# Patient Record
Sex: FEMALE | Race: WHITE | NOT HISPANIC OR LATINO | Employment: OTHER | URBAN - METROPOLITAN AREA
[De-identification: names, ages, dates, MRNs, and addresses within clinical notes are randomized per-mention and may not be internally consistent; named-entity substitution may affect disease eponyms.]

---

## 2017-03-21 ENCOUNTER — RESULTS ENCOUNTER (OUTPATIENT)
Dept: FAMILY MEDICINE CLINIC | Facility: CLINIC | Age: 72
End: 2017-03-21

## 2017-03-21 DIAGNOSIS — M13.0 ARTHRITIS OF MULTIPLE SITES: ICD-10-CM

## 2017-03-21 DIAGNOSIS — E03.9 HYPOTHYROIDISM, ACQUIRED: ICD-10-CM

## 2017-03-21 DIAGNOSIS — R73.01 IMPAIRED FASTING GLUCOSE: ICD-10-CM

## 2017-03-21 DIAGNOSIS — E78.2 MIXED HYPERLIPIDEMIA: ICD-10-CM

## 2017-03-21 DIAGNOSIS — G47.09 OTHER INSOMNIA: ICD-10-CM

## 2017-03-21 DIAGNOSIS — F41.9 ANXIETY: ICD-10-CM

## 2017-04-26 DIAGNOSIS — F41.9 ANXIETY: ICD-10-CM

## 2017-04-26 RX ORDER — PAROXETINE HYDROCHLORIDE 20 MG/1
TABLET, FILM COATED ORAL
Qty: 30 TABLET | Refills: 0 | Status: SHIPPED | OUTPATIENT
Start: 2017-04-26 | End: 2017-06-22 | Stop reason: SDUPTHER

## 2017-05-24 DIAGNOSIS — F41.9 ANXIETY: ICD-10-CM

## 2017-05-24 RX ORDER — PAROXETINE HYDROCHLORIDE 20 MG/1
TABLET, FILM COATED ORAL
Qty: 30 TABLET | Refills: 0 | OUTPATIENT
Start: 2017-05-24

## 2017-06-22 ENCOUNTER — OFFICE VISIT (OUTPATIENT)
Dept: FAMILY MEDICINE CLINIC | Facility: CLINIC | Age: 72
End: 2017-06-22

## 2017-06-22 VITALS
HEIGHT: 67 IN | RESPIRATION RATE: 16 BRPM | HEART RATE: 71 BPM | DIASTOLIC BLOOD PRESSURE: 80 MMHG | BODY MASS INDEX: 32.8 KG/M2 | WEIGHT: 209 LBS | OXYGEN SATURATION: 95 % | TEMPERATURE: 98.2 F | SYSTOLIC BLOOD PRESSURE: 140 MMHG

## 2017-06-22 DIAGNOSIS — M13.0 ARTHRITIS OF MULTIPLE SITES: ICD-10-CM

## 2017-06-22 DIAGNOSIS — E78.2 MIXED HYPERLIPIDEMIA: ICD-10-CM

## 2017-06-22 DIAGNOSIS — G47.09 OTHER INSOMNIA: ICD-10-CM

## 2017-06-22 DIAGNOSIS — H91.93 DECREASED HEARING, BILATERAL: Primary | ICD-10-CM

## 2017-06-22 DIAGNOSIS — E03.9 HYPOTHYROIDISM, ACQUIRED: ICD-10-CM

## 2017-06-22 DIAGNOSIS — F41.9 ANXIETY: ICD-10-CM

## 2017-06-22 PROCEDURE — 99214 OFFICE O/P EST MOD 30 MIN: CPT | Performed by: FAMILY MEDICINE

## 2017-06-22 RX ORDER — LEVOTHYROXINE SODIUM 0.12 MG/1
125 TABLET ORAL DAILY
Qty: 90 TABLET | Refills: 1 | Status: SHIPPED | OUTPATIENT
Start: 2017-06-22 | End: 2017-09-28 | Stop reason: SDUPTHER

## 2017-06-22 RX ORDER — PAROXETINE HYDROCHLORIDE 20 MG/1
20 TABLET, FILM COATED ORAL EVERY MORNING
Qty: 90 TABLET | Refills: 1 | Status: SHIPPED | OUTPATIENT
Start: 2017-06-22 | End: 2017-10-13 | Stop reason: ALTCHOICE

## 2017-06-22 RX ORDER — TRAZODONE HYDROCHLORIDE 150 MG/1
150 TABLET ORAL NIGHTLY
Qty: 90 TABLET | Refills: 1 | Status: SHIPPED | OUTPATIENT
Start: 2017-06-22 | End: 2017-09-28 | Stop reason: SDUPTHER

## 2017-06-22 RX ORDER — TRAZODONE HYDROCHLORIDE 150 MG/1
TABLET ORAL
Qty: 90 TABLET | Refills: 0 | OUTPATIENT
Start: 2017-06-22

## 2017-06-22 RX ORDER — DICLOFENAC SODIUM 75 MG/1
75 TABLET, DELAYED RELEASE ORAL DAILY
Qty: 90 TABLET | Refills: 1 | Status: SHIPPED | OUTPATIENT
Start: 2017-06-22 | End: 2017-10-13 | Stop reason: SDUPTHER

## 2017-06-22 RX ORDER — SIMVASTATIN 20 MG
20 TABLET ORAL NIGHTLY
Qty: 90 TABLET | Refills: 1 | Status: SHIPPED | OUTPATIENT
Start: 2017-06-22 | End: 2017-09-28 | Stop reason: SDUPTHER

## 2017-06-22 NOTE — PATIENT INSTRUCTIONS
Exercise 30 minutes most days of the week  Sleep 6-8 hours each night if possible  Low fat, low cholesterol diet   we discussed prescribed medications and how to take them   make sure you get results of any labs/studies ordered today  Low glycemic index diet     Do labs

## 2017-06-22 NOTE — PROGRESS NOTES
Subjective   Claudette L McManus is a 72 y.o. female.     History of Present Illness   Chief Complaint:   Chief Complaint   Patient presents with   • Hypertension   • Hyperlipidemia   • Hypothyroidism   • Anxiety   • Hyperglycemia       Claudette L McManus 72 y.o. female who presents today for Medical Management of the below listed issues and medication refills. Patient did not have labs as requested prior to her appointment. She stated that she is having decreased hearing and wishes to see a specialist for this issue.  Arthritis worse refer to dr delgadillo.  she has a history of   Patient Active Problem List   Diagnosis   • Acute pharyngitis   • Anxiety disorder   • Arthritis of multiple sites   • Back pain   • Bell's palsy   • Depression   • Essential hypertension   • Hyperlipidemia   • Hypothyroidism, acquired   • Impaired fasting glucose   • Insomnia   • Lumbar pain   • Sinusitis, acute   • Extremity pain   • OP (osteoporosis)   .  Since the last visit, she has overall felt well.  she has been compliant with   Current Outpatient Prescriptions:   •  aspirin 81 MG EC tablet, Take 81 mg by mouth daily. Take 1 tablet by oral route daily, Disp: , Rfl:   •  Calcium Carb-Cholecalciferol (CALCIUM + D3 PO), Take 1 tablet by mouth daily., Disp: , Rfl:   •  Cholecalciferol (VITAMIN D3) 2000 UNITS tablet, Take 1 capsule by mouth daily., Disp: , Rfl:   •  desonide (DESOWEN) 0.05 % ointment, Apply sparingly and rub gently into the affected area(s) by topical route 2 times per day for 10 days, Disp: , Rfl:   •  diclofenac (VOLTAREN) 75 MG EC tablet, Take 1 tablet by mouth Daily., Disp: 90 tablet, Rfl: 1  •  HYDROcodone-acetaminophen (NORCO) 5-325 MG per tablet, Take 1 tablet by mouth every 6 (six) hours as needed for moderate pain (4-6)., Disp: 60 tablet, Rfl: 0  •  levothyroxine (SYNTHROID, LEVOTHROID) 125 MCG tablet, Take 1 tablet by mouth Daily., Disp: 90 tablet, Rfl: 1  •  Multiple Vitamin (MULTIVITAMIN) tablet, Take 1 tablet  "by mouth daily., Disp: , Rfl:   •  ofloxacin (OCUFLOX) 0.3 % ophthalmic solution, , Disp: , Rfl:   •  PARoxetine (PAXIL) 20 MG tablet, TAKE ONE TABLET BY MOUTH EVERY MORNING, Disp: 30 tablet, Rfl: 0  •  simvastatin (ZOCOR) 20 MG tablet, Take 1 tablet by mouth Every Night., Disp: 90 tablet, Rfl: 1  •  traMADol (ULTRAM) 50 MG tablet, Take 1 tablet by mouth every 6 (six) hours as needed for moderate pain (4-6)., Disp: 60 tablet, Rfl: 0  •  traZODone (DESYREL) 150 MG tablet, Take 1 tablet by mouth Every Night., Disp: 90 tablet, Rfl: 0.  she denies medication side effects.    All of the chronic condition(s) listed above are stable w/o issues.    /94  Pulse 71  Temp 98.2 °F (36.8 °C) (Oral)   Resp 16  Ht 67\" (170.2 cm)  Wt 209 lb (94.8 kg)  SpO2 95%  BMI 32.73 kg/m2      The following portions of the patient's history were reviewed and updated as appropriate: allergies, current medications, past family history, past medical history, past social history, past surgical history and problem list.    Review of Systems   Constitutional: Negative for activity change, appetite change and unexpected weight change.   HENT: Positive for hearing loss.    Eyes: Negative for visual disturbance.   Respiratory: Negative for chest tightness and shortness of breath.    Cardiovascular: Negative for chest pain and palpitations.   Skin: Negative for color change.   Neurological: Negative for syncope and speech difficulty.   Psychiatric/Behavioral: Negative for confusion and decreased concentration.       Objective   Physical Exam   Constitutional: She is oriented to person, place, and time. She appears well-developed and well-nourished.   HENT:   Right Ear: External ear normal.   Left Ear: External ear normal.   Eyes: Pupils are equal, round, and reactive to light.   Neck: Normal range of motion. Neck supple. No thyromegaly present.   Cardiovascular: Normal rate and regular rhythm.    Pulmonary/Chest: Effort normal and breath " sounds normal.   Abdominal: Soft. Bowel sounds are normal.   Musculoskeletal:   Arthritis  Hands worse   Neurological: She is oriented to person, place, and time.   stress   Skin: No rash noted.   Psychiatric: She has a normal mood and affect. Her behavior is normal. Judgment and thought content normal.   Nursing note and vitals reviewed.      Assessment/Plan   Claudette was seen today for hypertension, hyperlipidemia, hypothyroidism, anxiety, hyperglycemia, arthritis and hearing problem.    Diagnoses and all orders for this visit:    Decreased hearing, bilateral  -     Ambulatory Referral to ENT (Otolaryngology)    Anxiety  -     PARoxetine (PAXIL) 20 MG tablet; Take 1 tablet by mouth Every Morning.    Mixed hyperlipidemia  -     simvastatin (ZOCOR) 20 MG tablet; Take 1 tablet by mouth Every Night.    Other insomnia  -     traZODone (DESYREL) 150 MG tablet; Take 1 tablet by mouth Every Night.    Hypothyroidism, acquired  -     levothyroxine (SYNTHROID, LEVOTHROID) 125 MCG tablet; Take 1 tablet by mouth Daily.    Arthritis of multiple sites  -     diclofenac (VOLTAREN) 75 MG EC tablet; Take 1 tablet by mouth Daily.  -     Ambulatory Referral to Rheumatology

## 2017-06-28 LAB
ALBUMIN SERPL-MCNC: 4.5 G/DL (ref 3.5–5.2)
ALBUMIN/GLOB SERPL: 2 G/DL
ALP SERPL-CCNC: 75 U/L (ref 39–117)
ALT SERPL-CCNC: 25 U/L (ref 1–33)
AST SERPL-CCNC: 27 U/L (ref 1–32)
BASOPHILS # BLD AUTO: 0.03 10*3/MM3 (ref 0–0.2)
BASOPHILS NFR BLD AUTO: 0.4 % (ref 0–1.5)
BILIRUB SERPL-MCNC: 0.3 MG/DL (ref 0.1–1.2)
BUN SERPL-MCNC: 14 MG/DL (ref 8–23)
BUN/CREAT SERPL: 15.1 (ref 7–25)
CALCIUM SERPL-MCNC: 10.3 MG/DL (ref 8.6–10.5)
CHLORIDE SERPL-SCNC: 98 MMOL/L (ref 98–107)
CHOLEST SERPL-MCNC: 203 MG/DL (ref 0–200)
CO2 SERPL-SCNC: 27.8 MMOL/L (ref 22–29)
CREAT SERPL-MCNC: 0.93 MG/DL (ref 0.57–1)
EOSINOPHIL # BLD AUTO: 0.34 10*3/MM3 (ref 0–0.7)
EOSINOPHIL NFR BLD AUTO: 4.4 % (ref 0.3–6.2)
ERYTHROCYTE [DISTWIDTH] IN BLOOD BY AUTOMATED COUNT: 13.3 % (ref 11.7–13)
GLOBULIN SER CALC-MCNC: 2.3 GM/DL
GLUCOSE SERPL-MCNC: 111 MG/DL (ref 65–99)
HBA1C MFR BLD: 6.4 % (ref 4.8–5.6)
HCT VFR BLD AUTO: 38.6 % (ref 35.6–45.5)
HDLC SERPL-MCNC: 59 MG/DL (ref 40–60)
HGB BLD-MCNC: 12.7 G/DL (ref 11.9–15.5)
IMM GRANULOCYTES # BLD: 0.02 10*3/MM3 (ref 0–0.03)
IMM GRANULOCYTES NFR BLD: 0.3 % (ref 0–0.5)
LDLC SERPL CALC-MCNC: 111 MG/DL (ref 0–100)
LYMPHOCYTES # BLD AUTO: 1.93 10*3/MM3 (ref 0.9–4.8)
LYMPHOCYTES NFR BLD AUTO: 24.8 % (ref 19.6–45.3)
MCH RBC QN AUTO: 32.2 PG (ref 26.9–32)
MCHC RBC AUTO-ENTMCNC: 32.9 G/DL (ref 32.4–36.3)
MCV RBC AUTO: 98 FL (ref 80.5–98.2)
MONOCYTES # BLD AUTO: 0.42 10*3/MM3 (ref 0.2–1.2)
MONOCYTES NFR BLD AUTO: 5.4 % (ref 5–12)
NEUTROPHILS # BLD AUTO: 5.04 10*3/MM3 (ref 1.9–8.1)
NEUTROPHILS NFR BLD AUTO: 64.7 % (ref 42.7–76)
PLATELET # BLD AUTO: 230 10*3/MM3 (ref 140–500)
POTASSIUM SERPL-SCNC: 4.6 MMOL/L (ref 3.5–5.2)
PROT SERPL-MCNC: 6.8 G/DL (ref 6–8.5)
RBC # BLD AUTO: 3.94 10*6/MM3 (ref 3.9–5.2)
SODIUM SERPL-SCNC: 141 MMOL/L (ref 136–145)
TRIGL SERPL-MCNC: 164 MG/DL (ref 0–150)
TSH SERPL DL<=0.005 MIU/L-ACNC: 2.48 MIU/ML (ref 0.27–4.2)
VLDLC SERPL CALC-MCNC: 32.8 MG/DL (ref 5–40)
WBC # BLD AUTO: 7.78 10*3/MM3 (ref 4.5–10.7)

## 2017-07-13 DIAGNOSIS — I10 ESSENTIAL HYPERTENSION: Primary | ICD-10-CM

## 2017-07-13 DIAGNOSIS — R73.01 IMPAIRED FASTING GLUCOSE: ICD-10-CM

## 2017-07-13 DIAGNOSIS — E03.9 HYPOTHYROIDISM, ACQUIRED: ICD-10-CM

## 2017-07-13 DIAGNOSIS — E78.5 HYPERLIPIDEMIA, UNSPECIFIED HYPERLIPIDEMIA TYPE: ICD-10-CM

## 2017-09-28 DIAGNOSIS — G47.09 OTHER INSOMNIA: ICD-10-CM

## 2017-09-28 DIAGNOSIS — E03.9 HYPOTHYROIDISM, ACQUIRED: ICD-10-CM

## 2017-09-28 DIAGNOSIS — E78.2 MIXED HYPERLIPIDEMIA: ICD-10-CM

## 2017-09-28 RX ORDER — LEVOTHYROXINE SODIUM 0.12 MG/1
125 TABLET ORAL DAILY
Qty: 30 TABLET | Refills: 0 | Status: SHIPPED | OUTPATIENT
Start: 2017-09-28 | End: 2017-10-13 | Stop reason: SDUPTHER

## 2017-09-28 RX ORDER — TRAZODONE HYDROCHLORIDE 150 MG/1
150 TABLET ORAL NIGHTLY
Qty: 30 TABLET | Refills: 0 | Status: SHIPPED | OUTPATIENT
Start: 2017-09-28 | End: 2017-10-13 | Stop reason: SDUPTHER

## 2017-09-28 RX ORDER — SIMVASTATIN 20 MG
20 TABLET ORAL NIGHTLY
Qty: 30 TABLET | Refills: 0 | Status: SHIPPED | OUTPATIENT
Start: 2017-09-28 | End: 2017-10-13 | Stop reason: SDUPTHER

## 2017-09-28 RX ORDER — SIMVASTATIN 20 MG
TABLET ORAL
Qty: 90 TABLET | Refills: 0 | OUTPATIENT
Start: 2017-09-28

## 2017-09-28 RX ORDER — LEVOTHYROXINE SODIUM 0.12 MG/1
TABLET ORAL
Qty: 90 TABLET | Refills: 0 | OUTPATIENT
Start: 2017-09-28

## 2017-09-28 RX ORDER — TRAZODONE HYDROCHLORIDE 150 MG/1
TABLET ORAL
Qty: 90 TABLET | Refills: 0 | OUTPATIENT
Start: 2017-09-28

## 2017-10-13 ENCOUNTER — OFFICE VISIT (OUTPATIENT)
Dept: FAMILY MEDICINE CLINIC | Facility: CLINIC | Age: 72
End: 2017-10-13

## 2017-10-13 VITALS
BODY MASS INDEX: 32.33 KG/M2 | WEIGHT: 206 LBS | RESPIRATION RATE: 16 BRPM | TEMPERATURE: 98.8 F | DIASTOLIC BLOOD PRESSURE: 80 MMHG | HEIGHT: 67 IN | OXYGEN SATURATION: 93 % | HEART RATE: 86 BPM | SYSTOLIC BLOOD PRESSURE: 130 MMHG

## 2017-10-13 DIAGNOSIS — E78.2 MIXED HYPERLIPIDEMIA: ICD-10-CM

## 2017-10-13 DIAGNOSIS — M13.0 ARTHRITIS OF MULTIPLE SITES: ICD-10-CM

## 2017-10-13 DIAGNOSIS — G47.09 OTHER INSOMNIA: ICD-10-CM

## 2017-10-13 DIAGNOSIS — F41.9 ANXIETY: Primary | ICD-10-CM

## 2017-10-13 DIAGNOSIS — Z12.39 SCREENING FOR BREAST CANCER: ICD-10-CM

## 2017-10-13 DIAGNOSIS — E03.9 HYPOTHYROIDISM, ACQUIRED: ICD-10-CM

## 2017-10-13 PROCEDURE — 99214 OFFICE O/P EST MOD 30 MIN: CPT | Performed by: FAMILY MEDICINE

## 2017-10-13 PROCEDURE — G0008 ADMIN INFLUENZA VIRUS VAC: HCPCS | Performed by: FAMILY MEDICINE

## 2017-10-13 RX ORDER — BUSPIRONE HYDROCHLORIDE 5 MG/1
5 TABLET ORAL 3 TIMES DAILY
Qty: 90 TABLET | Refills: 0 | Status: SHIPPED | OUTPATIENT
Start: 2017-10-13 | End: 2017-11-02 | Stop reason: SDUPTHER

## 2017-10-13 RX ORDER — DICLOFENAC SODIUM 75 MG/1
75 TABLET, DELAYED RELEASE ORAL DAILY
Qty: 90 TABLET | Refills: 1 | Status: SHIPPED | OUTPATIENT
Start: 2017-10-13 | End: 2019-01-15

## 2017-10-13 RX ORDER — SIMVASTATIN 20 MG
20 TABLET ORAL NIGHTLY
Qty: 90 TABLET | Refills: 1 | Status: SHIPPED | OUTPATIENT
Start: 2017-10-13 | End: 2018-05-04 | Stop reason: SDUPTHER

## 2017-10-13 RX ORDER — TRAZODONE HYDROCHLORIDE 150 MG/1
150 TABLET ORAL NIGHTLY
Qty: 90 TABLET | Refills: 1 | Status: SHIPPED | OUTPATIENT
Start: 2017-10-13 | End: 2018-05-04 | Stop reason: SDUPTHER

## 2017-10-13 RX ORDER — DULOXETIN HYDROCHLORIDE 30 MG/1
30 CAPSULE, DELAYED RELEASE ORAL DAILY
COMMUNITY
Start: 2017-09-26 | End: 2017-12-08 | Stop reason: DRUGHIGH

## 2017-10-13 RX ORDER — LEVOTHYROXINE SODIUM 0.12 MG/1
125 TABLET ORAL DAILY
Qty: 90 TABLET | Refills: 1 | Status: SHIPPED | OUTPATIENT
Start: 2017-10-13 | End: 2018-05-04 | Stop reason: SDUPTHER
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
## 2017-10-13 NOTE — PATIENT INSTRUCTIONS
Exercise 30 minutes most days of the week  Sleep 6-8 hours each night if possible  Low fat, low cholesterol diet   we discussed prescribed medications and how to take them   make sure you get results of any labs/studies ordered today  Low glycemic index diet   Labs due  Call me 3 weeks about buspar

## 2017-10-23 ENCOUNTER — HOSPITAL ENCOUNTER (OUTPATIENT)
Dept: MAMMOGRAPHY | Facility: HOSPITAL | Age: 72
Discharge: HOME | End: 2017-10-23

## 2017-11-01 ENCOUNTER — RESULTS ENCOUNTER (OUTPATIENT)
Dept: FAMILY MEDICINE CLINIC | Facility: CLINIC | Age: 72
End: 2017-11-01

## 2017-11-01 DIAGNOSIS — R73.01 IMPAIRED FASTING GLUCOSE: ICD-10-CM

## 2017-11-01 DIAGNOSIS — I10 ESSENTIAL HYPERTENSION: ICD-10-CM

## 2017-11-01 DIAGNOSIS — E78.5 HYPERLIPIDEMIA, UNSPECIFIED HYPERLIPIDEMIA TYPE: ICD-10-CM

## 2017-11-01 DIAGNOSIS — E03.9 HYPOTHYROIDISM, ACQUIRED: ICD-10-CM

## 2017-11-02 RX ORDER — BUSPIRONE HYDROCHLORIDE 5 MG/1
5 TABLET ORAL 3 TIMES DAILY
Qty: 90 TABLET | Refills: 4 | Status: SHIPPED | OUTPATIENT
Start: 2017-11-02 | End: 2018-04-16 | Stop reason: SDUPTHER

## 2017-12-01 LAB
ALBUMIN SERPL-MCNC: 4.4 G/DL (ref 3.5–5.2)
ALBUMIN/GLOB SERPL: 1.5 G/DL
ALP SERPL-CCNC: 82 U/L (ref 39–117)
ALT SERPL-CCNC: 22 U/L (ref 1–33)
AST SERPL-CCNC: 22 U/L (ref 1–32)
BILIRUB SERPL-MCNC: 0.4 MG/DL (ref 0.1–1.2)
BUN SERPL-MCNC: 22 MG/DL (ref 8–23)
BUN/CREAT SERPL: 21.8 (ref 7–25)
CALCIUM SERPL-MCNC: 10.3 MG/DL (ref 8.6–10.5)
CHLORIDE SERPL-SCNC: 101 MMOL/L (ref 98–107)
CHOLEST SERPL-MCNC: 194 MG/DL (ref 0–200)
CO2 SERPL-SCNC: 28.2 MMOL/L (ref 22–29)
CREAT SERPL-MCNC: 1.01 MG/DL (ref 0.57–1)
GFR SERPLBLD CREATININE-BSD FMLA CKD-EPI: 54 ML/MIN/1.73
GFR SERPLBLD CREATININE-BSD FMLA CKD-EPI: 65 ML/MIN/1.73
GLOBULIN SER CALC-MCNC: 3 GM/DL
GLUCOSE SERPL-MCNC: 132 MG/DL (ref 65–99)
HBA1C MFR BLD: 6.6 % (ref 4.8–5.6)
HDLC SERPL-MCNC: 62 MG/DL (ref 40–60)
LDLC SERPL CALC-MCNC: 106 MG/DL (ref 0–100)
POTASSIUM SERPL-SCNC: 4.3 MMOL/L (ref 3.5–5.2)
PROT SERPL-MCNC: 7.4 G/DL (ref 6–8.5)
SODIUM SERPL-SCNC: 142 MMOL/L (ref 136–145)
TRIGL SERPL-MCNC: 130 MG/DL (ref 0–150)
VLDLC SERPL CALC-MCNC: 26 MG/DL (ref 5–40)

## 2017-12-08 ENCOUNTER — OFFICE VISIT (OUTPATIENT)
Dept: FAMILY MEDICINE CLINIC | Facility: CLINIC | Age: 72
End: 2017-12-08

## 2017-12-08 VITALS
HEIGHT: 67 IN | BODY MASS INDEX: 32.33 KG/M2 | HEART RATE: 89 BPM | SYSTOLIC BLOOD PRESSURE: 160 MMHG | OXYGEN SATURATION: 92 % | WEIGHT: 206 LBS | TEMPERATURE: 97.8 F | RESPIRATION RATE: 16 BRPM | DIASTOLIC BLOOD PRESSURE: 94 MMHG

## 2017-12-08 DIAGNOSIS — R73.01 IMPAIRED FASTING GLUCOSE: Primary | ICD-10-CM

## 2017-12-08 DIAGNOSIS — I10 ESSENTIAL HYPERTENSION: ICD-10-CM

## 2017-12-08 DIAGNOSIS — F41.9 ANXIETY DISORDER, UNSPECIFIED TYPE: ICD-10-CM

## 2017-12-08 DIAGNOSIS — E78.2 MIXED HYPERLIPIDEMIA: ICD-10-CM

## 2017-12-08 PROCEDURE — 99214 OFFICE O/P EST MOD 30 MIN: CPT | Performed by: FAMILY MEDICINE

## 2017-12-08 RX ORDER — DULOXETIN HYDROCHLORIDE 60 MG/1
CAPSULE, DELAYED RELEASE ORAL
COMMUNITY
Start: 2017-11-17 | End: 2019-03-07 | Stop reason: SDUPTHER

## 2017-12-08 NOTE — PROGRESS NOTES
Subjective   Claudette L McManus is a 72 y.o. female.     History of Present Illness   Chief Complaint:   Chief Complaint   Patient presents with   • Anxiety   • Hypertension   • Hyperlipidemia   • impaired fasting glucose       Claudette L McManus 72 y.o. female who presents today for Medical Management of the below listed issues and to follow up on increased anxiety. At her last appointment I added buspar 5mg tid prn for her anxiety. She stated that this has helped her. Her Rheumatologist increased her Cymbalta from 30 mg to 60 mg. I reviewed her lab results.    she has a problem list of   Patient Active Problem List   Diagnosis   • Acute pharyngitis   • Anxiety disorder   • Arthritis of multiple sites   • Back pain   • Bell's palsy   • Depression   • Essential hypertension   • Hyperlipidemia   • Hypothyroidism, acquired   • Impaired fasting glucose   • Insomnia   • Lumbar pain   • Sinusitis, acute   • Extremity pain   • OP (osteoporosis)   .  Since the last visit, she has overall felt well.  she has been compliant with   Current Outpatient Prescriptions:   •  busPIRone (BUSPAR) 5 MG tablet, Take 1 tablet by mouth 3 (Three) Times a Day., Disp: 90 tablet, Rfl: 4  •  Calcium Carb-Cholecalciferol (CALCIUM + D3 PO), Take 1 tablet by mouth daily., Disp: , Rfl:   •  Cholecalciferol (VITAMIN D3) 2000 UNITS tablet, Take 1 capsule by mouth daily., Disp: , Rfl:   •  diclofenac (VOLTAREN) 75 MG EC tablet, Take 1 tablet by mouth Daily., Disp: 90 tablet, Rfl: 1  •  DULoxetine (CYMBALTA) 60 MG capsule, , Disp: , Rfl:   •  levothyroxine (SYNTHROID, LEVOTHROID) 125 MCG tablet, Take 1 tablet by mouth Daily., Disp: 90 tablet, Rfl: 1  •  Multiple Vitamin (MULTIVITAMIN) tablet, Take 1 tablet by mouth daily., Disp: , Rfl:   •  simvastatin (ZOCOR) 20 MG tablet, Take 1 tablet by mouth Every Night., Disp: 90 tablet, Rfl: 1  •  traZODone (DESYREL) 150 MG tablet, Take 1 tablet by mouth Every Night., Disp: 90 tablet, Rfl: 1.  she denies  "medication side effects.    All of the chronic condition(s) listed above are stable w/o issues.    /94  Pulse 89  Temp 97.8 °F (36.6 °C) (Oral)   Resp 16  Ht 170.2 cm (67\")  Wt 93.4 kg (206 lb)  SpO2 92%  BMI 32.26 kg/m2    Results for orders placed or performed in visit on 11/01/17   Comprehensive metabolic panel   Result Value Ref Range    Glucose 132 (H) 65 - 99 mg/dL    BUN 22 8 - 23 mg/dL    Creatinine 1.01 (H) 0.57 - 1.00 mg/dL    eGFR Non African Am 54 (L) >60 mL/min/1.73    eGFR African Am 65 >60 mL/min/1.73    BUN/Creatinine Ratio 21.8 7.0 - 25.0    Sodium 142 136 - 145 mmol/L    Potassium 4.3 3.5 - 5.2 mmol/L    Chloride 101 98 - 107 mmol/L    Total CO2 28.2 22.0 - 29.0 mmol/L    Calcium 10.3 8.6 - 10.5 mg/dL    Total Protein 7.4 6.0 - 8.5 g/dL    Albumin 4.40 3.50 - 5.20 g/dL    Globulin 3.0 gm/dL    A/G Ratio 1.5 g/dL    Total Bilirubin 0.4 0.1 - 1.2 mg/dL    Alkaline Phosphatase 82 39 - 117 U/L    AST (SGOT) 22 1 - 32 U/L    ALT (SGPT) 22 1 - 33 U/L   Lipid panel   Result Value Ref Range    Total Cholesterol 194 0 - 200 mg/dL    Triglycerides 130 0 - 150 mg/dL    HDL Cholesterol 62 (H) 40 - 60 mg/dL    VLDL Cholesterol 26 5 - 40 mg/dL    LDL Cholesterol  106 (H) 0 - 100 mg/dL   Hemoglobin A1c   Result Value Ref Range    Hemoglobin A1C 6.60 (H) 4.80 - 5.60 %           The following portions of the patient's history were reviewed and updated as appropriate: allergies, current medications, past family history, past medical history, past social history, past surgical history and problem list.    Review of Systems   Constitutional: Negative for activity change, appetite change and unexpected weight change.   Eyes: Negative for visual disturbance.   Respiratory: Negative for chest tightness and shortness of breath.    Cardiovascular: Negative for chest pain and palpitations.   Skin: Negative for color change.   Neurological: Negative for syncope and speech difficulty.   Psychiatric/Behavioral: " Negative for confusion and decreased concentration.       Objective   Physical Exam   Constitutional: She is oriented to person, place, and time. She appears well-developed and well-nourished.   HENT:   Right Ear: External ear normal.   Left Ear: External ear normal.   Mouth/Throat: Oropharynx is clear and moist.   Eyes: Conjunctivae are normal. Pupils are equal, round, and reactive to light.   Neck: Normal range of motion. Neck supple. No thyromegaly present.   Cardiovascular: Normal rate and regular rhythm.    Pulmonary/Chest: Effort normal and breath sounds normal.   Abdominal: Soft. Bowel sounds are normal.   Musculoskeletal: Normal range of motion.   Neurological: She is alert and oriented to person, place, and time. She has normal reflexes.   Skin: No rash noted.   Psychiatric: She has a normal mood and affect. Her behavior is normal. Thought content normal.   Nursing note and vitals reviewed.      Assessment/Plan   Claudette was seen today for anxiety, hypertension, hyperlipidemia and impaired fasting glucose.    Diagnoses and all orders for this visit:    Impaired fasting glucose  -     Lipid Panel; Future  -     Hemoglobin A1c; Future  -     Comprehensive Metabolic Panel; Future    Mixed hyperlipidemia  -     Lipid Panel; Future  -     Hemoglobin A1c; Future  -     Comprehensive Metabolic Panel; Future    Essential hypertension  -     Lipid Panel; Future  -     Hemoglobin A1c; Future  -     Comprehensive Metabolic Panel; Future    Anxiety disorder, unspecified type  -     Lipid Panel; Future  -     Hemoglobin A1c; Future  -     Comprehensive Metabolic Panel; Future

## 2017-12-08 NOTE — PATIENT INSTRUCTIONS
Exercise 30 minutes most days of the week  Sleep 6-8 hours each night if possible  Low fat, low cholesterol diet   we discussed prescribed medications and how to take them   make sure you get results of any labs/studies ordered today  Low glycemic index diet     Increase exercise

## 2017-12-17 DIAGNOSIS — F41.9 ANXIETY: ICD-10-CM

## 2017-12-18 RX ORDER — PAROXETINE HYDROCHLORIDE 20 MG/1
TABLET, FILM COATED ORAL
Qty: 90 TABLET | Refills: 0 | Status: SHIPPED | OUTPATIENT
Start: 2017-12-18 | End: 2019-01-15

## 2018-01-02 ENCOUNTER — OFFICE VISIT (OUTPATIENT)
Dept: FAMILY MEDICINE CLINIC | Facility: CLINIC | Age: 73
End: 2018-01-02

## 2018-01-02 VITALS
WEIGHT: 203 LBS | BODY MASS INDEX: 31.86 KG/M2 | SYSTOLIC BLOOD PRESSURE: 138 MMHG | OXYGEN SATURATION: 94 % | RESPIRATION RATE: 16 BRPM | HEART RATE: 86 BPM | DIASTOLIC BLOOD PRESSURE: 81 MMHG | TEMPERATURE: 97.6 F | HEIGHT: 67 IN

## 2018-01-02 DIAGNOSIS — J18.9 PNEUMONIA OF BOTH LOWER LOBES DUE TO INFECTIOUS ORGANISM: ICD-10-CM

## 2018-01-02 DIAGNOSIS — J06.9 ACUTE URI: Primary | ICD-10-CM

## 2018-01-02 PROCEDURE — 71046 X-RAY EXAM CHEST 2 VIEWS: CPT | Performed by: FAMILY MEDICINE

## 2018-01-02 PROCEDURE — 99214 OFFICE O/P EST MOD 30 MIN: CPT | Performed by: FAMILY MEDICINE

## 2018-01-02 RX ORDER — CEFDINIR 300 MG/1
300 CAPSULE ORAL 2 TIMES DAILY
Qty: 20 CAPSULE | Refills: 0 | Status: SHIPPED | OUTPATIENT
Start: 2018-01-02 | End: 2018-06-12

## 2018-01-02 NOTE — PROGRESS NOTES
Subjective   Claudette L McManus is a 72 y.o. female.     History of Present Illness   Chief Complaint:   Chief Complaint   Patient presents with   • ARASHI       Claudette L McManus 72 y.o. female who presents for evaluation of upper respiratory congestion with sinusitis. Symptoms include congestion, post nasal drip and productive cough.  Onset of symptoms was 10 days ago, gradually worsening since that time. Patient denies fever.   Evaluation to date: none Treatment to date:  OTC cough suppressant, Mucinex and cough drops.      she has a problem list of   Patient Active Problem List   Diagnosis   • Acute pharyngitis   • Anxiety disorder   • Arthritis of multiple sites   • Back pain   • Bell's palsy   • Depression   • Essential hypertension   • Hyperlipidemia   • Hypothyroidism, acquired   • Impaired fasting glucose   • Insomnia   • Lumbar pain   • Sinusitis, acute   • Extremity pain   • OP (osteoporosis)   .  Since the last visit, she has overall felt well.  she has been compliant with   Current Outpatient Prescriptions:   •  busPIRone (BUSPAR) 5 MG tablet, Take 1 tablet by mouth 3 (Three) Times a Day., Disp: 90 tablet, Rfl: 4  •  Calcium Carb-Cholecalciferol (CALCIUM + D3 PO), Take 1 tablet by mouth daily., Disp: , Rfl:   •  Cholecalciferol (VITAMIN D3) 2000 UNITS tablet, Take 1 capsule by mouth daily., Disp: , Rfl:   •  diclofenac (VOLTAREN) 75 MG EC tablet, Take 1 tablet by mouth Daily., Disp: 90 tablet, Rfl: 1  •  DULoxetine (CYMBALTA) 60 MG capsule, , Disp: , Rfl:   •  levothyroxine (SYNTHROID, LEVOTHROID) 125 MCG tablet, Take 1 tablet by mouth Daily., Disp: 90 tablet, Rfl: 1  •  Multiple Vitamin (MULTIVITAMIN) tablet, Take 1 tablet by mouth daily., Disp: , Rfl:   •  PARoxetine (PAXIL) 20 MG tablet, TAKE ONE TABLET BY MOUTH EVERY MORNING, Disp: 90 tablet, Rfl: 0  •  simvastatin (ZOCOR) 20 MG tablet, Take 1 tablet by mouth Every Night., Disp: 90 tablet, Rfl: 1  •  traZODone (DESYREL) 150 MG tablet, Take 1 tablet  "by mouth Every Night., Disp: 90 tablet, Rfl: 1.  she denies medication side effects.    All of the chronic condition(s) listed above are stable w/o issues.    /86  Pulse 86  Temp 97.6 °F (36.4 °C) (Oral)   Resp 16  Ht 170.2 cm (67\")  Wt 92.1 kg (203 lb)  SpO2 94%  BMI 31.79 kg/m2    The following portions of the patient's history were reviewed and updated as appropriate: allergies, current medications, past family history, past medical history, past social history, past surgical history and problem list.    Review of Systems   Constitutional: Negative for activity change, appetite change and unexpected weight change.   HENT: Positive for congestion and sinus pressure.    Eyes: Negative for visual disturbance.   Respiratory: Positive for cough. Negative for chest tightness and shortness of breath.    Cardiovascular: Negative for chest pain and palpitations.   Skin: Negative for color change.   Neurological: Negative for syncope and speech difficulty.   Psychiatric/Behavioral: Negative for confusion and decreased concentration.       Objective   Physical Exam   Constitutional: She is oriented to person, place, and time. She appears well-developed and well-nourished.   HENT:   Head: Normocephalic and atraumatic.   Right Ear: External ear normal.   Left Ear: External ear normal.   Mouth/Throat: Oropharynx is clear and moist.   Eyes: EOM are normal. Pupils are equal, round, and reactive to light.   Neck: Normal range of motion. Neck supple. No thyromegaly present.   Cardiovascular: Normal rate and regular rhythm.    Pulmonary/Chest: Effort normal. No respiratory distress. She has wheezes. She has no rales. She exhibits no tenderness.   Abdominal: Soft. Bowel sounds are normal.   Musculoskeletal: Normal range of motion.   Lymphadenopathy:     She has no cervical adenopathy.   Neurological: She is alert and oriented to person, place, and time.   Skin: Skin is warm and dry.   Psychiatric: She has a normal mood " and affect. Her behavior is normal.   Nursing note and vitals reviewed.  Views: posterior-anterior    Relevant Clinical Issues/Diagnoses/Indications for XRay: see HPI  Clinical Findings: Chest: was positive for infiltrate in  Both lower lobes    Compared with previous XRay? no    Date of Previous Xray:none    Changes on current Xray? yes    Assessment/Plan   Claudette was seen today for uri.    Diagnoses and all orders for this visit:    Acute URI  -     XR Chest PA & Lateral    Pneumonia of both lower lobes due to infectious organism    Other orders  -     cefdinir (OMNICEF) 300 MG capsule; Take 1 capsule by mouth 2 (Two) Times a Day.

## 2018-01-02 NOTE — PATIENT INSTRUCTIONS
Rest  Increase fluids   RTC prn or 1 week if not better   Take mucinex DM  Take Zyrtec or Claritin   OTC cough med prn      Ibuprofen for pain and fever control                                                        Tylenol(acetominophen) for pain and fever control    Saline nasal spray 2 sprays to each nostril 6 times daily     phenergan with codiene cough syrup  40z

## 2018-01-09 ENCOUNTER — OFFICE VISIT (OUTPATIENT)
Dept: FAMILY MEDICINE CLINIC | Facility: CLINIC | Age: 73
End: 2018-01-09

## 2018-01-09 VITALS
SYSTOLIC BLOOD PRESSURE: 161 MMHG | TEMPERATURE: 98.1 F | BODY MASS INDEX: 31.71 KG/M2 | DIASTOLIC BLOOD PRESSURE: 89 MMHG | HEIGHT: 67 IN | WEIGHT: 202 LBS | OXYGEN SATURATION: 93 % | HEART RATE: 88 BPM | RESPIRATION RATE: 16 BRPM

## 2018-01-09 DIAGNOSIS — J18.9 PNEUMONIA OF RIGHT LUNG DUE TO INFECTIOUS ORGANISM, UNSPECIFIED PART OF LUNG: Primary | ICD-10-CM

## 2018-01-09 PROCEDURE — 71046 X-RAY EXAM CHEST 2 VIEWS: CPT | Performed by: FAMILY MEDICINE

## 2018-01-09 PROCEDURE — 99214 OFFICE O/P EST MOD 30 MIN: CPT | Performed by: FAMILY MEDICINE

## 2018-01-09 NOTE — PATIENT INSTRUCTIONS
Exercise 30 minutes most days of the week  Sleep 6-8 hours each night if possible  Low fat, low cholesterol diet   we discussed prescribed medications and how to take them   make sure you get results of any labs/studies ordered today  Low glycemic index diet   Finish meds  Will call you cxr report

## 2018-01-09 NOTE — PROGRESS NOTES
Subjective   Claudette L McManus is a 72 y.o. female.     History of Present Illness   Chief Complaint:   Chief Complaint   Patient presents with   • Pneumonia       Claudette L McManus 72 y.o. female who presents today to follow up for pneumonia. She had a repeat CXR in the office today. She stated that she had been taking Omnicef twice a day with her Mucinex .. She stated that her symptoms are better but still has a cough and PND.  she has a problem list of   Patient Active Problem List   Diagnosis   • Acute pharyngitis   • Anxiety disorder   • Arthritis of multiple sites   • Back pain   • Bell's palsy   • Depression   • Essential hypertension   • Hyperlipidemia   • Hypothyroidism, acquired   • Impaired fasting glucose   • Insomnia   • Lumbar pain   • Sinusitis, acute   • Extremity pain   • OP (osteoporosis)   .  Since the last visit, she has overall felt well.  she has been compliant with   Current Outpatient Prescriptions:   •  busPIRone (BUSPAR) 5 MG tablet, Take 1 tablet by mouth 3 (Three) Times a Day., Disp: 90 tablet, Rfl: 4  •  Calcium Carb-Cholecalciferol (CALCIUM + D3 PO), Take 1 tablet by mouth daily., Disp: , Rfl:   •  cefdinir (OMNICEF) 300 MG capsule, Take 1 capsule by mouth 2 (Two) Times a Day., Disp: 20 capsule, Rfl: 0  •  Cholecalciferol (VITAMIN D3) 2000 UNITS tablet, Take 1 capsule by mouth daily., Disp: , Rfl:   •  diclofenac (VOLTAREN) 75 MG EC tablet, Take 1 tablet by mouth Daily., Disp: 90 tablet, Rfl: 1  •  DULoxetine (CYMBALTA) 60 MG capsule, , Disp: , Rfl:   •  levothyroxine (SYNTHROID, LEVOTHROID) 125 MCG tablet, Take 1 tablet by mouth Daily., Disp: 90 tablet, Rfl: 1  •  Multiple Vitamin (MULTIVITAMIN) tablet, Take 1 tablet by mouth daily., Disp: , Rfl:   •  PARoxetine (PAXIL) 20 MG tablet, TAKE ONE TABLET BY MOUTH EVERY MORNING, Disp: 90 tablet, Rfl: 0  •  simvastatin (ZOCOR) 20 MG tablet, Take 1 tablet by mouth Every Night., Disp: 90 tablet, Rfl: 1  •  traZODone (DESYREL) 150 MG tablet,  "Take 1 tablet by mouth Every Night., Disp: 90 tablet, Rfl: 1.  she denies medication side effects.    All of the chronic condition(s) listed above are stable w/o issues.    /83  Pulse 88  Temp 98.1 °F (36.7 °C) (Oral)   Resp 16  Ht 170.2 cm (67\")  Wt 91.6 kg (202 lb)  SpO2 93%  BMI 31.64 kg/m2    The following portions of the patient's history were reviewed and updated as appropriate: allergies, current medications, past family history, past medical history, past social history, past surgical history and problem list.    Review of Systems   Constitutional: Negative for activity change, appetite change and unexpected weight change.   HENT: Positive for postnasal drip.    Eyes: Negative for visual disturbance.   Respiratory: Positive for cough. Negative for chest tightness and shortness of breath.    Cardiovascular: Negative for chest pain and palpitations.   Skin: Negative for color change.   Neurological: Negative for syncope and speech difficulty.   Psychiatric/Behavioral: Negative for confusion and decreased concentration.       Objective   Physical Exam   Constitutional: She is oriented to person, place, and time. She appears well-developed and well-nourished.   HENT:   Head: Normocephalic.   Right Ear: External ear normal.   Left Ear: External ear normal.   Eyes: EOM are normal. Pupils are equal, round, and reactive to light.   Neck: Normal range of motion. Neck supple.   Cardiovascular: Normal rate and regular rhythm.    Pulmonary/Chest: Effort normal and breath sounds normal. No respiratory distress. She has no wheezes. She has no rales.   Abdominal: Soft. Bowel sounds are normal.   Musculoskeletal: Normal range of motion.   Neurological: She is alert and oriented to person, place, and time.   Nursing note and vitals reviewed.    Views: lateral and posterior-anterior    Relevant Clinical Issues/Diagnoses/Indications for XRay: see HPI  Clinical Findings: Chest: was negative for infiltrate, " effusion, pneumothorax, or wide mediastinum    Compared with previous XRay? yes    Date of Previous Xray:January 2, 2018    Changes on current Xray? no    Assessment/Plan   Claudette was seen today for pneumonia.    Diagnoses and all orders for this visit:    Pneumonia of right lung due to infectious organism, unspecified part of lung  -     XR Chest PA & Lateral

## 2018-02-08 ENCOUNTER — RESULTS ENCOUNTER (OUTPATIENT)
Dept: FAMILY MEDICINE CLINIC | Facility: CLINIC | Age: 73
End: 2018-02-08

## 2018-02-08 DIAGNOSIS — R73.01 IMPAIRED FASTING GLUCOSE: ICD-10-CM

## 2018-02-08 DIAGNOSIS — E78.2 MIXED HYPERLIPIDEMIA: ICD-10-CM

## 2018-02-08 DIAGNOSIS — F41.9 ANXIETY DISORDER, UNSPECIFIED TYPE: ICD-10-CM

## 2018-02-08 DIAGNOSIS — I10 ESSENTIAL HYPERTENSION: ICD-10-CM

## 2018-04-16 RX ORDER — BUSPIRONE HYDROCHLORIDE 5 MG/1
TABLET ORAL
Qty: 90 TABLET | Refills: 0 | Status: SHIPPED | OUTPATIENT
Start: 2018-04-16 | End: 2018-06-12 | Stop reason: SDUPTHER

## 2018-05-04 DIAGNOSIS — E03.9 HYPOTHYROIDISM, ACQUIRED: ICD-10-CM

## 2018-05-04 DIAGNOSIS — E78.2 MIXED HYPERLIPIDEMIA: ICD-10-CM

## 2018-05-04 DIAGNOSIS — G47.09 OTHER INSOMNIA: ICD-10-CM

## 2018-05-04 RX ORDER — TRAZODONE HYDROCHLORIDE 150 MG/1
TABLET ORAL
Qty: 30 TABLET | Refills: 0 | Status: SHIPPED | OUTPATIENT
Start: 2018-05-04 | End: 2018-06-02 | Stop reason: SDUPTHER

## 2018-05-04 RX ORDER — LEVOTHYROXINE SODIUM 0.12 MG/1
TABLET ORAL
Qty: 30 TABLET | Refills: 0 | Status: SHIPPED | OUTPATIENT
Start: 2018-05-04 | End: 2018-06-02 | Stop reason: SDUPTHER

## 2018-05-04 RX ORDER — SIMVASTATIN 20 MG
TABLET ORAL
Qty: 30 TABLET | Refills: 0 | Status: SHIPPED | OUTPATIENT
Start: 2018-05-04 | End: 2018-06-02 | Stop reason: SDUPTHER

## 2018-06-02 DIAGNOSIS — G47.09 OTHER INSOMNIA: ICD-10-CM

## 2018-06-02 DIAGNOSIS — E78.2 MIXED HYPERLIPIDEMIA: ICD-10-CM

## 2018-06-02 DIAGNOSIS — E03.9 HYPOTHYROIDISM, ACQUIRED: ICD-10-CM

## 2018-06-04 RX ORDER — SIMVASTATIN 20 MG
TABLET ORAL
Qty: 10 TABLET | Refills: 0 | Status: SHIPPED | OUTPATIENT
Start: 2018-06-04 | End: 2018-06-12 | Stop reason: SDUPTHER

## 2018-06-04 RX ORDER — LEVOTHYROXINE SODIUM 0.12 MG/1
TABLET ORAL
Qty: 10 TABLET | Refills: 0 | Status: SHIPPED | OUTPATIENT
Start: 2018-06-04 | End: 2018-06-12 | Stop reason: SDUPTHER

## 2018-06-04 RX ORDER — TRAZODONE HYDROCHLORIDE 150 MG/1
TABLET ORAL
Qty: 10 TABLET | Refills: 0 | Status: SHIPPED | OUTPATIENT
Start: 2018-06-04 | End: 2018-06-12 | Stop reason: SDUPTHER

## 2018-06-11 LAB
ALBUMIN SERPL-MCNC: 4.8 G/DL (ref 3.5–5.2)
ALBUMIN/GLOB SERPL: 1.5 G/DL
ALP SERPL-CCNC: 87 U/L (ref 39–117)
ALT SERPL-CCNC: 20 U/L (ref 1–33)
AST SERPL-CCNC: 17 U/L (ref 1–32)
BILIRUB SERPL-MCNC: 0.6 MG/DL (ref 0.1–1.2)
BUN SERPL-MCNC: 23 MG/DL (ref 8–23)
BUN/CREAT SERPL: 22.1 (ref 7–25)
CALCIUM SERPL-MCNC: 10.9 MG/DL (ref 8.6–10.5)
CHLORIDE SERPL-SCNC: 95 MMOL/L (ref 98–107)
CHOLEST SERPL-MCNC: 211 MG/DL (ref 0–200)
CO2 SERPL-SCNC: 26.6 MMOL/L (ref 22–29)
CREAT SERPL-MCNC: 1.04 MG/DL (ref 0.57–1)
GFR SERPLBLD CREATININE-BSD FMLA CKD-EPI: 52 ML/MIN/1.73
GFR SERPLBLD CREATININE-BSD FMLA CKD-EPI: 63 ML/MIN/1.73
GLOBULIN SER CALC-MCNC: 3.3 GM/DL
GLUCOSE SERPL-MCNC: 150 MG/DL (ref 65–99)
HBA1C MFR BLD: 6.5 % (ref 4.8–5.6)
HDLC SERPL-MCNC: 68 MG/DL (ref 40–60)
LDLC SERPL CALC-MCNC: 111 MG/DL (ref 0–100)
POTASSIUM SERPL-SCNC: 4.8 MMOL/L (ref 3.5–5.2)
PROT SERPL-MCNC: 8.1 G/DL (ref 6–8.5)
SODIUM SERPL-SCNC: 138 MMOL/L (ref 136–145)
TRIGL SERPL-MCNC: 159 MG/DL (ref 0–150)
VLDLC SERPL CALC-MCNC: 31.8 MG/DL (ref 5–40)

## 2018-06-12 ENCOUNTER — OFFICE VISIT (OUTPATIENT)
Dept: FAMILY MEDICINE CLINIC | Facility: CLINIC | Age: 73
End: 2018-06-12

## 2018-06-12 VITALS
RESPIRATION RATE: 16 BRPM | HEART RATE: 80 BPM | SYSTOLIC BLOOD PRESSURE: 146 MMHG | WEIGHT: 211 LBS | BODY MASS INDEX: 33.12 KG/M2 | TEMPERATURE: 97.2 F | DIASTOLIC BLOOD PRESSURE: 88 MMHG | OXYGEN SATURATION: 94 % | HEIGHT: 67 IN

## 2018-06-12 DIAGNOSIS — E03.9 HYPOTHYROIDISM, ACQUIRED: ICD-10-CM

## 2018-06-12 DIAGNOSIS — F41.9 ANXIETY: ICD-10-CM

## 2018-06-12 DIAGNOSIS — M13.0 ARTHRITIS OF MULTIPLE SITES: Primary | ICD-10-CM

## 2018-06-12 DIAGNOSIS — G47.09 OTHER INSOMNIA: ICD-10-CM

## 2018-06-12 DIAGNOSIS — E78.2 MIXED HYPERLIPIDEMIA: ICD-10-CM

## 2018-06-12 PROCEDURE — 73030 X-RAY EXAM OF SHOULDER: CPT | Performed by: FAMILY MEDICINE

## 2018-06-12 PROCEDURE — 99214 OFFICE O/P EST MOD 30 MIN: CPT | Performed by: FAMILY MEDICINE

## 2018-06-12 RX ORDER — SIMVASTATIN 20 MG
20 TABLET ORAL NIGHTLY
Qty: 90 TABLET | Refills: 1 | Status: SHIPPED | OUTPATIENT
Start: 2018-06-12 | End: 2019-01-15 | Stop reason: SDUPTHER

## 2018-06-12 RX ORDER — LEVOTHYROXINE SODIUM 0.12 MG/1
125 TABLET ORAL DAILY
Qty: 90 TABLET | Refills: 1 | Status: SHIPPED | OUTPATIENT
Start: 2018-06-12 | End: 2018-12-20

## 2018-06-12 RX ORDER — TRAZODONE HYDROCHLORIDE 150 MG/1
150 TABLET ORAL NIGHTLY
Qty: 90 TABLET | Refills: 1 | Status: SHIPPED | OUTPATIENT
Start: 2018-06-12 | End: 2018-12-20 | Stop reason: SDUPTHER

## 2018-06-12 RX ORDER — DICLOFENAC SODIUM 75 MG/1
75 TABLET, DELAYED RELEASE ORAL DAILY
Qty: 90 TABLET | Refills: 1 | Status: CANCELLED | OUTPATIENT
Start: 2018-06-12

## 2018-06-12 RX ORDER — PAROXETINE HYDROCHLORIDE 20 MG/1
20 TABLET, FILM COATED ORAL EVERY MORNING
Qty: 90 TABLET | Refills: 1 | Status: CANCELLED | OUTPATIENT
Start: 2018-06-12

## 2018-06-12 RX ORDER — BUSPIRONE HYDROCHLORIDE 5 MG/1
5 TABLET ORAL 3 TIMES DAILY
Qty: 270 TABLET | Refills: 1 | Status: SHIPPED | OUTPATIENT
Start: 2018-06-12 | End: 2019-01-15 | Stop reason: SDUPTHER

## 2018-06-12 RX ORDER — CEPHALEXIN 500 MG/1
500 CAPSULE ORAL 3 TIMES DAILY
Qty: 21 CAPSULE | Refills: 0 | Status: SHIPPED | OUTPATIENT
Start: 2018-06-12 | End: 2019-01-15

## 2018-06-12 NOTE — PROGRESS NOTES
Subjective   Claudette L McManus is a 73 y.o. female.     History of Present Illness   Chief Complaint:   Chief Complaint   Patient presents with   • Hyperlipidemia   • Insomnia   • Hypertension   • Hypothyroidism   • Depression   • Anxiety   • Arthritis       Claudette L McManus 73 y.o. female who presents today for Medical Management of the below listed issues and medication refills. I reviewed labs, hba1c good.  bs good  Lipid panel noted. Try to loose weight.  she has a problem list of   Refill meds   Discussed meds   infected dog bite  Scratch left leg  Pain right shoulder  Patient Active Problem List   Diagnosis   • Acute pharyngitis   • Anxiety disorder   • Arthritis of multiple sites   • Back pain   • Bell's palsy   • Depression   • Essential hypertension   • Hyperlipidemia   • Hypothyroidism, acquired   • Impaired fasting glucose   • Insomnia   • Lumbar pain   • Sinusitis, acute   • Extremity pain   • OP (osteoporosis)   .  Since the last visit, she has overall felt well.  she has been compliant with   Current Outpatient Prescriptions:   •  busPIRone (BUSPAR) 5 MG tablet, TAKE ONE TABLET BY MOUTH THREE TIMES A DAY, Disp: 90 tablet, Rfl: 0  •  Calcium Carb-Cholecalciferol (CALCIUM + D3 PO), Take 1 tablet by mouth daily., Disp: , Rfl:   •  Cholecalciferol (VITAMIN D3) 2000 UNITS tablet, Take 1 capsule by mouth daily., Disp: , Rfl:   •  diclofenac (VOLTAREN) 75 MG EC tablet, Take 1 tablet by mouth Daily., Disp: 90 tablet, Rfl: 1  •  DULoxetine (CYMBALTA) 60 MG capsule, , Disp: , Rfl:   •  levothyroxine (SYNTHROID, LEVOTHROID) 125 MCG tablet, TAKE ONE TABLET BY MOUTH DAILY, Disp: 10 tablet, Rfl: 0  •  Multiple Vitamin (MULTIVITAMIN) tablet, Take 1 tablet by mouth daily., Disp: , Rfl:   •  PARoxetine (PAXIL) 20 MG tablet, TAKE ONE TABLET BY MOUTH EVERY MORNING, Disp: 90 tablet, Rfl: 0  •  simvastatin (ZOCOR) 20 MG tablet, TAKE ONE TABLET BY MOUTH ONCE NIGHTLY, Disp: 10 tablet, Rfl: 0  •  traZODone (DESYREL) 150  "MG tablet, TAKE ONE TABLET BY MOUTH ONCE NIGHTLY, Disp: 10 tablet, Rfl: 0.  she denies medication side effects.    All of the chronic condition(s) listed above are stable w/o issues.    /88   Pulse 80   Temp 97.2 °F (36.2 °C) (Oral)   Resp 16   Ht 170.2 cm (67\")   Wt 95.7 kg (211 lb)   SpO2 94%   BMI 33.05 kg/m²     Results for orders placed or performed in visit on 02/08/18   Lipid Panel   Result Value Ref Range    Total Cholesterol 211 (H) 0 - 200 mg/dL    Triglycerides 159 (H) 0 - 150 mg/dL    HDL Cholesterol 68 (H) 40 - 60 mg/dL    VLDL Cholesterol 31.8 5 - 40 mg/dL    LDL Cholesterol  111 (H) 0 - 100 mg/dL   Hemoglobin A1c   Result Value Ref Range    Hemoglobin A1C 6.50 (H) 4.80 - 5.60 %   Comprehensive Metabolic Panel   Result Value Ref Range    Glucose 150 (H) 65 - 99 mg/dL    BUN 23 8 - 23 mg/dL    Creatinine 1.04 (H) 0.57 - 1.00 mg/dL    eGFR Non African Am 52 (L) >60 mL/min/1.73    eGFR African Am 63 >60 mL/min/1.73    BUN/Creatinine Ratio 22.1 7.0 - 25.0    Sodium 138 136 - 145 mmol/L    Potassium 4.8 3.5 - 5.2 mmol/L    Chloride 95 (L) 98 - 107 mmol/L    Total CO2 26.6 22.0 - 29.0 mmol/L    Calcium 10.9 (H) 8.6 - 10.5 mg/dL    Total Protein 8.1 6.0 - 8.5 g/dL    Albumin 4.80 3.50 - 5.20 g/dL    Globulin 3.3 gm/dL    A/G Ratio 1.5 g/dL    Total Bilirubin 0.6 0.1 - 1.2 mg/dL    Alkaline Phosphatase 87 39 - 117 U/L    AST (SGOT) 17 1 - 32 U/L    ALT (SGPT) 20 1 - 33 U/L           The following portions of the patient's history were reviewed and updated as appropriate: allergies, current medications, past family history, past medical history, past social history, past surgical history and problem list.    Review of Systems   Constitutional: Negative for activity change, appetite change, fatigue and unexpected weight change.   HENT: Negative for congestion.    Eyes: Negative for visual disturbance.   Respiratory: Negative for chest tightness and shortness of breath.    Cardiovascular: Negative " for chest pain and palpitations.   Gastrointestinal: Negative for abdominal pain and constipation.   Endocrine: Negative for cold intolerance, heat intolerance, polydipsia, polyphagia and polyuria.   Genitourinary: Negative for difficulty urinating, dysuria and menstrual problem.   Musculoskeletal: Negative for arthralgias.   Skin: Positive for wound. Negative for rash.        Dog scratch on LLE   Neurological: Negative for headaches.   Psychiatric/Behavioral: Negative for behavioral problems, dysphoric mood and sleep disturbance. The patient is not nervous/anxious.        Objective   Physical Exam   Constitutional: She is oriented to person, place, and time. She appears well-developed and well-nourished.   HENT:   Head: Normocephalic.   Right Ear: External ear normal.   Left Ear: External ear normal.   Mouth/Throat: Oropharynx is clear and moist.   Eyes: EOM are normal. Pupils are equal, round, and reactive to light.   Neck: Normal range of motion. Neck supple.   Cardiovascular: Normal rate and regular rhythm.    Pulmonary/Chest: Effort normal and breath sounds normal.   Abdominal: Soft. Bowel sounds are normal.   Musculoskeletal: Normal range of motion.   Lymphadenopathy:     She has no cervical adenopathy.   Neurological: She is alert and oriented to person, place, and time.   Skin: Skin is warm and dry.   Psychiatric: She has a normal mood and affect. Her behavior is normal.   Nursing note and vitals reviewed.      Assessment/Plan   Claudette was seen today for hyperlipidemia, insomnia, hypertension, hypothyroidism, depression, anxiety and arthritis.    Diagnoses and all orders for this visit:    Arthritis of multiple sites  -     XR Shoulder 2+ View Right    Anxiety  -     busPIRone (BUSPAR) 5 MG tablet; Take 1 tablet by mouth 3 (Three) Times a Day.    Hypothyroidism, acquired  -     levothyroxine (SYNTHROID, LEVOTHROID) 125 MCG tablet; Take 1 tablet by mouth Daily.    Mixed hyperlipidemia  -     simvastatin  (ZOCOR) 20 MG tablet; Take 1 tablet by mouth Every Night.    Other insomnia  -     traZODone (DESYREL) 150 MG tablet; Take 1 tablet by mouth Every Night.    Other orders  -     Cancel: diclofenac (VOLTAREN) 75 MG EC tablet; Take 1 tablet by mouth Daily.  -     Cancel: PARoxetine (PAXIL) 20 MG tablet; Take 1 tablet by mouth Every Morning.  -     cephalexin (KEFLEX) 500 MG capsule; Take 1 capsule by mouth 3 (Three) Times a Day.

## 2018-12-14 DIAGNOSIS — G47.09 OTHER INSOMNIA: ICD-10-CM

## 2018-12-14 DIAGNOSIS — E03.9 HYPOTHYROIDISM, ACQUIRED: ICD-10-CM

## 2018-12-14 RX ORDER — TRAZODONE HYDROCHLORIDE 150 MG/1
TABLET ORAL
Qty: 90 TABLET | Refills: 0 | OUTPATIENT
Start: 2018-12-14

## 2018-12-14 RX ORDER — LEVOTHYROXINE SODIUM 0.12 MG/1
TABLET ORAL
Qty: 90 TABLET | Refills: 0 | OUTPATIENT
Start: 2018-12-14

## 2018-12-20 DIAGNOSIS — E03.9 HYPOTHYROIDISM, ACQUIRED: ICD-10-CM

## 2018-12-20 DIAGNOSIS — G47.09 OTHER INSOMNIA: ICD-10-CM

## 2018-12-20 RX ORDER — LEVOTHYROXINE SODIUM 0.12 MG/1
125 TABLET ORAL DAILY
Qty: 90 TABLET | Refills: 1 | Status: SHIPPED | OUTPATIENT
Start: 2018-12-20 | End: 2019-01-15 | Stop reason: SDUPTHER

## 2018-12-20 RX ORDER — TRAZODONE HYDROCHLORIDE 150 MG/1
TABLET ORAL
Qty: 90 TABLET | Refills: 0 | Status: SHIPPED | OUTPATIENT
Start: 2018-12-20 | End: 2019-01-15 | Stop reason: SDUPTHER

## 2019-01-15 ENCOUNTER — OFFICE VISIT (OUTPATIENT)
Dept: FAMILY MEDICINE CLINIC | Facility: CLINIC | Age: 74
End: 2019-01-15

## 2019-01-15 VITALS
DIASTOLIC BLOOD PRESSURE: 77 MMHG | RESPIRATION RATE: 16 BRPM | WEIGHT: 212 LBS | HEIGHT: 67 IN | OXYGEN SATURATION: 98 % | SYSTOLIC BLOOD PRESSURE: 151 MMHG | HEART RATE: 85 BPM | TEMPERATURE: 98.3 F | BODY MASS INDEX: 33.27 KG/M2

## 2019-01-15 DIAGNOSIS — G47.09 OTHER INSOMNIA: ICD-10-CM

## 2019-01-15 DIAGNOSIS — E03.9 HYPOTHYROIDISM, ACQUIRED: ICD-10-CM

## 2019-01-15 DIAGNOSIS — E78.2 MIXED HYPERLIPIDEMIA: ICD-10-CM

## 2019-01-15 DIAGNOSIS — F41.9 ANXIETY: ICD-10-CM

## 2019-01-15 PROCEDURE — 99214 OFFICE O/P EST MOD 30 MIN: CPT | Performed by: FAMILY MEDICINE

## 2019-01-15 RX ORDER — LEVOTHYROXINE SODIUM 0.12 MG/1
125 TABLET ORAL DAILY
Qty: 90 TABLET | Refills: 1 | Status: SHIPPED | OUTPATIENT
Start: 2019-01-15 | End: 2020-02-10 | Stop reason: SDUPTHER

## 2019-01-15 RX ORDER — BUSPIRONE HYDROCHLORIDE 5 MG/1
5 TABLET ORAL 3 TIMES DAILY
Qty: 270 TABLET | Refills: 1 | Status: SHIPPED | OUTPATIENT
Start: 2019-01-15 | End: 2019-10-17

## 2019-01-15 RX ORDER — TRAZODONE HYDROCHLORIDE 150 MG/1
150 TABLET ORAL NIGHTLY
Qty: 90 TABLET | Refills: 1 | Status: SHIPPED | OUTPATIENT
Start: 2019-01-15 | End: 2019-09-17 | Stop reason: SDUPTHER

## 2019-01-15 RX ORDER — SIMVASTATIN 20 MG
20 TABLET ORAL NIGHTLY
Qty: 90 TABLET | Refills: 1 | Status: SHIPPED | OUTPATIENT
Start: 2019-01-15 | End: 2019-10-17

## 2019-01-15 NOTE — PROGRESS NOTES
Subjective   Chief Complaint:   Chief Complaint   Patient presents with   • Anxiety   • Hyperlipidemia         History of Present Illness [facility] we discussed dementia.  I'm going to review her papers from [facility].  Him back in 3 months since her log these episodes she seems alert and oriented don't see any memory deficit. She   is on trazodone 150 mg 1 at night for insomnia and BuSpar 5 mg 3 times a day for anxiety and  zocor  20 mg 1 a day.  Refill those medications  being level thyroxine 125 MCG's one a day.` We will do labs next time I see her.  I'm going to review the papers from [facility] and she is going to see me back in about 3 months.            Claudette L McManus 73 y.o. female who presents today for Medical Management of the below listed issues and medication refills.    ICD-10-CM ICD-9-CM   1. Anxiety F41.9 300.00   2. Mixed hyperlipidemia E78.2 272.2   3. Other insomnia G47.09 780.52        she has a problem list of   Patient Active Problem List   Diagnosis   • Acute pharyngitis   • Anxiety disorder   • Arthritis of multiple sites   • Back pain   • Bell's palsy   • Depression   • Essential hypertension   • Hyperlipidemia   • Hypothyroidism, acquired   • Impaired fasting glucose   • Insomnia   • Lumbar pain   • Sinusitis, acute   • Extremity pain   • OP (osteoporosis)   .  Since the last visit, she has overall felt well.  she has been compliant with   Current Outpatient Medications:   •  busPIRone (BUSPAR) 5 MG tablet, Take 1 tablet by mouth 3 (Three) Times a Day., Disp: 270 tablet, Rfl: 1  •  Calcium Carb-Cholecalciferol (CALCIUM + D3 PO), Take 1 tablet by mouth daily., Disp: , Rfl:   •  Cholecalciferol (VITAMIN D3) 2000 UNITS tablet, Take 1 capsule by mouth daily., Disp: , Rfl:   •  DULoxetine (CYMBALTA) 60 MG capsule, , Disp: , Rfl:   •  levothyroxine (SYNTHROID, LEVOTHROID) 125 MCG tablet, Take 1 tablet by mouth Daily., Disp: 90 tablet, Rfl: 1  •  Multiple Vitamin (MULTIVITAMIN)  "tablet, Take 1 tablet by mouth daily., Disp: , Rfl:   •  simvastatin (ZOCOR) 20 MG tablet, Take 1 tablet by mouth Every Night., Disp: 90 tablet, Rfl: 1  •  traZODone (DESYREL) 150 MG tablet, TAKE ONE TABLET BY MOUTH ONCE NIGHTLY, Disp: 90 tablet, Rfl: 0.  she denies medication side effects.    All of the chronic condition(s) listed above are stable w/o issues.    /77   Pulse 85   Temp 98.3 °F (36.8 °C)   Resp 16   Ht 170.2 cm (67\")   Wt 96.2 kg (212 lb)   SpO2 98%   BMI 33.20 kg/m²     Results for orders placed or performed in visit on 02/08/18   Lipid Panel   Result Value Ref Range    Total Cholesterol 211 (H) 0 - 200 mg/dL    Triglycerides 159 (H) 0 - 150 mg/dL    HDL Cholesterol 68 (H) 40 - 60 mg/dL    VLDL Cholesterol 31.8 5 - 40 mg/dL    LDL Cholesterol  111 (H) 0 - 100 mg/dL   Hemoglobin A1c   Result Value Ref Range    Hemoglobin A1C 6.50 (H) 4.80 - 5.60 %   Comprehensive Metabolic Panel   Result Value Ref Range    Glucose 150 (H) 65 - 99 mg/dL    BUN 23 8 - 23 mg/dL    Creatinine 1.04 (H) 0.57 - 1.00 mg/dL    eGFR Non African Am 52 (L) >60 mL/min/1.73    eGFR African Am 63 >60 mL/min/1.73    BUN/Creatinine Ratio 22.1 7.0 - 25.0    Sodium 138 136 - 145 mmol/L    Potassium 4.8 3.5 - 5.2 mmol/L    Chloride 95 (L) 98 - 107 mmol/L    Total CO2 26.6 22.0 - 29.0 mmol/L    Calcium 10.9 (H) 8.6 - 10.5 mg/dL    Total Protein 8.1 6.0 - 8.5 g/dL    Albumin 4.80 3.50 - 5.20 g/dL    Globulin 3.3 gm/dL    A/G Ratio 1.5 g/dL    Total Bilirubin 0.6 0.1 - 1.2 mg/dL    Alkaline Phosphatase 87 39 - 117 U/L    AST (SGOT) 17 1 - 32 U/L    ALT (SGPT) 20 1 - 33 U/L             The following portions of the patient's history were reviewed and updated as appropriate: allergies, current medications, past family history, past medical history, past social history, past surgical history and problem list.    Review of Systems   Constitutional: Negative for activity change, appetite change and unexpected weight change.   Eyes: " Negative for visual disturbance.   Respiratory: Negative for chest tightness and shortness of breath.    Cardiovascular: Negative for chest pain and palpitations.   Skin: Negative for color change.   Neurological: Negative for syncope and speech difficulty.   Psychiatric/Behavioral: Negative for confusion and decreased concentration.       Objective   Physical Exam   Constitutional: She is oriented to person, place, and time. She appears well-developed and well-nourished.   HENT:   Right Ear: External ear normal.   Left Ear: External ear normal.   Mouth/Throat: Oropharynx is clear and moist.   Eyes: Pupils are equal, round, and reactive to light.   Cardiovascular: Normal rate and regular rhythm.   Pulmonary/Chest: Effort normal and breath sounds normal.   Abdominal: Soft. Bowel sounds are normal.   Neurological: She is alert and oriented to person, place, and time.   Psychiatric: She has a normal mood and affect. Her behavior is normal.   Nursing note and vitals reviewed.      Assessment/Plan   Claudette was seen today for anxiety and hyperlipidemia.    Diagnoses and all orders for this visit:    Anxiety  -     busPIRone (BUSPAR) 5 MG tablet; Take 1 tablet by mouth 3 (Three) Times a Day.    Mixed hyperlipidemia  -     simvastatin (ZOCOR) 20 MG tablet; Take 1 tablet by mouth Every Night.    Other insomnia  -     traZODone (DESYREL) 150 MG tablet; Take 1 tablet by mouth Every Night.

## 2019-01-16 DIAGNOSIS — F41.9 ANXIETY: ICD-10-CM

## 2019-01-17 RX ORDER — BUSPIRONE HYDROCHLORIDE 5 MG/1
TABLET ORAL
Qty: 270 TABLET | Refills: 0 | Status: SHIPPED | OUTPATIENT
Start: 2019-01-17 | End: 2019-03-07 | Stop reason: SDUPTHER

## 2019-02-26 DIAGNOSIS — M54.50 CHRONIC BILATERAL LOW BACK PAIN WITHOUT SCIATICA: Primary | ICD-10-CM

## 2019-02-26 DIAGNOSIS — G89.29 CHRONIC BILATERAL LOW BACK PAIN WITHOUT SCIATICA: Primary | ICD-10-CM

## 2019-02-26 DIAGNOSIS — G89.29 CHRONIC BILATERAL THORACIC BACK PAIN: ICD-10-CM

## 2019-02-26 DIAGNOSIS — M54.6 CHRONIC BILATERAL THORACIC BACK PAIN: ICD-10-CM

## 2019-02-26 PROBLEM — E55.9 VITAMIN D DEFICIENCY: Status: ACTIVE | Noted: 2019-02-26

## 2019-02-26 PROBLEM — Z51.81 THERAPEUTIC DRUG MONITORING: Status: ACTIVE | Noted: 2019-02-26

## 2019-03-01 ENCOUNTER — HOSPITAL ENCOUNTER (OUTPATIENT)
Dept: GENERAL RADIOLOGY | Facility: HOSPITAL | Age: 74
Discharge: HOME | End: 2019-03-01
Admitting: INTERNAL MEDICINE

## 2019-03-01 ENCOUNTER — HOSPITAL ENCOUNTER (OUTPATIENT)
Dept: GENERAL RADIOLOGY | Facility: HOSPITAL | Age: 74
Discharge: HOME | End: 2019-03-01

## 2019-03-01 DIAGNOSIS — G89.29 CHRONIC BILATERAL THORACIC BACK PAIN: ICD-10-CM

## 2019-03-01 DIAGNOSIS — M54.6 CHRONIC BILATERAL THORACIC BACK PAIN: ICD-10-CM

## 2019-03-01 DIAGNOSIS — G89.29 CHRONIC BILATERAL LOW BACK PAIN WITHOUT SCIATICA: ICD-10-CM

## 2019-03-01 DIAGNOSIS — M54.50 CHRONIC BILATERAL LOW BACK PAIN WITHOUT SCIATICA: ICD-10-CM

## 2019-03-01 PROCEDURE — 72110 X-RAY EXAM L-2 SPINE 4/>VWS: CPT

## 2019-03-01 PROCEDURE — 72072 X-RAY EXAM THORAC SPINE 3VWS: CPT

## 2019-03-07 ENCOUNTER — OFFICE VISIT (OUTPATIENT)
Dept: INTERNAL MEDICINE | Facility: CLINIC | Age: 74
End: 2019-03-07

## 2019-03-07 VITALS
SYSTOLIC BLOOD PRESSURE: 160 MMHG | HEART RATE: 98 BPM | DIASTOLIC BLOOD PRESSURE: 82 MMHG | OXYGEN SATURATION: 97 % | HEIGHT: 67 IN | WEIGHT: 211 LBS | BODY MASS INDEX: 33.12 KG/M2

## 2019-03-07 DIAGNOSIS — I10 BENIGN ESSENTIAL HYPERTENSION: Chronic | ICD-10-CM

## 2019-03-07 DIAGNOSIS — M15.9 PRIMARY OSTEOARTHRITIS INVOLVING MULTIPLE JOINTS: Chronic | ICD-10-CM

## 2019-03-07 DIAGNOSIS — R73.01 IMPAIRED FASTING GLUCOSE: Chronic | ICD-10-CM

## 2019-03-07 DIAGNOSIS — F41.1 GENERALIZED ANXIETY DISORDER: Chronic | ICD-10-CM

## 2019-03-07 DIAGNOSIS — Z51.81 THERAPEUTIC DRUG MONITORING: ICD-10-CM

## 2019-03-07 DIAGNOSIS — E78.5 HYPERLIPIDEMIA, UNSPECIFIED HYPERLIPIDEMIA TYPE: Chronic | ICD-10-CM

## 2019-03-07 DIAGNOSIS — F51.04 CHRONIC INSOMNIA: Chronic | ICD-10-CM

## 2019-03-07 DIAGNOSIS — M54.50 CHRONIC BILATERAL LOW BACK PAIN WITHOUT SCIATICA: Primary | ICD-10-CM

## 2019-03-07 DIAGNOSIS — R93.7 ABNORMAL X-RAY OF THORACIC SPINE: ICD-10-CM

## 2019-03-07 DIAGNOSIS — G60.9 PERIPHERAL NEUROPATHY, IDIOPATHIC: ICD-10-CM

## 2019-03-07 DIAGNOSIS — F33.41 RECURRENT MAJOR DEPRESSIVE DISORDER, IN PARTIAL REMISSION (HCC): Chronic | ICD-10-CM

## 2019-03-07 DIAGNOSIS — R93.7 ABNORMAL X-RAY OF LUMBAR SPINE: ICD-10-CM

## 2019-03-07 DIAGNOSIS — G89.29 CHRONIC BILATERAL LOW BACK PAIN WITHOUT SCIATICA: Primary | ICD-10-CM

## 2019-03-07 DIAGNOSIS — E55.9 VITAMIN D DEFICIENCY: Chronic | ICD-10-CM

## 2019-03-07 DIAGNOSIS — M54.6 CHRONIC BILATERAL THORACIC BACK PAIN: ICD-10-CM

## 2019-03-07 DIAGNOSIS — G89.29 CHRONIC BILATERAL THORACIC BACK PAIN: ICD-10-CM

## 2019-03-07 DIAGNOSIS — E03.9 PRIMARY HYPOTHYROIDISM: Chronic | ICD-10-CM

## 2019-03-07 PROBLEM — N95.1 MENOPAUSAL STATE: Chronic | Status: ACTIVE | Noted: 2019-03-07

## 2019-03-07 PROBLEM — N95.1 MENOPAUSAL STATE: Status: ACTIVE | Noted: 2019-03-07

## 2019-03-07 PROCEDURE — 99204 OFFICE O/P NEW MOD 45 MIN: CPT | Performed by: INTERNAL MEDICINE

## 2019-03-07 RX ORDER — DULOXETIN HYDROCHLORIDE 60 MG/1
CAPSULE, DELAYED RELEASE ORAL
Qty: 90 CAPSULE | Refills: 3 | Status: SHIPPED | OUTPATIENT
Start: 2019-03-07 | End: 2019-07-03 | Stop reason: SDUPTHER

## 2019-03-07 NOTE — PROGRESS NOTES
03/07/2019    Patient Information  Claudette L McManus                                                                                          [id number]  [place] [address]      1945  [unfilled]  There is no work phone number on file.    Chief Complaint:     Complaining of mid and lower back pain with radiation into the ribs.  Follow-up peripheral neuropathy, hypertension/elevated blood pressure, generalized anxiety and depression, hyperlipidemia, hypothyroidism, impaired fasting glucose, chronic insomnia, primary osteoarthritis multiple joints.    History of Present Illness:    New patient to get established.  He is here today with complaints of persistent mid and lower back pain as described below.  I obtained orders for x-rays of the lumbar and thoracic spine prior to this visit and we will review that.  Patient also has several medical issues that we will touch upon today.  Her past medical history was updated in the electronic medical record and several corrections made.  Her past medical history reviewed and updated were necessary including health maintenance parameters.  This reveals she is up-to-date or else accounted for except for a DEXA scan.    The history regarding thoracic and lumbar chronic pain:    March 1, 2019--x-ray of the lumbar and thoracic spine reveals mild anterior listhesis of L3 upon L4 and L4 upon L5.  Alignment is otherwise normal.  Slight anterior wedging of mid thoracic vertebral bodies, may be chronic, but correlate clinically to exclude possibility recent injury.  No other evidence of fracture is identified.  Sclerotic change in the mid to lower thoracic vertebral bodies, particularly T6-T10 may be degenerative in nature but bone scan can be obtained to exclude possibility of an active process such as sclerotic metastatic disease.  Multilevel endplate spurring is seen.  Disc space narrowing at mid to lower thoracic levels and L4 through S1.  Arterial  calcifications are seen.    Review of Systems   Musculoskeletal: Positive for arthritis and back pain.   Neurological: Positive for numbness and paresthesias.       Active Problems:    Patient Active Problem List   Diagnosis   • Generalized anxiety disorder   • Primary osteoarthritis involving multiple joints   • Major depression   • Benign essential hypertension   • Hyperlipidemia   • Primary hypothyroidism   • Impaired fasting glucose   • Chronic insomnia   • Chronic bilateral low back pain without sciatica   • Chronic bilateral thoracic back pain   • Vitamin D deficiency   • Therapeutic drug monitoring   • Menopausal state   • Abnormal x-ray of thoracic spine   • Abnormal x-ray of lumbar spine   • Peripheral neuropathy, idiopathic         Past Medical History:   Diagnosis Date   • Benign essential hypertension    • Chronic insomnia 11/4/2014   • Generalized anxiety disorder 5/19/2013   • History of Bell's palsy 5/21/2013    Remote history of Bell's palsy.  Patient does not remember which side of the face.   • History of bone density study 2/5/2016    May 10, 2016--DEXA scan revealed average lumbar spine T score of 3.6.  Left femoral neck T score normal at 2.0.  Right femoral neck T score normal at 1.6.  Total left hip normal at 2.6 and total right hip normal at 2.5.   • Hyperlipidemia    • Impaired fasting glucose    • Major depression    • Menopausal state 3/7/2019   • Peripheral neuropathy, idiopathic 3/7/2019    Characterized by decreased sensation and paresthesias in both lower extremities described as a burning sensation.  Extends up to the knees.  Reportedly had been evaluated with nerve conduction study in the past.   • Primary hypothyroidism 5/19/2013   • Primary osteoarthritis involving multiple joints 4/22/2013   • Vitamin D deficiency 2/26/2019         Past Surgical History:   Procedure Laterality Date   • BILATERAL BREAST REDUCTION  Early 2000    Early 2000--bilateral breast reduction   •  CHOLECYSTECTOMY  1960s    1960s--open cholecystectomy   • COLONOSCOPY  2012 2012--reportedly normal colonoscopy   • PARTIAL HYSTERECTOMY  Remote    Remote partial hysterectomy.  Ovaries intact.   • REPLACEMENT TOTAL KNEE BILATERAL Left 2010; 2011    [id number]--bilateral total knee replacement   • TOTAL SHOULDER REPLACEMENT Left 2013    2013--left total shoulder replacement.         Allergies   Allergen Reactions   • Morphine And Related    • Other Other (See Comments)     REJECTED DACRON SUTURES IN THE PAST AND INCISION CAME APART           Current Outpatient Medications:   •  busPIRone (BUSPAR) 5 MG tablet, Take 1 tablet by mouth 3 (Three) Times a Day., Disp: 270 tablet, Rfl: 1  •  Calcium Carb-Cholecalciferol (CALCIUM + D3 PO), Take 1 tablet by mouth daily., Disp: , Rfl:   •  Cholecalciferol (VITAMIN D3) 2000 UNITS tablet, Take 1 capsule by mouth daily., Disp: , Rfl:   •  DULoxetine (CYMBALTA) 60 MG capsule, , Disp: , Rfl:   •  levothyroxine (SYNTHROID, LEVOTHROID) 125 MCG tablet, Take 1 tablet by mouth Daily., Disp: 90 tablet, Rfl: 1  •  Multiple Vitamin (MULTIVITAMIN) tablet, Take 1 tablet by mouth daily., Disp: , Rfl:   •  simvastatin (ZOCOR) 20 MG tablet, Take 1 tablet by mouth Every Night., Disp: 90 tablet, Rfl: 1  •  traZODone (DESYREL) 150 MG tablet, Take 1 tablet by mouth Every Night., Disp: 90 tablet, Rfl: 1      Family History   Problem Relation Age of Onset   • Alcohol abuse Father    • Cancer Other    • Diabetes Other         type II         Social History     Socioeconomic History   • Marital status:      Spouse name: Not on file   • Number of children: Not on file   • Years of education: Not on file   • Highest education level: Not on file   Social Needs   • Financial resource strain: Not hard at all   • Food insecurity - worry: Never true   • Food insecurity - inability: Never true   • Transportation needs - medical: No   • Transportation needs - non-medical: No   Occupational History   •  "Not on file   Tobacco Use   • Smoking status: Former Smoker     Last attempt to quit: 1998     Years since quittin.1   • Smokeless tobacco: Never Used   Substance and Sexual Activity   • Alcohol use: Yes     Alcohol/week: 0.6 oz     Types: 1 Glasses of wine per week     Frequency: Monthly or less     Drinks per session: 1 or 2     Binge frequency: Never     Comment: current some day   • Drug use: No   • Sexual activity: Not Currently     Partners: Male   Other Topics Concern   • Not on file   Social History Narrative   • Not on file         Vitals:    19 1510   BP: 160/82   Pulse: 98   SpO2: 97%   Weight: 95.7 kg (211 lb)   Height: 170.2 cm (67.01\")          Physical Exam:    General: Alert and oriented x 3.  No acute distress. Obese.  Normal affect.  HEENT: Pupils equal, round, reactive to light; extraocular movements intact; sclerae nonicteric; pharynx, ear canals and TMs normal.  Neck: Without JVD, thyromegaly, bruit, or adenopathy.  Lungs: Clear to auscultation in all fields.  Heart: Regular rate and rhythm without murmur, rub, gallop, or click.  Abdomen: Soft, nontender, without hepatosplenomegaly or hernia.  Bowel sounds normal.  : Deferred.  Rectal: Deferred.  Extremities: Without clubbing, cyanosis, edema, or pulse deficit.  Neurologic: Intact without focal deficit.  Normal station and gait observed during ingress and egress from the examination room.  Skin: Without significant lesion.  Musculoskeletal: Unremarkable.    Lab/other results:    I reviewed the x-rays of the lumbar and thoracic spine with patient.    Assessment/Plan:     Diagnosis Plan   1. Chronic bilateral low back pain without sciatica     2. Chronic bilateral thoracic back pain     3. Abnormal x-ray of thoracic spine     4. Abnormal x-ray of lumbar spine     5. Peripheral neuropathy, idiopathic     6. Benign essential hypertension     7. Generalized anxiety disorder     8. Major depression     9. Hyperlipidemia, unspecified " hyperlipidemia type     10. Primary hypothyroidism     11. Impaired fasting glucose     12. Chronic insomnia     13. Vitamin D deficiency     14. Primary osteoarthritis involving multiple joints     15. Therapeutic drug monitoring       Patient presents with chronic back pain that seems to be more involving the thoracic spine and radiates into her rib cage and is associated with certain movements such as lying back or twisting or tilting her torso.  She currently is giving no lumbar radicular symptoms.  She does have somewhat potentially abnormal x-rays of the thoracic and lumbar spine showing some minor wedging and sclerosis of the vertebral bodies.  Bone scan recommended by the radiologist.  Patient does have a peripheral neuropathy of both legs that sounds significant and she has failed numerous medications in the past including gabapentin and possibly Lyrica.  Patient has a history of elevated blood pressure without diagnosis of hypertension but I think she truly does have hypertension and this will need to be dealt with in the future.  Patient is under a lot of stress and has depression with anxiety and is on medication which seems to be helpful.  Patient has hyperlipidemia and is on simvastatin and this needs to be assessed properly as does her thyroid.  She has chronic insomnia and trazodone seems to be helpful.  Vitamin D needs to be assessed.  Note that it appears patient does not have any osteoporosis which was in the chart previously and I removed it.  Her latest DEXA scan was entirely normal.    Plan is as follows: Bone scan ordered.  Physical therapy ordered.  We will set up fasting lab work and follow-up after the results of the bone scan are normal.  Refill Cymbalta.    Note: New patient to me and this specialty.  Greater than 45 minutes was spent today evaluating patient with multiple medical problems and greater than 50% of this time was spent counseling patient regarding her diagnoses, diagnostic  options, therapeutic options, and prognosis.  [id number] level of service justified.    Procedures

## 2019-03-13 ENCOUNTER — HOSPITAL ENCOUNTER (OUTPATIENT)
Dept: NUCLEAR MEDICINE | Facility: HOSPITAL | Age: 74
Discharge: HOME | End: 2019-03-13

## 2019-03-13 DIAGNOSIS — G89.29 CHRONIC BILATERAL LOW BACK PAIN WITHOUT SCIATICA: ICD-10-CM

## 2019-03-13 DIAGNOSIS — R93.7 ABNORMAL X-RAY OF LUMBAR SPINE: ICD-10-CM

## 2019-03-13 DIAGNOSIS — G89.29 CHRONIC BILATERAL THORACIC BACK PAIN: ICD-10-CM

## 2019-03-13 DIAGNOSIS — M54.50 CHRONIC BILATERAL LOW BACK PAIN WITHOUT SCIATICA: ICD-10-CM

## 2019-03-13 DIAGNOSIS — M54.6 CHRONIC BILATERAL THORACIC BACK PAIN: ICD-10-CM

## 2019-03-13 DIAGNOSIS — R93.7 ABNORMAL X-RAY OF THORACIC SPINE: ICD-10-CM

## 2019-03-13 PROCEDURE — 0 TECHNETIUM MEDRONATE KIT: Performed by: INTERNAL MEDICINE

## 2019-03-13 PROCEDURE — A9503 TC99M MEDRONATE: HCPCS | Performed by: INTERNAL MEDICINE

## 2019-03-13 PROCEDURE — 78300 BONE IMAGING LIMITED AREA: CPT

## 2019-03-13 RX ORDER — TC 99M MEDRONATE 20 MG/10ML
20.9 INJECTION, POWDER, LYOPHILIZED, FOR SOLUTION INTRAVENOUS
Status: COMPLETED | OUTPATIENT
Start: 2019-03-13 | End: 2019-03-13

## 2019-03-13 RX ADMIN — Medication 20.9 MILLICURIE: at 12:10

## 2019-03-19 DIAGNOSIS — E78.5 HYPERLIPIDEMIA, UNSPECIFIED HYPERLIPIDEMIA TYPE: Chronic | ICD-10-CM

## 2019-03-19 DIAGNOSIS — E03.9 PRIMARY HYPOTHYROIDISM: Chronic | ICD-10-CM

## 2019-03-19 DIAGNOSIS — R73.01 IMPAIRED FASTING GLUCOSE: Chronic | ICD-10-CM

## 2019-03-19 DIAGNOSIS — Z51.81 THERAPEUTIC DRUG MONITORING: ICD-10-CM

## 2019-03-19 DIAGNOSIS — E55.9 VITAMIN D DEFICIENCY: Chronic | ICD-10-CM

## 2019-03-21 LAB
25(OH)D3+25(OH)D2 SERPL-MCNC: 82.3 NG/ML (ref 30–100)
ALBUMIN SERPL-MCNC: 4.8 G/DL (ref 3.5–5.2)
ALBUMIN/GLOB SERPL: 1.8 G/DL
ALP SERPL-CCNC: 72 U/L (ref 39–117)
ALT SERPL-CCNC: 40 U/L (ref 1–33)
AST SERPL-CCNC: 39 U/L (ref 1–32)
BILIRUB SERPL-MCNC: 0.5 MG/DL (ref 0.2–1.2)
BUN SERPL-MCNC: 20 MG/DL (ref 8–23)
BUN/CREAT SERPL: 20.6 (ref 7–25)
CALCIUM SERPL-MCNC: 10.6 MG/DL (ref 8.6–10.5)
CHLORIDE SERPL-SCNC: 100 MMOL/L (ref 98–107)
CHOLEST SERPL-MCNC: 203 MG/DL (ref 100–199)
CK SERPL-CCNC: 139 U/L (ref 20–180)
CO2 SERPL-SCNC: 25.2 MMOL/L (ref 22–29)
CREAT SERPL-MCNC: 0.97 MG/DL (ref 0.57–1)
ERYTHROCYTE [DISTWIDTH] IN BLOOD BY AUTOMATED COUNT: 12.5 % (ref 12.3–15.4)
GLOBULIN SER CALC-MCNC: 2.7 GM/DL
GLUCOSE SERPL-MCNC: 143 MG/DL (ref 65–99)
HBA1C MFR BLD: 6.5 % (ref 4.8–5.6)
HCT VFR BLD AUTO: 42 % (ref 34–46.6)
HDL SERPL-SCNC: 35.5 UMOL/L
HDLC SERPL-MCNC: 56 MG/DL
HGB BLD-MCNC: 13.6 G/DL (ref 12–15.9)
LDL SERPL QN: 21.3 NM
LDL SERPL-SCNC: 1401 NMOL/L
LDL SMALL SERPL-SCNC: 552 NMOL/L
LDLC SERPL CALC-MCNC: 119 MG/DL (ref 0–99)
MCH RBC QN AUTO: 31.6 PG (ref 26.6–33)
MCHC RBC AUTO-ENTMCNC: 32.4 G/DL (ref 31.5–35.7)
MCV RBC AUTO: 97.7 FL (ref 79–97)
PLATELET # BLD AUTO: 265 10*3/MM3 (ref 140–450)
POTASSIUM SERPL-SCNC: 4.7 MMOL/L (ref 3.5–5.2)
PROT SERPL-MCNC: 7.5 G/DL (ref 6–8.5)
RBC # BLD AUTO: 4.3 10*6/MM3 (ref 3.77–5.28)
SODIUM SERPL-SCNC: 142 MMOL/L (ref 136–145)
T3FREE SERPL-MCNC: 3 PG/ML (ref 2–4.4)
T4 FREE SERPL-MCNC: 1.86 NG/DL (ref 0.93–1.7)
TRIGL SERPL-MCNC: 140 MG/DL (ref 0–149)
TSH SERPL DL<=0.005 MIU/L-ACNC: 0.28 MIU/ML (ref 0.27–4.2)
WBC # BLD AUTO: 6.02 10*3/MM3 (ref 3.4–10.8)

## 2019-03-26 ENCOUNTER — TREATMENT (OUTPATIENT)
Dept: PHYSICAL THERAPY | Facility: CLINIC | Age: 74
End: 2019-03-26

## 2019-03-26 DIAGNOSIS — M54.6 CHRONIC BILATERAL THORACIC BACK PAIN: Primary | ICD-10-CM

## 2019-03-26 DIAGNOSIS — G89.29 CHRONIC BILATERAL THORACIC BACK PAIN: Primary | ICD-10-CM

## 2019-03-26 DIAGNOSIS — M54.50 CHRONIC BILATERAL LOW BACK PAIN WITHOUT SCIATICA: ICD-10-CM

## 2019-03-26 DIAGNOSIS — G89.29 CHRONIC BILATERAL LOW BACK PAIN WITHOUT SCIATICA: ICD-10-CM

## 2019-03-26 PROCEDURE — 97161 PT EVAL LOW COMPLEX 20 MIN: CPT | Performed by: PHYSICAL THERAPIST

## 2019-03-26 PROCEDURE — 97110 THERAPEUTIC EXERCISES: CPT | Performed by: PHYSICAL THERAPIST

## 2019-03-26 PROCEDURE — 97140 MANUAL THERAPY 1/> REGIONS: CPT | Performed by: PHYSICAL THERAPIST

## 2019-03-26 NOTE — PROGRESS NOTES
Physical Therapy Initial Evaluation and Plan of Care      Patient: Claudette L McManus   : 1945  Diagnosis/ICD-10 Code:  Chronic bilateral thoracic back pain [M54.6, G89.29]  Referring practitioner: Lito Moore MD    Subjective Evaluation    History of Present Illness  Mechanism of injury: 2018. Onset of thoracic pain. Especially with sit to supine. Random sharp intense pains with certain movements, mostly sidebending reaching  Chronic LBP  GArdening, bending, lifting  SIdebending;   PMH: B TKR; L TSA      Patient Occupation: Realtor part time; on computer Quality of life: good    Pain  Current pain ratin  At worst pain ratin  Quality: dull ache, sharp and discomfort  Relieving factors: rest and change in position  Aggravating factors: movement  Progression: no change    Social Support  Lives with: spouse    Diagnostic Tests  X-ray: abnormal  Bone scan: abnormal    Treatments  No previous or current treatments  Patient Goals  Patient goals for therapy: decreased pain, increased motion, increased strength and independence with ADLs/IADLs             Objective       Static Posture     Head  Tilted right.    Scapulae  Right depressed and right protracted.    Thoracic Spine  Hyperkyphosis.    Lumbar Spine   Flattened.     Cervical/Thoracic Screen   Thoracic range of motion within normal limits with the following exceptions: Decreased segmental mobility T4-T9.; decreased RROT (PA's on  L)    Active Range of Motion   Cervical/Thoracic Spine     Thoracic   Flexion: WFL  Left lateral flexion: Active left thoracic lateral flexion: 50%   Right lateral flexion: Active right thoracic lateral flexion: 50%   Left rotation: Active left thoracic rotation: 75%   Right rotation: Active right thoracic rotation: 50%   Left Shoulder   Flexion: 130 degrees   Abduction: 130 degrees     Right Shoulder   Flexion: 100 degrees with pain  Abduction: 90 degrees     Strength/Myotome Testing     Left Shoulder  Radha from home health calling to get order updated. It's over 11days old.     Planes of Motion   Abduction: 5 (sore in back)     Right Shoulder     Planes of Motion   Abduction: 5 (sore in back)     Left Hip   Planes of Motion   Flexion: 5    Right Hip   Planes of Motion   Flexion: 5    Additional Strength Details  Lower abs 3+/5         Assessment & Plan     Assessment  Impairments: abnormal muscle tone, abnormal or restricted ROM, activity intolerance, impaired physical strength, lacks appropriate home exercise program and pain with function  Assessment details: Pt is a good candidate for skilled PT intervention, nia manual therapy for spinal joint mobility, alignment, postural restoration and core strengthening to restore functional AROM and strength to return to previous level of ADL's.  Prognosis: good  Prognosis details: Short Term Goals ( 3 weeks)  1.Pt to be independent with HEP  2. Pt to exhibit improved thoracic segmental mobility to allow for increased ease with ADL's  3. Pt to report less pain with bed transfers  4. Pt to report less pain with sitting at computer    Long Term Goals ( 8 weeks)  1. Pt to demonstrate ability to maintain upright thoracic posture without verbal cues  2. Pt to demonstrate improved segmental mobility T spine  3. Pt to exhibit full cervical and thoracic AROM to  to allow for reaching and bending with min to no pain  4. Pt to score <20% on Back Index  Functional Limitations: lifting, uncomfortable because of pain, moving in bed, sitting, reaching overhead and unable to perform repetitive tasks  Plan  Therapy options: will be seen for skilled physical therapy services  Planned modality interventions: electrical stimulation/[place] stimulation, ultrasound and thermotherapy (hydrocollator packs)  Planned therapy interventions: abdominal trunk stabilization, body mechanics training, functional ROM exercises, home exercise program, joint mobilization, manual therapy, neuromuscular re-education, postural training, soft tissue mobilization, spinal/joint  mobilization, stretching, strengthening and therapeutic activities  Frequency: 2x week  Duration in weeks: 8  Treatment plan discussed with: patient        Manual Therapy:    20     mins  01868;  Therapeutic Exercise:    10     mins  99173;     Neuromuscular [place]:        mins  12998;    Therapeutic Activity:          mins  20754;     Gait Training:           mins  37767;     Ultrasound:          mins  58865;    Electrical Stimulation:         mins  27332 ( );  Dry Needling          mins self-pay    Timed Treatment:   30   mins   Total Treatment:     60   mins    PT SIGNATURE: Lennie Pimentel, PT   [place] License # [id number]  DATE TREATMENT INITIATED: 3/26/2019    Medicare Initial Certification  Certification Period: 6/24/2019  I certify that the therapy services are furnished while this patient is under my care.  The services outlined above are required by this patient, and will be reviewed every 90 days.     PHYSICIAN: Lito Moore MD      DATE:     Please sign and return via fax to [phone].. Thank you, [facility].

## 2019-03-27 ENCOUNTER — OFFICE VISIT (OUTPATIENT)
Dept: INTERNAL MEDICINE | Facility: CLINIC | Age: 74
End: 2019-03-27

## 2019-03-27 VITALS
DIASTOLIC BLOOD PRESSURE: 86 MMHG | HEART RATE: 87 BPM | HEIGHT: 67 IN | OXYGEN SATURATION: 96 % | BODY MASS INDEX: 32.33 KG/M2 | SYSTOLIC BLOOD PRESSURE: 146 MMHG | WEIGHT: 206 LBS

## 2019-03-27 DIAGNOSIS — F51.04 CHRONIC INSOMNIA: Chronic | ICD-10-CM

## 2019-03-27 DIAGNOSIS — M54.6 CHRONIC BILATERAL THORACIC BACK PAIN: Primary | Chronic | ICD-10-CM

## 2019-03-27 DIAGNOSIS — E03.9 PRIMARY HYPOTHYROIDISM: Chronic | ICD-10-CM

## 2019-03-27 DIAGNOSIS — F33.41 RECURRENT MAJOR DEPRESSIVE DISORDER, IN PARTIAL REMISSION (HCC): Chronic | ICD-10-CM

## 2019-03-27 DIAGNOSIS — M54.50 CHRONIC BILATERAL LOW BACK PAIN WITHOUT SCIATICA: Chronic | ICD-10-CM

## 2019-03-27 DIAGNOSIS — R73.01 IMPAIRED FASTING GLUCOSE: Chronic | ICD-10-CM

## 2019-03-27 DIAGNOSIS — M15.9 PRIMARY OSTEOARTHRITIS INVOLVING MULTIPLE JOINTS: Chronic | ICD-10-CM

## 2019-03-27 DIAGNOSIS — Z51.81 THERAPEUTIC DRUG MONITORING: ICD-10-CM

## 2019-03-27 DIAGNOSIS — G89.29 CHRONIC BILATERAL LOW BACK PAIN WITHOUT SCIATICA: Chronic | ICD-10-CM

## 2019-03-27 DIAGNOSIS — I10 BENIGN ESSENTIAL HYPERTENSION: Chronic | ICD-10-CM

## 2019-03-27 DIAGNOSIS — E55.9 VITAMIN D DEFICIENCY: Chronic | ICD-10-CM

## 2019-03-27 DIAGNOSIS — E78.5 HYPERLIPIDEMIA, UNSPECIFIED HYPERLIPIDEMIA TYPE: Chronic | ICD-10-CM

## 2019-03-27 DIAGNOSIS — G60.9 PERIPHERAL NEUROPATHY, IDIOPATHIC: Chronic | ICD-10-CM

## 2019-03-27 DIAGNOSIS — G89.29 CHRONIC BILATERAL THORACIC BACK PAIN: Primary | Chronic | ICD-10-CM

## 2019-03-27 DIAGNOSIS — F41.1 GENERALIZED ANXIETY DISORDER: Chronic | ICD-10-CM

## 2019-03-27 PROBLEM — R93.7 ABNORMAL X-RAY OF LUMBAR SPINE: Status: RESOLVED | Noted: 2019-03-07 | Resolved: 2019-03-27

## 2019-03-27 PROBLEM — R93.7 ABNORMAL X-RAY OF THORACIC SPINE: Status: RESOLVED | Noted: 2019-03-07 | Resolved: 2019-03-27

## 2019-03-27 PROCEDURE — 99214 OFFICE O/P EST MOD 30 MIN: CPT | Performed by: INTERNAL MEDICINE

## 2019-03-27 RX ORDER — EZETIMIBE 10 MG/1
10 TABLET ORAL DAILY
Qty: 30 TABLET | Refills: 6 | Status: SHIPPED | OUTPATIENT
Start: 2019-03-27 | End: 2019-10-17

## 2019-03-27 NOTE — PROGRESS NOTES
03/27/2019    Patient Information  Claudette L McManus                                                                                          [id number]  [place] [address]      1945  [unfilled]  There is no work phone number on file.    Chief Complaint:     Follow-up chronic thoracic and lumbar back pain, recent bone scan, impaired fasting glucose, primary hypothyroidism, hyperlipidemia, hypertension, major depression, primary osteoarthritis of multiple joints, history of generalized anxiety, chronic insomnia, vitamin D deficiency, peripheral neuropathy.  No new acute complaints.    History of Present Illness:    Patient with a history of medical problems as outlined in chief complaint presents today to follow-up on recent lab work and also to follow-up on a bone scan that we ordered because of abnormal thoracic and lumbar x-rays.  Patient also has chronic thoracic and lower back pain and we sent her to the physical therapist.  She has had one session and it is too early to assess whether or not she is improving.  Her past medical history reviewed and updated were necessary including health maintenance parameters.  This reveals she is up-to-date with the exception of a DEXA scan.  See below.    Review of Systems   Constitution: Negative.   HENT: Negative.    Eyes: Negative.    Cardiovascular: Negative.    Respiratory: Negative.    Endocrine: Negative.    Hematologic/Lymphatic: Negative.    Skin: Negative.    Musculoskeletal: Positive for arthritis and back pain.   Gastrointestinal: Negative.    Genitourinary: Negative.    Neurological: Negative.    Psychiatric/Behavioral: Negative.    Allergic/Immunologic: Negative.        Active Problems:    Patient Active Problem List   Diagnosis   • Generalized anxiety disorder   • Primary osteoarthritis involving multiple joints   • Major depression   • Benign essential hypertension   • Hyperlipidemia   • Primary hypothyroidism   • Impaired  fasting glucose   • Chronic insomnia   • Chronic bilateral low back pain without sciatica   • History of bone density study   • Chronic bilateral thoracic back pain   • Vitamin D deficiency   • Therapeutic drug monitoring   • Menopausal state   • Peripheral neuropathy, idiopathic         Past Medical History:   Diagnosis Date   • Benign essential hypertension    • Chronic bilateral low back pain without sciatica 8/16/2013 March 13, 2019--Limited bone scan.  This reveals scintigraphic activity in the spine and at the right shoulder corresponds to change seen on recent x-rays and is best explained by degenerative change.  Isolated metastatic disease exactly corresponding to the sites of extensive degenerative disc change would be peculiar.  March 7, 2019--new patient to get established.  She has complaints of approxi   • Chronic bilateral thoracic back pain 2/26/2019 March 13, 2019--Limited bone scan.  This reveals scintigraphic activity in the spine and at the right shoulder corresponds to change seen on recent x-rays and is best explained by degenerative change.  Isolated metastatic disease exactly corresponding to the sites of extensive degenerative disc change would be peculiar.  March 1, 2019--x-ray of the lumbar and thoracic spine reveals mild anterior l   • Chronic insomnia 11/4/2014   • Generalized anxiety disorder 5/19/2013   • History of Bell's palsy 5/21/2013    Remote history of Bell's palsy.  Patient does not remember which side of the face.   • History of bone density study 2/5/2016    May 10, 2016--DEXA scan revealed average lumbar spine T score of 3.6.  Left femoral neck T score normal at 2.0.  Right femoral neck T score normal at 1.6.  Total left hip normal at 2.6 and total right hip normal at 2.5.   • Hyperlipidemia    • Impaired fasting glucose    • Major depression    • Menopausal state 3/7/2019   • Peripheral neuropathy, idiopathic 3/7/2019    Characterized by decreased sensation and  paresthesias in both lower extremities described as a burning sensation.  Extends up to the knees.  Reportedly had been evaluated with nerve conduction study in the past.   • Primary hypothyroidism 5/19/2013   • Primary osteoarthritis involving multiple joints 4/22/2013   • Vitamin D deficiency 2/26/2019         Past Surgical History:   Procedure Laterality Date   • BILATERAL BREAST REDUCTION  Early 2000    Early 2000--bilateral breast reduction   • CHOLECYSTECTOMY  1960s    1960s--open cholecystectomy   • COLONOSCOPY  2012 2012--reportedly normal colonoscopy   • INCONTINENCE SURGERY  2012 Approximately 2012--implantation of questionable bladder stimulator right sacral area for urinary incontinence.  Details not known.   • PARTIAL HYSTERECTOMY  Remote    Remote partial hysterectomy.  Ovaries intact.   • REPLACEMENT TOTAL KNEE BILATERAL Left 2010; 2011 2010-2011--bilateral total knee replacement   • TOTAL SHOULDER REPLACEMENT Left 2013 2013--left total shoulder replacement.         Allergies   Allergen Reactions   • Morphine And Related    • Other Other (See Comments)     REJECTED DACRON SUTURES IN THE PAST AND INCISION CAME APART           Current Outpatient Medications:   •  busPIRone (BUSPAR) 5 MG tablet, Take 1 tablet by mouth 3 (Three) Times a Day., Disp: 270 tablet, Rfl: 1  •  Calcium Carb-Cholecalciferol (CALCIUM + D3 PO), Take 1 tablet by mouth daily., Disp: , Rfl:   •  Cholecalciferol (VITAMIN D3) 2000 UNITS tablet, Take 1 capsule by mouth daily., Disp: , Rfl:   •  DULoxetine (CYMBALTA) 60 MG capsule, Take 1 p.o. daily as directed, Disp: 90 capsule, Rfl: 3  •  levothyroxine (SYNTHROID, LEVOTHROID) 125 MCG tablet, Take 1 tablet by mouth Daily., Disp: 90 tablet, Rfl: 1  •  Multiple Vitamin (MULTIVITAMIN) tablet, Take 1 tablet by mouth daily., Disp: , Rfl:   •  simvastatin (ZOCOR) 20 MG tablet, Take 1 tablet by mouth Every Night., Disp: 90 tablet, Rfl: 1  •  traZODone (DESYREL) 150 MG tablet, Take 1  "tablet by mouth Every Night., Disp: 90 tablet, Rfl: 1      Family History   Problem Relation Age of Onset   • Alcohol abuse Father    • Cancer Other    • Diabetes Other         type II         Social History     Socioeconomic History   • Marital status:      Spouse name: Not on file   • Number of children: Not on file   • Years of education: Not on file   • Highest education level: Not on file   Social Needs   • Financial resource strain: Not hard at all   • Food insecurity:     Worry: Never true     Inability: Never true   • Transportation needs:     Medical: No     Non-medical: No   Tobacco Use   • Smoking status: Former Smoker     Last attempt to quit: 1998     Years since quittin.2   • Smokeless tobacco: Never Used   Substance and Sexual Activity   • Alcohol use: Yes     Alcohol/week: 0.6 oz     Types: 1 Glasses of wine per week     Frequency: Monthly or less     Drinks per session: 1 or 2     Binge frequency: Never     Comment: current some day   • Drug use: No   • Sexual activity: Not Currently     Partners: Male   Lifestyle   • Physical activity:     Days per week: 0 days     Minutes per session: 0 min   • Stress: To some extent   Relationships   • Social connections:     Talks on phone: Patient refused     Gets together: Patient refused     Attends Buddhism service: Patient refused     Active member of club or organization: Patient refused     Attends meetings of clubs or organizations: Patient refused     Relationship status: Patient refused         Vitals:    19 1057   BP: 146/86   Pulse: 87   SpO2: 96%   Weight: 93.4 kg (206 lb)   Height: 170.2 cm (67.01\")          Physical Exam:    General: Alert and oriented x 3.  No acute distress.  Normal affect. Obese. HEENT: Pupils equal, round, reactive to light; extraocular movements intact; sclerae nonicteric; pharynx, ear canals and TMs normal.  Neck: Without JVD, thyromegaly, bruit, or adenopathy.  Lungs: Clear to auscultation in all " fields.  Heart: Regular rate and rhythm without murmur, rub, gallop, or click.  Abdomen: Soft, nontender, without hepatosplenomegaly or hernia.  Bowel sounds normal.  : Deferred.  Rectal: Deferred.  Extremities: Without clubbing, cyanosis, edema, or pulse deficit.  Neurologic: Intact without focal deficit.  Normal station and gait observed during ingress and egress from the examination room.  Skin: Without significant lesion.  Musculoskeletal: Unremarkable.      Lab/other results:    NMR reveals a total cholesterol 203.  Triglycerides are 140.  LDL particle number is borderline elevated 1401.  Small LDL particle number elevated at 552.  HDL particle number normal at 35.5.  CMP normal except blood sugar elevated 143, calcium slightly elevated at 10.6, AST mildly elevated at 39 and ALT elevated at 40.  CBC is normal other than MCV slightly elevated at 97.7.  Hemoglobin A1c 6.5.  Thyroid function tests are essentially normal.  Vitamin D normal at 82.3.  CPK normal.    I reviewed the results of the limited nuclear bone scan.    Assessment/Plan:     Diagnosis Plan   1. Chronic bilateral thoracic back pain     2. Chronic bilateral low back pain without sciatica     3. Impaired fasting glucose     4. Primary hypothyroidism     5. Hyperlipidemia, unspecified hyperlipidemia type     6. Benign essential hypertension     7. Major depression     8. Primary osteoarthritis involving multiple joints     9. Generalized anxiety disorder     10. Chronic insomnia     11. Vitamin D deficiency     12. Peripheral neuropathy, idiopathic     13. Therapeutic drug monitoring       Patient with chronic thoracic and lower back pain related to degenerative arthritis and degenerative disc disease.  She is currently starting physical therapy and hopefully that will provide some relief.  It appears that her impaired fasting glucose has actually evolved into mild diabetes.  However, I will not give her that diagnosis at the present time based on  just one reading.  Patient has been working hard on her diet and is trying to lose weight and I feel certain that if she is successful that her A1c will fall back down into the range of impaired fasting glucose or perhaps better.  Her thyroid is totally therapeutic.  I do not feel any adjustments are needed.  Major depression and anxiety treated with BuSpar as well as Cymbalta.  She also takes trazodone to help with her chronic insomnia.  Her vitamin D is in the therapeutic range.  Patient does have a peripheral neuropathy and she has tried multiple medications without any success.  Symptoms are currently tolerable.    Plan is as follows: Patient will work on low carbohydrate diet and weight loss as well as exercise to the extent possible given her problems with her back.  Continue physical therapy.  I will not make any changes in her medication with the exception of her cholesterol medication.  She is currently taking simvastatin 20 mg/day which is not adequate.  I think an excellent choice would be addition of Zetia versus the increase of Zocor to 40 mg but certainly insurance considerations may play a role.  It appears that her insurance will cover Zetia and therefore I will prescribe Zetia 10 mg/day in addition to the simvastatin 20 mg/day.  Patient is aware to contact me if the cost of this is prohibitive.  I will have patient follow-up sometime after June 12, 2019 with lab prior and this will also be her subsequent Medicare wellness visit.  We will reassess the blood sugar issue as well as the cholesterol at that time.        Procedures

## 2019-03-29 ENCOUNTER — TREATMENT (OUTPATIENT)
Dept: PHYSICAL THERAPY | Facility: CLINIC | Age: 74
End: 2019-03-29

## 2019-03-29 DIAGNOSIS — G89.29 CHRONIC BILATERAL LOW BACK PAIN WITHOUT SCIATICA: ICD-10-CM

## 2019-03-29 DIAGNOSIS — M54.6 CHRONIC BILATERAL THORACIC BACK PAIN: Primary | ICD-10-CM

## 2019-03-29 DIAGNOSIS — G89.29 CHRONIC BILATERAL THORACIC BACK PAIN: Primary | ICD-10-CM

## 2019-03-29 DIAGNOSIS — M54.50 CHRONIC BILATERAL LOW BACK PAIN WITHOUT SCIATICA: ICD-10-CM

## 2019-03-29 PROCEDURE — 97110 THERAPEUTIC EXERCISES: CPT | Performed by: PHYSICAL THERAPIST

## 2019-03-29 PROCEDURE — 97140 MANUAL THERAPY 1/> REGIONS: CPT | Performed by: PHYSICAL THERAPIST

## 2019-03-29 NOTE — PROGRESS NOTES
Physical Therapy Daily Progress Note        Subjective     Claudette McManus reports: Exercises make me sore. Still having random intermittent pains    Objective   See Exercise, Manual, and Modality Logs for complete treatment.       Assessment/Plan  Improved rotation AROM today after treatment. Easier doing supine to sit after manual treatment than it was to do sit to supine. Adjusted wall thoracic exercise to do scap retraction vs arm raise since R shoulder ssore    Progress per Plan of Care           Manual Therapy:  25       mins  87785;  Therapeutic Exercise: 15      mins  56437;     Neuromuscular [place]:       mins  53831;    Therapeutic Activity:         mins  33660;     Gait Training:         mins  29369;     Ultrasound:         mins  59477;    Electrical Stimulation:        mins  14426 ( );  Dry Needling         mins self-pay    Timed Treatment:   40  mins   Total Treatment:     55  mins    Lennie Pimentel PT  Physical Therapist  [place] License # [id number]

## 2019-04-03 NOTE — PROGRESS NOTES
Physical Therapy Daily Progress Note    Visit # : 3  Claudette McManus reports: the last 2 days I've been painting lawn furniture, cleaning siding and hanging drapes and could barely move yesterday.     Subjective     Objective   See Exercise, Manual, and Modality Logs for complete treatment.       Assessment/Plan  Significant loss of segmental mobility T-S.  Pt requires verbal/tactile cuing for proper exercise form.  Good tolerance to core stability progression.  Progress per Plan of Care         Manual Therapy:          15       mins  41465;  Therapeutic Exercise: 15      mins  70634;     Neuromuscular [place]:       mins  61370;    Therapeutic Activity:          mins  17973;     Gait Training:                    mins  69425;     Ultrasound:                         mins  76192;    Electrical Stimulation:        mins  72839 ( );  Dry Needling                      mins self-pay     Timed Treatment:   30  mins direct  Total Treatment:     45  mins    Ghada Mayen PT  Physical Therapist  [place] License # [id number]

## 2019-04-05 ENCOUNTER — TREATMENT (OUTPATIENT)
Dept: PHYSICAL THERAPY | Facility: CLINIC | Age: 74
End: 2019-04-05

## 2019-04-05 DIAGNOSIS — M54.6 CHRONIC BILATERAL THORACIC BACK PAIN: Primary | ICD-10-CM

## 2019-04-05 DIAGNOSIS — G89.29 CHRONIC BILATERAL THORACIC BACK PAIN: Primary | ICD-10-CM

## 2019-04-05 PROCEDURE — 97110 THERAPEUTIC EXERCISES: CPT | Performed by: PHYSICAL THERAPIST

## 2019-04-05 PROCEDURE — 97140 MANUAL THERAPY 1/> REGIONS: CPT | Performed by: PHYSICAL THERAPIST

## 2019-04-10 ENCOUNTER — TREATMENT (OUTPATIENT)
Dept: PHYSICAL THERAPY | Facility: CLINIC | Age: 74
End: 2019-04-10

## 2019-04-10 DIAGNOSIS — G89.29 CHRONIC BILATERAL THORACIC BACK PAIN: Primary | ICD-10-CM

## 2019-04-10 DIAGNOSIS — M54.6 CHRONIC BILATERAL THORACIC BACK PAIN: Primary | ICD-10-CM

## 2019-04-10 PROCEDURE — 97110 THERAPEUTIC EXERCISES: CPT | Performed by: PHYSICAL THERAPIST

## 2019-04-10 PROCEDURE — 97140 MANUAL THERAPY 1/> REGIONS: CPT | Performed by: PHYSICAL THERAPIST

## 2019-04-10 NOTE — PROGRESS NOTES
Physical Therapy Daily Progress Note        Subjective     Claudetteisidoro Gomez reports: I am feeling some better. Only about 2 sharp pains since last week.    Objective   See Exercise, Manual, and Modality Logs for complete treatment.       Assessment/Plan  Improving posture and mobility. R shoulder sore after UBE    Progress per Plan of Care           Manual Therapy:  25       mins  22829;  Therapeutic Exercise: 15      mins  31362;     Neuromuscular [place]:       mins  92718;    Therapeutic Activity:         mins  47459;     Gait Training:         mins  57896;     Ultrasound:         mins  45392;    Electrical Stimulation:        mins  03901 ( );  Dry Needling         mins self-pay    Timed Treatment:   40   mins   Total Treatment:     40   mins    Lennie Pimentel, PT  Physical Therapist  [place] License # [id number]

## 2019-04-12 ENCOUNTER — TREATMENT (OUTPATIENT)
Dept: PHYSICAL THERAPY | Facility: CLINIC | Age: 74
End: 2019-04-12

## 2019-04-12 DIAGNOSIS — M54.6 CHRONIC BILATERAL THORACIC BACK PAIN: Primary | ICD-10-CM

## 2019-04-12 DIAGNOSIS — G89.29 CHRONIC BILATERAL THORACIC BACK PAIN: Primary | ICD-10-CM

## 2019-04-12 PROCEDURE — 97140 MANUAL THERAPY 1/> REGIONS: CPT | Performed by: PHYSICAL THERAPIST

## 2019-04-12 PROCEDURE — 97110 THERAPEUTIC EXERCISES: CPT | Performed by: PHYSICAL THERAPIST

## 2019-04-12 NOTE — PROGRESS NOTES
Physical Therapy Daily Progress NOTE       Subjective     Claudette McManus reports: No more sharp pains and I have more mobility. Pleased with progress    Objective   See Exercise, Manual, and Modality Logs for complete treatment.       Assessment/Plan  Doing well with improving posture and thoracic mobiltiy. L Cspine tight as well with restricted segmental mobility    Progress per Plan of Care           Manual Therapy: 25      mins  51033;  Therapeutic Exercise: 20      mins  50063;     Neuromuscular [place]:       mins  49553;    Therapeutic Activity:         mins  55961;     Gait Training:         mins  73570;     Ultrasound:         mins  10002;    Electrical Stimulation:        mins  91303 ( );  Dry Needling         mins self-pay    Timed Treatment:   45   mins   Total Treatment:     45   mins    Lennie Pimentel, PT  Physical Therapist  [place] License # [id number]

## 2019-04-15 ENCOUNTER — TELEPHONE (OUTPATIENT)
Dept: INTERNAL MEDICINE | Facility: CLINIC | Age: 74
End: 2019-04-15

## 2019-04-15 NOTE — TELEPHONE ENCOUNTER
Prescription diet pills are not indicated at the present time.  I do not feel that she has given this long enough or big enough effort.  Work harder.

## 2019-04-15 NOTE — TELEPHONE ENCOUNTER
Pt states that she has been trying the low carb diet but she is still hungry. She said that you told her to call if she has problems with this diet. You would prescribe her something. Call [phone].

## 2019-04-17 ENCOUNTER — TREATMENT (OUTPATIENT)
Dept: PHYSICAL THERAPY | Facility: CLINIC | Age: 74
End: 2019-04-17

## 2019-04-17 DIAGNOSIS — G89.29 CHRONIC BILATERAL LOW BACK PAIN WITHOUT SCIATICA: ICD-10-CM

## 2019-04-17 DIAGNOSIS — M54.6 CHRONIC BILATERAL THORACIC BACK PAIN: Primary | ICD-10-CM

## 2019-04-17 DIAGNOSIS — M54.50 CHRONIC BILATERAL LOW BACK PAIN WITHOUT SCIATICA: ICD-10-CM

## 2019-04-17 DIAGNOSIS — G89.29 CHRONIC BILATERAL THORACIC BACK PAIN: Primary | ICD-10-CM

## 2019-04-17 PROCEDURE — 97140 MANUAL THERAPY 1/> REGIONS: CPT | Performed by: PHYSICAL THERAPIST

## 2019-04-17 PROCEDURE — 97110 THERAPEUTIC EXERCISES: CPT | Performed by: PHYSICAL THERAPIST

## 2019-04-17 NOTE — PROGRESS NOTES
Physical Therapy Daily Progress Note        Subjective     Claudette McManus reports: 15 min late. Stuck in traffic. I feel so much better. No more sharp pains. I was able to work in yard without back pain    Objective   See Exercise, Manual, and Modality Logs for complete treatment.       Assessment/Plan  Minimal soreness with manual therapy. Improving thoracic rotation to WNL without pain. Continue to work on postural strengthening and seg mobiltiy    Progress strengthening /stabilization /functional activity           Manual Therapy:  15       mins  91262;  Therapeutic Exercise: 15      mins  28936;     Neuromuscular [place]:       mins  92264;    Therapeutic Activity:         mins  23772;     Gait Training:         mins  01041;     Ultrasound:         mins  19938;    Electrical Stimulation:        mins  07030 ( );  Dry Needling         mins self-pay    Timed Treatment:   30   mins   Total Treatment:     30   mins    Lennie Pimentel, PT  Physical Therapist  [place] License # [id number]

## 2019-04-24 ENCOUNTER — TREATMENT (OUTPATIENT)
Dept: PHYSICAL THERAPY | Facility: CLINIC | Age: 74
End: 2019-04-24

## 2019-04-24 PROCEDURE — 97140 MANUAL THERAPY 1/> REGIONS: CPT | Performed by: PHYSICAL THERAPIST

## 2019-04-24 PROCEDURE — 97110 THERAPEUTIC EXERCISES: CPT | Performed by: PHYSICAL THERAPIST

## 2019-04-24 NOTE — PROGRESS NOTES
Physical Therapy Daily Progress Note        Subjective     Claudette McManus reports: DOing really well. No more sharp pains      Objective   See Exercise, Manual, and Modality Logs for complete treatment.       Assessment/Plan  Much improved seg mobility, posture and strength. Full rotational AROM B    Progress per Plan of Care   Pt has to miss next week. Return the week after for probable DC           Manual Therapy:  25       mins  17782;  Therapeutic Exercise: 15      mins  72869;     Neuromuscular [place]:       mins  38027;    Therapeutic Activity:         mins  56681;     Gait Training:         mins  82575;     Ultrasound:         mins  50366;    Electrical Stimulation:        mins  48394 ( );  Dry Needling         mins self-pay    Timed Treatment:   40   mins   Total Treatment:     40   mins    Lennie Pimentel, PT  Physical Therapist  [place] License # [id number]

## 2019-07-03 ENCOUNTER — OFFICE VISIT (OUTPATIENT)
Dept: INTERNAL MEDICINE | Facility: CLINIC | Age: 74
End: 2019-07-03

## 2019-07-03 VITALS
DIASTOLIC BLOOD PRESSURE: 76 MMHG | SYSTOLIC BLOOD PRESSURE: 142 MMHG | WEIGHT: 200.6 LBS | HEART RATE: 77 BPM | BODY MASS INDEX: 31.48 KG/M2 | OXYGEN SATURATION: 98 % | HEIGHT: 67 IN

## 2019-07-03 DIAGNOSIS — G89.29 CHRONIC BILATERAL LOW BACK PAIN WITHOUT SCIATICA: Chronic | ICD-10-CM

## 2019-07-03 DIAGNOSIS — E55.9 VITAMIN D DEFICIENCY: Chronic | ICD-10-CM

## 2019-07-03 DIAGNOSIS — Z51.81 THERAPEUTIC DRUG MONITORING: ICD-10-CM

## 2019-07-03 DIAGNOSIS — F41.1 GENERALIZED ANXIETY DISORDER: Chronic | ICD-10-CM

## 2019-07-03 DIAGNOSIS — F51.04 CHRONIC INSOMNIA: Chronic | ICD-10-CM

## 2019-07-03 DIAGNOSIS — M54.6 CHRONIC BILATERAL THORACIC BACK PAIN: Chronic | ICD-10-CM

## 2019-07-03 DIAGNOSIS — E78.5 HYPERLIPIDEMIA, UNSPECIFIED HYPERLIPIDEMIA TYPE: Chronic | ICD-10-CM

## 2019-07-03 DIAGNOSIS — F33.41 RECURRENT MAJOR DEPRESSIVE DISORDER, IN PARTIAL REMISSION (HCC): Chronic | ICD-10-CM

## 2019-07-03 DIAGNOSIS — G89.29 CHRONIC BILATERAL THORACIC BACK PAIN: Chronic | ICD-10-CM

## 2019-07-03 DIAGNOSIS — R73.01 IMPAIRED FASTING GLUCOSE: Chronic | ICD-10-CM

## 2019-07-03 DIAGNOSIS — G60.9 PERIPHERAL NEUROPATHY, IDIOPATHIC: Chronic | ICD-10-CM

## 2019-07-03 DIAGNOSIS — Z00.00 MEDICARE ANNUAL WELLNESS VISIT, SUBSEQUENT: Primary | ICD-10-CM

## 2019-07-03 DIAGNOSIS — I10 BENIGN ESSENTIAL HYPERTENSION: Chronic | ICD-10-CM

## 2019-07-03 DIAGNOSIS — M54.50 CHRONIC BILATERAL LOW BACK PAIN WITHOUT SCIATICA: Chronic | ICD-10-CM

## 2019-07-03 DIAGNOSIS — E03.9 PRIMARY HYPOTHYROIDISM: Chronic | ICD-10-CM

## 2019-07-03 PROCEDURE — 99214 OFFICE O/P EST MOD 30 MIN: CPT | Performed by: INTERNAL MEDICINE

## 2019-07-03 PROCEDURE — 96160 PT-FOCUSED HLTH RISK ASSMT: CPT | Performed by: INTERNAL MEDICINE

## 2019-07-03 PROCEDURE — G0439 PPPS, SUBSEQ VISIT: HCPCS | Performed by: INTERNAL MEDICINE

## 2019-07-03 RX ORDER — DULOXETIN HYDROCHLORIDE 60 MG/1
CAPSULE, DELAYED RELEASE ORAL
Qty: 90 CAPSULE | Refills: 3 | Status: SHIPPED | OUTPATIENT
Start: 2019-07-03 | End: 2020-07-06

## 2019-07-03 NOTE — PROGRESS NOTES
QUICK REFERENCE INFORMATION:  The ABCs of the Annual Wellness Visit    Subsequent Medicare Wellness Visit     HEALTH RISK ASSESSMENT    : 1945    Recent Hospitalizations:  No hospitalization(s) within the last year..  ccc      Current Medical Providers:  Patient Care Team:  Lito Moore MD as PCP - General (Internal Medicine)  Shaan Ugalde MD as PCP - Claims Attributed        Smoking Status:  Social History     Tobacco Use   Smoking Status Former Smoker   • Last attempt to quit: 1998   • Years since quittin.5   Smokeless Tobacco Never Used       Alcohol Consumption:  Social History     Substance and Sexual Activity   Alcohol Use Yes   • Alcohol/week: 0.6 oz   • Types: 1 Glasses of wine per week   • Frequency: Monthly or less   • Drinks per session: 1 or 2   • Binge frequency: Never    Comment: current some day       Depression Screen:   PHQ-2/PHQ-9 Depression Screening 7/3/2019   Little interest or pleasure in doing things 0   Feeling down, depressed, or hopeless 0   Trouble falling or staying asleep, or sleeping too much 0   Feeling tired or having little energy 1   Poor appetite or overeating 0   Feeling bad about yourself - or that you are a failure or have let yourself or your family down 3   Trouble concentrating on things, such as reading the newspaper or watching television 0   Moving or speaking so slowly that other people could have noticed. Or the opposite - being so fidgety or restless that you have been moving around a lot more than usual 0   Thoughts that you would be better off dead, or of hurting yourself in some way 0   Total Score 4       Health Habits and Functional and Cognitive Screening:  Functional & Cognitive Status 7/3/2019   Do you have difficulty preparing food and eating? No   Do you have difficulty bathing yourself, getting dressed or grooming yourself? No   Do you have difficulty using the toilet? No   Do you have difficulty moving around from place to place? No    Do you have trouble with steps or getting out of a bed or a chair? No   In the past year have you fallen or experienced a near fall? Yes   Current Diet Low Carb Diet   Dental Exam Up to date   Eye Exam Up to date   Exercise (times per week) 0 times per week   Current Exercise Activities Include None   Do you need help using the phone?  No   Are you deaf or do you have serious difficulty hearing?  No   Do you need help with transportation? No   Do you need help shopping? No   Do you need help preparing meals?  No   Do you need help with housework?  No   Do you need help with laundry? No   Do you need help taking your medications? No   Do you need help managing money? No   Do you ever drive or ride in a car without wearing a seat belt? No   Have you felt unusual stress, anger or loneliness in the last month? No   Who do you live with? Spouse   If you need help, do you have trouble finding someone available to you? No   Have you been bothered in the last four weeks by sexual problems? No   Do you have difficulty concentrating, remembering or making decisions? Yes           Does the patient have evidence of cognitive impairment? No    Asiprin use counseling: Does not need ASA (and currently is not on it)      Recent Lab Results:    Lab Results   Component Value Date     (H) 06/26/2019     Lab Results   Component Value Date    HGBA1C 6.80 (H) 06/26/2019     Lab Results   Component Value Date    TRIG 164 (H) 06/26/2019    HDL 68 (H) 06/11/2018    VLDL 31.8 06/11/2018           Age-appropriate Screening Schedule:  Refer to the list below for future screening recommendations based on patient's age, sex and/or medical conditions. Orders for these recommended tests are listed in the plan section. The patient has been provided with a written plan.    Health Maintenance   Topic Date Due   • DXA SCAN  05/01/2020 (Originally 5/10/2018)   • INFLUENZA VACCINE  08/01/2019   • MAMMOGRAM  10/13/2019   • LIPID PANEL   06/26/2020   • COLONOSCOPY  04/03/2022   • TDAP/TD VACCINES (2 - Td) 05/25/2026   • PNEUMOCOCCAL VACCINES (65+ LOW/MEDIUM RISK)  Discontinued   • ZOSTER VACCINE  Discontinued        Subjective   History of Present Illness    Claudette L McManus is a 74 y.o. female who presents for an Annual Wellness Visit.    The following portions of the patient's history were reviewed and updated as appropriate: allergies, current medications, past family history, past medical history, past social history, past surgical history and problem list.    Outpatient Medications Prior to Visit   Medication Sig Dispense Refill   • busPIRone (BUSPAR) 5 MG tablet Take 1 tablet by mouth 3 (Three) Times a Day. 270 tablet 1   • Calcium Carb-Cholecalciferol (CALCIUM + D3 PO) Take 1 tablet by mouth daily.     • Cholecalciferol (VITAMIN D3) 2000 UNITS tablet Take 1 capsule by mouth daily.     • DULoxetine (CYMBALTA) 60 MG capsule Take 1 p.o. daily as directed 90 capsule 3   • ezetimibe (ZETIA) 10 MG tablet Take 1 tablet by mouth Daily. 30 tablet 6   • levothyroxine (SYNTHROID, LEVOTHROID) 125 MCG tablet Take 1 tablet by mouth Daily. 90 tablet 1   • Multiple Vitamin (MULTIVITAMIN) tablet Take 1 tablet by mouth daily.     • simvastatin (ZOCOR) 20 MG tablet Take 1 tablet by mouth Every Night. 90 tablet 1   • traZODone (DESYREL) 150 MG tablet Take 1 tablet by mouth Every Night. 90 tablet 1     No facility-administered medications prior to visit.        Patient Active Problem List   Diagnosis   • Generalized anxiety disorder   • Primary osteoarthritis involving multiple joints   • Major depression   • Benign essential hypertension   • Hyperlipidemia   • Primary hypothyroidism   • Impaired fasting glucose   • Chronic insomnia   • Chronic bilateral low back pain without sciatica   • Chronic bilateral thoracic back pain   • Vitamin D deficiency   • Therapeutic drug monitoring   • Menopausal state   • Peripheral neuropathy, idiopathic       Advance Care  "Planning:  Patient does not have an advance directive - information provided to the patient today    Identification of Risk Factors:  Risk factors include: weight , cardiovascular risk, increased fall risk and chronic pain.    Review of Systems   Musculoskeletal: Positive for arthralgias and back pain.   Psychiatric/Behavioral: Positive for dysphoric mood. The patient is nervous/anxious.    All other systems reviewed and are negative.      Compared to one year ago, the patient feels her physical health is worse.  Compared to one year ago, the patient feels her mental health is worse.    Objective       Physical Exam  General: Alert and oriented x 3.  No acute distress.  Normal affect.  Obese.  HEENT: Pupils equal, round, reactive to light; extraocular movements intact; sclerae nonicteric; pharynx, ear canals and TMs normal.  Neck: Without JVD, thyromegaly, bruit, or adenopathy.  Lungs: Clear to auscultation in all fields.  Heart: Regular rate and rhythm without murmur, rub, gallop, or click.  Abdomen: Soft, nontender, without hepatosplenomegaly or hernia.  Bowel sounds normal.  : Deferred.  Rectal: Deferred.  Extremities: Without clubbing, cyanosis, edema, or pulse deficit.  Neurologic: Intact without focal deficit.  Normal station and gait observed during ingress and egress from the examination room.  Skin: Without significant lesion.  Musculoskeletal: Unremarkable.    Vitals:    07/03/19 1021   BP: 142/76   BP Location: Right arm   Pulse: 77   SpO2: 98%   Weight: 91 kg (200 lb 9.6 oz)   Height: 170.2 cm (67.01\")       Patient's Body mass index is 31.41 kg/m². BMI is above normal parameters. Recommendations include: exercise counseling, nutrition counseling and referral to primary care.      Assessment/Plan   Patient Self-Management and Personalized Health Advice  The patient has been provided with information about: diet, exercise, weight management, prevention of cardiac or vascular disease, fall prevention and " designing advance directives and preventive services including:   · Advance directive, Counseling for cardiovascular disease risk reduction, Diabetes screening, see lab orders, Exercise counseling provided, Fall Risk assessment done, Glaucoma screening recommended, Nutrition counseling provided.    Visit Diagnoses:    ICD-10-CM ICD-9-CM   1. Medicare annual wellness visit, subsequent Z00.00 V70.0   2. Impaired fasting glucose R73.01 790.21   3. Primary hypothyroidism E03.9 244.9   4. Hyperlipidemia, unspecified hyperlipidemia type E78.5 272.4   5. Benign essential hypertension I10 401.1   6. Major depression F33.41 296.35   7. Generalized anxiety disorder F41.1 300.02   8. Chronic insomnia F51.04 780.52   9. Chronic bilateral low back pain without sciatica M54.5 724.2    G89.29 338.29   10. Chronic bilateral thoracic back pain M54.6 724.1    G89.29 338.29   11. Vitamin D deficiency E55.9 268.9   12. Peripheral neuropathy, idiopathic G60.9 356.9   13. Therapeutic drug monitoring Z51.81 V58.83       Orders Placed This Encounter   Procedures   • Comprehensive Metabolic Panel     Standing Status:   Future     Standing Expiration Date:   7/3/2021   • TSH     Standing Status:   Future     Standing Expiration Date:   7/3/2021   • T4, Free     Standing Status:   Future     Standing Expiration Date:   7/3/2021   • T3, Free     Standing Status:   Future     Standing Expiration Date:   7/3/2021   • Hemoglobin A1c     Standing Status:   Future     Standing Expiration Date:   7/3/2021       Outpatient Encounter Medications as of 7/3/2019   Medication Sig Dispense Refill   • busPIRone (BUSPAR) 5 MG tablet Take 1 tablet by mouth 3 (Three) Times a Day. 270 tablet 1   • Calcium Carb-Cholecalciferol (CALCIUM + D3 PO) Take 1 tablet by mouth daily.     • Cholecalciferol (VITAMIN D3) 2000 UNITS tablet Take 1 capsule by mouth daily.     • DULoxetine (CYMBALTA) 60 MG capsule Take 1 p.o. daily as directed 90 capsule 3   • ezetimibe (ZETIA)  10 MG tablet Take 1 tablet by mouth Daily. 30 tablet 6   • levothyroxine (SYNTHROID, LEVOTHROID) 125 MCG tablet Take 1 tablet by mouth Daily. 90 tablet 1   • Multiple Vitamin (MULTIVITAMIN) tablet Take 1 tablet by mouth daily.     • simvastatin (ZOCOR) 20 MG tablet Take 1 tablet by mouth Every Night. 90 tablet 1   • traZODone (DESYREL) 150 MG tablet Take 1 tablet by mouth Every Night. 90 tablet 1     No facility-administered encounter medications on file as of 7/3/2019.        Reviewed use of high risk medication in the elderly: yes  Reviewed for potential of harmful drug interactions in the elderly: yes    Follow Up:  Return in about 4 months (around 11/3/2019) for Next scheduled follow up with lab prior.     An After Visit Summary and PPPS with all of these plans were given to the patient.

## 2019-07-03 NOTE — PROGRESS NOTES
07/03/2019    Patient Information  Claudette L McManus                                                                                          [id number]  [place] [address]      1945  [unfilled]  There is no work phone number on file.    Chief Complaint:     Subsequent Medicare wellness visit.  Follow-up impaired fasting glucose, hypothyroidism, hyperlipidemia, hypertension, major depression with anxiety, chronic insomnia, chronic lower back pain and chronic thoracic back pain, vitamin D deficiency, idiopathic peripheral neuropathy.    History of Present Illness:    Patient with a history of medical problems as outlined in chief complaint presents today for subsequent Medicare wellness visit.  She also had lab work in order to monitor her chronic medical issues.  Her past medical history reviewed and updated were necessary including health maintenance parameters.  This reveals she will be up-to-date or else accounted for after today's visit.    Review of Systems   Constitution: Negative.   HENT: Negative.    Eyes: Negative.    Cardiovascular: Negative.    Respiratory: Negative.    Endocrine: Negative.    Hematologic/Lymphatic: Negative.    Skin: Negative.    Musculoskeletal: Positive for arthritis and back pain.   Gastrointestinal: Negative.    Genitourinary: Negative.    Neurological: Negative.    Psychiatric/Behavioral: Positive for depression. The patient has insomnia and is nervous/anxious.    Allergic/Immunologic: Negative.        Active Problems:    Patient Active Problem List   Diagnosis   • Generalized anxiety disorder   • Primary osteoarthritis involving multiple joints   • Major depression   • Benign essential hypertension   • Hyperlipidemia   • Primary hypothyroidism   • Impaired fasting glucose   • Chronic insomnia   • Chronic bilateral low back pain without sciatica   • Chronic bilateral thoracic back pain   • Vitamin D deficiency   • Therapeutic drug monitoring   •  Menopausal state   • Peripheral neuropathy, idiopathic         Past Medical History:   Diagnosis Date   • Benign essential hypertension    • Chronic bilateral low back pain without sciatica 8/16/2013 March 13, 2019--Limited bone scan.  This reveals scintigraphic activity in the spine and at the right shoulder corresponds to change seen on recent x-rays and is best explained by degenerative change.  Isolated metastatic disease exactly corresponding to the sites of extensive degenerative disc change would be peculiar.  March 7, 2019--new patient to get established.  She has complaints of approxi   • Chronic bilateral thoracic back pain 2/26/2019 March 13, 2019--Limited bone scan.  This reveals scintigraphic activity in the spine and at the right shoulder corresponds to change seen on recent x-rays and is best explained by degenerative change.  Isolated metastatic disease exactly corresponding to the sites of extensive degenerative disc change would be peculiar.  March 1, 2019--x-ray of the lumbar and thoracic spine reveals mild anterior l   • Chronic insomnia 11/4/2014   • Generalized anxiety disorder 5/19/2013   • History of Bell's palsy 5/21/2013    Remote history of Bell's palsy.  Patient does not remember which side of the face.   • History of bone density study 2/5/2016    May 10, 2016--DEXA scan revealed average lumbar spine T score of 3.6.  Left femoral neck T score normal at 2.0.  Right femoral neck T score normal at 1.6.  Total left hip normal at 2.6 and total right hip normal at 2.5.   • Hyperlipidemia    • Impaired fasting glucose    • Major depression    • Menopausal state 3/7/2019   • Peripheral neuropathy, idiopathic 3/7/2019    Characterized by decreased sensation and paresthesias in both lower extremities described as a burning sensation.  Extends up to the knees.  Reportedly had been evaluated with nerve conduction study in the past.   • Primary hypothyroidism 5/19/2013   • Primary  osteoarthritis involving multiple joints 4/22/2013   • Vitamin D deficiency 2/26/2019         Past Surgical History:   Procedure Laterality Date   • BILATERAL BREAST REDUCTION  Early 2000    Early 2000--bilateral breast reduction   • CHOLECYSTECTOMY  1960s    1960s--open cholecystectomy   • COLONOSCOPY  2012 2012--reportedly normal colonoscopy   • INCONTINENCE SURGERY  2012 Approximately 2012--implantation of questionable bladder stimulator right sacral area for urinary incontinence.  Details not known.   • PARTIAL HYSTERECTOMY  Remote    Remote partial hysterectomy.  Ovaries intact.   • REPLACEMENT TOTAL KNEE BILATERAL Left 2010; 2011 2010-2011--bilateral total knee replacement   • TOTAL SHOULDER REPLACEMENT Left 2013 2013--left total shoulder replacement.         Allergies   Allergen Reactions   • Morphine And Related    • Other Other (See Comments)     REJECTED DACRON SUTURES IN THE PAST AND INCISION CAME APART           Current Outpatient Medications:   •  busPIRone (BUSPAR) 5 MG tablet, Take 1 tablet by mouth 3 (Three) Times a Day., Disp: 270 tablet, Rfl: 1  •  Calcium Carb-Cholecalciferol (CALCIUM + D3 PO), Take 1 tablet by mouth daily., Disp: , Rfl:   •  Cholecalciferol (VITAMIN D3) 2000 UNITS tablet, Take 1 capsule by mouth daily., Disp: , Rfl:   •  DULoxetine (CYMBALTA) 60 MG capsule, Take 1 p.o. daily as directed, Disp: 90 capsule, Rfl: 3  •  ezetimibe (ZETIA) 10 MG tablet, Take 1 tablet by mouth Daily., Disp: 30 tablet, Rfl: 6  •  levothyroxine (SYNTHROID, LEVOTHROID) 125 MCG tablet, Take 1 tablet by mouth Daily., Disp: 90 tablet, Rfl: 1  •  Multiple Vitamin (MULTIVITAMIN) tablet, Take 1 tablet by mouth daily., Disp: , Rfl:   •  simvastatin (ZOCOR) 20 MG tablet, Take 1 tablet by mouth Every Night., Disp: 90 tablet, Rfl: 1  •  traZODone (DESYREL) 150 MG tablet, Take 1 tablet by mouth Every Night., Disp: 90 tablet, Rfl: 1      Family History   Problem Relation Age of Onset   • Alcohol abuse  "Father    • Cancer Other    • Diabetes Other         type II         Social History     Socioeconomic History   • Marital status:      Spouse name: Not on file   • Number of children: Not on file   • Years of education: Not on file   • Highest education level: Not on file   Social Needs   • Financial resource strain: Not hard at all   • Food insecurity:     Worry: Never true     Inability: Never true   • Transportation needs:     Medical: No     Non-medical: No   Tobacco Use   • Smoking status: Former Smoker     Last attempt to quit: 1998     Years since quittin.5   • Smokeless tobacco: Never Used   Substance and Sexual Activity   • Alcohol use: Yes     Alcohol/week: 0.6 oz     Types: 1 Glasses of wine per week     Frequency: Monthly or less     Drinks per session: 1 or 2     Binge frequency: Never     Comment: current some day   • Drug use: No   • Sexual activity: Not Currently     Partners: Male   Lifestyle   • Physical activity:     Days per week: 0 days     Minutes per session: 0 min   • Stress: Only a little   Relationships   • Social connections:     Talks on phone: Patient refused     Gets together: Patient refused     Attends Yazdanism service: Patient refused     Active member of club or organization: Patient refused     Attends meetings of clubs or organizations: Patient refused     Relationship status: Patient refused         Vitals:    19 1021   BP: 142/76   BP Location: Right arm   Pulse: 77   SpO2: 98%   Weight: 91 kg (200 lb 9.6 oz)   Height: 170.2 cm (67.01\")          Physical Exam:    General: Alert and oriented x 3.  No acute distress.  Normal affect.  Obese.  HEENT: Pupils equal, round, reactive to light; extraocular movements intact; sclerae nonicteric; pharynx, ear canals and TMs normal.  Neck: Without JVD, thyromegaly, bruit, or adenopathy.  Lungs: Clear to auscultation in all fields.  Heart: Regular rate and rhythm without murmur, rub, gallop, or click.  Abdomen: Soft, " nontender, without hepatosplenomegaly or hernia.  Bowel sounds normal.  : Deferred.  Rectal: Deferred.  Extremities: Without clubbing, cyanosis, edema, or pulse deficit.  Neurologic: Intact without focal deficit.  Normal station and gait observed during ingress and egress from the examination room.  Skin: Without significant lesion.  Musculoskeletal: Unremarkable.    Lab/other results:    NMR is essentially perfect.  She does have a mild elevation of triglycerides at 164.  Her HDL particle number and LDL particle number is excellent.  CMP normal except blood sugar elevated 126, calcium slightly elevated at 10.7.  Hemoglobin A1c 6.8.  Thyroid function tests are essentially normal.  Slightly suppressed TSH noted.  CPK normal.    Assessment/Plan:     Diagnosis Plan   1. Medicare annual wellness visit, subsequent     2. Impaired fasting glucose     3. Primary hypothyroidism     4. Hyperlipidemia, unspecified hyperlipidemia type     5. Benign essential hypertension     6. Major depression     7. Generalized anxiety disorder     8. Chronic insomnia     9. Chronic bilateral low back pain without sciatica     10. Chronic bilateral thoracic back pain     11. Vitamin D deficiency     12. Peripheral neuropathy, idiopathic     13. Therapeutic drug monitoring       The subsequent Medicare wellness visit is documented on separate note.    It appears that patient's impaired fasting glucose may have evolved into mild overt diabetes.  However, I am reluctant to give her that diagnosis based on one hemoglobin A1c reading.  Patient has been having a difficult time with weight loss.  She has tried a low carbohydrate diet without a whole lot of success.  We did have a discussion regarding the Kirkpatrick diet and the use of keto strips.  I explained the patient that if she is following the diet correctly and her urine ketone strips are positive each day then she will in fact lose weight and probably a substantial amount of weight.  Her  thyroid is therapeutic and is a matter fact it may be a little supratherapeutic but I do not want to reduce her dose.  Her cholesterol is under excellent control.  Her blood pressure is marginally elevated but not enough to make any changes.  Vitamin D therapeutic.    Plan is as follows: I recommended she follow a low carbohydrate diet such as the Kirkpatrick induction phase and to obtain some keto strips.  She should also begin exercise.  I explained to her that it takes at least 3 months with a hemoglobin A1c to normalize after making a change.  Therefore, we will check her with lab work in about 4 months.    Procedures

## 2019-07-03 NOTE — PROGRESS NOTES
QUICK REFERENCE INFORMATION:  The ABCs of the Annual Wellness Visit    Subsequent Medicare Wellness Visit     HEALTH RISK ASSESSMENT    : 1945    Recent Hospitalizations:  No hospitalization(s) within the last year..  ccc      Current Medical Providers:  Patient Care Team:  Lito Moore MD as PCP - General (Internal Medicine)  Shaan Ugalde MD as PCP - Claims Attributed        Smoking Status:  Social History     Tobacco Use   Smoking Status Former Smoker   • Last attempt to quit: 1998   • Years since quittin.5   Smokeless Tobacco Never Used       Alcohol Consumption:  Social History     Substance and Sexual Activity   Alcohol Use Yes   • Alcohol/week: 0.6 oz   • Types: 1 Glasses of wine per week   • Frequency: Monthly or less   • Drinks per session: 1 or 2   • Binge frequency: Never    Comment: current some day       Depression Screen:   PHQ-2/PHQ-9 Depression Screening 7/3/2019   Little interest or pleasure in doing things 0   Feeling down, depressed, or hopeless 0   Trouble falling or staying asleep, or sleeping too much 0   Feeling tired or having little energy 1   Poor appetite or overeating 0   Feeling bad about yourself - or that you are a failure or have let yourself or your family down 3   Trouble concentrating on things, such as reading the newspaper or watching television 0   Moving or speaking so slowly that other people could have noticed. Or the opposite - being so fidgety or restless that you have been moving around a lot more than usual 0   Thoughts that you would be better off dead, or of hurting yourself in some way 0   Total Score 4       Health Habits and Functional and Cognitive Screening:  Functional & Cognitive Status 7/3/2019   Do you have difficulty preparing food and eating? No   Do you have difficulty bathing yourself, getting dressed or grooming yourself? No   Do you have difficulty using the toilet? No   Do you have difficulty moving around from place to place? No    Do you have trouble with steps or getting out of a bed or a chair? No   In the past year have you fallen or experienced a near fall? Yes   Current Diet Low Carb Diet   Dental Exam Up to date   Eye Exam Up to date   Exercise (times per week) 0 times per week   Current Exercise Activities Include None   Do you need help using the phone?  No   Are you deaf or do you have serious difficulty hearing?  No   Do you need help with transportation? No   Do you need help shopping? No   Do you need help preparing meals?  No   Do you need help with housework?  No   Do you need help with laundry? No   Do you need help taking your medications? No   Do you need help managing money? No   Do you ever drive or ride in a car without wearing a seat belt? No   Have you felt unusual stress, anger or loneliness in the last month? No   Who do you live with? Spouse   If you need help, do you have trouble finding someone available to you? No   Have you been bothered in the last four weeks by sexual problems? No   Do you have difficulty concentrating, remembering or making decisions? Yes           Does the patient have evidence of cognitive impairment? No    Asiprin use counseling: Does not need ASA (and currently is not on it)      Recent Lab Results:    Lab Results   Component Value Date     (H) 06/26/2019     Lab Results   Component Value Date    HGBA1C 6.80 (H) 06/26/2019     Lab Results   Component Value Date    TRIG 164 (H) 06/26/2019    HDL 68 (H) 06/11/2018    VLDL 31.8 06/11/2018           Age-appropriate Screening Schedule:  Refer to the list below for future screening recommendations based on patient's age, sex and/or medical conditions. Orders for these recommended tests are listed in the plan section. The patient has been provided with a written plan.    Health Maintenance   Topic Date Due   • DXA SCAN  05/01/2020 (Originally 5/10/2018)   • INFLUENZA VACCINE  08/01/2019   • MAMMOGRAM  10/13/2019   • LIPID PANEL   06/26/2020   • COLONOSCOPY  04/03/2022   • TDAP/TD VACCINES (2 - Td) 05/25/2026   • PNEUMOCOCCAL VACCINES (65+ LOW/MEDIUM RISK)  Discontinued   • ZOSTER VACCINE  Discontinued        Subjective   History of Present Illness    Claudette L McManus is a 74 y.o. female who presents for an Annual Wellness Visit.    The following portions of the patient's history were reviewed and updated as appropriate: allergies, current medications, past family history, past medical history, past social history, past surgical history and problem list.    Outpatient Medications Prior to Visit   Medication Sig Dispense Refill   • busPIRone (BUSPAR) 5 MG tablet Take 1 tablet by mouth 3 (Three) Times a Day. 270 tablet 1   • Calcium Carb-Cholecalciferol (CALCIUM + D3 PO) Take 1 tablet by mouth daily.     • Cholecalciferol (VITAMIN D3) 2000 UNITS tablet Take 1 capsule by mouth daily.     • DULoxetine (CYMBALTA) 60 MG capsule Take 1 p.o. daily as directed 90 capsule 3   • ezetimibe (ZETIA) 10 MG tablet Take 1 tablet by mouth Daily. 30 tablet 6   • levothyroxine (SYNTHROID, LEVOTHROID) 125 MCG tablet Take 1 tablet by mouth Daily. 90 tablet 1   • Multiple Vitamin (MULTIVITAMIN) tablet Take 1 tablet by mouth daily.     • simvastatin (ZOCOR) 20 MG tablet Take 1 tablet by mouth Every Night. 90 tablet 1   • traZODone (DESYREL) 150 MG tablet Take 1 tablet by mouth Every Night. 90 tablet 1     No facility-administered medications prior to visit.        Patient Active Problem List   Diagnosis   • Generalized anxiety disorder   • Primary osteoarthritis involving multiple joints   • Major depression   • Benign essential hypertension   • Hyperlipidemia   • Primary hypothyroidism   • Impaired fasting glucose   • Chronic insomnia   • Chronic bilateral low back pain without sciatica   • Chronic bilateral thoracic back pain   • Vitamin D deficiency   • Therapeutic drug monitoring   • Menopausal state   • Peripheral neuropathy, idiopathic       Advance Care  "Planning:  Patient does not have an advance directive - information provided to the patient today    Identification of Risk Factors:  Risk factors include: weight , cardiovascular risk, chronic pain and depression.    Review of Systems   Musculoskeletal: Positive for arthralgias and back pain.   Psychiatric/Behavioral: Positive for dysphoric mood. Negative for agitation, behavioral problems, hallucinations and self-injury. The patient is nervous/anxious.    All other systems reviewed and are negative.      Compared to one year ago, the patient feels her physical health is worse.  Compared to one year ago, the patient feels her mental health is worse.    Objective       Physical Exam  General: Alert and oriented x 3.  No acute distress.  Normal affect.  Obese.  HEENT: Pupils equal, round, reactive to light; extraocular movements intact; sclerae nonicteric; pharynx, ear canals and TMs normal.  Neck: Without JVD, thyromegaly, bruit, or adenopathy.  Lungs: Clear to auscultation in all fields.  Heart: Regular rate and rhythm without murmur, rub, gallop, or click.  Abdomen: Soft, nontender, without hepatosplenomegaly or hernia.  Bowel sounds normal.  : Deferred.  Rectal: Deferred.  Extremities: Without clubbing, cyanosis, edema, or pulse deficit.  Neurologic: Intact without focal deficit.  Normal station and gait observed during ingress and egress from the examination room.  Skin: Without significant lesion.  Musculoskeletal: Unremarkable.    Vitals:    07/03/19 1021   BP: 142/76   BP Location: Right arm   Pulse: 77   SpO2: 98%   Weight: 91 kg (200 lb 9.6 oz)   Height: 170.2 cm (67.01\")       Patient's Body mass index is 31.41 kg/m². BMI is above normal parameters. Recommendations include: educational material, exercise counseling, nutrition counseling and referral to primary care.      Assessment/Plan   Patient Self-Management and Personalized Health Advice  The patient has been provided with information about: diet, " exercise, weight management, prevention of cardiac or vascular disease, fall prevention and designing advance directives and preventive services including:   · Advance directive, Counseling for cardiovascular disease risk reduction, Diabetes screening, see lab orders, Exercise counseling provided, Fall Risk assessment done, Glaucoma screening recommended, Nutrition counseling provided.    Visit Diagnoses:    ICD-10-CM ICD-9-CM   1. Medicare annual wellness visit, subsequent Z00.00 V70.0   2. Impaired fasting glucose R73.01 790.21   3. Primary hypothyroidism E03.9 244.9   4. Hyperlipidemia, unspecified hyperlipidemia type E78.5 272.4   5. Benign essential hypertension I10 401.1   6. Major depression F33.41 296.35   7. Generalized anxiety disorder F41.1 300.02   8. Chronic insomnia F51.04 780.52   9. Chronic bilateral low back pain without sciatica M54.5 724.2    G89.29 338.29   10. Chronic bilateral thoracic back pain M54.6 724.1    G89.29 338.29   11. Vitamin D deficiency E55.9 268.9   12. Peripheral neuropathy, idiopathic G60.9 356.9   13. Therapeutic drug monitoring Z51.81 V58.83       Orders Placed This Encounter   Procedures   • Comprehensive Metabolic Panel     Standing Status:   Future     Standing Expiration Date:   7/3/2021   • TSH     Standing Status:   Future     Standing Expiration Date:   7/3/2021   • T4, Free     Standing Status:   Future     Standing Expiration Date:   7/3/2021   • T3, Free     Standing Status:   Future     Standing Expiration Date:   7/3/2021   • Hemoglobin A1c     Standing Status:   Future     Standing Expiration Date:   7/3/2021       Outpatient Encounter Medications as of 7/3/2019   Medication Sig Dispense Refill   • busPIRone (BUSPAR) 5 MG tablet Take 1 tablet by mouth 3 (Three) Times a Day. 270 tablet 1   • Calcium Carb-Cholecalciferol (CALCIUM + D3 PO) Take 1 tablet by mouth daily.     • Cholecalciferol (VITAMIN D3) 2000 UNITS tablet Take 1 capsule by mouth daily.     •  DULoxetine (CYMBALTA) 60 MG capsule Take 1 p.o. daily as directed 90 capsule 3   • ezetimibe (ZETIA) 10 MG tablet Take 1 tablet by mouth Daily. 30 tablet 6   • levothyroxine (SYNTHROID, LEVOTHROID) 125 MCG tablet Take 1 tablet by mouth Daily. 90 tablet 1   • Multiple Vitamin (MULTIVITAMIN) tablet Take 1 tablet by mouth daily.     • simvastatin (ZOCOR) 20 MG tablet Take 1 tablet by mouth Every Night. 90 tablet 1   • traZODone (DESYREL) 150 MG tablet Take 1 tablet by mouth Every Night. 90 tablet 1     No facility-administered encounter medications on file as of 7/3/2019.        Reviewed use of high risk medication in the elderly: yes  Reviewed for potential of harmful drug interactions in the elderly: yes    Follow Up:  Return in about 4 months (around 11/3/2019) for Next scheduled follow up with lab prior.     An After Visit Summary and PPPS with all of these plans were given to the patient.

## 2019-08-09 RX ORDER — LORAZEPAM 1 MG/1
TABLET ORAL
Qty: 30 TABLET | Refills: 0 | OUTPATIENT
Start: 2019-08-09

## 2019-08-12 ENCOUNTER — TELEPHONE (OUTPATIENT)
Dept: INTERNAL MEDICINE | Facility: CLINIC | Age: 74
End: 2019-08-12

## 2019-08-13 ENCOUNTER — TELEPHONE (OUTPATIENT)
Dept: INTERNAL MEDICINE | Facility: CLINIC | Age: 74
End: 2019-08-13

## 2019-08-13 DIAGNOSIS — F41.8 SITUATIONAL ANXIETY: Primary | ICD-10-CM

## 2019-08-13 RX ORDER — LORAZEPAM 1 MG/1
TABLET ORAL
Qty: 30 TABLET | Refills: 0 | Status: SHIPPED | OUTPATIENT
Start: 2019-08-13 | End: 2019-08-21 | Stop reason: SDUPTHER

## 2019-08-13 NOTE — TELEPHONE ENCOUNTER
I printed off a prescription for this.  The reason pharmacy will not fill it is because it was not on her medicine list.  I called this in on the weekend.  Patient recently lost her daughter.  She needs a follow-up appointment because this is a controlled substance.  Okay to fill this for a #30 as I printed out but patient must have a follow-up.

## 2019-08-13 NOTE — TELEPHONE ENCOUNTER
Patient called about her prescription for lorazopan for anxiety. She wants to know why the pharmacy wont fill it for her.

## 2019-08-13 NOTE — TELEPHONE ENCOUNTER
I called and told pt rx was ready to be picked up and that she will need to sign forms for controlled and need to be seen every 3 months

## 2019-08-21 ENCOUNTER — OFFICE VISIT (OUTPATIENT)
Dept: INTERNAL MEDICINE | Facility: CLINIC | Age: 74
End: 2019-08-21

## 2019-08-21 VITALS
BODY MASS INDEX: 31.86 KG/M2 | OXYGEN SATURATION: 93 % | DIASTOLIC BLOOD PRESSURE: 94 MMHG | HEART RATE: 75 BPM | HEIGHT: 67 IN | WEIGHT: 203 LBS | SYSTOLIC BLOOD PRESSURE: 148 MMHG

## 2019-08-21 DIAGNOSIS — F43.23 SITUATIONAL MIXED ANXIETY AND DEPRESSIVE DISORDER: Primary | ICD-10-CM

## 2019-08-21 DIAGNOSIS — I10 BENIGN ESSENTIAL HYPERTENSION: Chronic | ICD-10-CM

## 2019-08-21 DIAGNOSIS — F41.8 SITUATIONAL ANXIETY: ICD-10-CM

## 2019-08-21 DIAGNOSIS — F41.1 GENERALIZED ANXIETY DISORDER: Chronic | ICD-10-CM

## 2019-08-21 DIAGNOSIS — F33.41 RECURRENT MAJOR DEPRESSIVE DISORDER, IN PARTIAL REMISSION (HCC): Chronic | ICD-10-CM

## 2019-08-21 PROCEDURE — 99214 OFFICE O/P EST MOD 30 MIN: CPT | Performed by: INTERNAL MEDICINE

## 2019-08-21 RX ORDER — LISINOPRIL AND HYDROCHLOROTHIAZIDE 12.5; 1 MG/1; MG/1
TABLET ORAL
Qty: 30 TABLET | Refills: 6 | Status: SHIPPED | OUTPATIENT
Start: 2019-08-21 | End: 2019-09-17

## 2019-08-21 RX ORDER — LORAZEPAM 1 MG/1
TABLET ORAL
Qty: 60 TABLET | Refills: 2 | Status: SHIPPED | OUTPATIENT
Start: 2019-08-21 | End: 2019-08-21 | Stop reason: SDUPTHER

## 2019-08-21 RX ORDER — LORAZEPAM 1 MG/1
TABLET ORAL
Qty: 60 TABLET | Refills: 2 | Status: SHIPPED | OUTPATIENT
Start: 2019-08-21 | End: 2019-12-12 | Stop reason: SDUPTHER

## 2019-08-21 NOTE — PROGRESS NOTES
08/21/2019    Patient Information  Claudette L McManus                                                                                          [id number]  [place] [address]      1945  [unfilled]  There is no work phone number on file.    Chief Complaint:     Follow-up situational anxiety and depression.    History of Present Illness:    Patient with a history of major depression as well as generalized anxiety disorder in the past presents today for a follow-up at my request regarding recent situational mixed anxiety and depression as described below.  Patient blood pressure has been somewhat borderline elevated in the past and we will address this issue today as well.  Her past medical history reviewed and updated were necessary including health maintenance parameters.  This reveals she is up-to-date or else accounted for at the present time.    The history regarding situational mixed anxiety and depression:    August 21, 2019--patient seen in follow-up after I called in a prescription for Ativan after the patient's  contacted me a few weeks ago regarding the sudden death of patient's daughter.  This was an apparent suicide and the patient found her daughter dead.  I will not go into details.  Patient reports the Lorazepam has been helpful but she is still quite distraught, nervous, and undergoing situational depression.  She also takes Cymbalta for major depression which had been fairly well controlled up until recent events.  She also takes BuSpar 5 mg 3 times daily.  She also takes trazodone for sleep.  Patient today is clearly tearful and depressed which is certainly understandable.  I explained to her that I will continue to prescribe the Ativan but I insist that she see a therapist.  I will place a referral today.    Review of Systems   Constitution: Negative.   HENT: Negative.    Eyes: Negative.    Cardiovascular: Negative.    Respiratory: Negative.    Endocrine:  Negative.    Hematologic/Lymphatic: Negative.    Skin: Negative.    Musculoskeletal: Negative.    Gastrointestinal: Negative.    Genitourinary: Negative.    Neurological: Negative.    Psychiatric/Behavioral: Positive for depression. The patient has insomnia and is nervous/anxious.    Allergic/Immunologic: Negative.        Active Problems:    Patient Active Problem List   Diagnosis   • Generalized anxiety disorder   • Primary osteoarthritis involving multiple joints   • Major depression   • Benign essential hypertension   • Hyperlipidemia   • Primary hypothyroidism   • Impaired fasting glucose   • Chronic insomnia   • Chronic bilateral low back pain without sciatica   • Chronic bilateral thoracic back pain   • Vitamin D deficiency   • Therapeutic drug monitoring   • Menopausal state   • Peripheral neuropathy, idiopathic   • Situational mixed anxiety and depressive disorder         Past Medical History:   Diagnosis Date   • Benign essential hypertension    • Chronic bilateral low back pain without sciatica 8/16/2013 March 13, 2019--Limited bone scan.  This reveals scintigraphic activity in the spine and at the right shoulder corresponds to change seen on recent x-rays and is best explained by degenerative change.  Isolated metastatic disease exactly corresponding to the sites of extensive degenerative disc change would be peculiar.  March 7, 2019--new patient to get established.  She has complaints of approxi   • Chronic bilateral thoracic back pain 2/26/2019 March 13, 2019--Limited bone scan.  This reveals scintigraphic activity in the spine and at the right shoulder corresponds to change seen on recent x-rays and is best explained by degenerative change.  Isolated metastatic disease exactly corresponding to the sites of extensive degenerative disc change would be peculiar.  March 1, 2019--x-ray of the lumbar and thoracic spine reveals mild anterior l   • Chronic insomnia 11/4/2014   • Generalized anxiety  disorder 5/19/2013   • History of Bell's palsy 5/21/2013    Remote history of Bell's palsy.  Patient does not remember which side of the face.   • History of bone density study 2/5/2016    May 10, 2016--DEXA scan revealed average lumbar spine T score of 3.6.  Left femoral neck T score normal at 2.0.  Right femoral neck T score normal at 1.6.  Total left hip normal at 2.6 and total right hip normal at 2.5.   • Hyperlipidemia    • Impaired fasting glucose    • Major depression    • Menopausal state 3/7/2019   • Peripheral neuropathy, idiopathic 3/7/2019    Characterized by decreased sensation and paresthesias in both lower extremities described as a burning sensation.  Extends up to the knees.  Reportedly had been evaluated with nerve conduction study in the past.   • Primary hypothyroidism 5/19/2013   • Primary osteoarthritis involving multiple joints 4/22/2013   • Vitamin D deficiency 2/26/2019         Past Surgical History:   Procedure Laterality Date   • BILATERAL BREAST REDUCTION  Early 2000    Early 2000--bilateral breast reduction   • CHOLECYSTECTOMY  1960s    1960s--open cholecystectomy   • COLONOSCOPY  2012 2012--reportedly normal colonoscopy   • INCONTINENCE SURGERY  2012 Approximately 2012--implantation of questionable bladder stimulator right sacral area for urinary incontinence.  Details not known.   • PARTIAL HYSTERECTOMY  Remote    Remote partial hysterectomy.  Ovaries intact.   • REPLACEMENT TOTAL KNEE BILATERAL Left 2010; 2011 2010-2011--bilateral total knee replacement   • TOTAL SHOULDER REPLACEMENT Left 2013 2013--left total shoulder replacement.         Allergies   Allergen Reactions   • Morphine And Related    • Other Other (See Comments)     REJECTED DACRON SUTURES IN THE PAST AND INCISION CAME APART           Current Outpatient Medications:   •  busPIRone (BUSPAR) 5 MG tablet, Take 1 tablet by mouth 3 (Three) Times a Day., Disp: 270 tablet, Rfl: 1  •  Calcium Carb-Cholecalciferol  (CALCIUM + D3 PO), Take 1 tablet by mouth daily., Disp: , Rfl:   •  Cholecalciferol (VITAMIN D3) 2000 UNITS tablet, Take 1 capsule by mouth daily., Disp: , Rfl:   •  DULoxetine (CYMBALTA) 60 MG capsule, Take 1 p.o. daily as directed, Disp: 90 capsule, Rfl: 3  •  ezetimibe (ZETIA) 10 MG tablet, Take 1 tablet by mouth Daily., Disp: 30 tablet, Rfl: 6  •  levothyroxine (SYNTHROID, LEVOTHROID) 125 MCG tablet, Take 1 tablet by mouth Daily., Disp: 90 tablet, Rfl: 1  •  LORazepam (ATIVAN) 1 MG tablet, Take 1 p.o. twice daily as needed for anxiety., Disp: 60 tablet, Rfl: 2  •  Multiple Vitamin (MULTIVITAMIN) tablet, Take 1 tablet by mouth daily., Disp: , Rfl:   •  simvastatin (ZOCOR) 20 MG tablet, Take 1 tablet by mouth Every Night., Disp: 90 tablet, Rfl: 1  •  traZODone (DESYREL) 150 MG tablet, Take 1 tablet by mouth Every Night., Disp: 90 tablet, Rfl: 1      Family History   Problem Relation Age of Onset   • Alcohol abuse Father    • Cancer Other    • Diabetes Other         type II         Social History     Socioeconomic History   • Marital status:      Spouse name: Not on file   • Number of children: Not on file   • Years of education: Not on file   • Highest education level: Not on file   Social Needs   • Financial resource strain: Not hard at all   • Food insecurity:     Worry: Never true     Inability: Never true   • Transportation needs:     Medical: No     Non-medical: No   Tobacco Use   • Smoking status: Former Smoker     Last attempt to quit: 1998     Years since quittin.6   • Smokeless tobacco: Never Used   Substance and Sexual Activity   • Alcohol use: Yes     Alcohol/week: 0.6 oz     Types: 1 Glasses of wine per week     Frequency: Monthly or less     Drinks per session: 1 or 2     Binge frequency: Never     Comment: current some day   • Drug use: No   • Sexual activity: Not Currently     Partners: Male   Lifestyle   • Physical activity:     Days per week: 0 days     Minutes per session: 0 min  "  • Stress: Only a little   Relationships   • Social connections:     Talks on phone: Patient refused     Gets together: Patient refused     Attends Yarsanism service: Patient refused     Active member of club or organization: Patient refused     Attends meetings of clubs or organizations: Patient refused     Relationship status: Patient refused         Vitals:    08/21/19 1035   BP: 148/94   BP Location: Right arm   Pulse: 75   SpO2: 93%   Weight: 92.1 kg (203 lb)   Height: 170.2 cm (67.01\")          Physical Exam:    General: Alert and oriented x 3.  Tearful and obviously depressed.  Normal affect. Obese. HEENT: Pupils equal, round, reactive to light; extraocular movements intact; sclerae nonicteric; pharynx, ear canals and TMs normal.  Neck: Without JVD, thyromegaly, bruit, or adenopathy.  Lungs: Clear to auscultation in all fields.  Heart: Regular rate and rhythm without murmur, rub, gallop, or click.  Abdomen: Soft, nontender, without hepatosplenomegaly or hernia.  Bowel sounds normal.  : Deferred.  Rectal: Deferred.  Extremities: Without clubbing, cyanosis, edema, or pulse deficit.  Neurologic: Intact without focal deficit.  Normal station and gait observed during ingress and egress from the examination room.  Skin: Without significant lesion.  Musculoskeletal: Unremarkable.    Lab/other results:      Assessment/Plan:     Diagnosis Plan   1. Situational mixed anxiety and depressive disorder     2. Major depression     3. Generalized anxiety disorder     4. Benign essential hypertension     5. Situational anxiety  LORazepam (ATIVAN) 1 MG tablet     Patient with rather severe situational anxiety and depression with underlying chronic major depression and generalized anxiety.  The lorazepam seems to be helpful with patient but I am concerned about the long-term prospects.  I do think that she needs to undergo psychiatric therapy and will place a referral.  Her blood pressure remains elevated and I do think this " needs to be treated.  I explained to patient that in the long run if she were to lose some weight we might be able to get her off of blood pressure medication.  For now, I feel medical treatment is the best for her.    Plan is as follows: I will refill the Ativan and since it looks like patient will be on this a while longer, I will increase the number to a month supply and place a refill.  Referral for psychiatric therapy given.      Procedures

## 2019-09-17 ENCOUNTER — OFFICE VISIT (OUTPATIENT)
Dept: INTERNAL MEDICINE | Facility: CLINIC | Age: 74
End: 2019-09-17

## 2019-09-17 VITALS
HEIGHT: 67 IN | SYSTOLIC BLOOD PRESSURE: 148 MMHG | HEART RATE: 80 BPM | OXYGEN SATURATION: 96 % | DIASTOLIC BLOOD PRESSURE: 82 MMHG | BODY MASS INDEX: 28.12 KG/M2 | WEIGHT: 179.2 LBS

## 2019-09-17 DIAGNOSIS — G47.09 OTHER INSOMNIA: ICD-10-CM

## 2019-09-17 DIAGNOSIS — F33.41 RECURRENT MAJOR DEPRESSIVE DISORDER, IN PARTIAL REMISSION (HCC): Chronic | ICD-10-CM

## 2019-09-17 DIAGNOSIS — I10 BENIGN ESSENTIAL HYPERTENSION: Primary | Chronic | ICD-10-CM

## 2019-09-17 DIAGNOSIS — F43.23 SITUATIONAL MIXED ANXIETY AND DEPRESSIVE DISORDER: ICD-10-CM

## 2019-09-17 PROCEDURE — 99214 OFFICE O/P EST MOD 30 MIN: CPT | Performed by: INTERNAL MEDICINE

## 2019-09-17 RX ORDER — ARIPIPRAZOLE 5 MG/1
TABLET ORAL
Qty: 30 TABLET | Refills: 4 | Status: SHIPPED | OUTPATIENT
Start: 2019-09-17 | End: 2020-01-08

## 2019-09-17 RX ORDER — TRAZODONE HYDROCHLORIDE 150 MG/1
150 TABLET ORAL NIGHTLY
Qty: 90 TABLET | Refills: 1 | Status: SHIPPED | OUTPATIENT
Start: 2019-09-17 | End: 2020-01-08

## 2019-09-17 RX ORDER — LISINOPRIL AND HYDROCHLOROTHIAZIDE 20; 12.5 MG/1; MG/1
TABLET ORAL
Qty: 30 TABLET | Refills: 3 | Status: SHIPPED | OUTPATIENT
Start: 2019-09-17 | End: 2019-10-17

## 2019-09-17 NOTE — PROGRESS NOTES
09/17/2019    Patient Information  Claudette L McManus                                                                                          [id number]  [place] [address]      1945  [unfilled]  There is no work phone number on file.    Chief Complaint:     Follow-up blood pressure and situational anxiety and depression.    History of Present Illness:    Patient with a history of poorly controlled hypertension that was recently treated with lisinopril HCT 10/12.5, 1 p.o. daily.  Patient presents today to follow-up on her blood pressure after initiation of medication.  She is also here to quickly review her situational anxiety and depressive symptoms related to the unexpected death of her daughter.  She reports she still having anxiety and depressive symptoms and I have previously encouraged her to seek psychiatric help.  She saw the therapist and has had a total of 4 visits but she is not quite sure if this is helped or not.  Patient is still grieving quite a bit and still having a lot of anxiety and depressive symptoms.  She was previously on Paxil which was changed to Cymbalta to help control some arthritic aches and pains and this was ordered by a rheumatologist a couple of years ago.  She still needs to take the lorazepam.  Her past medical history reviewed and updated were necessary including health maintenance parameters.  This reveals he is up-to-date or else accounted for with exception of influenza vaccine which we are currently out of.    Review of Systems   Constitution: Negative.   HENT: Negative.    Eyes: Negative.    Cardiovascular: Negative.    Respiratory: Negative.    Endocrine: Negative.    Hematologic/Lymphatic: Negative.    Skin: Negative.    Musculoskeletal: Negative.    Gastrointestinal: Negative.    Genitourinary: Negative.    Neurological: Negative.    Psychiatric/Behavioral: Positive for depression. The patient is nervous/anxious.    Allergic/Immunologic:  Negative.        Active Problems:    Patient Active Problem List   Diagnosis   • Generalized anxiety disorder   • Primary osteoarthritis involving multiple joints   • Major depression   • Benign essential hypertension   • Hyperlipidemia   • Primary hypothyroidism   • Impaired fasting glucose   • Chronic insomnia   • Chronic bilateral low back pain without sciatica   • Chronic bilateral thoracic back pain   • Vitamin D deficiency   • Therapeutic drug monitoring   • Menopausal state   • Peripheral neuropathy, idiopathic   • Situational mixed anxiety and depressive disorder         Past Medical History:   Diagnosis Date   • Benign essential hypertension    • Chronic bilateral low back pain without sciatica 8/16/2013 March 13, 2019--Limited bone scan.  This reveals scintigraphic activity in the spine and at the right shoulder corresponds to change seen on recent x-rays and is best explained by degenerative change.  Isolated metastatic disease exactly corresponding to the sites of extensive degenerative disc change would be peculiar.  March 7, 2019--new patient to get established.  She has complaints of approxi   • Chronic bilateral thoracic back pain 2/26/2019 March 13, 2019--Limited bone scan.  This reveals scintigraphic activity in the spine and at the right shoulder corresponds to change seen on recent x-rays and is best explained by degenerative change.  Isolated metastatic disease exactly corresponding to the sites of extensive degenerative disc change would be peculiar.  March 1, 2019--x-ray of the lumbar and thoracic spine reveals mild anterior l   • Chronic insomnia 11/4/2014   • Generalized anxiety disorder 5/19/2013   • History of Bell's palsy 5/21/2013    Remote history of Bell's palsy.  Patient does not remember which side of the face.   • History of bone density study 2/5/2016    May 10, 2016--DEXA scan revealed average lumbar spine T score of 3.6.  Left femoral neck T score normal at 2.0.  Right  femoral neck T score normal at 1.6.  Total left hip normal at 2.6 and total right hip normal at 2.5.   • Hyperlipidemia    • Impaired fasting glucose    • Major depression    • Menopausal state 3/7/2019   • Peripheral neuropathy, idiopathic 3/7/2019    Characterized by decreased sensation and paresthesias in both lower extremities described as a burning sensation.  Extends up to the knees.  Reportedly had been evaluated with nerve conduction study in the past.   • Primary hypothyroidism 5/19/2013   • Primary osteoarthritis involving multiple joints 4/22/2013   • Vitamin D deficiency 2/26/2019         Past Surgical History:   Procedure Laterality Date   • BILATERAL BREAST REDUCTION  Early 2000    Early 2000--bilateral breast reduction   • CHOLECYSTECTOMY  1960s    1960s--open cholecystectomy   • COLONOSCOPY  2012 2012--reportedly normal colonoscopy   • INCONTINENCE SURGERY  2012 Approximately 2012--implantation of questionable bladder stimulator right sacral area for urinary incontinence.  Details not known.   • PARTIAL HYSTERECTOMY  Remote    Remote partial hysterectomy.  Ovaries intact.   • REPLACEMENT TOTAL KNEE BILATERAL Left 2010; 2011 2010-2011--bilateral total knee replacement   • TOTAL SHOULDER REPLACEMENT Left 2013 2013--left total shoulder replacement.         Allergies   Allergen Reactions   • Morphine And Related    • Other Other (See Comments)     REJECTED DACRON SUTURES IN THE PAST AND INCISION CAME APART           Current Outpatient Medications:   •  busPIRone (BUSPAR) 5 MG tablet, Take 1 tablet by mouth 3 (Three) Times a Day., Disp: 270 tablet, Rfl: 1  •  Calcium Carb-Cholecalciferol (CALCIUM + D3 PO), Take 1 tablet by mouth daily., Disp: , Rfl:   •  Cholecalciferol (VITAMIN D3) 2000 UNITS tablet, Take 1 capsule by mouth daily., Disp: , Rfl:   •  DULoxetine (CYMBALTA) 60 MG capsule, Take 1 p.o. daily as directed, Disp: 90 capsule, Rfl: 3  •  ezetimibe (ZETIA) 10 MG tablet, Take 1 tablet by  mouth Daily., Disp: 30 tablet, Rfl: 6  •  levothyroxine (SYNTHROID, LEVOTHROID) 125 MCG tablet, Take 1 tablet by mouth Daily., Disp: 90 tablet, Rfl: 1  •  LORazepam (ATIVAN) 1 MG tablet, Take 1 p.o. twice daily as needed for anxiety., Disp: 60 tablet, Rfl: 2  •  Multiple Vitamin (MULTIVITAMIN) tablet, Take 1 tablet by mouth daily., Disp: , Rfl:   •  simvastatin (ZOCOR) 20 MG tablet, Take 1 tablet by mouth Every Night., Disp: 90 tablet, Rfl: 1  •  ARIPiprazole (ABILIFY) 5 MG tablet, Take 1 p.o. daily for mood stabilizer, Disp: 30 tablet, Rfl: 4  •  lisinopril-hydrochlorothiazide (PRINZIDE,ZESTORETIC) 20-12.5 MG per tablet, Take 1 p.o. every morning for high blood pressure, Disp: 30 tablet, Rfl: 3  •  traZODone (DESYREL) 150 MG tablet, Take 1 tablet by mouth Every Night., Disp: 90 tablet, Rfl: 1      Family History   Problem Relation Age of Onset   • Alcohol abuse Father    • Cancer Other    • Diabetes Other         type II         Social History     Socioeconomic History   • Marital status:      Spouse name: Not on file   • Number of children: Not on file   • Years of education: Not on file   • Highest education level: Not on file   Social Needs   • Financial resource strain: Not hard at all   • Food insecurity:     Worry: Never true     Inability: Never true   • Transportation needs:     Medical: No     Non-medical: No   Tobacco Use   • Smoking status: Former Smoker     Last attempt to quit: 1998     Years since quittin.7   • Smokeless tobacco: Never Used   Substance and Sexual Activity   • Alcohol use: Yes     Alcohol/week: 0.6 oz     Types: 1 Glasses of wine per week     Frequency: Monthly or less     Drinks per session: 1 or 2     Binge frequency: Never     Comment: current some day   • Drug use: No   • Sexual activity: Not Currently     Partners: Male   Lifestyle   • Physical activity:     Days per week: 0 days     Minutes per session: 0 min   • Stress: Only a little   Relationships   • Social  "connections:     Talks on phone: Patient refused     Gets together: Patient refused     Attends [employer] service: Patient refused     Active member of club or organization: Patient refused     Attends meetings of clubs or organizations: Patient refused     Relationship status: Patient refused         Vitals:    09/17/19 1107   BP: 148/82   BP Location: Right arm   Pulse: 80   SpO2: 96%   Weight: 81.3 kg (179 lb 3.2 oz)   Height: 170.2 cm (67.01\")          Physical Exam:    General: Alert and oriented x 3.  No acute distress.  Normal affect. Tearful.  HEENT: Pupils equal, round, reactive to light; extraocular movements intact; sclerae nonicteric; pharynx, ear canals and TMs normal.  Neck: Without JVD, thyromegaly, bruit, or adenopathy.  Lungs: Clear to auscultation in all fields.  Heart: Regular rate and rhythm without murmur, rub, gallop, or click.  Abdomen: Soft, nontender, without hepatosplenomegaly or hernia.  Bowel sounds normal.  : Deferred.  Rectal: Deferred.  Extremities: Without clubbing, cyanosis, edema, or pulse deficit.  Neurologic: Intact without focal deficit.  Normal station and gait observed during ingress and egress from the examination room.  Skin: Without significant lesion.  Musculoskeletal: Unremarkable.    Lab/other results:      Assessment/Plan:     Diagnosis Plan   1. Benign essential hypertension  lisinopril-hydrochlorothiazide (PRINZIDE,ZESTORETIC) 20-12.5 MG per tablet   2. Situational mixed anxiety and depressive disorder  ARIPiprazole (ABILIFY) 5 MG tablet   3. Major depression       Patient with a history of major depression previously who is suffering from severe situational anxiety and depression due to the death of a loved one.  She is still struggling despite seeing a therapist.  I think medication adjustment is indicated.  This may just be temporary but I feel something needs to be done.    Plan is as follows: Increase lisinopril to 20/12.5, 1 p.o. every morning.  Add Abilify 5 " mg, one half p.o. daily initially.  I instructed her that if she feels she needs to after about 2 weeks she can increase to a full 5 mg/day.  I will have patient follow-up in about 3 to 4 weeks to reassess the blood pressure as well as her depression and anxiety symptoms.  Encourage patient to continue to see the therapist.        Procedures

## 2019-10-17 ENCOUNTER — OFFICE VISIT (OUTPATIENT)
Dept: INTERNAL MEDICINE | Facility: CLINIC | Age: 74
End: 2019-10-17

## 2019-10-17 VITALS
BODY MASS INDEX: 31.2 KG/M2 | TEMPERATURE: 98.5 F | WEIGHT: 198.8 LBS | OXYGEN SATURATION: 95 % | RESPIRATION RATE: 18 BRPM | HEIGHT: 67 IN | HEART RATE: 71 BPM | DIASTOLIC BLOOD PRESSURE: 76 MMHG | SYSTOLIC BLOOD PRESSURE: 132 MMHG

## 2019-10-17 DIAGNOSIS — R05.8 ACE-INHIBITOR COUGH: ICD-10-CM

## 2019-10-17 DIAGNOSIS — F43.23 SITUATIONAL MIXED ANXIETY AND DEPRESSIVE DISORDER: ICD-10-CM

## 2019-10-17 DIAGNOSIS — E78.2 MIXED HYPERLIPIDEMIA: ICD-10-CM

## 2019-10-17 DIAGNOSIS — F41.9 ANXIETY: ICD-10-CM

## 2019-10-17 DIAGNOSIS — I10 BENIGN ESSENTIAL HYPERTENSION: Primary | Chronic | ICD-10-CM

## 2019-10-17 DIAGNOSIS — T46.4X5A ACE-INHIBITOR COUGH: ICD-10-CM

## 2019-10-17 DIAGNOSIS — E78.5 HYPERLIPIDEMIA, UNSPECIFIED HYPERLIPIDEMIA TYPE: Chronic | ICD-10-CM

## 2019-10-17 PROCEDURE — 99213 OFFICE O/P EST LOW 20 MIN: CPT | Performed by: INTERNAL MEDICINE

## 2019-10-17 RX ORDER — EZETIMIBE 10 MG/1
TABLET ORAL
Qty: 30 TABLET | Refills: 6
Start: 2019-10-17 | End: 2020-04-07

## 2019-10-17 RX ORDER — LOSARTAN POTASSIUM AND HYDROCHLOROTHIAZIDE 12.5; 5 MG/1; MG/1
TABLET ORAL
Qty: 30 TABLET | Refills: 2 | Status: SHIPPED | OUTPATIENT
Start: 2019-10-17 | End: 2019-11-13 | Stop reason: SDUPTHER

## 2019-10-17 RX ORDER — BUSPIRONE HYDROCHLORIDE 5 MG/1
5 TABLET ORAL 3 TIMES DAILY
Qty: 270 TABLET | Refills: 1
Start: 2019-10-17 | End: 2020-07-06

## 2019-10-17 RX ORDER — SIMVASTATIN 20 MG
TABLET ORAL
Qty: 90 TABLET | Refills: 1
Start: 2019-10-17 | End: 2020-05-21 | Stop reason: SDUPTHER

## 2019-10-17 NOTE — PROGRESS NOTES
10/17/2019    Patient Information  Claudette L McManus                                                                                          [id number]  [place] [address]      1945  [unfilled]  There is no work phone number on file.    Chief Complaint:     Follow-up hypertension, situational anxiety and depression, recent medication adjustment and addition.  Complaining of a dry cough, particularly at night.    History of Present Illness:    Patient with a history of hypertension that has not been well controlled presents today to follow-up on her blood pressure after we increased her lisinopril HCT.  However, for the past 5 weeks she has had a dry cough and I think this is a result of the lisinopril.  Also patient has had some situational depression and anxiety that I will not go into the details at the present time and there is no but we started her on Abilify 2.5 mg/day which she took for 5 days and did not really notice any difference and therefore she discontinued it.  See below.  Past medical history reviewed and updated were necessary including health maintenance parameters.  This reveals she is up-to-date or else accounted for.    Review of Systems   Constitution: Negative.   HENT: Negative.    Eyes: Negative.    Cardiovascular: Negative.    Respiratory: Positive for cough. Negative for sputum production.    Endocrine: Negative.    Hematologic/Lymphatic: Negative.    Skin: Negative.    Musculoskeletal: Negative.    Gastrointestinal: Negative.    Genitourinary: Negative.    Neurological: Negative.    Psychiatric/Behavioral: Positive for depression. The patient is nervous/anxious.    Allergic/Immunologic: Negative.        Active Problems:    Patient Active Problem List   Diagnosis   • Generalized anxiety disorder   • Primary osteoarthritis involving multiple joints   • Major depression   • Benign essential hypertension   • Hyperlipidemia   • Primary hypothyroidism   •  Impaired fasting glucose   • Chronic insomnia   • Chronic bilateral low back pain without sciatica   • Chronic bilateral thoracic back pain   • Vitamin D deficiency   • Therapeutic drug monitoring   • Menopausal state   • Peripheral neuropathy, idiopathic   • Situational mixed anxiety and depressive disorder         Past Medical History:   Diagnosis Date   • Benign essential hypertension    • Chronic bilateral low back pain without sciatica 8/16/2013 March 13, 2019--Limited bone scan.  This reveals scintigraphic activity in the spine and at the right shoulder corresponds to change seen on recent x-rays and is best explained by degenerative change.  Isolated metastatic disease exactly corresponding to the sites of extensive degenerative disc change would be peculiar.  March 7, 2019--new patient to get established.  She has complaints of approxi   • Chronic bilateral thoracic back pain 2/26/2019 March 13, 2019--Limited bone scan.  This reveals scintigraphic activity in the spine and at the right shoulder corresponds to change seen on recent x-rays and is best explained by degenerative change.  Isolated metastatic disease exactly corresponding to the sites of extensive degenerative disc change would be peculiar.  March 1, 2019--x-ray of the lumbar and thoracic spine reveals mild anterior l   • Chronic insomnia 11/4/2014   • Generalized anxiety disorder 5/19/2013   • History of Bell's palsy 5/21/2013    Remote history of Bell's palsy.  Patient does not remember which side of the face.   • History of bone density study 2/5/2016    May 10, 2016--DEXA scan revealed average lumbar spine T score of 3.6.  Left femoral neck T score normal at 2.0.  Right femoral neck T score normal at 1.6.  Total left hip normal at 2.6 and total right hip normal at 2.5.   • Hyperlipidemia    • Impaired fasting glucose    • Major depression    • Menopausal state 3/7/2019   • Peripheral neuropathy, idiopathic 3/7/2019    Characterized by  decreased sensation and paresthesias in both lower extremities described as a burning sensation.  Extends up to the knees.  Reportedly had been evaluated with nerve conduction study in the past.   • Primary hypothyroidism 5/19/2013   • Primary osteoarthritis involving multiple joints 4/22/2013   • Vitamin D deficiency 2/26/2019         Past Surgical History:   Procedure Laterality Date   • BILATERAL BREAST REDUCTION  Early 2000    Early 2000--bilateral breast reduction   • CHOLECYSTECTOMY  1960s    1960s--open cholecystectomy   • COLONOSCOPY  2012 2012--reportedly normal colonoscopy   • INCONTINENCE SURGERY  2012 Approximately 2012--implantation of questionable bladder stimulator right sacral area for urinary incontinence.  Details not known.   • PARTIAL HYSTERECTOMY  Remote    Remote partial hysterectomy.  Ovaries intact.   • REPLACEMENT TOTAL KNEE BILATERAL Left 2010; 2011 2010-2011--bilateral total knee replacement   • TOTAL SHOULDER REPLACEMENT Left 2013 2013--left total shoulder replacement.         Allergies   Allergen Reactions   • Morphine And Related    • Other Other (See Comments)     REJECTED DACRON SUTURES IN THE PAST AND INCISION CAME APART           Current Outpatient Medications:   •  busPIRone (BUSPAR) 5 MG tablet, Take 1 tablet by mouth 3 (Three) Times a Day., Disp: 270 tablet, Rfl: 1  •  Calcium Carb-Cholecalciferol (CALCIUM + D3 PO), Take 1 tablet by mouth daily., Disp: , Rfl:   •  Cholecalciferol (VITAMIN D3) 2000 UNITS tablet, Take 1 capsule by mouth daily., Disp: , Rfl:   •  DULoxetine (CYMBALTA) 60 MG capsule, Take 1 p.o. daily as directed, Disp: 90 capsule, Rfl: 3  •  ezetimibe (ZETIA) 10 MG tablet, Take 1 p.o. daily for high cholesterol, Disp: 30 tablet, Rfl: 6  •  levothyroxine (SYNTHROID, LEVOTHROID) 125 MCG tablet, Take 1 tablet by mouth Daily., Disp: 90 tablet, Rfl: 1  •  lisinopril-hydrochlorothiazide (PRINZIDE,ZESTORETIC) 20-12.5 MG per tablet, Take 1 p.o. every morning for  high blood pressure, Disp: 30 tablet, Rfl: 3  •  LORazepam (ATIVAN) 1 MG tablet, Take 1 p.o. twice daily as needed for anxiety., Disp: 60 tablet, Rfl: 2  •  Multiple Vitamin (MULTIVITAMIN) tablet, Take 1 tablet by mouth daily., Disp: , Rfl:   •  simvastatin (ZOCOR) 20 MG tablet, Take 1 p.o. daily for high cholesterol, Disp: 90 tablet, Rfl: 1  •  traZODone (DESYREL) 150 MG tablet, Take 1 tablet by mouth Every Night., Disp: 90 tablet, Rfl: 1  •  ARIPiprazole (ABILIFY) 5 MG tablet, Take 1 p.o. daily for mood stabilizer, Disp: 30 tablet, Rfl: 4      Family History   Problem Relation Age of Onset   • Alcohol abuse Father    • Cancer Other    • Diabetes Other         type II         Social History     Socioeconomic History   • Marital status:      Spouse name: Not on file   • Number of children: Not on file   • Years of education: Not on file   • Highest education level: Not on file   Social Needs   • Financial resource strain: Not hard at all   • Food insecurity:     Worry: Never true     Inability: Never true   • Transportation needs:     Medical: No     Non-medical: No   Tobacco Use   • Smoking status: Former Smoker     Last attempt to quit: 1998     Years since quittin.8   • Smokeless tobacco: Never Used   Substance and Sexual Activity   • Alcohol use: Yes     Alcohol/week: 0.6 oz     Types: 1 Glasses of wine per week     Frequency: Monthly or less     Drinks per session: 1 or 2     Binge frequency: Never     Comment: current some day   • Drug use: No   • Sexual activity: Not Currently     Partners: Male   Lifestyle   • Physical activity:     Days per week: 0 days     Minutes per session: 0 min   • Stress: Only a little   Relationships   • Social connections:     Talks on phone: Patient refused     Gets together: Patient refused     Attends [employer] service: Patient refused     Active member of club or organization: Patient refused     Attends meetings of clubs or organizations: Patient refused      "Relationship status: Patient refused         Vitals:    10/17/19 1014   BP: 132/76   BP Location: Left arm   Patient Position: Sitting   Cuff Size: Large Adult   Pulse: 71   Resp: 18   Temp: 98.5 °F (36.9 °C)   TempSrc: Oral   SpO2: 95%   Weight: 90.2 kg (198 lb 12.8 oz)   Height: 170.2 cm (67.01\")          Physical Exam:    General: Alert and oriented x 3.  No acute distress.  Normal affect. Obese. HEENT: Pupils equal, round, reactive to light; extraocular movements intact; sclerae nonicteric; pharynx, ear canals and TMs normal.  Neck: Without JVD, thyromegaly, bruit, or adenopathy.  Lungs: Clear to auscultation in all fields.  Heart: Regular rate and rhythm without murmur, rub, gallop, or click.  Abdomen: Soft, nontender, without hepatosplenomegaly or hernia.  Bowel sounds normal.  : Deferred.  Rectal: Deferred.  Extremities: Without clubbing, cyanosis, edema, or pulse deficit.  Neurologic: Intact without focal deficit.  Normal station and gait observed during ingress and egress from the examination room.  Skin: Without significant lesion.  Musculoskeletal: Unremarkable.    Lab/other results:      Assessment/Plan:     Diagnosis Plan   1. Benign essential hypertension     2. Hyperlipidemia, unspecified hyperlipidemia type  ezetimibe (ZETIA) 10 MG tablet   3. Anxiety  busPIRone (BUSPAR) 5 MG tablet   4. Mixed hyperlipidemia  simvastatin (ZOCOR) 20 MG tablet   5. Situational mixed anxiety and depressive disorder       Patient's blood pressure is now controlled but unfortunately she has developed a dry cough which I think is an ACE inhibitor induced cough.  Medication changes indicated.    Plan is as follows: Discontinue lisinopril HCT.  Start losartan HCT 50/12.5, 1 p.o. daily.  I instructed patient to start back on the Abilify at 5 mg/day and stay on that until we reassess her in a few weeks unless she has some problems.        Procedures        "

## 2019-10-21 DIAGNOSIS — E78.5 HYPERLIPIDEMIA, UNSPECIFIED HYPERLIPIDEMIA TYPE: Chronic | ICD-10-CM

## 2019-10-21 RX ORDER — EZETIMIBE 10 MG/1
TABLET ORAL
Qty: 30 TABLET | Refills: 5 | Status: SHIPPED | OUTPATIENT
Start: 2019-10-21 | End: 2019-11-13

## 2019-10-22 RX ORDER — LOSARTAN POTASSIUM 50 MG/1
50 TABLET ORAL DAILY
Qty: 30 TABLET | Refills: 1 | Status: SHIPPED | OUTPATIENT
Start: 2019-10-22 | End: 2019-11-13

## 2019-10-22 RX ORDER — HYDROCHLOROTHIAZIDE 12.5 MG/1
12.5 TABLET ORAL DAILY
Qty: 30 TABLET | Refills: 1 | Status: SHIPPED | OUTPATIENT
Start: 2019-10-22 | End: 2019-11-13

## 2019-11-04 DIAGNOSIS — R73.01 IMPAIRED FASTING GLUCOSE: Chronic | ICD-10-CM

## 2019-11-04 DIAGNOSIS — E03.9 PRIMARY HYPOTHYROIDISM: Chronic | ICD-10-CM

## 2019-11-06 LAB
ALBUMIN SERPL-MCNC: 4.7 G/DL (ref 3.5–5.2)
ALBUMIN/GLOB SERPL: 1.4 G/DL
ALP SERPL-CCNC: 73 U/L (ref 39–117)
ALT SERPL-CCNC: 23 U/L (ref 1–33)
AST SERPL-CCNC: 23 U/L (ref 1–32)
BILIRUB SERPL-MCNC: 0.5 MG/DL (ref 0.2–1.2)
BUN SERPL-MCNC: 18 MG/DL (ref 8–23)
BUN/CREAT SERPL: 18.4 (ref 7–25)
CALCIUM SERPL-MCNC: 10.9 MG/DL (ref 8.6–10.5)
CHLORIDE SERPL-SCNC: 95 MMOL/L (ref 98–107)
CO2 SERPL-SCNC: 30.1 MMOL/L (ref 22–29)
CREAT SERPL-MCNC: 0.98 MG/DL (ref 0.57–1)
GLOBULIN SER CALC-MCNC: 3.3 GM/DL
GLUCOSE SERPL-MCNC: 110 MG/DL (ref 65–99)
HBA1C MFR BLD: 7.17 % (ref 4.8–5.6)
POTASSIUM SERPL-SCNC: 4.6 MMOL/L (ref 3.5–5.2)
PROT SERPL-MCNC: 8 G/DL (ref 6–8.5)
SODIUM SERPL-SCNC: 138 MMOL/L (ref 136–145)
T3FREE SERPL-MCNC: 2.8 PG/ML (ref 2–4.4)
T4 FREE SERPL-MCNC: 1.41 NG/DL (ref 0.93–1.7)
TSH SERPL DL<=0.005 MIU/L-ACNC: 0.98 UIU/ML (ref 0.27–4.2)

## 2019-11-13 ENCOUNTER — OFFICE VISIT (OUTPATIENT)
Dept: INTERNAL MEDICINE | Facility: CLINIC | Age: 74
End: 2019-11-13

## 2019-11-13 ENCOUNTER — HOSPITAL ENCOUNTER (OUTPATIENT)
Dept: GENERAL RADIOLOGY | Facility: HOSPITAL | Age: 74
Discharge: HOME | End: 2019-11-13
Admitting: INTERNAL MEDICINE

## 2019-11-13 VITALS
DIASTOLIC BLOOD PRESSURE: 72 MMHG | HEIGHT: 67 IN | BODY MASS INDEX: 28.56 KG/M2 | HEART RATE: 80 BPM | SYSTOLIC BLOOD PRESSURE: 126 MMHG | WEIGHT: 182 LBS | OXYGEN SATURATION: 98 %

## 2019-11-13 DIAGNOSIS — F43.23 SITUATIONAL MIXED ANXIETY AND DEPRESSIVE DISORDER: ICD-10-CM

## 2019-11-13 DIAGNOSIS — Z51.81 THERAPEUTIC DRUG MONITORING: ICD-10-CM

## 2019-11-13 DIAGNOSIS — T46.4X5A ACE-INHIBITOR COUGH: ICD-10-CM

## 2019-11-13 DIAGNOSIS — R73.01 IMPAIRED FASTING GLUCOSE: Primary | Chronic | ICD-10-CM

## 2019-11-13 DIAGNOSIS — E78.5 HYPERLIPIDEMIA, UNSPECIFIED HYPERLIPIDEMIA TYPE: Chronic | ICD-10-CM

## 2019-11-13 DIAGNOSIS — F33.41 RECURRENT MAJOR DEPRESSIVE DISORDER, IN PARTIAL REMISSION (HCC): Chronic | ICD-10-CM

## 2019-11-13 DIAGNOSIS — F51.04 CHRONIC INSOMNIA: Chronic | ICD-10-CM

## 2019-11-13 DIAGNOSIS — F41.1 GENERALIZED ANXIETY DISORDER: Chronic | ICD-10-CM

## 2019-11-13 DIAGNOSIS — K21.9 GASTROESOPHAGEAL REFLUX DISEASE WITHOUT ESOPHAGITIS: ICD-10-CM

## 2019-11-13 DIAGNOSIS — I10 BENIGN ESSENTIAL HYPERTENSION: Chronic | ICD-10-CM

## 2019-11-13 DIAGNOSIS — E03.9 PRIMARY HYPOTHYROIDISM: Chronic | ICD-10-CM

## 2019-11-13 DIAGNOSIS — R05.3 PERSISTENT COUGH: ICD-10-CM

## 2019-11-13 DIAGNOSIS — R05.8 ACE-INHIBITOR COUGH: ICD-10-CM

## 2019-11-13 DIAGNOSIS — E83.52 HYPERCALCEMIA: ICD-10-CM

## 2019-11-13 DIAGNOSIS — E55.9 VITAMIN D DEFICIENCY: Chronic | ICD-10-CM

## 2019-11-13 PROCEDURE — 99214 OFFICE O/P EST MOD 30 MIN: CPT | Performed by: INTERNAL MEDICINE

## 2019-11-13 PROCEDURE — 71046 X-RAY EXAM CHEST 2 VIEWS: CPT

## 2019-11-13 RX ORDER — DEXLANSOPRAZOLE 60 MG/1
CAPSULE, DELAYED RELEASE ORAL
Qty: 30 CAPSULE | Refills: 0 | Status: SHIPPED | OUTPATIENT
Start: 2019-11-13 | End: 2019-12-02

## 2019-11-13 RX ORDER — LOSARTAN POTASSIUM AND HYDROCHLOROTHIAZIDE 12.5; 5 MG/1; MG/1
TABLET ORAL
Qty: 90 TABLET | Refills: 3 | Status: SHIPPED | OUTPATIENT
Start: 2019-11-13 | End: 2019-12-02

## 2019-11-13 NOTE — PROGRESS NOTES
11/13/2019    Patient Information  Claudette L McManus                                                                                          [id number]  [place] [address]      1945  [unfilled]  There is no work phone number on file.    Chief Complaint:     Follow-up generalized anxiety disorder/depression, recent medication adjustments, hypertension and ACE inhibitor induced cough, impaired fasting glucose, hyperlipidemia, hypothyroidism, chronic insomnia, vitamin D deficiency, recent lab work.    History of Present Illness:    Patient with history of medical problems as outlined in the chief complaint presents today for a follow-up after recent medication adjustments.  Patient also had lab work in order to monitor her chronic medical issues.  Her past medical history reviewed and updated were necessary including health maintenance parameters.  This reveals she will be up-to-date or else accounted for after today's visit.    ROS    Active Problems:    Patient Active Problem List   Diagnosis   • Generalized anxiety disorder   • Primary osteoarthritis involving multiple joints   • Major depression   • Benign essential hypertension   • Hyperlipidemia   • Primary hypothyroidism   • Impaired fasting glucose   • Chronic insomnia   • Chronic bilateral low back pain without sciatica   • Chronic bilateral thoracic back pain   • Vitamin D deficiency   • Therapeutic drug monitoring   • Menopausal state   • Peripheral neuropathy, idiopathic   • Situational mixed anxiety and depressive disorder   • ACE-inhibitor cough   • Hypercalcemia   • Persistent cough   • Gastroesophageal reflux disease without esophagitis         Past Medical History:   Diagnosis Date   • Benign essential hypertension    • Chronic bilateral low back pain without sciatica 8/16/2013 March 13, 2019--Limited bone scan.  This reveals scintigraphic activity in the spine and at the right shoulder corresponds to change seen  on recent x-rays and is best explained by degenerative change.  Isolated metastatic disease exactly corresponding to the sites of extensive degenerative disc change would be peculiar.  March 7, 2019--new patient to get established.  She has complaints of approxi   • Chronic bilateral thoracic back pain 2/26/2019 March 13, 2019--Limited bone scan.  This reveals scintigraphic activity in the spine and at the right shoulder corresponds to change seen on recent x-rays and is best explained by degenerative change.  Isolated metastatic disease exactly corresponding to the sites of extensive degenerative disc change would be peculiar.  March 1, 2019--x-ray of the lumbar and thoracic spine reveals mild anterior l   • Chronic insomnia 11/4/2014   • Generalized anxiety disorder 5/19/2013   • History of Bell's palsy 5/21/2013    Remote history of Bell's palsy.  Patient does not remember which side of the face.   • History of bone density study 2/5/2016    May 10, 2016--DEXA scan revealed average lumbar spine T score of 3.6.  Left femoral neck T score normal at 2.0.  Right femoral neck T score normal at 1.6.  Total left hip normal at 2.6 and total right hip normal at 2.5.   • Hyperlipidemia    • Impaired fasting glucose    • Major depression    • Menopausal state 3/7/2019   • Peripheral neuropathy, idiopathic 3/7/2019    Characterized by decreased sensation and paresthesias in both lower extremities described as a burning sensation.  Extends up to the knees.  Reportedly had been evaluated with nerve conduction study in the past.   • Primary hypothyroidism 5/19/2013   • Primary osteoarthritis involving multiple joints 4/22/2013   • Vitamin D deficiency 2/26/2019         Past Surgical History:   Procedure Laterality Date   • BILATERAL BREAST REDUCTION  Early 2000    Early 2000--bilateral breast reduction   • CHOLECYSTECTOMY  1960s    1960s--open cholecystectomy   • COLONOSCOPY  2012 2012--reportedly normal colonoscopy   •  INCONTINENCE SURGERY  2012    Approximately 2012--implantation of questionable bladder stimulator right sacral area for urinary incontinence.  Details not known.   • PARTIAL HYSTERECTOMY  Remote    Remote partial hysterectomy.  Ovaries intact.   • REPLACEMENT TOTAL KNEE BILATERAL Left 2010; 2011    [id number]--bilateral total knee replacement   • TOTAL SHOULDER REPLACEMENT Left 2013    2013--left total shoulder replacement.         Allergies   Allergen Reactions   • Morphine And Related    • Other Other (See Comments)     REJECTED DACRON SUTURES IN THE PAST AND INCISION CAME APART           Current Outpatient Medications:   •  ARIPiprazole (ABILIFY) 5 MG tablet, Take 1 p.o. daily for mood stabilizer, Disp: 30 tablet, Rfl: 4  •  busPIRone (BUSPAR) 5 MG tablet, Take 1 tablet by mouth 3 (Three) Times a Day., Disp: 270 tablet, Rfl: 1  •  Calcium Carb-Cholecalciferol (CALCIUM + D3 PO), Take 1 tablet by mouth daily., Disp: , Rfl:   •  Cholecalciferol (VITAMIN D3) 2000 UNITS tablet, Take 1 capsule by mouth daily., Disp: , Rfl:   •  DULoxetine (CYMBALTA) 60 MG capsule, Take 1 p.o. daily as directed, Disp: 90 capsule, Rfl: 3  •  ezetimibe (ZETIA) 10 MG tablet, Take 1 p.o. daily for high cholesterol, Disp: 30 tablet, Rfl: 6  •  levothyroxine (SYNTHROID, LEVOTHROID) 125 MCG tablet, Take 1 tablet by mouth Daily., Disp: 90 tablet, Rfl: 1  •  Multiple Vitamin (MULTIVITAMIN) tablet, Take 1 tablet by mouth daily., Disp: , Rfl:   •  traZODone (DESYREL) 150 MG tablet, Take 1 tablet by mouth Every Night., Disp: 90 tablet, Rfl: 1  •  dexlansoprazole (DEXILANT) 60 MG capsule, Take 1 p.o. daily before supper for 30 days, Disp: 30 capsule, Rfl: 0  •  LORazepam (ATIVAN) 1 MG tablet, Take 1 p.o. twice daily as needed for anxiety., Disp: 60 tablet, Rfl: 2  •  losartan-hydrochlorothiazide (HYZAAR) 50-12.5 MG per tablet, Take 1 p.o. every morning for high blood pressure, Disp: 90 tablet, Rfl: 3  •  simvastatin (ZOCOR) 20 MG tablet, Take 1 p.o.  "daily for high cholesterol, Disp: 90 tablet, Rfl: 1      Family History   Problem Relation Age of Onset   • Alcohol abuse Father    • Cancer Other    • Diabetes Other         type II         Social History     Socioeconomic History   • Marital status:      Spouse name: Not on file   • Number of children: Not on file   • Years of education: Not on file   • Highest education level: Not on file   Social Needs   • Financial resource strain: Not hard at all   • Food insecurity:     Worry: Never true     Inability: Never true   • Transportation needs:     Medical: No     Non-medical: No   Tobacco Use   • Smoking status: Former Smoker     Last attempt to quit: 1998     Years since quittin.8   • Smokeless tobacco: Never Used   Substance and Sexual Activity   • Alcohol use: Yes     Alcohol/week: 0.6 oz     Types: 1 Glasses of wine per week     Frequency: Monthly or less     Drinks per session: 1 or 2     Binge frequency: Never     Comment: current some day   • Drug use: No   • Sexual activity: Not Currently     Partners: Male   Lifestyle   • Physical activity:     Days per week: 0 days     Minutes per session: 0 min   • Stress: Only a little   Relationships   • Social connections:     Talks on phone: Patient refused     Gets together: Patient refused     Attends Shinto service: Patient refused     Active member of club or organization: Patient refused     Attends meetings of clubs or organizations: Patient refused     Relationship status: Patient refused         Vitals:    19 0953   BP: 126/72   BP Location: Left arm   Pulse: 80   SpO2: 98%   Weight: 82.6 kg (182 lb)   Height: 170.2 cm (67.01\")        Body mass index is 28.5 kg/m².      Physical Exam:    General: Alert and oriented x 3.  No acute distress.  Normal affect.  Obese.  HEENT: Pupils equal, round, reactive to light; extraocular movements intact; sclerae nonicteric; pharynx, ear canals and TMs normal.  Neck: Without JVD, thyromegaly, bruit, " or adenopathy.  Lungs: Clear to auscultation in all fields.  Heart: Regular rate and rhythm without murmur, rub, gallop, or click.  Abdomen: Soft, nontender, without hepatosplenomegaly or hernia.  Bowel sounds normal.  : Deferred.  Rectal: Deferred.  Extremities: Without clubbing, cyanosis, edema, or pulse deficit.  Neurologic: Intact without focal deficit.  Normal station and gait observed during ingress and egress from the examination room.  Skin: Without significant lesion.  Musculoskeletal: Unremarkable.    Lab/other results:    Previous NMR reveals a total cholesterol of 163.  Triglycerides 164.  LDL particle number borderline elevated 1401.  Small LDL particle number excellent at 412.  HDL particle number excellent at 43.3.  Current CMP reveals a blood sugar of 110, chloride 95, calcium elevated at 10.9.  Hemoglobin A1c 7.17.  Thyroid function tests are normal.    Assessment/Plan:     Diagnosis Plan   1. Impaired fasting glucose  Comprehensive Metabolic Panel    Hemoglobin A1c   2. Benign essential hypertension  losartan-hydrochlorothiazide (HYZAAR) 50-12.5 MG per tablet    Comprehensive Metabolic Panel   3. Major depression     4. Situational mixed anxiety and depressive disorder     5. Generalized anxiety disorder     6. ACE-inhibitor cough     7. Hyperlipidemia, unspecified hyperlipidemia type  NMR LipoProfile   8. Primary hypothyroidism  TSH    T4, Free    T3, Free   9. Chronic insomnia     10. Vitamin D deficiency  Vitamin D 25 Hydroxy   11. Hypercalcemia  Calcium, Ionized    PTH, Intact   12. Therapeutic drug monitoring  CBC (No Diff)   13. Persistent cough  XR Chest PA & Lateral   14. Gastroesophageal reflux disease without esophagitis  dexlansoprazole (DEXILANT) 60 MG capsule     Patient has a history of impaired fasting glucose which appears to have resolved into overt diabetes.  However, I am reluctant to give her this diagnosis based on one hemoglobin A1c reading, particularly given that her  fasting glucose is only 110.  Patient admits to dietary indiscretion including a lot of carbohydrates and sweets.  Her blood pressure is well controlled on the current regimen and we will combine the hydrochlorothiazide and losartan and 1 pill to simplify things.  Her depression which is somewhat situationally related as well as her anxiety is somewhat improved.  She continues to have some problems with insomnia and has taken Tylenol PM which seems to be helpful and I see no reason why she cannot continue that.  She has hyperlipidemia which is under good control and her thyroid is therapeutic.  Her vitamin D is also in the normal range.  She does have hypercalcemia which may be laboratory error but needs to be assessed which we can do today.  She has new complaints of a nocturnal persistent cough which I think is related to reflux.    Plan is as follows: Check ionized calcium and intact PTH.  Patient will follow-up on phone for the results and possible further instructions.  Dexilant 60 mg p.o. daily before suppertime.  We discussed dietary measures as noted under the history of present illness above.  Weight loss will also be helpful.  Low carbohydrate diet recommended along with exercise.  This will improve her emotional status as well.  We will discontinue the hydrochlorothiazide and the losartan and instead combine this into losartan HCT 50/12.5, 1 p.o. daily.  I will give patient about 4 months to work on her diet and weight loss and we will reassess with lab work and I suspect that her hemoglobin A1c will be much better at that time.  Overall she seems to be slowly improving which is what I have expected all along.  Chest x-ray PA and lateral also ordered and patient can follow-up on phone for the results and possible further instructions.    Note: Greater than 40 minutes was spent evaluating this patient today with multiple medical problems including some new problems.  Greater than 50% of this time was spent  counseling patient regarding her numerous problems.  Therapeutic and diagnostic options also discussed.  Complex patient.  [id number] level service justified.    Procedures

## 2019-11-14 LAB
CA-I SERPL ISE-MCNC: 6.2 MG/DL (ref 4.5–5.6)
PTH-INTACT SERPL-MCNC: 13 PG/ML (ref 15–65)

## 2019-11-15 RX ORDER — LOSARTAN POTASSIUM 50 MG/1
50 TABLET ORAL DAILY
Qty: 90 TABLET | Refills: 1 | Status: SHIPPED | OUTPATIENT
Start: 2019-11-15 | End: 2019-12-02 | Stop reason: SDUPTHER

## 2019-11-15 RX ORDER — HYDROCHLOROTHIAZIDE 12.5 MG/1
12.5 TABLET ORAL DAILY
Qty: 90 TABLET | Refills: 1 | Status: SHIPPED | OUTPATIENT
Start: 2019-11-15 | End: 2019-12-02 | Stop reason: SDUPTHER

## 2019-11-18 DIAGNOSIS — E83.52 HYPERCALCEMIA: Primary | ICD-10-CM

## 2019-11-19 ENCOUNTER — TELEPHONE (OUTPATIENT)
Dept: INTERNAL MEDICINE | Facility: CLINIC | Age: 74
End: 2019-11-19

## 2019-11-19 NOTE — TELEPHONE ENCOUNTER
You started the pt on losartan hctz at her last visit.  The pharmacy has it on backorder so they asked that the rx be split in two.  I split it up and sent it in again and they are now saying the losartan is on backorder so they need a prescription for something different

## 2019-11-19 NOTE — TELEPHONE ENCOUNTER
This is a medication that I want her on.  Suggested patient go to a different pharmacy or wait until the pharmacy receives the losartan HCT as long as it will not be more than a week.

## 2019-12-02 ENCOUNTER — OFFICE VISIT (OUTPATIENT)
Dept: INTERNAL MEDICINE | Facility: CLINIC | Age: 74
End: 2019-12-02

## 2019-12-02 VITALS
BODY MASS INDEX: 31.27 KG/M2 | HEART RATE: 73 BPM | DIASTOLIC BLOOD PRESSURE: 86 MMHG | SYSTOLIC BLOOD PRESSURE: 142 MMHG | TEMPERATURE: 98.5 F | WEIGHT: 199.2 LBS | OXYGEN SATURATION: 98 % | HEIGHT: 67 IN

## 2019-12-02 DIAGNOSIS — K21.9 GASTROESOPHAGEAL REFLUX DISEASE WITHOUT ESOPHAGITIS: Chronic | ICD-10-CM

## 2019-12-02 DIAGNOSIS — R68.89 FLU-LIKE SYMPTOMS: Primary | ICD-10-CM

## 2019-12-02 DIAGNOSIS — E83.52 HYPERCALCEMIA: ICD-10-CM

## 2019-12-02 DIAGNOSIS — I10 BENIGN ESSENTIAL HYPERTENSION: Chronic | ICD-10-CM

## 2019-12-02 DIAGNOSIS — R05.3 PERSISTENT COUGH: ICD-10-CM

## 2019-12-02 PROCEDURE — 99214 OFFICE O/P EST MOD 30 MIN: CPT | Performed by: INTERNAL MEDICINE

## 2019-12-02 RX ORDER — LOSARTAN POTASSIUM 50 MG/1
50 TABLET ORAL DAILY
Qty: 90 TABLET | Refills: 1 | Status: SHIPPED | OUTPATIENT
Start: 2019-12-02 | End: 2019-12-02 | Stop reason: SDUPTHER

## 2019-12-02 RX ORDER — HYDROCHLOROTHIAZIDE 12.5 MG/1
TABLET ORAL
Qty: 90 TABLET | Refills: 1
Start: 2019-12-02 | End: 2019-12-09 | Stop reason: SDUPTHER

## 2019-12-02 RX ORDER — LOSARTAN POTASSIUM 50 MG/1
TABLET ORAL
Qty: 90 TABLET | Refills: 1
Start: 2019-12-02 | End: 2019-12-09 | Stop reason: SDUPTHER

## 2019-12-02 RX ORDER — HYDROCHLOROTHIAZIDE 12.5 MG/1
12.5 TABLET ORAL DAILY
Qty: 90 TABLET | Refills: 1 | Status: SHIPPED | OUTPATIENT
Start: 2019-12-02 | End: 2019-12-02 | Stop reason: SDUPTHER

## 2019-12-02 NOTE — PROGRESS NOTES
12/02/2019    Patient Information  Claudette L McManus                                                                                          [id number]  [place] [address]      1945  [unfilled]  There is no work phone number on file.    Chief Complaint:     Complaining of flulike symptoms.    History of Present Illness:    Patient with history of impaired fasting glucose, hypothyroidism, hypertension, chronic lower back pain, esophageal reflux.  She presents today with complaints of flulike symptoms that apparently have resolved as described below.  Also patient has history of hypertension and at the last visit we wanted to change her medication to losartan HCT 50/12.5, 1 p.o. daily instead of the separate drugs that make up to this combination.  However, her pharmacist cannot obtain this medication.  Her past medical history reviewed and updated were necessary including health maintenance parameters.  This reveals she is up-to-date or else accounted for.    Review of Systems   Constitution: Positive for decreased appetite and malaise/fatigue. Negative for chills and fever.   HENT: Negative.    Eyes: Negative.    Cardiovascular: Negative.    Respiratory: Positive for cough and sputum production.    Endocrine: Negative.    Hematologic/Lymphatic: Negative.    Skin: Negative.    Musculoskeletal: Positive for arthritis and back pain.   Gastrointestinal: Negative.    Genitourinary: Negative.    Neurological: Negative.    Psychiatric/Behavioral: Negative.    Allergic/Immunologic: Negative.        Active Problems:    Patient Active Problem List   Diagnosis   • Generalized anxiety disorder   • Primary osteoarthritis involving multiple joints   • Major depression   • Benign essential hypertension   • Hyperlipidemia   • Primary hypothyroidism   • Impaired fasting glucose   • Chronic insomnia   • Chronic bilateral low back pain without sciatica   • Chronic bilateral thoracic back pain   •  Vitamin D deficiency   • Therapeutic drug monitoring   • Menopausal state   • Peripheral neuropathy, idiopathic   • Situational mixed anxiety and depressive disorder   • ACE-inhibitor cough   • Hypercalcemia   • Flu-like symptoms         Past Medical History:   Diagnosis Date   • Benign essential hypertension    • Chronic bilateral low back pain without sciatica 8/16/2013 March 13, 2019--Limited bone scan.  This reveals scintigraphic activity in the spine and at the right shoulder corresponds to change seen on recent x-rays and is best explained by degenerative change.  Isolated metastatic disease exactly corresponding to the sites of extensive degenerative disc change would be peculiar.  March 7, 2019--new patient to get established.  She has complaints of approxi   • Chronic bilateral thoracic back pain 2/26/2019 March 13, 2019--Limited bone scan.  This reveals scintigraphic activity in the spine and at the right shoulder corresponds to change seen on recent x-rays and is best explained by degenerative change.  Isolated metastatic disease exactly corresponding to the sites of extensive degenerative disc change would be peculiar.  March 1, 2019--x-ray of the lumbar and thoracic spine reveals mild anterior l   • Chronic insomnia 11/4/2014   • Generalized anxiety disorder 5/19/2013   • History of Bell's palsy 5/21/2013    Remote history of Bell's palsy.  Patient does not remember which side of the face.   • Hyperlipidemia    • Impaired fasting glucose    • Major depression    • Menopausal state 3/7/2019   • Peripheral neuropathy, idiopathic 3/7/2019    Characterized by decreased sensation and paresthesias in both lower extremities described as a burning sensation.  Extends up to the knees.  Reportedly had been evaluated with nerve conduction study in the past.   • Primary hypothyroidism 5/19/2013   • Primary osteoarthritis involving multiple joints 4/22/2013   • Vitamin D deficiency 2/26/2019         Past  Surgical History:   Procedure Laterality Date   • BILATERAL BREAST REDUCTION  Early 2000    Early 2000--bilateral breast reduction   • CHOLECYSTECTOMY  1960s    1960s--open cholecystectomy   • COLONOSCOPY  2012 2012--reportedly normal colonoscopy   • INCONTINENCE SURGERY  2012 Approximately 2012--implantation of questionable bladder stimulator right sacral area for urinary incontinence.  Details not known.   • PARTIAL HYSTERECTOMY  Remote    Remote partial hysterectomy.  Ovaries intact.   • REPLACEMENT TOTAL KNEE BILATERAL Left 2010; 2011 2010-2011--bilateral total knee replacement   • TOTAL SHOULDER REPLACEMENT Left 2013    2013--left total shoulder replacement.         Allergies   Allergen Reactions   • Morphine And Related    • Other Other (See Comments)     REJECTED DACRON SUTURES IN THE PAST AND INCISION CAME APART           Current Outpatient Medications:   •  ARIPiprazole (ABILIFY) 5 MG tablet, Take 1 p.o. daily for mood stabilizer, Disp: 30 tablet, Rfl: 4  •  busPIRone (BUSPAR) 5 MG tablet, Take 1 tablet by mouth 3 (Three) Times a Day., Disp: 270 tablet, Rfl: 1  •  Calcium Carb-Cholecalciferol (CALCIUM + D3 PO), Take 1 tablet by mouth daily., Disp: , Rfl:   •  Cholecalciferol (VITAMIN D3) 2000 UNITS tablet, Take 1 capsule by mouth daily., Disp: , Rfl:   •  DULoxetine (CYMBALTA) 60 MG capsule, Take 1 p.o. daily as directed, Disp: 90 capsule, Rfl: 3  •  ezetimibe (ZETIA) 10 MG tablet, Take 1 p.o. daily for high cholesterol, Disp: 30 tablet, Rfl: 6  •  hydroCHLOROthiazide (HYDRODIURIL) 12.5 MG tablet, Take 1 p.o. every morning for high blood pressure, Disp: 90 tablet, Rfl: 1  •  levothyroxine (SYNTHROID, LEVOTHROID) 125 MCG tablet, Take 1 tablet by mouth Daily., Disp: 90 tablet, Rfl: 1  •  LORazepam (ATIVAN) 1 MG tablet, Take 1 p.o. twice daily as needed for anxiety., Disp: 60 tablet, Rfl: 2  •  Multiple Vitamin (MULTIVITAMIN) tablet, Take 1 tablet by mouth daily., Disp: , Rfl:   •  simvastatin (ZOCOR)  20 MG tablet, Take 1 p.o. daily for high cholesterol, Disp: 90 tablet, Rfl: 1  •  traZODone (DESYREL) 150 MG tablet, Take 1 tablet by mouth Every Night., Disp: 90 tablet, Rfl: 1  •  losartan (COZAAR) 50 MG tablet, Take 1 p.o. every morning for high blood pressure, Disp: 90 tablet, Rfl: 1      Family History   Problem Relation Age of Onset   • Alcohol abuse Father    • Cancer Other    • Diabetes Other         type II         Social History     Socioeconomic History   • Marital status:      Spouse name: Not on file   • Number of children: Not on file   • Years of education: Not on file   • Highest education level: Not on file   Social Needs   • Financial resource strain: Not hard at all   • Food insecurity:     Worry: Never true     Inability: Never true   • Transportation needs:     Medical: No     Non-medical: No   Tobacco Use   • Smoking status: Former Smoker     Last attempt to quit: 1998     Years since quittin.9   • Smokeless tobacco: Never Used   Substance and Sexual Activity   • Alcohol use: Yes     Alcohol/week: 0.6 oz     Types: 1 Glasses of wine per week     Frequency: Monthly or less     Drinks per session: 1 or 2     Binge frequency: Never     Comment: current some day   • Drug use: No   • Sexual activity: Not Currently     Partners: Male   Lifestyle   • Physical activity:     Days per week: 0 days     Minutes per session: 0 min   • Stress: Only a little   Relationships   • Social connections:     Talks on phone: Patient refused     Gets together: Patient refused     Attends Zoroastrian service: Patient refused     Active member of club or organization: Patient refused     Attends meetings of clubs or organizations: Patient refused     Relationship status: Patient refused         Vitals:    19 0758   BP: 142/86   BP Location: Right arm   Patient Position: Sitting   Cuff Size: Adult   Pulse: 73   Temp: 98.5 °F (36.9 °C)   SpO2: 98%   Weight: 90.4 kg (199 lb 3.2 oz)   Height: 170.2 cm  "(67.01\")        Body mass index is 31.19 kg/m².      Physical Exam:    General: Alert and oriented x 3.  No acute distress.  Normal affect. Obese.   HEENT: Pupils equal, round, reactive to light; extraocular movements intact; sclerae nonicteric; pharynx, ear canals and TMs normal.  Neck: Without JVD, thyromegaly, bruit, or adenopathy.  Lungs: Clear to auscultation in all fields.  Heart: Regular rate and rhythm without murmur, rub, gallop, or click.  Abdomen: Soft, nontender, without hepatosplenomegaly or hernia.  Bowel sounds normal.  : Deferred.  Rectal: Deferred.  Extremities: Without clubbing, cyanosis, edema, or pulse deficit.  Neurologic: Intact without focal deficit.  Normal station and gait observed during ingress and egress from the examination room.  Skin: Without significant lesion.  Musculoskeletal: Unremarkable.    Lab/other results:      Assessment/Plan:     Diagnosis Plan   1. Flu-like symptoms     2. Benign essential hypertension  losartan (COZAAR) 50 MG tablet    hydroCHLOROthiazide (HYDRODIURIL) 12.5 MG tablet   3. Persistent cough     4. Gastroesophageal reflux disease without esophagitis     5. Hypercalcemia       Patient presents with flulike symptoms that apparently have resolved.  Her blood pressure is elevated today and asked because she has been without her blood pressure medication.  Her persistent cough seems to have resolved.  Patient did not get the Dexilant filled because it was going to cost $300.  Her nocturnal cough has resolved apparently.  Patient does have hypercalcemia and I reviewed the recent lab work which revealed a mildly elevated ionized calcium but her intact PTH was suppressed.  We will need to monitor this closely.    Plan is as follows: Formally discontinue Dexilant.  Discontinue losartan HCT and refill plain losartan 50 mg/day along with hydrochlorothiazide 15 mg/day.  Patient has cut back on her vitamin D supplementation as recommended.  She will keep her previously " scheduled lab and follow-up appointment in March as planned.        Procedures

## 2019-12-09 DIAGNOSIS — I10 BENIGN ESSENTIAL HYPERTENSION: Chronic | ICD-10-CM

## 2019-12-09 RX ORDER — HYDROCHLOROTHIAZIDE 12.5 MG/1
TABLET ORAL
Qty: 90 TABLET | Refills: 1 | Status: SHIPPED | OUTPATIENT
Start: 2019-12-09 | End: 2020-11-30

## 2019-12-09 RX ORDER — LOSARTAN POTASSIUM 50 MG/1
TABLET ORAL
Qty: 90 TABLET | Refills: 1 | Status: SHIPPED | OUTPATIENT
Start: 2019-12-09 | End: 2020-01-08

## 2019-12-12 DIAGNOSIS — F43.23 SITUATIONAL MIXED ANXIETY AND DEPRESSIVE DISORDER: ICD-10-CM

## 2019-12-12 RX ORDER — LORAZEPAM 1 MG/1
TABLET ORAL
Qty: 60 TABLET | Refills: 2 | OUTPATIENT
Start: 2019-12-12 | End: 2020-06-08

## 2020-01-08 DIAGNOSIS — F43.23 SITUATIONAL MIXED ANXIETY AND DEPRESSIVE DISORDER: ICD-10-CM

## 2020-01-08 DIAGNOSIS — I10 BENIGN ESSENTIAL HYPERTENSION: Chronic | ICD-10-CM

## 2020-01-08 DIAGNOSIS — E03.9 HYPOTHYROIDISM, ACQUIRED: ICD-10-CM

## 2020-01-08 DIAGNOSIS — G47.09 OTHER INSOMNIA: ICD-10-CM

## 2020-01-08 RX ORDER — LISINOPRIL AND HYDROCHLOROTHIAZIDE 20; 12.5 MG/1; MG/1
TABLET ORAL
Qty: 90 TABLET | Refills: 2 | OUTPATIENT
Start: 2020-01-08

## 2020-01-08 RX ORDER — ARIPIPRAZOLE 5 MG/1
TABLET ORAL
Qty: 90 TABLET | Refills: 3 | Status: SHIPPED | OUTPATIENT
Start: 2020-01-08 | End: 2020-07-06

## 2020-01-08 RX ORDER — TRAZODONE HYDROCHLORIDE 150 MG/1
TABLET ORAL
Qty: 27 TABLET | Refills: 0 | Status: SHIPPED | OUTPATIENT
Start: 2020-01-08 | End: 2020-03-11

## 2020-01-08 RX ORDER — LOSARTAN POTASSIUM 50 MG/1
TABLET ORAL
Qty: 30 TABLET | Refills: 0 | Status: SHIPPED | OUTPATIENT
Start: 2020-01-08 | End: 2020-07-06 | Stop reason: SDUPTHER

## 2020-01-10 RX ORDER — LEVOTHYROXINE SODIUM 0.12 MG/1
TABLET ORAL
Qty: 90 TABLET | Refills: 0 | OUTPATIENT
Start: 2020-01-10

## 2020-02-10 DIAGNOSIS — E03.9 HYPOTHYROIDISM, ACQUIRED: ICD-10-CM

## 2020-02-10 RX ORDER — LEVOTHYROXINE SODIUM 0.12 MG/1
125 TABLET ORAL DAILY
Qty: 90 TABLET | Refills: 1 | Status: SHIPPED | OUTPATIENT
Start: 2020-02-10 | End: 2020-08-04

## 2020-02-10 NOTE — TELEPHONE ENCOUNTER
Pt called and said the pharmacy told her a refill request for levothyroxine was denied.  I don't see any denial or any requests from pt's pharmacy.  It looks like the last refill was on 01/15/2019 from Dr. Shaan Ugalde.  I sent a rx to pt's [facility] pharmacy

## 2020-03-10 DIAGNOSIS — G47.09 OTHER INSOMNIA: ICD-10-CM

## 2020-03-11 ENCOUNTER — TELEPHONE (OUTPATIENT)
Dept: INTERNAL MEDICINE | Facility: CLINIC | Age: 75
End: 2020-03-11

## 2020-03-11 DIAGNOSIS — G47.09 OTHER INSOMNIA: ICD-10-CM

## 2020-03-11 RX ORDER — TRAZODONE HYDROCHLORIDE 150 MG/1
TABLET ORAL
Qty: 90 TABLET | Refills: 0 | OUTPATIENT
Start: 2020-03-11

## 2020-03-11 RX ORDER — TRAZODONE HYDROCHLORIDE 150 MG/1
TABLET ORAL
Qty: 27 TABLET | Refills: 0 | Status: SHIPPED | OUTPATIENT
Start: 2020-03-11 | End: 2020-03-11 | Stop reason: SDUPTHER

## 2020-03-11 RX ORDER — TRAZODONE HYDROCHLORIDE 150 MG/1
150 TABLET ORAL NIGHTLY
Qty: 90 TABLET | Refills: 0 | Status: SHIPPED | OUTPATIENT
Start: 2020-03-11 | End: 2020-07-27

## 2020-03-11 NOTE — TELEPHONE ENCOUNTER
Pt called and asked for an order for a mammogram.  She said there is a lump or small mass in one of her breasts

## 2020-03-12 DIAGNOSIS — E78.5 HYPERLIPIDEMIA, UNSPECIFIED HYPERLIPIDEMIA TYPE: Chronic | ICD-10-CM

## 2020-03-12 DIAGNOSIS — I10 BENIGN ESSENTIAL HYPERTENSION: Chronic | ICD-10-CM

## 2020-03-12 DIAGNOSIS — R73.01 IMPAIRED FASTING GLUCOSE: Chronic | ICD-10-CM

## 2020-03-12 DIAGNOSIS — E55.9 VITAMIN D DEFICIENCY: Chronic | ICD-10-CM

## 2020-03-12 DIAGNOSIS — E83.52 HYPERCALCEMIA: ICD-10-CM

## 2020-03-12 DIAGNOSIS — Z51.81 THERAPEUTIC DRUG MONITORING: ICD-10-CM

## 2020-03-12 DIAGNOSIS — E03.9 PRIMARY HYPOTHYROIDISM: Chronic | ICD-10-CM

## 2020-03-13 DIAGNOSIS — N63.0 BREAST MASS IN FEMALE: Primary | ICD-10-CM

## 2020-03-14 LAB
25(OH)D3+25(OH)D2 SERPL-MCNC: 45.8 NG/ML (ref 30–100)
ALBUMIN SERPL-MCNC: 4.6 G/DL (ref 3.7–4.7)
ALBUMIN/GLOB SERPL: 1.8 {RATIO} (ref 1.2–2.2)
ALP SERPL-CCNC: 74 IU/L (ref 39–117)
ALT SERPL-CCNC: 22 IU/L (ref 0–32)
AST SERPL-CCNC: 24 IU/L (ref 0–40)
BILIRUB SERPL-MCNC: 0.5 MG/DL (ref 0–1.2)
BUN SERPL-MCNC: 19 MG/DL (ref 8–27)
BUN/CREAT SERPL: 20 (ref 12–28)
CA-I SERPL ISE-MCNC: 5.5 MG/DL (ref 4.5–5.6)
CALCIUM SERPL-MCNC: 10.1 MG/DL (ref 8.7–10.3)
CHLORIDE SERPL-SCNC: 94 MMOL/L (ref 96–106)
CHOLEST SERPL-MCNC: 225 MG/DL (ref 100–199)
CO2 SERPL-SCNC: 24 MMOL/L (ref 20–29)
CREAT SERPL-MCNC: 0.97 MG/DL (ref 0.57–1)
CRP SERPL-MCNC: 2 MG/L (ref 0–10)
ERYTHROCYTE [DISTWIDTH] IN BLOOD BY AUTOMATED COUNT: 11.9 % (ref 11.7–15.4)
ERYTHROCYTE [SEDIMENTATION RATE] IN BLOOD BY WESTERGREN METHOD: 24 MM/HR (ref 0–40)
GLOBULIN SER CALC-MCNC: 2.5 G/DL (ref 1.5–4.5)
GLUCOSE SERPL-MCNC: 110 MG/DL (ref 65–99)
HBA1C MFR BLD: 6.1 % (ref 4.8–5.6)
HCT VFR BLD AUTO: 38.3 % (ref 34–46.6)
HDL SERPL-SCNC: 38.8 UMOL/L
HDLC SERPL-MCNC: 61 MG/DL
HGB BLD-MCNC: 13.1 G/DL (ref 11.1–15.9)
LDL SERPL QN: 21.3 NM
LDL SERPL-SCNC: 1576 NMOL/L
LDL SMALL SERPL-SCNC: 633 NMOL/L
LDLC SERPL CALC-MCNC: 129 MG/DL (ref 0–99)
MCH RBC QN AUTO: 32 PG (ref 26.6–33)
MCHC RBC AUTO-ENTMCNC: 34.2 G/DL (ref 31.5–35.7)
MCV RBC AUTO: 94 FL (ref 79–97)
PLATELET # BLD AUTO: 287 X10E3/UL (ref 150–450)
POTASSIUM SERPL-SCNC: 4.4 MMOL/L (ref 3.5–5.2)
PROT SERPL-MCNC: 7.1 G/DL (ref 6–8.5)
PTH-INTACT SERPL-MCNC: 19 PG/ML (ref 15–65)
RBC # BLD AUTO: 4.09 X10E6/UL (ref 3.77–5.28)
SODIUM SERPL-SCNC: 135 MMOL/L (ref 134–144)
T3FREE SERPL-MCNC: 2.6 PG/ML (ref 2–4.4)
T4 FREE SERPL-MCNC: 1.35 NG/DL (ref 0.82–1.77)
TRIGL SERPL-MCNC: 174 MG/DL (ref 0–149)
TSH SERPL DL<=0.005 MIU/L-ACNC: 1.41 UIU/ML (ref 0.45–4.5)
WBC # BLD AUTO: 5.6 X10E3/UL (ref 3.4–10.8)

## 2020-04-07 DIAGNOSIS — E78.5 HYPERLIPIDEMIA, UNSPECIFIED HYPERLIPIDEMIA TYPE: Chronic | ICD-10-CM

## 2020-04-07 RX ORDER — EZETIMIBE 10 MG/1
TABLET ORAL
Qty: 30 TABLET | Refills: 4 | Status: SHIPPED | OUTPATIENT
Start: 2020-04-07 | End: 2020-05-21 | Stop reason: SDUPTHER

## 2020-05-21 ENCOUNTER — OFFICE VISIT (OUTPATIENT)
Dept: INTERNAL MEDICINE | Facility: CLINIC | Age: 75
End: 2020-05-21

## 2020-05-21 DIAGNOSIS — F41.1 GENERALIZED ANXIETY DISORDER: Chronic | ICD-10-CM

## 2020-05-21 DIAGNOSIS — E83.52 HYPERCALCEMIA: ICD-10-CM

## 2020-05-21 DIAGNOSIS — R73.01 IMPAIRED FASTING GLUCOSE: Primary | Chronic | ICD-10-CM

## 2020-05-21 DIAGNOSIS — E55.9 VITAMIN D DEFICIENCY: Chronic | ICD-10-CM

## 2020-05-21 DIAGNOSIS — E78.2 MIXED HYPERLIPIDEMIA: ICD-10-CM

## 2020-05-21 DIAGNOSIS — I10 BENIGN ESSENTIAL HYPERTENSION: Chronic | ICD-10-CM

## 2020-05-21 DIAGNOSIS — E03.9 PRIMARY HYPOTHYROIDISM: Chronic | ICD-10-CM

## 2020-05-21 DIAGNOSIS — F33.41 RECURRENT MAJOR DEPRESSIVE DISORDER, IN PARTIAL REMISSION (HCC): Chronic | ICD-10-CM

## 2020-05-21 DIAGNOSIS — F43.23 SITUATIONAL MIXED ANXIETY AND DEPRESSIVE DISORDER: ICD-10-CM

## 2020-05-21 DIAGNOSIS — Z51.81 THERAPEUTIC DRUG MONITORING: ICD-10-CM

## 2020-05-21 DIAGNOSIS — E78.5 HYPERLIPIDEMIA, UNSPECIFIED HYPERLIPIDEMIA TYPE: Chronic | ICD-10-CM

## 2020-05-21 DIAGNOSIS — G60.9 PERIPHERAL NEUROPATHY, IDIOPATHIC: Chronic | ICD-10-CM

## 2020-05-21 PROBLEM — R68.89 FLU-LIKE SYMPTOMS: Status: RESOLVED | Noted: 2019-12-02 | Resolved: 2020-05-21

## 2020-05-21 PROCEDURE — 99443 PR PHYS/QHP TELEPHONE EVALUATION 21-30 MIN: CPT | Performed by: INTERNAL MEDICINE

## 2020-05-21 RX ORDER — SIMVASTATIN 20 MG
TABLET ORAL
Qty: 90 TABLET | Refills: 1 | Status: SHIPPED | OUTPATIENT
Start: 2020-05-21 | End: 2020-11-17

## 2020-05-21 RX ORDER — EZETIMIBE 10 MG/1
TABLET ORAL
Qty: 90 TABLET | Refills: 1 | Status: SHIPPED | OUTPATIENT
Start: 2020-05-21 | End: 2021-04-26

## 2020-05-21 NOTE — PROGRESS NOTES
05/21/2020    Patient Information  Claudette L McManus                                                                                          [id number]  [place] [address]      1945  [unfilled]  There is no work phone number on file.    Chief Complaint:     Blood work in order to monitor several chronic medical issues.  No new acute complaints.    Telephone visit.  Patient gave verbal consent and actively participated.  25 minutes was spent evaluating patient today.    History of Present Illness:      Review of Systems   Constitution: Negative.   HENT: Negative.    Eyes: Negative.    Cardiovascular: Negative.    Respiratory: Negative.    Endocrine: Negative.    Hematologic/Lymphatic: Negative.    Skin: Negative.    Musculoskeletal: Positive for arthritis and back pain.   Gastrointestinal: Negative.    Genitourinary: Negative.    Neurological: Negative.    Psychiatric/Behavioral: Positive for depression. The patient is nervous/anxious.    Allergic/Immunologic: Negative.        Active Problems:    Patient Active Problem List   Diagnosis   • Generalized anxiety disorder   • Primary osteoarthritis involving multiple joints   • Major depression   • Benign essential hypertension   • Hyperlipidemia   • Primary hypothyroidism   • Impaired fasting glucose   • Chronic insomnia   • Chronic bilateral low back pain without sciatica   • Chronic bilateral thoracic back pain   • Vitamin D deficiency   • Therapeutic drug monitoring   • Menopausal state   • Peripheral neuropathy, idiopathic   • Situational mixed anxiety and depressive disorder   • ACE-inhibitor cough   • Hypercalcemia         Past Medical History:   Diagnosis Date   • Benign essential hypertension    • Chronic bilateral low back pain without sciatica 8/16/2013 March 13, 2019--Limited bone scan.  This reveals scintigraphic activity in the spine and at the right shoulder corresponds to change seen on recent x-rays and is best  explained by degenerative change.  Isolated metastatic disease exactly corresponding to the sites of extensive degenerative disc change would be peculiar.  March 7, 2019--new patient to get established.  She has complaints of approxi   • Chronic bilateral thoracic back pain 2/26/2019 March 13, 2019--Limited bone scan.  This reveals scintigraphic activity in the spine and at the right shoulder corresponds to change seen on recent x-rays and is best explained by degenerative change.  Isolated metastatic disease exactly corresponding to the sites of extensive degenerative disc change would be peculiar.  March 1, 2019--x-ray of the lumbar and thoracic spine reveals mild anterior l   • Chronic insomnia 11/4/2014   • Generalized anxiety disorder 5/19/2013   • History of Bell's palsy 5/21/2013    Remote history of Bell's palsy.  Patient does not remember which side of the face.   • Hyperlipidemia    • Impaired fasting glucose    • Major depression    • Menopausal state 3/7/2019   • Peripheral neuropathy, idiopathic 3/7/2019    Characterized by decreased sensation and paresthesias in both lower extremities described as a burning sensation.  Extends up to the knees.  Reportedly had been evaluated with nerve conduction study in the past.   • Primary hypothyroidism 5/19/2013   • Primary osteoarthritis involving multiple joints 4/22/2013   • Vitamin D deficiency 2/26/2019         Past Surgical History:   Procedure Laterality Date   • BILATERAL BREAST REDUCTION  Early 2000    Early 2000--bilateral breast reduction   • CHOLECYSTECTOMY  1960s    1960s--open cholecystectomy   • COLONOSCOPY  2012 2012--reportedly normal colonoscopy   • INCONTINENCE SURGERY  2012 Approximately 2012--implantation of questionable bladder stimulator right sacral area for urinary incontinence.  Details not known.   • REPLACEMENT TOTAL KNEE BILATERAL Left 2010; 2011 2010-2011--bilateral total knee replacement   • SUBTOTAL HYSTERECTOMY  Remote     Remote partial hysterectomy.  Ovaries intact.   • TOTAL SHOULDER REPLACEMENT Left 2013 2013--left total shoulder replacement.         Allergies   Allergen Reactions   • Morphine And Related    • Other Other (See Comments)     REJECTED DACRON SUTURES IN THE PAST AND INCISION CAME APART           Current Outpatient Medications:   •  ARIPiprazole (ABILIFY) 5 MG tablet, TAKE ONE TABLET BY MOUTH DAILY FOR MOOD STABILIZER, Disp: 90 tablet, Rfl: 3  •  busPIRone (BUSPAR) 5 MG tablet, Take 1 tablet by mouth 3 (Three) Times a Day., Disp: 270 tablet, Rfl: 1  •  Calcium Carb-Cholecalciferol (CALCIUM + D3 PO), Take 1 tablet by mouth daily., Disp: , Rfl:   •  Cholecalciferol (VITAMIN D3) 2000 UNITS tablet, Take 1 capsule by mouth daily., Disp: , Rfl:   •  DULoxetine (CYMBALTA) 60 MG capsule, Take 1 p.o. daily as directed, Disp: 90 capsule, Rfl: 3  •  ezetimibe (ZETIA) 10 MG tablet, TAKE ONE TABLET BY MOUTH DAILY, Disp: 30 tablet, Rfl: 4  •  hydroCHLOROthiazide (HYDRODIURIL) 12.5 MG tablet, Take 1 p.o. every morning for high blood pressure, Disp: 90 tablet, Rfl: 1  •  levothyroxine (SYNTHROID, LEVOTHROID) 125 MCG tablet, Take 1 tablet by mouth Daily., Disp: 90 tablet, Rfl: 1  •  LORazepam (ATIVAN) 1 MG tablet, Take 1 p.o. twice daily as needed for anxiety., Disp: 60 tablet, Rfl: 2  •  losartan (COZAAR) 50 MG tablet, TAKE ONE TABLET BY MOUTH DAILY, Disp: 30 tablet, Rfl: 0  •  Multiple Vitamin (MULTIVITAMIN) tablet, Take 1 tablet by mouth daily., Disp: , Rfl:   •  simvastatin (ZOCOR) 20 MG tablet, Take 1 p.o. daily for high cholesterol, Disp: 90 tablet, Rfl: 1  •  traZODone (DESYREL) 150 MG tablet, Take 1 tablet by mouth Every Night., Disp: 90 tablet, Rfl: 0      Family History   Problem Relation Age of Onset   • Alcohol abuse Father    • Cancer Other    • Diabetes Other         type II         Social History     Socioeconomic History   • Marital status:      Spouse name: Not on file   • Number of children: Not on file    • Years of education: Not on file   • Highest education level: Not on file   Social Needs   • Financial resource strain: Not hard at all   • Food insecurity:     Worry: Never true     Inability: Never true   • Transportation needs:     Medical: No     Non-medical: No   Tobacco Use   • Smoking status: Former Smoker     Last attempt to quit: 1998     Years since quittin.4   • Smokeless tobacco: Never Used   Substance and Sexual Activity   • Alcohol use: Yes     Alcohol/week: 1.0 standard drinks     Types: 1 Glasses of wine per week     Frequency: Monthly or less     Drinks per session: 1 or 2     Binge frequency: Never     Comment: current some day   • Drug use: No   • Sexual activity: Not Currently     Partners: Male   Lifestyle   • Physical activity:     Days per week: 0 days     Minutes per session: 0 min   • Stress: Only a little   Relationships   • Social connections:     Talks on phone: Patient refused     Gets together: Patient refused     Attends [employer] service: Patient refused     Active member of club or organization: Patient refused     Attends meetings of clubs or organizations: Patient refused     Relationship status: Patient refused         There were no vitals filed for this visit.     There is no height or weight on file to calculate BMI.      Physical Exam:    Telephone visit.  Physical exam and vital signs obviously not performed.    Lab/other results:    NMR reveals a total cholesterol 225.  Triglycerides elevated 174.  LDL particle number elevated 1576.  Small LDL particle number elevated at 633.  HDL particle number normal at 38.8.  CMP normal except glucose 110.  CBC is normal.  Hemoglobin A1c 6.1.  Vitamin D normal.  Thyroid function test normal.  Ionized calcium, intact PTH, sed rate and CRP normal.    Assessment/Plan:     Diagnosis Plan   1. Impaired fasting glucose     2. Primary hypothyroidism     3. Mixed hyperlipidemia     4. Benign essential hypertension     5. Major  depression     6. Generalized anxiety disorder     7. Vitamin D deficiency     8. Peripheral neuropathy, idiopathic     9. Situational mixed anxiety and depressive disorder     10. Hypercalcemia     11. Therapeutic drug monitoring       Patient has mild impaired fasting glucose that does not require medication.  She has primary hypothyroidism which is under good control.  She has hyperlipidemia and is on Zetia alone and not taking zocor. This explains why cholesterol profile is bad.   Her blood pressure needs to be reassessed at the next visit.  She has major depression and also situational anxiety and depressive disorder due to the death of her daughter.  This seems fairly stable.  Vitamin D in the normal range.  Hypercalcemia is not present and suspected lab error.  This can be resolved.    Plan is as follows: Refill simvastatin as well as Zetia.  Patient is now aware she needs to be on both medications for cholesterol.  No other changes in medical regimen.  Strongly encouraged low carbohydrate diet, exercise, and weight loss.  We will schedule fasting lab, follow-up, subsequent Medicare wellness visit after July 3, 2020.        Procedures

## 2020-06-05 DIAGNOSIS — F43.23 SITUATIONAL MIXED ANXIETY AND DEPRESSIVE DISORDER: ICD-10-CM

## 2020-06-08 RX ORDER — LORAZEPAM 1 MG/1
TABLET ORAL
Qty: 60 TABLET | Refills: 1 | Status: SHIPPED | OUTPATIENT
Start: 2020-06-08 | End: 2020-08-14

## 2020-06-29 ENCOUNTER — LAB (OUTPATIENT)
Dept: LAB | Facility: HOSPITAL | Age: 75
End: 2020-06-29

## 2020-06-29 DIAGNOSIS — I10 BENIGN ESSENTIAL HYPERTENSION: Chronic | ICD-10-CM

## 2020-06-29 DIAGNOSIS — Z51.81 THERAPEUTIC DRUG MONITORING: Primary | ICD-10-CM

## 2020-06-29 DIAGNOSIS — E78.2 MIXED HYPERLIPIDEMIA: Chronic | ICD-10-CM

## 2020-06-29 DIAGNOSIS — E55.9 VITAMIN D DEFICIENCY: Chronic | ICD-10-CM

## 2020-06-29 DIAGNOSIS — E03.9 PRIMARY HYPOTHYROIDISM: Chronic | ICD-10-CM

## 2020-06-29 DIAGNOSIS — R73.01 IMPAIRED FASTING GLUCOSE: Chronic | ICD-10-CM

## 2020-06-29 DIAGNOSIS — Z11.59 NEED FOR HEPATITIS C SCREENING TEST: ICD-10-CM

## 2020-06-29 LAB
25(OH)D3 SERPL-MCNC: 50.3 NG/ML (ref 30–100)
ALBUMIN SERPL-MCNC: 4.5 G/DL (ref 3.5–5.2)
ALBUMIN/GLOB SERPL: 1.5 G/DL
ALP SERPL-CCNC: 65 U/L (ref 39–117)
ALT SERPL W P-5'-P-CCNC: 25 U/L (ref 1–33)
ANION GAP SERPL CALCULATED.3IONS-SCNC: 10.8 MMOL/L (ref 5–15)
AST SERPL-CCNC: 24 U/L (ref 1–32)
BILIRUB SERPL-MCNC: 0.5 MG/DL (ref 0.2–1.2)
BUN BLD-MCNC: 13 MG/DL (ref 8–23)
BUN/CREAT SERPL: 13.4 (ref 7–25)
CALCIUM SPEC-SCNC: 10.2 MG/DL (ref 8.6–10.5)
CHLORIDE SERPL-SCNC: 95 MMOL/L (ref 98–107)
CK SERPL-CCNC: 135 U/L (ref 20–180)
CO2 SERPL-SCNC: 28.2 MMOL/L (ref 22–29)
CREAT BLD-MCNC: 0.97 MG/DL (ref 0.57–1)
DEPRECATED RDW RBC AUTO: 44.9 FL (ref 37–54)
ERYTHROCYTE [DISTWIDTH] IN BLOOD BY AUTOMATED COUNT: 13 % (ref 12.3–15.4)
GFR SERPL CREATININE-BSD FRML MDRD: 56 ML/MIN/1.73
GLOBULIN UR ELPH-MCNC: 3 GM/DL
GLUCOSE BLD-MCNC: 113 MG/DL (ref 65–99)
HBA1C MFR BLD: 6.7 % (ref 4.8–5.6)
HCT VFR BLD AUTO: 37.6 % (ref 34–46.6)
HCV AB SER DONR QL: NORMAL
HGB BLD-MCNC: 13 G/DL (ref 12–15.9)
MCH RBC QN AUTO: 32.6 PG (ref 26.6–33)
MCHC RBC AUTO-ENTMCNC: 34.6 G/DL (ref 31.5–35.7)
MCV RBC AUTO: 94.2 FL (ref 79–97)
PLATELET # BLD AUTO: 266 10*3/MM3 (ref 140–450)
PMV BLD AUTO: 9.4 FL (ref 6–12)
POTASSIUM BLD-SCNC: 4.4 MMOL/L (ref 3.5–5.2)
PROT SERPL-MCNC: 7.5 G/DL (ref 6–8.5)
RBC # BLD AUTO: 3.99 10*6/MM3 (ref 3.77–5.28)
SODIUM BLD-SCNC: 134 MMOL/L (ref 136–145)
T3FREE SERPL-MCNC: 2.52 PG/ML (ref 2–4.4)
T4 FREE SERPL-MCNC: 1.46 NG/DL (ref 0.93–1.7)
TSH SERPL DL<=0.05 MIU/L-ACNC: 0.74 UIU/ML (ref 0.27–4.2)
WBC NRBC COR # BLD: 6.47 10*3/MM3 (ref 3.4–10.8)

## 2020-06-29 PROCEDURE — 83704 LIPOPROTEIN BLD QUAN PART: CPT | Performed by: INTERNAL MEDICINE

## 2020-06-29 PROCEDURE — 85027 COMPLETE CBC AUTOMATED: CPT | Performed by: INTERNAL MEDICINE

## 2020-06-29 PROCEDURE — 82550 ASSAY OF CK (CPK): CPT | Performed by: INTERNAL MEDICINE

## 2020-06-29 PROCEDURE — 86803 HEPATITIS C AB TEST: CPT | Performed by: INTERNAL MEDICINE

## 2020-06-29 PROCEDURE — 80053 COMPREHEN METABOLIC PANEL: CPT | Performed by: INTERNAL MEDICINE

## 2020-06-29 PROCEDURE — 36415 COLL VENOUS BLD VENIPUNCTURE: CPT | Performed by: INTERNAL MEDICINE

## 2020-06-29 PROCEDURE — 80061 LIPID PANEL: CPT | Performed by: INTERNAL MEDICINE

## 2020-06-29 PROCEDURE — 84443 ASSAY THYROID STIM HORMONE: CPT | Performed by: INTERNAL MEDICINE

## 2020-06-29 PROCEDURE — 82306 VITAMIN D 25 HYDROXY: CPT | Performed by: INTERNAL MEDICINE

## 2020-06-29 PROCEDURE — 84439 ASSAY OF FREE THYROXINE: CPT | Performed by: INTERNAL MEDICINE

## 2020-06-29 PROCEDURE — 83036 HEMOGLOBIN GLYCOSYLATED A1C: CPT | Performed by: INTERNAL MEDICINE

## 2020-06-29 PROCEDURE — 84481 FREE ASSAY (FT-3): CPT | Performed by: INTERNAL MEDICINE

## 2020-07-01 LAB
CHOLEST SERPL-MCNC: 176 MG/DL (ref 100–199)
HDL SERPL-SCNC: 49.2 UMOL/L
HDLC SERPL-MCNC: 72 MG/DL
LDL-P: 1208 NMOL/L
LDLC REAL SIZE PAT SERPL: 20.8 NM
LDLC SERPL CALC-MCNC: 75 MG/DL (ref 0–99)
SMALL LDL-P: 464 NMOL/L
TRIGL SERPL-MCNC: 143 MG/DL (ref 0–149)

## 2020-07-03 DIAGNOSIS — F33.41 RECURRENT MAJOR DEPRESSIVE DISORDER, IN PARTIAL REMISSION (HCC): Chronic | ICD-10-CM

## 2020-07-06 ENCOUNTER — OFFICE VISIT (OUTPATIENT)
Dept: INTERNAL MEDICINE | Facility: CLINIC | Age: 75
End: 2020-07-06

## 2020-07-06 VITALS
SYSTOLIC BLOOD PRESSURE: 136 MMHG | BODY MASS INDEX: 33.12 KG/M2 | OXYGEN SATURATION: 98 % | DIASTOLIC BLOOD PRESSURE: 80 MMHG | HEIGHT: 67 IN | HEART RATE: 80 BPM | WEIGHT: 211 LBS

## 2020-07-06 DIAGNOSIS — Z00.00 MEDICARE ANNUAL WELLNESS VISIT, SUBSEQUENT: Primary | ICD-10-CM

## 2020-07-06 DIAGNOSIS — F33.41 RECURRENT MAJOR DEPRESSIVE DISORDER, IN PARTIAL REMISSION (HCC): Chronic | ICD-10-CM

## 2020-07-06 DIAGNOSIS — I10 BENIGN ESSENTIAL HYPERTENSION: Chronic | ICD-10-CM

## 2020-07-06 DIAGNOSIS — F43.23 SITUATIONAL MIXED ANXIETY AND DEPRESSIVE DISORDER: ICD-10-CM

## 2020-07-06 DIAGNOSIS — E78.2 MIXED HYPERLIPIDEMIA: Chronic | ICD-10-CM

## 2020-07-06 DIAGNOSIS — E55.9 VITAMIN D DEFICIENCY: Chronic | ICD-10-CM

## 2020-07-06 DIAGNOSIS — E03.9 PRIMARY HYPOTHYROIDISM: Chronic | ICD-10-CM

## 2020-07-06 DIAGNOSIS — Z51.81 THERAPEUTIC DRUG MONITORING: ICD-10-CM

## 2020-07-06 DIAGNOSIS — N95.1 MENOPAUSAL STATE: Chronic | ICD-10-CM

## 2020-07-06 DIAGNOSIS — F41.1 GENERALIZED ANXIETY DISORDER: Chronic | ICD-10-CM

## 2020-07-06 DIAGNOSIS — G60.9 PERIPHERAL NEUROPATHY, IDIOPATHIC: Chronic | ICD-10-CM

## 2020-07-06 DIAGNOSIS — R73.01 IMPAIRED FASTING GLUCOSE: Chronic | ICD-10-CM

## 2020-07-06 DIAGNOSIS — M15.9 PRIMARY OSTEOARTHRITIS INVOLVING MULTIPLE JOINTS: Chronic | ICD-10-CM

## 2020-07-06 PROBLEM — M12.811 ROTATOR CUFF ARTHROPATHY OF RIGHT SHOULDER: Status: ACTIVE | Noted: 2020-05-27

## 2020-07-06 PROCEDURE — 96160 PT-FOCUSED HLTH RISK ASSMT: CPT | Performed by: INTERNAL MEDICINE

## 2020-07-06 PROCEDURE — G0439 PPPS, SUBSEQ VISIT: HCPCS | Performed by: INTERNAL MEDICINE

## 2020-07-06 PROCEDURE — 99214 OFFICE O/P EST MOD 30 MIN: CPT | Performed by: INTERNAL MEDICINE

## 2020-07-06 RX ORDER — DULOXETIN HYDROCHLORIDE 60 MG/1
CAPSULE, DELAYED RELEASE ORAL
Qty: 30 CAPSULE | Refills: 2 | Status: SHIPPED | OUTPATIENT
Start: 2020-07-06 | End: 2020-09-15

## 2020-07-06 RX ORDER — LOSARTAN POTASSIUM 50 MG/1
TABLET ORAL
Qty: 30 TABLET | Refills: 6 | Status: SHIPPED | OUTPATIENT
Start: 2020-07-06 | End: 2020-11-30 | Stop reason: SDUPTHER

## 2020-07-06 NOTE — PROGRESS NOTES
07/06/2020    Patient Information  Claudette L McManus                                                                                          [id number]  [place] [address]      1945  [unfilled]  There is no work phone number on file.    Chief Complaint:     Subsequent Medicare wellness visit.  Follow-up lab work in order to monitor chronic medical issues listed in history of present illness.  No new acute complaints.    History of Present Illness:    Patient with a history of impaired fasting glucose, hyperlipidemia, hypertension, hypothyroidism, osteoarthritis of multiple joints, situational anxiety and depression/generalized anxiety disorder and major depression, vitamin D deficiency, idiopathic peripheral neuropathy.  She presents today for a subsequent Medicare wellness visit.  She also had lab work in order to monitor her chronic medical issues.  Her past medical history reviewed and updated were necessary including health maintenance parameters.  This reveals she will be up-to-date or else accounted for after today's visit, although she does need a DEXA scan.    Review of Systems   Constitution: Negative.   HENT: Negative.    Eyes: Negative.    Cardiovascular: Negative.    Respiratory: Negative.    Endocrine: Negative.    Hematologic/Lymphatic: Negative.    Skin: Negative.    Musculoskeletal: Positive for arthritis, back pain and joint pain.   Gastrointestinal: Negative.    Genitourinary: Negative.    Neurological: Negative.    Psychiatric/Behavioral: Positive for depression. The patient is nervous/anxious.    Allergic/Immunologic: Negative.        Active Problems:    Patient Active Problem List   Diagnosis   • Generalized anxiety disorder   • Primary osteoarthritis involving multiple joints   • Major depression   • Benign essential hypertension   • Hyperlipidemia   • Primary hypothyroidism   • Impaired fasting glucose   • Chronic insomnia   • Chronic bilateral low back pain  without sciatica   • Chronic bilateral thoracic back pain   • Vitamin D deficiency   • Therapeutic drug monitoring   • Menopausal state   • Peripheral neuropathy, idiopathic   • Situational mixed anxiety and depressive disorder   • ACE-inhibitor cough   • Rotator cuff arthropathy of right shoulder         Past Medical History:   Diagnosis Date   • Benign essential hypertension    • Chronic bilateral low back pain without sciatica 8/16/2013 March 13, 2019--Limited bone scan.  This reveals scintigraphic activity in the spine and at the right shoulder corresponds to change seen on recent x-rays and is best explained by degenerative change.  Isolated metastatic disease exactly corresponding to the sites of extensive degenerative disc change would be peculiar.  March 7, 2019--new patient to get established.  She has complaints of approxi   • Chronic bilateral thoracic back pain 2/26/2019 March 13, 2019--Limited bone scan.  This reveals scintigraphic activity in the spine and at the right shoulder corresponds to change seen on recent x-rays and is best explained by degenerative change.  Isolated metastatic disease exactly corresponding to the sites of extensive degenerative disc change would be peculiar.  March 1, 2019--x-ray of the lumbar and thoracic spine reveals mild anterior l   • Chronic insomnia 11/4/2014   • Generalized anxiety disorder 5/19/2013   • History of Bell's palsy 5/21/2013    Remote history of Bell's palsy.  Patient does not remember which side of the face.   • Hyperlipidemia    • Impaired fasting glucose    • Major depression    • Menopausal state 3/7/2019   • Peripheral neuropathy, idiopathic 3/7/2019    Characterized by decreased sensation and paresthesias in both lower extremities described as a burning sensation.  Extends up to the knees.  Reportedly had been evaluated with nerve conduction study in the past.   • Primary hypothyroidism 5/19/2013   • Primary osteoarthritis involving multiple  joints 4/22/2013   • Vitamin D deficiency 2/26/2019         Past Surgical History:   Procedure Laterality Date   • BILATERAL BREAST REDUCTION  Early 2000    Early 2000--bilateral breast reduction   • CHOLECYSTECTOMY  1960s    1960s--open cholecystectomy   • COLONOSCOPY  2012 2012--reportedly normal colonoscopy   • INCONTINENCE SURGERY  2012    Approximately 2012--implantation of questionable bladder stimulator right sacral area for urinary incontinence.  Details not known.   • REPLACEMENT TOTAL KNEE BILATERAL Left 2010; 2011 2010-2011--bilateral total knee replacement   • SUBTOTAL HYSTERECTOMY  Remote    Remote partial hysterectomy.  Ovaries intact.   • TOTAL SHOULDER REPLACEMENT Left 2013 2013--left total shoulder replacement.         Allergies   Allergen Reactions   • Morphine And Related    • Other Other (See Comments)     REJECTED DACRON SUTURES IN THE PAST AND INCISION CAME APART           Current Outpatient Medications:   •  Calcium Carb-Cholecalciferol (CALCIUM + D3 PO), Take 1 tablet by mouth daily., Disp: , Rfl:   •  Cholecalciferol (VITAMIN D3) 2000 UNITS tablet, Take 1 capsule by mouth daily., Disp: , Rfl:   •  ezetimibe (ZETIA) 10 MG tablet, TAKE ONE TABLET BY MOUTH DAILY, Disp: 90 tablet, Rfl: 1  •  hydroCHLOROthiazide (HYDRODIURIL) 12.5 MG tablet, Take 1 p.o. every morning for high blood pressure, Disp: 90 tablet, Rfl: 1  •  levothyroxine (SYNTHROID, LEVOTHROID) 125 MCG tablet, Take 1 tablet by mouth Daily., Disp: 90 tablet, Rfl: 1  •  LORazepam (ATIVAN) 1 MG tablet, TAKE ONE TABLET BY MOUTH TWICE A DAY AS NEEDED FOR ANXIETY, Disp: 60 tablet, Rfl: 1  •  Multiple Vitamin (MULTIVITAMIN) tablet, Take 1 tablet by mouth daily., Disp: , Rfl:   •  traZODone (DESYREL) 150 MG tablet, Take 1 tablet by mouth Every Night., Disp: 90 tablet, Rfl: 0  •  DULoxetine (CYMBALTA) 60 MG capsule, TAKE ONE CAPSULE BY MOUTH DAILY AS DIRECTED, Disp: 30 capsule, Rfl: 2  •  losartan (COZAAR) 50 MG tablet, TAKE ONE  "TABLET BY MOUTH DAILY, Disp: 30 tablet, Rfl: 6  •  simvastatin (ZOCOR) 20 MG tablet, Take 1 p.o. daily for high cholesterol, Disp: 90 tablet, Rfl: 1      Family History   Problem Relation Age of Onset   • Alcohol abuse Father    • Cancer Other    • Diabetes Other         type II         Social History     Socioeconomic History   • Marital status:      Spouse name: Not on file   • Number of children: Not on file   • Years of education: Not on file   • Highest education level: Not on file   Social Needs   • Financial resource strain: Not hard at all   • Food insecurity:     Worry: Never true     Inability: Never true   • Transportation needs:     Medical: No     Non-medical: No   Tobacco Use   • Smoking status: Former Smoker     Last attempt to quit: 1998     Years since quittin.5   • Smokeless tobacco: Never Used   Substance and Sexual Activity   • Alcohol use: Yes     Alcohol/week: 1.0 standard drinks     Types: 1 Glasses of wine per week     Frequency: Monthly or less     Drinks per session: 1 or 2     Binge frequency: Never     Comment: current some day   • Drug use: No   • Sexual activity: Not Currently     Partners: Male   Lifestyle   • Physical activity:     Days per week: 0 days     Minutes per session: 0 min   • Stress: Only a little   Relationships   • Social connections:     Talks on phone: Patient refused     Gets together: Patient refused     Attends [employer] service: Patient refused     Active member of club or organization: Patient refused     Attends meetings of clubs or organizations: Patient refused     Relationship status: Patient refused         Vitals:    20 0949   BP: 136/80   BP Location: Right arm   Patient Position: Sitting   Cuff Size: Adult   Pulse: 80   SpO2: 98%   Weight: 95.7 kg (211 lb)   Height: 170.2 cm (67.01\")        Body mass index is 33.04 kg/m².      Physical Exam:    General: Alert and oriented x 3.  No acute distress.  Normal affect.  Obese.  HEENT: Pupils " equal, round, reactive to light; extraocular movements intact; sclerae nonicteric; pharynx, ear canals and TMs normal.  Neck: Without JVD, thyromegaly, bruit, or adenopathy.  Lungs: Clear to auscultation in all fields.  Heart: Regular rate and rhythm without murmur, rub, gallop, or click.  Abdomen: Soft, nontender, without hepatosplenomegaly or hernia.  Bowel sounds normal.  : Deferred.  Rectal: Deferred.  Extremities: Without clubbing, cyanosis, edema, or pulse deficit.  Neurologic: Intact without focal deficit.  Normal station and gait observed during ingress and egress from the examination room.  Skin: Without significant lesion.  Musculoskeletal: Unremarkable.    Lab/other results:    NMR reveals a total cholesterol of 176.  Triglycerides normal at 143.  LDL particle number elevated at 1208.  Small LDL particle number excellent 4 and 64.  HDL particle number excellent at 49.2.  CMP normal except glucose 113, serum sodium 134.  Hemoglobin A1c 6.7.  CBC normal.  CPK normal.  Hepatitis C antibody screening negative.  Vitamin D normal.  Thyroid function test normal.    Assessment/Plan:     Diagnosis Plan   1. Medicare annual wellness visit, subsequent     2. Impaired fasting glucose  Comprehensive Metabolic Panel    Hemoglobin A1c   3. Hyperlipidemia  CK    NMR LipoProfile   4. Benign essential hypertension  losartan (COZAAR) 50 MG tablet   5. Primary hypothyroidism  TSH    T4, Free    T3, Free   6. Primary osteoarthritis involving multiple joints     7. Situational mixed anxiety and depressive disorder     8. Generalized anxiety disorder     9. Major depression     10. Vitamin D deficiency  Vitamin D 25 Hydroxy   11. Peripheral neuropathy, idiopathic     12. Therapeutic drug monitoring  CBC (No Diff)   13. Menopausal state  DEXA Bone Density Axial     The subsequent Medicare wellness visit is documented on a separate note.    It appears the patient's impaired fasting glucose has evolved into mild overt diabetes  but I am reluctant to give her this diagnosis based on one hemoglobin A1c reading.  Her hemoglobin A1c just 3 months ago was 6.1.  Hyperlipidemia is under good control.  Her blood pressure appears to be controlled.  Thyroid is in therapeutic range.  She does have primary osteoarthritis of multiple joints and has chronic back pain as well.  This seems to be tolerable.  She has situational mixed anxiety and depressive disorder that has been previously well controlled.  She is seeing a therapist.  Her idiopathic peripheral neuropathy is terribly bothersome, but she has tried all the medications in the past including gabapentin with no help.  Vitamin D is in the normal range.    Plan is as follows: Patient instructed to start cutting Abilify in half for 1 month and then discontinuing it altogether.  This should help with her weight gain and hopefully help resolve her blood sugar issue.  Also recommend she restart [facility].  I will have her follow-up in about 4 months, towards the end of November 1 part of December to reassess the situation with lab prior.    Procedures

## 2020-07-06 NOTE — PROGRESS NOTES
The ABCs of the Annual Wellness Visit  Subsequent Medicare Wellness Visit    No chief complaint on file.      Subjective   History of Present Illness:  Claudette L McManus is a 75 y.o. female who presents for a Subsequent Medicare Wellness Visit.    HEALTH RISK ASSESSMENT    Recent Hospitalizations:  No hospitalization(s) within the last year.    Current Medical Providers:  Patient Care Team:  Lito Moore MD as PCP - General (Internal Medicine)  Aldo Mcqueen DPM as PCP - Claims Attributed    Smoking Status:  Social History     Tobacco Use   Smoking Status Former Smoker   • Last attempt to quit: 1998   • Years since quittin.5   Smokeless Tobacco Never Used       Alcohol Consumption:  Social History     Substance and Sexual Activity   Alcohol Use Yes   • Alcohol/week: 1.0 standard drinks   • Types: 1 Glasses of wine per week   • Frequency: Monthly or less   • Drinks per session: 1 or 2   • Binge frequency: Never    Comment: current some day       Depression Screen:   PHQ-2/PHQ-9 Depression Screening 2020   Little interest or pleasure in doing things 0   Feeling down, depressed, or hopeless 1   Trouble falling or staying asleep, or sleeping too much 3   Feeling tired or having little energy 3   Poor appetite or overeating 0   Feeling bad about yourself - or that you are a failure or have let yourself or your family down 1   Trouble concentrating on things, such as reading the newspaper or watching television 0   Moving or speaking so slowly that other people could have noticed. Or the opposite - being so fidgety or restless that you have been moving around a lot more than usual 0   Thoughts that you would be better off dead, or of hurting yourself in some way 0   Total Score 8   If you checked off any problems, how difficult have these problems made it for you to do your work, take care of things at home, or get along with other people? Not difficult at all       Fall Risk Screen:  NOAH Torrez  Risk Assessment was completed, and patient is at LOW risk for falls.Assessment completed on:7/6/2020    Health Habits and Functional and Cognitive Screening:  Functional & Cognitive Status 7/6/2020   Do you have difficulty preparing food and eating? No   Do you have difficulty bathing yourself, getting dressed or grooming yourself? No   Do you have difficulty using the toilet? No   Do you have difficulty moving around from place to place? No   Do you have trouble with steps or getting out of a bed or a chair? No   Current Diet Well Balanced Diet   Dental Exam Up to date   Eye Exam Up to date   Exercise (times per week) 0 times per week   Current Exercise Activities Include None   Do you need help using the phone?  No   Are you deaf or do you have serious difficulty hearing?  No   Do you need help with transportation? No   Do you need help shopping? No   Do you need help preparing meals?  No   Do you need help with housework?  No   Do you need help with laundry? No   Do you need help taking your medications? No   Do you need help managing money? No   Do you ever drive or ride in a car without wearing a seat belt? No   Have you felt unusual stress, anger or loneliness in the last month? No   Who do you live with? Spouse   If you need help, do you have trouble finding someone available to you? No   Have you been bothered in the last four weeks by sexual problems? -   Do you have difficulty concentrating, remembering or making decisions? No         Does the patient have evidence of cognitive impairment? No    Asprin use counseling:Does not need ASA (and currently is not on it)    Age-appropriate Screening Schedule:  Refer to the list below for future screening recommendations based on patient's age, sex and/or medical conditions. Orders for these recommended tests are listed in the plan section. The patient has been provided with a written plan.    Health Maintenance   Topic Date Due   • DXA SCAN  05/10/2018   •  MAMMOGRAM  08/01/2020 (Originally 10/13/2019)   • INFLUENZA VACCINE  08/01/2020   • LIPID PANEL  06/29/2021   • COLONOSCOPY  04/03/2022   • TDAP/TD VACCINES (2 - Td) 05/25/2026   • ZOSTER VACCINE  Discontinued          The following portions of the patient's history were reviewed and updated as appropriate: allergies, current medications, past family history, past medical history, past social history, past surgical history and problem list.    Outpatient Medications Prior to Visit   Medication Sig Dispense Refill   • Calcium Carb-Cholecalciferol (CALCIUM + D3 PO) Take 1 tablet by mouth daily.     • Cholecalciferol (VITAMIN D3) 2000 UNITS tablet Take 1 capsule by mouth daily.     • ezetimibe (ZETIA) 10 MG tablet TAKE ONE TABLET BY MOUTH DAILY 90 tablet 1   • hydroCHLOROthiazide (HYDRODIURIL) 12.5 MG tablet Take 1 p.o. every morning for high blood pressure 90 tablet 1   • levothyroxine (SYNTHROID, LEVOTHROID) 125 MCG tablet Take 1 tablet by mouth Daily. 90 tablet 1   • LORazepam (ATIVAN) 1 MG tablet TAKE ONE TABLET BY MOUTH TWICE A DAY AS NEEDED FOR ANXIETY 60 tablet 1   • Multiple Vitamin (MULTIVITAMIN) tablet Take 1 tablet by mouth daily.     • traZODone (DESYREL) 150 MG tablet Take 1 tablet by mouth Every Night. 90 tablet 0   • ARIPiprazole (ABILIFY) 5 MG tablet TAKE ONE TABLET BY MOUTH DAILY FOR MOOD STABILIZER 90 tablet 3   • DULoxetine (CYMBALTA) 60 MG capsule Take 1 p.o. daily as directed 90 capsule 3   • simvastatin (ZOCOR) 20 MG tablet Take 1 p.o. daily for high cholesterol 90 tablet 1   • busPIRone (BUSPAR) 5 MG tablet Take 1 tablet by mouth 3 (Three) Times a Day. 270 tablet 1   • losartan (COZAAR) 50 MG tablet TAKE ONE TABLET BY MOUTH DAILY 30 tablet 0     No facility-administered medications prior to visit.        Patient Active Problem List   Diagnosis   • Generalized anxiety disorder   • Primary osteoarthritis involving multiple joints   • Major depression   • Benign essential hypertension   •  "Hyperlipidemia   • Primary hypothyroidism   • Impaired fasting glucose   • Chronic insomnia   • Chronic bilateral low back pain without sciatica   • Chronic bilateral thoracic back pain   • Vitamin D deficiency   • Therapeutic drug monitoring   • Menopausal state   • Peripheral neuropathy, idiopathic   • Situational mixed anxiety and depressive disorder   • ACE-inhibitor cough   • Rotator cuff arthropathy of right shoulder       Advanced Care Planning:  ACP discussion was held with the patient during this visit. Patient has an advance directive (not in EMR), copy requested.    Review of Systems   Constitutional: Negative.    HENT: Negative.    Eyes: Negative.    Respiratory: Negative.    Cardiovascular: Negative.    Gastrointestinal: Negative.    Endocrine: Negative.    Genitourinary: Negative.    Musculoskeletal: Positive for arthralgias and back pain.   Skin: Negative.    Neurological: Negative.         Painful peripheral neuropathy in the legs   Hematological: Negative.    Psychiatric/Behavioral: Positive for dysphoric mood. The patient is nervous/anxious.        Compared to one year ago, the patient feels her physical health is worse.  Compared to one year ago, the patient feels her mental health is better.    Reviewed chart for potential of high risk medication in the elderly: yes  Reviewed chart for potential of harmful drug interactions in the elderly:yes    Objective         Vitals:    07/06/20 0949   BP: 136/80   BP Location: Right arm   Patient Position: Sitting   Cuff Size: Adult   Pulse: 80   SpO2: 98%   Weight: 95.7 kg (211 lb)   Height: 170.2 cm (67.01\")       Body mass index is 33.04 kg/m².  Discussed the patient's BMI with her. The BMI is above average; BMI management plan is completed.    Physical Exam     General: Alert and oriented x 3.  No acute distress.  Normal affect.  Obese.  HEENT: Pupils equal, round, reactive to light; extraocular movements intact; sclerae nonicteric; pharynx, ear canals " and TMs normal.  Neck: Without JVD, thyromegaly, bruit, or adenopathy.  Lungs: Clear to auscultation in all fields.  Heart: Regular rate and rhythm without murmur, rub, gallop, or click.  Abdomen: Soft, nontender, without hepatosplenomegaly or hernia.  Bowel sounds normal.  : Deferred.  Rectal: Deferred.  Extremities: Without clubbing, cyanosis, edema, or pulse deficit.  Neurologic: Intact without focal deficit.  Normal station and gait observed during ingress and egress from the examination room.  Skin: Without significant lesion.  Musculoskeletal: Unremarkable.    Lab Results   Component Value Date    CHLPL 176 06/29/2020    TRIG 143 06/29/2020    HGBA1C 6.70 (H) 06/29/2020        Assessment/Plan   Medicare Risks and Personalized Health Plan  CMS Preventative Services Quick Reference  Advance Directive Discussion  Diabetic Lab Screening   Inactivity/Sedentary  Obesity/Overweight   Osteoprorosis Risk    The above risks/problems have been discussed with the patient.  Pertinent information has been shared with the patient in the After Visit Summary.  Follow up plans and orders are seen below in the Assessment/Plan Section.    Diagnoses and all orders for this visit:    1. Medicare annual wellness visit, subsequent (Primary)    2. Impaired fasting glucose  -     Comprehensive Metabolic Panel; Future  -     Hemoglobin A1c; Future    3. Hyperlipidemia  -     CK; Future  -     NMR LipoProfile; Future    4. Benign essential hypertension  -     losartan (COZAAR) 50 MG tablet; TAKE ONE TABLET BY MOUTH DAILY  Dispense: 30 tablet; Refill: 6    5. Primary hypothyroidism  -     TSH; Future  -     T4, Free; Future  -     T3, Free; Future    6. Primary osteoarthritis involving multiple joints    7. Situational mixed anxiety and depressive disorder    8. Generalized anxiety disorder    9. Major depression    10. Vitamin D deficiency  -     Vitamin D 25 Hydroxy; Future    11. Peripheral neuropathy, idiopathic    12. Therapeutic  drug monitoring  -     CBC (No Diff); Future      Follow Up:  No follow-ups on file.     An After Visit Summary and PPPS were given to the patient.

## 2020-07-26 DIAGNOSIS — G47.09 OTHER INSOMNIA: ICD-10-CM

## 2020-07-27 RX ORDER — TRAZODONE HYDROCHLORIDE 150 MG/1
TABLET ORAL
Qty: 90 TABLET | Refills: 1 | Status: SHIPPED | OUTPATIENT
Start: 2020-07-27 | End: 2020-12-28

## 2020-08-04 DIAGNOSIS — E03.9 HYPOTHYROIDISM, ACQUIRED: ICD-10-CM

## 2020-08-04 RX ORDER — LEVOTHYROXINE SODIUM 0.12 MG/1
TABLET ORAL
Qty: 90 TABLET | Refills: 0 | Status: SHIPPED | OUTPATIENT
Start: 2020-08-04 | End: 2020-09-15

## 2020-08-13 DIAGNOSIS — F43.23 SITUATIONAL MIXED ANXIETY AND DEPRESSIVE DISORDER: ICD-10-CM

## 2020-08-14 RX ORDER — LORAZEPAM 1 MG/1
TABLET ORAL
Qty: 60 TABLET | Refills: 5 | Status: SHIPPED | OUTPATIENT
Start: 2020-08-14 | End: 2020-11-30 | Stop reason: SDUPTHER

## 2020-08-19 ENCOUNTER — HOSPITAL ENCOUNTER (OUTPATIENT)
Dept: BONE DENSITY | Facility: HOSPITAL | Age: 75
Discharge: HOME | End: 2020-08-19
Admitting: INTERNAL MEDICINE

## 2020-08-19 PROCEDURE — 77080 DXA BONE DENSITY AXIAL: CPT

## 2020-09-15 DIAGNOSIS — F43.23 SITUATIONAL MIXED ANXIETY AND DEPRESSIVE DISORDER: ICD-10-CM

## 2020-09-15 DIAGNOSIS — E03.9 HYPOTHYROIDISM, ACQUIRED: ICD-10-CM

## 2020-09-15 DIAGNOSIS — F33.41 RECURRENT MAJOR DEPRESSIVE DISORDER, IN PARTIAL REMISSION (HCC): Chronic | ICD-10-CM

## 2020-09-15 RX ORDER — ARIPIPRAZOLE 5 MG/1
TABLET ORAL
Qty: 72 TABLET | Refills: 2 | Status: SHIPPED | OUTPATIENT
Start: 2020-09-15 | End: 2020-09-21 | Stop reason: SDUPTHER

## 2020-09-15 RX ORDER — LEVOTHYROXINE SODIUM 0.12 MG/1
TABLET ORAL
Qty: 72 TABLET | Refills: 5 | Status: SHIPPED | OUTPATIENT
Start: 2020-09-15 | End: 2021-08-18

## 2020-09-15 RX ORDER — DULOXETIN HYDROCHLORIDE 60 MG/1
CAPSULE, DELAYED RELEASE ORAL
Qty: 30 CAPSULE | Refills: 5 | Status: SHIPPED | OUTPATIENT
Start: 2020-09-15 | End: 2021-01-26

## 2020-09-21 DIAGNOSIS — F43.23 SITUATIONAL MIXED ANXIETY AND DEPRESSIVE DISORDER: ICD-10-CM

## 2020-09-21 RX ORDER — ARIPIPRAZOLE 5 MG/1
TABLET ORAL
Qty: 30 TABLET | Refills: 2 | Status: SHIPPED | OUTPATIENT
Start: 2020-09-21 | End: 2020-11-30

## 2020-09-25 ENCOUNTER — CLINICAL SUPPORT (OUTPATIENT)
Dept: INTERNAL MEDICINE | Facility: CLINIC | Age: 75
End: 2020-09-25

## 2020-09-25 DIAGNOSIS — Z23 NEED FOR INFLUENZA VACCINATION: Primary | ICD-10-CM

## 2020-09-25 PROCEDURE — G0008 ADMIN INFLUENZA VIRUS VAC: HCPCS | Performed by: INTERNAL MEDICINE

## 2020-09-25 PROCEDURE — 90694 VACC AIIV4 NO PRSRV 0.5ML IM: CPT | Performed by: INTERNAL MEDICINE

## 2020-10-20 ENCOUNTER — TELEPHONE (OUTPATIENT)
Dept: INTERNAL MEDICINE | Facility: CLINIC | Age: 75
End: 2020-10-20

## 2020-10-20 NOTE — TELEPHONE ENCOUNTER
PT CALLED C/O CHILLS, FEELING HOT SOMETIMES, LIKE HOT FLASHES, DIARRHEA  PLEASE ADVISE    [phone]

## 2020-10-20 NOTE — TELEPHONE ENCOUNTER
She must go to the urgent care or the emergency room.  We cannot see patients with potential fevers

## 2020-10-27 DIAGNOSIS — F41.1 GENERALIZED ANXIETY DISORDER: Primary | Chronic | ICD-10-CM

## 2020-10-27 DIAGNOSIS — F33.41 RECURRENT MAJOR DEPRESSIVE DISORDER, IN PARTIAL REMISSION (HCC): Chronic | ICD-10-CM

## 2020-11-04 DIAGNOSIS — E03.9 PRIMARY HYPOTHYROIDISM: Chronic | ICD-10-CM

## 2020-11-04 DIAGNOSIS — E55.9 VITAMIN D DEFICIENCY: Chronic | ICD-10-CM

## 2020-11-04 DIAGNOSIS — Z51.81 THERAPEUTIC DRUG MONITORING: ICD-10-CM

## 2020-11-04 DIAGNOSIS — R73.01 IMPAIRED FASTING GLUCOSE: Chronic | ICD-10-CM

## 2020-11-04 DIAGNOSIS — E78.2 MIXED HYPERLIPIDEMIA: Chronic | ICD-10-CM

## 2020-11-17 DIAGNOSIS — E78.2 MIXED HYPERLIPIDEMIA: ICD-10-CM

## 2020-11-23 ENCOUNTER — LAB (OUTPATIENT)
Dept: LAB | Facility: HOSPITAL | Age: 75
End: 2020-11-23

## 2020-11-23 LAB
25(OH)D3 SERPL-MCNC: 46.6 NG/ML (ref 30–100)
ALBUMIN SERPL-MCNC: 4.6 G/DL (ref 3.5–5.2)
ALBUMIN/GLOB SERPL: 1.9 G/DL
ALP SERPL-CCNC: 63 U/L (ref 39–117)
ALT SERPL W P-5'-P-CCNC: 25 U/L (ref 1–33)
ANION GAP SERPL CALCULATED.3IONS-SCNC: 8.9 MMOL/L (ref 5–15)
AST SERPL-CCNC: 28 U/L (ref 1–32)
BILIRUB SERPL-MCNC: 0.4 MG/DL (ref 0–1.2)
BUN SERPL-MCNC: 12 MG/DL (ref 8–23)
BUN/CREAT SERPL: 14.5 (ref 7–25)
CALCIUM SPEC-SCNC: 10.3 MG/DL (ref 8.6–10.5)
CHLORIDE SERPL-SCNC: 96 MMOL/L (ref 98–107)
CK SERPL-CCNC: 94 U/L (ref 20–180)
CO2 SERPL-SCNC: 28.1 MMOL/L (ref 22–29)
CREAT SERPL-MCNC: 0.83 MG/DL (ref 0.57–1)
DEPRECATED RDW RBC AUTO: 43.3 FL (ref 37–54)
ERYTHROCYTE [DISTWIDTH] IN BLOOD BY AUTOMATED COUNT: 12.6 % (ref 12.3–15.4)
GFR SERPL CREATININE-BSD FRML MDRD: 67 ML/MIN/1.73
GLOBULIN UR ELPH-MCNC: 2.4 GM/DL
GLUCOSE SERPL-MCNC: 121 MG/DL (ref 65–99)
HBA1C MFR BLD: 6.7 % (ref 4.8–5.6)
HCT VFR BLD AUTO: 40.1 % (ref 34–46.6)
HGB BLD-MCNC: 13.5 G/DL (ref 12–15.9)
MCH RBC QN AUTO: 31.8 PG (ref 26.6–33)
MCHC RBC AUTO-ENTMCNC: 33.7 G/DL (ref 31.5–35.7)
MCV RBC AUTO: 94.6 FL (ref 79–97)
PLATELET # BLD AUTO: 271 10*3/MM3 (ref 140–450)
PMV BLD AUTO: 9.9 FL (ref 6–12)
POTASSIUM SERPL-SCNC: 4.7 MMOL/L (ref 3.5–5.2)
PROT SERPL-MCNC: 7 G/DL (ref 6–8.5)
RBC # BLD AUTO: 4.24 10*6/MM3 (ref 3.77–5.28)
SODIUM SERPL-SCNC: 133 MMOL/L (ref 136–145)
T3FREE SERPL-MCNC: 2.6 PG/ML (ref 2–4.4)
T4 FREE SERPL-MCNC: 1.57 NG/DL (ref 0.93–1.7)
TSH SERPL DL<=0.05 MIU/L-ACNC: 0.06 UIU/ML (ref 0.27–4.2)
WBC # BLD AUTO: 6.07 10*3/MM3 (ref 3.4–10.8)

## 2020-11-23 PROCEDURE — 85027 COMPLETE CBC AUTOMATED: CPT | Performed by: INTERNAL MEDICINE

## 2020-11-23 PROCEDURE — 82306 VITAMIN D 25 HYDROXY: CPT | Performed by: INTERNAL MEDICINE

## 2020-11-23 PROCEDURE — 84481 FREE ASSAY (FT-3): CPT | Performed by: INTERNAL MEDICINE

## 2020-11-23 PROCEDURE — 83036 HEMOGLOBIN GLYCOSYLATED A1C: CPT | Performed by: INTERNAL MEDICINE

## 2020-11-23 PROCEDURE — 84443 ASSAY THYROID STIM HORMONE: CPT | Performed by: INTERNAL MEDICINE

## 2020-11-23 PROCEDURE — 80061 LIPID PANEL: CPT | Performed by: INTERNAL MEDICINE

## 2020-11-23 PROCEDURE — 80053 COMPREHEN METABOLIC PANEL: CPT | Performed by: INTERNAL MEDICINE

## 2020-11-23 PROCEDURE — 84439 ASSAY OF FREE THYROXINE: CPT | Performed by: INTERNAL MEDICINE

## 2020-11-23 PROCEDURE — 36415 COLL VENOUS BLD VENIPUNCTURE: CPT

## 2020-11-23 PROCEDURE — 82550 ASSAY OF CK (CPK): CPT | Performed by: INTERNAL MEDICINE

## 2020-11-23 PROCEDURE — 83704 LIPOPROTEIN BLD QUAN PART: CPT | Performed by: INTERNAL MEDICINE

## 2020-11-26 LAB
CHOLEST SERPL-MCNC: 167 MG/DL (ref 100–199)
HDL SERPL-SCNC: 38.9 UMOL/L
HDLC SERPL-MCNC: 55 MG/DL
LDL SERPL QN: 20.8 NM
LDL SERPL-SCNC: 1163 NMOL/L
LDL SMALL SERPL-SCNC: 434 NMOL/L
LDLC SERPL CALC-MCNC: 81 MG/DL (ref 0–99)
TRIGL SERPL-MCNC: 187 MG/DL (ref 0–149)

## 2020-11-29 DIAGNOSIS — I10 BENIGN ESSENTIAL HYPERTENSION: Chronic | ICD-10-CM

## 2020-11-30 ENCOUNTER — OFFICE VISIT (OUTPATIENT)
Dept: INTERNAL MEDICINE | Facility: CLINIC | Age: 75
End: 2020-11-30

## 2020-11-30 VITALS
HEART RATE: 88 BPM | WEIGHT: 200.6 LBS | BODY MASS INDEX: 31.48 KG/M2 | DIASTOLIC BLOOD PRESSURE: 80 MMHG | OXYGEN SATURATION: 97 % | SYSTOLIC BLOOD PRESSURE: 150 MMHG | HEIGHT: 67 IN

## 2020-11-30 DIAGNOSIS — G60.9 PERIPHERAL NEUROPATHY, IDIOPATHIC: Chronic | ICD-10-CM

## 2020-11-30 DIAGNOSIS — E55.9 VITAMIN D DEFICIENCY: Chronic | ICD-10-CM

## 2020-11-30 DIAGNOSIS — E11.40 TYPE 2 DIABETES MELLITUS WITH DIABETIC NEUROPATHY, WITHOUT LONG-TERM CURRENT USE OF INSULIN (HCC): Primary | ICD-10-CM

## 2020-11-30 DIAGNOSIS — G89.29 CHRONIC RIGHT EAR PAIN: ICD-10-CM

## 2020-11-30 DIAGNOSIS — Z12.31 BREAST CANCER SCREENING BY MAMMOGRAM: ICD-10-CM

## 2020-11-30 DIAGNOSIS — F41.1 GENERALIZED ANXIETY DISORDER: Chronic | ICD-10-CM

## 2020-11-30 DIAGNOSIS — F43.23 SITUATIONAL MIXED ANXIETY AND DEPRESSIVE DISORDER: ICD-10-CM

## 2020-11-30 DIAGNOSIS — E78.2 MIXED HYPERLIPIDEMIA: Chronic | ICD-10-CM

## 2020-11-30 DIAGNOSIS — I10 BENIGN ESSENTIAL HYPERTENSION: Chronic | ICD-10-CM

## 2020-11-30 DIAGNOSIS — E03.9 PRIMARY HYPOTHYROIDISM: Chronic | ICD-10-CM

## 2020-11-30 DIAGNOSIS — F33.41 RECURRENT MAJOR DEPRESSIVE DISORDER, IN PARTIAL REMISSION (HCC): Chronic | ICD-10-CM

## 2020-11-30 DIAGNOSIS — H92.01 CHRONIC RIGHT EAR PAIN: ICD-10-CM

## 2020-11-30 PROBLEM — Z23 NEED FOR INFLUENZA VACCINATION: Status: RESOLVED | Noted: 2020-09-25 | Resolved: 2020-11-30

## 2020-11-30 PROCEDURE — 99214 OFFICE O/P EST MOD 30 MIN: CPT | Performed by: INTERNAL MEDICINE

## 2020-11-30 RX ORDER — LORAZEPAM 1 MG/1
1 TABLET ORAL 2 TIMES DAILY PRN
Qty: 60 TABLET | Refills: 5 | Status: SHIPPED | OUTPATIENT
Start: 2020-11-30 | End: 2021-08-03

## 2020-11-30 RX ORDER — SIMVASTATIN 20 MG
TABLET ORAL
Qty: 90 TABLET | Refills: 1 | Status: SHIPPED | OUTPATIENT
Start: 2020-11-30 | End: 2021-05-11

## 2020-11-30 RX ORDER — LOSARTAN POTASSIUM 50 MG/1
TABLET ORAL
Qty: 90 TABLET | Refills: 3 | Status: SHIPPED | OUTPATIENT
Start: 2020-11-30 | End: 2021-04-12

## 2020-11-30 RX ORDER — HYDROCHLOROTHIAZIDE 12.5 MG/1
TABLET ORAL
Qty: 90 TABLET | Refills: 1 | Status: SHIPPED | OUTPATIENT
Start: 2020-11-30 | End: 2021-05-21

## 2020-11-30 NOTE — PROGRESS NOTES
11/30/2020    Patient Information  Claudette L McManus                                                                                          [id number]  [place] [address]      1945  [unfilled]  There is no work phone number on file.    Chief Complaint:     Follow-up lab work in order to monitor chronic medical issues listed in history of present illness.  No new acute complaints.    History of Present Illness:    Patient with a history of impaired fasting glucose, hyperlipidemia, hypothyroidism, hypertension, peripheral neuropathy, vitamin D deficiency, depression and anxiety/major depression and generalized anxiety.  She presents today for follow-up with lab prior in order to monitor chronic medical issues.  When her medications were reviewed, it became apparent that she has not been taking losartan.  This is why her blood pressure is elevated.    Review of Systems   Constitution: Negative.   HENT: Negative.    Eyes: Negative.    Cardiovascular: Negative.    Respiratory: Negative.    Endocrine: Negative.    Hematologic/Lymphatic: Negative.    Skin: Negative.    Musculoskeletal: Positive for arthritis and back pain.   Gastrointestinal: Negative.    Genitourinary: Negative.    Neurological: Negative.    Psychiatric/Behavioral: Positive for depression. The patient is nervous/anxious.    Allergic/Immunologic: Negative.        Active Problems:    Patient Active Problem List   Diagnosis   • Generalized anxiety disorder   • Primary osteoarthritis involving multiple joints   • Major depression   • Benign essential hypertension   • Hyperlipidemia   • Primary hypothyroidism   • Type 2 diabetes mellitus with diabetic neuropathy, without long-term current use of insulin (CMS/[facility])   • Chronic insomnia   • Chronic bilateral low back pain without sciatica   • Chronic bilateral thoracic back pain   • Vitamin D deficiency   • Therapeutic drug monitoring   • Menopausal state   • Peripheral  neuropathy, idiopathic   • Situational mixed anxiety and depressive disorder   • ACE-inhibitor cough   • Rotator cuff arthropathy of right shoulder   • Chronic right ear pain         Past Medical History:   Diagnosis Date   • Benign essential hypertension    • Chronic bilateral low back pain without sciatica 8/16/2013 March 13, 2019--Limited bone scan.  This reveals scintigraphic activity in the spine and at the right shoulder corresponds to change seen on recent x-rays and is best explained by degenerative change.  Isolated metastatic disease exactly corresponding to the sites of extensive degenerative disc change would be peculiar.  March 7, 2019--new patient to get established.  She has complaints of approxi   • Chronic bilateral thoracic back pain 2/26/2019 March 13, 2019--Limited bone scan.  This reveals scintigraphic activity in the spine and at the right shoulder corresponds to change seen on recent x-rays and is best explained by degenerative change.  Isolated metastatic disease exactly corresponding to the sites of extensive degenerative disc change would be peculiar.  March 1, 2019--x-ray of the lumbar and thoracic spine reveals mild anterior l   • Chronic insomnia 11/4/2014   • Generalized anxiety disorder 5/19/2013   • History of Bell's palsy 5/21/2013    Remote history of Bell's palsy.  Patient does not remember which side of the face.   • Hyperlipidemia    • Impaired fasting glucose    • Major depression    • Menopausal state 3/7/2019   • Peripheral neuropathy, idiopathic 3/7/2019    Characterized by decreased sensation and paresthesias in both lower extremities described as a burning sensation.  Extends up to the knees.  Reportedly had been evaluated with nerve conduction study in the past.   • Primary hypothyroidism 5/19/2013   • Primary osteoarthritis involving multiple joints 4/22/2013   • Type 2 diabetes mellitus with diabetic neuropathy, without long-term current use of insulin (CMS/[facility])     • Vitamin D deficiency 2/26/2019         Past Surgical History:   Procedure Laterality Date   • BILATERAL BREAST REDUCTION  Early 2000    Early 2000--bilateral breast reduction   • CHOLECYSTECTOMY  1960s    1960s--open cholecystectomy   • COLONOSCOPY  2012 2012--reportedly normal colonoscopy   • INCONTINENCE SURGERY  2012    Approximately 2012--implantation of questionable bladder stimulator right sacral area for urinary incontinence.  Details not known.   • REPLACEMENT TOTAL KNEE BILATERAL Left 2010; 2011    [id number]--bilateral total knee replacement   • SUBTOTAL HYSTERECTOMY  Remote    Remote partial hysterectomy.  Ovaries intact.   • TOTAL SHOULDER REPLACEMENT Left 2013 2013--left total shoulder replacement.         Allergies   Allergen Reactions   • Morphine And Related    • Other Other (See Comments)     REJECTED DACRON SUTURES IN THE PAST AND INCISION CAME APART           Current Outpatient Medications:   •  Calcium Carb-Cholecalciferol (CALCIUM + D3 PO), Take 1 tablet by mouth daily., Disp: , Rfl:   •  Cholecalciferol (VITAMIN D3) 2000 UNITS tablet, Take 1 capsule by mouth daily., Disp: , Rfl:   •  DULoxetine (CYMBALTA) 60 MG capsule, TAKE ONE CAPSULE BY MOUTH DAILY AS DIRECTED, Disp: 30 capsule, Rfl: 5  •  ezetimibe (ZETIA) 10 MG tablet, TAKE ONE TABLET BY MOUTH DAILY, Disp: 90 tablet, Rfl: 1  •  hydroCHLOROthiazide (HYDRODIURIL) 12.5 MG tablet, TAKE ONE TABLET BY MOUTH DAILY, Disp: 90 tablet, Rfl: 1  •  levothyroxine (SYNTHROID, LEVOTHROID) 125 MCG tablet, TAKE ONE TABLET BY MOUTH DAILY, Disp: 72 tablet, Rfl: 5  •  LORazepam (ATIVAN) 1 MG tablet, Take 1 tablet by mouth 2 (Two) Times a Day As Needed for Anxiety., Disp: 60 tablet, Rfl: 5  •  Multiple Vitamin (MULTIVITAMIN) tablet, Take 1 tablet by mouth daily., Disp: , Rfl:   •  traZODone (DESYREL) 150 MG tablet, TAKE ONE TABLET BY MOUTH ONCE NIGHTLY, Disp: 90 tablet, Rfl: 1  •  losartan (COZAAR) 50 MG tablet, Take 1 p.o. daily for high blood  "pressure., Disp: 90 tablet, Rfl: 3  •  simvastatin (ZOCOR) 20 MG tablet, Take 1 p.o. daily for high cholesterol, Disp: 90 tablet, Rfl: 1      Family History   Problem Relation Age of Onset   • Alcohol abuse Father    • Cancer Other    • Diabetes Other         type II         Social History     Socioeconomic History   • Marital status:      Spouse name: Not on file   • Number of children: Not on file   • Years of education: Not on file   • Highest education level: Not on file   Social Needs   • Financial resource strain: Not hard at all   • Food insecurity     Worry: Never true     Inability: Never true   • Transportation needs     Medical: No     Non-medical: No   Tobacco Use   • Smoking status: Former Smoker     Quit date: 1998     Years since quittin.9   • Smokeless tobacco: Never Used   Substance and Sexual Activity   • Alcohol use: Yes     Alcohol/week: 1.0 standard drinks     Types: 1 Glasses of wine per week     Frequency: Monthly or less     Drinks per session: 1 or 2     Binge frequency: Never     Comment: current some day   • Drug use: No   • Sexual activity: Not Currently     Partners: Male   Lifestyle   • Physical activity     Days per week: 0 days     Minutes per session: 0 min   • Stress: Only a little   Relationships   • Social connections     Talks on phone: Patient refused     Gets together: Patient refused     Attends Jainism service: Patient refused     Active member of club or organization: Patient refused     Attends meetings of clubs or organizations: Patient refused     Relationship status: Patient refused         Vitals:    20 1111   BP: 150/80   BP Location: Left arm   Pulse: 88   SpO2: 97%   Weight: 91 kg (200 lb 9.6 oz)   Height: 170.2 cm (67.01\")        Body mass index is 31.41 kg/m².      Physical Exam:    General: Alert and oriented x 3.  No acute distress.  Normal affect.  Obese.  HEENT: Pupils equal, round, reactive to light; extraocular movements intact; " sclerae nonicteric; pharynx, ear canals and TMs normal.  Neck: Without JVD, thyromegaly, bruit, or adenopathy.  Lungs: Clear to auscultation in all fields.  Heart: Regular rate and rhythm without murmur, rub, gallop, or click.  Abdomen: Soft, nontender, without hepatosplenomegaly or hernia.  Bowel sounds normal.  : Deferred.  Rectal: Deferred.  Extremities: Without clubbing, cyanosis, edema, or pulse deficit.  Neurologic: Intact without focal deficit.  Normal station and gait observed during ingress and egress from the examination room.  Skin: Without significant lesion.  Musculoskeletal: Unremarkable.    Lab/other results:    NMR reveals a total cholesterol of 167.  Triglycerides elevated mildly at 187.  LDL particle number mildly elevated 1163.  Small LDL particle number excellent at 434.  HDL particle number normal at 38.9.  CMP normal except glucose 121, sodium low at 133, chloride 96.  Hemoglobin A1c 6.7.  CPK normal.  CBC normal.  TSH is suppressed at 0.059 but free T3 and free T4 are normal.  Vitamin D normal.    Assessment/Plan:     Diagnosis Plan   1. Type 2 diabetes mellitus with diabetic neuropathy, without long-term current use of insulin (CMS/[facility])  Comprehensive Metabolic Panel    Hemoglobin A1c    Microalbumin / Creatinine Urine Ratio - Urine, Clean Catch   2. Hyperlipidemia  Comprehensive Metabolic Panel    CK    NMR LipoProfile   3. Primary hypothyroidism  TSH    T4, Free    T3, Free   4. Benign essential hypertension  losartan (COZAAR) 50 MG tablet   5. Peripheral neuropathy, idiopathic     6. Vitamin D deficiency     7. Situational mixed anxiety and depressive disorder  LORazepam (ATIVAN) 1 MG tablet   8. Major depression     9. Generalized anxiety disorder     10. Chronic right ear pain  Ambulatory Referral to ENT (Otolaryngology)   11. Breast cancer screening by mammogram  Mammo Screening Bilateral With CAD     Patient's impaired fasting glucose has evolved into mild overt diabetes.  Her  hemoglobin A1c is exactly the same as it was before and we will need to add that diagnosis.  She has hyperlipidemia which is under reasonable control.  Interestingly, her TSH is suppressed but her free T3 and free T4 are in the normal range.  I am suspecting laboratory error.  Her TSH was just fine 4 months ago.  She has idiopathic peripheral neuropathy and this may actually be due to her alcohol consumption and prolonged impaired fasting glucose/diabetes.  Vitamin D in normal range.  She has major depression and generalized anxiety as well as situational depression and anxiety which is being maintained with Ativan and Cymbalta.  This has definitely been helpful.  There is some concern on my part regarding dependence given her history of heavy alcohol consumption and we will closely monitor this.    Plan is as follows: We have added the diagnosis of type 2 diabetes.  I have strongly recommended patient lose weight with a low carbohydrate diet and also exercise.  I recommended she go get a diabetic eye exam.  I will have her follow-up in 4 months with lab prior to reassess the situation.  Medications refilled were necessary.  I explained to patient that she needs to be on low [place] and I sent a prescription for this into her pharmacy.    Addendum:November 30, 2020--patient has had at least a 2-week history of discomfort in her right ear but also has had a strange sensation in her throat.  The sensation seem to change when she opens her jaw.  Her exam is not revealing and I have referred her to ENT.  Mammogram ordered.    Procedures

## 2020-12-15 ENCOUNTER — TELEPHONE (OUTPATIENT)
Dept: INTERNAL MEDICINE | Facility: CLINIC | Age: 75
End: 2020-12-15

## 2020-12-15 NOTE — TELEPHONE ENCOUNTER
Pt  is concerned about her referral to psychiatry.  It was ordered 10/27 and still has not heard anything. He thinks that is to long to wait or not a good idea.  Can you try and expedite this and let mr Akira Sylvester know.

## 2020-12-26 DIAGNOSIS — G47.09 OTHER INSOMNIA: ICD-10-CM

## 2020-12-28 RX ORDER — TRAZODONE HYDROCHLORIDE 150 MG/1
TABLET ORAL
Qty: 30 TABLET | Refills: 3 | Status: SHIPPED | OUTPATIENT
Start: 2020-12-28 | End: 2021-02-19

## 2021-01-26 DIAGNOSIS — F33.41 RECURRENT MAJOR DEPRESSIVE DISORDER, IN PARTIAL REMISSION (HCC): Chronic | ICD-10-CM

## 2021-01-26 RX ORDER — DULOXETIN HYDROCHLORIDE 60 MG/1
CAPSULE, DELAYED RELEASE ORAL
Qty: 30 CAPSULE | Refills: 4 | Status: SHIPPED | OUTPATIENT
Start: 2021-01-26 | End: 2021-06-21

## 2021-02-03 ENCOUNTER — LAB REQUISITION (OUTPATIENT)
Dept: LAB | Facility: HOSPITAL | Age: 76
End: 2021-02-03

## 2021-02-03 DIAGNOSIS — Z00.00 ENCOUNTER FOR GENERAL ADULT MEDICAL EXAMINATION WITHOUT ABNORMAL FINDINGS: ICD-10-CM

## 2021-02-03 PROCEDURE — 88305 TISSUE EXAM BY PATHOLOGIST: CPT | Performed by: SPECIALIST

## 2021-02-04 LAB
CYTO UR: NORMAL
LAB AP CASE REPORT: NORMAL
LAB AP CLINICAL INFORMATION: NORMAL
PATH REPORT.FINAL DX SPEC: NORMAL
PATH REPORT.GROSS SPEC: NORMAL

## 2021-02-19 DIAGNOSIS — G47.09 OTHER INSOMNIA: ICD-10-CM

## 2021-02-19 RX ORDER — TRAZODONE HYDROCHLORIDE 150 MG/1
TABLET ORAL
Qty: 30 TABLET | Refills: 1 | Status: SHIPPED | OUTPATIENT
Start: 2021-02-19 | End: 2021-06-21

## 2021-03-09 DIAGNOSIS — Z23 IMMUNIZATION DUE: ICD-10-CM

## 2021-04-02 ENCOUNTER — LAB (OUTPATIENT)
Dept: LAB | Facility: HOSPITAL | Age: 76
End: 2021-04-02

## 2021-04-02 DIAGNOSIS — E11.40 TYPE 2 DIABETES MELLITUS WITH DIABETIC NEUROPATHY, WITHOUT LONG-TERM CURRENT USE OF INSULIN (HCC): ICD-10-CM

## 2021-04-02 DIAGNOSIS — E78.2 MIXED HYPERLIPIDEMIA: Chronic | ICD-10-CM

## 2021-04-02 DIAGNOSIS — E03.9 PRIMARY HYPOTHYROIDISM: Chronic | ICD-10-CM

## 2021-04-02 LAB
ALBUMIN SERPL-MCNC: 4.5 G/DL (ref 3.5–5.2)
ALBUMIN UR-MCNC: <1.2 MG/DL
ALBUMIN/GLOB SERPL: 1.6 G/DL
ALP SERPL-CCNC: 80 U/L (ref 39–117)
ALT SERPL W P-5'-P-CCNC: 23 U/L (ref 1–33)
ANION GAP SERPL CALCULATED.3IONS-SCNC: 14.1 MMOL/L (ref 5–15)
AST SERPL-CCNC: 27 U/L (ref 1–32)
BILIRUB SERPL-MCNC: 0.4 MG/DL (ref 0–1.2)
BUN SERPL-MCNC: 15 MG/DL (ref 8–23)
BUN/CREAT SERPL: 16.5 (ref 7–25)
CALCIUM SPEC-SCNC: 9 MG/DL (ref 8.6–10.5)
CHLORIDE SERPL-SCNC: 96 MMOL/L (ref 98–107)
CK SERPL-CCNC: 123 U/L (ref 20–180)
CO2 SERPL-SCNC: 22.9 MMOL/L (ref 22–29)
CREAT SERPL-MCNC: 0.91 MG/DL (ref 0.57–1)
CREAT UR-MCNC: 65.3 MG/DL
GFR SERPL CREATININE-BSD FRML MDRD: 60 ML/MIN/1.73
GLOBULIN UR ELPH-MCNC: 2.9 GM/DL
GLUCOSE SERPL-MCNC: 111 MG/DL (ref 65–99)
HBA1C MFR BLD: 6.5 % (ref 4.8–5.6)
MICROALBUMIN/CREAT UR: NORMAL MG/G{CREAT}
POTASSIUM SERPL-SCNC: 4.4 MMOL/L (ref 3.5–5.2)
PROT SERPL-MCNC: 7.4 G/DL (ref 6–8.5)
SODIUM SERPL-SCNC: 133 MMOL/L (ref 136–145)
T3FREE SERPL-MCNC: 2.15 PG/ML (ref 2–4.4)
T4 FREE SERPL-MCNC: 1.27 NG/DL (ref 0.93–1.7)
TSH SERPL DL<=0.05 MIU/L-ACNC: 1.6 UIU/ML (ref 0.27–4.2)

## 2021-04-02 PROCEDURE — 80061 LIPID PANEL: CPT

## 2021-04-02 PROCEDURE — 84439 ASSAY OF FREE THYROXINE: CPT

## 2021-04-02 PROCEDURE — 83704 LIPOPROTEIN BLD QUAN PART: CPT

## 2021-04-02 PROCEDURE — 82570 ASSAY OF URINE CREATININE: CPT

## 2021-04-02 PROCEDURE — 82043 UR ALBUMIN QUANTITATIVE: CPT

## 2021-04-02 PROCEDURE — 36415 COLL VENOUS BLD VENIPUNCTURE: CPT

## 2021-04-02 PROCEDURE — 84443 ASSAY THYROID STIM HORMONE: CPT

## 2021-04-02 PROCEDURE — 82550 ASSAY OF CK (CPK): CPT

## 2021-04-02 PROCEDURE — 83036 HEMOGLOBIN GLYCOSYLATED A1C: CPT

## 2021-04-02 PROCEDURE — 84481 FREE ASSAY (FT-3): CPT

## 2021-04-02 PROCEDURE — 80053 COMPREHEN METABOLIC PANEL: CPT

## 2021-04-04 LAB
CHOLEST SERPL-MCNC: 171 MG/DL (ref 100–199)
HDL SERPL-SCNC: 38.4 UMOL/L
HDLC SERPL-MCNC: 62 MG/DL
LDL SERPL QN: 21.3 NM
LDL SERPL-SCNC: 1246 NMOL/L
LDL SMALL SERPL-SCNC: 495 NMOL/L
LDLC SERPL CALC-MCNC: 84 MG/DL (ref 0–99)
TRIGL SERPL-MCNC: 148 MG/DL (ref 0–149)

## 2021-04-12 ENCOUNTER — OFFICE VISIT (OUTPATIENT)
Dept: INTERNAL MEDICINE | Facility: CLINIC | Age: 76
End: 2021-04-12

## 2021-04-12 VITALS
OXYGEN SATURATION: 99 % | SYSTOLIC BLOOD PRESSURE: 144 MMHG | HEART RATE: 83 BPM | HEIGHT: 67 IN | WEIGHT: 192 LBS | BODY MASS INDEX: 30.13 KG/M2 | DIASTOLIC BLOOD PRESSURE: 98 MMHG

## 2021-04-12 DIAGNOSIS — E03.9 PRIMARY HYPOTHYROIDISM: Chronic | ICD-10-CM

## 2021-04-12 DIAGNOSIS — E11.42 DIABETIC PERIPHERAL NEUROPATHY (HCC): Chronic | ICD-10-CM

## 2021-04-12 DIAGNOSIS — F43.23 SITUATIONAL MIXED ANXIETY AND DEPRESSIVE DISORDER: Chronic | ICD-10-CM

## 2021-04-12 DIAGNOSIS — Z51.81 THERAPEUTIC DRUG MONITORING: ICD-10-CM

## 2021-04-12 DIAGNOSIS — F51.04 CHRONIC INSOMNIA: Chronic | ICD-10-CM

## 2021-04-12 DIAGNOSIS — F33.41 RECURRENT MAJOR DEPRESSIVE DISORDER, IN PARTIAL REMISSION (HCC): Chronic | ICD-10-CM

## 2021-04-12 DIAGNOSIS — E11.40 TYPE 2 DIABETES MELLITUS WITH DIABETIC NEUROPATHY, WITHOUT LONG-TERM CURRENT USE OF INSULIN (HCC): Primary | Chronic | ICD-10-CM

## 2021-04-12 DIAGNOSIS — I10 BENIGN ESSENTIAL HYPERTENSION: Chronic | ICD-10-CM

## 2021-04-12 DIAGNOSIS — F41.1 GENERALIZED ANXIETY DISORDER: Chronic | ICD-10-CM

## 2021-04-12 DIAGNOSIS — E55.9 VITAMIN D DEFICIENCY: Chronic | ICD-10-CM

## 2021-04-12 DIAGNOSIS — E78.2 MIXED HYPERLIPIDEMIA: Chronic | ICD-10-CM

## 2021-04-12 PROBLEM — H92.01 CHRONIC RIGHT EAR PAIN: Status: RESOLVED | Noted: 2020-11-30 | Resolved: 2021-04-12

## 2021-04-12 PROBLEM — G89.29 CHRONIC RIGHT EAR PAIN: Status: RESOLVED | Noted: 2020-11-30 | Resolved: 2021-04-12

## 2021-04-12 PROCEDURE — 99214 OFFICE O/P EST MOD 30 MIN: CPT | Performed by: INTERNAL MEDICINE

## 2021-04-12 RX ORDER — ASCORBIC ACID 1000 MG
TABLET, EXTENDED RELEASE ORAL DAILY
COMMUNITY
End: 2022-06-02

## 2021-04-12 RX ORDER — B-COMPLEX WITH VITAMIN C
TABLET ORAL DAILY
COMMUNITY
End: 2022-03-08

## 2021-04-12 RX ORDER — VALSARTAN 320 MG/1
TABLET ORAL
Qty: 30 TABLET | Refills: 3 | Status: SHIPPED | OUTPATIENT
Start: 2021-04-12 | End: 2021-08-06

## 2021-04-12 NOTE — PROGRESS NOTES
04/12/2021    Patient Information  Claudette L McManus                                                                                          [id number]  [place] [address]      1945  [unfilled]  There is no work phone number on file.    Chief Complaint:     Follow-up blood work in order to monitor chronic medical issues listed in history of present illness.  No new acute complaints.    Patient also needs preoperative surgical clearance for right shoulder replacement next week.    History of Present Illness:    Patient with a history of type 2 diabetes, diabetic peripheral neuropathy, hyperlipidemia, hypothyroidism, hypertension, major depression and generalized anxiety, chronic insomnia, vitamin D deficiency.  She presents today for follow-up with lab prior in order to monitor chronic medical issues.  Her past medical history reviewed and updated were necessary including health maintenance parameters.  This reveals she needs a diabetic eye exam and I have encouraged her to do so.    Review of Systems   Constitutional: Negative.   HENT: Negative.    Eyes: Negative.    Cardiovascular: Negative.    Respiratory: Negative.    Endocrine: Negative.    Hematologic/Lymphatic: Negative.    Skin: Negative.    Musculoskeletal: Positive for arthritis, back pain and joint pain.   Gastrointestinal: Negative.    Genitourinary: Negative.    Neurological: Negative.    Psychiatric/Behavioral: Positive for depression. The patient has insomnia and is nervous/anxious.    Allergic/Immunologic: Negative.        Active Problems:    Patient Active Problem List   Diagnosis   • Generalized anxiety disorder   • Primary osteoarthritis involving multiple joints   • Major depression   • Benign essential hypertension   • Hyperlipidemia   • Primary hypothyroidism   • Type 2 diabetes mellitus with diabetic neuropathy, without long-term current use of insulin (CMS/[facility])   • Chronic insomnia   • Chronic bilateral low  back pain without sciatica   • Chronic bilateral thoracic back pain   • Vitamin D deficiency   • Therapeutic drug monitoring   • Menopausal state   • Diabetic peripheral neuropathy (CMS/HCC)   • Situational mixed anxiety and depressive disorder   • ACE-inhibitor cough   • Rotator cuff arthropathy of right shoulder         Past Medical History:   Diagnosis Date   • Benign essential hypertension    • Chronic bilateral low back pain without sciatica 8/16/2013 March 13, 2019--Limited bone scan.  This reveals scintigraphic activity in the spine and at the right shoulder corresponds to change seen on recent x-rays and is best explained by degenerative change.  Isolated metastatic disease exactly corresponding to the sites of extensive degenerative disc change would be peculiar.  March 7, 2019--new patient to get established.  She has complaints of approxi   • Chronic bilateral thoracic back pain 2/26/2019 March 13, 2019--Limited bone scan.  This reveals scintigraphic activity in the spine and at the right shoulder corresponds to change seen on recent x-rays and is best explained by degenerative change.  Isolated metastatic disease exactly corresponding to the sites of extensive degenerative disc change would be peculiar.  March 1, 2019--x-ray of the lumbar and thoracic spine reveals mild anterior l   • Chronic insomnia 11/4/2014   • Diabetic peripheral neuropathy (CMS/HCC) 3/7/2019    Characterized by decreased sensation and paresthesias in both lower extremities described as a burning sensation.  Extends up to the knees.  Reportedly had been evaluated with nerve conduction study in the past.   • Generalized anxiety disorder 5/19/2013   • History of Bell's palsy 5/21/2013    Remote history of Bell's palsy.  Patient does not remember which side of the face.   • Hyperlipidemia    • Impaired fasting glucose    • Major depression    • Menopausal state 3/7/2019   • Peripheral neuropathy, idiopathic 3/7/2019     Characterized by decreased sensation and paresthesias in both lower extremities described as a burning sensation.  Extends up to the knees.  Reportedly had been evaluated with nerve conduction study in the past.   • Primary hypothyroidism 5/19/2013   • Primary osteoarthritis involving multiple joints 4/22/2013   • Situational mixed anxiety and depressive disorder 8/21/2019 August 21, 2019--patient seen in follow-up after I called in a prescription for Ativan after the patient's  contacted me a few weeks ago regarding the sudden death of patient's daughter.  This was an apparent suicide and the patient found her daughter dead.  I will not go into details.  Patient reports the Lorazepam has been helpful but she is still quite distraught, nervous, and undergoing   • Type 2 diabetes mellitus with diabetic neuropathy, without long-term current use of insulin (CMS/[facility])    • Vitamin D deficiency 2/26/2019         Past Surgical History:   Procedure Laterality Date   • BILATERAL BREAST REDUCTION  Early 2000    Early 2000--bilateral breast reduction   • CHOLECYSTECTOMY  1960s    1960s--open cholecystectomy   • COLONOSCOPY  2012 2012--reportedly normal colonoscopy   • INCONTINENCE SURGERY  2012 Approximately 2012--implantation of questionable bladder stimulator right sacral area for urinary incontinence.  Details not known.   • REPLACEMENT TOTAL KNEE BILATERAL Left 2010; 2011 2010-2011--bilateral total knee replacement   • SUBTOTAL HYSTERECTOMY  Remote    Remote partial hysterectomy.  Ovaries intact.   • TOTAL SHOULDER REPLACEMENT Left 2013 2013--left total shoulder replacement.         Allergies   Allergen Reactions   • Morphine And Related    • Other Other (See Comments)     REJECTED DACRON SUTURES IN THE PAST AND INCISION CAME APART           Current Outpatient Medications:   •  Ascorbic Acid (Vitamin C ER) 1000 MG tablet controlled-release, Take  by mouth Daily., Disp: , Rfl:   •  Calcium  Carb-Cholecalciferol (CALCIUM + D3 PO), Take 1 tablet by mouth daily., Disp: , Rfl:   •  Cholecalciferol (VITAMIN D3) 2000 UNITS tablet, Take 1 capsule by mouth daily., Disp: , Rfl:   •  DULoxetine (CYMBALTA) 60 MG capsule, TAKE ONE CAPSULE BY MOUTH DAILY AS DIRECTED, Disp: 30 capsule, Rfl: 4  •  ezetimibe (ZETIA) 10 MG tablet, TAKE ONE TABLET BY MOUTH DAILY, Disp: 90 tablet, Rfl: 1  •  hydroCHLOROthiazide (HYDRODIURIL) 12.5 MG tablet, TAKE ONE TABLET BY MOUTH DAILY, Disp: 90 tablet, Rfl: 1  •  levothyroxine (SYNTHROID, LEVOTHROID) 125 MCG tablet, TAKE ONE TABLET BY MOUTH DAILY, Disp: 72 tablet, Rfl: 5  •  LORazepam (ATIVAN) 1 MG tablet, Take 1 tablet by mouth 2 (Two) Times a Day As Needed for Anxiety., Disp: 60 tablet, Rfl: 5  •  Multiple Vitamin (MULTIVITAMIN) tablet, Take 1 tablet by mouth daily., Disp: , Rfl:   •  simvastatin (ZOCOR) 20 MG tablet, Take 1 p.o. daily for high cholesterol, Disp: 90 tablet, Rfl: 1  •  traZODone (DESYREL) 150 MG tablet, TAKE ONE TABLET BY MOUTH ONCE NIGHTLY, Disp: 30 tablet, Rfl: 1  •  Zinc 100 MG tablet, Take  by mouth Daily., Disp: , Rfl:   •  valsartan (DIOVAN) 320 MG tablet, Take 1 p.o. every morning for high blood pressure, Disp: 30 tablet, Rfl: 3      Family History   Problem Relation Age of Onset   • Alcohol abuse Father    • Cancer Other    • Diabetes Other         type II         Social History     Socioeconomic History   • Marital status:      Spouse name: Not on file   • Number of children: Not on file   • Years of education: Not on file   • Highest education level: Not on file   Tobacco Use   • Smoking status: Former Smoker     Quit date: 1998     Years since quittin.2   • Smokeless tobacco: Never Used   Vaping Use   • Vaping Use: Never used   Substance and Sexual Activity   • Alcohol use: Yes     Alcohol/week: 1.0 standard drinks     Types: 1 Glasses of wine per week     Comment: current some day   • Drug use: No   • Sexual activity: Not Currently      "Partners: Male         Vitals:    04/12/21 1259   BP: 144/98   Pulse: 83   SpO2: 99%   Weight: 87.1 kg (192 lb)   Height: 170.2 cm (67.01\")        Body mass index is 30.06 kg/m².      Physical Exam:    General: Alert and oriented x 3.  No acute distress.  Normal affect.  Obese.  HEENT: Pupils equal, round, reactive to light; extraocular movements intact; sclerae nonicteric; pharynx, ear canals and TMs normal.  Neck: Without JVD, thyromegaly, bruit, or adenopathy.  Lungs: Clear to auscultation in all fields.  Heart: Regular rate and rhythm without murmur, rub, gallop, or click.  Abdomen: Soft, nontender, without hepatosplenomegaly or hernia.  Bowel sounds normal.  : Deferred.  Rectal: Deferred.  Extremities: Without clubbing, cyanosis, edema, or pulse deficit.  Neurologic: Intact without focal deficit.  Normal station and gait observed during ingress and egress from the examination room.  Skin: Without significant lesion.  Musculoskeletal: Unremarkable.    Lab/other results:    NMR reveals a total cholesterol of 171.  Triglycerides 148.  LDL particle number slightly elevated 1246.  Small LDL particle number excellent at 495.  HDL particle number good at 38.4.  CMP normal except glucose 111, sodium low at 133.  Hemoglobin A1c 6.5.  CPK normal.  Microalbumin/creatinine ratio was negative.  Thyroid function tests are normal.    Assessment/Plan:     Diagnosis Plan   1. Type 2 diabetes mellitus with diabetic neuropathy, without long-term current use of insulin (CMS/HCC)     2. Diabetic peripheral neuropathy (CMS/HCC)     3. Hyperlipidemia     4. Primary hypothyroidism     5. Benign essential hypertension  valsartan (DIOVAN) 320 MG tablet   6. Major depression     7. Situational mixed anxiety and depressive disorder     8. Generalized anxiety disorder     9. Chronic insomnia     10. Vitamin D deficiency     11. Therapeutic drug monitoring       Patient has mild type 2 diabetes that does not require medication at the " present time.  Strongly recommend low carbohydrate diet, exercise, and weight loss.  She has diabetic peripheral neuropathy that previously was determined to be idiopathic but now it is clear that diabetes is the culprit.  She has hyperlipidemia which is under fairly good control on the current regimen.  Her thyroid is therapeutic.  Her blood pressure is elevated and medication adjustment is indicated.  Her major depression and situational anxiety and depressive disorder seems to be reasonably controlled on the current regimen as is her chronic insomnia.  Vitamin D is in the normal range.    Plan is as follows: Discontinue losartan.  Start valsartan 320 mg/day.  Patient will follow-up in 3 to 4 weeks to reassess the blood pressure.  I I will check her electrolytes and renal function after that visit.  Patient does not need to be fasting.    Addendum: Preoperative clearance note.  Patient is scheduled for reverse shoulder replacement next Tuesday and she is cleared for the anticipated surgery with the following qualifiers: Her blood pressure is elevated today and I changed her blood pressure medication and I think that this will be effective enough to lower her blood pressure to a reasonable range within the week.  She has type 2 diabetes that is very mild and I think her risk for surgery is only slightly above average for someone of her age group.  Patient is cleared for surgery based on the lab work that I have as well as the history and physical.    Procedures

## 2021-04-25 DIAGNOSIS — E78.2 MIXED HYPERLIPIDEMIA: ICD-10-CM

## 2021-04-26 ENCOUNTER — TELEPHONE (OUTPATIENT)
Dept: INTERNAL MEDICINE | Facility: CLINIC | Age: 76
End: 2021-04-26

## 2021-04-26 RX ORDER — EZETIMIBE 10 MG/1
TABLET ORAL
Qty: 30 TABLET | Refills: 3 | Status: SHIPPED | OUTPATIENT
Start: 2021-04-26 | End: 2021-08-24

## 2021-04-27 ENCOUNTER — TRANSCRIBE ORDERS (OUTPATIENT)
Dept: ADMINISTRATIVE | Facility: HOSPITAL | Age: 76
End: 2021-04-27

## 2021-04-27 ENCOUNTER — LAB (OUTPATIENT)
Dept: LAB | Facility: HOSPITAL | Age: 76
End: 2021-04-27

## 2021-04-27 ENCOUNTER — OFFICE VISIT (OUTPATIENT)
Dept: INTERNAL MEDICINE | Facility: CLINIC | Age: 76
End: 2021-04-27

## 2021-04-27 VITALS
WEIGHT: 192 LBS | HEART RATE: 79 BPM | SYSTOLIC BLOOD PRESSURE: 138 MMHG | OXYGEN SATURATION: 97 % | BODY MASS INDEX: 30.13 KG/M2 | TEMPERATURE: 98.4 F | DIASTOLIC BLOOD PRESSURE: 70 MMHG | HEIGHT: 67 IN | RESPIRATION RATE: 16 BRPM

## 2021-04-27 DIAGNOSIS — E87.1 HYPONATREMIA: Primary | ICD-10-CM

## 2021-04-27 DIAGNOSIS — E55.9 VITAMIN D DEFICIENCY: Chronic | ICD-10-CM

## 2021-04-27 DIAGNOSIS — E87.1 HYPONATREMIA: ICD-10-CM

## 2021-04-27 DIAGNOSIS — M12.811 ROTATOR CUFF ARTHROPATHY OF RIGHT SHOULDER: ICD-10-CM

## 2021-04-27 DIAGNOSIS — E11.40 TYPE 2 DIABETES MELLITUS WITH DIABETIC NEUROPATHY, WITHOUT LONG-TERM CURRENT USE OF INSULIN (HCC): Chronic | ICD-10-CM

## 2021-04-27 DIAGNOSIS — E03.9 PRIMARY HYPOTHYROIDISM: Chronic | ICD-10-CM

## 2021-04-27 DIAGNOSIS — I10 BENIGN ESSENTIAL HYPERTENSION: Chronic | ICD-10-CM

## 2021-04-27 DIAGNOSIS — Z09 HOSPITAL DISCHARGE FOLLOW-UP: Primary | ICD-10-CM

## 2021-04-27 DIAGNOSIS — Z51.81 THERAPEUTIC DRUG MONITORING: ICD-10-CM

## 2021-04-27 DIAGNOSIS — E78.2 MIXED HYPERLIPIDEMIA: Chronic | ICD-10-CM

## 2021-04-27 LAB
ANION GAP SERPL CALCULATED.3IONS-SCNC: 15.8 MMOL/L (ref 5–15)
BUN SERPL-MCNC: 11 MG/DL (ref 8–23)
BUN/CREAT SERPL: 16.4 (ref 7–25)
CALCIUM SPEC-SCNC: 9.8 MG/DL (ref 8.6–10.5)
CHLORIDE SERPL-SCNC: 98 MMOL/L (ref 98–107)
CO2 SERPL-SCNC: 21.2 MMOL/L (ref 22–29)
CREAT SERPL-MCNC: 0.67 MG/DL (ref 0.57–1)
GFR SERPL CREATININE-BSD FRML MDRD: 85 ML/MIN/1.73
GLUCOSE SERPL-MCNC: 115 MG/DL (ref 65–99)
POTASSIUM SERPL-SCNC: 4.7 MMOL/L (ref 3.5–5.2)
SODIUM SERPL-SCNC: 135 MMOL/L (ref 136–145)

## 2021-04-27 PROCEDURE — 80048 BASIC METABOLIC PNL TOTAL CA: CPT

## 2021-04-27 PROCEDURE — 1111F DSCHRG MED/CURRENT MED MERGE: CPT | Performed by: INTERNAL MEDICINE

## 2021-04-27 PROCEDURE — 99496 TRANSJ CARE MGMT HIGH F2F 7D: CPT | Performed by: INTERNAL MEDICINE

## 2021-04-27 PROCEDURE — 36415 COLL VENOUS BLD VENIPUNCTURE: CPT

## 2021-04-27 RX ORDER — ASPIRIN 81 MG/1
TABLET ORAL
Start: 2021-04-27 | End: 2022-03-08

## 2021-04-27 RX ORDER — ASPIRIN 325 MG
325 TABLET, DELAYED RELEASE (ENTERIC COATED) ORAL DAILY
COMMUNITY
Start: 2021-04-15 | End: 2021-04-27

## 2021-04-27 RX ORDER — SODIUM CHLORIDE 1000 MG
1 TABLET, SOLUBLE MISCELLANEOUS
COMMUNITY
Start: 2021-04-22 | End: 2022-02-08

## 2021-04-27 NOTE — PROGRESS NOTES
04/27/2021    Patient Information  Claudette L McManus                                                                                          [id number]   [place] [address]      1945  [unfilled]  There is no work phone number on file.    Chief Complaint:     Hospital discharge follow-up.  No new acute complaints.    History of Present Illness:    Current outpatient and discharge medications have been reconciled for the patient.  Reviewed by: Lito Moore MD    Patient with a history of rotator cuff injury presents today in follow-up after she was briefly hospitalized for a right shoulder reverse total arthroplasty.  She seems to be doing well since the surgery.  She has had no complications.  Her hospital records reviewed including history and physical, hospital course, operative report, discharge summary and discharge medications.    Review of Systems   Constitutional: Negative.   HENT: Negative.    Eyes: Negative.    Cardiovascular: Negative.    Respiratory: Negative.    Endocrine: Negative.    Hematologic/Lymphatic: Negative.    Skin: Negative.    Musculoskeletal: Positive for arthritis and joint pain.   Gastrointestinal: Negative.    Genitourinary: Negative.    Neurological: Negative.    Psychiatric/Behavioral: Negative.    Allergic/Immunologic: Negative.        Active Problems:    Patient Active Problem List   Diagnosis   • Generalized anxiety disorder   • Primary osteoarthritis involving multiple joints   • Major depression   • Benign essential hypertension   • Hyperlipidemia   • Primary hypothyroidism   • Type 2 diabetes mellitus with diabetic neuropathy, without long-term current use of insulin (CMS/HCC)   • Chronic insomnia   • Chronic bilateral low back pain without sciatica   • Chronic bilateral thoracic back pain   • Vitamin D deficiency   • Therapeutic drug monitoring   • Menopausal state   • Diabetic peripheral neuropathy (CMS/HCC)   • Situational mixed anxiety  and depressive disorder   • ACE-inhibitor cough   • Rotator cuff arthropathy of right shoulder, 4/20/2021--status post right reverse total shoulder arthroplasty   • Hospital discharge follow-up   • Hyponatremia         Past Medical History:   Diagnosis Date   • Benign essential hypertension    • Chronic bilateral low back pain without sciatica 8/16/2013 March 13, 2019--Limited bone scan.  This reveals scintigraphic activity in the spine and at the right shoulder corresponds to change seen on recent x-rays and is best explained by degenerative change.  Isolated metastatic disease exactly corresponding to the sites of extensive degenerative disc change would be peculiar.  March 7, 2019--new patient to get established.  She has complaints of approxi   • Chronic bilateral thoracic back pain 2/26/2019 March 13, 2019--Limited bone scan.  This reveals scintigraphic activity in the spine and at the right shoulder corresponds to change seen on recent x-rays and is best explained by degenerative change.  Isolated metastatic disease exactly corresponding to the sites of extensive degenerative disc change would be peculiar.  March 1, 2019--x-ray of the lumbar and thoracic spine reveals mild anterior l   • Chronic insomnia 11/4/2014   • Diabetic peripheral neuropathy (CMS/HCC) 3/7/2019    Characterized by decreased sensation and paresthesias in both lower extremities described as a burning sensation.  Extends up to the knees.  Reportedly had been evaluated with nerve conduction study in the past.   • Generalized anxiety disorder 5/19/2013   • History of Bell's palsy 5/21/2013    Remote history of Bell's palsy.  Patient does not remember which side of the face.   • Hyperlipidemia    • Impaired fasting glucose    • Major depression    • Menopausal state 3/7/2019   • Peripheral neuropathy, idiopathic 3/7/2019    Characterized by decreased sensation and paresthesias in both lower extremities described as a burning sensation.   Extends up to the knees.  Reportedly had been evaluated with nerve conduction study in the past.   • Primary hypothyroidism 5/19/2013   • Primary osteoarthritis involving multiple joints 4/22/2013   • Situational mixed anxiety and depressive disorder 8/21/2019 August 21, 2019--patient seen in follow-up after I called in a prescription for Ativan after the patient's  contacted me a few weeks ago regarding the sudden death of patient's daughter.  This was an apparent suicide and the patient found her daughter dead.  I will not go into details.  Patient reports the Lorazepam has been helpful but she is still quite distraught, nervous, and undergoing   • Type 2 diabetes mellitus with diabetic neuropathy, without long-term current use of insulin (CMS/[facility])    • Vitamin D deficiency 2/26/2019         Past Surgical History:   Procedure Laterality Date   • BILATERAL BREAST REDUCTION  Early 2000    Early 2000--bilateral breast reduction   • CHOLECYSTECTOMY  1960s    1960s--open cholecystectomy   • COLONOSCOPY  2012 2012--reportedly normal colonoscopy   • INCONTINENCE SURGERY  2012 Approximately 2012--implantation of questionable bladder stimulator right sacral area for urinary incontinence.  Details not known.   • REPLACEMENT TOTAL KNEE BILATERAL Left 2010; 2011 2010-2011--bilateral total knee replacement   • SUBTOTAL HYSTERECTOMY  Remote    Remote partial hysterectomy.  Ovaries intact.   • TOTAL SHOULDER REPLACEMENT Left 2013 2013--left total shoulder replacement.   • TOTAL SHOULDER REPLACEMENT  April 28, 2021 4/20/2021--status post right reverse total shoulder arthroplasty         Allergies   Allergen Reactions   • Morphine And Related    • Other Other (See Comments)     REJECTED DACRON SUTURES IN THE PAST AND INCISION CAME APART           Current Outpatient Medications:   •  Ascorbic Acid (Vitamin C ER) 1000 MG tablet controlled-release, Take  by mouth Daily., Disp: , Rfl:   •  Calcium  Carb-Cholecalciferol (CALCIUM + D3 PO), Take 1 tablet by mouth daily., Disp: , Rfl:   •  Cholecalciferol (VITAMIN D3) 2000 UNITS tablet, Take 1 capsule by mouth daily., Disp: , Rfl:   •  DULoxetine (CYMBALTA) 60 MG capsule, TAKE ONE CAPSULE BY MOUTH DAILY AS DIRECTED, Disp: 30 capsule, Rfl: 4  •  ezetimibe (ZETIA) 10 MG tablet, TAKE ONE TABLET BY MOUTH DAILY, Disp: 30 tablet, Rfl: 3  •  hydroCHLOROthiazide (HYDRODIURIL) 12.5 MG tablet, TAKE ONE TABLET BY MOUTH DAILY, Disp: 90 tablet, Rfl: 1  •  levothyroxine (SYNTHROID, LEVOTHROID) 125 MCG tablet, TAKE ONE TABLET BY MOUTH DAILY, Disp: 72 tablet, Rfl: 5  •  LORazepam (ATIVAN) 1 MG tablet, Take 1 tablet by mouth 2 (Two) Times a Day As Needed for Anxiety., Disp: 60 tablet, Rfl: 5  •  Multiple Vitamin (MULTIVITAMIN) tablet, Take 1 tablet by mouth daily., Disp: , Rfl:   •  simvastatin (ZOCOR) 20 MG tablet, Take 1 p.o. daily for high cholesterol, Disp: 90 tablet, Rfl: 1  •  sodium chloride 1 g tablet, Take 1 g by mouth., Disp: , Rfl:   •  traZODone (DESYREL) 150 MG tablet, TAKE ONE TABLET BY MOUTH ONCE NIGHTLY, Disp: 30 tablet, Rfl: 1  •  valsartan (DIOVAN) 320 MG tablet, Take 1 p.o. every morning for high blood pressure, Disp: 30 tablet, Rfl: 3  •  Zinc 100 MG tablet, Take  by mouth Daily., Disp: , Rfl:   •  aspirin (aspirin) 81 MG EC tablet, Take 1 p.o. daily until further notice., Disp: , Rfl:       Family History   Problem Relation Age of Onset   • Alcohol abuse Father    • Cancer Other    • Diabetes Other         type II         Social History     Socioeconomic History   • Marital status:      Spouse name: Not on file   • Number of children: Not on file   • Years of education: Not on file   • Highest education level: Not on file   Tobacco Use   • Smoking status: Former Smoker     Quit date: 1998     Years since quittin.3   • Smokeless tobacco: Never Used   Vaping Use   • Vaping Use: Never used   Substance and Sexual Activity   • Alcohol use: Yes      "Alcohol/week: 1.0 standard drinks     Types: 1 Glasses of wine per week     Comment: current some day   • Drug use: No   • Sexual activity: Not Currently     Partners: Male         Vitals:    04/27/21 0918   BP: 138/70   Pulse: 79   Resp: 16   Temp: 98.4 °F (36.9 °C)   SpO2: 97%   Weight: 87.1 kg (192 lb)   Height: 170.2 cm (67.01\")        Body mass index is 30.06 kg/m².      Physical Exam:    General: Alert and oriented x 3.  No acute distress.  Obese.  Normal affect.  HEENT: Pupils equal, round, reactive to light; extraocular movements intact; sclerae nonicteric; pharynx, ear canals and TMs normal.  Neck: Without JVD, thyromegaly, bruit, or adenopathy.  Lungs: Clear to auscultation in all fields.  Heart: Regular rate and rhythm without murmur, rub, gallop, or click.  Abdomen: Soft, nontender, without hepatosplenomegaly or hernia.  Bowel sounds normal.  : Deferred.  Rectal: Deferred.  Extremities: Without clubbing, cyanosis, edema, or pulse deficit.  Neurologic: Intact without focal deficit.  Normal station and gait observed during ingress and egress from the examination room.  Skin: Without significant lesion.  Musculoskeletal: Right shoulder is in a sling.  Did not take dressings down.    Lab/other results:      Assessment/Plan:     Diagnosis Plan   1. Hospital discharge follow-up     2. Rotator cuff arthropathy of right shoulder, 4/20/2021--status post right reverse total shoulder arthroplasty     3. Hyponatremia     4. Hyperlipidemia  CK    Comprehensive Metabolic Panel    NMR LipoProfile   5. Benign essential hypertension     6. Primary hypothyroidism  TSH    T4, Free    T3, Free   7. Type 2 diabetes mellitus with diabetic neuropathy, without long-term current use of insulin (CMS/[facility])  Comprehensive Metabolic Panel    Hemoglobin A1c    Microalbumin / Creatinine Urine Ratio - Urine, Clean Catch   8. Vitamin D deficiency  Vitamin D 25 Hydroxy   9. Therapeutic drug monitoring  CBC (No Diff)     Patient seen in " hospital discharge follow-up after a right reverse total shoulder arthroplasty.  She seems to be doing well.  Medications reviewed at length and are the same except for the addition of sodium chloride tablet which I think needs to be discontinued.  Apparently her sodium was low on the preoperative labs and a work-up was done including urine osmolality and serum osmolality as well as cortisol level.  Her cortisol level was initially low at less than 1 but they did a stimulation test that appeared to be okay.  Her serum osmolality was low at 270 but her urine osmolality was inappropriately concentrated.  Patient does have a prescription order for a BMP today and the results will be faxed to the nephrologist.  They will apparently contact her for appropriate follow-up.    We will cancel patient's follow-up blood pressure appointment that was previously scheduled for May 11, 2021.  We will reschedule her after July 6, 2021 with lab 1 week prior and this will also be subsequent Medicare wellness visit.        Procedures

## 2021-05-11 DIAGNOSIS — E78.2 MIXED HYPERLIPIDEMIA: ICD-10-CM

## 2021-05-11 RX ORDER — SIMVASTATIN 20 MG
TABLET ORAL
Qty: 72 TABLET | Refills: 1 | Status: SHIPPED | OUTPATIENT
Start: 2021-05-11 | End: 2021-08-24

## 2021-05-20 DIAGNOSIS — I10 BENIGN ESSENTIAL HYPERTENSION: Chronic | ICD-10-CM

## 2021-05-21 RX ORDER — HYDROCHLOROTHIAZIDE 12.5 MG/1
TABLET ORAL
Qty: 90 TABLET | Refills: 1 | Status: SHIPPED | OUTPATIENT
Start: 2021-05-21 | End: 2021-10-18

## 2021-06-20 DIAGNOSIS — F33.41 RECURRENT MAJOR DEPRESSIVE DISORDER, IN PARTIAL REMISSION (HCC): Chronic | ICD-10-CM

## 2021-06-20 DIAGNOSIS — G47.09 OTHER INSOMNIA: ICD-10-CM

## 2021-06-21 RX ORDER — DULOXETIN HYDROCHLORIDE 60 MG/1
CAPSULE, DELAYED RELEASE ORAL
Qty: 30 CAPSULE | Refills: 4 | Status: SHIPPED | OUTPATIENT
Start: 2021-06-21 | End: 2021-11-16

## 2021-06-21 RX ORDER — TRAZODONE HYDROCHLORIDE 150 MG/1
TABLET ORAL
Qty: 30 TABLET | Refills: 4 | Status: SHIPPED | OUTPATIENT
Start: 2021-06-21 | End: 2021-11-16

## 2021-07-23 ENCOUNTER — LAB (OUTPATIENT)
Dept: LAB | Facility: HOSPITAL | Age: 76
End: 2021-07-23

## 2021-07-23 DIAGNOSIS — E55.9 VITAMIN D DEFICIENCY: Chronic | ICD-10-CM

## 2021-07-23 DIAGNOSIS — E11.40 TYPE 2 DIABETES MELLITUS WITH DIABETIC NEUROPATHY, WITHOUT LONG-TERM CURRENT USE OF INSULIN (HCC): Chronic | ICD-10-CM

## 2021-07-23 DIAGNOSIS — Z51.81 THERAPEUTIC DRUG MONITORING: ICD-10-CM

## 2021-07-23 DIAGNOSIS — E78.2 MIXED HYPERLIPIDEMIA: Chronic | ICD-10-CM

## 2021-07-23 DIAGNOSIS — E03.9 PRIMARY HYPOTHYROIDISM: Chronic | ICD-10-CM

## 2021-07-23 LAB
25(OH)D3 SERPL-MCNC: 46.6 NG/ML
ALBUMIN SERPL-MCNC: 4.7 G/DL (ref 3.5–5.2)
ALBUMIN UR-MCNC: <1.2 MG/DL
ALBUMIN/GLOB SERPL: 1.9 G/DL
ALP SERPL-CCNC: 73 U/L (ref 39–117)
ALT SERPL W P-5'-P-CCNC: 28 U/L (ref 1–33)
ANION GAP SERPL CALCULATED.3IONS-SCNC: 13.8 MMOL/L (ref 5–15)
AST SERPL-CCNC: 26 U/L (ref 1–32)
BILIRUB SERPL-MCNC: 0.2 MG/DL (ref 0–1.2)
BUN SERPL-MCNC: 17 MG/DL (ref 8–23)
BUN/CREAT SERPL: 19.5 (ref 7–25)
CALCIUM SPEC-SCNC: 10.1 MG/DL (ref 8.6–10.5)
CHLORIDE SERPL-SCNC: 96 MMOL/L (ref 98–107)
CK SERPL-CCNC: 116 U/L (ref 20–180)
CO2 SERPL-SCNC: 26.2 MMOL/L (ref 22–29)
CREAT SERPL-MCNC: 0.87 MG/DL (ref 0.57–1)
CREAT UR-MCNC: 57 MG/DL
DEPRECATED RDW RBC AUTO: 44 FL (ref 37–54)
ERYTHROCYTE [DISTWIDTH] IN BLOOD BY AUTOMATED COUNT: 12.4 % (ref 12.3–15.4)
GFR SERPL CREATININE-BSD FRML MDRD: 63 ML/MIN/1.73
GLOBULIN UR ELPH-MCNC: 2.5 GM/DL
GLUCOSE SERPL-MCNC: 121 MG/DL (ref 65–99)
HBA1C MFR BLD: 6.54 % (ref 4.8–5.6)
HCT VFR BLD AUTO: 40 % (ref 34–46.6)
HGB BLD-MCNC: 13.1 G/DL (ref 12–15.9)
MCH RBC QN AUTO: 31.8 PG (ref 26.6–33)
MCHC RBC AUTO-ENTMCNC: 32.8 G/DL (ref 31.5–35.7)
MCV RBC AUTO: 97.1 FL (ref 79–97)
MICROALBUMIN/CREAT UR: NORMAL MG/G{CREAT}
PLATELET # BLD AUTO: 267 10*3/MM3 (ref 140–450)
PMV BLD AUTO: 9.8 FL (ref 6–12)
POTASSIUM SERPL-SCNC: 4.6 MMOL/L (ref 3.5–5.2)
PROT SERPL-MCNC: 7.2 G/DL (ref 6–8.5)
RBC # BLD AUTO: 4.12 10*6/MM3 (ref 3.77–5.28)
SODIUM SERPL-SCNC: 136 MMOL/L (ref 136–145)
T3FREE SERPL-MCNC: 2.4 PG/ML (ref 2–4.4)
T4 FREE SERPL-MCNC: 1.27 NG/DL (ref 0.93–1.7)
TSH SERPL DL<=0.05 MIU/L-ACNC: 0.36 UIU/ML (ref 0.27–4.2)
WBC # BLD AUTO: 5.87 10*3/MM3 (ref 3.4–10.8)

## 2021-07-23 PROCEDURE — 82043 UR ALBUMIN QUANTITATIVE: CPT

## 2021-07-23 PROCEDURE — 80053 COMPREHEN METABOLIC PANEL: CPT

## 2021-07-23 PROCEDURE — 84443 ASSAY THYROID STIM HORMONE: CPT

## 2021-07-23 PROCEDURE — 85027 COMPLETE CBC AUTOMATED: CPT

## 2021-07-23 PROCEDURE — 36415 COLL VENOUS BLD VENIPUNCTURE: CPT

## 2021-07-23 PROCEDURE — 83036 HEMOGLOBIN GLYCOSYLATED A1C: CPT

## 2021-07-23 PROCEDURE — 84481 FREE ASSAY (FT-3): CPT

## 2021-07-23 PROCEDURE — 82570 ASSAY OF URINE CREATININE: CPT

## 2021-07-23 PROCEDURE — 80061 LIPID PANEL: CPT

## 2021-07-23 PROCEDURE — 82306 VITAMIN D 25 HYDROXY: CPT

## 2021-07-23 PROCEDURE — 82550 ASSAY OF CK (CPK): CPT

## 2021-07-23 PROCEDURE — 84439 ASSAY OF FREE THYROXINE: CPT

## 2021-07-23 PROCEDURE — 83704 LIPOPROTEIN BLD QUAN PART: CPT

## 2021-07-25 LAB
CHOLEST SERPL-MCNC: 175 MG/DL (ref 100–199)
HDL SERPL-SCNC: 39.1 UMOL/L
HDLC SERPL-MCNC: 66 MG/DL
LDL SERPL QN: 21.1 NM
LDL SERPL-SCNC: 1057 NMOL/L
LDL SMALL SERPL-SCNC: 326 NMOL/L
LDLC SERPL CALC-MCNC: 87 MG/DL (ref 0–99)
TRIGL SERPL-MCNC: 126 MG/DL (ref 0–149)

## 2021-08-03 ENCOUNTER — OFFICE VISIT (OUTPATIENT)
Dept: INTERNAL MEDICINE | Facility: CLINIC | Age: 76
End: 2021-08-03

## 2021-08-03 VITALS
OXYGEN SATURATION: 97 % | HEIGHT: 67 IN | SYSTOLIC BLOOD PRESSURE: 132 MMHG | HEART RATE: 87 BPM | DIASTOLIC BLOOD PRESSURE: 76 MMHG | WEIGHT: 208.6 LBS | RESPIRATION RATE: 16 BRPM | BODY MASS INDEX: 32.74 KG/M2

## 2021-08-03 DIAGNOSIS — F33.41 RECURRENT MAJOR DEPRESSIVE DISORDER, IN PARTIAL REMISSION (HCC): Chronic | ICD-10-CM

## 2021-08-03 DIAGNOSIS — E03.9 PRIMARY HYPOTHYROIDISM: Chronic | ICD-10-CM

## 2021-08-03 DIAGNOSIS — D75.89 MACROCYTOSIS: ICD-10-CM

## 2021-08-03 DIAGNOSIS — F43.23 SITUATIONAL MIXED ANXIETY AND DEPRESSIVE DISORDER: Chronic | ICD-10-CM

## 2021-08-03 DIAGNOSIS — G89.29 CHRONIC BILATERAL THORACIC BACK PAIN: Chronic | ICD-10-CM

## 2021-08-03 DIAGNOSIS — M12.811 ROTATOR CUFF ARTHROPATHY OF RIGHT SHOULDER: ICD-10-CM

## 2021-08-03 DIAGNOSIS — R05.8 ACE-INHIBITOR COUGH: Chronic | ICD-10-CM

## 2021-08-03 DIAGNOSIS — F41.1 GENERALIZED ANXIETY DISORDER: Chronic | ICD-10-CM

## 2021-08-03 DIAGNOSIS — E11.42 DIABETIC PERIPHERAL NEUROPATHY (HCC): Chronic | ICD-10-CM

## 2021-08-03 DIAGNOSIS — Z00.00 ENCOUNTER FOR SUBSEQUENT ANNUAL WELLNESS VISIT (AWV) IN MEDICARE PATIENT: Primary | ICD-10-CM

## 2021-08-03 DIAGNOSIS — M54.50 CHRONIC BILATERAL LOW BACK PAIN WITHOUT SCIATICA: Chronic | ICD-10-CM

## 2021-08-03 DIAGNOSIS — N95.1 MENOPAUSAL STATE: Chronic | ICD-10-CM

## 2021-08-03 DIAGNOSIS — I10 BENIGN ESSENTIAL HYPERTENSION: Chronic | ICD-10-CM

## 2021-08-03 DIAGNOSIS — T46.4X5A ACE-INHIBITOR COUGH: Chronic | ICD-10-CM

## 2021-08-03 DIAGNOSIS — M54.6 CHRONIC BILATERAL THORACIC BACK PAIN: Chronic | ICD-10-CM

## 2021-08-03 DIAGNOSIS — G89.29 CHRONIC BILATERAL LOW BACK PAIN WITHOUT SCIATICA: Chronic | ICD-10-CM

## 2021-08-03 DIAGNOSIS — E78.2 MIXED HYPERLIPIDEMIA: Chronic | ICD-10-CM

## 2021-08-03 DIAGNOSIS — F51.04 CHRONIC INSOMNIA: Chronic | ICD-10-CM

## 2021-08-03 DIAGNOSIS — Z51.81 THERAPEUTIC DRUG MONITORING: ICD-10-CM

## 2021-08-03 DIAGNOSIS — E55.9 VITAMIN D DEFICIENCY: Chronic | ICD-10-CM

## 2021-08-03 DIAGNOSIS — E11.40 TYPE 2 DIABETES MELLITUS WITH DIABETIC NEUROPATHY, WITHOUT LONG-TERM CURRENT USE OF INSULIN (HCC): Chronic | ICD-10-CM

## 2021-08-03 PROBLEM — Z96.611 S/P REVERSE TOTAL SHOULDER ARTHROPLASTY, RIGHT: Status: ACTIVE | Noted: 2021-05-04

## 2021-08-03 PROBLEM — Z09 HOSPITAL DISCHARGE FOLLOW-UP: Status: RESOLVED | Noted: 2021-04-27 | Resolved: 2021-08-03

## 2021-08-03 PROBLEM — E87.1 HYPONATREMIA: Status: RESOLVED | Noted: 2021-04-27 | Resolved: 2021-08-03

## 2021-08-03 PROBLEM — Z96.611 S/P REVERSE TOTAL SHOULDER ARTHROPLASTY, RIGHT: Status: RESOLVED | Noted: 2021-05-04 | Resolved: 2021-08-03

## 2021-08-03 PROCEDURE — 1170F FXNL STATUS ASSESSED: CPT | Performed by: INTERNAL MEDICINE

## 2021-08-03 PROCEDURE — G0439 PPPS, SUBSEQ VISIT: HCPCS | Performed by: INTERNAL MEDICINE

## 2021-08-03 PROCEDURE — 1160F RVW MEDS BY RX/DR IN RCRD: CPT | Performed by: INTERNAL MEDICINE

## 2021-08-03 PROCEDURE — 99214 OFFICE O/P EST MOD 30 MIN: CPT | Performed by: INTERNAL MEDICINE

## 2021-08-03 PROCEDURE — 1126F AMNT PAIN NOTED NONE PRSNT: CPT | Performed by: INTERNAL MEDICINE

## 2021-08-03 PROCEDURE — 96160 PT-FOCUSED HLTH RISK ASSMT: CPT | Performed by: INTERNAL MEDICINE

## 2021-08-03 NOTE — PROGRESS NOTES
08/03/2021    Patient Information  Claudette L McManus                                                                                          [id number]  [place] [address]      1945  [unfilled]  There is no work phone number on file.    Chief Complaint:     Medicare wellness visit.  Follow-up lab work in order to monitor chronic medical issues listed in history of present illness.  No new acute complaints.    History of Present Illness:    Patient with a history of type 2 diabetes, hyperlipidemia, hypothyroidism, hypertension, chronic insomnia, chronic lower back pain, chronic thoracic back pain, vitamin D deficiency, menopausal state, diabetic peripheral neuropathy, situational anxiety and depressive disorder superimposed on chronic depression and generalized anxiety, ACE inhibitor cough, rotator cuff arthropathy with recent right reverse total shoulder arthroplasty.  She presents today for subsequent Medicare wellness visit.  She also had lab work in order to monitor chronic medical issues.  Her past medical history reviewed and updated were necessary including health maintenance parameters.  This reveals she is up-to-date or else accounted for with the exception of needing diabetic eye exam which I encouraged her to get.    Review of Systems   Constitutional: Negative.   HENT: Negative.    Eyes: Negative.    Cardiovascular: Negative.    Respiratory: Negative.    Endocrine: Negative.    Hematologic/Lymphatic: Negative.    Skin: Negative.    Musculoskeletal: Positive for arthritis, back pain and joint pain.   Gastrointestinal: Negative.    Genitourinary: Negative.    Neurological: Negative.    Psychiatric/Behavioral: Negative.    Allergic/Immunologic: Negative.        Active Problems:    Patient Active Problem List   Diagnosis   • Generalized anxiety disorder   • Primary osteoarthritis involving multiple joints   • Major depression   • Benign essential hypertension   •  Hyperlipidemia   • Primary hypothyroidism   • Type 2 diabetes mellitus with diabetic neuropathy, without long-term current use of insulin (CMS/HCC)   • Chronic insomnia   • Chronic bilateral low back pain without sciatica   • Chronic bilateral thoracic back pain   • Vitamin D deficiency   • Therapeutic drug monitoring   • Menopausal state, 8/19/2020--normal DEXA.   • Diabetic peripheral neuropathy (CMS/HCC)   • Situational mixed anxiety and depressive disorder   • ACE-inhibitor cough         Past Medical History:   Diagnosis Date   • Benign essential hypertension    • Chronic bilateral low back pain without sciatica 8/16/2013 March 13, 2019--Limited bone scan.  This reveals scintigraphic activity in the spine and at the right shoulder corresponds to change seen on recent x-rays and is best explained by degenerative change.  Isolated metastatic disease exactly corresponding to the sites of extensive degenerative disc change would be peculiar.  March 7, 2019--new patient to get established.  She has complaints of approxi   • Chronic bilateral thoracic back pain 2/26/2019 March 13, 2019--Limited bone scan.  This reveals scintigraphic activity in the spine and at the right shoulder corresponds to change seen on recent x-rays and is best explained by degenerative change.  Isolated metastatic disease exactly corresponding to the sites of extensive degenerative disc change would be peculiar.  March 1, 2019--x-ray of the lumbar and thoracic spine reveals mild anterior l   • Chronic insomnia 11/4/2014   • Diabetic peripheral neuropathy (CMS/HCC) 3/7/2019    Characterized by decreased sensation and paresthesias in both lower extremities described as a burning sensation.  Extends up to the knees.  Reportedly had been evaluated with nerve conduction study in the past.   • Generalized anxiety disorder 5/19/2013   • History of Bell's palsy 5/21/2013    Remote history of Bell's palsy.  Patient does not remember which side of  the face.   • Hyperlipidemia    • Impaired fasting glucose    • Major depression    • Menopausal state, 8/19/2020--normal DEXA. 3/7/2019    August 19, 2020--DEXA scan reveals lumbar spine T score 3.8.  Left femoral neck T score 2.5.  Right femoral neck T score 2.2.  Normal bone density.   • Peripheral neuropathy, idiopathic 3/7/2019    Characterized by decreased sensation and paresthesias in both lower extremities described as a burning sensation.  Extends up to the knees.  Reportedly had been evaluated with nerve conduction study in the past.   • Primary hypothyroidism 5/19/2013   • Primary osteoarthritis involving multiple joints 4/22/2013   • Rotator cuff arthropathy of right shoulder, 4/20/2021--status post right reverse total shoulder arthroplasty 5/27/2020   • Situational mixed anxiety and depressive disorder 8/21/2019 August 21, 2019--patient seen in follow-up after I called in a prescription for Ativan after the patient's  contacted me a few weeks ago regarding the sudden death of patient's daughter.  This was an apparent suicide and the patient found her daughter dead.  I will not go into details.  Patient reports the Lorazepam has been helpful but she is still quite distraught, nervous, and undergoing   • Type 2 diabetes mellitus with diabetic neuropathy, without long-term current use of insulin (CMS/[facility])    • Vitamin D deficiency 2/26/2019         Past Surgical History:   Procedure Laterality Date   • BILATERAL BREAST REDUCTION  Early 2000    Early 2000--bilateral breast reduction   • CHOLECYSTECTOMY  1960s    1960s--open cholecystectomy   • COLONOSCOPY  2012 2012--reportedly normal colonoscopy   • INCONTINENCE SURGERY  2012 Approximately 2012--implantation of questionable bladder stimulator right sacral area for urinary incontinence.  Details not known.   • REPLACEMENT TOTAL KNEE BILATERAL Left 2010; 2011 2010-2011--bilateral total knee replacement   • SUBTOTAL HYSTERECTOMY  Remote     Remote partial hysterectomy.  Ovaries intact.   • TOTAL SHOULDER REPLACEMENT Left 2013 2013--left total shoulder replacement.   • TOTAL SHOULDER REPLACEMENT  April 28, 2021 4/20/2021--status post right reverse total shoulder arthroplasty         Allergies   Allergen Reactions   • Morphine And Related    • Other Other (See Comments)     REJECTED DACRON SUTURES IN THE PAST AND INCISION CAME APART           Current Outpatient Medications:   •  Ascorbic Acid (Vitamin C ER) 1000 MG tablet controlled-release, Take  by mouth Daily., Disp: , Rfl:   •  aspirin (aspirin) 81 MG EC tablet, Take 1 p.o. daily until further notice., Disp: , Rfl:   •  Calcium Carb-Cholecalciferol (CALCIUM + D3 PO), Take 1 tablet by mouth daily., Disp: , Rfl:   •  Cholecalciferol (VITAMIN D3) 2000 UNITS tablet, Take 1 capsule by mouth daily., Disp: , Rfl:   •  DULoxetine (CYMBALTA) 60 MG capsule, TAKE ONE CAPSULE BY MOUTH DAILY AS DIRECTED, Disp: 30 capsule, Rfl: 4  •  ezetimibe (ZETIA) 10 MG tablet, TAKE ONE TABLET BY MOUTH DAILY, Disp: 30 tablet, Rfl: 3  •  hydroCHLOROthiazide (HYDRODIURIL) 12.5 MG tablet, TAKE ONE TABLET BY MOUTH DAILY, Disp: 90 tablet, Rfl: 1  •  levothyroxine (SYNTHROID, LEVOTHROID) 125 MCG tablet, TAKE ONE TABLET BY MOUTH DAILY, Disp: 72 tablet, Rfl: 5  •  Multiple Vitamin (MULTIVITAMIN) tablet, Take 1 tablet by mouth daily., Disp: , Rfl:   •  simvastatin (ZOCOR) 20 MG tablet, TAKE ONE TABLET BY MOUTH DAILY FOR HIGH CHOLESTEROL, Disp: 72 tablet, Rfl: 1  •  sodium chloride 1 g tablet, Take 1 g by mouth., Disp: , Rfl:   •  traZODone (DESYREL) 150 MG tablet, TAKE ONE TABLET BY MOUTH ONCE NIGHTLY, Disp: 30 tablet, Rfl: 4  •  valsartan (DIOVAN) 320 MG tablet, Take 1 p.o. every morning for high blood pressure, Disp: 30 tablet, Rfl: 3  •  Zinc 100 MG tablet, Take  by mouth Daily., Disp: , Rfl:       Family History   Problem Relation Age of Onset   • Alcohol abuse Father    • Cancer Other    • Diabetes Other         type II  "        Social History     Socioeconomic History   • Marital status:      Spouse name: Not on file   • Number of children: Not on file   • Years of education: Not on file   • Highest education level: Not on file   Tobacco Use   • Smoking status: Former Smoker     Quit date: 1998     Years since quittin.6   • Smokeless tobacco: Never Used   Vaping Use   • Vaping Use: Never used   Substance and Sexual Activity   • Alcohol use: Yes     Alcohol/week: 1.0 standard drinks     Types: 1 Glasses of wine per week     Comment: current some day   • Drug use: No   • Sexual activity: Not Currently     Partners: Male         Vitals:    21 1105   BP: 132/76   Pulse: 87   Resp: 16   SpO2: 97%   Weight: 94.6 kg (208 lb 9.6 oz)   Height: 170.2 cm (67.01\")        Body mass index is 32.66 kg/m².      Physical Exam:    General: Alert and oriented x 3.  No acute distress.  Obese.  Normal affect.  HEENT: Pupils equal, round, reactive to light; extraocular movements intact; sclerae nonicteric; pharynx, ear canals and TMs normal.  Neck: Without JVD, thyromegaly, bruit, or adenopathy.  Lungs: Clear to auscultation in all fields.  Heart: Regular rate and rhythm without murmur, rub, gallop, or click.  Abdomen: Soft, nontender, without hepatosplenomegaly or hernia.  Bowel sounds normal.  : Deferred.  Rectal: Deferred.  Extremities: Without clubbing, cyanosis, edema, or pulse deficit.  Neurologic: Intact without focal deficit.  Normal station and gait observed during ingress and egress from the examination room.  Skin: Without significant lesion.  Musculoskeletal: Unremarkable.    Lab/other results:    NMR reveals a total cholesterol 175.  Triglycerides 126.  LDL particle number slightly elevated 1057.  Small LDL particle number excellent at 326.  HDL particle number normal at 39.1.  CMP normal except glucose 121.  Hemoglobin A1c 6.54.  Microalbumin/creatinine ratio is negative.  Vitamin D normal at 46.6.  Thyroid function " tests are normal.  CPK normal.  CBC normal except MCV mildly elevated at 97.1.    August 19, 2020--DEXA scan reveals lumbar spine T score 3.8.  Left femoral neck T score 2.5.  Right femoral neck T score 2.2.  Normal bone density.    Assessment/Plan:     Diagnosis Plan   1. Encounter for subsequent annual wellness visit (AWV) in Medicare patient     2. Type 2 diabetes mellitus with diabetic neuropathy, without long-term current use of insulin (CMS/[facility])  Comprehensive Metabolic Panel    Hemoglobin A1c   3. Hyperlipidemia  CK    Comprehensive Metabolic Panel    NMR LipoProfile   4. Primary hypothyroidism  TSH    T4, Free    T3, Free   5. Benign essential hypertension     6. Chronic insomnia     7. Chronic bilateral low back pain without sciatica     8. Chronic bilateral thoracic back pain     9. Vitamin D deficiency  Vitamin D 25 Hydroxy   10. Menopausal state     11. Diabetic peripheral neuropathy (CMS/[facility])     12. Situational mixed anxiety and depressive disorder     13. Generalized anxiety disorder     14. Major depression     15. ACE-inhibitor cough     16. Rotator cuff arthropathy of right shoulder, 4/20/2021--status post right reverse total shoulder arthroplasty     17. Therapeutic drug monitoring  CBC (No Diff)   18. Macrocytosis  CBC (No Diff)     The subsequent Medicare wellness visit is documented on separate note.    Patient has type 2 diabetes that is under excellent control with dietary measures alone.  Hyperlipidemia is under excellent control with simvastatin and Zetia.  Her thyroid is therapeutic on 125 mcg of levothyroxine.  Blood pressure appears to be well controlled.  Patient has chronic insomnia and also has depression and anxiety which seems to be reasonably controlled with the Cymbalta.  She is no longer taking lorazepam.  She is also taking trazodone which helps with her sleep.  Her chronic lower back pain and chronic thoracic back pain is currently tolerable.  Her vitamin D is in the normal  range which is important given her postmenopausal state.  Her diabetic peripheral neuropathy symptoms are not severe.  She has done well since her recent right shoulder surgery.    Plan is as follows: No change in current medical regimen.  Patient should work on low carbohydrate diet, exercise, and lose weight.  I will have her follow-up in 6 months with lab prior or follow-up as needed.  I have encouraged her to get diabetic eye exam.  She is up-to-date on her DEXA scan.      Procedures

## 2021-08-03 NOTE — PROGRESS NOTES
The ABCs of the Annual Wellness Visit  Subsequent Medicare Wellness Visit    No chief complaint on file.      Subjective   History of Present Illness:  Claudette L McManus is a 76 y.o. female who presents for a Subsequent Medicare Wellness Visit.    HEALTH RISK ASSESSMENT    Recent Hospitalizations:  Recently treated at the following:  Other: [facility]    Current Medical Providers:  Patient Care Team:  Lito Moore MD as PCP - General (Internal Medicine)    Smoking Status:  Social History     Tobacco Use   Smoking Status Former Smoker   • Quit date: 1998   • Years since quittin.6   Smokeless Tobacco Never Used       Alcohol Consumption:  Social History     Substance and Sexual Activity   Alcohol Use Yes   • Alcohol/week: 1.0 standard drinks   • Types: 1 Glasses of wine per week    Comment: current some day       Depression Screen:   PHQ-2/PHQ-9 Depression Screening 2021   Little interest or pleasure in doing things 0   Feeling down, depressed, or hopeless 1   Trouble falling or staying asleep, or sleeping too much -   Feeling tired or having little energy -   Poor appetite or overeating -   Feeling bad about yourself - or that you are a failure or have let yourself or your family down -   Trouble concentrating on things, such as reading the newspaper or watching television -   Moving or speaking so slowly that other people could have noticed. Or the opposite - being so fidgety or restless that you have been moving around a lot more than usual -   Thoughts that you would be better off dead, or of hurting yourself in some way -   Total Score 1   If you checked off any problems, how difficult have these problems made it for you to do your work, take care of things at home, or get along with other people? -       Fall Risk Screen:  STEADI Fall Risk Assessment was completed, and patient is at LOW risk for falls.Assessment completed on:2021    Health Habits and Functional and  Cognitive Screening:  Functional & Cognitive Status 8/3/2021   Do you have difficulty preparing food and eating? No   Do you have difficulty bathing yourself, getting dressed or grooming yourself? No   Do you have difficulty using the toilet? No   Do you have difficulty moving around from place to place? No   Do you have trouble with steps or getting out of a bed or a chair? No   Current Diet Well Balanced Diet   Dental Exam Up to date   Eye Exam Up to date   Exercise (times per week) 2 times per week   Current Exercise Activities Include -   Do you need help using the phone?  No   Are you deaf or do you have serious difficulty hearing?  No   Do you need help with transportation? No   Do you need help shopping? No   Do you need help preparing meals?  No   Do you need help with housework?  No   Do you need help with laundry? No   Do you need help taking your medications? No   Do you need help managing money? No   Do you ever drive or ride in a car without wearing a seat belt? No   Have you felt unusual stress, anger or loneliness in the last month? No   Who do you live with? Spouse   If you need help, do you have trouble finding someone available to you? No   Have you been bothered in the last four weeks by sexual problems? No   Do you have difficulty concentrating, remembering or making decisions? No         Does the patient have evidence of cognitive impairment? No    Asprin use counseling:Does not need ASA (and currently is not on it)    Age-appropriate Screening Schedule:  Refer to the list below for future screening recommendations based on patient's age, sex and/or medical conditions. Orders for these recommended tests are listed in the plan section. The patient has been provided with a written plan.    Health Maintenance   Topic Date Due   • DIABETIC EYE EXAM  Never done   • MAMMOGRAM  10/13/2019   • INFLUENZA VACCINE  10/01/2021   • HEMOGLOBIN A1C  01/23/2022   • LIPID PANEL  07/23/2022   • URINE MICROALBUMIN   07/23/2022   • DXA SCAN  08/19/2022   • TDAP/TD VACCINES (2 - Td or Tdap) 05/25/2026   • ZOSTER VACCINE  Discontinued          The following portions of the patient's history were reviewed and updated as appropriate: allergies, current medications, past family history, past medical history, past social history, past surgical history and problem list.    Outpatient Medications Prior to Visit   Medication Sig Dispense Refill   • Ascorbic Acid (Vitamin C ER) 1000 MG tablet controlled-release Take  by mouth Daily.     • aspirin (aspirin) 81 MG EC tablet Take 1 p.o. daily until further notice.     • Calcium Carb-Cholecalciferol (CALCIUM + D3 PO) Take 1 tablet by mouth daily.     • Cholecalciferol (VITAMIN D3) 2000 UNITS tablet Take 1 capsule by mouth daily.     • DULoxetine (CYMBALTA) 60 MG capsule TAKE ONE CAPSULE BY MOUTH DAILY AS DIRECTED 30 capsule 4   • ezetimibe (ZETIA) 10 MG tablet TAKE ONE TABLET BY MOUTH DAILY 30 tablet 3   • hydroCHLOROthiazide (HYDRODIURIL) 12.5 MG tablet TAKE ONE TABLET BY MOUTH DAILY 90 tablet 1   • levothyroxine (SYNTHROID, LEVOTHROID) 125 MCG tablet TAKE ONE TABLET BY MOUTH DAILY 72 tablet 5   • Multiple Vitamin (MULTIVITAMIN) tablet Take 1 tablet by mouth daily.     • simvastatin (ZOCOR) 20 MG tablet TAKE ONE TABLET BY MOUTH DAILY FOR HIGH CHOLESTEROL 72 tablet 1   • sodium chloride 1 g tablet Take 1 g by mouth.     • traZODone (DESYREL) 150 MG tablet TAKE ONE TABLET BY MOUTH ONCE NIGHTLY 30 tablet 4   • valsartan (DIOVAN) 320 MG tablet Take 1 p.o. every morning for high blood pressure 30 tablet 3   • Zinc 100 MG tablet Take  by mouth Daily.     • LORazepam (ATIVAN) 1 MG tablet Take 1 tablet by mouth 2 (Two) Times a Day As Needed for Anxiety. 60 tablet 5     No facility-administered medications prior to visit.       Patient Active Problem List   Diagnosis   • Generalized anxiety disorder   • Primary osteoarthritis involving multiple joints   • Major depression   • Benign essential  "hypertension   • Hyperlipidemia   • Primary hypothyroidism   • Type 2 diabetes mellitus with diabetic neuropathy, without long-term current use of insulin (CMS/HCC)   • Chronic insomnia   • Chronic bilateral low back pain without sciatica   • Chronic bilateral thoracic back pain   • Vitamin D deficiency   • Therapeutic drug monitoring   • Menopausal state, 8/19/2020--normal DEXA.   • Diabetic peripheral neuropathy (CMS/HCC)   • Situational mixed anxiety and depressive disorder   • ACE-inhibitor cough       Advanced Care Planning:  ACP discussion was held with the patient during this visit. Patient has an advance directive (not in EMR), copy requested.    Review of Systems   Constitutional: Negative.    HENT: Negative.    Eyes: Negative.    Respiratory: Negative.    Cardiovascular: Negative.    Gastrointestinal: Negative.    Endocrine: Negative.    Genitourinary: Negative.    Musculoskeletal: Positive for arthralgias and back pain.   Skin: Negative.    Allergic/Immunologic: Negative.    Neurological: Negative.    Hematological: Negative.    Psychiatric/Behavioral: Negative.        Compared to one year ago, the patient feels her physical health is better.  Compared to one year ago, the patient feels her mental health is better.    Reviewed chart for potential of high risk medication in the elderly: yes  Reviewed chart for potential of harmful drug interactions in the elderly:yes    Objective         Vitals:    08/03/21 1105   BP: 132/76   Pulse: 87   Resp: 16   SpO2: 97%   Weight: 94.6 kg (208 lb 9.6 oz)   Height: 170.2 cm (67.01\")   PainSc: 0-No pain       Body mass index is 32.66 kg/m².  Discussed the patient's BMI with her. The BMI is above average; BMI management plan is completed.    Physical Exam    General: Alert and oriented x 3.  No acute distress.  Obese.  Normal affect.  HEENT: Pupils equal, round, reactive to light; extraocular movements intact; sclerae nonicteric; pharynx, ear canals and TMs normal.  " Neck: Without JVD, thyromegaly, bruit, or adenopathy.  Lungs: Clear to auscultation in all fields.  Heart: Regular rate and rhythm without murmur, rub, gallop, or click.  Abdomen: Soft, nontender, without hepatosplenomegaly or hernia.  Bowel sounds normal.  : Deferred.  Rectal: Deferred.  Extremities: Without clubbing, cyanosis, edema, or pulse deficit.  Neurologic: Intact without focal deficit.  Normal station and gait observed during ingress and egress from the examination room.  Skin: Without significant lesion.  Musculoskeletal: Unremarkable.    Lab Results   Component Value Date    CHLPL 175 07/23/2021    TRIG 126 07/23/2021    HGBA1C 6.54 (H) 07/23/2021        Assessment/Plan   Medicare Risks and Personalized Health Plan  CMS Preventative Services Quick Reference  Advance Directive Discussion  Cardiovascular risk  Depression/Dysphoria  Diabetic Lab Screening   Obesity/Overweight     The above risks/problems have been discussed with the patient.  Pertinent information has been shared with the patient in the After Visit Summary.  Follow up plans and orders are seen below in the Assessment/Plan Section.    Diagnoses and all orders for this visit:    1. Encounter for subsequent annual wellness visit (AWV) in Medicare patient (Primary)    2. Type 2 diabetes mellitus with diabetic neuropathy, without long-term current use of insulin (CMS/[facility])  -     Comprehensive Metabolic Panel; Future  -     Hemoglobin A1c; Future    3. Hyperlipidemia  -     CK; Future  -     Comprehensive Metabolic Panel; Future  -     NMR LipoProfile; Future    4. Primary hypothyroidism  -     TSH; Future  -     T4, Free; Future  -     T3, Free; Future    5. Benign essential hypertension    6. Chronic insomnia    7. Chronic bilateral low back pain without sciatica    8. Chronic bilateral thoracic back pain    9. Vitamin D deficiency  -     Vitamin D 25 Hydroxy; Future    10. Menopausal state    11. Diabetic peripheral neuropathy  (CMS/[facility])    12. Situational mixed anxiety and depressive disorder    13. Generalized anxiety disorder    14. Major depression    15. ACE-inhibitor cough    16. Rotator cuff arthropathy of right shoulder, 4/20/2021--status post right reverse total shoulder arthroplasty    17. Therapeutic drug monitoring  -     CBC (No Diff); Future    18. Macrocytosis  -     CBC (No Diff); Future      Follow Up:  Return in about 6 months (around 2/3/2022) for Next scheduled follow up with lab prior.     An After Visit Summary and PPPS were given to the patient.

## 2021-08-06 DIAGNOSIS — I10 BENIGN ESSENTIAL HYPERTENSION: Chronic | ICD-10-CM

## 2021-08-06 RX ORDER — VALSARTAN 320 MG/1
TABLET ORAL
Qty: 30 TABLET | Refills: 3 | Status: SHIPPED | OUTPATIENT
Start: 2021-08-06 | End: 2021-10-18

## 2021-08-10 ENCOUNTER — TELEPHONE (OUTPATIENT)
Dept: INTERNAL MEDICINE | Facility: CLINIC | Age: 76
End: 2021-08-10

## 2021-08-10 DIAGNOSIS — L98.9 SKIN LESION OF FOOT: Primary | ICD-10-CM

## 2021-08-10 NOTE — TELEPHONE ENCOUNTER
I cannot really make anything out of this message.  It sounds like to me she wants a referral to see Dr. Mcqueen podiatrist.  It sounds like it is for a spot on her foot.  If this is all that she needs and go ahead and put the referral and I will sign it.

## 2021-08-10 NOTE — TELEPHONE ENCOUNTER
Caller: McManus, Claudette L    Relationship: Self    Best call back number: [phone]     What orders are you requesting (i.e. lab or imaging): ORDERS FOR SPOT ON LEFT FOOT     In what timeframe would the patient need to come in: ASAP     Where will you receive your lab/imaging services:  DR. HERRERA PODI[facility]RIST   [phone]     Additional notes: N/A

## 2021-08-18 DIAGNOSIS — E03.9 HYPOTHYROIDISM, ACQUIRED: ICD-10-CM

## 2021-08-18 RX ORDER — LEVOTHYROXINE SODIUM 0.12 MG/1
TABLET ORAL
Qty: 90 TABLET | Refills: 1 | Status: SHIPPED | OUTPATIENT
Start: 2021-08-18 | End: 2022-01-14 | Stop reason: SDUPTHER

## 2021-08-24 DIAGNOSIS — E78.2 MIXED HYPERLIPIDEMIA: ICD-10-CM

## 2021-08-24 RX ORDER — EZETIMIBE 10 MG/1
TABLET ORAL
Qty: 30 TABLET | Refills: 3 | Status: SHIPPED | OUTPATIENT
Start: 2021-08-24 | End: 2022-01-04

## 2021-08-24 RX ORDER — SIMVASTATIN 20 MG
TABLET ORAL
Qty: 72 TABLET | Refills: 1 | Status: SHIPPED | OUTPATIENT
Start: 2021-08-24 | End: 2022-01-14 | Stop reason: SDUPTHER

## 2021-10-17 DIAGNOSIS — I10 BENIGN ESSENTIAL HYPERTENSION: Chronic | ICD-10-CM

## 2021-10-18 RX ORDER — LOSARTAN POTASSIUM 50 MG/1
TABLET ORAL
Qty: 90 TABLET | Refills: 1 | Status: SHIPPED | OUTPATIENT
Start: 2021-10-18 | End: 2022-02-08

## 2021-10-18 RX ORDER — HYDROCHLOROTHIAZIDE 12.5 MG/1
TABLET ORAL
Qty: 90 TABLET | Refills: 1 | Status: SHIPPED | OUTPATIENT
Start: 2021-10-18 | End: 2022-02-08

## 2021-11-16 DIAGNOSIS — F33.41 RECURRENT MAJOR DEPRESSIVE DISORDER, IN PARTIAL REMISSION (HCC): Chronic | ICD-10-CM

## 2021-11-16 DIAGNOSIS — G47.09 OTHER INSOMNIA: ICD-10-CM

## 2021-11-16 RX ORDER — TRAZODONE HYDROCHLORIDE 150 MG/1
TABLET ORAL
Qty: 30 TABLET | Refills: 4 | Status: SHIPPED | OUTPATIENT
Start: 2021-11-16 | End: 2022-03-17

## 2021-11-16 RX ORDER — DULOXETIN HYDROCHLORIDE 60 MG/1
CAPSULE, DELAYED RELEASE ORAL
Qty: 30 CAPSULE | Refills: 4 | Status: SHIPPED | OUTPATIENT
Start: 2021-11-16 | End: 2022-03-17

## 2021-12-07 DIAGNOSIS — I10 BENIGN ESSENTIAL HYPERTENSION: Primary | ICD-10-CM

## 2021-12-07 RX ORDER — VALSARTAN 320 MG/1
TABLET ORAL
Qty: 90 TABLET | Refills: 3 | Status: SHIPPED | OUTPATIENT
Start: 2021-12-07 | End: 2022-10-13

## 2022-01-04 DIAGNOSIS — E78.2 MIXED HYPERLIPIDEMIA: ICD-10-CM

## 2022-01-04 RX ORDER — EZETIMIBE 10 MG/1
TABLET ORAL
Qty: 30 TABLET | Refills: 3 | Status: SHIPPED | OUTPATIENT
Start: 2022-01-04 | End: 2022-03-17

## 2022-01-14 ENCOUNTER — TELEPHONE (OUTPATIENT)
Dept: INTERNAL MEDICINE | Facility: CLINIC | Age: 77
End: 2022-01-14

## 2022-01-14 DIAGNOSIS — E78.2 MIXED HYPERLIPIDEMIA: ICD-10-CM

## 2022-01-14 DIAGNOSIS — E03.9 HYPOTHYROIDISM, ACQUIRED: ICD-10-CM

## 2022-01-14 RX ORDER — SIMVASTATIN 20 MG
TABLET ORAL
Qty: 90 TABLET | Refills: 2 | Status: SHIPPED | OUTPATIENT
Start: 2022-01-14 | End: 2022-09-28

## 2022-01-14 RX ORDER — LEVOTHYROXINE SODIUM 0.12 MG/1
125 TABLET ORAL DAILY
Qty: 90 TABLET | Refills: 2 | Status: SHIPPED | OUTPATIENT
Start: 2022-01-14 | End: 2022-08-23

## 2022-01-14 NOTE — TELEPHONE ENCOUNTER
Caller: ROSY    Relationship: [facility]     Best call back number: [phone]    Requested Prescriptions:   Requested Prescriptions     Pending Prescriptions Disp Refills   • levothyroxine (SYNTHROID, LEVOTHROID) 125 MCG tablet 90 tablet 1     Sig: Take 1 tablet by mouth Daily.   • simvastatin (ZOCOR) 20 MG tablet 72 tablet 1        Pharmacy where request should be sent: [facility] - [address] - [phone] [facility] [phone] FX     Additional details provided by patient: OUT OF MEDICATION     Does the patient have less than a 3 day supply:  [x] Yes  [] No    Bhavesh Heller Rep   01/14/22 15:40 EST

## 2022-02-01 ENCOUNTER — LAB (OUTPATIENT)
Dept: LAB | Facility: HOSPITAL | Age: 77
End: 2022-02-01

## 2022-02-01 DIAGNOSIS — D75.89 MACROCYTOSIS: ICD-10-CM

## 2022-02-01 DIAGNOSIS — E03.9 PRIMARY HYPOTHYROIDISM: Chronic | ICD-10-CM

## 2022-02-01 DIAGNOSIS — E55.9 VITAMIN D DEFICIENCY: Chronic | ICD-10-CM

## 2022-02-01 DIAGNOSIS — Z51.81 THERAPEUTIC DRUG MONITORING: ICD-10-CM

## 2022-02-01 DIAGNOSIS — E78.2 MIXED HYPERLIPIDEMIA: Chronic | ICD-10-CM

## 2022-02-01 DIAGNOSIS — E11.40 TYPE 2 DIABETES MELLITUS WITH DIABETIC NEUROPATHY, WITHOUT LONG-TERM CURRENT USE OF INSULIN: Chronic | ICD-10-CM

## 2022-02-01 LAB
25(OH)D3 SERPL-MCNC: 47.8 NG/ML (ref 30–100)
ALBUMIN SERPL-MCNC: 4.8 G/DL (ref 3.5–5.2)
ALBUMIN/GLOB SERPL: 2.2 G/DL
ALP SERPL-CCNC: 74 U/L (ref 39–117)
ALT SERPL W P-5'-P-CCNC: 25 U/L (ref 1–33)
ANION GAP SERPL CALCULATED.3IONS-SCNC: 12 MMOL/L (ref 5–15)
AST SERPL-CCNC: 22 U/L (ref 1–32)
BILIRUB SERPL-MCNC: 0.4 MG/DL (ref 0–1.2)
BUN SERPL-MCNC: 14 MG/DL (ref 8–23)
BUN/CREAT SERPL: 16.1 (ref 7–25)
CALCIUM SPEC-SCNC: 10.2 MG/DL (ref 8.6–10.5)
CHLORIDE SERPL-SCNC: 94 MMOL/L (ref 98–107)
CK SERPL-CCNC: 111 U/L (ref 20–180)
CO2 SERPL-SCNC: 26 MMOL/L (ref 22–29)
CREAT SERPL-MCNC: 0.87 MG/DL (ref 0.57–1)
DEPRECATED RDW RBC AUTO: 40.7 FL (ref 37–54)
ERYTHROCYTE [DISTWIDTH] IN BLOOD BY AUTOMATED COUNT: 12 % (ref 12.3–15.4)
GFR SERPL CREATININE-BSD FRML MDRD: 63 ML/MIN/1.73
GLOBULIN UR ELPH-MCNC: 2.2 GM/DL
GLUCOSE SERPL-MCNC: 110 MG/DL (ref 65–99)
HBA1C MFR BLD: 7.08 % (ref 4.8–5.6)
HCT VFR BLD AUTO: 38.6 % (ref 34–46.6)
HGB BLD-MCNC: 13.3 G/DL (ref 12–15.9)
MCH RBC QN AUTO: 32 PG (ref 26.6–33)
MCHC RBC AUTO-ENTMCNC: 34.5 G/DL (ref 31.5–35.7)
MCV RBC AUTO: 92.8 FL (ref 79–97)
PLATELET # BLD AUTO: 264 10*3/MM3 (ref 140–450)
PMV BLD AUTO: 9.6 FL (ref 6–12)
POTASSIUM SERPL-SCNC: 4.5 MMOL/L (ref 3.5–5.2)
PROT SERPL-MCNC: 7 G/DL (ref 6–8.5)
RBC # BLD AUTO: 4.16 10*6/MM3 (ref 3.77–5.28)
SODIUM SERPL-SCNC: 132 MMOL/L (ref 136–145)
T3FREE SERPL-MCNC: 2.77 PG/ML (ref 2–4.4)
T4 FREE SERPL-MCNC: 1.32 NG/DL (ref 0.93–1.7)
TSH SERPL DL<=0.05 MIU/L-ACNC: 0.34 UIU/ML (ref 0.27–4.2)
WBC NRBC COR # BLD: 5.64 10*3/MM3 (ref 3.4–10.8)

## 2022-02-01 PROCEDURE — 36415 COLL VENOUS BLD VENIPUNCTURE: CPT

## 2022-02-01 PROCEDURE — 83036 HEMOGLOBIN GLYCOSYLATED A1C: CPT

## 2022-02-01 PROCEDURE — 84481 FREE ASSAY (FT-3): CPT

## 2022-02-01 PROCEDURE — 84439 ASSAY OF FREE THYROXINE: CPT

## 2022-02-01 PROCEDURE — 80061 LIPID PANEL: CPT

## 2022-02-01 PROCEDURE — 83704 LIPOPROTEIN BLD QUAN PART: CPT

## 2022-02-01 PROCEDURE — 82550 ASSAY OF CK (CPK): CPT

## 2022-02-01 PROCEDURE — 82306 VITAMIN D 25 HYDROXY: CPT

## 2022-02-01 PROCEDURE — 80053 COMPREHEN METABOLIC PANEL: CPT

## 2022-02-01 PROCEDURE — 85027 COMPLETE CBC AUTOMATED: CPT

## 2022-02-01 PROCEDURE — 84443 ASSAY THYROID STIM HORMONE: CPT

## 2022-02-02 LAB
CHOLEST SERPL-MCNC: 189 MG/DL (ref 100–199)
HDL SERPL-SCNC: 42.1 UMOL/L
HDLC SERPL-MCNC: 65 MG/DL
LDL SERPL QN: 21.1 NM
LDL SERPL-SCNC: 1159 NMOL/L
LDL SMALL SERPL-SCNC: 374 NMOL/L
LDLC SERPL CALC-MCNC: 98 MG/DL (ref 0–99)
TRIGL SERPL-MCNC: 152 MG/DL (ref 0–149)

## 2022-02-08 ENCOUNTER — OFFICE VISIT (OUTPATIENT)
Dept: INTERNAL MEDICINE | Facility: CLINIC | Age: 77
End: 2022-02-08

## 2022-02-08 VITALS
SYSTOLIC BLOOD PRESSURE: 132 MMHG | WEIGHT: 194.6 LBS | RESPIRATION RATE: 18 BRPM | HEART RATE: 85 BPM | OXYGEN SATURATION: 97 % | BODY MASS INDEX: 30.54 KG/M2 | HEIGHT: 67 IN | DIASTOLIC BLOOD PRESSURE: 70 MMHG

## 2022-02-08 DIAGNOSIS — F41.1 GENERALIZED ANXIETY DISORDER: Chronic | ICD-10-CM

## 2022-02-08 DIAGNOSIS — G89.29 CHRONIC BILATERAL THORACIC BACK PAIN: Chronic | ICD-10-CM

## 2022-02-08 DIAGNOSIS — Z12.31 BREAST CANCER SCREENING BY MAMMOGRAM: ICD-10-CM

## 2022-02-08 DIAGNOSIS — G89.29 CHRONIC BILATERAL LOW BACK PAIN WITHOUT SCIATICA: Chronic | ICD-10-CM

## 2022-02-08 DIAGNOSIS — E55.9 VITAMIN D DEFICIENCY: Chronic | ICD-10-CM

## 2022-02-08 DIAGNOSIS — M15.9 PRIMARY OSTEOARTHRITIS INVOLVING MULTIPLE JOINTS: Chronic | ICD-10-CM

## 2022-02-08 DIAGNOSIS — N95.1 MENOPAUSAL STATE: Chronic | ICD-10-CM

## 2022-02-08 DIAGNOSIS — E78.2 MIXED HYPERLIPIDEMIA: Chronic | ICD-10-CM

## 2022-02-08 DIAGNOSIS — F33.41 RECURRENT MAJOR DEPRESSIVE DISORDER, IN PARTIAL REMISSION: Chronic | ICD-10-CM

## 2022-02-08 DIAGNOSIS — E87.1 HYPONATREMIA: ICD-10-CM

## 2022-02-08 DIAGNOSIS — M54.6 CHRONIC BILATERAL THORACIC BACK PAIN: Chronic | ICD-10-CM

## 2022-02-08 DIAGNOSIS — Z51.81 THERAPEUTIC DRUG MONITORING: ICD-10-CM

## 2022-02-08 DIAGNOSIS — F43.23 SITUATIONAL MIXED ANXIETY AND DEPRESSIVE DISORDER: Chronic | ICD-10-CM

## 2022-02-08 DIAGNOSIS — M54.50 CHRONIC BILATERAL LOW BACK PAIN WITHOUT SCIATICA: Chronic | ICD-10-CM

## 2022-02-08 DIAGNOSIS — E11.40 TYPE 2 DIABETES MELLITUS WITH DIABETIC NEUROPATHY, WITHOUT LONG-TERM CURRENT USE OF INSULIN: Primary | Chronic | ICD-10-CM

## 2022-02-08 DIAGNOSIS — F51.04 CHRONIC INSOMNIA: Chronic | ICD-10-CM

## 2022-02-08 DIAGNOSIS — E03.9 PRIMARY HYPOTHYROIDISM: Chronic | ICD-10-CM

## 2022-02-08 DIAGNOSIS — I10 BENIGN ESSENTIAL HYPERTENSION: Chronic | ICD-10-CM

## 2022-02-08 DIAGNOSIS — E11.42 DIABETIC PERIPHERAL NEUROPATHY: Chronic | ICD-10-CM

## 2022-02-08 PROCEDURE — 99214 OFFICE O/P EST MOD 30 MIN: CPT | Performed by: INTERNAL MEDICINE

## 2022-02-08 RX ORDER — METRONIDAZOLE 7.5 MG/G
GEL TOPICAL
COMMUNITY
Start: 2022-01-17 | End: 2022-11-14 | Stop reason: SDUPTHER

## 2022-02-08 NOTE — PROGRESS NOTES
02/08/2022    Patient Information  Claudette L McManus                                                                                          [address]  [place] [address]      1945  [unfilled]  There is no work phone number on file.    Chief Complaint:     Follow-up blood work in order to monitor chronic medical issues listed in history of present illness.  No new acute complaints.    History of Present Illness:    Patient with type 2 diabetes, diabetic peripheral neuropathy, hyperlipidemia, hypothyroidism, hypertension, vitamin D deficiency, mixed anxiety and depressive disorder, chronic insomnia, menopausal state, chronic lower back pain and chronic thoracic back pain, primary osteoarthritis of multiple joints.  She presents today for follow-up with lab prior in order to monitor chronic medical issues.  Her past medical history reviewed and updated were necessary including health maintenance parameters.  This reveals she is up-to-date or else accounted for after today's visit except for mammogram which we will order.    Review of Systems   Constitutional: Negative.   HENT: Negative.    Eyes: Negative.    Cardiovascular: Negative.    Respiratory: Negative.    Endocrine: Negative.    Hematologic/Lymphatic: Negative.    Skin: Negative.    Musculoskeletal: Positive for arthritis and back pain.   Gastrointestinal: Negative.    Genitourinary: Negative.    Neurological: Negative.    Psychiatric/Behavioral: The patient has insomnia.    Allergic/Immunologic: Negative.        Active Problems:    Patient Active Problem List   Diagnosis   • Primary osteoarthritis involving multiple joints   • Benign essential hypertension   • Hyperlipidemia   • Primary hypothyroidism   • Type 2 diabetes mellitus with diabetic neuropathy, without long-term current use of insulin (HCC)   • Chronic insomnia   • Chronic bilateral low back pain without sciatica   • Chronic bilateral thoracic back pain   •  Vitamin D deficiency   • Therapeutic drug monitoring   • Menopausal state, 8/19/2020--normal DEXA.   • Diabetic peripheral neuropathy (HCC)   • Situational mixed anxiety and depressive disorder   • ACE-inhibitor cough   • Hyponatremia         Past Medical History:   Diagnosis Date   • Benign essential hypertension    • Chronic bilateral low back pain without sciatica 8/16/2013 March 13, 2019--Limited bone scan.  This reveals scintigraphic activity in the spine and at the right shoulder corresponds to change seen on recent x-rays and is best explained by degenerative change.  Isolated metastatic disease exactly corresponding to the sites of extensive degenerative disc change would be peculiar.  March 7, 2019--new patient to get established.  She has complaints of approxi   • Chronic bilateral thoracic back pain 2/26/2019 March 13, 2019--Limited bone scan.  This reveals scintigraphic activity in the spine and at the right shoulder corresponds to change seen on recent x-rays and is best explained by degenerative change.  Isolated metastatic disease exactly corresponding to the sites of extensive degenerative disc change would be peculiar.  March 1, 2019--x-ray of the lumbar and thoracic spine reveals mild anterior l   • Chronic insomnia 11/4/2014   • Diabetic peripheral neuropathy (HCC) 3/7/2019    Characterized by decreased sensation and paresthesias in both lower extremities described as a burning sensation.  Extends up to the knees.  Reportedly had been evaluated with nerve conduction study in the past.   • Generalized anxiety disorder 5/19/2013   • History of Bell's palsy 5/21/2013    Remote history of Bell's palsy.  Patient does not remember which side of the face.   • Hyperlipidemia    • Impaired fasting glucose    • Major depression    • Menopausal state, 8/19/2020--normal DEXA. 3/7/2019    August 19, 2020--DEXA scan reveals lumbar spine T score 3.8.  Left femoral neck T score 2.5.  Right femoral neck T  score 2.2.  Normal bone density.   • Peripheral neuropathy, idiopathic 3/7/2019    Characterized by decreased sensation and paresthesias in both lower extremities described as a burning sensation.  Extends up to the knees.  Reportedly had been evaluated with nerve conduction study in the past.   • Primary hypothyroidism 5/19/2013   • Primary osteoarthritis involving multiple joints 4/22/2013   • Rotator cuff arthropathy of right shoulder, 4/20/2021--status post right reverse total shoulder arthroplasty 5/27/2020   • Situational mixed anxiety and depressive disorder 8/21/2019 August 21, 2019--patient seen in follow-up after I called in a prescription for Ativan after the patient's  contacted me a few weeks ago regarding the sudden death of patient's daughter.  This was an apparent suicide and the patient found her daughter dead.  I will not go into details.  Patient reports the Lorazepam has been helpful but she is still quite distraught, nervous, and undergoing   • Type 2 diabetes mellitus with diabetic neuropathy, without long-term current use of insulin (HCC)    • Vitamin D deficiency 2/26/2019         Past Surgical History:   Procedure Laterality Date   • BILATERAL BREAST REDUCTION  Early 2000    Early 2000--bilateral breast reduction   • CHOLECYSTECTOMY  1960s    1960s--open cholecystectomy   • COLONOSCOPY  2012 2012--reportedly normal colonoscopy   • INCONTINENCE SURGERY  2012 Approximately 2012--implantation of questionable bladder stimulator right sacral area for urinary incontinence.  Details not known.   • REPLACEMENT TOTAL KNEE BILATERAL Left 2010; 2011 2010-2011--bilateral total knee replacement   • SUBTOTAL HYSTERECTOMY  Remote    Remote partial hysterectomy.  Ovaries intact.   • TOTAL SHOULDER REPLACEMENT Left 2013 2013--left total shoulder replacement.   • TOTAL SHOULDER REPLACEMENT  April 28, 2021 4/20/2021--status post right reverse total shoulder arthroplasty   • TOTAL  SHOULDER REVERSE ARTHROPLASTY Right 2021--right reverse total shoulder replacement.         Allergies   Allergen Reactions   • Morphine And Related    • Other Other (See Comments)     REJECTED DACRON SUTURES IN THE PAST AND INCISION CAME APART           Current Outpatient Medications:   •  Ascorbic Acid (Vitamin C ER) 1000 MG tablet controlled-release, Take  by mouth Daily., Disp: , Rfl:   •  aspirin (aspirin) 81 MG EC tablet, Take 1 p.o. daily until further notice., Disp: , Rfl:   •  Calcium Carb-Cholecalciferol (CALCIUM + D3 PO), Take 1 tablet by mouth daily., Disp: , Rfl:   •  Cholecalciferol (VITAMIN D3) 2000 UNITS tablet, Take 1 capsule by mouth daily., Disp: , Rfl:   •  DULoxetine (CYMBALTA) 60 MG capsule, TAKE ONE CAPSULE BY MOUTH DAILY AS DIRECTED, Disp: 30 capsule, Rfl: 4  •  ezetimibe (ZETIA) 10 MG tablet, TAKE ONE TABLET BY MOUTH DAILY, Disp: 30 tablet, Rfl: 3  •  levothyroxine (SYNTHROID, LEVOTHROID) 125 MCG tablet, Take 1 tablet by mouth Daily., Disp: 90 tablet, Rfl: 2  •  metroNIDAZOLE (METROGEL) 0.75 % gel, , Disp: , Rfl:   •  Multiple Vitamin (MULTIVITAMIN) tablet, Take 1 tablet by mouth daily., Disp: , Rfl:   •  simvastatin (ZOCOR) 20 MG tablet, Take one tablet by mouth daily for high cholesterol., Disp: 90 tablet, Rfl: 2  •  traZODone (DESYREL) 150 MG tablet, TAKE ONE TABLET BY MOUTH ONCE NIGHTLY, Disp: 30 tablet, Rfl: 4  •  valsartan (DIOVAN) 320 MG tablet, TAKE ONE TABLET BY MOUTH EVERY MORNING FOR BLOOD PRESSURE, Disp: 90 tablet, Rfl: 3  •  Zinc 100 MG tablet, Take  by mouth Daily., Disp: , Rfl:       Family History   Problem Relation Age of Onset   • Alcohol abuse Father    • Cancer Other    • Diabetes Other         type II         Social History     Socioeconomic History   • Marital status:    Tobacco Use   • Smoking status: Former Smoker     Quit date: 1998     Years since quittin.1   • Smokeless tobacco: Never Used   Vaping Use   • Vaping Use: Never used  "  Substance and Sexual Activity   • Alcohol use: Yes     Alcohol/week: 1.0 standard drink     Types: 1 Glasses of wine per week     Comment: current some day   • Drug use: No   • Sexual activity: Not Currently     Partners: Male         Vitals:    02/08/22 1032   BP: 132/70   Pulse: 85   Resp: 18   SpO2: 97%   Weight: 88.3 kg (194 lb 9.6 oz)   Height: 170.2 cm (67\")        Body mass index is 30.48 kg/m².      Physical Exam:    General: Alert and oriented x 3.  No acute distress.  Obese.  Normal affect.  HEENT: Pupils equal, round, reactive to light; extraocular movements intact; sclerae nonicteric; pharynx, ear canals and TMs normal.  Neck: Without JVD, thyromegaly, bruit, or adenopathy.  Lungs: Clear to auscultation in all fields.  Heart: Regular rate and rhythm without murmur, rub, gallop, or click.  Abdomen: Soft, nontender, without hepatosplenomegaly or hernia.  Bowel sounds normal.  : Deferred.  Rectal: Deferred.  Extremities: Without clubbing, cyanosis, edema, or pulse deficit.  Neurologic: Intact without focal deficit.  Normal station and gait observed during ingress and egress from the examination room.  Skin: Without significant lesion.  Musculoskeletal: Unremarkable.    Lab/other results:    CBC is normal.  CPK normal.  CMP normal except glucose 110, sodium low at 132.  Hemoglobin A1c excellent at 7.08.  NMR reveals a total cholesterol of 199, triglycerides slightly elevated 152.  LDL particle number slightly elevated 1159.  HDL particle number good at 42.1.  Thyroid function tests are normal.  Vitamin D is normal at 47.8.    Assessment/Plan:     Diagnosis Plan   1. Type 2 diabetes mellitus with diabetic neuropathy, without long-term current use of insulin (HCC)  Comprehensive Metabolic Panel    Hemoglobin A1c    Microalbumin / Creatinine Urine Ratio - Urine, Clean Catch    Urinalysis With Microscopic If Indicated (No Culture) - Urine, Clean Catch   2. Diabetic peripheral neuropathy (HCC)     3. " Hyperlipidemia  CK    Comprehensive Metabolic Panel    NMR LipoProfile   4. Primary hypothyroidism  TSH    T4, Free    T3, Free   5. Benign essential hypertension     6. Vitamin D deficiency  Vitamin D 25 Hydroxy   7. Situational mixed anxiety and depressive disorder     8. Major depression     9. Generalized anxiety disorder     10. Chronic insomnia     11. Menopausal state, 8/19/2020--normal DEXA.     12. Chronic bilateral thoracic back pain     13. Chronic bilateral low back pain without sciatica     14. Primary osteoarthritis involving multiple joints     15. Therapeutic drug monitoring  CBC (No Diff)   16. Breast cancer screening by mammogram  Mammo Screening Bilateral With CAD   17. Hyponatremia       Patient has type 2 diabetes is under excellent control.  Her diabetic peripheral neuropathy is not severe and currently she is not requiring medication for this.  Hyperlipidemia is under good control with simvastatin and Zetia.  Her thyroid is therapeutic on the current dose.  Blood pressure seems well controlled.  Her sodium is a little low but we will continue to monitor.  Vitamin D is in the normal range which is important given her postmenopausal status.  Her mixed anxiety and depression seems to be well controlled at present time.  She has chronic insomnia requiring medication which is very helpful.  Her low back and thoracic back pain seems to be fairly well controlled at present time.    Plan is as follows: Strongly recommend low carbohydrate diet, exercise, and weight loss.  No changes in current medical regimen.  Mammogram ordered.  Patient will follow-up after August 3, 2022 with lab prior and this will also be subsequent Medicare wellness visit.  Otherwise she will follow-up as needed.  Also encouraged her to get influenza vaccine at local drugstore as we are currently out of stock.    Addendum: Last year patient had a right reverse total shoulder replacement and at that time her sodium was quite low  postoperatively with a sodium down to 125.  Apparently an endocrinologist or renal physician saw her and did an evaluation including cortisol levels which were normal.  Although her sodium is not as low as it was perioperatively last year, I am little uncomfortable with her having hyponatremia and I think it is a direct result of the hydrochlorothiazide.  Her blood pressure is under good control on the current regimen but I would like to see if we can do without the hydrochlorothiazide even if that means making an adjustment such as adding a small dose of amlodipine.  I will have her discontinue the hydrochlorothiazide and I will have her follow back up in about a month so we can check her blood pressure and recheck her sodium after that visit.    Procedures

## 2022-03-08 ENCOUNTER — LAB (OUTPATIENT)
Dept: LAB | Facility: HOSPITAL | Age: 77
End: 2022-03-08

## 2022-03-08 ENCOUNTER — OFFICE VISIT (OUTPATIENT)
Dept: INTERNAL MEDICINE | Facility: CLINIC | Age: 77
End: 2022-03-08

## 2022-03-08 VITALS
RESPIRATION RATE: 18 BRPM | OXYGEN SATURATION: 98 % | DIASTOLIC BLOOD PRESSURE: 72 MMHG | WEIGHT: 195.4 LBS | SYSTOLIC BLOOD PRESSURE: 136 MMHG | BODY MASS INDEX: 30.67 KG/M2 | HEIGHT: 67 IN | HEART RATE: 89 BPM

## 2022-03-08 DIAGNOSIS — I10 BENIGN ESSENTIAL HYPERTENSION: Chronic | ICD-10-CM

## 2022-03-08 DIAGNOSIS — E87.1 HYPONATREMIA: Primary | ICD-10-CM

## 2022-03-08 LAB
ALBUMIN SERPL-MCNC: 4.7 G/DL (ref 3.5–5.2)
ALBUMIN/GLOB SERPL: 1.6 G/DL
ALP SERPL-CCNC: 71 U/L (ref 39–117)
ALT SERPL W P-5'-P-CCNC: 20 U/L (ref 1–33)
ANION GAP SERPL CALCULATED.3IONS-SCNC: 12 MMOL/L (ref 5–15)
AST SERPL-CCNC: 21 U/L (ref 1–32)
BILIRUB SERPL-MCNC: 0.3 MG/DL (ref 0–1.2)
BUN SERPL-MCNC: 16 MG/DL (ref 8–23)
BUN/CREAT SERPL: 20 (ref 7–25)
CALCIUM SPEC-SCNC: 9.8 MG/DL (ref 8.6–10.5)
CHLORIDE SERPL-SCNC: 94 MMOL/L (ref 98–107)
CO2 SERPL-SCNC: 28 MMOL/L (ref 22–29)
CREAT SERPL-MCNC: 0.8 MG/DL (ref 0.57–1)
EGFRCR SERPLBLD CKD-EPI 2021: 75.1 ML/MIN/1.73
GLOBULIN UR ELPH-MCNC: 2.9 GM/DL
GLUCOSE SERPL-MCNC: 93 MG/DL (ref 65–99)
POTASSIUM SERPL-SCNC: 4.6 MMOL/L (ref 3.5–5.2)
PROT SERPL-MCNC: 7.6 G/DL (ref 6–8.5)
SODIUM SERPL-SCNC: 134 MMOL/L (ref 136–145)

## 2022-03-08 PROCEDURE — 80053 COMPREHEN METABOLIC PANEL: CPT | Performed by: INTERNAL MEDICINE

## 2022-03-08 PROCEDURE — 99213 OFFICE O/P EST LOW 20 MIN: CPT | Performed by: INTERNAL MEDICINE

## 2022-03-08 PROCEDURE — 36415 COLL VENOUS BLD VENIPUNCTURE: CPT | Performed by: INTERNAL MEDICINE

## 2022-03-08 NOTE — PROGRESS NOTES
03/08/2022    Patient Information  Claudette L McManus                                                                                          [address]  [place] [address]      1945  [unfilled]  There is no work phone number on file.    Chief Complaint:     Follow-up low sodium, high blood pressure, recent medication adjustment.    History of Present Illness:    With a history of hypertension, hyperlipidemia, hypothyroidism, type 2 diabetes, chronic insomnia, chronic back pain, vitamin D deficiency, persistent hyponatremia felt to be related to thiazide diuretic.  She presents today to follow-up primarily on her low sodium and blood pressure after we discontinued the thiazide diuretic.  Patient tolerated this well and her blood pressure appears to be controlled.  Her past medical history reviewed and updated were necessary including health maintenance parameters.  This reveals she is up-to-date or else accounted for.    Review of Systems   Constitutional: Negative.   HENT: Negative.    Eyes: Negative.    Cardiovascular: Negative.    Respiratory: Negative.    Endocrine: Negative.    Hematologic/Lymphatic: Negative.    Skin: Negative.    Musculoskeletal: Positive for arthritis and back pain.   Gastrointestinal: Negative.    Genitourinary: Negative.    Neurological: Negative.    Psychiatric/Behavioral: Negative.    Allergic/Immunologic: Negative.        Active Problems:    Patient Active Problem List   Diagnosis   • Primary osteoarthritis involving multiple joints   • Benign essential hypertension   • Hyperlipidemia   • Primary hypothyroidism   • Type 2 diabetes mellitus with diabetic neuropathy, without long-term current use of insulin (HCC)   • Chronic insomnia   • Chronic bilateral low back pain without sciatica   • Chronic bilateral thoracic back pain   • Vitamin D deficiency   • Therapeutic drug monitoring   • Menopausal state, 8/19/2020--normal DEXA.   • Diabetic peripheral  neuropathy (HCC)   • Situational mixed anxiety and depressive disorder   • ACE-inhibitor cough   • Hyponatremia         Past Medical History:   Diagnosis Date   • Benign essential hypertension    • Chronic bilateral low back pain without sciatica 8/16/2013 March 13, 2019--Limited bone scan.  This reveals scintigraphic activity in the spine and at the right shoulder corresponds to change seen on recent x-rays and is best explained by degenerative change.  Isolated metastatic disease exactly corresponding to the sites of extensive degenerative disc change would be peculiar.  March 7, 2019--new patient to get established.  She has complaints of approxi   • Chronic bilateral thoracic back pain 2/26/2019 March 13, 2019--Limited bone scan.  This reveals scintigraphic activity in the spine and at the right shoulder corresponds to change seen on recent x-rays and is best explained by degenerative change.  Isolated metastatic disease exactly corresponding to the sites of extensive degenerative disc change would be peculiar.  March 1, 2019--x-ray of the lumbar and thoracic spine reveals mild anterior l   • Chronic insomnia 11/4/2014   • Diabetic peripheral neuropathy (HCC) 3/7/2019    Characterized by decreased sensation and paresthesias in both lower extremities described as a burning sensation.  Extends up to the knees.  Reportedly had been evaluated with nerve conduction study in the past.   • Generalized anxiety disorder 5/19/2013   • History of Bell's palsy 5/21/2013    Remote history of Bell's palsy.  Patient does not remember which side of the face.   • Hyperlipidemia    • Impaired fasting glucose    • Major depression    • Menopausal state, 8/19/2020--normal DEXA. 3/7/2019    August 19, 2020--DEXA scan reveals lumbar spine T score 3.8.  Left femoral neck T score 2.5.  Right femoral neck T score 2.2.  Normal bone density.   • Peripheral neuropathy, idiopathic 3/7/2019    Characterized by decreased sensation and  paresthesias in both lower extremities described as a burning sensation.  Extends up to the knees.  Reportedly had been evaluated with nerve conduction study in the past.   • Primary hypothyroidism 5/19/2013   • Primary osteoarthritis involving multiple joints 4/22/2013   • Rotator cuff arthropathy of right shoulder, 4/20/2021--status post right reverse total shoulder arthroplasty 5/27/2020   • Situational mixed anxiety and depressive disorder 8/21/2019 August 21, 2019--patient seen in follow-up after I called in a prescription for Ativan after the patient's  contacted me a few weeks ago regarding the sudden death of patient's daughter.  This was an apparent suicide and the patient found her daughter dead.  I will not go into details.  Patient reports the Lorazepam has been helpful but she is still quite distraught, nervous, and undergoing   • Type 2 diabetes mellitus with diabetic neuropathy, without long-term current use of insulin (HCC)    • Vitamin D deficiency 2/26/2019         Past Surgical History:   Procedure Laterality Date   • BILATERAL BREAST REDUCTION  Early 2000    Early 2000--bilateral breast reduction   • CHOLECYSTECTOMY  1960s    1960s--open cholecystectomy   • COLONOSCOPY  2012 2012--reportedly normal colonoscopy   • INCONTINENCE SURGERY  2012 Approximately 2012--implantation of questionable bladder stimulator right sacral area for urinary incontinence.  Details not known.   • REPLACEMENT TOTAL KNEE BILATERAL Left 2010; 2011 2010-2011--bilateral total knee replacement   • SUBTOTAL HYSTERECTOMY  Remote    Remote partial hysterectomy.  Ovaries intact.   • TOTAL SHOULDER REPLACEMENT Left 2013 2013--left total shoulder replacement.   • TOTAL SHOULDER REPLACEMENT  April 28, 2021 4/20/2021--status post right reverse total shoulder arthroplasty   • TOTAL SHOULDER REVERSE ARTHROPLASTY Right 04/20/2021 4/20/2021--right reverse total shoulder replacement.         Allergies    Allergen Reactions   • Morphine And Related    • Other Other (See Comments)     REJECTED DACRON SUTURES IN THE PAST AND INCISION CAME APART           Current Outpatient Medications:   •  Ascorbic Acid (Vitamin C ER) 1000 MG tablet controlled-release, Take  by mouth Daily., Disp: , Rfl:   •  Calcium Carb-Cholecalciferol (CALCIUM + D3 PO), Take 1 tablet by mouth daily., Disp: , Rfl:   •  Cholecalciferol (VITAMIN D3) 2000 UNITS tablet, Take 1 capsule by mouth daily., Disp: , Rfl:   •  DULoxetine (CYMBALTA) 60 MG capsule, TAKE ONE CAPSULE BY MOUTH DAILY AS DIRECTED, Disp: 30 capsule, Rfl: 4  •  ezetimibe (ZETIA) 10 MG tablet, TAKE ONE TABLET BY MOUTH DAILY, Disp: 30 tablet, Rfl: 3  •  levothyroxine (SYNTHROID, LEVOTHROID) 125 MCG tablet, Take 1 tablet by mouth Daily., Disp: 90 tablet, Rfl: 2  •  metroNIDAZOLE (METROGEL) 0.75 % gel, , Disp: , Rfl:   •  Multiple Vitamin (MULTIVITAMIN) tablet, Take 1 tablet by mouth daily., Disp: , Rfl:   •  simvastatin (ZOCOR) 20 MG tablet, Take one tablet by mouth daily for high cholesterol., Disp: 90 tablet, Rfl: 2  •  traZODone (DESYREL) 150 MG tablet, TAKE ONE TABLET BY MOUTH ONCE NIGHTLY, Disp: 30 tablet, Rfl: 4  •  valsartan (DIOVAN) 320 MG tablet, TAKE ONE TABLET BY MOUTH EVERY MORNING FOR BLOOD PRESSURE, Disp: 90 tablet, Rfl: 3      Family History   Problem Relation Age of Onset   • Alcohol abuse Father    • Cancer Other    • Diabetes Other         type II         Social History     Socioeconomic History   • Marital status:    Tobacco Use   • Smoking status: Former Smoker     Quit date: 1998     Years since quittin.1   • Smokeless tobacco: Never Used   Vaping Use   • Vaping Use: Never used   Substance and Sexual Activity   • Alcohol use: Yes     Alcohol/week: 1.0 standard drink     Types: 1 Glasses of wine per week     Comment: current some day   • Drug use: No   • Sexual activity: Not Currently     Partners: Male         Vitals:    22 1325   BP: 136/72  "  Pulse: 89   Resp: 18   SpO2: 98%   Weight: 88.6 kg (195 lb 6.4 oz)   Height: 170.2 cm (67\")        Body mass index is 30.6 kg/m².      Physical Exam:    General: Alert and oriented x 3.  No acute distress.  Obese.  Normal affect.  HEENT: Pupils equal, round, reactive to light; extraocular movements intact; sclerae nonicteric; pharynx, ear canals and TMs normal.  Neck: Without JVD, thyromegaly, bruit, or adenopathy.  Lungs: Clear to auscultation in all fields.  Heart: Regular rate and rhythm without murmur, rub, gallop, or click.  Abdomen: Soft, nontender, without hepatosplenomegaly or hernia.  Bowel sounds normal.  : Deferred.  Rectal: Deferred.  Extremities: Without clubbing, cyanosis, edema, or pulse deficit.  Neurologic: Intact without focal deficit.  Normal station and gait observed during ingress and egress from the examination room.  Skin: Without significant lesion.  Musculoskeletal: Unremarkable.    Lab/other results:      Assessment/Plan:     Diagnosis Plan   1. Hyponatremia  Comprehensive Metabolic Panel   2. Benign essential hypertension  Comprehensive Metabolic Panel       Patient blood pressure appears to be controlled after discontinuation of the thiazide diuretic.  She is only on valsartan 320 mg by itself for her high blood pressure and this seems to be controlling it.  Hopefully her low sodium will be better.  We need to assess that today.  I explained to patient I am little uncomfortable letting her go until August for her regular lab and follow-up to recheck the blood pressure.  I think it needs to be done in about 2 months.  We will do any lab prior to that visit.  I may check the sodium in the renal function at that time but that will be after the visit and she does not need to fast.    Plan is as follows: No change in current medical regimen.  Check CMP today.  I will contact patient tomorrow with the results and possible further instructions.  We will set her up for a follow-up on the " blood pressure for about 2 months.       Procedures

## 2022-03-16 DIAGNOSIS — G47.09 OTHER INSOMNIA: ICD-10-CM

## 2022-03-16 DIAGNOSIS — F33.41 RECURRENT MAJOR DEPRESSIVE DISORDER, IN PARTIAL REMISSION: Chronic | ICD-10-CM

## 2022-03-16 DIAGNOSIS — E78.2 MIXED HYPERLIPIDEMIA: ICD-10-CM

## 2022-03-17 RX ORDER — EZETIMIBE 10 MG/1
TABLET ORAL
Qty: 90 TABLET | Refills: 3 | Status: SHIPPED | OUTPATIENT
Start: 2022-03-17

## 2022-03-17 RX ORDER — DULOXETIN HYDROCHLORIDE 60 MG/1
CAPSULE, DELAYED RELEASE ORAL
Qty: 90 CAPSULE | Refills: 3 | Status: SHIPPED | OUTPATIENT
Start: 2022-03-17

## 2022-03-17 RX ORDER — TRAZODONE HYDROCHLORIDE 150 MG/1
TABLET ORAL
Qty: 90 TABLET | Refills: 3 | Status: SHIPPED | OUTPATIENT
Start: 2022-03-17 | End: 2023-04-04

## 2022-05-10 ENCOUNTER — APPOINTMENT (OUTPATIENT)
Dept: MAMMOGRAPHY | Facility: HOSPITAL | Age: 77
End: 2022-05-10

## 2022-05-11 ENCOUNTER — TELEPHONE (OUTPATIENT)
Dept: INTERNAL MEDICINE | Facility: CLINIC | Age: 77
End: 2022-05-11

## 2022-05-11 ENCOUNTER — OFFICE VISIT (OUTPATIENT)
Dept: INTERNAL MEDICINE | Facility: CLINIC | Age: 77
End: 2022-05-11

## 2022-05-11 ENCOUNTER — HOSPITAL ENCOUNTER (OUTPATIENT)
Dept: GENERAL RADIOLOGY | Facility: HOSPITAL | Age: 77
Discharge: HOME | End: 2022-05-11

## 2022-05-11 ENCOUNTER — LAB (OUTPATIENT)
Dept: LAB | Facility: HOSPITAL | Age: 77
End: 2022-05-11

## 2022-05-11 VITALS
BODY MASS INDEX: 29.85 KG/M2 | SYSTOLIC BLOOD PRESSURE: 140 MMHG | RESPIRATION RATE: 18 BRPM | WEIGHT: 190.2 LBS | OXYGEN SATURATION: 98 % | HEART RATE: 85 BPM | DIASTOLIC BLOOD PRESSURE: 82 MMHG | HEIGHT: 67 IN

## 2022-05-11 DIAGNOSIS — R07.9 EXERTIONAL CHEST PAIN: ICD-10-CM

## 2022-05-11 DIAGNOSIS — R06.09 DYSPNEA ON EXERTION: ICD-10-CM

## 2022-05-11 DIAGNOSIS — I10 BENIGN ESSENTIAL HYPERTENSION: Primary | Chronic | ICD-10-CM

## 2022-05-11 DIAGNOSIS — E11.40 TYPE 2 DIABETES MELLITUS WITH DIABETIC NEUROPATHY, WITHOUT LONG-TERM CURRENT USE OF INSULIN: Chronic | ICD-10-CM

## 2022-05-11 DIAGNOSIS — E87.1 HYPONATREMIA: ICD-10-CM

## 2022-05-11 DIAGNOSIS — E78.2 MIXED HYPERLIPIDEMIA: Chronic | ICD-10-CM

## 2022-05-11 LAB
ALBUMIN SERPL-MCNC: 5.1 G/DL (ref 3.5–5.2)
ALBUMIN/GLOB SERPL: 2 G/DL
ALP SERPL-CCNC: 67 U/L (ref 39–117)
ALT SERPL W P-5'-P-CCNC: 19 U/L (ref 1–33)
ANION GAP SERPL CALCULATED.3IONS-SCNC: 10 MMOL/L (ref 5–15)
AST SERPL-CCNC: 19 U/L (ref 1–32)
BILIRUB SERPL-MCNC: 0.3 MG/DL (ref 0–1.2)
BUN SERPL-MCNC: 17 MG/DL (ref 8–23)
BUN/CREAT SERPL: 17.5 (ref 7–25)
CALCIUM SPEC-SCNC: 10.2 MG/DL (ref 8.6–10.5)
CHLORIDE SERPL-SCNC: 98 MMOL/L (ref 98–107)
CO2 SERPL-SCNC: 29 MMOL/L (ref 22–29)
CREAT SERPL-MCNC: 0.97 MG/DL (ref 0.57–1)
D DIMER PPP FEU-MCNC: 0.3 MCGFEU/ML (ref 0–0.49)
DEPRECATED RDW RBC AUTO: 47.3 FL (ref 37–54)
EGFRCR SERPLBLD CKD-EPI 2021: 59.6 ML/MIN/1.73
ERYTHROCYTE [DISTWIDTH] IN BLOOD BY AUTOMATED COUNT: 13.2 % (ref 12.3–15.4)
GLOBULIN UR ELPH-MCNC: 2.5 GM/DL
GLUCOSE SERPL-MCNC: 78 MG/DL (ref 65–99)
HCT VFR BLD AUTO: 39.7 % (ref 34–46.6)
HGB BLD-MCNC: 13.1 G/DL (ref 12–15.9)
MCH RBC QN AUTO: 32.6 PG (ref 26.6–33)
MCHC RBC AUTO-ENTMCNC: 33 G/DL (ref 31.5–35.7)
MCV RBC AUTO: 98.8 FL (ref 79–97)
NT-PROBNP SERPL-MCNC: 249 PG/ML (ref 0–1800)
PLATELET # BLD AUTO: 266 10*3/MM3 (ref 140–450)
PMV BLD AUTO: 9.6 FL (ref 6–12)
POTASSIUM SERPL-SCNC: 4.9 MMOL/L (ref 3.5–5.2)
PROT SERPL-MCNC: 7.6 G/DL (ref 6–8.5)
RBC # BLD AUTO: 4.02 10*6/MM3 (ref 3.77–5.28)
SODIUM SERPL-SCNC: 137 MMOL/L (ref 136–145)
WBC NRBC COR # BLD: 5.87 10*3/MM3 (ref 3.4–10.8)

## 2022-05-11 PROCEDURE — 36415 COLL VENOUS BLD VENIPUNCTURE: CPT | Performed by: INTERNAL MEDICINE

## 2022-05-11 PROCEDURE — 99214 OFFICE O/P EST MOD 30 MIN: CPT | Performed by: INTERNAL MEDICINE

## 2022-05-11 PROCEDURE — 85379 FIBRIN DEGRADATION QUANT: CPT

## 2022-05-11 PROCEDURE — 83880 ASSAY OF NATRIURETIC PEPTIDE: CPT | Performed by: INTERNAL MEDICINE

## 2022-05-11 PROCEDURE — 80053 COMPREHEN METABOLIC PANEL: CPT | Performed by: INTERNAL MEDICINE

## 2022-05-11 PROCEDURE — 71046 X-RAY EXAM CHEST 2 VIEWS: CPT

## 2022-05-11 PROCEDURE — 85027 COMPLETE CBC AUTOMATED: CPT | Performed by: INTERNAL MEDICINE

## 2022-05-11 NOTE — PROGRESS NOTES
05/11/2022    Patient Information  Claudette L McManus                                                                                          [id number]  [place] [address]      1945  [unfilled]  There is no work phone number on file.    Chief Complaint:     Follow-up high blood pressure, recent medication adjustment, low sodium.  New complaint of shortness of breath, particular with exertion and associated with tightness in the chest.    History of Present Illness:    Patient with a history of hypertension which has previously been poorly controlled and associated with low sodium presents today in follow-up.  We changed her medication to valsartan 320 mg/day.  Hydrochlorothiazide was discontinued due to hyponatremia.  She is here today to follow-up on the blood pressure and sodium problems.  Her past medical history reviewed and updated were necessary including health maintenance parameters.  This reveals she is due for colon cancer screening.    May 11, 2022--patient with type 2 diabetes, hyperlipidemia, hypothyroidism, hypertension, chronic lower back pain and diabetic peripheral neuropathy.  She presents today with at least a 1 month history of progressively worsening shortness of breath with exertion and associated with a tightness in her chest that is relieved with rest after 15 or 20 minutes.  No radiation into the neck or jaw.    Review of Systems   Constitutional: Negative.   HENT: Negative.    Eyes: Negative.    Cardiovascular: Positive for chest pain and dyspnea on exertion.        Chest pain described as tightness in the chest with exertion, relieved with rest after 15 or 20 minutes   Respiratory: Positive for shortness of breath. Negative for cough, sputum production and wheezing.    Endocrine: Negative.    Hematologic/Lymphatic: Negative.    Skin: Negative.    Musculoskeletal: Negative.    Gastrointestinal: Negative.    Genitourinary: Negative.    Neurological:  Negative.    Psychiatric/Behavioral: Negative.    Allergic/Immunologic: Negative.        Active Problems:    Patient Active Problem List   Diagnosis   • Primary osteoarthritis involving multiple joints   • Benign essential hypertension   • Hyperlipidemia   • Primary hypothyroidism   • Type 2 diabetes mellitus with diabetic neuropathy, without long-term current use of insulin (HCC)   • Chronic insomnia   • Chronic bilateral low back pain without sciatica   • Chronic bilateral thoracic back pain   • Vitamin D deficiency   • Therapeutic drug monitoring   • Menopausal state, 8/19/2020--normal DEXA.   • Diabetic peripheral neuropathy (HCC)   • Situational mixed anxiety and depressive disorder   • ACE-inhibitor cough   • Hyponatremia   • Dyspnea on exertion   • Exertional chest pain         Past Medical History:   Diagnosis Date   • Benign essential hypertension    • Chronic bilateral low back pain without sciatica 8/16/2013 March 13, 2019--Limited bone scan.  This reveals scintigraphic activity in the spine and at the right shoulder corresponds to change seen on recent x-rays and is best explained by degenerative change.  Isolated metastatic disease exactly corresponding to the sites of extensive degenerative disc change would be peculiar.  March 7, 2019--new patient to get established.  She has complaints of approxi   • Chronic bilateral thoracic back pain 2/26/2019 March 13, 2019--Limited bone scan.  This reveals scintigraphic activity in the spine and at the right shoulder corresponds to change seen on recent x-rays and is best explained by degenerative change.  Isolated metastatic disease exactly corresponding to the sites of extensive degenerative disc change would be peculiar.  March 1, 2019--x-ray of the lumbar and thoracic spine reveals mild anterior l   • Chronic insomnia 11/4/2014   • Diabetic peripheral neuropathy (HCC) 3/7/2019    Characterized by decreased sensation and paresthesias in both lower  extremities described as a burning sensation.  Extends up to the knees.  Reportedly had been evaluated with nerve conduction study in the past.   • Generalized anxiety disorder 5/19/2013   • History of Bell's palsy 5/21/2013    Remote history of Bell's palsy.  Patient does not remember which side of the face.   • Hyperlipidemia    • Impaired fasting glucose    • Major depression    • Menopausal state, 8/19/2020--normal DEXA. 3/7/2019    August 19, 2020--DEXA scan reveals lumbar spine T score 3.8.  Left femoral neck T score 2.5.  Right femoral neck T score 2.2.  Normal bone density.   • Peripheral neuropathy, idiopathic 3/7/2019    Characterized by decreased sensation and paresthesias in both lower extremities described as a burning sensation.  Extends up to the knees.  Reportedly had been evaluated with nerve conduction study in the past.   • Primary hypothyroidism 5/19/2013   • Primary osteoarthritis involving multiple joints 4/22/2013   • Rotator cuff arthropathy of right shoulder, 4/20/2021--status post right reverse total shoulder arthroplasty 5/27/2020   • Situational mixed anxiety and depressive disorder 8/21/2019 August 21, 2019--patient seen in follow-up after I called in a prescription for Ativan after the patient's  contacted me a few weeks ago regarding the sudden death of patient's daughter.  This was an apparent suicide and the patient found her daughter dead.  I will not go into details.  Patient reports the Lorazepam has been helpful but she is still quite distraught, nervous, and undergoing   • Type 2 diabetes mellitus with diabetic neuropathy, without long-term current use of insulin (HCC)    • Vitamin D deficiency 2/26/2019         Past Surgical History:   Procedure Laterality Date   • BILATERAL BREAST REDUCTION  Early 2000    Early 2000--bilateral breast reduction   • CHOLECYSTECTOMY  1960s    1960s--open cholecystectomy   • COLONOSCOPY  2012 2012--reportedly normal colonoscopy   •  INCONTINENCE SURGERY  2012 Approximately 2012--implantation of questionable bladder stimulator right sacral area for urinary incontinence.  Details not known.   • REPLACEMENT TOTAL KNEE BILATERAL Left 2010; 2011    [id number]--bilateral total knee replacement   • SUBTOTAL HYSTERECTOMY  Remote    Remote partial hysterectomy.  Ovaries intact.   • TOTAL SHOULDER REPLACEMENT Left 2013 2013--left total shoulder replacement.   • TOTAL SHOULDER REPLACEMENT  April 28, 2021 4/20/2021--status post right reverse total shoulder arthroplasty   • TOTAL SHOULDER REVERSE ARTHROPLASTY Right 04/20/2021 4/20/2021--right reverse total shoulder replacement.         Allergies   Allergen Reactions   • Morphine And Related    • Other Other (See Comments)     REJECTED DACRON SUTURES IN THE PAST AND INCISION CAME APART           Current Outpatient Medications:   •  Ascorbic Acid (Vitamin C ER) 1000 MG tablet controlled-release, Take  by mouth Daily., Disp: , Rfl:   •  Calcium Carb-Cholecalciferol (CALCIUM + D3 PO), Take 1 tablet by mouth daily., Disp: , Rfl:   •  Cholecalciferol (VITAMIN D3) 2000 UNITS tablet, Take 1 capsule by mouth daily., Disp: , Rfl:   •  DULoxetine (CYMBALTA) 60 MG capsule, TAKE 1 CAPSULE EVERY DAY AS DIRECTED, Disp: 90 capsule, Rfl: 3  •  ezetimibe (ZETIA) 10 MG tablet, TAKE 1 TABLET EVERY DAY, Disp: 90 tablet, Rfl: 3  •  levothyroxine (SYNTHROID, LEVOTHROID) 125 MCG tablet, Take 1 tablet by mouth Daily., Disp: 90 tablet, Rfl: 2  •  metroNIDAZOLE (METROGEL) 0.75 % gel, , Disp: , Rfl:   •  Multiple Vitamin (MULTIVITAMIN) tablet, Take 1 tablet by mouth daily., Disp: , Rfl:   •  simvastatin (ZOCOR) 20 MG tablet, Take one tablet by mouth daily for high cholesterol., Disp: 90 tablet, Rfl: 2  •  traZODone (DESYREL) 150 MG tablet, TAKE 1 TABLET EVERY NIGHT, Disp: 90 tablet, Rfl: 3  •  valsartan (DIOVAN) 320 MG tablet, TAKE ONE TABLET BY MOUTH EVERY MORNING FOR BLOOD PRESSURE, Disp: 90 tablet, Rfl: 3      Family  "History   Problem Relation Age of Onset   • Alcohol abuse Father    • Cancer Other    • Diabetes Other         type II         Social History     Socioeconomic History   • Marital status:    Tobacco Use   • Smoking status: Former Smoker     Quit date: 1998     Years since quittin.3   • Smokeless tobacco: Never Used   Vaping Use   • Vaping Use: Never used   Substance and Sexual Activity   • Alcohol use: Yes     Alcohol/week: 1.0 standard drink     Types: 1 Glasses of wine per week     Comment: current some day   • Drug use: No   • Sexual activity: Not Currently     Partners: Male         Vitals:    22 1035   BP: 140/82   Pulse: 85   Resp: 18   SpO2: 98%   Weight: 86.3 kg (190 lb 3.2 oz)   Height: 170.2 cm (67\")        Body mass index is 29.79 kg/m².      Physical Exam:    General: Alert and oriented x 3.  No acute distress. Overweight.  Normal affect.  HEENT: Pupils equal, round, reactive to light; extraocular movements intact; sclerae nonicteric; pharynx, ear canals and TMs normal.  Neck: Without JVD, thyromegaly, bruit, or adenopathy.  Lungs: Clear to auscultation in all fields.  Heart: Regular rate and rhythm without murmur, rub, gallop, or click.  Abdomen: Soft, nontender, without hepatosplenomegaly or hernia.  Bowel sounds normal.  : Deferred.  Rectal: Deferred.  Extremities: Without clubbing, cyanosis, edema, or pulse deficit.  Neurologic: Intact without focal deficit.  Normal station and gait observed during ingress and egress from the examination room.  Skin: Without significant lesion.  Musculoskeletal: Unremarkable.    Lab/other results:      Assessment/Plan:     Diagnosis Plan   1. Benign essential hypertension     2. Dyspnea on exertion  Stress Test With Myocardial Perfusion One Day    XR Chest PA & Lateral    CBC (No Diff)    Comprehensive Metabolic Panel    proBNP    D-dimer, Quantitative   3. Exertional chest pain  Stress Test With Myocardial Perfusion One Day    XR Chest PA & " Lateral    CBC (No Diff)    Comprehensive Metabolic Panel    proBNP    D-dimer, Quantitative   4. Hyponatremia     5. Type 2 diabetes mellitus with diabetic neuropathy, without long-term current use of insulin (HCC)     6. Hyperlipidemia       Patient's blood pressure is little borderline today but given the overall clinical picture I am not going to make any changes at this time.  She presents with new complaint of exertional dyspnea and exertional chest discomfort which is worrisome and needs to be evaluated thoroughly.  She has several risk factors for coronary artery disease including type 2 diabetes and hyperlipidemia along with hypertension.    Plan is as follows: Pharmacologic stress test as soon as possible.  Chest x-ray PA and lateral.  CBC, CMP, D-dimer and proBNP.  No changes in current medical regimen.  Patient will follow-up after the results of the stress test are known.  We will defer screening colonoscopy for obvious reasons.  I have asked patient to take it somewhat easy as far as exertion is concerned.  Also if she begins to develop severe shortness of breath or chest tightness/discomfort that will not go away or that is persistent at rest then she needs to seek medical attention.      Procedures

## 2022-05-11 NOTE — TELEPHONE ENCOUNTER
Caller: McManus, Claudette L    Relationship to patient: Self    Best call back number: [phone]    Patient is needing: PATIENT MISSED A CALL AND CAN'T MAKE OUT PART OF THE MESSAGE REGARDING A STRESS TEST. PLEASE CALL AND ADVISE.

## 2022-05-17 ENCOUNTER — HOSPITAL ENCOUNTER (OUTPATIENT)
Dept: NUCLEAR MEDICINE | Facility: HOSPITAL | Age: 77
Discharge: HOME | End: 2022-05-17

## 2022-05-17 DIAGNOSIS — R06.09 DYSPNEA ON EXERTION: ICD-10-CM

## 2022-05-17 DIAGNOSIS — R07.9 EXERTIONAL CHEST PAIN: ICD-10-CM

## 2022-05-17 LAB
BH CV REST NUCLEAR ISOTOPE DOSE: 9.8 MCI
BH CV STRESS COMMENTS STAGE 1: NORMAL
BH CV STRESS DOSE REGADENOSON STAGE 1: 0.4
BH CV STRESS DURATION MIN STAGE 1: 0
BH CV STRESS DURATION SEC STAGE 1: 10
BH CV STRESS NUCLEAR ISOTOPE DOSE: 29 MCI
BH CV STRESS PROTOCOL 1: NORMAL
BH CV STRESS RECOVERY BP: NORMAL MMHG
BH CV STRESS RECOVERY HR: 69 BPM
BH CV STRESS STAGE 1: 1
LV EF NUC BP: 79 %
MAXIMAL PREDICTED HEART RATE: 141 BPM
PERCENT MAX PREDICTED HR: 56.74 %
STRESS BASELINE BP: NORMAL MMHG
STRESS BASELINE HR: 68 BPM
STRESS PERCENT HR: 67 %
STRESS POST PEAK BP: NORMAL MMHG
STRESS POST PEAK HR: 80 BPM
STRESS TARGET HR: 120 BPM

## 2022-05-17 PROCEDURE — 93017 CV STRESS TEST TRACING ONLY: CPT

## 2022-05-17 PROCEDURE — 0 TECHNETIUM SESTAMIBI: Performed by: INTERNAL MEDICINE

## 2022-05-17 PROCEDURE — 78452 HT MUSCLE IMAGE SPECT MULT: CPT

## 2022-05-17 PROCEDURE — A9500 TC99M SESTAMIBI: HCPCS | Performed by: INTERNAL MEDICINE

## 2022-05-17 PROCEDURE — 78452 HT MUSCLE IMAGE SPECT MULT: CPT | Performed by: INTERNAL MEDICINE

## 2022-05-17 PROCEDURE — 25010000002 REGADENOSON 0.4 MG/5ML SOLUTION: Performed by: INTERNAL MEDICINE

## 2022-05-17 PROCEDURE — 93018 CV STRESS TEST I&R ONLY: CPT | Performed by: INTERNAL MEDICINE

## 2022-05-17 RX ADMIN — TECHNETIUM TC 99M SESTAMIBI 1 DOSE: 1 INJECTION INTRAVENOUS at 10:55

## 2022-05-17 RX ADMIN — REGADENOSON 0.4 MG: 0.08 INJECTION, SOLUTION INTRAVENOUS at 10:55

## 2022-05-17 RX ADMIN — TECHNETIUM TC 99M SESTAMIBI 1 DOSE: 1 INJECTION INTRAVENOUS at 08:28

## 2022-05-19 ENCOUNTER — OFFICE VISIT (OUTPATIENT)
Dept: INTERNAL MEDICINE | Facility: CLINIC | Age: 77
End: 2022-05-19

## 2022-05-19 ENCOUNTER — LAB (OUTPATIENT)
Dept: LAB | Facility: HOSPITAL | Age: 77
End: 2022-05-19

## 2022-05-19 ENCOUNTER — HOSPITAL ENCOUNTER (OUTPATIENT)
Dept: CT IMAGING | Facility: HOSPITAL | Age: 77
Discharge: HOME | End: 2022-05-19

## 2022-05-19 VITALS
HEIGHT: 67 IN | HEART RATE: 86 BPM | OXYGEN SATURATION: 97 % | RESPIRATION RATE: 18 BRPM | BODY MASS INDEX: 29.79 KG/M2 | SYSTOLIC BLOOD PRESSURE: 136 MMHG | DIASTOLIC BLOOD PRESSURE: 74 MMHG

## 2022-05-19 DIAGNOSIS — I10 BENIGN ESSENTIAL HYPERTENSION: Chronic | ICD-10-CM

## 2022-05-19 DIAGNOSIS — R07.1 CHEST PAIN ON BREATHING: ICD-10-CM

## 2022-05-19 DIAGNOSIS — I27.82 CHRONIC PULMONARY THROMBOEMBOLISM SYNDROME: ICD-10-CM

## 2022-05-19 DIAGNOSIS — R07.9 EXERTIONAL CHEST PAIN: Primary | ICD-10-CM

## 2022-05-19 DIAGNOSIS — I26.99 MULTIPLE PULMONARY EMBOLI: ICD-10-CM

## 2022-05-19 DIAGNOSIS — I27.20 PULMONARY HYPERTENSION: ICD-10-CM

## 2022-05-19 DIAGNOSIS — E11.40 TYPE 2 DIABETES MELLITUS WITH DIABETIC NEUROPATHY, WITHOUT LONG-TERM CURRENT USE OF INSULIN: Chronic | ICD-10-CM

## 2022-05-19 DIAGNOSIS — R06.09 DYSPNEA ON EXERTION: ICD-10-CM

## 2022-05-19 DIAGNOSIS — E78.2 MIXED HYPERLIPIDEMIA: Chronic | ICD-10-CM

## 2022-05-19 LAB — D DIMER PPP FEU-MCNC: 0.28 MCGFEU/ML (ref 0–0.49)

## 2022-05-19 PROCEDURE — 36415 COLL VENOUS BLD VENIPUNCTURE: CPT | Performed by: INTERNAL MEDICINE

## 2022-05-19 PROCEDURE — 71275 CT ANGIOGRAPHY CHEST: CPT

## 2022-05-19 PROCEDURE — 99215 OFFICE O/P EST HI 40 MIN: CPT | Performed by: INTERNAL MEDICINE

## 2022-05-19 PROCEDURE — 85379 FIBRIN DEGRADATION QUANT: CPT | Performed by: INTERNAL MEDICINE

## 2022-05-19 PROCEDURE — 0 IOPAMIDOL PER 1 ML: Performed by: INTERNAL MEDICINE

## 2022-05-19 RX ADMIN — IOPAMIDOL 85 ML: 755 INJECTION, SOLUTION INTRAVENOUS at 13:22

## 2022-05-19 NOTE — ADDENDUM NOTE
Addended by: SILVANA CROOKS on: 5/19/2022 03:19 PM     Modules accepted: Orders, Level of Service

## 2022-05-19 NOTE — PROGRESS NOTES
05/19/2022    Patient Information  Claudette L McManus                                                                                          [id number] [place] [address]      1945  [unfilled]  There is no work phone number on file.    Chief Complaint:     Follow-up complaints of severe dyspnea on exertion and tightness in chest with exertion.    History of Present Illness:    Patient with a history of multiple cardiac risk factors including hyperlipidemia and type 2 diabetes along with hypertension.  She presents today to follow-up on evaluation for exertional shortness of breath and chest discomfort described as a tightness which is getting progressively worse.  This will be described below.  Her past medical history reviewed and updated were necessary including health maintenance parameters.  This reveals she needs a colonoscopy which we are going to defer at the present time for obvious reasons.    The history regarding exertional shortness of breath and tightness is documented under the appropriate diagnoses and the problem list today by Dr. Moore and transferred to this note as follows:    May 19, 2022--patient seen in follow-up and results of the stress test discussed.  I again reviewed her symptoms as follows: She reports that with minimal exertion she gets severe shortness of breath and a sensation of tightness/squeezing in her chest.  This goes away with rest after a few minutes.  She also reports that there is a definite relationship to the discomfort in her neck when she gets short of breath.  This also goes away with rest.  Patient has numerous risk factors including type 2 diabetes, hyperlipidemia, hypertension, diabetic peripheral neuropathy.  The symptoms started a couple months ago and are definitely getting worse meaning more severe and more frequent.  Examination today reveals clear lungs although her breath sounds seem to be a little diminished on the right.   Her chest x-ray PA and lateral on May 11, 2022 was negative.  The exact etiology of her symptoms is not clear at this time but are worrisome.  I explained to her that the nuclear stress test is not a perfect test and she could in fact have blockages in her coronary arteries.  Another consideration would be chronic thrombopulmonary embolism and I do think this needs to be ruled out.  Plan is as follows: Check D-dimer as soon as possible.  I will have a low threshold for CTA of the chest and I Samina go ahead and order it now.  I will discussed the case with the cardiologist to see if heart cath may be warranted.  Further to follow.    May 17, 2022--nuclear stress test reveals left ventricular ejection fraction of greater than 70%.  Hyperdynamic.  Myocardial perfusion imaging indicates a normal myocardial perfusion study with no evidence of ischemia.  Impressions are consistent with a low risk study.    May 11, 2022--patient with type 2 diabetes, hyperlipidemia, hypothyroidism, hypertension, chronic lower back pain and diabetic peripheral neuropathy.  She presents today with at least a 1 month history of progressively worsening shortness of breath with exertion and associated with a tightness in her chest that is relieved with rest after 15 or 20 minutes.  No radiation into the neck or jaw. Plan is as follows: Pharmacologic stress test as soon as possible.  Chest x-ray PA and lateral.  CBC, CMP, D-dimer and proBNP.  No changes in current medical regimen.  Patient will follow-up after the results of the stress test are known.  We will defer screening colonoscopy for obvious reasons.  I have asked patient to take it somewhat easy as far as exertion is concerned.  Also if she begins to develop severe shortness of breath or chest tightness/discomfort that will not go away or that is persistent at rest then she needs to seek medical attention.    Review of Systems   Constitutional: Negative.   HENT: Negative.    Eyes:  Negative.    Cardiovascular: Positive for chest pain, dyspnea on exertion and leg swelling. Negative for near-syncope, orthopnea, palpitations, paroxysmal nocturnal dyspnea and syncope.   Respiratory: Negative.    Endocrine: Negative.    Hematologic/Lymphatic: Negative.    Skin: Negative.    Musculoskeletal: Negative.    Gastrointestinal: Negative.    Genitourinary: Negative.    Neurological: Negative.    Psychiatric/Behavioral: Negative.    Allergic/Immunologic: Negative.        Active Problems:    Patient Active Problem List   Diagnosis   • Primary osteoarthritis involving multiple joints   • Benign essential hypertension   • Hyperlipidemia   • Primary hypothyroidism   • Type 2 diabetes mellitus with diabetic neuropathy, without long-term current use of insulin (HCC)   • Chronic insomnia   • Chronic bilateral low back pain without sciatica   • Chronic bilateral thoracic back pain   • Vitamin D deficiency   • Therapeutic drug monitoring   • Menopausal state, 8/19/2020--normal DEXA.   • Diabetic peripheral neuropathy (HCC)   • Situational mixed anxiety and depressive disorder   • ACE-inhibitor cough   • Hyponatremia   • Dyspnea on exertion   • Exertional chest pain         Past Medical History:   Diagnosis Date   • Benign essential hypertension    • Chronic bilateral low back pain without sciatica 8/16/2013 March 13, 2019--Limited bone scan.  This reveals scintigraphic activity in the spine and at the right shoulder corresponds to change seen on recent x-rays and is best explained by degenerative change.  Isolated metastatic disease exactly corresponding to the sites of extensive degenerative disc change would be peculiar.  March 7, 2019--new patient to get established.  She has complaints of approxi   • Chronic bilateral thoracic back pain 2/26/2019 March 13, 2019--Limited bone scan.  This reveals scintigraphic activity in the spine and at the right shoulder corresponds to change seen on recent x-rays and is  best explained by degenerative change.  Isolated metastatic disease exactly corresponding to the sites of extensive degenerative disc change would be peculiar.  March 1, 2019--x-ray of the lumbar and thoracic spine reveals mild anterior l   • Chronic insomnia 11/4/2014   • Diabetic peripheral neuropathy (HCC) 3/7/2019    Characterized by decreased sensation and paresthesias in both lower extremities described as a burning sensation.  Extends up to the knees.  Reportedly had been evaluated with nerve conduction study in the past.   • Generalized anxiety disorder 5/19/2013   • History of Bell's palsy 5/21/2013    Remote history of Bell's palsy.  Patient does not remember which side of the face.   • Hyperlipidemia    • Impaired fasting glucose    • Major depression    • Menopausal state, 8/19/2020--normal DEXA. 3/7/2019    August 19, 2020--DEXA scan reveals lumbar spine T score 3.8.  Left femoral neck T score 2.5.  Right femoral neck T score 2.2.  Normal bone density.   • Peripheral neuropathy, idiopathic 3/7/2019    Characterized by decreased sensation and paresthesias in both lower extremities described as a burning sensation.  Extends up to the knees.  Reportedly had been evaluated with nerve conduction study in the past.   • Primary hypothyroidism 5/19/2013   • Primary osteoarthritis involving multiple joints 4/22/2013   • Rotator cuff arthropathy of right shoulder, 4/20/2021--status post right reverse total shoulder arthroplasty 5/27/2020   • Situational mixed anxiety and depressive disorder 8/21/2019 August 21, 2019--patient seen in follow-up after I called in a prescription for Ativan after the patient's  contacted me a few weeks ago regarding the sudden death of patient's daughter.  This was an apparent suicide and the patient found her daughter dead.  I will not go into details.  Patient reports the Lorazepam has been helpful but she is still quite distraught, nervous, and undergoing   • Type 2  diabetes mellitus with diabetic neuropathy, without long-term current use of insulin (HCC)    • Vitamin D deficiency 2/26/2019         Past Surgical History:   Procedure Laterality Date   • BILATERAL BREAST REDUCTION  Early 2000    Early 2000--bilateral breast reduction   • CHOLECYSTECTOMY  1960s    1960s--open cholecystectomy   • COLONOSCOPY  2012 2012--reportedly normal colonoscopy   • INCONTINENCE SURGERY  2012 Approximately 2012--implantation of questionable bladder stimulator right sacral area for urinary incontinence.  Details not known.   • REPLACEMENT TOTAL KNEE BILATERAL Left 2010; 2011 2010-2011--bilateral total knee replacement   • SUBTOTAL HYSTERECTOMY  Remote    Remote partial hysterectomy.  Ovaries intact.   • TOTAL SHOULDER REPLACEMENT Left 2013 2013--left total shoulder replacement.   • TOTAL SHOULDER REPLACEMENT  April 28, 2021 4/20/2021--status post right reverse total shoulder arthroplasty   • TOTAL SHOULDER REVERSE ARTHROPLASTY Right 04/20/2021 4/20/2021--right reverse total shoulder replacement.         Allergies   Allergen Reactions   • Morphine And Related    • Other Other (See Comments)     REJECTED DACRON SUTURES IN THE PAST AND INCISION CAME APART           Current Outpatient Medications:   •  Ascorbic Acid (Vitamin C ER) 1000 MG tablet controlled-release, Take  by mouth Daily., Disp: , Rfl:   •  Calcium Carb-Cholecalciferol (CALCIUM + D3 PO), Take 1 tablet by mouth daily., Disp: , Rfl:   •  Cholecalciferol (VITAMIN D3) 2000 UNITS tablet, Take 1 capsule by mouth daily., Disp: , Rfl:   •  DULoxetine (CYMBALTA) 60 MG capsule, TAKE 1 CAPSULE EVERY DAY AS DIRECTED, Disp: 90 capsule, Rfl: 3  •  ezetimibe (ZETIA) 10 MG tablet, TAKE 1 TABLET EVERY DAY, Disp: 90 tablet, Rfl: 3  •  levothyroxine (SYNTHROID, LEVOTHROID) 125 MCG tablet, Take 1 tablet by mouth Daily., Disp: 90 tablet, Rfl: 2  •  metroNIDAZOLE (METROGEL) 0.75 % gel, , Disp: , Rfl:   •  Multiple Vitamin (MULTIVITAMIN)  "tablet, Take 1 tablet by mouth daily., Disp: , Rfl:   •  simvastatin (ZOCOR) 20 MG tablet, Take one tablet by mouth daily for high cholesterol., Disp: 90 tablet, Rfl: 2  •  traZODone (DESYREL) 150 MG tablet, TAKE 1 TABLET EVERY NIGHT, Disp: 90 tablet, Rfl: 3  •  valsartan (DIOVAN) 320 MG tablet, TAKE ONE TABLET BY MOUTH EVERY MORNING FOR BLOOD PRESSURE, Disp: 90 tablet, Rfl: 3      Family History   Problem Relation Age of Onset   • Alcohol abuse Father    • Cancer Other    • Diabetes Other         type II         Social History     Socioeconomic History   • Marital status:    Tobacco Use   • Smoking status: Former Smoker     Quit date: 1998     Years since quittin.3   • Smokeless tobacco: Never Used   Vaping Use   • Vaping Use: Never used   Substance and Sexual Activity   • Alcohol use: Yes     Alcohol/week: 1.0 standard drink     Types: 1 Glasses of wine per week     Comment: current some day   • Drug use: No   • Sexual activity: Not Currently     Partners: Male         Vitals:    22 1124   BP: 136/74   Pulse: 86   Resp: 18   SpO2: 97%   Height: 170.2 cm (67\")        Body mass index is 29.79 kg/m².      Physical Exam:    General: Alert and oriented x 3.  No acute distress.  Normal affect.  HEENT: Pupils equal, round, reactive to light; extraocular movements intact; sclerae nonicteric; pharynx, ear canals and TMs normal.  Neck: Without JVD, thyromegaly, bruit, or adenopathy.  Lungs: Clear to auscultation in all fields, although breath sounds seem a little diminished on the right posteriorly.  Heart: Regular rate and rhythm without murmur, rub, gallop, or click.  Abdomen: Soft, nontender, without hepatosplenomegaly or hernia.  Bowel sounds normal.  : Deferred.  Rectal: Deferred.  Extremities: Without clubbing, cyanosis, edema, or pulse deficit.  Neurologic: Intact without focal deficit.  Normal station and gait observed during ingress and egress from the examination room.  Skin: Without " significant lesion.  Musculoskeletal: Unremarkable.    Lab/other results:    Recent nuclear stress test was negative and deemed a low risk study.  Hyperdynamic left ventricular ejection fraction noted.    Chest x-ray PA and lateral reveals no active disease.    Assessment/Plan:     Diagnosis Plan   1. Exertional chest pain  D-dimer, Quantitative   2. Dyspnea on exertion  D-dimer, Quantitative   3. Type 2 diabetes mellitus with diabetic neuropathy, without long-term current use of insulin (HCC)     4. Benign essential hypertension     5. Hyperlipidemia       Patient with type 2 diabetes, hypertension, and hyperlipidemia presents with continued exertional dyspnea and exertional chest tightness that remains worrisome despite negative stress test.    May 19, 2022--patient seen in follow-up and results of the stress test discussed.  I again reviewed her symptoms as follows: She reports that with minimal exertion she gets severe shortness of breath and a sensation of tightness/squeezing in her chest.  This goes away with rest after a few minutes.  She also reports that there is a definite relationship to the discomfort in her neck when she gets short of breath.  This also goes away with rest.  Patient has numerous risk factors including type 2 diabetes, hyperlipidemia, hypertension, diabetic peripheral neuropathy.  The symptoms started a couple months ago and are definitely getting worse meaning more severe and more frequent.  Examination today reveals clear lungs although her breath sounds seem to be a little diminished on the right.  Her chest x-ray PA and lateral on May 11, 2022 was negative.  The exact etiology of her symptoms is not clear at this time but are worrisome.  I explained to her that the nuclear stress test is not a perfect test and she could in fact have blockages in her coronary arteries.  Another consideration would be chronic thrombopulmonary embolism and I do think this needs to be ruled out.  Plan is  as follows: Check D-dimer as soon as possible.  I will have a low threshold for CTA of the chest and I Samina go ahead and order it now.  I will discussed the case with the cardiologist to see if heart cath may be warranted.  Further to follow.    May 17, 2022--nuclear stress test reveals left ventricular ejection fraction of greater than 70%.  Hyperdynamic.  Myocardial perfusion imaging indicates a normal myocardial perfusion study with no evidence of ischemia.  Impressions are consistent with a low risk study.    May 11, 2022--patient with type 2 diabetes, hyperlipidemia, hypothyroidism, hypertension, chronic lower back pain and diabetic peripheral neuropathy.  She presents today with at least a 1 month history of progressively worsening shortness of breath with exertion and associated with a tightness in her chest that is relieved with rest after 15 or 20 minutes.  No radiation into the neck or jaw. Plan is as follows: Pharmacologic stress test as soon as possible.  Chest x-ray PA and lateral.  CBC, CMP, D-dimer and proBNP.  No changes in current medical regimen.  Patient will follow-up after the results of the stress test are known.  We will defer screening colonoscopy for obvious reasons.  I have asked patient to take it somewhat easy as far as exertion is concerned.  Also if she begins to develop severe shortness of breath or chest tightness/discomfort that will not go away or that is persistent at rest then she needs to seek medical attention.    Addendum: Results of CTA of the chest and further recommendations as follows:    May 19, 2022--patient called back into the office and I explained the diagnosis and the treatment plan.  We will start her on Xarelto 15 mg twice a day initially and then decrease down to once daily 20 mg.  I am waiting for Dr. Garcia to get back with me regarding whether or not cardiology wants to see her regarding her right heart.  Patient referred to hematology as well as  pulmonology.  I will contact hematology and obtain their suggestion for hypercoagulable work-up in order to get that started.  I will have patient follow-up with me in about 2 weeks to reassess her clinically.  Sooner if she has any problems.  Doppler venous study of the lower extremities also ordered.    May 19, 2022--CTA of the chest stat:   IMPRESSION:  1. There are tiny pulmonary thromboemboli with the largest within a  subsegmental right lower lobe pulmonary artery. The RV:LV ratio is 1.4.  The appearance of some of the pulmonary artery branches and the lungs  can be seen with chronic pulmonary thromboemboli and there may be some  air trapping as well.  2. Mildly diffusely thickened appearance of the esophagus can be seen  with esophagitis. There is no hiatal hernia.     The findings concerning the pulmonary thromboemboli were discussed with  Dr. Moore.    May 19, 2022--patient seen in follow-up and results of the stress test discussed.  I again reviewed her symptoms as follows: She reports that with minimal exertion she gets severe shortness of breath and a sensation of tightness/squeezing in her chest.  This goes away with rest after a few minutes.  She also reports that there is a definite relationship to the discomfort in her neck when she gets short of breath.  This also goes away with rest.  Patient has numerous risk factors including type 2 diabetes, hyperlipidemia, hypertension, diabetic peripheral neuropathy.  The symptoms started a couple months ago and are definitely getting worse meaning more severe and more frequent.  Examination today reveals clear lungs although her breath sounds seem to be a little diminished on the right.  Her chest x-ray PA and lateral on May 11, 2022 was negative.  The exact etiology of her symptoms is not clear at this time but are worrisome.  I explained to her that the nuclear stress test is not a perfect test and she could in fact have blockages in her coronary arteries.   Another consideration would be chronic thrombopulmonary embolism and I do think this needs to be ruled out.  Plan is as follows: Check D-dimer as soon as possible.  I will have a low threshold for CTA of the chest and I Samina go ahead and order it now.  I will discussed the case with the cardiologist to see if heart cath may be warranted.  Further to follow.    May 17, 2022--nuclear stress test reveals left ventricular ejection fraction of greater than 70%.  Hyperdynamic.  Myocardial perfusion imaging indicates a normal myocardial perfusion study with no evidence of ischemia.  Impressions are consistent with a low risk study.    May 11, 2022--patient with type 2 diabetes, hyperlipidemia, hypothyroidism, hypertension, chronic lower back pain and diabetic peripheral neuropathy.  She presents today with at least a 1 month history of progressively worsening shortness of breath with exertion and associated with a tightness in her chest that is relieved with rest after 15 or 20 minutes.  No radiation into the neck or jaw. Plan is as follows: Pharmacologic stress test as soon as possible.  Chest x-ray PA and lateral.  CBC, CMP, D-dimer and proBNP.  No changes in current medical regimen.  Patient will follow-up after the results of the stress test are known.  We will defer screening colonoscopy for obvious reasons.  I have asked patient to take it somewhat easy as far as exertion is concerned.  Also if she begins to develop severe shortness of breath or chest tightness/discomfort that will not go away or that is persistent at rest then she needs to seek medical attention.    Note: Greater than 1 hour was spent evaluating this patient with multiple medical problems and serious medical condition that is life threatening.  Greater than 50% of this time was spent counseling this patient regarding the new diagnosis and treatment plans and options.  [id number] level service justified.    Procedures

## 2022-05-25 ENCOUNTER — LAB (OUTPATIENT)
Dept: OTHER | Facility: HOSPITAL | Age: 77
End: 2022-05-25

## 2022-05-25 ENCOUNTER — CONSULT (OUTPATIENT)
Dept: ONCOLOGY | Facility: CLINIC | Age: 77
End: 2022-05-25

## 2022-05-25 VITALS
TEMPERATURE: 97.1 F | DIASTOLIC BLOOD PRESSURE: 90 MMHG | SYSTOLIC BLOOD PRESSURE: 165 MMHG | HEART RATE: 76 BPM | OXYGEN SATURATION: 96 % | WEIGHT: 211.2 LBS | RESPIRATION RATE: 16 BRPM | BODY MASS INDEX: 35.19 KG/M2 | HEIGHT: 65 IN

## 2022-05-25 DIAGNOSIS — D68.59 HYPERCOAGULABLE STATE: ICD-10-CM

## 2022-05-25 DIAGNOSIS — I26.99 MULTIPLE PULMONARY EMBOLI: Primary | ICD-10-CM

## 2022-05-25 DIAGNOSIS — I26.99 PULMONARY EMBOLISM, UNSPECIFIED CHRONICITY, UNSPECIFIED PULMONARY EMBOLISM TYPE, UNSPECIFIED WHETHER ACUTE COR PULMONALE PRESENT: Primary | ICD-10-CM

## 2022-05-25 LAB
BASOPHILS # BLD AUTO: 0.05 10*3/MM3 (ref 0–0.2)
BASOPHILS NFR BLD AUTO: 0.7 % (ref 0–1.5)
DEPRECATED RDW RBC AUTO: 44.5 FL (ref 37–54)
EOSINOPHIL # BLD AUTO: 0.16 10*3/MM3 (ref 0–0.4)
EOSINOPHIL NFR BLD AUTO: 2.1 % (ref 0.3–6.2)
ERYTHROCYTE [DISTWIDTH] IN BLOOD BY AUTOMATED COUNT: 12.8 % (ref 12.3–15.4)
HCT VFR BLD AUTO: 38.2 % (ref 34–46.6)
HGB BLD-MCNC: 13 G/DL (ref 12–15.9)
IMM GRANULOCYTES # BLD AUTO: 0.07 10*3/MM3 (ref 0–0.05)
IMM GRANULOCYTES NFR BLD AUTO: 0.9 % (ref 0–0.5)
LYMPHOCYTES # BLD AUTO: 2.1 10*3/MM3 (ref 0.7–3.1)
LYMPHOCYTES NFR BLD AUTO: 27.9 % (ref 19.6–45.3)
MCH RBC QN AUTO: 32 PG (ref 26.6–33)
MCHC RBC AUTO-ENTMCNC: 34 G/DL (ref 31.5–35.7)
MCV RBC AUTO: 94.1 FL (ref 79–97)
MONOCYTES # BLD AUTO: 0.77 10*3/MM3 (ref 0.1–0.9)
MONOCYTES NFR BLD AUTO: 10.2 % (ref 5–12)
NEUTROPHILS NFR BLD AUTO: 4.38 10*3/MM3 (ref 1.7–7)
NEUTROPHILS NFR BLD AUTO: 58.2 % (ref 42.7–76)
NRBC BLD AUTO-RTO: 0 /100 WBC (ref 0–0.2)
PLATELET # BLD AUTO: 244 10*3/MM3 (ref 140–450)
PMV BLD AUTO: 9.8 FL (ref 6–12)
RBC # BLD AUTO: 4.06 10*6/MM3 (ref 3.77–5.28)
WBC NRBC COR # BLD: 7.53 10*3/MM3 (ref 3.4–10.8)

## 2022-05-25 PROCEDURE — 85300 ANTITHROMBIN III ACTIVITY: CPT | Performed by: INTERNAL MEDICINE

## 2022-05-25 PROCEDURE — 85732 THROMBOPLASTIN TIME PARTIAL: CPT | Performed by: INTERNAL MEDICINE

## 2022-05-25 PROCEDURE — 85613 RUSSELL VIPER VENOM DILUTED: CPT | Performed by: INTERNAL MEDICINE

## 2022-05-25 PROCEDURE — 86146 BETA-2 GLYCOPROTEIN ANTIBODY: CPT | Performed by: INTERNAL MEDICINE

## 2022-05-25 PROCEDURE — 85025 COMPLETE CBC W/AUTO DIFF WBC: CPT | Performed by: INTERNAL MEDICINE

## 2022-05-25 PROCEDURE — 86147 CARDIOLIPIN ANTIBODY EA IG: CPT | Performed by: INTERNAL MEDICINE

## 2022-05-25 PROCEDURE — 85303 CLOT INHIBIT PROT C ACTIVITY: CPT | Performed by: INTERNAL MEDICINE

## 2022-05-25 PROCEDURE — 99205 OFFICE O/P NEW HI 60 MIN: CPT | Performed by: INTERNAL MEDICINE

## 2022-05-25 PROCEDURE — 36415 COLL VENOUS BLD VENIPUNCTURE: CPT

## 2022-05-25 PROCEDURE — 85306 CLOT INHIBIT PROT S FREE: CPT | Performed by: INTERNAL MEDICINE

## 2022-05-25 NOTE — PROGRESS NOTES
[facility]    CONSULTING IN BLOOD DISORDERS & CANCER      REASON FOR CONSULTATION/CHIEF COMPLAINT:     Evaluation & management for bilateral pulmonary embolism                             REQUESTING PHYSICIAN: Lito Moore MD  RECORDS OBTAINED:  Records of the patients history including those from the electronic medical record were reviewed and summarized in detail.    HISTORY OF PRESENT ILLNESS:    The patient is a 77 y.o. year old female with medical history significant for hypertension, dyslipidemia, hypothyroidism, anxiety and depression who had presented to her PCP in May 2022 with a ~ 6 weeks history of progressive shortness of breath and dyspnea on exertion.     A cardiac stress test from 5/17/2022 showed normal myocardial perfusion with no evidence of ischemia.  Consistent with a low risk study.    A CTA chest performed 5/19/22 showed tiny nonocclusive subsegmental right lower lobe pulmonary thromboembolus and along with few very tiny distal pulmonary thromboemboli as well.  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  Patient was started on anticoagulation with Xarelto, which she is tolerating it very well.    Patient was first seen in this clinic on 5/25/2022.  She reports of no significant improvement in her respiratory symptoms while being on anticoagulation for around a week.   Patient states she always had mild difficulty breathing, but it got significantly worse in the last 6 weeks.  She now has to rest even after walking a short interval.  She denies any chest pain, but does report of chest tightness. Denies any cough, hemoptysis or sputum production.  She has remote history of smoking.  Says she smoked half to 1 pack/day for 20-25 years.  Quit in 1988.  Denies any prior respiratory issues.    Patient denies any prior history of blood clots.  She does report of history of blood clot in her daughter and history of  stroke in her elder sister.  No other family history significant for bleeding or thrombotic history.     Past Medical History:   Diagnosis Date   • Benign essential hypertension    • Chronic bilateral low back pain without sciatica 8/16/2013 March 13, 2019--Limited bone scan.  This reveals scintigraphic activity in the spine and at the right shoulder corresponds to change seen on recent x-rays and is best explained by degenerative change.  Isolated metastatic disease exactly corresponding to the sites of extensive degenerative disc change would be peculiar.  March 7, 2019--new patient to get established.  She has complaints of approxi   • Chronic bilateral thoracic back pain 2/26/2019 March 13, 2019--Limited bone scan.  This reveals scintigraphic activity in the spine and at the right shoulder corresponds to change seen on recent x-rays and is best explained by degenerative change.  Isolated metastatic disease exactly corresponding to the sites of extensive degenerative disc change would be peculiar.  March 1, 2019--x-ray of the lumbar and thoracic spine reveals mild anterior l   • Chronic insomnia 11/4/2014   • Diabetic peripheral neuropathy (HCC) 3/7/2019    Characterized by decreased sensation and paresthesias in both lower extremities described as a burning sensation.  Extends up to the knees.  Reportedly had been evaluated with nerve conduction study in the past.   • Generalized anxiety disorder 5/19/2013   • History of Bell's palsy 5/21/2013    Remote history of Bell's palsy.  Patient does not remember which side of the face.   • Hyperlipidemia    • Impaired fasting glucose    • Major depression    • Menopausal state, 8/19/2020--normal DEXA. 3/7/2019    August 19, 2020--DEXA scan reveals lumbar spine T score 3.8.  Left femoral neck T score 2.5.  Right femoral neck T score 2.2.  Normal bone density.   • Peripheral neuropathy, idiopathic 3/7/2019    Characterized by decreased sensation and paresthesias in  both lower extremities described as a burning sensation.  Extends up to the knees.  Reportedly had been evaluated with nerve conduction study in the past.   • Primary hypothyroidism 5/19/2013   • Primary osteoarthritis involving multiple joints 4/22/2013   • Rotator cuff arthropathy of right shoulder, 4/20/2021--status post right reverse total shoulder arthroplasty 5/27/2020   • Situational mixed anxiety and depressive disorder 8/21/2019 August 21, 2019--patient seen in follow-up after I called in a prescription for Ativan after the patient's  contacted me a few weeks ago regarding the sudden death of patient's daughter.  This was an apparent suicide and the patient found her daughter dead.  I will not go into details.  Patient reports the Lorazepam has been helpful but she is still quite distraught, nervous, and undergoing   • Type 2 diabetes mellitus with diabetic neuropathy, without long-term current use of insulin (HCC)    • Vitamin D deficiency 2/26/2019     Past Surgical History:   Procedure Laterality Date   • BILATERAL BREAST REDUCTION  Early 2000    Early 2000--bilateral breast reduction   • CHOLECYSTECTOMY  1960s    1960s--open cholecystectomy   • COLONOSCOPY  2012 2012--reportedly normal colonoscopy   • INCONTINENCE SURGERY  2012 Approximately 2012--implantation of questionable bladder stimulator right sacral area for urinary incontinence.  Details not known.   • REPLACEMENT TOTAL KNEE BILATERAL Left 2010; 2011 2010-2011--bilateral total knee replacement   • SUBTOTAL HYSTERECTOMY  Remote    Remote partial hysterectomy.  Ovaries intact.   • TOTAL SHOULDER REPLACEMENT Left 2013 2013--left total shoulder replacement.   • TOTAL SHOULDER REPLACEMENT  April 28, 2021 4/20/2021--status post right reverse total shoulder arthroplasty   • TOTAL SHOULDER REVERSE ARTHROPLASTY Right 04/20/2021 4/20/2021--right reverse total shoulder replacement.       MEDICATIONS    Current Outpatient  Medications:   •  Calcium Carb-Cholecalciferol (CALCIUM + D3 PO), Take 1 tablet by mouth daily., Disp: , Rfl:   •  Cholecalciferol (VITAMIN D3) 2000 UNITS tablet, Take 1 capsule by mouth daily., Disp: , Rfl:   •  DULoxetine (CYMBALTA) 60 MG capsule, TAKE 1 CAPSULE EVERY DAY AS DIRECTED, Disp: 90 capsule, Rfl: 3  •  ezetimibe (ZETIA) 10 MG tablet, TAKE 1 TABLET EVERY DAY, Disp: 90 tablet, Rfl: 3  •  levothyroxine (SYNTHROID, LEVOTHROID) 125 MCG tablet, Take 1 tablet by mouth Daily., Disp: 90 tablet, Rfl: 2  •  metroNIDAZOLE (METROGEL) 0.75 % gel, , Disp: , Rfl:   •  simvastatin (ZOCOR) 20 MG tablet, Take one tablet by mouth daily for high cholesterol., Disp: 90 tablet, Rfl: 2  •  traZODone (DESYREL) 150 MG tablet, TAKE 1 TABLET EVERY NIGHT, Disp: 90 tablet, Rfl: 3  •  valsartan (DIOVAN) 320 MG tablet, TAKE ONE TABLET BY MOUTH EVERY MORNING FOR BLOOD PRESSURE, Disp: 90 tablet, Rfl: 3  •  Ascorbic Acid (Vitamin C ER) 1000 MG tablet controlled-release, Take  by mouth Daily., Disp: , Rfl:   •  Multiple Vitamin (MULTIVITAMIN) tablet, Take 1 tablet by mouth daily., Disp: , Rfl:     ALLERGIES:     Allergies   Allergen Reactions   • Morphine And Related    • Other Other (See Comments)     REJECTED DACRON SUTURES IN THE PAST AND INCISION CAME APART       SOCIAL HISTORY:       Social History     Socioeconomic History   • Marital status:    Tobacco Use   • Smoking status: Former Smoker     Quit date: 1998     Years since quittin.4   • Smokeless tobacco: Never Used   Vaping Use   • Vaping Use: Never used   Substance and Sexual Activity   • Alcohol use: Yes     Alcohol/week: 1.0 standard drink     Types: 1 Glasses of wine per week     Comment: current some day   • Drug use: No   • Sexual activity: Not Currently     Partners: Male         FAMILY HISTORY:  Family History   Problem Relation Age of Onset   • Alcohol abuse Father    • Cancer Other    • Diabetes Other         type II       REVIEW OF  "SYSTEMS:  Constitutional: [No fevers, chills, sweats]  Eye: [No recent visual problems]  ENMT: [No ear pain, nasal congestion, sore throat]  Respiratory: [c/o shortness of breath,no cough]  Cardiovascular: [No Chest pain, palpitations, syncope]  Gastrointestinal: [No nausea, vomiting, diarrhea]  Genitourinary: [No hematuria]  Hema/Lymph: [Negative for bruising tendency, swollen lymph glands]  Endocrine: [Negative for excessive thirst, excessive hunger]  Musculoskeletal: [Denies any musculoskeletal pain or swelling]  Integumentary: [No rash, pruritus, abrasions]  Neurologic: [ No weakness or numbness, Alert & oriented X 4]       Vitals:    05/25/22 1534   BP: 165/90   Pulse: 76   Resp: 16   Temp: 97.1 °F (36.2 °C)   TempSrc: Temporal   SpO2: 96%   Weight: 95.8 kg (211 lb 3.2 oz)   Height: 165.1 cm (65\")   PainSc: 0-No pain     Current Status 5/25/2022   ECOG score 0      PHYSICAL EXAM:    CONSTITUTIONAL:  Vital signs reviewed.  No distress, looks comfortable.  EYES:  Conjunctiva and lids unremarkable.   EARS,NOSE,MOUTH,THROAT:  Ears and nose appear unremarkable.  Lips, teeth, gums appear unremarkable.  RESPIRATORY:  Normal respiratory effort.  Lungs clear to auscultation bilaterally.  CARDIOVASCULAR:  Normal S1, S2.  No murmurs rubs or gallops.  No significant lower extremity edema.  GASTROINTESTINAL: Abdomen appears unremarkable.  Nondistended  LYMPHATIC:  No cervical, supraclavicular lymphadenopathy.  NEURO: AAO x 3, No focal deficits.  Appears to have equal strength all 4 extremities.  MUSCULOSKELETAL:  Unremarkable digits/nails.  No cyanosis or clubbing.  No apparent joint deformities.  SKIN:  Warm.  No rashes.  PSYCHIATRIC:  Normal judgment and insight.  Normal mood and affect.     RECENT LABS:        Lab on 05/25/2022   Component Date Value Ref Range Status   • WBC 05/25/2022 7.53  3.40 - 10.80 10*3/mm3 Final   • RBC 05/25/2022 4.06  3.77 - 5.28 10*6/mm3 Final   • Hemoglobin 05/25/2022 13.0  12.0 - 15.9 g/dL " Final   • Hematocrit 05/25/2022 38.2  34.0 - 46.6 % Final   • MCV 05/25/2022 94.1  79.0 - 97.0 fL Final   • MCH 05/25/2022 32.0  26.6 - 33.0 pg Final   • MCHC 05/25/2022 34.0  31.5 - 35.7 g/dL Final   • RDW 05/25/2022 12.8  12.3 - 15.4 % Final   • RDW-SD 05/25/2022 44.5  37.0 - 54.0 fl Final   • MPV 05/25/2022 9.8  6.0 - 12.0 fL Final   • Platelets 05/25/2022 244  140 - 450 10*3/mm3 Final   • Neutrophil % 05/25/2022 58.2  42.7 - 76.0 % Final   • Lymphocyte % 05/25/2022 27.9  19.6 - 45.3 % Final   • Monocyte % 05/25/2022 10.2  5.0 - 12.0 % Final   • Eosinophil % 05/25/2022 2.1  0.3 - 6.2 % Final   • Basophil % 05/25/2022 0.7  0.0 - 1.5 % Final   • Immature Grans % 05/25/2022 0.9 (A) 0.0 - 0.5 % Final   • Neutrophils, Absolute 05/25/2022 4.38  1.70 - 7.00 10*3/mm3 Final   • Lymphocytes, Absolute 05/25/2022 2.10  0.70 - 3.10 10*3/mm3 Final   • Monocytes, Absolute 05/25/2022 0.77  0.10 - 0.90 10*3/mm3 Final   • Eosinophils, Absolute 05/25/2022 0.16  0.00 - 0.40 10*3/mm3 Final   • Basophils, Absolute 05/25/2022 0.05  0.00 - 0.20 10*3/mm3 Final   • Immature Grans, Absolute 05/25/2022 0.07 (A) 0.00 - 0.05 10*3/mm3 Final   • nRBC 05/25/2022 0.0  0.0 - 0.2 /100 WBC Final   Office Visit on 05/19/2022   Component Date Value Ref Range Status   • D-Dimer, Quantitative 05/19/2022 0.28  0.00 - 0.49 MCGFEU/mL Final         ASSESSMENT:   is a pleasant 78 y/o WF with medical history significant for hypertension, dyslipidemia, hypothyroidism, anxiety and depression who comes for pulmonary embolism evaluation and management.    # Pulmonary embolism, subsegmental and tiny emboli:  A tiny nonocclusive subsegmental right lower lobe pulmonary thrombus and few very tiny distal thromboemboli were noted on the CTA chest from 5/19/2022.  No clear provoking event.  No prior history of venous thromboembolism.  Family history significant for daughter having history of DVT and sister with history of strokes.  It is unclear if patient's  current respiratory symptoms are entirely due to these tiny pulmonary emboli.  Given unprovoked nature and family history, will perform hypercoagulable work-up as below.  Discussed plan to continue anticoagulation with Xarelto for now, pending hypercoagulable work-up.    #Shortness of breath:  Progressive over the last 6 weeks.  The CTA chest findings raised concern for chronic pulmonary thromboembolism.   We will plan to continue anticoagulation with Xarelto for now.  She has she has been referred to pulmonology and cardiology for further evaluation.  There does appear to be a component of anxiety contributing to her dyspnea.  Patient reports of her daughter's death due to suicide around 3 years ago.  Says that had severe effect on her mental health. Says her daughter's birthday comes in June and each year around this time she gets very emotional & stressed.  She was seeing a therapist, but stopped going there due to cost issues.    # Anxiety/depression: Continue Cymbalta and trazodone.    PLAN:   -Tiny subsegmental and distal pulmonary emboli.  No clear provoking event.    - Will perform hypercoagulable work-up as below  - Ccontinue Xarelto for now.  Will likely need indefinite anticoagulation.  -Advised to follow-up with pulmonology and cardiology for shortness of breath.  -Will plan plan to see her back in 3 months for a follow-up.    Orders Placed This Encounter   Procedures   • Factor 5 Leiden     Order Specific Question:   Release to patient     Answer:   Immediate   • Factor II, DNA Analysis     Order Specific Question:   Release to patient     Answer:   Immediate   • Antithrombin III     Order Specific Question:   Release to patient     Answer:   Immediate   • Protein C Activity     Order Specific Question:   Release to patient     Answer:   Immediate   • Protein S Functional     Order Specific Question:   Release to patient     Answer:   Immediate   • Anticardiolipin Antibody, IgG / M, Qn     Order  Specific Question:   Release to patient     Answer:   Immediate   • Lupus Anticoagulant Reflex     Order Specific Question:   Release to patient     Answer:   Immediate   • Beta-2 Glycoprotein Antibodies     Order Specific Question:   Release to patient     Answer:   Immediate   Total time spent during this patient encounter is 65 minutes. The total time spent with the patient includes at least one or more of the following: preparing to see the patient by reviewing of tests, prior notes or other relevant information, performing appropriate independent examination & evaluation, counseling, ordering of medications, tests or procedures, communicating with other healthcare professionals, when appropriate to coordinate care, documenting clinic information in the electronic medical records or other health records, independently interpreting results of tests and communicating the results to the patient/family or caregiver.

## 2022-05-26 LAB
CARDIOLIPIN IGG SER IA-ACNC: <9 GPL U/ML (ref 0–14)
CARDIOLIPIN IGM SER IA-ACNC: 10 MPL U/ML (ref 0–12)

## 2022-05-27 LAB
AT III PPP CHRO-ACNC: 111 % (ref 90–134)
B2 GLYCOPROT1 IGA SER-ACNC: <9 GPI IGA UNITS (ref 0–25)
B2 GLYCOPROT1 IGG SER-ACNC: <9 GPI IGG UNITS (ref 0–20)
B2 GLYCOPROT1 IGM SER-ACNC: <9 GPI IGM UNITS (ref 0–32)
PROT C ACT/NOR PPP: 193 % (ref 86–163)
PROT S ACT/NOR PPP: >150 % (ref 70–127)

## 2022-05-28 LAB
DRVVT SCREEN TO CONFIRM RATIO: 1.4 RATIO (ref 0.8–1.2)
LA 2 SCREEN W REFLEX-IMP: ABNORMAL
MIXING DRVVT: 69.8 SEC (ref 0–40.4)
SCREEN APTT: 38.5 SEC (ref 0–51.9)
SCREEN DRVVT: 81 SEC (ref 0–47)

## 2022-06-02 ENCOUNTER — OFFICE VISIT (OUTPATIENT)
Dept: INTERNAL MEDICINE | Facility: CLINIC | Age: 77
End: 2022-06-02

## 2022-06-02 VITALS
SYSTOLIC BLOOD PRESSURE: 134 MMHG | DIASTOLIC BLOOD PRESSURE: 78 MMHG | WEIGHT: 208.6 LBS | HEART RATE: 92 BPM | OXYGEN SATURATION: 94 % | RESPIRATION RATE: 18 BRPM | HEIGHT: 65 IN | BODY MASS INDEX: 34.75 KG/M2

## 2022-06-02 DIAGNOSIS — I27.29 PULMONARY HYPERTENSION DUE TO THROMBOEMBOLISM: ICD-10-CM

## 2022-06-02 DIAGNOSIS — I26.99 PULMONARY HYPERTENSION DUE TO THROMBOEMBOLISM: ICD-10-CM

## 2022-06-02 DIAGNOSIS — I26.99 MULTIPLE PULMONARY EMBOLI: Primary | ICD-10-CM

## 2022-06-02 DIAGNOSIS — R06.09 DYSPNEA ON EXERTION: ICD-10-CM

## 2022-06-02 DIAGNOSIS — R07.9 EXERTIONAL CHEST PAIN: ICD-10-CM

## 2022-06-02 DIAGNOSIS — I27.82 CHRONIC PULMONARY THROMBOEMBOLISM SYNDROME: ICD-10-CM

## 2022-06-02 PROCEDURE — 99214 OFFICE O/P EST MOD 30 MIN: CPT | Performed by: INTERNAL MEDICINE

## 2022-06-02 RX ORDER — RIVAROXABAN 15 MG-20MG
KIT ORAL
Qty: 1 EACH | Refills: 0
Start: 2022-06-02 | End: 2022-06-29

## 2022-06-02 NOTE — PROGRESS NOTES
06/02/2022    Patient Information  Claudette L McManus                                                                                          [id number]  [place] [address]      1945  [unfilled]  There is no work phone number on file.    Chief Complaint:     Follow-up complaints of exertional chest pain and shortness of breath, new diagnosis of chronic pulmonary thromboembolism syndrome with suspected pulmonary hypertension.    History of Present Illness:    Patient with a history of benign essential hypertension, hyperlipidemia, hypothyroidism, type 2 diabetes with diabetic neuropathy, chronic insomnia, chronic back pain, vitamin D deficiency, situational anxiety and depressive disorder, hyponatremia, recent diagnosis of multiple pulmonary emboli.  She presents today to for follow-up of her complaints of shortness of breath and chest pain believed to be related to multiple pulmonary emboli.  The history regarding this will be described below.  She does not have any new complaints.  Her past medical history reviewed and updated were necessary including health maintenance parameters.  This reveals she is up-to-date or else accounted for.    The history regarding dyspnea on exertion and exertional chest pain, chronic pulmonary thromboembolism syndrome with suspected pulmonary hypertension is documented today by Dr. Moore under the appropriate diagnoses in the problem list and subsequently transferred to this note as follows:    June 2, 2022--patient seen in follow-up.  She has seen the hematologist but has not heard anything from pulmonary.  I am going to refer her to cardiology physician that specializes in pulmonary hypertension.  Clinically, patient is no worse and if she is any better its only a small amount.  Her lungs are clear today and her heart is regular.  There is no significant edema.  Unfortunately our computer was down during this visit.  I did not order any lab work  today but may need to when she follows up in about 2 weeks.  Patient instructed to contact me for any signs or symptoms of bleeding or any other issues.    May 19, 2022--patient called back into the office and I explained the diagnosis and the treatment plan.  We will start her on Xarelto 15 mg twice a day initially and then decrease down to once daily 20 mg.  I am waiting for Dr. Garcia to get back with me regarding whether or not cardiology wants to see her regarding her right heart.  Patient referred to hematology as well as pulmonology.  I will contact hematology and obtain their suggestion for hypercoagulable work-up in order to get that started.  I will have patient follow-up with me in about 2 weeks to reassess her clinically.  Sooner if she has any problems.  Doppler venous study of the lower extremities also ordered.    May 19, 2022--CTA of the chest stat:   IMPRESSION:  1. There are tiny pulmonary thromboemboli with the largest within a  subsegmental right lower lobe pulmonary artery. The RV:LV ratio is 1.4.  The appearance of some of the pulmonary artery branches and the lungs  can be seen with chronic pulmonary thromboemboli and there may be some  air trapping as well.  2. Mildly diffusely thickened appearance of the esophagus can be seen  with esophagitis. There is no hiatal hernia.     The findings concerning the pulmonary thromboemboli were discussed with  Dr. Moore.    May 19, 2022--patient seen in follow-up and results of the stress test discussed.  I again reviewed her symptoms as follows: She reports that with minimal exertion she gets severe shortness of breath and a sensation of tightness/squeezing in her chest.  This goes away with rest after a few minutes.  She also reports that there is a definite relationship to the discomfort in her neck when she gets short of breath.  This also goes away with rest.  Patient has numerous risk factors including type 2 diabetes, hyperlipidemia,  hypertension, diabetic peripheral neuropathy.  The symptoms started a couple months ago and are definitely getting worse meaning more severe and more frequent.  Examination today reveals clear lungs although her breath sounds seem to be a little diminished on the right.  Her chest x-ray PA and lateral on May 11, 2022 was negative.  The exact etiology of her symptoms is not clear at this time but are worrisome.  I explained to her that the nuclear stress test is not a perfect test and she could in fact have blockages in her coronary arteries.  Another consideration would be chronic thrombopulmonary embolism and I do think this needs to be ruled out.  Plan is as follows: Check D-dimer as soon as possible.  I will have a low threshold for CTA of the chest and I Samina go ahead and order it now.  I will discussed the case with the cardiologist to see if heart cath may be warranted.  Further to follow.    May 17, 2022--nuclear stress test reveals left ventricular ejection fraction of greater than 70%.  Hyperdynamic.  Myocardial perfusion imaging indicates a normal myocardial perfusion study with no evidence of ischemia.  Impressions are consistent with a low risk study.    May 11, 2022--patient with type 2 diabetes, hyperlipidemia, hypothyroidism, hypertension, chronic lower back pain and diabetic peripheral neuropathy.  She presents today with at least a 1 month history of progressively worsening shortness of breath with exertion and associated with a tightness in her chest that is relieved with rest after 15 or 20 minutes.  No radiation into the neck or jaw. Plan is as follows: Pharmacologic stress test as soon as possible.  Chest x-ray PA and lateral.  CBC, CMP, D-dimer and proBNP.  No changes in current medical regimen.  Patient will follow-up after the results of the stress test are known.  We will defer screening colonoscopy for obvious reasons.  I have asked patient to take it somewhat easy as far as exertion is  concerned.  Also if she begins to develop severe shortness of breath or chest tightness/discomfort that will not go away or that is persistent at rest then she needs to seek medical attention.    ROS    Active Problems:    Patient Active Problem List   Diagnosis   • Primary osteoarthritis involving multiple joints   • Benign essential hypertension   • Hyperlipidemia   • Primary hypothyroidism   • Type 2 diabetes mellitus with diabetic neuropathy, without long-term current use of insulin (HCC)   • Chronic insomnia   • Chronic bilateral low back pain without sciatica   • Chronic bilateral thoracic back pain   • Vitamin D deficiency   • Therapeutic drug monitoring   • Menopausal state, 8/19/2020--normal DEXA.   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  This reveals scintigraphic activity in the spine and at the right shoulder corresponds to change seen on recent x-rays and is best explained by degenerative change.  Isolated metastatic disease exactly corresponding to the sites of extensive degenerative disc change would be peculiar.  March 7, 2019--new patient to get established.  She has complaints of approxi   • Chronic bilateral thoracic back pain 2/26/2019 March 13, 2019--Limited bone scan.  This reveals scintigraphic activity in the spine and at the right shoulder corresponds to change seen on recent x-rays and is best explained by degenerative change.  Isolated metastatic disease exactly corresponding to the sites of extensive degenerative disc  change would be peculiar.  March 1, 2019--x-ray of the lumbar and thoracic spine reveals mild anterior l   • Chronic insomnia 11/4/2014   • Diabetic peripheral neuropathy (HCC) 3/7/2019    Characterized by decreased sensation and paresthesias in both lower extremities described as a burning sensation.  Extends up to the knees.  Reportedly had been evaluated with nerve conduction study in the past.   • Generalized anxiety disorder 5/19/2013   • History of Bell's palsy 5/21/2013    Remote history of Bell's palsy.  Patient does not remember which side of the face.   • Hyperlipidemia    • Impaired fasting glucose    • Major depression    • Menopausal state, 8/19/2020--normal DEXA. 3/7/2019    August 19, 2020--DEXA scan reveals lumbar spine T score 3.8.  Left femoral neck T score 2.5.  Right femoral neck T score 2.2.  Normal bone density.   • Peripheral neuropathy, idiopathic 3/7/2019    Characterized by decreased sensation and paresthesias in both lower extremities described as a burning sensation.  Extends up to the knees.  Reportedly had been evaluated with nerve conduction study in the past.   • Primary hypothyroidism 5/19/2013   • Primary osteoarthritis involving multiple joints 4/22/2013   • Rotator cuff arthropathy of right shoulder, 4/20/2021--status post right reverse total shoulder arthroplasty 5/27/2020   • Situational mixed anxiety and depressive disorder 8/21/2019 August 21, 2019--patient seen in follow-up after I called in a prescription for Ativan after the patient's  contacted me a few weeks ago regarding the sudden death of patient's daughter.  This was an apparent suicide and the patient found her daughter dead.  I will not go into details.  Patient reports the Lorazepam has been helpful but she is still quite distraught, nervous, and undergoing   • Type 2 diabetes mellitus with diabetic neuropathy, without long-term current use of insulin (HCC)    • Vitamin D deficiency 2/26/2019         Past  Surgical History:   Procedure Laterality Date   • BILATERAL BREAST REDUCTION  Early 2000    Early 2000--bilateral breast reduction   • CHOLECYSTECTOMY  1960s    1960s--open cholecystectomy   • COLONOSCOPY  2012 2012--reportedly normal colonoscopy   • INCONTINENCE SURGERY  2012 Approximately 2012--implantation of questionable bladder stimulator right sacral area for urinary incontinence.  Details not known.   • REPLACEMENT TOTAL KNEE BILATERAL Left 2010; 2011 2010-2011--bilateral total knee replacement   • SUBTOTAL HYSTERECTOMY  Remote    Remote partial hysterectomy.  Ovaries intact.   • TOTAL SHOULDER REPLACEMENT Left 2013 2013--left total shoulder replacement.   • TOTAL SHOULDER REPLACEMENT  April 28, 2021 4/20/2021--status post right reverse total shoulder arthroplasty   • TOTAL SHOULDER REVERSE ARTHROPLASTY Right 04/20/2021 4/20/2021--right reverse total shoulder replacement.         Allergies   Allergen Reactions   • Morphine And Related    • Other Other (See Comments)     REJECTED DACRON SUTURES IN THE PAST AND INCISION CAME APART           Current Outpatient Medications:   •  Calcium Carb-Cholecalciferol (CALCIUM + D3 PO), Take 1 tablet by mouth daily., Disp: , Rfl:   •  Cholecalciferol (VITAMIN D3) 2000 UNITS tablet, Take 1 capsule by mouth daily., Disp: , Rfl:   •  DULoxetine (CYMBALTA) 60 MG capsule, TAKE 1 CAPSULE EVERY DAY AS DIRECTED, Disp: 90 capsule, Rfl: 3  •  ezetimibe (ZETIA) 10 MG tablet, TAKE 1 TABLET EVERY DAY, Disp: 90 tablet, Rfl: 3  •  levothyroxine (SYNTHROID, LEVOTHROID) 125 MCG tablet, Take 1 tablet by mouth Daily., Disp: 90 tablet, Rfl: 2  •  metroNIDAZOLE (METROGEL) 0.75 % gel, , Disp: , Rfl:   •  simvastatin (ZOCOR) 20 MG tablet, Take one tablet by mouth daily for high cholesterol., Disp: 90 tablet, Rfl: 2  •  traZODone (DESYREL) 150 MG tablet, TAKE 1 TABLET EVERY NIGHT, Disp: 90 tablet, Rfl: 3  •  valsartan (DIOVAN) 320 MG tablet, TAKE ONE TABLET BY MOUTH EVERY MORNING  "FOR BLOOD PRESSURE, Disp: 90 tablet, Rfl: 3  •  Rivaroxaban (Xarelto Starter Pack) tablet therapy pack starter pack, Take one 15 mg tablet twice daily with food for 21 days.  Followed by one 20 mg tablet by mouth once daily with food. Continue the 20 mg daily until further notice by physician., Disp: 1 each, Rfl: 0  •  rivaroxaban (XARELTO) 20 MG tablet, Take 1 p.o. daily for blood thinner as directed., Disp: 30 tablet, Rfl: 3      Family History   Problem Relation Age of Onset   • Alcohol abuse Father    • Cancer Other    • Diabetes Other         type II         Social History     Socioeconomic History   • Marital status:    Tobacco Use   • Smoking status: Former Smoker     Quit date: 1998     Years since quittin.4   • Smokeless tobacco: Never Used   Vaping Use   • Vaping Use: Never used   Substance and Sexual Activity   • Alcohol use: Yes     Alcohol/week: 1.0 standard drink     Types: 1 Glasses of wine per week     Comment: current some day   • Drug use: No   • Sexual activity: Not Currently     Partners: Male         Vitals:    22 1108   BP: 134/78   Pulse: 92   Resp: 18   SpO2: 94%   Weight: 94.6 kg (208 lb 9.6 oz)   Height: 165.1 cm (65\")        Body mass index is 34.71 kg/m².      Physical Exam:    General: Alert and oriented x 3.  Obese.  No acute distress.  Normal affect.  HEENT: Pupils equal, round, reactive to light; extraocular movements intact; sclerae nonicteric; pharynx, ear canals and TMs normal.  Neck: Without JVD, thyromegaly, bruit, or adenopathy.  Lungs: Clear to auscultation in all fields.  Heart: Regular rate and rhythm without murmur, rub, gallop, or click.  Abdomen: Soft, nontender, without hepatosplenomegaly or hernia.  Bowel sounds normal.  : Deferred.  Rectal: Deferred.  Extremities: Without clubbing, cyanosis, edema, or pulse deficit.  Neurologic: Intact without focal deficit.  Normal station and gait observed during ingress and egress from the examination room.  " Skin: Without significant lesion.  Musculoskeletal: Unremarkable.    Lab/other results:      Assessment/Plan:     Diagnosis Plan   1. Multiple pulmonary emboli (HCC)  Ambulatory Referral to Cardiology    rivaroxaban (XARELTO) 20 MG tablet    Rivaroxaban (Xarelto Starter Pack) tablet therapy pack starter pack   2. Chronic pulmonary thromboembolism syndrome (HCC)  Ambulatory Referral to Cardiology    rivaroxaban (XARELTO) 20 MG tablet    Rivaroxaban (Xarelto Starter Pack) tablet therapy pack starter pack   3. 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 Exertional chest pain     5. Dyspnea on exertion       June 2, 2022--patient seen in follow-up.  She has seen the hematologist but has not heard anything from pulmonary.  I am going to refer her to cardiology physician that specializes in pulmonary hypertension.  Clinically, patient is no worse and if she is any better its only a small amount.  Her lungs are clear today and her heart is regular.  There is no significant edema.  Unfortunately our computer was down during this visit.  I did not order any lab work today but may need to when she follows up in about 2 weeks.  Patient instructed to contact me for any signs or symptoms of bleeding or any other issues.  I communicated with Dr. Garcia, cardiology, regarding this case today during this visit.    May 19, 2022--patient called back into the office and I explained the diagnosis and the treatment plan.  We will start her on Xarelto 15 mg twice a day initially and then decrease down to once daily 20 mg.  I am waiting for Dr. Garcia to get back with me regarding whether or not cardiology wants to see her regarding her right heart.  Patient referred to hematology as well as pulmonology.  I will contact hematology and obtain their suggestion for hypercoagulable work-up in order to get that started.  I will have patient follow-up with me in about 2 weeks to reassess her  clinically.  Sooner if she has any problems.  Doppler venous study of the lower extremities also ordered.    May 19, 2022--CTA of the chest stat:   IMPRESSION:  1. There are tiny pulmonary thromboemboli with the largest within a  subsegmental right lower lobe pulmonary artery. The RV:LV ratio is 1.4.  The appearance of some of the pulmonary artery branches and the lungs  can be seen with chronic pulmonary thromboemboli and there may be some  air trapping as well.  2. Mildly diffusely thickened appearance of the esophagus can be seen  with esophagitis. There is no hiatal hernia.     The findings concerning the pulmonary thromboemboli were discussed with  Dr. Moore.    May 19, 2022--patient seen in follow-up and results of the stress test discussed.  I again reviewed her symptoms as follows: She reports that with minimal exertion she gets severe shortness of breath and a sensation of tightness/squeezing in her chest.  This goes away with rest after a few minutes.  She also reports that there is a definite relationship to the discomfort in her neck when she gets short of breath.  This also goes away with rest.  Patient has numerous risk factors including type 2 diabetes, hyperlipidemia, hypertension, diabetic peripheral neuropathy.  The symptoms started a couple months ago and are definitely getting worse meaning more severe and more frequent.  Examination today reveals clear lungs although her breath sounds seem to be a little diminished on the right.  Her chest x-ray PA and lateral on May 11, 2022 was negative.  The exact etiology of her symptoms is not clear at this time but are worrisome.  I explained to her that the nuclear stress test is not a perfect test and she could in fact have blockages in her coronary arteries.  Another consideration would be chronic thrombopulmonary embolism and I do think this needs to be ruled out.  Plan is as follows: Check D-dimer as soon as possible.  I will have a low threshold for  CTA of the chest and I Samina go ahead and order it now.  I will discussed the case with the cardiologist to see if heart cath may be warranted.  Further to follow.    May 17, 2022--nuclear stress test reveals left ventricular ejection fraction of greater than 70%.  Hyperdynamic.  Myocardial perfusion imaging indicates a normal myocardial perfusion study with no evidence of ischemia.  Impressions are consistent with a low risk study.    May 11, 2022--patient with type 2 diabetes, hyperlipidemia, hypothyroidism, hypertension, chronic lower back pain and diabetic peripheral neuropathy.  She presents today with at least a 1 month history of progressively worsening shortness of breath with exertion and associated with a tightness in her chest that is relieved with rest after 15 or 20 minutes.  No radiation into the neck or jaw. Plan is as follows: Pharmacologic stress test as soon as possible.  Chest x-ray PA and lateral.  CBC, CMP, D-dimer and proBNP.  No changes in current medical regimen.  Patient will follow-up after the results of the stress test are known.  We will defer screening colonoscopy for obvious reasons.  I have asked patient to take it somewhat easy as far as exertion is concerned.  Also if she begins to develop severe shortness of breath or chest tightness/discomfort that will not go away or that is persistent at rest then she needs to seek medical attention.            Procedures

## 2022-06-07 ENCOUNTER — HOSPITAL ENCOUNTER (OUTPATIENT)
Dept: CARDIOLOGY | Facility: HOSPITAL | Age: 77
Discharge: HOME | End: 2022-06-07

## 2022-06-07 ENCOUNTER — HOSPITAL ENCOUNTER (OUTPATIENT)
Dept: CARDIOLOGY | Facility: HOSPITAL | Age: 77
Discharge: HOME | End: 2022-06-07
Admitting: INTERNAL MEDICINE

## 2022-06-07 VITALS
WEIGHT: 211 LBS | OXYGEN SATURATION: 93 % | HEART RATE: 88 BPM | BODY MASS INDEX: 35.16 KG/M2 | RESPIRATION RATE: 20 BRPM | DIASTOLIC BLOOD PRESSURE: 84 MMHG | HEIGHT: 65 IN | SYSTOLIC BLOOD PRESSURE: 142 MMHG

## 2022-06-07 DIAGNOSIS — R06.09 DYSPNEA ON EXERTION: ICD-10-CM

## 2022-06-07 DIAGNOSIS — I26.99 PULMONARY HYPERTENSION DUE TO THROMBOEMBOLISM: ICD-10-CM

## 2022-06-07 DIAGNOSIS — I26.99 MULTIPLE PULMONARY EMBOLI: Primary | ICD-10-CM

## 2022-06-07 DIAGNOSIS — I27.29 PULMONARY HYPERTENSION DUE TO THROMBOEMBOLISM: ICD-10-CM

## 2022-06-07 LAB
AORTIC DIMENSIONLESS INDEX: 0.8 (DI)
ASCENDING AORTA: 3.5 CM
BH CV ECHO MEAS - AO MAX PG: 7.4 MMHG
BH CV ECHO MEAS - AO MEAN PG: 4.8 MMHG
BH CV ECHO MEAS - AO V2 MAX: 136.3 CM/SEC
BH CV ECHO MEAS - AO V2 VTI: 29.9 CM
BH CV ECHO MEAS - AVA(I,D): 2.9 CM2
BH CV ECHO MEAS - EDV(MOD-SP2): 95 ML
BH CV ECHO MEAS - EDV(MOD-SP4): 109 ML
BH CV ECHO MEAS - EF(MOD-BP): 59.1 %
BH CV ECHO MEAS - EF(MOD-SP2): 63.2 %
BH CV ECHO MEAS - EF(MOD-SP4): 56 %
BH CV ECHO MEAS - ESV(MOD-SP2): 35 ML
BH CV ECHO MEAS - ESV(MOD-SP4): 48 ML
BH CV ECHO MEAS - LAT PEAK E' VEL: 4.9 CM/SEC
BH CV ECHO MEAS - LV DIASTOLIC VOL/BSA (35-75): 54.3 CM2
BH CV ECHO MEAS - LV MAX PG: 4.4 MMHG
BH CV ECHO MEAS - LV MEAN PG: 2.7 MMHG
BH CV ECHO MEAS - LV SYSTOLIC VOL/BSA (12-30): 23.9 CM2
BH CV ECHO MEAS - LV V1 MAX: 104.5 CM/SEC
BH CV ECHO MEAS - LV V1 VTI: 23.8 CM
BH CV ECHO MEAS - LVOT AREA: 3.6 CM2
BH CV ECHO MEAS - LVOT DIAM: 2.15 CM
BH CV ECHO MEAS - MED PEAK E' VEL: 6.7 CM/SEC
BH CV ECHO MEAS - MV A MAX VEL: 83.1 CM/SEC
BH CV ECHO MEAS - MV DEC SLOPE: 217.3 CM/SEC2
BH CV ECHO MEAS - MV DEC TIME: 270 MSEC
BH CV ECHO MEAS - MV E MAX VEL: 67.3 CM/SEC
BH CV ECHO MEAS - MV E/A: 0.81
BH CV ECHO MEAS - MV MAX PG: 4.4 MMHG
BH CV ECHO MEAS - MV MEAN PG: 1.44 MMHG
BH CV ECHO MEAS - MV P1/2T: 93.7 MSEC
BH CV ECHO MEAS - MV V2 VTI: 29.7 CM
BH CV ECHO MEAS - MVA(P1/2T): 2.35 CM2
BH CV ECHO MEAS - MVA(VTI): 2.9 CM2
BH CV ECHO MEAS - PA V2 MAX: 99.1 CM/SEC
BH CV ECHO MEAS - RAP SYSTOLE: 3 MMHG
BH CV ECHO MEAS - RV MAX PG: 2.7 MMHG
BH CV ECHO MEAS - RV V1 MAX: 82.7 CM/SEC
BH CV ECHO MEAS - RV V1 VTI: 17.9 CM
BH CV ECHO MEAS - SI(MOD-SP2): 29.9 ML/M2
BH CV ECHO MEAS - SI(MOD-SP4): 30.4 ML/M2
BH CV ECHO MEAS - SV(LVOT): 86.1 ML
BH CV ECHO MEAS - SV(MOD-SP2): 60 ML
BH CV ECHO MEAS - SV(MOD-SP4): 61 ML
BH CV ECHO MEAS - TAPSE (>1.6): 2 CM
BH CV ECHO MEAS RV FREE WALL STRAIN: -19.8 %
BH CV ECHO MEASUREMENTS AVERAGE E/E' RATIO: 11.6
BH CV XLRA - RV BASE: 3.2 CM
BH CV XLRA - RV LENGTH: 6.2 CM
BH CV XLRA - RV MID: 2.36 CM
BH CV XLRA - TDI S': 9.6 CM/SEC
LEFT ATRIUM VOLUME INDEX: 27.9 ML/M2
MAXIMAL PREDICTED HEART RATE: 141 BPM
QT INTERVAL: 371 MS
SINUS: 3.2 CM
STRESS TARGET HR: 120 BPM

## 2022-06-07 PROCEDURE — 93010 ELECTROCARDIOGRAM REPORT: CPT | Performed by: INTERNAL MEDICINE

## 2022-06-07 PROCEDURE — 25010000002 PERFLUTREN (DEFINITY) 8.476 MG IN SODIUM CHLORIDE (PF) 0.9 % 10 ML INJECTION: Performed by: INTERNAL MEDICINE

## 2022-06-07 PROCEDURE — 93306 TTE W/DOPPLER COMPLETE: CPT

## 2022-06-07 PROCEDURE — 94618 PULMONARY STRESS TESTING: CPT

## 2022-06-07 PROCEDURE — 93306 TTE W/DOPPLER COMPLETE: CPT | Performed by: INTERNAL MEDICINE

## 2022-06-07 PROCEDURE — 99205 OFFICE O/P NEW HI 60 MIN: CPT | Performed by: INTERNAL MEDICINE

## 2022-06-07 PROCEDURE — 93005 ELECTROCARDIOGRAM TRACING: CPT | Performed by: INTERNAL MEDICINE

## 2022-06-07 RX ORDER — ACETAMINOPHEN,DIPHENHYDRAMINE HCL 500; 25 MG/1; MG/1
2 TABLET, FILM COATED ORAL NIGHTLY
COMMUNITY

## 2022-06-07 RX ADMIN — SODIUM CHLORIDE 2 ML: 9 INJECTION INTRAMUSCULAR; INTRAVENOUS; SUBCUTANEOUS at 15:09

## 2022-06-07 NOTE — ADDENDUM NOTE
Encounter addended by: Agus Solorio MD PhD on: 6/7/2022 12:41 PM   Actions taken: Order list changed, Diagnosis association updated

## 2022-06-07 NOTE — ADDENDUM NOTE
Encounter addended by: Loida Johnson RN on: 6/7/2022 12:32 PM   Actions taken: Charge Capture section accepted, Flowsheet accepted

## 2022-06-07 NOTE — PROGRESS NOTES
Heart Failure & Pulmonary Arterial Hypertension Clinic  [facility]  Agus Solorio M.D., Ph.D., [facility]       Lito Moore MD  [id number] [id number]    Thank you for asking me to see Claudette L McManus for PE, concerns for PAH/CTEPH.    History of Present Illness  This is a 77 y.o. 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 Likely PAH      Claudette L McManus is a 77 y.o. female who presents for today for further evaluation and management of VTE/PE with CT scan features concerning for the development of CTEPH.  The patient is typically seen by Lito Moore MD.      The patient presents to pulmonary hypertension clinic today.  She has been undergoing a work-up by her PCP, Dr. Moore.  Patient has been endorsing non-exertional chest tightness, exercise intolerance, and exertional dyspnea.  She has had no evidence of orthopnea, PND, lower extremity edema, dizziness, lightheadedness, LOC, ascites, or early abdominal satiety.  He denies a family history of pulmonary hypertension.  No family history of familial cardiomyopathy.    Patient received a pro BNP and a D-dimer, both of which were within normal limits.  Due to concerns for obstructive coronary artery disease the patient was referred for MPS.  This showed a hyperdynamic LVEF greater than 70% and no evidence of inducible ischemia.  A 2D TTE was also ordered, but has not yet been completed.  Due to the patient's ongoing exercise intolerance and exertional dyspnea a CTA was performed.  Radiology interpretation showed SSPE, primarily in the RLL.  There was also comment that the RV: LV ratio was elevated at 1.4.  Pulmonary artery branches appeared attenuated with a mosaic pattern of density with the lucent region having less vasculature concerning for CTEPH versus air trapping.  Patient was initiated on anticoagulation.  The patient has also been seen by hematology who performed a  work-up for coagulopathy.  Given the patient's abnormal CT scan and concerns for CTEPH the patient presents to pulmonary hypertension clinic today.    Patient does have a family history of pulmonary embolism.  She reports a family history of PE in her daughter. She had DVT in her late 50s.   She is medication compliant with her anticoagulant.  She denies adverse effects.  However, she continues to have severe exercise intolerance and exertional dyspnea.  No recent episodes of chest pain. Overall, she reports her dyspnea and fatigue has been going on for a long time. She noticed when she was dog sitting she had to stop more and more frequently. Her  has also noted her breathing has been hard. It was the first part of May when she noted severe exercise intolerance.     Review of Systems - Review of Systems   Cardiovascular: Positive for dyspnea on exertion. Negative for chest pain, claudication, cyanosis, irregular heartbeat, leg swelling, near-syncope, orthopnea, palpitations, paroxysmal nocturnal dyspnea and syncope.   Respiratory: Positive for shortness of breath, sleep disturbances due to breathing and snoring. Negative for hemoptysis, sputum production and wheezing.    All other systems reviewed and are negative.       All other systems were reviewed and were negative.    family history includes Alcohol abuse in her father; Cancer in an other family member; Diabetes in an other family member.     reports that she quit smoking about 24 years ago. She has never used smokeless tobacco. She reports current alcohol use of about 1.0 standard drink of alcohol per week. She reports that she does not use drugs.    Allergies   Allergen Reactions   • Morphine And Related    • Other Other (See Comments)     REJECTED DACRON SUTURES IN THE PAST AND INCISION CAME APART         Current Outpatient Medications:   •  Calcium Carb-Cholecalciferol (CALCIUM + D3 PO), Take 1 tablet by mouth daily., Disp: , Rfl:   •   Cholecalciferol (VITAMIN D3) 2000 UNITS tablet, Take 1 capsule by mouth daily., Disp: , Rfl:   •  DULoxetine (CYMBALTA) 60 MG capsule, TAKE 1 CAPSULE EVERY DAY AS DIRECTED, Disp: 90 capsule, Rfl: 3  •  ezetimibe (ZETIA) 10 MG tablet, TAKE 1 TABLET EVERY DAY, Disp: 90 tablet, Rfl: 3  •  levothyroxine (SYNTHROID, LEVOTHROID) 125 MCG tablet, Take 1 tablet by mouth Daily., Disp: 90 tablet, Rfl: 2  •  metroNIDAZOLE (METROGEL) 0.75 % gel, , Disp: , Rfl:   •  Rivaroxaban (Xarelto Starter Pack) tablet therapy pack starter pack, Take one 15 mg tablet twice daily with food for 21 days.  Followed by one 20 mg tablet by mouth once daily with food. Continue the 20 mg daily until further notice by physician., Disp: 1 each, Rfl: 0  •  rivaroxaban (XARELTO) 20 MG tablet, Take 1 p.o. daily for blood thinner as directed., Disp: 30 tablet, Rfl: 3  •  simvastatin (ZOCOR) 20 MG tablet, Take one tablet by mouth daily for high cholesterol., Disp: 90 tablet, Rfl: 2  •  traZODone (DESYREL) 150 MG tablet, TAKE 1 TABLET EVERY NIGHT, Disp: 90 tablet, Rfl: 3  •  valsartan (DIOVAN) 320 MG tablet, TAKE ONE TABLET BY MOUTH EVERY MORNING FOR BLOOD PRESSURE, Disp: 90 tablet, Rfl: 3      Physical Exam:  I have reviewed the patient's current vital signs as documented in the patient's EMR.   Vitals:    06/07/22 1111   BP: 142/84   Pulse: 88   Resp: 20   SpO2: 93%     Body mass index is 35.11 kg/m².   Pulse Pressure: high  Pulse Ox: Normal  on room air  General: alert, appears stated age and cooperative     Body Habitus: obese    HEENT: Head: Normocephalic, no lesions, without obvious abnormality. No arcus senilis, xanthelasma or xanthomas.  No malar flush, proptosis, xanthomas or malar flush.   Neuro: alert, oriented x3  speech normal in context and clarity  memory intact grossly  Pulses: 2+ and symmetric  JVP: Volume/Pulsation: Normal.  Normal x and y descent.  Waveforms: Normal waveforms with a, x, and v waves.   Physiology: Appropriate inspiratory  decrease.  No Kussmaul's. No Josefina's.   Carotid Exam: no bruit normal pulsation bilaterally   Carotid Volume: normal.     Subclavian Bruit: absent  Vertebral Bruit: absent  Respirations: no increased work of breathing     Pulmonary:Normal     Precordium: Normal impulses. P2 is not palpable.  RV Heave: Present  LV Heave: absent  Argos:  normal size and placement  Palpable S4: absent.  Heart rate: normal    Heart Rhythm: regular     Heart Sounds: S1: normal  S2: increased P2  S3: absent   S4: absent  Opening Snap: absent    A2-OS:  no  Pericardial Rub:  Absent: Yes     Ejection click: None      Murmurs:  absent   Extremity: moves all extremities equally.       DATA REVIEWED:     EKG. I personally reviewed and interpreted the EKG.      TTE/RACHEL:  Pending.     My interpretation of the TTE is below.     -----------------------------------------------------    I personally reviewed and interpreted the CXR.      -----------------------------------------------------  CT:   CT angiogram chest w wo contrast    Result Date: 5/20/2022  1. There are tiny pulmonary thromboemboli with the largest within a subsegmental right lower lobe pulmonary artery. The RV:LV ratio is 1.4. The appearance of some of the pulmonary artery branches and the lungs can be seen with chronic pulmonary thromboemboli and there may be some air trapping as well. 2. Mildly diffusely thickened appearance of the esophagus can be seen with esophagitis. There is no hiatal hernia.  The findings concerning the pulmonary thromboemboli were discussed with Dr. Moore.  This report was finalized on 5/20/2022 1:32 PM by Dr. Ella Stiles M.D.      XR Chest PA & Lateral    Result Date: 5/11/2022  Negative.  This report was finalized on 5/11/2022 12:19 PM by Dr. Chau Qureshi M.D.                    I personally reviewed the images of the CT scan.  My personal interpretation is below.    The scan shows very tiny pulmonary thromboemboli.  The largest of these are SS and  in the RLL PA.  RV: LV ratio was elevated at 1.4.  Distal pulmonary artery branches are tapered and minimum.  There is a diffuse mosaic pattern throughout both lungs and the more lucent regions do have less vasculature.  This is consistent with pulmonary thromboembolism with an RV: LV ratio predictive of pulmonary hypertension.  Pulmonary arteries at the upper limit of normal at 27 mm.  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  ----------------------------------------------------    PFTs:    Pending  --------------------------------------------------------------------------------------------------    Laboratory evaluations:    Lab Results   Component Value Date    GLUCOSE 78 05/11/2022    BUN 17 05/11/2022    CREATININE 0.97 05/11/2022    EGFRIFNONA 63 02/01/2022    EGFRIFAFRI 65 03/12/2020    BCR 17.5 05/11/2022    K 4.9 05/11/2022    CO2 29.0 05/11/2022    CALCIUM 10.2 05/11/2022    PROTENTOTREF 7.1 03/12/2020    ALBUMIN 5.10 05/11/2022    LABIL2 1.8 03/12/2020    AST 19 05/11/2022    ALT 19 05/11/2022     Lab Results   Component Value Date    WBC 7.53 05/25/2022    HGB 13.0 05/25/2022    HCT 38.2 05/25/2022    MCV 94.1 05/25/2022     05/25/2022     Lab Results   Component Value Date    CHLPL 189 02/01/2022    TRIG 152 (H) 02/01/2022    HDL 68 (H) 06/11/2018     (H) 06/11/2018     Lab Results   Component Value Date    TSH 0.340 02/01/2022     Lab Results   Component Value Date    CKTOTAL 111 02/01/2022     Lab Results   Component Value Date    HGBA1C 7.08 (H) 02/01/2022     No results found for: DDIMER  Lab Results   Component Value Date    ALT 19 05/11/2022     Lab Results   Component Value Date    HGBA1C 7.08 (H) 02/01/2022    HGBA1C 6.54 (H) 07/23/2021    HGBA1C 6.50 (H) 04/02/2021     Lab Results   Component Value Date    MICROALBUR <1.2 07/23/2021    CREATININE 0.97 05/11/2022     No results found for: IRON, TIBC, FERRITIN  No  results found for: INR, PROTIME        1. Multiple pulmonary emboli (HCC)    2. 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  The patient's CT scan is consistent with pulmonary thromboembolism with an elevated RV: LV ratio predictive of pulmonary hypertension.  Additionally, there is a mosaic pattern throughout both lungs with lucent regions having less vasculature, as well as the pulmonary artery at the upper limit of normal, all of which concerning for the development of pulmonary hypertension and CTEPH.  With that said, I did inform the patient that a diagnosis technically cannot be made until the patient has been adequately anticoagulated for a period of 90 days at which point she will need a repeat limited 2D echocardiogram and, should she have evidence of pulmonary hypertension, a VQ scan for definitive diagnosis.  With that said, I am concerned that her CT appearance is consistent with CTEPH.  I do suspect that she also will require RHC in 90 days of anticoagulation.  Given her mosaic attenuation on her CT scan I also want to exclude small airway disease, she will need PFTs.    WHO Group concerning for group 4; functional class III.  RV status is unknown based on echocardiogram, but this was ordered today.  PFTs are currently pending.The KCCQ-12 was updated at the visit today. The Agudelo Index was updated today. Most recent score: 6.     Today, the Patient appears euvolemic.. Perfusion status: good.  There is not evidence of decompensation.    Today, I discussed the evaluation, treatment, and usual course of pulmonary hypertension.  All questions were answered. Signs and symptoms of worsening pulmonary hypertension and right ventricular failure were discussed.  Handouts were provided in discharge paperwork. At this time, there is not indication for consideration of PAH-specific therapies. There is not indication for a RHC at this point.       Additional Studies Ordered Today:  -TTE 2D  PAH-protocol with saline, PFTs and 6MWT  -EKG  -I am suspicious that her 2D TTE will show evidence of pulmonary hypertension.  She will need a repeat limited 2D TTE in 90 days on OAC (on or after 8/19/2023).  She also will likely need to discuss having a VQ scan and RHC at that time.    Medications To Be Continued/Ordered/Adjusted Today:   -N/A/    Vaccinations:  Pneumoccal (PCV13) and COVID booster    I have asked the patient that if they were to move to be certain they see someone with expertise in PAH. These providers can be located at [email or web address].        Return in about 4 weeks (around 7/5/2022).          This document has been electronically signed by Agus Solorio MD PhD on June 7, 2022 12:19 EDT        Agus Solorio M.D., Ph.D., [facility] Failure and PAH [facility]      Today, 72 minutes was utilized for the visit.  This includes face-to-face time, medical record review of the patient's PCP and hematology notes, review of the patient's chest x-ray, laboratory evaluation, and CT scan.  I also pulled up the patient's images and went over them with her and her .  I explained the work-up and diagnosis of PH and CTEPH.  We also discussed treatment options including riociguat and PTE surgery.

## 2022-06-08 ENCOUNTER — TELEPHONE (OUTPATIENT)
Dept: CARDIOLOGY | Facility: HOSPITAL | Age: 77
End: 2022-06-08

## 2022-06-08 ENCOUNTER — TELEPHONE (OUTPATIENT)
Dept: ONCOLOGY | Facility: CLINIC | Age: 77
End: 2022-06-08

## 2022-06-08 DIAGNOSIS — G47.39 SLEEP APNEA-LIKE BEHAVIOR: Primary | ICD-10-CM

## 2022-06-08 NOTE — TELEPHONE ENCOUNTER
Returned call to Good to obtain more information.  She needs to know for PA reasons which lab the Factor 5 Leiden and Factor 2 are being sent to on this patient.

## 2022-06-08 NOTE — TELEPHONE ENCOUNTER
Pt returned call.  Pt anf  informed of echo results as per Dr. Solorio's note. Pt said she is agreeable to having a sleep study.  I will let Dr. Solorio know to place referral.  Pt said she does have a referral from Dr. Moore to see pulmonology, but has not made an appt. yet. All questions and concerns addressed. Understanding and agreement verbalized.      Loida Johnson RN  [facility] Failure [facility]

## 2022-06-08 NOTE — TELEPHONE ENCOUNTER
Caller: DYLAN    Relationship: [facility]    Best call back number: [phone]      What was the call regarding: FACILITY CALLED TO SEE WHERE WE ARE SENDING THE LABS TO?      Do you require a callback: YES

## 2022-06-08 NOTE — TELEPHONE ENCOUNTER
----- Message from Agus Solorio MD PhD sent at 6/7/2022  3:45 PM EDT -----  Please let her know her echo shows her LV function is normal. Her RV is dilated (just like her CT scan had shown). The good news is that her RV function is low normal. We were unable to estimate pulmonary pressures. She has no holes in her heart that she shouldn't. Continue anticoagulation, complete tests ordered, and follow-up in one month. Lastly, can you let her know that her RV is also thickened. I suspect it has been pumping up against some pressure. Sleep apnea can also do this. I would like her to see a sleep physician to be tested. If she is agreeable, just let me know. I will put the referral in.

## 2022-06-08 NOTE — TELEPHONE ENCOUNTER
Returned Pt request for call back.  She said she has a referral to pulmonology, but they can't get her an appt. until September.  Pt wanting to know if Dr. Solorio has a recommendation for a pulmonologist who can get her in sooner.  I let her know the referral has been placed for sleep medicine.  I let her know that I can ask Dr. Solorio and call her back.  All questions and concerns addressed. Understanding and agreement verbalized.      Loida Johnson RN  [facility] Failure [facility]

## 2022-06-15 ENCOUNTER — OFFICE VISIT (OUTPATIENT)
Dept: INTERNAL MEDICINE | Facility: CLINIC | Age: 77
End: 2022-06-15

## 2022-06-15 VITALS
HEART RATE: 90 BPM | HEIGHT: 65 IN | RESPIRATION RATE: 18 BRPM | WEIGHT: 209.4 LBS | BODY MASS INDEX: 34.89 KG/M2 | OXYGEN SATURATION: 96 % | DIASTOLIC BLOOD PRESSURE: 76 MMHG | SYSTOLIC BLOOD PRESSURE: 146 MMHG

## 2022-06-15 DIAGNOSIS — I27.29 PULMONARY HYPERTENSION DUE TO THROMBOEMBOLISM: ICD-10-CM

## 2022-06-15 DIAGNOSIS — I26.99 MULTIPLE PULMONARY EMBOLI: ICD-10-CM

## 2022-06-15 DIAGNOSIS — R06.09 DYSPNEA ON EXERTION: Primary | ICD-10-CM

## 2022-06-15 DIAGNOSIS — I26.99 PULMONARY HYPERTENSION DUE TO THROMBOEMBOLISM: ICD-10-CM

## 2022-06-15 DIAGNOSIS — I27.82 CHRONIC PULMONARY THROMBOEMBOLISM SYNDROME: ICD-10-CM

## 2022-06-15 DIAGNOSIS — I10 BENIGN ESSENTIAL HYPERTENSION: ICD-10-CM

## 2022-06-15 PROBLEM — R07.9 EXERTIONAL CHEST PAIN: Status: RESOLVED | Noted: 2022-05-11 | Resolved: 2022-06-15

## 2022-06-15 PROCEDURE — 99214 OFFICE O/P EST MOD 30 MIN: CPT | Performed by: INTERNAL MEDICINE

## 2022-06-15 NOTE — PROGRESS NOTES
06/15/2022    Patient Information  Claudette L McManus                                                                                          [id number] [facility] [address]      1945  [unfilled]  There is no work phone number on file.    Chief Complaint:     Follow-up pulmonary embolism, dyspnea on exertion, pulmonary hypertension.  Complaining of worsening shortness of breath.    History of Present Illness:    Patient with type 2 diabetes, diabetic peripheral neuropathy, hypertension, hyperlipidemia, hypothyroidism.  She presents today to follow-up on recently discovered chronic pulmonary thromboembolism syndrome with suspected pulmonary hypertension.  The history regarding this will be described below.  Patient is complaining of worsening dyspnea on exertion.    The history regarding chronic pulmonary embolism syndrome and pulmonary hypertension with multiple pulmonary emboli is documented under the appropriate diagnoses in the problem list by Dr. Moore today and subsequently transferred to this note along with previous data as follows:    Stacie 15, 2022--patient seen in follow-up by Dr. Moore.  She is complaining of continued dyspnea on exertion which may be worse.  I reviewed the cardiology consultation from June 7, 2022 and also reviewed the echocardiogram from that same date.  Noted below.  Her  is now present and he reports her dyspnea on exertion is definitely worse.  Her oxygen saturation at rest is 96%.  Upon ambulation it dropped to 94%.  Her lungs seem to be clear today.  I am concerned that her symptoms may be progressing and she may not be properly responding to anticoagulation.  I have sent an email message to her cardiologist Dr. Solorio to inform him of the situation.  I will check a proBNP along with a CMP and a CBC to rule out other causes of her progressive dyspnea.  Further to follow.    6/7/2022--echocardiogram revealed normal left ventricular  systolic function with ejection fraction of 59%.  Grade 1A diastolic dysfunction.  Right ventricular systolic function is low normal.  RV cavity is mild to moderately dilated and there is moderate right ventricular hypertrophy.  Saline test are negative.  Insufficient TR velocity profile to estimate right ventricular systolic pressure.    June 2, 2022--patient seen in follow-up.  She has seen the hematologist but has not heard anything from pulmonary.  I am going to refer her to cardiology physician that specializes in pulmonary hypertension.  Clinically, patient is no worse and if she is any better its only a small amount.  Her lungs are clear today and her heart is regular.  There is no significant edema.  Unfortunately our computer was down during this visit.  I did not order any lab work today but may need to when she follows up in about 2 weeks.  Patient instructed to contact me for any signs or symptoms of bleeding or any other issues.    May 19, 2022--patient called back into the office and I explained the diagnosis and the treatment plan.  We will start her on Xarelto 15 mg twice a day initially and then decrease down to once daily 20 mg.  I am waiting for Dr. Garcia to get back with me regarding whether or not cardiology wants to see her regarding her right heart.  Patient referred to hematology as well as pulmonology.  I will contact hematology and obtain their suggestion for hypercoagulable work-up in order to get that started.  I will have patient follow-up with me in about 2 weeks to reassess her clinically.  Sooner if she has any problems.  Doppler venous study of the lower extremities also ordered.    May 19, 2022--CTA of the chest stat:   IMPRESSION:  1. There are tiny pulmonary thromboemboli with the largest within a  subsegmental right lower lobe pulmonary artery. The RV:LV ratio is 1.4.  The appearance of some of the pulmonary artery branches and the lungs  can be seen with chronic pulmonary  thromboemboli and there may be some  air trapping as well.  2. Mildly diffusely thickened appearance of the esophagus can be seen  with esophagitis. There is no hiatal hernia.     The findings concerning the pulmonary thromboemboli were discussed with  Dr. Moore.    May 19, 2022--patient seen in follow-up and results of the stress test discussed.  I again reviewed her symptoms as follows: She reports that with minimal exertion she gets severe shortness of breath and a sensation of tightness/squeezing in her chest.  This goes away with rest after a few minutes.  She also reports that there is a definite relationship to the discomfort in her neck when she gets short of breath.  This also goes away with rest.  Patient has numerous risk factors including type 2 diabetes, hyperlipidemia, hypertension, diabetic peripheral neuropathy.  The symptoms started a couple months ago and are definitely getting worse meaning more severe and more frequent.  Examination today reveals clear lungs although her breath sounds seem to be a little diminished on the right.  Her chest x-ray PA and lateral on May 11, 2022 was negative.  The exact etiology of her symptoms is not clear at this time but are worrisome.  I explained to her that the nuclear stress test is not a perfect test and she could in fact have blockages in her coronary arteries.  Another consideration would be chronic thrombopulmonary embolism and I do think this needs to be ruled out.  Plan is as follows: Check D-dimer as soon as possible.  I will have a low threshold for CTA of the chest and I Samina go ahead and order it now.  I will discussed the case with the cardiologist to see if heart cath may be warranted.  Further to follow.    May 17, 2022--nuclear stress test reveals left ventricular ejection fraction of greater than 70%.  Hyperdynamic.  Myocardial perfusion imaging indicates a normal myocardial perfusion study with no evidence of ischemia.  Impressions are  consistent with a low risk study.    May 11, 2022--patient with type 2 diabetes, hyperlipidemia, hypothyroidism, hypertension, chronic lower back pain and diabetic peripheral neuropathy.  She presents today with at least a 1 month history of progressively worsening shortness of breath with exertion and associated with a tightness in her chest that is relieved with rest after 15 or 20 minutes.  No radiation into the neck or jaw. Plan is as follows: Pharmacologic stress test as soon as possible.  Chest x-ray PA and lateral.  CBC, CMP, D-dimer and proBNP.  No changes in current medical regimen.  Patient will follow-up after the results of the stress test are known.  We will defer screening colonoscopy for obvious reasons.  I have asked patient to take it somewhat easy as far as exertion is concerned.  Also if she begins to develop severe shortness of breath or chest tightness/discomfort that will not go away or that is persistent at rest then she needs to seek medical attention.    ROS    Active Problems:    Patient Active Problem List   Diagnosis   • Primary osteoarthritis involving multiple joints   • Benign essential hypertension   • Hyperlipidemia   • Primary hypothyroidism   • Type 2 diabetes mellitus with diabetic neuropathy, without long-term current use of insulin (HCC)   • Chronic insomnia   • Chronic bilateral low back pain without sciatica   • Chronic bilateral thoracic back pain   • Vitamin D deficiency   • Therapeutic drug monitoring   • Menopausal state, 8/19/2020--normal DEXA.   • Diabetic peripheral neuropathy (HCC)   • Situational mixed anxiety and depressive disorder   • ACE-inhibitor cough   • Hyponatremia   • Dyspnea on exertion   • Multiple pulmonary emboli (HCC)   • Chronic pulmonary thromboembolism syndrome (HCC)   • Pulmonary hypertension due to thromboembolism (HCC)         Past Medical History:   Diagnosis Date   • Benign essential hypertension    • Chronic bilateral low back pain without  sciatica 8/16/2013 March 13, 2019--Limited bone scan.  This reveals scintigraphic activity in the spine and at the right shoulder corresponds to change seen on recent x-rays and is best explained by degenerative change.  Isolated metastatic disease exactly corresponding to the sites of extensive degenerative disc change would be peculiar.  March 7, 2019--new patient to get established.  She has complaints of approxi   • Chronic bilateral thoracic back pain 2/26/2019 March 13, 2019--Limited bone scan.  This reveals scintigraphic activity in the spine and at the right shoulder corresponds to change seen on recent x-rays and is best explained by degenerative change.  Isolated metastatic disease exactly corresponding to the sites of extensive degenerative disc change would be peculiar.  March 1, 2019--x-ray of the lumbar and thoracic spine reveals mild anterior l   • Chronic insomnia 11/4/2014   • Diabetic peripheral neuropathy (HCC) 3/7/2019    Characterized by decreased sensation and paresthesias in both lower extremities described as a burning sensation.  Extends up to the knees.  Reportedly had been evaluated with nerve conduction study in the past.   • Generalized anxiety disorder 5/19/2013   • History of Bell's palsy 5/21/2013    Remote history of Bell's palsy.  Patient does not remember which side of the face.   • Hyperlipidemia    • Impaired fasting glucose    • Major depression    • Menopausal state, 8/19/2020--normal DEXA. 3/7/2019    August 19, 2020--DEXA scan reveals lumbar spine T score 3.8.  Left femoral neck T score 2.5.  Right femoral neck T score 2.2.  Normal bone density.   • Peripheral neuropathy, idiopathic 3/7/2019    Characterized by decreased sensation and paresthesias in both lower extremities described as a burning sensation.  Extends up to the knees.  Reportedly had been evaluated with nerve conduction study in the past.   • Primary hypothyroidism 5/19/2013   • Primary osteoarthritis  involving multiple joints 4/22/2013   • Rotator cuff arthropathy of right shoulder, 4/20/2021--status post right reverse total shoulder arthroplasty 5/27/2020   • Situational mixed anxiety and depressive disorder 8/21/2019 August 21, 2019--patient seen in follow-up after I called in a prescription for Ativan after the patient's  contacted me a few weeks ago regarding the sudden death of patient's daughter.  This was an apparent suicide and the patient found her daughter dead.  I will not go into details.  Patient reports the Lorazepam has been helpful but she is still quite distraught, nervous, and undergoing   • Type 2 diabetes mellitus with diabetic neuropathy, without long-term current use of insulin (HCC)    • Vitamin D deficiency 2/26/2019         Past Surgical History:   Procedure Laterality Date   • BILATERAL BREAST REDUCTION  Early 2000    Early 2000--bilateral breast reduction   • CHOLECYSTECTOMY  1960s    1960s--open cholecystectomy   • COLONOSCOPY  2012 2012--reportedly normal colonoscopy   • INCONTINENCE SURGERY  2012 Approximately 2012--implantation of questionable bladder stimulator right sacral area for urinary incontinence.  Details not known.   • REPLACEMENT TOTAL KNEE BILATERAL Left 2010; 2011 2010-2011--bilateral total knee replacement   • SUBTOTAL HYSTERECTOMY  Remote    Remote partial hysterectomy.  Ovaries intact.   • TOTAL SHOULDER REPLACEMENT Left 2013 2013--left total shoulder replacement.   • TOTAL SHOULDER REPLACEMENT  April 28, 2021 4/20/2021--status post right reverse total shoulder arthroplasty   • TOTAL SHOULDER REVERSE ARTHROPLASTY Right 04/20/2021 4/20/2021--right reverse total shoulder replacement.         Allergies   Allergen Reactions   • Morphine And Related    • Other Other (See Comments)     REJECTED DACRON SUTURES IN THE PAST AND INCISION CAME APART           Current Outpatient Medications:   •  Calcium Carb-Cholecalciferol (CALCIUM + D3 PO), Take 1  tablet by mouth daily., Disp: , Rfl:   •  Cholecalciferol (VITAMIN D3) 2000 UNITS tablet, Take 1 capsule by mouth daily., Disp: , Rfl:   •  diphenhydrAMINE-acetaminophen (TYLENOL PM)  MG tablet per tablet, Take 2 tablets by mouth Every Night., Disp: , Rfl:   •  DULoxetine (CYMBALTA) 60 MG capsule, TAKE 1 CAPSULE EVERY DAY AS DIRECTED, Disp: 90 capsule, Rfl: 3  •  ezetimibe (ZETIA) 10 MG tablet, TAKE 1 TABLET EVERY DAY, Disp: 90 tablet, Rfl: 3  •  levothyroxine (SYNTHROID, LEVOTHROID) 125 MCG tablet, Take 1 tablet by mouth Daily., Disp: 90 tablet, Rfl: 2  •  metroNIDAZOLE (METROGEL) 0.75 % gel, , Disp: , Rfl:   •  Rivaroxaban (Xarelto Starter Pack) tablet therapy pack starter pack, Take one 15 mg tablet twice daily with food for 21 days.  Followed by one 20 mg tablet by mouth once daily with food. Continue the 20 mg daily until further notice by physician., Disp: 1 each, Rfl: 0  •  rivaroxaban (XARELTO) 20 MG tablet, Take 1 p.o. daily for blood thinner as directed., Disp: 30 tablet, Rfl: 3  •  simvastatin (ZOCOR) 20 MG tablet, Take one tablet by mouth daily for high cholesterol., Disp: 90 tablet, Rfl: 2  •  traZODone (DESYREL) 150 MG tablet, TAKE 1 TABLET EVERY NIGHT, Disp: 90 tablet, Rfl: 3  •  valsartan (DIOVAN) 320 MG tablet, TAKE ONE TABLET BY MOUTH EVERY MORNING FOR BLOOD PRESSURE, Disp: 90 tablet, Rfl: 3      Family History   Problem Relation Age of Onset   • Alcohol abuse Father    • Cancer Other    • Diabetes Other         type II         Social History     Socioeconomic History   • Marital status:    Tobacco Use   • Smoking status: Former Smoker     Quit date: 1998     Years since quittin.4   • Smokeless tobacco: Never Used   Vaping Use   • Vaping Use: Never used   Substance and Sexual Activity   • Alcohol use: Yes     Alcohol/week: 1.0 standard drink     Types: 1 Glasses of wine per week     Comment: current some day   • Drug use: No   • Sexual activity: Not Currently     Partners:  "Male         Vitals:    06/15/22 1045   BP: 146/76   Pulse: 90   Resp: 18   SpO2: 96%   Weight: 95 kg (209 lb 6.4 oz)   Height: 165.1 cm (65\")        Body mass index is 34.85 kg/m².      Physical Exam:    General: Alert and oriented x 3.  Obvious dyspnea with minimal exertion.  Obese.  Normal affect.  HEENT: Pupils equal, round, reactive to light; extraocular movements intact; sclerae nonicteric; pharynx, ear canals and TMs normal.  Neck: Without JVD, thyromegaly, bruit, or adenopathy.  Lungs: Clear to auscultation in all fields.  Heart: Regular rate and rhythm without murmur, rub, gallop, or click.  Abdomen: Soft, nontender, without hepatosplenomegaly or hernia.  Bowel sounds normal.  : Deferred.  Rectal: Deferred.  Extremities: Without clubbing, cyanosis, edema, or pulse deficit.  Neurologic: Intact without focal deficit.  Normal station and gait observed during ingress and egress from the examination room.  Skin: Without significant lesion.  Musculoskeletal: Unremarkable.    Lab/other results:    I reviewed the recent cardiology consultation as well as the recent echocardiogram.    Assessment/Plan:     Diagnosis Plan   1. Dyspnea on exertion  proBNP    Comprehensive Metabolic Panel    CBC (No Diff)   2. Multiple pulmonary emboli (HCC)  proBNP   3. Chronic pulmonary thromboembolism syndrome (HCC)  proBNP   4. Pulmonary hypertension due to thromboembolism (HCC)  proBNP   5. Benign essential hypertension  Comprehensive Metabolic Panel     Stacie 15, 2022--patient seen in follow-up by Dr. Moore.  She is complaining of continued dyspnea on exertion which may be worse.  I reviewed the cardiology consultation from June 7, 2022 and also reviewed the echocardiogram from that same date.  Noted below.  Her  is now present and he reports her dyspnea on exertion is definitely worse.  Her oxygen saturation at rest is 96%.  Upon ambulation it dropped to 94%.  Her lungs seem to be clear today.  I am concerned that her " symptoms may be progressing and she may not be properly responding to anticoagulation.  I have sent an email message to her cardiologist Dr. Solorio to inform him of the situation.  I will check a proBNP along with a CMP and a CBC to rule out other causes of her progressive dyspnea.  Further to follow.    6/7/2022--echocardiogram revealed normal left ventricular systolic function with ejection fraction of 59%.  Grade 1A diastolic dysfunction.  Right ventricular systolic function is low normal.  RV cavity is mild to moderately dilated and there is moderate right ventricular hypertrophy.  Saline test are negative.  Insufficient TR velocity profile to estimate right ventricular systolic pressure.    June 2, 2022--patient seen in follow-up.  She has seen the hematologist but has not heard anything from pulmonary.  I am going to refer her to cardiology physician that specializes in pulmonary hypertension.  Clinically, patient is no worse and if she is any better its only a small amount.  Her lungs are clear today and her heart is regular.  There is no significant edema.  Unfortunately our computer was down during this visit.  I did not order any lab work today but may need to when she follows up in about 2 weeks.  Patient instructed to contact me for any signs or symptoms of bleeding or any other issues.    May 19, 2022--patient called back into the office and I explained the diagnosis and the treatment plan.  We will start her on Xarelto 15 mg twice a day initially and then decrease down to once daily 20 mg.  I am waiting for Dr. Garcia to get back with me regarding whether or not cardiology wants to see her regarding her right heart.  Patient referred to hematology as well as pulmonology.  I will contact hematology and obtain their suggestion for hypercoagulable work-up in order to get that started.  I will have patient follow-up with me in about 2 weeks to reassess her clinically.  Sooner if she has any problems.   Doppler venous study of the lower extremities also ordered.    May 19, 2022--CTA of the chest stat:   IMPRESSION:  1. There are tiny pulmonary thromboemboli with the largest within a  subsegmental right lower lobe pulmonary artery. The RV:LV ratio is 1.4.  The appearance of some of the pulmonary artery branches and the lungs  can be seen with chronic pulmonary thromboemboli and there may be some  air trapping as well.  2. Mildly diffusely thickened appearance of the esophagus can be seen  with esophagitis. There is no hiatal hernia.     The findings concerning the pulmonary thromboemboli were discussed with  Dr. Moore.    May 19, 2022--patient seen in follow-up and results of the stress test discussed.  I again reviewed her symptoms as follows: She reports that with minimal exertion she gets severe shortness of breath and a sensation of tightness/squeezing in her chest.  This goes away with rest after a few minutes.  She also reports that there is a definite relationship to the discomfort in her neck when she gets short of breath.  This also goes away with rest.  Patient has numerous risk factors including type 2 diabetes, hyperlipidemia, hypertension, diabetic peripheral neuropathy.  The symptoms started a couple months ago and are definitely getting worse meaning more severe and more frequent.  Examination today reveals clear lungs although her breath sounds seem to be a little diminished on the right.  Her chest x-ray PA and lateral on May 11, 2022 was negative.  The exact etiology of her symptoms is not clear at this time but are worrisome.  I explained to her that the nuclear stress test is not a perfect test and she could in fact have blockages in her coronary arteries.  Another consideration would be chronic thrombopulmonary embolism and I do think this needs to be ruled out.  Plan is as follows: Check D-dimer as soon as possible.  I will have a low threshold for CTA of the chest and I Samina go ahead and  order it now.  I will discussed the case with the cardiologist to see if heart cath may be warranted.  Further to follow.    May 17, 2022--nuclear stress test reveals left ventricular ejection fraction of greater than 70%.  Hyperdynamic.  Myocardial perfusion imaging indicates a normal myocardial perfusion study with no evidence of ischemia.  Impressions are consistent with a low risk study.    May 11, 2022--patient with type 2 diabetes, hyperlipidemia, hypothyroidism, hypertension, chronic lower back pain and diabetic peripheral neuropathy.  She presents today with at least a 1 month history of progressively worsening shortness of breath with exertion and associated with a tightness in her chest that is relieved with rest after 15 or 20 minutes.  No radiation into the neck or jaw. Plan is as follows: Pharmacologic stress test as soon as possible.  Chest x-ray PA and lateral.  CBC, CMP, D-dimer and proBNP.  No changes in current medical regimen.  Patient will follow-up after the results of the stress test are known.  We will defer screening colonoscopy for obvious reasons.  I have asked patient to take it somewhat easy as far as exertion is concerned.  Also if she begins to develop severe shortness of breath or chest tightness/discomfort that will not go away or that is persistent at rest then she needs to seek medical attention.            Procedures

## 2022-06-21 ENCOUNTER — APPOINTMENT (OUTPATIENT)
Dept: LAB | Facility: HOSPITAL | Age: 77
End: 2022-06-21

## 2022-06-23 ENCOUNTER — APPOINTMENT (OUTPATIENT)
Dept: RESPIRATORY THERAPY | Facility: HOSPITAL | Age: 77
End: 2022-06-23

## 2022-06-28 ENCOUNTER — APPOINTMENT (OUTPATIENT)
Dept: LAB | Facility: HOSPITAL | Age: 77
End: 2022-06-28

## 2022-06-29 ENCOUNTER — APPOINTMENT (OUTPATIENT)
Dept: RESPIRATORY THERAPY | Facility: HOSPITAL | Age: 77
End: 2022-06-29

## 2022-06-29 ENCOUNTER — HOSPITAL ENCOUNTER (OUTPATIENT)
Dept: CARDIOLOGY | Facility: HOSPITAL | Age: 77
Discharge: HOME | End: 2022-06-29
Admitting: INTERNAL MEDICINE

## 2022-06-29 VITALS
BODY MASS INDEX: 35.59 KG/M2 | SYSTOLIC BLOOD PRESSURE: 132 MMHG | HEART RATE: 66 BPM | DIASTOLIC BLOOD PRESSURE: 80 MMHG | OXYGEN SATURATION: 96 % | HEIGHT: 65 IN | WEIGHT: 213.6 LBS

## 2022-06-29 DIAGNOSIS — I26.99 MULTIPLE PULMONARY EMBOLI: Primary | ICD-10-CM

## 2022-06-29 DIAGNOSIS — I74.9 THROMBOEMBOLISM: Primary | ICD-10-CM

## 2022-06-29 DIAGNOSIS — R06.09 DYSPNEA ON EXERTION: ICD-10-CM

## 2022-06-29 PROBLEM — I27.29 PULMONARY HYPERTENSION DUE TO THROMBOEMBOLISM: Status: RESOLVED | Noted: 2022-06-02 | Resolved: 2022-06-29

## 2022-06-29 PROCEDURE — G0463 HOSPITAL OUTPT CLINIC VISIT: HCPCS

## 2022-06-29 PROCEDURE — 99214 OFFICE O/P EST MOD 30 MIN: CPT | Performed by: INTERNAL MEDICINE

## 2022-06-29 NOTE — ADDENDUM NOTE
Encounter addended by: Loida Johnson RN on: 6/29/2022 2:37 PM   Actions taken: Charge Capture section accepted

## 2022-06-29 NOTE — PROGRESS NOTES
Heart Failure & Pulmonary Arterial Hypertension Clinic  [facility]  Agus Solorio M.D., Ph.D., [facility]         Thank you for asking me to see Claudette L McManus for PE, concerns for PAH/CTEPH.    History of Present Illness  This is a 77 y.o. 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 Likely PAH      Claudette L McManus is a 77 y.o. female who presents for today for further evaluation and management of VTE/PE with CT scan features concerning for the development of CTEPH.  The patient is typically seen by Lito Moore MD.      The patient presents to pulmonary hypertension clinic today.  She has been undergoing a work-up by her PCP, Dr. Moore.  Patient has been endorsing non-exertional chest tightness, exercise intolerance, and exertional dyspnea.  She has had no evidence of orthopnea, PND, lower extremity edema, dizziness, lightheadedness, LOC, ascites, or early abdominal satiety.  He denies a family history of pulmonary hypertension.  No family history of familial cardiomyopathy.    Patient received a pro BNP and a D-dimer, both of which were within normal limits.  Due to concerns for obstructive coronary artery disease the patient was referred for MPS.  This showed a hyperdynamic LVEF greater than 70% and no evidence of inducible ischemia.  A 2D TTE was also ordered, but has not yet been completed.  Due to the patient's ongoing exercise intolerance and exertional dyspnea a CTA was performed.  Radiology interpretation showed SSPE, primarily in the RLL.  There was also comment that the RV: LV ratio was elevated at 1.4.  Pulmonary artery branches appeared attenuated with a mosaic pattern of density with the lucent region having less vasculature concerning for CTEPH versus air trapping.  Patient was initiated on anticoagulation.  The patient has also been seen by hematology who performed a work-up for coagulopathy.    Patient does have a family history of pulmonary  embolism.  She reports a family history of PE in her daughter. She had DVT in her late 50s.       The patient was sent for a 2D TTE, PFTs, sleep referral, and pulmonary referral.  She returns to clinic today.  She has not yet seen pulmonary or sleep medicine.  These appointments are pending.  PFTs have not been performed.  She did have a 2D TTE.  She has recently seen her PCP.  Medication compliant.  He noted that she and her  were complaining of worsening exercise intolerance.  She presents today for an evaluation to exclude the development of right ventricular failure.  She has had no ascites or early abdominal satiety.  No abdominal pain.  No orthopnea, PND, dizziness, lightheadedness, LOC, or lower extremity edema.  No increased cardiac awareness.    Review of Systems - Review of Systems   Cardiovascular: Positive for dyspnea on exertion. Negative for chest pain, claudication, cyanosis, irregular heartbeat, leg swelling, near-syncope, orthopnea, palpitations, paroxysmal nocturnal dyspnea and syncope.   Respiratory: Positive for shortness of breath, sleep disturbances due to breathing and snoring. Negative for hemoptysis, sputum production and wheezing.    All other systems reviewed and are negative.       All other systems were reviewed and were negative.    family history includes Alcohol abuse in her father; Cancer in an other family member; Diabetes in an other family member.     reports that she quit smoking about 24 years ago. She has never used smokeless tobacco. She reports current alcohol use of about 1.0 standard drink of alcohol per week. She reports that she does not use drugs.    Allergies   Allergen Reactions   • Morphine And Related    • Other Other (See Comments)     REJECTED DACRON SUTURES IN THE PAST AND INCISION CAME APART         Current Outpatient Medications:   •  Calcium Carb-Cholecalciferol (CALCIUM + D3 PO), Take 1 tablet by mouth daily., Disp: , Rfl:   •  Cholecalciferol (VITAMIN  D3) 2000 UNITS tablet, Take 1 capsule by mouth daily., Disp: , Rfl:   •  diphenhydrAMINE-acetaminophen (TYLENOL PM)  MG tablet per tablet, Take 2 tablets by mouth Every Night., Disp: , Rfl:   •  DULoxetine (CYMBALTA) 60 MG capsule, TAKE 1 CAPSULE EVERY DAY AS DIRECTED, Disp: 90 capsule, Rfl: 3  •  ezetimibe (ZETIA) 10 MG tablet, TAKE 1 TABLET EVERY DAY, Disp: 90 tablet, Rfl: 3  •  levothyroxine (SYNTHROID, LEVOTHROID) 125 MCG tablet, Take 1 tablet by mouth Daily., Disp: 90 tablet, Rfl: 2  •  metroNIDAZOLE (METROGEL) 0.75 % gel, , Disp: , Rfl:   •  Rivaroxaban (Xarelto Starter Pack) tablet therapy pack starter pack, Take one 15 mg tablet twice daily with food for 21 days.  Followed by one 20 mg tablet by mouth once daily with food. Continue the 20 mg daily until further notice by physician., Disp: 1 each, Rfl: 0  •  rivaroxaban (XARELTO) 20 MG tablet, Take 1 p.o. daily for blood thinner as directed., Disp: 30 tablet, Rfl: 3  •  simvastatin (ZOCOR) 20 MG tablet, Take one tablet by mouth daily for high cholesterol., Disp: 90 tablet, Rfl: 2  •  traZODone (DESYREL) 150 MG tablet, TAKE 1 TABLET EVERY NIGHT, Disp: 90 tablet, Rfl: 3  •  valsartan (DIOVAN) 320 MG tablet, TAKE ONE TABLET BY MOUTH EVERY MORNING FOR BLOOD PRESSURE, Disp: 90 tablet, Rfl: 3      Physical Exam:  I have reviewed the patient's current vital signs as documented in the patient's EMR.   Vitals:    06/29/22 1308   BP: 132/80   Pulse: 66   SpO2: 96%     Body mass index is 35.54 kg/m².   Pulse Pressure: high  Pulse Ox: Normal  on room air  General: alert, appears stated age and cooperative     Body Habitus: obese    HEENT: Head: Normocephalic, no lesions, without obvious abnormality. No arcus senilis, xanthelasma or xanthomas.  No malar flush, proptosis, xanthomas or malar flush.   Neuro: alert, oriented x3  speech normal in context and clarity  memory intact grossly  JVP: Volume/Pulsation: Normal.  Normal x and y descent.  Waveforms: Normal  waveforms with a, x, and v waves.   Physiology: Appropriate inspiratory decrease.  No Kussmaul's. No Josefina's.   Respirations: no increased work of breathing     Pulmonary:Normal     Precordium: Normal impulses. P2 is not palpable.  RV Heave: Present  LV Heave: absent  [place]:  normal size and placement  Palpable S4: absent.  Heart rate: normal    Heart Rhythm: regular     Heart Sounds: S1: normal  S2: normal  S3: absent   S4: absent  Opening Snap: absent    A2-OS:  no  Pericardial Rub:  Absent: Yes     Ejection click: None      Murmurs:  absent   Extremity: moves all extremities equally.       DATA REVIEWED:         TTE/RACHEL:  Results for orders placed during the hospital encounter of 06/07/22    Adult Transthoracic Echo Complete W/ Cont if Necessary Per Protocol    Interpretation Summary  · Normal LV systolic function. Calculated LV EF: 59%. Grade 1a diastolic function.  · Right ventricular systolic function is low normal. RV S' 9.6 cm/sec. The RV cavity is mild to moderately dilated. There is moderate RV hypertrophy.  · Saline test results are negative.  · Insufficient TR velocity profile to estimate the right ventricular systolic pressure.      My interpretation of the TTE is below.   LVEF is 59%.  RV cavity is mild to moderately dilated with RVH.  RV systolic function is low normal.  S prime velocity was 9.6 cm/s.  Insufficient TR velocity profile.  -----------------------------------------------------    CT:   No radiology results for the last 30 days.                I personally reviewed the images of the CT scan.  My personal interpretation is below.      Tiny pulmonary thromboemboli.  These are SS and are in the RLL PA.  RV: LV ratio elevated at 1.4.  Distal pulmonary artery branches are tapered.  Diffuse mosaic pattern throughout both lungs.  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  Unable to exclude air  trapping.    ----------------------------------------------------    PFTs:    Pending  --------------------------------------------------------------------------------------------------    Laboratory evaluations:    Lab Results   Component Value Date    GLUCOSE 78 05/11/2022    BUN 17 05/11/2022    CREATININE 0.97 05/11/2022    EGFRIFNONA 63 02/01/2022    EGFRIFAFRI 65 03/12/2020    BCR 17.5 05/11/2022    K 4.9 05/11/2022    CO2 29.0 05/11/2022    CALCIUM 10.2 05/11/2022    PROTENTOTREF 7.1 03/12/2020    ALBUMIN 5.10 05/11/2022    LABIL2 1.8 03/12/2020    AST 19 05/11/2022    ALT 19 05/11/2022     Lab Results   Component Value Date    WBC 7.53 05/25/2022    HGB 13.0 05/25/2022    HCT 38.2 05/25/2022    MCV 94.1 05/25/2022     05/25/2022     Lab Results   Component Value Date    CHLPL 189 02/01/2022    TRIG 152 (H) 02/01/2022    HDL 68 (H) 06/11/2018     (H) 06/11/2018     Lab Results   Component Value Date    TSH 0.340 02/01/2022     Lab Results   Component Value Date    CKTOTAL 111 02/01/2022     Lab Results   Component Value Date    HGBA1C 7.08 (H) 02/01/2022     No results found for: DDIMER  Lab Results   Component Value Date    ALT 19 05/11/2022     Lab Results   Component Value Date    HGBA1C 7.08 (H) 02/01/2022    HGBA1C 6.54 (H) 07/23/2021    HGBA1C 6.50 (H) 04/02/2021     Lab Results   Component Value Date    MICROALBUR <1.2 07/23/2021    CREATININE 0.97 05/11/2022     No results found for: IRON, TIBC, FERRITIN  No results found for: INR, PROTIME        1. Multiple pulmonary emboli (HCC).  Patient CT scan is consistent with pulmonary thromboembolism with an elevated RV: LV ratio that is predictive of pulmonary hypertension.  There is a mosaic pattern throughout both lungs with the lucent regions having less vasculature, as well as the pulmonary arteries at the upper limit of normal in diameter, all of which is concerning for the development of pulmonary hypertension and possibly CTEPH.  With  that said, this diagnosis cannot be made until the patient has been adequately anticoagulated for a period of 90 days.  At that point I would recommend a RHC.  Given her mosaic attenuation on her CT scan I want to exclude small airway disease, so have asked her to have PFTs.  These have not been performed. She had PFTs scheduled today, but cancelled them because she said that her insurance sent her a letter stating it was not paying for this. She does have a sleep appt. She does not have a pulm appt.  I will have Tabitha contact [facility] for an appt.     2. Dyspnea on exertion.  I suspect this is multifactorial.  I am concerned that she actually has small airway disease.  She likely has restrictive lung physiology given her body habitus.  She also has recently had pulmonary thromboembolism.  Her last 2D echocardiogram was unable to estimate pulmonary pressures, but it is possible that she has/is developing pulmonary hypertension.  Today, there is no indication that this represents right ventricular failure.  I have asked her to have PFTs, see pulmonary medicine, see sleep medicine.  After she has been fully anticoagulated I will repeat her echocardiogram and discussed with her about proceeding with RHC.  I will send a proBNP today and a CBC to exclude other etiologies of her dyspnea, but she states these were done with her PCP. I will have Tabitha call and get a copy.       Orders placed today:    No orders of the defined types were placed in this encounter.    Medication orders today:    No orders of the defined types were placed in this encounter.        Return in about 2 months (around 8/29/2022).        This document has been electronically signed by Agus Solorio MD PhD on June 29, 2022 13:27 EDT        Agus Solorio M.D., Ph.D., [facility] Failure and PAH [facility]

## 2022-07-06 ENCOUNTER — TELEPHONE (OUTPATIENT)
Dept: CARDIOLOGY | Facility: HOSPITAL | Age: 77
End: 2022-07-06

## 2022-07-06 NOTE — TELEPHONE ENCOUNTER
Pt called with questions about referral to Pulmonary care.  She said they can see her sooner but need a copy of her records.  Advised her that I will check with them to see what they need and call her back.All questions and concerns addressed. Understanding and agreement verbalized.      Loida Johnson RN  [facility] Failure [facility]

## 2022-07-06 NOTE — TELEPHONE ENCOUNTER
Spoke to Pt.  I let her know that we sent records to [facility] for her appointment tomorrow.  Per staff member, they have receive it.  All questions and concerns addressed. Understanding and agreement verbalized.      Loida Johnson RN  [facility] Failure [facility]

## 2022-07-29 ENCOUNTER — HOSPITAL ENCOUNTER (OUTPATIENT)
Dept: MAMMOGRAPHY | Facility: HOSPITAL | Age: 77
Discharge: HOME | End: 2022-07-29
Admitting: INTERNAL MEDICINE

## 2022-07-29 DIAGNOSIS — Z12.31 BREAST CANCER SCREENING BY MAMMOGRAM: ICD-10-CM

## 2022-07-29 PROCEDURE — 77063 BREAST TOMOSYNTHESIS BI: CPT

## 2022-07-29 PROCEDURE — 77067 SCR MAMMO BI INCL CAD: CPT

## 2022-08-01 DIAGNOSIS — R92.8 ABNORMALITY OF LEFT BREAST ON SCREENING MAMMOGRAM: Primary | ICD-10-CM

## 2022-08-03 ENCOUNTER — LAB (OUTPATIENT)
Dept: LAB | Facility: HOSPITAL | Age: 77
End: 2022-08-03

## 2022-08-03 DIAGNOSIS — E78.2 MIXED HYPERLIPIDEMIA: Chronic | ICD-10-CM

## 2022-08-03 DIAGNOSIS — E11.40 TYPE 2 DIABETES MELLITUS WITH DIABETIC NEUROPATHY, WITHOUT LONG-TERM CURRENT USE OF INSULIN: Chronic | ICD-10-CM

## 2022-08-03 DIAGNOSIS — E03.9 PRIMARY HYPOTHYROIDISM: Chronic | ICD-10-CM

## 2022-08-03 DIAGNOSIS — E55.9 VITAMIN D DEFICIENCY: Chronic | ICD-10-CM

## 2022-08-03 DIAGNOSIS — Z51.81 THERAPEUTIC DRUG MONITORING: ICD-10-CM

## 2022-08-03 LAB
25(OH)D3 SERPL-MCNC: 46.7 NG/ML (ref 30–100)
ALBUMIN SERPL-MCNC: 4.6 G/DL (ref 3.5–5.2)
ALBUMIN UR-MCNC: 1.6 MG/DL
ALBUMIN/GLOB SERPL: 1.7 G/DL
ALP SERPL-CCNC: 65 U/L (ref 39–117)
ALT SERPL W P-5'-P-CCNC: 24 U/L (ref 1–33)
ANION GAP SERPL CALCULATED.3IONS-SCNC: 14 MMOL/L (ref 5–15)
AST SERPL-CCNC: 26 U/L (ref 1–32)
BILIRUB SERPL-MCNC: 0.4 MG/DL (ref 0–1.2)
BILIRUB UR QL STRIP: NEGATIVE
BUN SERPL-MCNC: 14 MG/DL (ref 8–23)
BUN/CREAT SERPL: 14.4 (ref 7–25)
CALCIUM SPEC-SCNC: 9.8 MG/DL (ref 8.6–10.5)
CHLORIDE SERPL-SCNC: 100 MMOL/L (ref 98–107)
CK SERPL-CCNC: 97 U/L (ref 20–180)
CLARITY UR: CLEAR
CO2 SERPL-SCNC: 22 MMOL/L (ref 22–29)
COLOR UR: YELLOW
CREAT SERPL-MCNC: 0.97 MG/DL (ref 0.57–1)
CREAT UR-MCNC: 59.2 MG/DL
DEPRECATED RDW RBC AUTO: 43.6 FL (ref 37–54)
EGFRCR SERPLBLD CKD-EPI 2021: 59.6 ML/MIN/1.73
ERYTHROCYTE [DISTWIDTH] IN BLOOD BY AUTOMATED COUNT: 12.6 % (ref 12.3–15.4)
GLOBULIN UR ELPH-MCNC: 2.7 GM/DL
GLUCOSE SERPL-MCNC: 134 MG/DL (ref 65–99)
GLUCOSE UR STRIP-MCNC: NEGATIVE MG/DL
HBA1C MFR BLD: 6.6 % (ref 4.8–5.6)
HCT VFR BLD AUTO: 38.6 % (ref 34–46.6)
HGB BLD-MCNC: 12.9 G/DL (ref 12–15.9)
HGB UR QL STRIP.AUTO: NEGATIVE
KETONES UR QL STRIP: NEGATIVE
LEUKOCYTE ESTERASE UR QL STRIP.AUTO: NEGATIVE
MCH RBC QN AUTO: 32.1 PG (ref 26.6–33)
MCHC RBC AUTO-ENTMCNC: 33.4 G/DL (ref 31.5–35.7)
MCV RBC AUTO: 96 FL (ref 79–97)
MICROALBUMIN/CREAT UR: 27 MG/G
NITRITE UR QL STRIP: NEGATIVE
NT-PROBNP SERPL-MCNC: 120 PG/ML (ref 0–1800)
PH UR STRIP.AUTO: 7.5 [PH] (ref 5–8)
PLATELET # BLD AUTO: 255 10*3/MM3 (ref 140–450)
PMV BLD AUTO: 10.1 FL (ref 6–12)
POTASSIUM SERPL-SCNC: 4.5 MMOL/L (ref 3.5–5.2)
PROT SERPL-MCNC: 7.3 G/DL (ref 6–8.5)
PROT UR QL STRIP: NEGATIVE
RBC # BLD AUTO: 4.02 10*6/MM3 (ref 3.77–5.28)
SODIUM SERPL-SCNC: 136 MMOL/L (ref 136–145)
SP GR UR STRIP: 1.01 (ref 1–1.03)
T3FREE SERPL-MCNC: 2.19 PG/ML (ref 2–4.4)
T4 FREE SERPL-MCNC: 1.34 NG/DL (ref 0.93–1.7)
TSH SERPL DL<=0.05 MIU/L-ACNC: 0.98 UIU/ML (ref 0.27–4.2)
UROBILINOGEN UR QL STRIP: NORMAL
WBC NRBC COR # BLD: 6.41 10*3/MM3 (ref 3.4–10.8)

## 2022-08-03 PROCEDURE — 83880 ASSAY OF NATRIURETIC PEPTIDE: CPT | Performed by: INTERNAL MEDICINE

## 2022-08-03 PROCEDURE — 36415 COLL VENOUS BLD VENIPUNCTURE: CPT

## 2022-08-03 PROCEDURE — 80053 COMPREHEN METABOLIC PANEL: CPT

## 2022-08-03 PROCEDURE — 83036 HEMOGLOBIN GLYCOSYLATED A1C: CPT

## 2022-08-03 PROCEDURE — 82570 ASSAY OF URINE CREATININE: CPT

## 2022-08-03 PROCEDURE — 82306 VITAMIN D 25 HYDROXY: CPT

## 2022-08-03 PROCEDURE — 80061 LIPID PANEL: CPT

## 2022-08-03 PROCEDURE — 85027 COMPLETE CBC AUTOMATED: CPT

## 2022-08-03 PROCEDURE — 82550 ASSAY OF CK (CPK): CPT

## 2022-08-03 PROCEDURE — 82043 UR ALBUMIN QUANTITATIVE: CPT

## 2022-08-03 PROCEDURE — 84443 ASSAY THYROID STIM HORMONE: CPT

## 2022-08-03 PROCEDURE — 84439 ASSAY OF FREE THYROXINE: CPT

## 2022-08-03 PROCEDURE — 84481 FREE ASSAY (FT-3): CPT

## 2022-08-03 PROCEDURE — 83704 LIPOPROTEIN BLD QUAN PART: CPT

## 2022-08-03 PROCEDURE — 81003 URINALYSIS AUTO W/O SCOPE: CPT

## 2022-08-05 LAB
CHOLEST SERPL-MCNC: 177 MG/DL (ref 100–199)
HDL SERPL-SCNC: 39.6 UMOL/L
HDLC SERPL-MCNC: 67 MG/DL
LDL SERPL QN: 21.2 NM
LDL SERPL-SCNC: 1150 NMOL/L
LDL SMALL SERPL-SCNC: 422 NMOL/L
LDLC SERPL CALC-MCNC: 82 MG/DL (ref 0–99)
TRIGL SERPL-MCNC: 166 MG/DL (ref 0–149)

## 2022-08-11 ENCOUNTER — OFFICE VISIT (OUTPATIENT)
Dept: INTERNAL MEDICINE | Facility: CLINIC | Age: 77
End: 2022-08-11

## 2022-08-11 VITALS
DIASTOLIC BLOOD PRESSURE: 76 MMHG | RESPIRATION RATE: 18 BRPM | SYSTOLIC BLOOD PRESSURE: 136 MMHG | WEIGHT: 215.8 LBS | BODY MASS INDEX: 35.96 KG/M2 | OXYGEN SATURATION: 98 % | HEIGHT: 65 IN | HEART RATE: 72 BPM

## 2022-08-11 DIAGNOSIS — R06.09 DYSPNEA ON EXERTION: ICD-10-CM

## 2022-08-11 DIAGNOSIS — E78.2 MIXED HYPERLIPIDEMIA: Chronic | ICD-10-CM

## 2022-08-11 DIAGNOSIS — E87.1 HYPONATREMIA: ICD-10-CM

## 2022-08-11 DIAGNOSIS — G89.29 CHRONIC BILATERAL LOW BACK PAIN WITHOUT SCIATICA: Chronic | ICD-10-CM

## 2022-08-11 DIAGNOSIS — I10 BENIGN ESSENTIAL HYPERTENSION: Chronic | ICD-10-CM

## 2022-08-11 DIAGNOSIS — F43.23 SITUATIONAL MIXED ANXIETY AND DEPRESSIVE DISORDER: Chronic | ICD-10-CM

## 2022-08-11 DIAGNOSIS — E55.9 VITAMIN D DEFICIENCY: Chronic | ICD-10-CM

## 2022-08-11 DIAGNOSIS — E11.42 DIABETIC PERIPHERAL NEUROPATHY: Chronic | ICD-10-CM

## 2022-08-11 DIAGNOSIS — E11.40 TYPE 2 DIABETES MELLITUS WITH DIABETIC NEUROPATHY, WITHOUT LONG-TERM CURRENT USE OF INSULIN: Primary | Chronic | ICD-10-CM

## 2022-08-11 DIAGNOSIS — N95.1 MENOPAUSAL STATE: Chronic | ICD-10-CM

## 2022-08-11 DIAGNOSIS — E03.9 PRIMARY HYPOTHYROIDISM: Chronic | ICD-10-CM

## 2022-08-11 DIAGNOSIS — I26.99 MULTIPLE PULMONARY EMBOLI: ICD-10-CM

## 2022-08-11 DIAGNOSIS — E66.9 NON MORBID OBESITY: Chronic | ICD-10-CM

## 2022-08-11 DIAGNOSIS — Z51.81 THERAPEUTIC DRUG MONITORING: ICD-10-CM

## 2022-08-11 DIAGNOSIS — F51.04 CHRONIC INSOMNIA: Chronic | ICD-10-CM

## 2022-08-11 DIAGNOSIS — I27.82 CHRONIC PULMONARY THROMBOEMBOLISM SYNDROME: ICD-10-CM

## 2022-08-11 DIAGNOSIS — M15.9 PRIMARY OSTEOARTHRITIS INVOLVING MULTIPLE JOINTS: Chronic | ICD-10-CM

## 2022-08-11 DIAGNOSIS — M54.50 CHRONIC BILATERAL LOW BACK PAIN WITHOUT SCIATICA: Chronic | ICD-10-CM

## 2022-08-11 PROCEDURE — 99214 OFFICE O/P EST MOD 30 MIN: CPT | Performed by: INTERNAL MEDICINE

## 2022-08-11 RX ORDER — TRAMADOL HYDROCHLORIDE 50 MG/1
TABLET ORAL
Qty: 120 TABLET | Refills: 3 | Status: SHIPPED | OUTPATIENT
Start: 2022-08-11

## 2022-08-11 NOTE — PROGRESS NOTES
08/11/2022    Patient Information  Claudette L McManus                                                                                          [id number] [facility] [address]      1945  [unfilled]  There is no work phone number on file.    Chief Complaint:     Follow-up lab work in order to monitor chronic medical issues listed in history of present illness.  No new acute complaints.    History of Present Illness:    Patient with type 2 diabetes, diabetic peripheral neuropathy, hyperlipidemia, hypothyroidism, hypertension, vitamin D deficiency, menopausal state, hyponatremia, chronic dyspnea on exertion related to multiple pulmonary emboli and chronic pulmonary thromboembolism syndrome, chronic lower back pain, chronic insomnia, osteoarthritis involving multiple joints, situational anxiety and depressive disorder.  She presents today for follow-up with lab prior in order to monitor chronic medical issues.  Her past medical history reviewed and updated were necessary including health maintenance parameters.  This reveals she is up-to-date or else accounted for with the exception of diabetic eye exam which I encouraged her to get.    Review of Systems   Constitutional: Negative.   HENT: Negative.    Eyes: Negative.    Cardiovascular: Positive for dyspnea on exertion. Negative for chest pain.   Respiratory: Negative.    Endocrine: Negative.    Hematologic/Lymphatic: Negative.    Skin: Negative.    Musculoskeletal: Negative.    Gastrointestinal: Negative.    Genitourinary: Negative.    Neurological: Negative.    Psychiatric/Behavioral: The patient is nervous/anxious.    Allergic/Immunologic: Negative.        Active Problems:    Patient Active Problem List   Diagnosis   • Primary osteoarthritis involving multiple joints   • Benign essential hypertension   • Hyperlipidemia   • Primary hypothyroidism   • Type 2 diabetes mellitus with diabetic neuropathy, without long-term current use of  insulin (HCC)   • Chronic insomnia   • Chronic bilateral low back pain without sciatica   • Chronic bilateral thoracic back pain   • Vitamin D deficiency   • Therapeutic drug monitoring   • Menopausal state, 8/19/2020--normal DEXA.   • Diabetic peripheral neuropathy (HCC)   • Situational mixed anxiety and depressive disorder   • ACE-inhibitor cough   • Hyponatremia   • Dyspnea on exertion   • Multiple pulmonary emboli (HCC)   • Chronic pulmonary thromboembolism syndrome (HCC)   • Non morbid obesity         Past Medical History:   Diagnosis Date   • Benign essential hypertension    • Chronic bilateral low back pain without sciatica 8/16/2013 March 13, 2019--Limited bone scan.  This reveals scintigraphic activity in the spine and at the right shoulder corresponds to change seen on recent x-rays and is best explained by degenerative change.  Isolated metastatic disease exactly corresponding to the sites of extensive degenerative disc change would be peculiar.  March 7, 2019--new patient to get established.  She has complaints of approxi   • Chronic bilateral thoracic back pain 2/26/2019 March 13, 2019--Limited bone scan.  This reveals scintigraphic activity in the spine and at the right shoulder corresponds to change seen on recent x-rays and is best explained by degenerative change.  Isolated metastatic disease exactly corresponding to the sites of extensive degenerative disc change would be peculiar.  March 1, 2019--x-ray of the lumbar and thoracic spine reveals mild anterior l   • Chronic insomnia 11/4/2014   • Diabetic peripheral neuropathy (HCC) 3/7/2019    Characterized by decreased sensation and paresthesias in both lower extremities described as a burning sensation.  Extends up to the knees.  Reportedly had been evaluated with nerve conduction study in the past.   • Generalized anxiety disorder 5/19/2013   • History of Bell's palsy 5/21/2013    Remote history of Bell's palsy.  Patient does not remember  which side of the face.   • Hyperlipidemia    • Impaired fasting glucose    • Major depression    • Menopausal state, 8/19/2020--normal DEXA. 3/7/2019    August 19, 2020--DEXA scan reveals lumbar spine T score 3.8.  Left femoral neck T score 2.5.  Right femoral neck T score 2.2.  Normal bone density.   • Non morbid obesity 8/11/2022   • Peripheral neuropathy, idiopathic 3/7/2019    Characterized by decreased sensation and paresthesias in both lower extremities described as a burning sensation.  Extends up to the knees.  Reportedly had been evaluated with nerve conduction study in the past.   • Primary hypothyroidism 5/19/2013   • Primary osteoarthritis involving multiple joints 4/22/2013   • Rotator cuff arthropathy of right shoulder, 4/20/2021--status post right reverse total shoulder arthroplasty 5/27/2020   • Situational mixed anxiety and depressive disorder 8/21/2019 August 21, 2019--patient seen in follow-up after I called in a prescription for Ativan after the patient's  contacted me a few weeks ago regarding the sudden death of patient's daughter.  This was an apparent suicide and the patient found her daughter dead.  I will not go into details.  Patient reports the Lorazepam has been helpful but she is still quite distraught, nervous, and undergoing   • Type 2 diabetes mellitus with diabetic neuropathy, without long-term current use of insulin (HCC)    • Vitamin D deficiency 2/26/2019         Past Surgical History:   Procedure Laterality Date   • BILATERAL BREAST REDUCTION  Early 2000    Early 2000--bilateral breast reduction   • CHOLECYSTECTOMY  1960s    1960s--open cholecystectomy   • COLONOSCOPY  2012 2012--reportedly normal colonoscopy   • INCONTINENCE SURGERY  2012 Approximately 2012--implantation of questionable bladder stimulator right sacral area for urinary incontinence.  Details not known.   • REPLACEMENT TOTAL KNEE BILATERAL Left 2010; 2011 2010-2011--bilateral total knee  replacement   • SUBTOTAL HYSTERECTOMY  Remote    Remote partial hysterectomy.  Ovaries intact.   • TOTAL SHOULDER REPLACEMENT Left 2013 2013--left total shoulder replacement.   • TOTAL SHOULDER REPLACEMENT  April 28, 2021 4/20/2021--status post right reverse total shoulder arthroplasty   • TOTAL SHOULDER REVERSE ARTHROPLASTY Right 04/20/2021 4/20/2021--right reverse total shoulder replacement.         Allergies   Allergen Reactions   • Morphine And Related    • Other Other (See Comments)     REJECTED DACRON SUTURES IN THE PAST AND INCISION CAME APART           Current Outpatient Medications:   •  Calcium Carb-Cholecalciferol (CALCIUM + D3 PO), Take 1 tablet by mouth daily., Disp: , Rfl:   •  Cholecalciferol (VITAMIN D3) 2000 UNITS tablet, Take 1 capsule by mouth daily., Disp: , Rfl:   •  diphenhydrAMINE-acetaminophen (TYLENOL PM)  MG tablet per tablet, Take 2 tablets by mouth Every Night., Disp: , Rfl:   •  DULoxetine (CYMBALTA) 60 MG capsule, TAKE 1 CAPSULE EVERY DAY AS DIRECTED, Disp: 90 capsule, Rfl: 3  •  ezetimibe (ZETIA) 10 MG tablet, TAKE 1 TABLET EVERY DAY, Disp: 90 tablet, Rfl: 3  •  levothyroxine (SYNTHROID, LEVOTHROID) 125 MCG tablet, Take 1 tablet by mouth Daily., Disp: 90 tablet, Rfl: 2  •  metroNIDAZOLE (METROGEL) 0.75 % gel, , Disp: , Rfl:   •  rivaroxaban (XARELTO) 20 MG tablet, Take 1 p.o. daily for blood thinner as directed., Disp: 30 tablet, Rfl: 3  •  simvastatin (ZOCOR) 20 MG tablet, Take one tablet by mouth daily for high cholesterol., Disp: 90 tablet, Rfl: 2  •  traZODone (DESYREL) 150 MG tablet, TAKE 1 TABLET EVERY NIGHT, Disp: 90 tablet, Rfl: 3  •  valsartan (DIOVAN) 320 MG tablet, TAKE ONE TABLET BY MOUTH EVERY MORNING FOR BLOOD PRESSURE, Disp: 90 tablet, Rfl: 3  •  Tirzepatide 5 MG/0.5ML solution pen-injector, Inject 5 mg under the skin into the appropriate area as directed 1 (One) Time Per Week., Disp: 2 mL, Rfl: 3  •  traMADol (ULTRAM) 50 MG tablet, 1-2 p.o. every 6 hours  "as needed pain from peripheral neuropathy.  Not greater than 4 in 24 hours, Disp: 120 tablet, Rfl: 3      Family History   Problem Relation Age of Onset   • Alcohol abuse Father    • Breast cancer Daughter    • Cancer Other    • Diabetes Other         type II         Social History     Socioeconomic History   • Marital status:    Tobacco Use   • Smoking status: Former Smoker     Quit date: 1998     Years since quittin.6   • Smokeless tobacco: Never Used   Vaping Use   • Vaping Use: Never used   Substance and Sexual Activity   • Alcohol use: Yes     Alcohol/week: 1.0 standard drink     Types: 1 Glasses of wine per week     Comment: current some day   • Drug use: No   • Sexual activity: Not Currently     Partners: Male         Vitals:    22 1337   BP: 136/76   Pulse: 72   Resp: 18   SpO2: 98%   Weight: 97.9 kg (215 lb 12.8 oz)   Height: 165.1 cm (65\")        Body mass index is 35.91 kg/m².      Physical Exam:    General: Alert and oriented x 3.  No acute distress.  Normal affect.  HEENT: Pupils equal, round, reactive to light; extraocular movements intact; sclerae nonicteric; pharynx, ear canals and TMs normal.  Neck: Without JVD, thyromegaly, bruit, or adenopathy.  Lungs: Clear to auscultation in all fields.  Heart: Regular rate and rhythm without murmur, rub, gallop, or click.  Abdomen: Soft, nontender, without hepatosplenomegaly or hernia.  Bowel sounds normal.  : Deferred.  Rectal: Deferred.  Extremities: Without clubbing, cyanosis, edema, or pulse deficit.  Neurologic: Intact without focal deficit.  Normal station and gait observed during ingress and egress from the examination room.  Skin: Without significant lesion.  Musculoskeletal: Unremarkable.    Lab/other results:    proBNP is normal at 120.  CBC normal.  CPK normal.  CMP normal except glucose 134.  Hemoglobin A1c 6.6.  Total cholesterol is 177, triglyceride 266, LDL particle number mildly elevated 1150, small LDL particle #422, HDL " particle #39.6.  Vitamin D normal.  Urinalysis normal.  Thyroid function test normal.  Microalbumin/creatinine ratio mildly elevated at 27.0.    Assessment/Plan:     Diagnosis Plan   1. Type 2 diabetes mellitus with diabetic neuropathy, without long-term current use of insulin (HCC)  Tirzepatide 5 MG/0.5ML solution pen-injector   2. Diabetic peripheral neuropathy (HCC)  traMADol (ULTRAM) 50 MG tablet   3. Hyperlipidemia     4. Primary hypothyroidism     5. Benign essential hypertension     6. Vitamin D deficiency     7. Menopausal state, 8/19/2020--normal DEXA.     8. Hyponatremia     9. Dyspnea on exertion     10. Multiple pulmonary emboli (HCC)     11. Chronic pulmonary thromboembolism syndrome (HCC)     12. Chronic bilateral low back pain without sciatica     13. Chronic insomnia     14. Primary osteoarthritis involving multiple joints     15. Situational mixed anxiety and depressive disorder     16. Therapeutic drug monitoring     17. Non morbid obesity       Patient has type 2 diabetes is under excellent control.  She does have nonmorbid obesity and I strongly encouraged her to follow a low carbohydrate diet, exercise, and lose weight.  Her diabetic peripheral neuropathy symptoms are worsening.  She is having burning sensation in her legs that is keeping her from sleeping.    Hyperlipidemia is under good control with simvastatin 20 mg.  Her blood pressure seems to be controlled.  Vitamin D is in normal range with supplementation.  Hyponatremia appears to have resolved.  Patient had multiple pulmonary emboli and chronic pulmonary thromboembolism syndrome and has been evaluated by cardiology as well as pulmonary.  She is on chronic anticoagulation with Xarelto and seems to be tolerating it well without signs or symptoms of bleeding.  I think her symptoms are improving.  Her oxygenation is definitely better.    Plan is as follows: Start tramadol to help with the peripheral neuropathy.  Not more than 4 in 24 hours.   We discussed the potential interactions between the trazodone and the tramadol.  Patient has tried multiple medications in the past for the peripheral neuropathy which is very painful including gabapentin/Neurontin.  Nothing is really helped other than in the past tramadol has helped her get some sleep.  Start terzepatide 2.5 mg subcutaneously weekly.  Prescription for the 5 mg weekly given.  I will have patient follow-up in about 4 weeks without lab to reassess tolerance and proceed from there.      Procedures

## 2022-08-22 DIAGNOSIS — E03.9 HYPOTHYROIDISM, ACQUIRED: ICD-10-CM

## 2022-08-23 RX ORDER — LEVOTHYROXINE SODIUM 0.12 MG/1
TABLET ORAL
Qty: 90 TABLET | Refills: 2 | Status: SHIPPED | OUTPATIENT
Start: 2022-08-23 | End: 2022-12-09

## 2022-08-25 ENCOUNTER — HOSPITAL ENCOUNTER (OUTPATIENT)
Dept: MAMMOGRAPHY | Facility: HOSPITAL | Age: 77
Discharge: HOME | End: 2022-08-25

## 2022-08-25 ENCOUNTER — APPOINTMENT (OUTPATIENT)
Dept: MAMMOGRAPHY | Facility: HOSPITAL | Age: 77
End: 2022-08-25

## 2022-08-25 ENCOUNTER — HOSPITAL ENCOUNTER (OUTPATIENT)
Dept: ULTRASOUND IMAGING | Facility: HOSPITAL | Age: 77
Discharge: HOME | End: 2022-08-25

## 2022-08-25 DIAGNOSIS — R92.8 ABNORMALITY OF LEFT BREAST ON SCREENING MAMMOGRAM: ICD-10-CM

## 2022-08-25 PROCEDURE — G0279 TOMOSYNTHESIS, MAMMO: HCPCS

## 2022-08-25 PROCEDURE — 76642 ULTRASOUND BREAST LIMITED: CPT

## 2022-08-25 PROCEDURE — 77065 DX MAMMO INCL CAD UNI: CPT

## 2022-08-26 DIAGNOSIS — N63.20 MASS OF LEFT BREAST ON MAMMOGRAM: Primary | ICD-10-CM

## 2022-08-26 DIAGNOSIS — R92.8 OTHER ABNORMAL AND INCONCLUSIVE FINDINGS ON DIAGNOSTIC IMAGING OF BREAST: ICD-10-CM

## 2022-08-29 ENCOUNTER — HOSPITAL ENCOUNTER (OUTPATIENT)
Dept: CARDIOLOGY | Facility: HOSPITAL | Age: 77
Discharge: HOME | End: 2022-08-29
Admitting: INTERNAL MEDICINE

## 2022-08-29 VITALS
OXYGEN SATURATION: 96 % | HEART RATE: 83 BPM | DIASTOLIC BLOOD PRESSURE: 75 MMHG | HEIGHT: 65 IN | BODY MASS INDEX: 35.29 KG/M2 | WEIGHT: 211.8 LBS | SYSTOLIC BLOOD PRESSURE: 149 MMHG

## 2022-08-29 DIAGNOSIS — Q20.8 ABNORMALITY OF RIGHT VENTRICLE OF HEART: ICD-10-CM

## 2022-08-29 DIAGNOSIS — I26.99 MULTIPLE PULMONARY EMBOLI: Primary | ICD-10-CM

## 2022-08-29 DIAGNOSIS — E66.9 NON MORBID OBESITY: Chronic | ICD-10-CM

## 2022-08-29 PROBLEM — I27.82: Status: RESOLVED | Noted: 2022-05-19 | Resolved: 2022-08-29

## 2022-08-29 PROCEDURE — 99214 OFFICE O/P EST MOD 30 MIN: CPT | Performed by: INTERNAL MEDICINE

## 2022-08-29 PROCEDURE — G0463 HOSPITAL OUTPT CLINIC VISIT: HCPCS

## 2022-08-29 RX ORDER — SODIUM CHLORIDE 0.9 % (FLUSH) 0.9 %
10 SYRINGE (ML) INJECTION EVERY 12 HOURS SCHEDULED
Status: CANCELLED | OUTPATIENT
Start: 2022-09-26

## 2022-08-29 RX ORDER — SODIUM CHLORIDE 0.9 % (FLUSH) 0.9 %
10 SYRINGE (ML) INJECTION AS NEEDED
Status: CANCELLED | OUTPATIENT
Start: 2022-09-26

## 2022-08-29 NOTE — ADDENDUM NOTE
Encounter addended by: Citlaly Larson MA on: 8/29/2022 2:22 PM   Actions taken: Charge Capture section accepted

## 2022-08-29 NOTE — PROGRESS NOTES
Heart Failure & Pulmonary Arterial Hypertension Clinic  [facility]  Agus Solorio M.D., Ph.D., [facility]         Thank you for asking me to see Claudette L McManus for PE, concerns for PAH/CTEPH.    History of Present Illness  This is a 77 y.o. 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 Likely PAH      Claudette L McManus is a 77 y.o. female who presents for today for further evaluation and management of VTE/PE with CT scan features concerning for the development of CTEPH.  The patient is typically seen by Lito Moore MD.      Patient received a pro BNP and a D-dimer, both of which were within normal limits.  Due to concerns for obstructive coronary artery disease the patient was referred for MPS.  This showed a hyperdynamic LVEF greater than 70% and no evidence of inducible ischemia.  A 2D TTE was also ordered, but has not yet been completed.  Due to the patient's ongoing exercise intolerance and exertional dyspnea a CTA was performed.  Radiology interpretation showed SSPE, primarily in the RLL.  There was also comment that the RV: LV ratio was elevated at 1.4.  Pulmonary artery branches appeared attenuated with a mosaic pattern of density with the lucent region having less vasculature concerning for CTEPH versus air trapping.  Patient was initiated on anticoagulation.  The patient has also been seen by hematology who performed a work-up for coagulopathy.    Patient does have a family history of pulmonary embolism.  She reports a family history of PE in her daughter. She had DVT in her late 50s.       Patient returns to the clinic today.  When I last saw her she had been seen earlier because of her PCP request.  She was having worsening exercise intolerance.  She was found to not have right ventricular failure, so she was encouraged to continue anticoagulation and follow-up for screening for pulmonary hypertension.  Her last 2D echocardiogram had insufficient TR  "velocity profile to estimate pulmonary pressures.  She has remained anticoagulated since her initial diagnosis of PE.  She has seen pulmonary medicine.  There was concern for possible asthma. She was started on inhalers. She has noted no changes to this. She stopped these. I referred her to Dr. Walden. She cancelled her appt. She says, \" I am not going\" to see sleep medicine. Today, she has continued to note severe exercise intolerance. No ascites, or early abdominal satiety. Since her last appt with me she had a mammogram. She was told she likely has breast CA.     Review of Systems - Review of Systems   Cardiovascular: Positive for dyspnea on exertion. Negative for chest pain, claudication, cyanosis, irregular heartbeat, leg swelling, near-syncope, orthopnea, palpitations, paroxysmal nocturnal dyspnea and syncope.   Respiratory: Positive for shortness of breath, sleep disturbances due to breathing and snoring. Negative for hemoptysis, sputum production and wheezing.    All other systems reviewed and are negative.       All other systems were reviewed and were negative.    family history includes Alcohol abuse in her father; Breast cancer in her daughter; Cancer in an other family member; Diabetes in an other family member.     reports that she quit smoking about 24 years ago. She has never used smokeless tobacco. She reports current alcohol use of about 1.0 standard drink of alcohol per week. She reports that she does not use drugs.    Allergies   Allergen Reactions   • Morphine And Related    • Other Other (See Comments)     REJECTED DACRON SUTURES IN THE PAST AND INCISION CAME APART         Current Outpatient Medications:   •  Calcium Carb-Cholecalciferol (CALCIUM + D3 PO), Take 1 tablet by mouth daily., Disp: , Rfl:   •  Cholecalciferol (VITAMIN D3) 2000 UNITS tablet, Take 1 capsule by mouth daily., Disp: , Rfl:   •  diphenhydrAMINE-acetaminophen (TYLENOL PM)  MG tablet per tablet, Take 2 tablets by " mouth Every Night., Disp: , Rfl:   •  DULoxetine (CYMBALTA) 60 MG capsule, TAKE 1 CAPSULE EVERY DAY AS DIRECTED, Disp: 90 capsule, Rfl: 3  •  ezetimibe (ZETIA) 10 MG tablet, TAKE 1 TABLET EVERY DAY, Disp: 90 tablet, Rfl: 3  •  levothyroxine (SYNTHROID, LEVOTHROID) 125 MCG tablet, TAKE 1 TABLET EVERY DAY, Disp: 90 tablet, Rfl: 2  •  metroNIDAZOLE (METROGEL) 0.75 % gel, , Disp: , Rfl:   •  rivaroxaban (XARELTO) 20 MG tablet, Take 1 p.o. daily for blood thinner as directed., Disp: 30 tablet, Rfl: 3  •  simvastatin (ZOCOR) 20 MG tablet, Take one tablet by mouth daily for high cholesterol., Disp: 90 tablet, Rfl: 2  •  Tirzepatide 5 MG/0.5ML solution pen-injector, Inject 5 mg under the skin into the appropriate area as directed 1 (One) Time Per Week., Disp: 2 mL, Rfl: 3  •  traMADol (ULTRAM) 50 MG tablet, 1-2 p.o. every 6 hours as needed pain from peripheral neuropathy.  Not greater than 4 in 24 hours, Disp: 120 tablet, Rfl: 3  •  traZODone (DESYREL) 150 MG tablet, TAKE 1 TABLET EVERY NIGHT, Disp: 90 tablet, Rfl: 3  •  valsartan (DIOVAN) 320 MG tablet, TAKE ONE TABLET BY MOUTH EVERY MORNING FOR BLOOD PRESSURE, Disp: 90 tablet, Rfl: 3      Physical Exam:  I have reviewed the patient's current vital signs as documented in the patient's EMR.   Vitals:    08/29/22 1310   BP: 149/75   Pulse: 83   SpO2: 96%     Body mass index is 35.25 kg/m².   Pulse Pressure: high  Pulse Ox: Normal  on room air  General: alert, appears stated age and cooperative     Body Habitus: obese    HEENT: Head: Normocephalic, no lesions, without obvious abnormality. No arcus senilis, xanthelasma or xanthomas.  No malar flush, proptosis, xanthomas or malar flush.   Neuro: alert, oriented x3  speech normal in context and clarity  memory intact grossly  JVP: Volume/Pulsation: Normal.  Normal x and y descent.  Waveforms: Normal waveforms with a, x, and v waves.   Physiology: Appropriate inspiratory decrease.  No Kussmaul's. No Josefina's.   Respirations:  no increased work of breathing     Pulmonary:Normal     Precordium: Normal impulses. P2 is not palpable.  RV Heave: Present  LV Heave: absent  [place]:  normal size and placement  Palpable S4: absent.  Heart rate: normal    Heart Rhythm: regular     Heart Sounds: S1: normal  S2: normal  S3: absent   S4: absent  Opening Snap: absent    A2-OS:  no  Pericardial Rub:  Absent: Yes     Ejection click: None      Murmurs:  absent   Extremity: moves all extremities equally.       DATA REVIEWED:         TTE/RACHEL:  Results for orders placed during the hospital encounter of 06/07/22    Adult Transthoracic Echo Complete W/ Cont if Necessary Per Protocol    Interpretation Summary  · Normal LV systolic function. Calculated LV EF: 59%. Grade 1a diastolic function.  · Right ventricular systolic function is low normal. RV S' 9.6 cm/sec. The RV cavity is mild to moderately dilated. There is moderate RV hypertrophy.  · Saline test results are negative.  · Insufficient TR velocity profile to estimate the right ventricular systolic pressure.      My interpretation of the TTE is below.     LVEF is 59%.  RV systolic function is low normal.  S prime velocity 9.6 cm/s.  RV cavity is mild to moderately dilated with RVH.  Insufficient TR velocity profile to calculate PA pressure.    -----------------------------------------------------    CT:                   I personally reviewed the images of the CT scan.  My personal interpretation is below.      Tiny pulmonary thromboemboli.  These are subsegmental and primarily in the RLL PA.  Distal pulmonary artery branches are tapered.  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        --------------------------------------------------------------------------------------------------    Laboratory evaluations:    Lab Results   Component Value Date    GLUCOSE 134 (H) 08/03/2022    BUN 14 08/03/2022    CREATININE 0.97 08/03/2022    EGFRIFNONA 63 02/01/2022     EGFRIFAFRI 65 03/12/2020    BCR 14.4 08/03/2022    K 4.5 08/03/2022    CO2 22.0 08/03/2022    CALCIUM 9.8 08/03/2022    PROTENTOTREF 7.1 03/12/2020    ALBUMIN 4.60 08/03/2022    LABIL2 1.8 03/12/2020    AST 26 08/03/2022    ALT 24 08/03/2022     Lab Results   Component Value Date    WBC 6.41 08/03/2022    HGB 12.9 08/03/2022    HCT 38.6 08/03/2022    MCV 96.0 08/03/2022     08/03/2022     Lab Results   Component Value Date    CHLPL 177 08/03/2022    TRIG 166 (H) 08/03/2022    HDL 68 (H) 06/11/2018     (H) 06/11/2018     Lab Results   Component Value Date    TSH 0.984 08/03/2022     Lab Results   Component Value Date    CKTOTAL 97 08/03/2022     Lab Results   Component Value Date    HGBA1C 6.60 (H) 08/03/2022     No results found for: DDIMER  Lab Results   Component Value Date    ALT 24 08/03/2022     Lab Results   Component Value Date    HGBA1C 6.60 (H) 08/03/2022    HGBA1C 7.08 (H) 02/01/2022    HGBA1C 6.54 (H) 07/23/2021     Lab Results   Component Value Date    MICROALBUR 1.6 08/03/2022    CREATININE 0.97 08/03/2022     No results found for: IRON, TIBC, FERRITIN  No results found for: INR, PROTIME        1. Multiple pulmonary emboli (HCC).  Patient CT scan is consistent with pulmonary thromboembolism with an elevated RV: LV ratio that is predictive of pulmonary hypertension.  There is a mosaic pattern throughout both lungs with the lucent regions having less vasculature, as well as the pulmonary arteries at the upper limit of normal in diameter, all of which is concerning for the development of pulmonary hypertension and possibly CTEPH.      The patient has now been anticoagulated for a period of greater than 90 days.  Given her ongoing exercise intolerance we will proceed with RHC to exclude pulmonary hypertension.  If she does have pulmonary hypertension she will need a VQ scan to definitively make the diagnosis of CTEPH.    RHC PRE-OP:  The indications, risks and benefits of diagnostic right   heart cardiac catheterization, angiography, conscious sedation, and possible blood transfusion were discussed in detail with the patient. The increased risks that are present with pulmonary arterial hypertension with right heart catheterization were emphasized. I have cited a complication rate of 1.1% with total adverse events. This includes a 0.3% risk of inpatient admission due to a complication. I have cited a 0.5% risk of fatality. This includes the risk of PA perforation. I have cited a risk of hematoma, vagal reactions and pneumothorax as <1%. Pulmonary vasoreactivity testing, if indicated, can cause hypotension, bronchospasm, chest pain and SOB. Pulmonary angiography, if indicated, can rarely cause bronchospasm and one reported death has been cited due to intrapulmonary hemorrhage. I have also discussed the possible risks, though low, of heart block and the need for emergency venous pacing. Additionally, I went over that if a rare complication requires a thoracotomy or median sternotomy that this would be performed emergently by CTS. The patient is willing to proceed.      ASA class is ASA 3 - Patient with moderate systemic disease with functional limitations; Mallampati is II (hard and soft palate, upper portion of tonsils anduvula visible).    -RHC, RUE access, possible VD challenge  -Patient will REMAIN ON XARELTO for the case for RUE approach.     2.    Right ventricle abnormality.  She has a dilated right ventricle with impaired systolic function.  RVH is present.  I suspect this is from longstanding undiagnosed NEVILLE.  However, some of this may be from her most recent PE.  Most recent 2D echocardiogram was just completed about 2 months ago, so I do not think there is a need to repeat it at this time.  Certainly if she continues to demonstrate pulmonary hypertension on RHC we can repeat this and consider MRA therapy.   3. Obese. The BMI is Body mass index is 35.25 kg/m². ACC/AHA guidelines recommend that  height, weight and BMI is calculated at visits.  The patient has been provided with information regarding the elevated risk of cardiovascular disease, type 2 diabetes and all-cause mortality.  Sustained weight loss of 3-5% is likely to result in clinically meaningful reductions in triglycerides, hemoglobin A1c, blood glucose, and the risk of developing type 2 diabetes.  The greater the amount of weight loss typically the greater reduction in blood pressure, reduction and need for medications to control blood pressure, improvements in blood glucose and lipids.  Handout was provided on the patient's BMI.  The patient has been encouraged to follow-up with their primary care provider.          Orders placed today:    Orders Placed This Encounter   Procedures   • COVID PRE-OP / PRE-PROCEDURE SCREENING ORDER (NO ISOLATION) - Swab, Nasopharynx   • CBC (No Diff)   • Basic Metabolic Panel     Medication orders today:    No orders of the defined types were placed in this encounter.        Return in about 2 months (around 10/29/2022).          This document has been electronically signed by Agus Solorio MD PhD on August 29, 2022 13:25 EDT

## 2022-08-30 ENCOUNTER — APPOINTMENT (OUTPATIENT)
Dept: LAB | Facility: HOSPITAL | Age: 77
End: 2022-08-30

## 2022-09-12 ENCOUNTER — TELEPHONE (OUTPATIENT)
Dept: INTERNAL MEDICINE | Facility: CLINIC | Age: 77
End: 2022-09-12

## 2022-09-12 ENCOUNTER — TELEPHONE (OUTPATIENT)
Dept: SURGERY | Facility: CLINIC | Age: 77
End: 2022-09-12

## 2022-09-12 DIAGNOSIS — R11.0 NAUSEA: Primary | ICD-10-CM

## 2022-09-12 RX ORDER — ONDANSETRON HYDROCHLORIDE 8 MG/1
8 TABLET, FILM COATED ORAL EVERY 6 HOURS PRN
Qty: 60 TABLET | Refills: 1 | Status: SHIPPED | OUTPATIENT
Start: 2022-09-12 | End: 2022-09-12 | Stop reason: SDUPTHER

## 2022-09-12 RX ORDER — ONDANSETRON HYDROCHLORIDE 8 MG/1
8 TABLET, FILM COATED ORAL EVERY 6 HOURS PRN
Qty: 60 TABLET | Refills: 1 | Status: SHIPPED | OUTPATIENT
Start: 2022-09-12

## 2022-09-12 RX ORDER — ONDANSETRON HYDROCHLORIDE 8 MG/1
8 TABLET, FILM COATED ORAL EVERY 6 HOURS PRN
COMMUNITY
End: 2022-09-12 | Stop reason: SDUPTHER

## 2022-09-12 NOTE — TELEPHONE ENCOUNTER
Patient calls, let me know she spoke with someone last week and has not heard back regarding apt to see Dr. Fam    She tells me she needs bx, suspicious for cancer and needs apt asap    I did explain our process, prepared chart for Dr. Fam to review and let her know scheduling will call her back once chart is review.

## 2022-09-15 ENCOUNTER — TELEPHONE (OUTPATIENT)
Dept: SURGERY | Facility: CLINIC | Age: 77
End: 2022-09-15

## 2022-09-20 DIAGNOSIS — R92.8 ABNORMAL MAMMOGRAM OF LEFT BREAST: Primary | ICD-10-CM

## 2022-09-21 ENCOUNTER — HOSPITAL ENCOUNTER (OUTPATIENT)
Dept: SURGERY | Facility: HOSPITAL | Age: 77
Discharge: HOME | End: 2022-09-21

## 2022-09-21 ENCOUNTER — OFFICE VISIT (OUTPATIENT)
Dept: SURGERY | Facility: CLINIC | Age: 77
End: 2022-09-21

## 2022-09-21 VITALS
DIASTOLIC BLOOD PRESSURE: 76 MMHG | BODY MASS INDEX: 33.11 KG/M2 | HEART RATE: 85 BPM | OXYGEN SATURATION: 96 % | SYSTOLIC BLOOD PRESSURE: 134 MMHG | WEIGHT: 206 LBS | HEIGHT: 66 IN

## 2022-09-21 DIAGNOSIS — Z98.890 HISTORY OF BILATERAL BREAST REDUCTION SURGERY: ICD-10-CM

## 2022-09-21 DIAGNOSIS — R92.8 ABNORMAL ULTRASOUND OF BREAST: ICD-10-CM

## 2022-09-21 DIAGNOSIS — I27.82 CHRONIC PULMONARY EMBOLISM WITHOUT ACUTE COR PULMONALE, UNSPECIFIED PULMONARY EMBOLISM TYPE: ICD-10-CM

## 2022-09-21 DIAGNOSIS — E66.09 CLASS 1 OBESITY DUE TO EXCESS CALORIES WITHOUT SERIOUS COMORBIDITY WITH BODY MASS INDEX (BMI) OF 33.0 TO 33.9 IN ADULT: ICD-10-CM

## 2022-09-21 DIAGNOSIS — R06.02 SHORTNESS OF BREATH: ICD-10-CM

## 2022-09-21 DIAGNOSIS — R92.8 ABNORMAL MAMMOGRAM: Primary | ICD-10-CM

## 2022-09-21 DIAGNOSIS — Z80.3 FH: BREAST CANCER: ICD-10-CM

## 2022-09-21 PROCEDURE — 88341 IMHCHEM/IMCYTCHM EA ADD ANTB: CPT | Performed by: SURGERY

## 2022-09-21 PROCEDURE — 19083 BX BREAST 1ST LESION US IMAG: CPT | Performed by: SURGERY

## 2022-09-21 PROCEDURE — 99205 OFFICE O/P NEW HI 60 MIN: CPT | Performed by: SURGERY

## 2022-09-21 PROCEDURE — 88305 TISSUE EXAM BY PATHOLOGIST: CPT | Performed by: SURGERY

## 2022-09-21 PROCEDURE — 19084 BX BREAST ADD LESION US IMAG: CPT | Performed by: SURGERY

## 2022-09-21 PROCEDURE — 88342 IMHCHEM/IMCYTCHM 1ST ANTB: CPT | Performed by: SURGERY

## 2022-09-21 PROCEDURE — 88360 TUMOR IMMUNOHISTOCHEM/MANUAL: CPT | Performed by: SURGERY

## 2022-09-21 PROCEDURE — 88377 M/PHMTRC ALYS ISHQUANT/SEMIQ: CPT

## 2022-09-21 NOTE — TELEPHONE ENCOUNTER
Called Pt with echo results. No answer. Left message asking Pt to call us back. Will await Pt call back.    Loida Johnson RN  [facility]   [de-identified] :   EXAMINATION OF THE RIGHT HAND AND THUMB, PATIENT HAS SWELLING OVER THE IP JOINT AND OVER THE THENAR EMINENCE, PATIENT HAS RESOLVING ECCHYMOSIS OVER THE THENAR EMINENCE.  \par PATIENT HAS GOOD RANGE OF MOTION OVER THE IP JOINT OF THE THUMB AND OVER THE MCP JOINT.  \par THERE IS NO LAXITY WHEN STRESSING THE RADIAL AND ULNAR COLLATERAL LIGAMENT OVER THE CMC JOINT.  \par PATIENT HAS NO PAIN WHEN THE PALPATING THE ULNAR AND RADIAL COLLATERAL LIGAMENT OF THE CMC JOINT.  \par PATIENT HAS 10 TO PALPATION OVER THE RADIAL AND ULNAR COLLATERAL LIGAMENTS OVER THE IP JOINT.  \par PATIENT HAS GOOD RANGE OF MOTION ABOUT THE WRIST.  \par NEUROVASCULAR INTACT.\par \par   X-RAY TAKING AT [facility] OF THE RIGHT HAND IS NEGATIVE FOR ANY ACUTE FRACTURE DISLOCATION.

## 2022-09-21 NOTE — PROGRESS NOTES
Chief Complaint: Claudette L McManus is a 77 y.o. female who was seen in consultation at the request of Lito Moore MD  for abnormal breast imaging    History of Present Illness:  Patient presents with abnormal breast imaging. She noted no new masses, skin changes, nipple discharge, nipple changes prior to her most recent imaging.  Her most recent imaging includes the followin22 [facility]  CT CHEST  MCMANUS, CLAUDETTE   HISTORY: dyspnea on exertion. FINDINGS: There is a tiny nonocclusive subsegmental right lower lobe pulmonary thromboembolus and there are a few very tiny distal pulmonary thromboemboli as well. IMPRESSION:  1. There are tiny pulmonary thromboemboli with the largest within a subsegmental right lower lobe pulmonary artery. The RV:LV ratio is 1.4. The appearance of some of the pulmonary artery branches and the lungs can be seen with chronic pulmonary thromboemboli.     22 [facility]   MAMMO SCREENING  MCMANUS CLAUDETTE   There are scattered areas of fibroglandular density. There are indeterminate calcifications in the outer central anterior left breast. There is a focal asymmetry with questioned distortion in the slightly lower slightly outer anterior left breast. There is a focal asymmetry in the outer central middle to anterior left breast. BIRADS CATEGORY 0      22 [facility] MAMMO DIAG LEFT MATTY LEFT BREAST U/S  MCMANUS CLAUDETTE  In the outer central anterior left breast, there is a 1.3 cm group of somewhat pleomorphic calcifications, which is suspicious. In the lower slightly outer middle left breast, there is a persistent approximately 1.5 cm mass with mild corresponding architectural distortion. There is also a central corresponding calcification.  The previously noted focal asymmetry in the outer central middle left  breast partially effaces with spot compression and is less conspicuous on the lateral view.  In the slightly upper outer middle left breast, there is  a persistent approximately 1 cm mass with corresponding architectural distortion.  In the upper central anterior left breast, there is a persistent focal asymmetry with calcifications.  These areas were further assessed with ultrasound.     ULTRASOUND:  Targeted sonographic evaluation of the left breast was performed from 2:00 to 6:00 in the region of the mammographic abnormalities. At 1:00 in the retroareolar left breast, there is an at least 2.3 x 0.9 x 2.0 cm hypoechoic mass with indistinct margins and internal echogenic foci from calcifications corresponding to the focal asymmetry with calcifications in the anterior left breast on mammogram. There is tubular elongation of the mass concerning for possible ductal extension.  At 3:00 in the retroareolar left breast, there is a 1.1 x 0.8 x 1.0 cm irregular hypoechoic mass with indistinct margins and internal echogenic foci from calcifications, which corresponds to the suspicious group of calcifications on mammogram.  At 4:00, 3 cm from the nipple, there is a 1.4 x 1.0 x 1.1 cm irregular hypoechoic mass with peripheral vascularity corresponding to a mass with distortion on mammogram.  At 4:30, 3 cm from the nipple, approximately 1.8 cm from the above mass  at the 4:00 position, there is a 0.9 x 0.5 x 0.9 cm irregular hypoechoic mass with indistinct margins, which is suspicious.  At 3:00, 5 cm from the nipple, there is a 0.9 x 0.7 x 0.7 cm irregular hypoechoic mass with indistinct margins and corresponding internal vascularity, which corresponds to a mass with architectural distortion  on mammogram and is suspicious.   Recommend 2 site ultrasound-guided core needle biopsy  targeting the dominant masses at 1:00 in the retroareolar breast and  4:00, 3 cm from the nipple with management of the additional masses  pending biopsy results. Contrast-enhanced breast MRI may be warranted  following the biopsy. BI RADS CATEGORY 4C       She has not had a breast biopsy in the  past.  She has her  ovaries, is postmenopausal, and takes nor hormones.  Her family history includes the following: She has 1 daughter, 1 sister, 2 maternal aunts, 1 paternal aunt, her daughter had breast cancer in her 40s.  This daughter is  due to suicide in her 60s.  The patient has had an intentional 20 pound weight loss over the past 2-month related to an injectable diabetes medication called tirzepatide.  She is here for evaluation.    Review of Systems:  Review of Systems   Constitutional: Negative for unexpected weight change (20 lbs last 2 months ).   Respiratory: Positive for shortness of breath (only with exertion ).    Genitourinary: Positive for bladder incontinence, frequency and nocturia.    Musculoskeletal: Positive for back pain.   All other systems reviewed and are negative.       Past Medical and Surgical History:  Breast Biopsy History:  Patient has not had a breast biopsy in the past.  Breast Cancer HIstory:  Patient does not have a past medical history of breast cancer.  Breast Operations, and year:  Reduction   Obstetric/Gynecologic History:  Age menstrual periods began: 10  Patient is postmenopausal due to removal of her uterus in the following year:   Number of pregnancies:1  Number of live births: 1  Number of abortions or miscarriages: 0  Age of delivery of first child: 12  Patient breast fed, for the following lenth of time:  Length of time taking birth control pills:6-8 wks   Patient has never taken hormone replacement  Patient has both ovaries, uterus removed   Past Surgical History:   Procedure Laterality Date   • BILATERAL BREAST REDUCTION  Early     Early --bilateral breast reduction   • CHOLECYSTECTOMY  1960s    --open cholecystectomy   • COLONOSCOPY  2012--reportedly normal colonoscopy   • INCONTINENCE SURGERY      Approximately --implantation of questionable bladder stimulator right sacral area for urinary incontinence.  Details not  known.   • REPLACEMENT TOTAL KNEE BILATERAL Left 2010; 2011 2010-2011--bilateral total knee replacement   • SUBTOTAL HYSTERECTOMY  Remote    Remote partial hysterectomy.  Ovaries intact.   • TOTAL SHOULDER REPLACEMENT Left 2013 2013--left total shoulder replacement.   • TOTAL SHOULDER REPLACEMENT  April 28, 2021 4/20/2021--status post right reverse total shoulder arthroplasty   • TOTAL SHOULDER REVERSE ARTHROPLASTY Right 04/20/2021 4/20/2021--right reverse total shoulder replacement.     Past Medical History:   Diagnosis Date   • Benign essential hypertension    • Chronic bilateral low back pain without sciatica 8/16/2013 March 13, 2019--Limited bone scan.  This reveals scintigraphic activity in the spine and at the right shoulder corresponds to change seen on recent x-rays and is best explained by degenerative change.  Isolated metastatic disease exactly corresponding to the sites of extensive degenerative disc change would be peculiar.  March 7, 2019--new patient to get established.  She has complaints of approxi   • Chronic bilateral thoracic back pain 2/26/2019 March 13, 2019--Limited bone scan.  This reveals scintigraphic activity in the spine and at the right shoulder corresponds to change seen on recent x-rays and is best explained by degenerative change.  Isolated metastatic disease exactly corresponding to the sites of extensive degenerative disc change would be peculiar.  March 1, 2019--x-ray of the lumbar and thoracic spine reveals mild anterior l   • Chronic insomnia 11/4/2014   • Diabetic peripheral neuropathy (HCC) 3/7/2019    Characterized by decreased sensation and paresthesias in both lower extremities described as a burning sensation.  Extends up to the knees.  Reportedly had been evaluated with nerve conduction study in the past.   • Generalized anxiety disorder 5/19/2013   • History of Bell's palsy 5/21/2013    Remote history of Bell's palsy.  Patient does not remember which  side of the face.   • Hyperlipidemia    • Impaired fasting glucose    • Major depression    • Menopausal state, 2020--normal DEXA. 3/7/2019    2020--DEXA scan reveals lumbar spine T score 3.8.  Left femoral neck T score 2.5.  Right femoral neck T score 2.2.  Normal bone density.   • Non morbid obesity 2022   • Peripheral neuropathy, idiopathic 3/7/2019    Characterized by decreased sensation and paresthesias in both lower extremities described as a burning sensation.  Extends up to the knees.  Reportedly had been evaluated with nerve conduction study in the past.   • Primary hypothyroidism 2013   • Primary osteoarthritis involving multiple joints 2013   • Rotator cuff arthropathy of right shoulder, 2021--status post right reverse total shoulder arthroplasty 2020   • Situational mixed anxiety and depressive disorder 2019--patient seen in follow-up after I called in a prescription for Ativan after the patient's  contacted me a few weeks ago regarding the sudden death of patient's daughter.  This was an apparent suicide and the patient found her daughter dead.  I will not go into details.  Patient reports the Lorazepam has been helpful but she is still quite distraught, nervous, and undergoing   • Type 2 diabetes mellitus with diabetic neuropathy, without long-term current use of insulin (HCC)    • Vitamin D deficiency 2019       Prior Hospitalizations, other than for surgery or childbirth, and year:  None     Social History     Socioeconomic History   • Marital status:    Tobacco Use   • Smoking status: Former Smoker     Quit date: 1998     Years since quittin.7   • Smokeless tobacco: Never Used   Vaping Use   • Vaping Use: Never used   Substance and Sexual Activity   • Alcohol use: Yes     Alcohol/week: 1.0 standard drink     Types: 1 Glasses of wine per week     Comment: current some day   • Drug use: No   • Sexual activity:  "Not Currently     Partners: Male     Patient is .  Patient is retired.  Patient drinks 2 servings of caffeine per day.    Family History:  Family History   Problem Relation Age of Onset   • Alcohol abuse Father    • Breast cancer Daughter         40s   • Cancer Other    • Diabetes Other         type II   • Leukemia Grandchild 19       Vital Signs:  /76   Pulse 85   Ht 167.6 cm (66\")   Wt 93.4 kg (206 lb)   LMP  (LMP Unknown) Comment: partial hysterectomy  SpO2 96%   Breastfeeding No   BMI 33.25 kg/m²      Medications:    Current Outpatient Medications:   •  Cholecalciferol (VITAMIN D3) 2000 UNITS tablet, Take 1 capsule by mouth daily., Disp: , Rfl:   •  diphenhydrAMINE-acetaminophen (TYLENOL PM)  MG tablet per tablet, Take 2 tablets by mouth Every Night., Disp: , Rfl:   •  DULoxetine (CYMBALTA) 60 MG capsule, TAKE 1 CAPSULE EVERY DAY AS DIRECTED, Disp: 90 capsule, Rfl: 3  •  ezetimibe (ZETIA) 10 MG tablet, TAKE 1 TABLET EVERY DAY, Disp: 90 tablet, Rfl: 3  •  levothyroxine (SYNTHROID, LEVOTHROID) 125 MCG tablet, TAKE 1 TABLET EVERY DAY, Disp: 90 tablet, Rfl: 2  •  metroNIDAZOLE (METROGEL) 0.75 % gel, , Disp: , Rfl:   •  ondansetron (ZOFRAN) 8 MG tablet, Take 1 tablet by mouth Every 6 (Six) Hours As Needed for Nausea., Disp: 60 tablet, Rfl: 1  •  rivaroxaban (XARELTO) 20 MG tablet, Take 1 p.o. daily for blood thinner as directed., Disp: 30 tablet, Rfl: 3  •  simvastatin (ZOCOR) 20 MG tablet, Take one tablet by mouth daily for high cholesterol., Disp: 90 tablet, Rfl: 2  •  Tirzepatide 5 MG/0.5ML solution pen-injector, Inject 5 mg under the skin into the appropriate area as directed 1 (One) Time Per Week., Disp: 2 mL, Rfl: 3  •  traMADol (ULTRAM) 50 MG tablet, 1-2 p.o. every 6 hours as needed pain from peripheral neuropathy.  Not greater than 4 in 24 hours, Disp: 120 tablet, Rfl: 3  •  traZODone (DESYREL) 150 MG tablet, TAKE 1 TABLET EVERY NIGHT, Disp: 90 tablet, Rfl: 3  •  valsartan (DIOVAN) " "320 MG tablet, TAKE ONE TABLET BY MOUTH EVERY MORNING FOR BLOOD PRESSURE, Disp: 90 tablet, Rfl: 3     Allergies:  Allergies   Allergen Reactions   • Morphine And Related    • Other Other (See Comments)     REJECTED DACRON SUTURES IN THE PAST AND INCISION CAME APART       Physical Examination:  /76   Pulse 85   Ht 167.6 cm (66\")   Wt 93.4 kg (206 lb)   LMP  (LMP Unknown) Comment: partial hysterectomy  SpO2 96%   Breastfeeding No   BMI 33.25 kg/m²   General Appearance:  Patient is in no distress.  She is well kept and has an obese build.   Psychiatric:  Patient with appropriate mood and affect. Alert and oriented to self, time, and place.    Breast, RIGHT:  medium sized, symmetric with the contralateral side.  Well-healed reduction pattern incisions.  Breast skin is without erythema, edema, rashes.  There are no visible abnormalities upon inspection during the arm-raising maneuver or with hands on hips in the sitting position. There is no nipple retraction, discharge or nipple/areolar skin changes.There are no masses palpable in the sitting or supine positions.    Breast, LEFT:  medium sized, symmetric with the contralateral side.  Well-healed reduction pattern incisions.  Breast skin is without erythema, edema, rashes.  There are no visible abnormalities upon inspection during the arm-raising maneuver or with hands on hips in the sitting position. There is no nipple retraction, discharge or nipple/areolar skin changes.There are no masses palpable in the sitting or supine positions.  There is some central and lower outer quadrant nodularity in the left breast that is tender to palpation.    Lymphatic:  There is no axillary, cervical, infraclavicular, or supraclavicular adenopathy bilaterally.  Eyes:  Pupils are round and reactive to light.  Cardiovascular:  Heart rate and rhythm are regular.  Respiratory:  Lungs are clear bilaterally with no crackles or wheezes in any lung " field.  Gastrointestinal:  Abdomen is soft, nondistended, and nontender.     Musculoskeletal:  Good strength in all 4 extremities.   There is good range of motion in both shoulders.    Skin:  No new skin lesions or rashes on the skin excluding the breast (see breast exam above).        Imagin22 [facility]  CT CHEST  OMAR CLAUDETTE   HISTORY: dyspnea on exertion. FINDINGS: There is a tiny nonocclusive subsegmental right lower lobe pulmonary thromboembolus and there are a few very tiny distal pulmonary thromboemboli as well. IMPRESSION:  1. There are tiny pulmonary thromboemboli with the largest within a subsegmental right lower lobe pulmonary artery. The RV:LV ratio is 1.4. The appearance of some of the pulmonary artery branches and the lungs can be seen with chronic pulmonary thromboemboli.     22 [facility]   MAMMO SCREENING  MCMANUS CLAUDETTE   There are scattered areas of fibroglandular density. There are indeterminate calcifications in the outer central anterior left breast. There is a focal asymmetry with questioned distortion in the slightly lower slightly outer anterior left breast. There is a focal asymmetry in the outer central middle to anterior left breast. BIRADS CATEGORY 0      22 [facility] MAMMO DI[facility]  In the outer central anterior left breast, there is a 1.3 cm group of somewhat pleomorphic calcifications, which is suspicious. In the lower slightly outer middle left breast, there is a persistent approximately 1.5 cm mass with mild corresponding architectural distortion. There is also a central corresponding calcification.  The previously noted focal asymmetry in the outer central middle left  breast partially effaces with spot compression and is less conspicuous on the lateral view.  In the slightly upper outer middle left breast, there is a persistent approximately 1 cm mass with corresponding architectural distortion.  In the  upper central anterior left breast, there is a persistent focal asymmetry with calcifications.  These areas were further assessed with ultrasound.     ULTRASOUND:  Targeted sonographic evaluation of the left breast was performed from 2:00 to 6:00 in the region of the mammographic abnormalities. At 1:00 in the retroareolar left breast, there is an at least 2.3 x 0.9 x 2.0 cm hypoechoic mass with indistinct margins and internal echogenic foci from calcifications corresponding to the focal asymmetry with calcifications in the anterior left breast on mammogram. There is tubular elongation of the mass concerning for possible ductal extension.  At 3:00 in the retroareolar left breast, there is a 1.1 x 0.8 x 1.0 cm irregular hypoechoic mass with indistinct margins and internal echogenic foci from calcifications, which corresponds to the suspicious group of calcifications on mammogram.  At 4:00, 3 cm from the nipple, there is a 1.4 x 1.0 x 1.1 cm irregular hypoechoic mass with peripheral vascularity corresponding to a mass with distortion on mammogram.  At 4:30, 3 cm from the nipple, approximately 1.8 cm from the above mass  at the 4:00 position, there is a 0.9 x 0.5 x 0.9 cm irregular hypoechoic mass with indistinct margins, which is suspicious.  At 3:00, 5 cm from the nipple, there is a 0.9 x 0.7 x 0.7 cm irregular hypoechoic mass with indistinct margins and corresponding internal vascularity, which corresponds to a mass with architectural distortion  on mammogram and is suspicious.   Recommend 2 site ultrasound-guided core needle biopsy  targeting the dominant masses at 1:00 in the retroareolar breast and  4:00, 3 cm from the nipple with management of the additional masses  pending biopsy results. Contrast-enhanced breast MRI may be warranted  following the biopsy. BI RADS CATEGORY 4C                   05/25/22 [facility]   SINGH, VIKAS MCMANUS CLAUDETTE L.   5/19/22 patient was started on anticoagulation with  Xarelto. Humble reports no significant improvemenrt in her respiratory symptoms while being on anticoagulation for around a week. Patient states symptoms while being on anticoagulation for around a week. Patient states she always had mild difficulty breasting, but it got significantly worse in the last 6 weeks. She now has to rest even after walkiong short interval. Says she smoked halk pack/day for 20-25 years. Quit in 1988.       Procedures 9-21-22:  Percutaneous ultrasound-guided vacuum-assisted excisional breast biopsy x2   Indication:  ultrasound-visible breast mass x2  Location: Left breast 3:00, 2 cm from the nipple, left breast 4:00, 2 cm from the nipple  Consent:  The risks, benefits, and alternatives to the procedure were discussed with the patient, who understood and wished to proceed.  The risks described included, but were not limited to, bleeding, infection, pneumothorax, and inadequate sampling requiring either repeat percutaneous or open excisional biopsy.  Description of Procedure:   After the patient was positioned supine on the procedure table, I located each lesion using ultrasound.  The lesion at the 3:00, 2 cm the nipple location measured in the antiradial dimension 1.7 x 0.9 cm and in the radial dimension 1.4 x 1.3 cm.  Lesion at the 4:00, 2 cm from the nipple location measured in the radial dimension 1.4 x 1.0 cm and in the antiradial dimension 1.1 x 0.6 cm.    I prepped and draped the breast skin in sterile fashion.   For each separate distinct site I performed the following procedure: I anesthetized the breast skin at the site of anticipated mammotomy with 1% lidocaine with epinephrine.  I then anesthetized the underlying subcutaneous tissue and breast parenchyma surrounding each separate distinct lesion with 1% lidocaine with epinephrine under ultrasound visualization and guidance. I then made a nicking incision with an 11blade and inserted the 10G encore biopsy device from inferolateral to  superomedial for the first biopsy and superior lateral to inferior medial for the second lesion  under ultrasound guidance.   I then took 12 sequential core samples of each separate lesion using the automated sampling of the encore device, until a majority of each lesion disappeared on ultrasound. There was minimal residual lesion visible at the conclusion of the procedure.  I removed the probe, then placed a hydromark marker, using a stiff introducer, into each of the 2 biopsy sites.   We held manual compression for 10 minutes, placed steri-strips at the mammotomy site, and wrapped the patient in a 6 inch super ace wrap with an ice-pack.  Marker placed: hydromark x2  Tolerance: The patient tolerated the procedure well.  Disposition: We will see her back within a week to review her pathology.    Assessment:   Diagnosis Plan   1. Abnormal mammogram  Mammo Diagnostic Digital Tomosynthesis Left With CAD    MRI Breast Bilateral With & Without Contrast   2. Abnormal ultrasound of breast  MRI Breast Bilateral With & Without Contrast   3. Chronic pulmonary embolism without acute cor pulmonale, unspecified pulmonary embolism type (HCC)     4. Shortness of breath     5. FH: breast cancer     6. Class 1 obesity due to excess calories without serious comorbidity with body mass index (BMI) of 33.0 to 33.9 in adult     7. History of bilateral breast reduction surgery     1-2  LEFT breast 1:00 RA, 2.3 cm mass with calcifications-BR4C-    LEFT breast 3:00 RA, 2 CFN- 1.1 cm mass with calcifications- BR4C target for core biopsy today    LEFT 4:00, 2-3 CFN- 1.4 cm mass- BR4C- target for biopsy today    LEFT 4:00, 3 CFN- 9mm mass, located 9mm from the above mass- BR4C      3-  RLL subsegmental emboli- some chronic (CT 5-19-22)- sees Dr Fofana with [facility] hematology, on eliquis    4-  25 PYH smoking, stopped 1998- some chronic SOB, but worsening over the past one year- sees A pulmonologist at [facility]- uncertain of name    Sees   Agus Solorio from cardiology- he plans cardiac catheterization 9-26-22      5-  Daughter Jamilah Pfeiffer- 6-1-57- survived breast cancer. Comitted suicide in her 60s.    6-  BMI 33    7-  2000- breast reduction    Plan:  The patient goes by Claudette.  She is here with her  today, who is her primary support.  We reviewed her history, imaging, imaging reports and exam and bedside ultrasound today.    We reviewed her most recent imaging, her history, and her exam together today.    There are 4 lesions of concern on her imaging. We will biopsy 2 of those, highlighted above, today.  We discussed that the designation of a BIRADS 4 signifies that there is a 3-89% possibility of malignancy accounting for the imaging finding. We discussed the recommendation to biopsy all BIRADS 4 lesions. We discussed a second option, and recommended only if 1- the above lesion was not technically amenable to either percutaneous or to surgical biopsy, or 2-if the patient refused biopsy. This option would be imaging surveillance in 3-6 months. She understood and wished to proceed with biopsy.  We plan to perform the following biopsy :   LEFT breast 10G percutaneous vacuum assist biopsy X 2 sites.    We called her hematologist to have her hold her xarelto pre procedure today.    I will call her with the pathology report as it returns, and we have given her after care instructions post biopsy.  I did tell her that I am concerned for a breast cancer and that if this is malignant that we would proceed with genetic testing, postbiopsy mammogram and MRI.    We will get a note from her pulmonologist as well as from her cardiologist post cath.    Note that she is taking an injection (past 2-3 months) for weight loss and diabetes called tirzepatide- this can cause delayed gastric empytying and so will have her hold this for 1- 2 weeks preop.          Noelle Fam MD      Today I spent 60 minutes doing the following: Reviewing records,  labs, outside imaging and reports in preparation for the patient visit; obtaining medical history; performing the physical exam; counseling and educating the patient and any available family or caregivers; ordering medications, tests or procedures; coordinating care with any other physicians on her care team as needed, and documenting all of the above in the medical record as well as sending communications with her other healthcare professionals.        Next Appointment:  Return for post biopsy visit.      EMR Dragon/transcription disclaimer:    Please note that portions of this note were completed with a voice recognition program.

## 2022-09-22 ENCOUNTER — TELEPHONE (OUTPATIENT)
Dept: INTERNAL MEDICINE | Facility: CLINIC | Age: 77
End: 2022-09-22

## 2022-09-22 ENCOUNTER — OFFICE VISIT (OUTPATIENT)
Dept: INTERNAL MEDICINE | Facility: CLINIC | Age: 77
End: 2022-09-22

## 2022-09-22 VITALS
BODY MASS INDEX: 32.21 KG/M2 | HEIGHT: 66 IN | HEART RATE: 100 BPM | WEIGHT: 200.4 LBS | RESPIRATION RATE: 18 BRPM | DIASTOLIC BLOOD PRESSURE: 76 MMHG | SYSTOLIC BLOOD PRESSURE: 130 MMHG | OXYGEN SATURATION: 96 %

## 2022-09-22 DIAGNOSIS — E87.1 HYPONATREMIA: ICD-10-CM

## 2022-09-22 DIAGNOSIS — R06.09 DYSPNEA ON EXERTION: ICD-10-CM

## 2022-09-22 DIAGNOSIS — E11.42 DIABETIC PERIPHERAL NEUROPATHY: Chronic | ICD-10-CM

## 2022-09-22 DIAGNOSIS — E78.2 MIXED HYPERLIPIDEMIA: Chronic | ICD-10-CM

## 2022-09-22 DIAGNOSIS — E55.9 VITAMIN D DEFICIENCY: Chronic | ICD-10-CM

## 2022-09-22 DIAGNOSIS — N63.20 MASS OF LEFT BREAST ON MAMMOGRAM: ICD-10-CM

## 2022-09-22 DIAGNOSIS — I26.99 MULTIPLE PULMONARY EMBOLI: ICD-10-CM

## 2022-09-22 DIAGNOSIS — E66.9 NON MORBID OBESITY: Chronic | ICD-10-CM

## 2022-09-22 DIAGNOSIS — Z51.81 THERAPEUTIC DRUG MONITORING: ICD-10-CM

## 2022-09-22 DIAGNOSIS — E03.9 PRIMARY HYPOTHYROIDISM: Chronic | ICD-10-CM

## 2022-09-22 DIAGNOSIS — E11.40 TYPE 2 DIABETES MELLITUS WITH DIABETIC NEUROPATHY, WITHOUT LONG-TERM CURRENT USE OF INSULIN: Chronic | ICD-10-CM

## 2022-09-22 DIAGNOSIS — I10 BENIGN ESSENTIAL HYPERTENSION: Chronic | ICD-10-CM

## 2022-09-22 DIAGNOSIS — Z00.00 ENCOUNTER FOR SUBSEQUENT ANNUAL WELLNESS VISIT (AWV) IN MEDICARE PATIENT: Primary | ICD-10-CM

## 2022-09-22 PROBLEM — I51.7 RIGHT VENTRICULAR ENLARGEMENT: Chronic | Status: ACTIVE | Noted: 2022-08-29

## 2022-09-22 PROBLEM — I51.7 RIGHT VENTRICULAR ENLARGEMENT: Status: ACTIVE | Noted: 2022-08-29

## 2022-09-22 PROBLEM — R92.8 ABNORMAL MAMMOGRAM OF LEFT BREAST: Status: RESOLVED | Noted: 2022-09-20 | Resolved: 2022-09-22

## 2022-09-22 PROCEDURE — G0439 PPPS, SUBSEQ VISIT: HCPCS | Performed by: INTERNAL MEDICINE

## 2022-09-22 PROCEDURE — 99214 OFFICE O/P EST MOD 30 MIN: CPT | Performed by: INTERNAL MEDICINE

## 2022-09-22 PROCEDURE — 1159F MED LIST DOCD IN RCRD: CPT | Performed by: INTERNAL MEDICINE

## 2022-09-22 PROCEDURE — 1126F AMNT PAIN NOTED NONE PRSNT: CPT | Performed by: INTERNAL MEDICINE

## 2022-09-22 PROCEDURE — 1170F FXNL STATUS ASSESSED: CPT | Performed by: INTERNAL MEDICINE

## 2022-09-22 PROCEDURE — 96160 PT-FOCUSED HLTH RISK ASSMT: CPT | Performed by: INTERNAL MEDICINE

## 2022-09-22 NOTE — TELEPHONE ENCOUNTER
Pharmacy Name:  [facility] - [facility] [address] - [phone]  - [phone]     Pharmacy representative phone number: [phone]    Pharmacy representative name:  AVELINO     What medication are you calling in regards to: ondansetron (ZOFRAN) 8 MG tablet    What question does the pharmacy have: AVELINO STATED THAT IT IS A HIGH DOSE AND THE MAXIMUM DOSE IS THREE PER DAY AND THE DOSE THAT WAS SENT IN IS FOR FOUR PER DAY. AVELINO WAS CALLING TO CLARIFY THIS. PLEASE ADVISE.    Who is the provider that prescribed the medication: DR CROOKS

## 2022-09-22 NOTE — PROGRESS NOTES
The ABCs of the Annual Wellness Visit  Subsequent Medicare Wellness Visit    No chief complaint on file.     Subjective    History of Present Illness:  Claudette L McManus is a 77 y.o. female who presents for a Subsequent Medicare Wellness Visit.    The following portions of the patient's history were reviewed and   updated as appropriate: allergies, current medications, past family history, past medical history, past social history, past surgical history and problem list.    Compared to one year ago, the patient feels her physical   health is worse.    Compared to one year ago, the patient feels her mental   health is the same.    Recent Hospitalizations:  She was not admitted to the hospital during the last year.       Current Medical Providers:  Patient Care Team:  Lito Moore MD as PCP - General (Internal Medicine)  Lito Moore MD as Referring Physician (Internal Medicine)    Outpatient Medications Prior to Visit   Medication Sig Dispense Refill   • Cholecalciferol (VITAMIN D3) 2000 UNITS tablet Take 1 capsule by mouth daily.     • diphenhydrAMINE-acetaminophen (TYLENOL PM)  MG tablet per tablet Take 2 tablets by mouth Every Night.     • DULoxetine (CYMBALTA) 60 MG capsule TAKE 1 CAPSULE EVERY DAY AS DIRECTED 90 capsule 3   • ezetimibe (ZETIA) 10 MG tablet TAKE 1 TABLET EVERY DAY 90 tablet 3   • levothyroxine (SYNTHROID, LEVOTHROID) 125 MCG tablet TAKE 1 TABLET EVERY DAY 90 tablet 2   • metroNIDAZOLE (METROGEL) 0.75 % gel      • ondansetron (ZOFRAN) 8 MG tablet Take 1 tablet by mouth Every 6 (Six) Hours As Needed for Nausea. 60 tablet 1   • rivaroxaban (XARELTO) 20 MG tablet Take 1 p.o. daily for blood thinner as directed. 30 tablet 3   • simvastatin (ZOCOR) 20 MG tablet Take one tablet by mouth daily for high cholesterol. 90 tablet 2   • Tirzepatide 5 MG/0.5ML solution pen-injector Inject 5 mg under the skin into the appropriate area as directed 1 (One) Time Per Week. 2 mL 3   • traMADol  (ULTRAM) 50 MG tablet 1-2 p.o. every 6 hours as needed pain from peripheral neuropathy.  Not greater than 4 in 24 hours 120 tablet 3   • traZODone (DESYREL) 150 MG tablet TAKE 1 TABLET EVERY NIGHT 90 tablet 3   • valsartan (DIOVAN) 320 MG tablet TAKE ONE TABLET BY MOUTH EVERY MORNING FOR BLOOD PRESSURE 90 tablet 3     No facility-administered medications prior to visit.       Opioid medication/s are on active medication list.  and I have evaluated her active treatment plan and pain score trends (see table).  Vitals:    09/22/22 1411   PainSc: 0-No pain     I have reviewed the chart for potential of high risk medication and harmful drug interactions in the elderly.            Aspirin is not on active medication list.  Aspirin use is contraindicated for this patient due to: current use of Xarelto.  .    Patient Active Problem List   Diagnosis   • Primary osteoarthritis involving multiple joints   • Benign essential hypertension   • Hyperlipidemia   • Primary hypothyroidism   • Type 2 diabetes mellitus with diabetic neuropathy, without long-term current use of insulin (HCC)   • Chronic insomnia   • Chronic bilateral low back pain without sciatica   • Chronic bilateral thoracic back pain   • Vitamin D deficiency   • Therapeutic drug monitoring   • Menopausal state, 8/19/2020--normal DEXA.   • Diabetic peripheral neuropathy (HCC)   • Situational mixed anxiety and depressive disorder   • ACE-inhibitor cough   • Hyponatremia   • Dyspnea on exertion   • Multiple pulmonary emboli (HCC)   • Non morbid obesity   • Mass of left breast on mammogram   • Right ventricular enlargement     Advance Care Planning  Advance Directive is not on file.  ACP discussion was held with the patient during this visit. Patient does not have an advance directive, information provided.    Review of Systems   Constitutional: Negative.    HENT: Negative.    Eyes: Negative.    Respiratory: Positive for shortness of breath.    Cardiovascular: Negative.  "   Gastrointestinal: Negative.    Endocrine: Negative.    Genitourinary: Negative.    Musculoskeletal: Negative.    Skin: Negative.    Allergic/Immunologic: Negative.    Neurological:        Painful diabetic peripheral neuropathy   Hematological: Negative.    Psychiatric/Behavioral: Negative.         Objective    Vitals:    09/22/22 1411   BP: 130/76   Pulse: 100   Resp: 18   SpO2: 96%   Weight: 90.9 kg (200 lb 6.4 oz)   Height: 167.6 cm (66\")   PainSc: 0-No pain     Estimated body mass index is 32.35 kg/m² as calculated from the following:    Height as of this encounter: 167.6 cm (66\").    Weight as of this encounter: 90.9 kg (200 lb 6.4 oz).    BMI is >= 30 and <35. (Class 1 Obesity). The following options were offered after discussion;: exercise counseling/recommendations, nutrition counseling/recommendations and pharmacological intervention options      Does the patient have evidence of cognitive impairment? No    Physical Exam     General: Alert and oriented x 3.  No acute distress.  Obese.  Normal affect.  HEENT: Pupils equal, round, reactive to light; extraocular movements intact; sclerae nonicteric; pharynx, ear canals and TMs normal.  Neck: Without JVD, thyromegaly, bruit, or adenopathy.  Lungs: Clear to auscultation in all fields.  Heart: Regular rate and rhythm without murmur, rub, gallop, or click.  Abdomen: Soft, nontender, without hepatosplenomegaly or hernia.  Bowel sounds normal.  : Deferred.  Rectal: Deferred.  Extremities: Without clubbing, cyanosis, edema, or pulse deficit.  Neurologic: Intact without focal deficit.  Normal station and gait observed during ingress and egress from the examination room.  Skin: Without significant lesion.  Musculoskeletal: Unremarkable.    Lab Results   Component Value Date    CHLPL 177 08/03/2022    TRIG 166 (H) 08/03/2022    HGBA1C 6.60 (H) 08/03/2022            HEALTH RISK ASSESSMENT    Smoking Status:  Social History     Tobacco Use   Smoking Status Former " Smoker   • Quit date: 1998   • Years since quittin.7   Smokeless Tobacco Never Used     Alcohol Consumption:  Social History     Substance and Sexual Activity   Alcohol Use Yes   • Alcohol/week: 1.0 standard drink   • Types: 1 Glasses of wine per week    Comment: current some day     Fall Risk Screen:    NOAH Fall Risk Assessment was completed, and patient is at LOW risk for falls.Assessment completed on:2022    Depression Screening:  PHQ-2/PHQ-9 Depression Screening 2022   Retired PHQ-9 Total Score 1   Retired Total Score 1       Health Habits and Functional and Cognitive Screening:  Functional & Cognitive Status 2022   Do you have difficulty preparing food and eating? No   Do you have difficulty bathing yourself, getting dressed or grooming yourself? No   Do you have difficulty using the toilet? No   Do you have difficulty moving around from place to place? No   Do you have trouble with steps or getting out of a bed or a chair? No   Current Diet -   Dental Exam Up to date   Eye Exam Up to date   Exercise (times per week) -   Current Exercises Include No Regular Exercise   Current Exercise Activities Include -   Do you need help using the phone?  No   Are you deaf or do you have serious difficulty hearing?  No   Do you need help with transportation? No   Do you need help shopping? No   Do you need help preparing meals?  No   Do you need help with housework?  No   Do you need help with laundry? No   Do you need help taking your medications? No   Do you need help managing money? No   Do you ever drive or ride in a car without wearing a seat belt? No   Have you felt unusual stress, anger or loneliness in the last month? No   Who do you live with? Spouse   If you need help, do you have trouble finding someone available to you? No   Have you been bothered in the last four weeks by sexual problems? No   Do you have difficulty concentrating, remembering or making decisions? No       Age-appropriate  Screening Schedule:  Refer to the list below for future screening recommendations based on patient's age, sex and/or medical conditions. Orders for these recommended tests are listed in the plan section. The patient has been provided with a written plan.    Health Maintenance   Topic Date Due   • DIABETIC EYE EXAM  07/28/2017   • DXA SCAN  08/19/2022   • INFLUENZA VACCINE  10/01/2022   • HEMOGLOBIN A1C  02/03/2023   • LIPID PANEL  08/03/2023   • URINE MICROALBUMIN  08/03/2023   • MAMMOGRAM  08/25/2024   • TDAP/TD VACCINES (2 - Td or Tdap) 05/25/2026   • ZOSTER VACCINE  Discontinued              Assessment & Plan   CMS Preventative Services Quick Reference  Risk Factors Identified During Encounter  Cardiovascular Disease  Chronic Pain   Immunizations Discussed/Encouraged (specific Immunizations; COVID19  Obesity/Overweight   The above risks/problems have been discussed with the patient.  Follow up actions/plans if indicated are seen below in the Assessment/Plan Section.  Pertinent information has been shared with the patient in the After Visit Summary.    Diagnoses and all orders for this visit:    1. Encounter for subsequent annual wellness visit (AWV) in Medicare patient (Primary)    2. Type 2 diabetes mellitus with diabetic neuropathy, without long-term current use of insulin (HCC)  -     Hemoglobin A1c; Future    3. Diabetic peripheral neuropathy (HCC)    4. Mass of left breast on mammogram    5. Primary hypothyroidism  -     TSH; Future  -     T4, Free; Future  -     T3, Free; Future    6. Hyperlipidemia  -     CK; Future  -     Comprehensive Metabolic Panel; Future  -     NMR LipoProfile; Future    7. Benign essential hypertension    8. Vitamin D deficiency  -     Vitamin D 25 Hydroxy; Future    9. Non morbid obesity    10. Hyponatremia    11. Dyspnea on exertion    12. Multiple pulmonary emboli (HCC)    13. Therapeutic drug monitoring  -     CBC (No Diff); Future        Follow Up:   No follow-ups on file.      An After Visit Summary and PPPS were made available to the patient.

## 2022-09-22 NOTE — PROGRESS NOTES
09/22/2022    Patient Information  Claudette L McManus                                                                                          [id number] [facility] [address]      1945  [unfilled]  There is no work phone number on file.    Chief Complaint:     Subsequent Medicare wellness visit.  Follow-up recent medication addition/adjustment for diabetes and to control pain from diabetic peripheral neuropathy.  Recent abnormal mammogram requiring breast biopsy.    History of Present Illness:    Patient with a history of multiple medical problems as outlined in the problem list presents today to have her Medicare wellness visit.  However, we are dealing with a few unstable/new problems today as well.  A few weeks ago I started her on terzepatide with instructions to titrate up to 5 mg/day.  This was done to help control her diabetes and hopefully help improve her diabetic peripheral neuropathy pain.  We started her on tramadol to help with her peripheral neuropathy pain and this does seem to help somewhat.  Unfortunately, patient had an abnormal left breast mammogram which reveals a possible mass and she is to undergo a breast biopsy.  She saw the breast surgeon here recently.  The pathology report is not back yet.  Past medical history reviewed and updated were necessary including health maintenance parameters.  This reveals he will be up-to-date or else accounted for after today's visit.    Review of Systems   Constitutional: Negative.   HENT: Negative.    Eyes: Negative.    Cardiovascular: Positive for dyspnea on exertion.   Respiratory: Negative.    Endocrine: Negative.  Negative for polydipsia, polyphagia and polyuria.   Hematologic/Lymphatic: Negative.    Skin: Negative.    Musculoskeletal: Positive for arthritis and joint pain.   Gastrointestinal: Negative.    Genitourinary: Negative.    Neurological: Negative.    Psychiatric/Behavioral: Negative.    Allergic/Immunologic:  Negative.        Active Problems:    Patient Active Problem List   Diagnosis   • Primary osteoarthritis involving multiple joints   • Benign essential hypertension   • Hyperlipidemia   • Primary hypothyroidism   • Type 2 diabetes mellitus with diabetic neuropathy, without long-term current use of insulin (HCC)   • Chronic insomnia   • Chronic bilateral low back pain without sciatica   • Chronic bilateral thoracic back pain   • Vitamin D deficiency   • Therapeutic drug monitoring   • Menopausal state, 8/19/2020--normal DEXA.   • Diabetic peripheral neuropathy (HCC)   • Situational mixed anxiety and depressive disorder   • ACE-inhibitor cough   • Hyponatremia   • Dyspnea on exertion   • Multiple pulmonary emboli (HCC)   • Non morbid obesity   • Mass of left breast on mammogram   • Right ventricular enlargement         Past Medical History:   Diagnosis Date   • Benign essential hypertension    • Chronic bilateral low back pain without sciatica 8/16/2013 March 13, 2019--Limited bone scan.  This reveals scintigraphic activity in the spine and at the right shoulder corresponds to change seen on recent x-rays and is best explained by degenerative change.  Isolated metastatic disease exactly corresponding to the sites of extensive degenerative disc change would be peculiar.  March 7, 2019--new patient to get established.  She has complaints of approxi   • Chronic bilateral thoracic back pain 2/26/2019 March 13, 2019--Limited bone scan.  This reveals scintigraphic activity in the spine and at the right shoulder corresponds to change seen on recent x-rays and is best explained by degenerative change.  Isolated metastatic disease exactly corresponding to the sites of extensive degenerative disc change would be peculiar.  March 1, 2019--x-ray of the lumbar and thoracic spine reveals mild anterior l   • Chronic insomnia 11/4/2014   • Diabetic peripheral neuropathy (HCC) 3/7/2019    Characterized by decreased sensation and  paresthesias in both lower extremities described as a burning sensation.  Extends up to the knees.  Reportedly had been evaluated with nerve conduction study in the past.   • Generalized anxiety disorder 5/19/2013   • History of Bell's palsy 5/21/2013    Remote history of Bell's palsy.  Patient does not remember which side of the face.   • Hyperlipidemia    • Impaired fasting glucose    • Major depression    • Menopausal state, 8/19/2020--normal DEXA. 3/7/2019    August 19, 2020--DEXA scan reveals lumbar spine T score 3.8.  Left femoral neck T score 2.5.  Right femoral neck T score 2.2.  Normal bone density.   • Non morbid obesity 8/11/2022   • Peripheral neuropathy, idiopathic 3/7/2019    Characterized by decreased sensation and paresthesias in both lower extremities described as a burning sensation.  Extends up to the knees.  Reportedly had been evaluated with nerve conduction study in the past.   • Primary hypothyroidism 5/19/2013   • Primary osteoarthritis involving multiple joints 4/22/2013   • Rotator cuff arthropathy of right shoulder, 4/20/2021--status post right reverse total shoulder arthroplasty 5/27/2020   • Situational mixed anxiety and depressive disorder 8/21/2019 August 21, 2019--patient seen in follow-up after I called in a prescription for Ativan after the patient's  contacted me a few weeks ago regarding the sudden death of patient's daughter.  This was an apparent suicide and the patient found her daughter dead.  I will not go into details.  Patient reports the Lorazepam has been helpful but she is still quite distraught, nervous, and undergoing   • Type 2 diabetes mellitus with diabetic neuropathy, without long-term current use of insulin (HCC)    • Vitamin D deficiency 2/26/2019         Past Surgical History:   Procedure Laterality Date   • BILATERAL BREAST REDUCTION  Early 2000    Early 2000--bilateral breast reduction   • CHOLECYSTECTOMY  1960s    1960s--open cholecystectomy   •  COLONOSCOPY  2012 2012--reportedly normal colonoscopy   • INCONTINENCE SURGERY  2012    Approximately 2012--implantation of questionable bladder stimulator right sacral area for urinary incontinence.  Details not known.   • REPLACEMENT TOTAL KNEE BILATERAL Left 2010; 2011    [id number]--bilateral total knee replacement   • SUBTOTAL HYSTERECTOMY  Remote    Remote partial hysterectomy.  Ovaries intact.   • TOTAL SHOULDER REPLACEMENT Left 2013 2013--left total shoulder replacement.   • TOTAL SHOULDER REPLACEMENT  April 28, 2021 4/20/2021--status post right reverse total shoulder arthroplasty   • TOTAL SHOULDER REVERSE ARTHROPLASTY Right 04/20/2021 4/20/2021--right reverse total shoulder replacement.         Allergies   Allergen Reactions   • Morphine And Related    • Other Other (See Comments)     REJECTED DACRON SUTURES IN THE PAST AND INCISION CAME APART           Current Outpatient Medications:   •  Cholecalciferol (VITAMIN D3) 2000 UNITS tablet, Take 1 capsule by mouth daily., Disp: , Rfl:   •  diphenhydrAMINE-acetaminophen (TYLENOL PM)  MG tablet per tablet, Take 2 tablets by mouth Every Night., Disp: , Rfl:   •  DULoxetine (CYMBALTA) 60 MG capsule, TAKE 1 CAPSULE EVERY DAY AS DIRECTED, Disp: 90 capsule, Rfl: 3  •  ezetimibe (ZETIA) 10 MG tablet, TAKE 1 TABLET EVERY DAY, Disp: 90 tablet, Rfl: 3  •  levothyroxine (SYNTHROID, LEVOTHROID) 125 MCG tablet, TAKE 1 TABLET EVERY DAY, Disp: 90 tablet, Rfl: 2  •  metroNIDAZOLE (METROGEL) 0.75 % gel, , Disp: , Rfl:   •  ondansetron (ZOFRAN) 8 MG tablet, Take 1 tablet by mouth Every 6 (Six) Hours As Needed for Nausea., Disp: 60 tablet, Rfl: 1  •  rivaroxaban (XARELTO) 20 MG tablet, Take 1 p.o. daily for blood thinner as directed., Disp: 30 tablet, Rfl: 3  •  simvastatin (ZOCOR) 20 MG tablet, Take one tablet by mouth daily for high cholesterol., Disp: 90 tablet, Rfl: 2  •  Tirzepatide 5 MG/0.5ML solution pen-injector, Inject 5 mg under the skin into the  "appropriate area as directed 1 (One) Time Per Week., Disp: 2 mL, Rfl: 3  •  traMADol (ULTRAM) 50 MG tablet, 1-2 p.o. every 6 hours as needed pain from peripheral neuropathy.  Not greater than 4 in 24 hours, Disp: 120 tablet, Rfl: 3  •  traZODone (DESYREL) 150 MG tablet, TAKE 1 TABLET EVERY NIGHT, Disp: 90 tablet, Rfl: 3  •  valsartan (DIOVAN) 320 MG tablet, TAKE ONE TABLET BY MOUTH EVERY MORNING FOR BLOOD PRESSURE, Disp: 90 tablet, Rfl: 3      Family History   Problem Relation Age of Onset   • Alcohol abuse Father    • Breast cancer Daughter         40s   • Cancer Other    • Diabetes Other         type II   • Leukemia Grandchild 19         Social History     Socioeconomic History   • Marital status:    Tobacco Use   • Smoking status: Former Smoker     Quit date: 1998     Years since quittin.7   • Smokeless tobacco: Never Used   Vaping Use   • Vaping Use: Never used   Substance and Sexual Activity   • Alcohol use: Yes     Alcohol/week: 1.0 standard drink     Types: 1 Glasses of wine per week     Comment: current some day   • Drug use: No   • Sexual activity: Not Currently     Partners: Male         Vitals:    22 1411   BP: 130/76   Pulse: 100   Resp: 18   SpO2: 96%   Weight: 90.9 kg (200 lb 6.4 oz)   Height: 167.6 cm (66\")        Body mass index is 32.35 kg/m².      Physical Exam:    General: Alert and oriented x 3.  No acute distress.  Obese.  Normal affect.  HEENT: Pupils equal, round, reactive to light; extraocular movements intact; sclerae nonicteric; pharynx, ear canals and TMs normal.  Neck: Without JVD, thyromegaly, bruit, or adenopathy.  Lungs: Clear to auscultation in all fields.  Heart: Regular rate and rhythm without murmur, rub, gallop, or click.  Abdomen: Soft, nontender, without hepatosplenomegaly or hernia.  Bowel sounds normal.  : Deferred.  Rectal: Deferred.  Extremities: Without clubbing, cyanosis, edema, or pulse deficit.  Neurologic: Intact without focal deficit.  Normal " station and gait observed during ingress and egress from the examination room.  Skin: Without significant lesion.  Musculoskeletal: Unremarkable.    Lab/other results:    I reviewed the results of the mammogram and the breast surgeon consultation.    Assessment/Plan:     Diagnosis Plan   1. Encounter for subsequent annual wellness visit (AWV) in Medicare patient     2. Type 2 diabetes mellitus with diabetic neuropathy, without long-term current use of insulin (HCC)  Hemoglobin A1c   3. Diabetic peripheral neuropathy (HCC)     4. Mass of left breast on mammogram     5. Primary hypothyroidism  TSH    T4, Free    T3, Free   6. Hyperlipidemia  CK    Comprehensive Metabolic Panel    NMR LipoProfile   7. Benign essential hypertension     8. Vitamin D deficiency  Vitamin D 25 Hydroxy   9. Non morbid obesity     10. Hyponatremia     11. Dyspnea on exertion     12. Multiple pulmonary emboli (HCC)     13. Therapeutic drug monitoring  CBC (No Diff)     The subsequent Medicare wellness visit is documented on separate note.    Patient has type 2 diabetes and is tolerating the Mounjaro/terzepatide well.  She did have some nausea particularly initially but the nausea medication helped.  She has lost nearly 17 pounds!  This is been in the 6-week period.  She is feeling a lot better.  I am sure that her diabetes will improve as well.  She has diabetic peripheral neuropathy and the tramadol does help somewhat for the pain at nighttime.  I explained to the patient that her peripheral neuropathy may get worse as her diabetes improves but this is usually only temporary.  Patient also underwent a breast biopsy x2 yesterday and we do not have that pathology report back yet.  She is followed by the breast surgeon.  She has other medical problems as listed above which have been fairly stable.  She is scheduled for right heart cath on the 26 of this month.  It is okay for her to be off of the Xarelto 3 days prior to this procedure even  though she had to stop the Xarelto for the recent breast biopsy.     Plan is as follows: No changes in current medical regimen.  Patient is aware to hold the blood thinner Xarelto prior to her heart cath.  We will schedule fasting lab and follow-up sometime first week in December.  Otherwise patient will follow-up as needed.            Procedures

## 2022-09-23 ENCOUNTER — LAB (OUTPATIENT)
Dept: LAB | Facility: HOSPITAL | Age: 77
End: 2022-09-23

## 2022-09-23 ENCOUNTER — PATIENT ROUNDING (BHMG ONLY) (OUTPATIENT)
Dept: SURGERY | Facility: CLINIC | Age: 77
End: 2022-09-23

## 2022-09-23 DIAGNOSIS — I26.99 MULTIPLE PULMONARY EMBOLI: ICD-10-CM

## 2022-09-23 LAB
ANION GAP SERPL CALCULATED.3IONS-SCNC: 12.3 MMOL/L (ref 5–15)
BUN SERPL-MCNC: 13 MG/DL (ref 8–23)
BUN/CREAT SERPL: 15.7 (ref 7–25)
CALCIUM SPEC-SCNC: 9.5 MG/DL (ref 8.6–10.5)
CHLORIDE SERPL-SCNC: 94 MMOL/L (ref 98–107)
CO2 SERPL-SCNC: 23.7 MMOL/L (ref 22–29)
CREAT SERPL-MCNC: 0.83 MG/DL (ref 0.57–1)
DEPRECATED RDW RBC AUTO: 41.1 FL (ref 37–54)
EGFRCR SERPLBLD CKD-EPI 2021: 71.8 ML/MIN/1.73
ERYTHROCYTE [DISTWIDTH] IN BLOOD BY AUTOMATED COUNT: 11.9 % (ref 12.3–15.4)
GLUCOSE SERPL-MCNC: 94 MG/DL (ref 65–99)
HCT VFR BLD AUTO: 38.4 % (ref 34–46.6)
HGB BLD-MCNC: 13.3 G/DL (ref 12–15.9)
MCH RBC QN AUTO: 32.7 PG (ref 26.6–33)
MCHC RBC AUTO-ENTMCNC: 34.6 G/DL (ref 31.5–35.7)
MCV RBC AUTO: 94.3 FL (ref 79–97)
PLATELET # BLD AUTO: 246 10*3/MM3 (ref 140–450)
PMV BLD AUTO: 9.7 FL (ref 6–12)
POTASSIUM SERPL-SCNC: 4.1 MMOL/L (ref 3.5–5.2)
RBC # BLD AUTO: 4.07 10*6/MM3 (ref 3.77–5.28)
SODIUM SERPL-SCNC: 130 MMOL/L (ref 136–145)
WBC NRBC COR # BLD: 5.87 10*3/MM3 (ref 3.4–10.8)

## 2022-09-23 PROCEDURE — 80048 BASIC METABOLIC PNL TOTAL CA: CPT

## 2022-09-23 PROCEDURE — 85027 COMPLETE CBC AUTOMATED: CPT

## 2022-09-23 PROCEDURE — 36415 COLL VENOUS BLD VENIPUNCTURE: CPT

## 2022-09-23 NOTE — PROGRESS NOTES
September 23, 2022    Hello, may I speak with Claudette L McManus?    My name is Vida      I am  with [facility]  [address] [facility] [address] [phone].    Before we get started may I verify your date of birth? 1945    I am calling to officially welcome you to our practice and ask about your recent visit. Is this a good time to talk? yes    Tell me about your visit with us. What things went well?  Biopsy experience went well. Dr. Fam was thorough, explained things in ways she could understand. Giovanna was great.      We're always looking for ways to make our patients' experiences even better. Do you have recommendations on ways we may improve?  no    Overall were you satisfied with your first visit to our practice? yes       I appreciate you taking the time to speak with me today. Is there anything else I can do for you? Yes.  Pt requested the results of her biopsy. I looked in Epic but the results have not been released yet.      Thank you, and have a great day.

## 2022-09-26 ENCOUNTER — HOSPITAL ENCOUNTER (OUTPATIENT)
Facility: HOSPITAL | Age: 77
Setting detail: HOSPITAL OUTPATIENT SURGERY
Discharge: HOME | End: 2022-09-26
Attending: INTERNAL MEDICINE | Admitting: INTERNAL MEDICINE

## 2022-09-26 ENCOUNTER — TELEPHONE (OUTPATIENT)
Dept: SURGERY | Facility: CLINIC | Age: 77
End: 2022-09-26

## 2022-09-26 VITALS
RESPIRATION RATE: 18 BRPM | BODY MASS INDEX: 32.62 KG/M2 | OXYGEN SATURATION: 93 % | SYSTOLIC BLOOD PRESSURE: 157 MMHG | HEIGHT: 66 IN | HEART RATE: 63 BPM | TEMPERATURE: 97.8 F | DIASTOLIC BLOOD PRESSURE: 83 MMHG | WEIGHT: 203 LBS

## 2022-09-26 DIAGNOSIS — Q20.8 ABNORMALITY OF RIGHT VENTRICLE OF HEART: ICD-10-CM

## 2022-09-26 DIAGNOSIS — I26.99 MULTIPLE PULMONARY EMBOLI: ICD-10-CM

## 2022-09-26 PROCEDURE — 85018 HEMOGLOBIN: CPT

## 2022-09-26 PROCEDURE — 85014 HEMATOCRIT: CPT

## 2022-09-26 PROCEDURE — 25010000002 MIDAZOLAM PER 1 MG: Performed by: INTERNAL MEDICINE

## 2022-09-26 PROCEDURE — C1894 INTRO/SHEATH, NON-LASER: HCPCS | Performed by: INTERNAL MEDICINE

## 2022-09-26 PROCEDURE — 82810 BLOOD GASES O2 SAT ONLY: CPT

## 2022-09-26 PROCEDURE — 93451 RIGHT HEART CATH: CPT | Performed by: INTERNAL MEDICINE

## 2022-09-26 RX ORDER — SODIUM CHLORIDE 9 MG/ML
75 INJECTION, SOLUTION INTRAVENOUS CONTINUOUS
Status: DISCONTINUED | OUTPATIENT
Start: 2022-09-26 | End: 2022-09-26 | Stop reason: HOSPADM

## 2022-09-26 RX ORDER — MIDAZOLAM HYDROCHLORIDE 1 MG/ML
INJECTION INTRAMUSCULAR; INTRAVENOUS AS NEEDED
Status: DISCONTINUED | OUTPATIENT
Start: 2022-09-26 | End: 2022-09-26 | Stop reason: HOSPADM

## 2022-09-26 RX ORDER — SODIUM CHLORIDE 0.9 % (FLUSH) 0.9 %
10 SYRINGE (ML) INJECTION AS NEEDED
Status: DISCONTINUED | OUTPATIENT
Start: 2022-09-26 | End: 2022-09-26 | Stop reason: HOSPADM

## 2022-09-26 RX ORDER — SODIUM CHLORIDE 0.9 % (FLUSH) 0.9 %
10 SYRINGE (ML) INJECTION EVERY 12 HOURS SCHEDULED
Status: DISCONTINUED | OUTPATIENT
Start: 2022-09-26 | End: 2022-09-26 | Stop reason: HOSPADM

## 2022-09-26 RX ORDER — LIDOCAINE HYDROCHLORIDE 20 MG/ML
INJECTION, SOLUTION INFILTRATION; PERINEURAL AS NEEDED
Status: DISCONTINUED | OUTPATIENT
Start: 2022-09-26 | End: 2022-09-26 | Stop reason: HOSPADM

## 2022-09-26 RX ADMIN — SODIUM CHLORIDE 75 ML/HR: 9 INJECTION, SOLUTION INTRAVENOUS at 08:01

## 2022-09-26 NOTE — TELEPHONE ENCOUNTER
MRI at [facility] Mon 10/3/2022 at 7:45 am, arrival 7:30 am.    Post bx MMG at [facility] Wed 10/5/2022 at 12:30 pm, arrival 12:00 pm.    Unable to speak to pt. Left detailed voicemail.    Pt to call back and confirm.    Pt called back and confirmed.    MEB

## 2022-09-26 NOTE — DISCHARGE INSTRUCTIONS
[facility]  [address] [place] [address]    Right Heart Cath After Care    Refer to this sheet in the next few weeks. These instructions provide you with information on caring for yourself after your procedure. Your caregiver may also give you more specific instructions. Your treatment has been planned according to current medical practices, but problems sometimes occur. Call your caregiver if you have any problems or questions after your procedure.    Home Care Instructions:  You may shower the day after the procedure. Remove the bandage (dressing) and gently wash the site with plain soap and water. Gently pat the site dry. You may apply a band aid daily for 2 days if desired.    Do not apply powder or lotion to the site until the site is completely healed.  Do not submerge the affected site in water until the site is completely healed.   No heavy lifting today.   Inspect the site at least twice daily. You may notice some bruising at the site..     If you received sedation a responsible adult should be with you for the first 24 hours after you arrive home. Do not make any important legal decisions or sign legal papers for 24 hours.  Do not drink alcohol for 24 hours.  Do not operate machinery or power tools for 24 hours. You may drive 24 hours after the procedure unless otherwise instructed by your caregiver.        Call Your Doctor if:   You have unusual pain at the puncture site.  You have redness, warmth, swelling, or pain at the puncture site.  You have drainage (other than a small amount of blood on the dressing).  `You have chills or a fever > 101.  Your arm becomes pale or dark, cool, tingly, or numb.  You develop chest pain, shortness of breath, feel faint or pass out.    You have heavy bleeding from the site, hold pressure on the site for 20 minutes.  If the bleeding stops, apply a fresh bandage and call your cardiologist.  However, if you        continue to have bleeding, call 911  and continue to apply pressure to the site.   You have any symptoms of a stroke.  Remember BE FAST  B-balance. Sudden trouble walking or loss of balance.  E-eyes.  Sudden changes in how you see or a sudden onset of a very bad headache.   F-face. Sudden weakness or loss of feeling of the face or facial droop on one side.   A-arms Sudden weakness or numbness in one arm.  One arm drifts down if they are both held out in front of you. This happens suddenly and usually on one side of the body.   S-speech.  Sudden trouble speaking, slurred speech or trouble understanding what are saying.   T-time  Time to call emergency services.  Write down the symptoms and the time they started.

## 2022-09-26 NOTE — INTERVAL H&P NOTE
H&P reviewed. The patient was examined and there are no changes to the H&P.      Agus Solorio MD PhD

## 2022-09-27 DIAGNOSIS — C50.912 DUCTAL CARCINOMA OF LEFT BREAST: Primary | ICD-10-CM

## 2022-09-27 LAB
HCT VFR BLDA CALC: 34 % (ref 38–51)
HGB BLDA-MCNC: 11.6 G/DL (ref 12–17)
SAO2 % BLDA: 65 % (ref 95–98)

## 2022-09-27 NOTE — TELEPHONE ENCOUNTER
Called pt to verify if she had biopsy results, and medications. Pt stated that she hadn't heard anything, only that things were backed up. And meds weren't needed at this time.

## 2022-09-28 ENCOUNTER — TELEPHONE (OUTPATIENT)
Dept: SURGERY | Facility: CLINIC | Age: 77
End: 2022-09-28

## 2022-09-28 ENCOUNTER — HOSPITAL ENCOUNTER (OUTPATIENT)
Dept: MAMMOGRAPHY | Facility: HOSPITAL | Age: 77
Discharge: HOME | End: 2022-09-28
Admitting: INTERNAL MEDICINE

## 2022-09-28 DIAGNOSIS — E78.2 MIXED HYPERLIPIDEMIA: ICD-10-CM

## 2022-09-28 PROCEDURE — 77065 DX MAMMO INCL CAD UNI: CPT

## 2022-09-28 PROCEDURE — G0279 TOMOSYNTHESIS, MAMMO: HCPCS

## 2022-09-28 RX ORDER — SIMVASTATIN 20 MG
TABLET ORAL
Qty: 90 TABLET | Refills: 2 | Status: SHIPPED | OUTPATIENT
Start: 2022-09-28

## 2022-09-28 NOTE — TELEPHONE ENCOUNTER
Patient would like to see a plastic surgeon for reconstruction options and would like for us to set this up for her.     Patient also let me know she would like to see Dr. Calderon for Med Onc    Patient did not have a return visit to come back and see us for cancer consult. I put her on.

## 2022-09-28 NOTE — TELEPHONE ENCOUNTER
I called patient to let her know breast biopsy was malignant as expected,   We are still waiting on additional pathology-stains.     We plan the following:    proceed with genetic testing, postbiopsy mammogram 9/28 and MRI 10/3.     Dr. Fam will review all at next visit.     Patient asked if she may call me back to review above message.       Confirmed all.

## 2022-09-28 NOTE — TELEPHONE ENCOUNTER
Left breast 3:00, 2 cm from the nipple ultrasound-guided core needle biopsy:  Low-grade duct carcinoma in situ, involving an intraductal papilloma.  Solid, cribriform, micropapillary, calcifications.  1.4 cm in a core.  Estrogen , progesterone , Ki-67 is 6%.  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  Estrogen , progesterone , HER2/willian 2+.  FISH  Not amplified 2.8/1.3..  Ki-67 15%.    Her most recent note from Dr Bell pulmonology : Remote 20-pack-year history smoking quit in the 1980s.  Recently diagnosed primary embolism unprovoked 2022.  Concern for pulmonary hypertension, ongoing evaluation by Dr. Solorio.  Patient states issues since May 2022 with persistent dyspnea on exertion of unclear etiology.  Patient currently on anticoagulation with Xarelto tolerating it fairly well.  Some concern for grade 1 diastolic dysfunction and dilated left atrium.  Some concern for undiagnosed sleep apnea.    Diagnosis given unprovoked pulmonary embolus.  Agree with anticoagulation.  Ongoing hypercoagulability evaluation.  Fatigue, most likely undiagnosed obstructive sleep apnea.  Discussed need for testing and CPAP.  Pulmonary hypertension, repeat echocardiogram to evaluate.  Defer to Dr. Solorio.  Will need a VQ scan after acute pulmonary embolus treatment to evaluate for chronic pulmonary emboli.    Dr. Solorio September 26, 2022: Right heart cath performed: Conclusion normal pulmonary pressures, normal left-sided filling pressures, elevated right ventricular filling pressures, normal pulmonary vascular resistance.  Recommendations optimize medical therapy.

## 2022-09-29 LAB
LAB AP CASE REPORT: NORMAL
LAB AP CLINICAL INFORMATION: NORMAL
LAB AP DIAGNOSIS COMMENT: NORMAL
LAB AP SPECIAL STAINS: NORMAL
LAB AP SYNOPTIC CHECKLIST: NORMAL
PATH REPORT.ADDENDUM SPEC: NORMAL
PATH REPORT.FINAL DX SPEC: NORMAL
PATH REPORT.GROSS SPEC: NORMAL

## 2022-09-30 ENCOUNTER — TELEPHONE (OUTPATIENT)
Dept: SURGERY | Facility: CLINIC | Age: 77
End: 2022-09-30

## 2022-09-30 NOTE — TELEPHONE ENCOUNTER
Message to Cardiologist and Hematologist regarding recent heart cath, upcoming breast surgery and holding Xarelto.

## 2022-10-03 ENCOUNTER — TELEPHONE (OUTPATIENT)
Dept: SURGERY | Facility: CLINIC | Age: 77
End: 2022-10-03

## 2022-10-03 ENCOUNTER — APPOINTMENT (OUTPATIENT)
Dept: MRI IMAGING | Facility: HOSPITAL | Age: 77
End: 2022-10-03

## 2022-10-03 ENCOUNTER — HOSPITAL ENCOUNTER (OUTPATIENT)
Dept: MRI IMAGING | Facility: HOSPITAL | Age: 77
Discharge: HOME | End: 2022-10-03

## 2022-10-03 DIAGNOSIS — R92.8 ABNORMAL MAMMOGRAM: ICD-10-CM

## 2022-10-03 DIAGNOSIS — R92.8 ABNORMAL ULTRASOUND OF BREAST: ICD-10-CM

## 2022-10-03 NOTE — TELEPHONE ENCOUNTER
Patient let us know breast MRI could not be performed today due to an incontinence implant in her left hip area. She is scheduled her NP consult on Friday 10/7.

## 2022-10-03 NOTE — TELEPHONE ENCOUNTER
OK per Dr Chang to hold her xarelto for 2-3 days prior to procedure      Unable to have MRI due to incontinence implant

## 2022-10-06 ENCOUNTER — APPOINTMENT (OUTPATIENT)
Dept: MAMMOGRAPHY | Facility: HOSPITAL | Age: 77
End: 2022-10-06

## 2022-10-06 ENCOUNTER — TELEPHONE (OUTPATIENT)
Dept: SURGERY | Facility: CLINIC | Age: 77
End: 2022-10-06

## 2022-10-06 NOTE — TELEPHONE ENCOUNTER
Pt appt moved down by half an hour to 10:00 am, arrival 9:30 am.    Unable to speak with pt. Lvm.    Pt to call back and confirm.    MEB

## 2022-10-07 ENCOUNTER — OFFICE VISIT (OUTPATIENT)
Dept: SURGERY | Facility: CLINIC | Age: 77
End: 2022-10-07

## 2022-10-07 ENCOUNTER — TELEPHONE (OUTPATIENT)
Dept: ONCOLOGY | Facility: CLINIC | Age: 77
End: 2022-10-07

## 2022-10-07 VITALS
HEART RATE: 86 BPM | SYSTOLIC BLOOD PRESSURE: 152 MMHG | WEIGHT: 205 LBS | HEIGHT: 66 IN | OXYGEN SATURATION: 95 % | BODY MASS INDEX: 32.95 KG/M2 | DIASTOLIC BLOOD PRESSURE: 86 MMHG

## 2022-10-07 DIAGNOSIS — E66.09 CLASS 1 OBESITY DUE TO EXCESS CALORIES WITHOUT SERIOUS COMORBIDITY WITH BODY MASS INDEX (BMI) OF 33.0 TO 33.9 IN ADULT: ICD-10-CM

## 2022-10-07 DIAGNOSIS — Z17.0 MALIGNANT NEOPLASM OF CENTRAL PORTION OF LEFT BREAST IN FEMALE, ESTROGEN RECEPTOR POSITIVE: Primary | ICD-10-CM

## 2022-10-07 DIAGNOSIS — R06.02 SHORTNESS OF BREATH: ICD-10-CM

## 2022-10-07 DIAGNOSIS — Z98.890 HISTORY OF BILATERAL BREAST REDUCTION SURGERY: ICD-10-CM

## 2022-10-07 DIAGNOSIS — I27.82 CHRONIC PULMONARY EMBOLISM WITHOUT ACUTE COR PULMONALE, UNSPECIFIED PULMONARY EMBOLISM TYPE: ICD-10-CM

## 2022-10-07 DIAGNOSIS — C50.112 MALIGNANT NEOPLASM OF CENTRAL PORTION OF LEFT BREAST IN FEMALE, ESTROGEN RECEPTOR POSITIVE: Primary | ICD-10-CM

## 2022-10-07 DIAGNOSIS — R92.8 ABNORMAL MAMMOGRAM: ICD-10-CM

## 2022-10-07 DIAGNOSIS — R92.8 ABNORMAL ULTRASOUND OF BREAST: ICD-10-CM

## 2022-10-07 DIAGNOSIS — Z80.3 FH: BREAST CANCER: ICD-10-CM

## 2022-10-07 PROCEDURE — 99215 OFFICE O/P EST HI 40 MIN: CPT | Performed by: SURGERY

## 2022-10-07 NOTE — TELEPHONE ENCOUNTER
Caller: INDRA    Relationship to patient:   DR [facility] OFFICE    Best call back number: [phone]    Chief complaint: DR FLOWERS WANTS PATIENT TO BE SWITCHED TO DR TOM     Type of visit: NEW      Additional notes:CALL IF ANY QUESTIONS

## 2022-10-07 NOTE — TELEPHONE ENCOUNTER
Caller: MAXIMO    Relationship: DR SAY FLOWERS'S OFFICE     Best call back number: [phone]    What is the best time to reach you: ANYTIME 8-4    Who are you requesting to speak with (clinical staff, provider,  specific staff member): DAYAN ESPINOZA        What was the call regarding:     DR AMIE FLOWERS IS REQUESTING TO CANCEL PATIENTS NEW PT APPOINTMENT WITH DR RODRIGUEZ AND TO PLEASE SCHEDULE WITH DR TOM INSTEAD.     Do you require a callback: YES TO FOLLOW UP

## 2022-10-07 NOTE — PROGRESS NOTES
Chief Complaint: Claudette L McManus is a 77 y.o. female who was seen in consultation at the request of Lito Moore MD  for abnormal breast imaging, newly diagnosed breast cancer and a post-biopsy visit    History of Present Illness:  Patient presents with abnormal breast imaging. She noted no new masses, skin changes, nipple discharge, nipple changes prior to her most recent imaging.  Her most recent imaging includes the followin22 [facility]  CT CHEST  MCMANUS, CLAUDETTE   HISTORY: dyspnea on exertion. FINDINGS: There is a tiny nonocclusive subsegmental right lower lobe pulmonary thromboembolus and there are a few very tiny distal pulmonary thromboemboli as well. IMPRESSION:  1. There are tiny pulmonary thromboemboli with the largest within a subsegmental right lower lobe pulmonary artery. The RV:LV ratio is 1.4. The appearance of some of the pulmonary artery branches and the lungs can be seen with chronic pulmonary thromboemboli.     22 [facility]   MAMMO SCREENING  MCMANUS CLAUDETTE   There are scattered areas of fibroglandular density. There are indeterminate calcifications in the outer central anterior left breast. There is a focal asymmetry with questioned distortion in the slightly lower slightly outer anterior left breast. There is a focal asymmetry in the outer central middle to anterior left breast. BIRADS CATEGORY 0      22 [facility] MAMMO DIAG LEFT MATTY LEFT BREAST U/S  MCMANUS CLAUDETTE  In the outer central anterior left breast, there is a 1.3 cm group of somewhat pleomorphic calcifications, which is suspicious. In the lower slightly outer middle left breast, there is a persistent approximately 1.5 cm mass with mild corresponding architectural distortion. There is also a central corresponding calcification.  The previously noted focal asymmetry in the outer central middle left  breast partially effaces with spot compression and is less conspicuous on the lateral  view.  In the slightly upper outer middle left breast, there is a persistent approximately 1 cm mass with corresponding architectural distortion.  In the upper central anterior left breast, there is a persistent focal asymmetry with calcifications.  These areas were further assessed with ultrasound.     ULTRASOUND:  Targeted sonographic evaluation of the left breast was performed from 2:00 to 6:00 in the region of the mammographic abnormalities. At 1:00 in the retroareolar left breast, there is an at least 2.3 x 0.9 x 2.0 cm hypoechoic mass with indistinct margins and internal echogenic foci from calcifications corresponding to the focal asymmetry with calcifications in the anterior left breast on mammogram. There is tubular elongation of the mass concerning for possible ductal extension.  At 3:00 in the retroareolar left breast, there is a 1.1 x 0.8 x 1.0 cm irregular hypoechoic mass with indistinct margins and internal echogenic foci from calcifications, which corresponds to the suspicious group of calcifications on mammogram.  At 4:00, 3 cm from the nipple, there is a 1.4 x 1.0 x 1.1 cm irregular hypoechoic mass with peripheral vascularity corresponding to a mass with distortion on mammogram.  At 4:30, 3 cm from the nipple, approximately 1.8 cm from the above mass  at the 4:00 position, there is a 0.9 x 0.5 x 0.9 cm irregular hypoechoic mass with indistinct margins, which is suspicious.  At 3:00, 5 cm from the nipple, there is a 0.9 x 0.7 x 0.7 cm irregular hypoechoic mass with indistinct margins and corresponding internal vascularity, which corresponds to a mass with architectural distortion  on mammogram and is suspicious.   Recommend 2 site ultrasound-guided core needle biopsy  targeting the dominant masses at 1:00 in the retroareolar breast and  4:00, 3 cm from the nipple with management of the additional masses  pending biopsy results. Contrast-enhanced breast MRI may be warranted  following the biopsy. BI  RADS CATEGORY 4C       She has not had a breast biopsy in the past.  She has her  ovaries, is postmenopausal, and takes nor hormones.  Her family history includes the following: She has 1 daughter, 1 sister, 2 maternal aunts, 1 paternal aunt, her daughter had breast cancer in her 40s.  This daughter is  due to suicide in her 60s.  The patient has had an intentional 20 pound weight loss over the past 2-month related to an injectable diabetes medication called tirzepatide.      Interval History:  Core biopsies in the office returned as :  Left breast 3:00, 2 cm from the nipple ultrasound-guided core needle biopsy:  Low-grade duct carcinoma in situ, involving an intraductal papilloma.  Solid, cribriform, micropapillary, calcifications.  1.4 cm in a core.  Estrogen , progesterone , Ki-67 is 6%.  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  Estrogen , progesterone , HER2/willian 2+.  FISH  Not amplified 2.8/1.3..  Ki-67 15%.     Her most recent note from Dr Bell pulmonology : Remote 20-pack-year history smoking quit in the .  Recently diagnosed primary embolism unprovoked .  Concern for pulmonary hypertension, ongoing evaluation by Dr. Solorio.  Patient states issues since May 2022 with persistent dyspnea on exertion of unclear etiology.  Patient currently on anticoagulation with Xarelto tolerating it fairly well.  Some concern for grade 1 diastolic dysfunction and dilated left atrium.  Some concern for undiagnosed sleep apnea.     Diagnosis given unprovoked pulmonary embolus.  Agree with anticoagulation.  Ongoing hypercoagulability evaluation.  Fatigue, most likely undiagnosed obstructive sleep apnea.  Discussed need for testing and CPAP.  Pulmonary hypertension, repeat echocardiogram to evaluate.  Defer to   [facility].  Will need a VQ scan after acute pulmonary embolus treatment to evaluate for chronic pulmonary emboli.     Dr. Solorio visit September 26, 2022: Right heart cath performed: Conclusion normal pulmonary pressures, normal left-sided filling pressures, elevated right ventricular filling pressures, normal pulmonary vascular resistance.  Recommendations optimize medical therapy.      OK per Dr Chang to hold her xarelto for 2-3 days prior to procedure        Unable to have MRI due to incontinence implant    She is here to review today with her  Akira.      Review of Systems:  Review of Systems   Constitutional: Negative for unexpected weight change (20 lbs last 2 months ).   Respiratory: Shortness of breath: only with exertion     All other systems reviewed and are negative.       Past Medical and Surgical History:  Breast Biopsy History:  Patient has not had a breast biopsy in the past.  Breast Cancer HIstory:  Patient does not have a past medical history of breast cancer.  Breast Operations, and year:  Reduction 2000  Obstetric/Gynecologic History:  Age menstrual periods began: 10  Patient is postmenopausal due to removal of her uterus in the following year:1990   Number of pregnancies:1  Number of live births: 1  Number of abortions or miscarriages: 0  Age of delivery of first child: 12  Patient breast fed, for the following lenth of time:  Length of time taking birth control pills:6-8 wks   Patient has never taken hormone replacement  Patient has both ovaries, uterus removed 1990  Past Surgical History:   Procedure Laterality Date   • BILATERAL BREAST REDUCTION  Early 2000    Early 2000--bilateral breast reduction   • CARDIAC CATHETERIZATION N/A 9/26/2022    Procedure: Right Heart Cath;  Surgeon: Agus Solorio MD PhD;  Location: [facility] CATH INVASIVE LOC[facility];  Service: Cardiology;  Laterality: N/A;  Will REMAIN on Xarelto for the case   • CHOLECYSTECTOMY  1960s 1960s--open  cholecystectomy   • COLONOSCOPY  2012 2012--reportedly normal colonoscopy   • INCONTINENCE SURGERY  2012 Approximately 2012--implantation of questionable bladder stimulator right sacral area for urinary incontinence.  Details not known.   • REPLACEMENT TOTAL KNEE BILATERAL Left 2010; 2011 2010-2011--bilateral total knee replacement   • SUBTOTAL HYSTERECTOMY  Remote    Remote partial hysterectomy.  Ovaries intact.   • TOTAL SHOULDER REPLACEMENT Left 2013 2013--left total shoulder replacement.   • TOTAL SHOULDER REPLACEMENT  April 28, 2021 4/20/2021--status post right reverse total shoulder arthroplasty   • TOTAL SHOULDER REVERSE ARTHROPLASTY Right 04/20/2021 4/20/2021--right reverse total shoulder replacement.     Past Medical History:   Diagnosis Date   • Benign essential hypertension    • Chronic bilateral low back pain without sciatica 8/16/2013 March 13, 2019--Limited bone scan.  This reveals scintigraphic activity in the spine and at the right shoulder corresponds to change seen on recent x-rays and is best explained by degenerative change.  Isolated metastatic disease exactly corresponding to the sites of extensive degenerative disc change would be peculiar.  March 7, 2019--new patient to get established.  She has complaints of approxi   • Chronic bilateral thoracic back pain 2/26/2019 March 13, 2019--Limited bone scan.  This reveals scintigraphic activity in the spine and at the right shoulder corresponds to change seen on recent x-rays and is best explained by degenerative change.  Isolated metastatic disease exactly corresponding to the sites of extensive degenerative disc change would be peculiar.  March 1, 2019--x-ray of the lumbar and thoracic spine reveals mild anterior l   • Chronic insomnia 11/4/2014   • Diabetic peripheral neuropathy (HCC) 3/7/2019    Characterized by decreased sensation and paresthesias in both lower extremities described as a burning sensation.  Extends up to  the knees.  Reportedly had been evaluated with nerve conduction study in the past.   • Generalized anxiety disorder 2013   • History of Bell's palsy 2013    Remote history of Bell's palsy.  Patient does not remember which side of the face.   • Hyperlipidemia    • Impaired fasting glucose    • Major depression    • Menopausal state, 2020--normal DEXA. 3/7/2019    2020--DEXA scan reveals lumbar spine T score 3.8.  Left femoral neck T score 2.5.  Right femoral neck T score 2.2.  Normal bone density.   • Non morbid obesity 2022   • Peripheral neuropathy, idiopathic 3/7/2019    Characterized by decreased sensation and paresthesias in both lower extremities described as a burning sensation.  Extends up to the knees.  Reportedly had been evaluated with nerve conduction study in the past.   • Primary hypothyroidism 2013   • Primary osteoarthritis involving multiple joints 2013   • Rotator cuff arthropathy of right shoulder, 2021--status post right reverse total shoulder arthroplasty 2020   • Situational mixed anxiety and depressive disorder 2019--patient seen in follow-up after I called in a prescription for Ativan after the patient's  contacted me a few weeks ago regarding the sudden death of patient's daughter.  This was an apparent suicide and the patient found her daughter dead.  I will not go into details.  Patient reports the Lorazepam has been helpful but she is still quite distraught, nervous, and undergoing   • Type 2 diabetes mellitus with diabetic neuropathy, without long-term current use of insulin (HCC)    • Vitamin D deficiency 2019       Prior Hospitalizations, other than for surgery or childbirth, and year:  None     Social History     Socioeconomic History   • Marital status:    Tobacco Use   • Smoking status: Former Smoker     Quit date: 1998     Years since quittin.7   • Smokeless tobacco: Never Used  "  Vaping Use   • Vaping Use: Never used   Substance and Sexual Activity   • Alcohol use: Yes     Alcohol/week: 1.0 standard drink     Types: 1 Glasses of wine per week     Comment: current some day   • Drug use: No   • Sexual activity: Not Currently     Partners: Male     Patient is .  Patient is retired.  Patient drinks 2 servings of caffeine per day.    Family History:  Family History   Problem Relation Age of Onset   • Alcohol abuse Father    • Breast cancer Daughter         40s   • Cancer Other    • Diabetes Other         type II   • Leukemia Grandchild 19       Vital Signs:  /86   Pulse 86   Ht 167.6 cm (66\")   Wt 93 kg (205 lb)   LMP  (LMP Unknown) Comment: partial hysterectomy  SpO2 95%   Breastfeeding No   BMI 33.09 kg/m²      Medications:    Current Outpatient Medications:   •  Cholecalciferol (VITAMIN D3) 2000 UNITS tablet, Take 1 capsule by mouth daily., Disp: , Rfl:   •  diphenhydrAMINE-acetaminophen (TYLENOL PM)  MG tablet per tablet, Take 2 tablets by mouth Every Night., Disp: , Rfl:   •  DULoxetine (CYMBALTA) 60 MG capsule, TAKE 1 CAPSULE EVERY DAY AS DIRECTED, Disp: 90 capsule, Rfl: 3  •  ezetimibe (ZETIA) 10 MG tablet, TAKE 1 TABLET EVERY DAY, Disp: 90 tablet, Rfl: 3  •  levothyroxine (SYNTHROID, LEVOTHROID) 125 MCG tablet, TAKE 1 TABLET EVERY DAY, Disp: 90 tablet, Rfl: 2  •  metroNIDAZOLE (METROGEL) 0.75 % gel, , Disp: , Rfl:   •  ondansetron (ZOFRAN) 8 MG tablet, Take 1 tablet by mouth Every 6 (Six) Hours As Needed for Nausea., Disp: 60 tablet, Rfl: 1  •  rivaroxaban (XARELTO) 20 MG tablet, Take 1 p.o. daily for blood thinner as directed., Disp: 30 tablet, Rfl: 3  •  simvastatin (ZOCOR) 20 MG tablet, TAKE ONE TABLET BY MOUTH DAILY FOR HIGH CHOLESTEROL., Disp: 90 tablet, Rfl: 2  •  Tirzepatide 5 MG/0.5ML solution pen-injector, Inject 5 mg under the skin into the appropriate area as directed 1 (One) Time Per Week., Disp: 2 mL, Rfl: 3  •  traMADol (ULTRAM) 50 MG tablet, 1-2 " "p.o. every 6 hours as needed pain from peripheral neuropathy.  Not greater than 4 in 24 hours, Disp: 120 tablet, Rfl: 3  •  traZODone (DESYREL) 150 MG tablet, TAKE 1 TABLET EVERY NIGHT, Disp: 90 tablet, Rfl: 3  •  valsartan (DIOVAN) 320 MG tablet, TAKE ONE TABLET BY MOUTH EVERY MORNING FOR BLOOD PRESSURE, Disp: 90 tablet, Rfl: 3     Allergies:  Allergies   Allergen Reactions   • Morphine And Related    • Other Other (See Comments)     REJECTED DACRON SUTURES IN THE PAST AND INCISION CAME APART       Physical Examination:  /86   Pulse 86   Ht 167.6 cm (66\")   Wt 93 kg (205 lb)   LMP  (LMP Unknown) Comment: partial hysterectomy  SpO2 95%   Breastfeeding No   BMI 33.09 kg/m²   General Appearance:  Patient is in no distress.  She is well kept and has an obese build.   Psychiatric:  Patient with appropriate mood and affect. Alert and oriented to self, time, and place.    Breast, RIGHT:  medium sized, symmetric with the contralateral side.  Well-healed reduction pattern incisions.  Breast skin is without erythema, edema, rashes.  There are no visible abnormalities upon inspection during the arm-raising maneuver or with hands on hips in the sitting position. There is no nipple retraction, discharge or nipple/areolar skin changes.There are no masses palpable in the sitting or supine positions.     Breast, LEFT:  medium sized, symmetric with the contralateral side.  Well-healed reduction pattern incisions.  Breast skin is without erythema, edema, rashes.  There are no visible abnormalities upon inspection during the arm-raising maneuver or with hands on hips in the sitting position. There is no nipple retraction, discharge or nipple/areolar skin changes.There are no masses palpable in the sitting or supine positions.  There is some central and upper and lower outer quadrant nodularity in the left breast. Mammotomy well healing. No erythema, warmth, drainage.    Lymphatic:  There is no axillary, cervical, " infraclavicular, or supraclavicular adenopathy bilaterally.  Eyes:  Pupils are round and reactive to light.  Cardiovascular:  Heart rate and rhythm are regular.  Respiratory:  Lungs are clear bilaterally with no crackles or wheezes in any lung field.  She has labored breathing at rest.      Gastrointestinal:  Abdomen is soft, nondistended, and nontender.     Musculoskeletal:  Good strength in all 4 extremities.   There is good range of motion in both shoulders.    Skin:  No new skin lesions or rashes on the skin excluding the breast (see breast exam above).        Imagin[facility]  CT CHEST  MCMANUS, CLAUDETTE   HISTORY: dyspnea on exertion. FINDINGS: There is a tiny nonocclusive subsegmental right lower lobe pulmonary thromboembolus and there are a few very tiny distal pulmonary thromboemboli as well. IMPRESSION:  1. There are tiny pulmonary thromboemboli with the largest within a subsegmental right lower lobe pulmonary artery. The RV:LV ratio is 1.4. The appearance of some of the pulmonary artery branches and the lungs can be seen with chronic pulmonary thromboemboli.     22 [facility]   MAMMO SCREENING  OMAR CLAUDETTE   There are scattered areas of fibroglandular density. There are indeterminate calcifications in the outer central anterior left breast. There is a focal asymmetry with questioned distortion in the slightly lower slightly outer anterior left breast. There is a focal asymmetry in the outer central middle to anterior left breast. BIRADS CATEGORY 0      22 [facility] MAMMO DIAG LEFT MATTY LEFT BREAST U/S  OMAR CLAUDETTE  In the outer central anterior left breast, there is a 1.3 cm group of somewhat pleomorphic calcifications, which is suspicious. In the lower slightly outer middle left breast, there is a persistent approximately 1.5 cm mass with mild corresponding architectural distortion. There is also a central corresponding calcification.  The previously noted focal  asymmetry in the outer central middle left  breast partially effaces with spot compression and is less conspicuous on the lateral view.  In the slightly upper outer middle left breast, there is a persistent approximately 1 cm mass with corresponding architectural distortion.  In the upper central anterior left breast, there is a persistent focal asymmetry with calcifications.  These areas were further assessed with ultrasound.     ULTRASOUND:  Targeted sonographic evaluation of the left breast was performed from 2:00 to 6:00 in the region of the mammographic abnormalities. At 1:00 in the retroareolar left breast, there is an at least 2.3 x 0.9 x 2.0 cm hypoechoic mass with indistinct margins and internal echogenic foci from calcifications corresponding to the focal asymmetry with calcifications in the anterior left breast on mammogram. There is tubular elongation of the mass concerning for possible ductal extension.  At 3:00 in the retroareolar left breast, there is a 1.1 x 0.8 x 1.0 cm irregular hypoechoic mass with indistinct margins and internal echogenic foci from calcifications, which corresponds to the suspicious group of calcifications on mammogram.  At 4:00, 3 cm from the nipple, there is a 1.4 x 1.0 x 1.1 cm irregular hypoechoic mass with peripheral vascularity corresponding to a mass with distortion on mammogram.  At 4:30, 3 cm from the nipple, approximately 1.8 cm from the above mass  at the 4:00 position, there is a 0.9 x 0.5 x 0.9 cm irregular hypoechoic mass with indistinct margins, which is suspicious.  At 3:00, 5 cm from the nipple, there is a 0.9 x 0.7 x 0.7 cm irregular hypoechoic mass with indistinct margins and corresponding internal vascularity, which corresponds to a mass with architectural distortion  on mammogram and is suspicious.   Recommend 2 site ultrasound-guided core needle biopsy  targeting the dominant masses at 1:00 in the retroareolar breast and  4:00, 3 cm from the nipple with  management of the additional masses  pending biopsy results. Contrast-enhanced breast MRI may be warranted  following the biopsy. BI RADS CATEGORY 4C     PATHOLOGY:  Left breast 3:00, 2 cm from the nipple ultrasound-guided core needle biopsy:  Low-grade duct carcinoma in situ, involving an intraductal papilloma.  Solid, cribriform, micropapillary, calcifications.  1.4 cm in a core.  Estrogen , progesterone , Ki-67 is 6%.  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  Estrogen , progesterone , HER2/willian 2+.  FISH  Not amplified 2.8/1.3..  Ki-67 15%.      Consultations:  05/25/22 [facility]   SINGH, VIKAS MCMANUS CLAUDETTE L.   5/19/22 patient was started on anticoagulation with Xarelto. Mshe reports no significant improvemenrt in her respiratory symptoms while being on anticoagulation for around a week. Patient states symptoms while being on anticoagulation for around a week. Patient states she always had mild difficulty breasting, but it got significantly worse in the last 6 weeks. She now has to rest even after walkiong short interval. Says she smoked halk pack/day for 20-25 years. Quit in 1988.     Her most recent note from Dr Bell pulmonology : Remote 20-pack-year history smoking quit in the 1980s.  Recently diagnosed primary embolism unprovoked 2022.  Concern for pulmonary hypertension, ongoing evaluation by Dr. Solorio.  Patient states issues since May 2022 with persistent dyspnea on exertion of unclear etiology.  Patient currently on anticoagulation with Xarelto tolerating it fairly well.  Some concern for grade 1 diastolic dysfunction and dilated left atrium.  Some concern for undiagnosed sleep apnea.     Diagnosis given unprovoked pulmonary embolus.  Agree with anticoagulation.  Ongoing hypercoagulability  evaluation.  Fatigue, most likely undiagnosed obstructive sleep apnea.  Discussed need for testing and CPAP.  Pulmonary hypertension, repeat echocardiogram to evaluate.  Defer to Dr. Solorio.  Will need a VQ scan after acute pulmonary embolus treatment to evaluate for chronic pulmonary emboli.     Dr. Solorio September 26, 2022: Right heart cath performed: Conclusion normal pulmonary pressures, normal left-sided filling pressures, elevated right ventricular filling pressures, normal pulmonary vascular resistance.  Recommendations optimize medical therapy.    OK per Dr Chang to hold her xarelto for 2-3 days prior to procedure              Procedures 9-21-22:  Percutaneous ultrasound-guided vacuum-assisted excisional breast biopsy x2   Indication:  ultrasound-visible breast mass x2  Location: Left breast 3:00, 2 cm from the nipple, left breast 4:00, 2 cm from the nipple  Consent:  The risks, benefits, and alternatives to the procedure were discussed with the patient, who understood and wished to proceed.  The risks described included, but were not limited to, bleeding, infection, pneumothorax, and inadequate sampling requiring either repeat percutaneous or open excisional biopsy.  Description of Procedure:   After the patient was positioned supine on the procedure table, I located each lesion using ultrasound.  The lesion at the 3:00, 2 cm the nipple location measured in the antiradial dimension 1.7 x 0.9 cm and in the radial dimension 1.4 x 1.3 cm.  Lesion at the 4:00, 2 cm from the nipple location measured in the radial dimension 1.4 x 1.0 cm and in the antiradial dimension 1.1 x 0.6 cm.    I prepped and draped the breast skin in sterile fashion.   For each separate distinct site I performed the following procedure: I anesthetized the breast skin at the site of anticipated mammotomy with 1% lidocaine with epinephrine.  I then anesthetized the underlying subcutaneous tissue and breast parenchyma surrounding each  separate distinct lesion with 1% lidocaine with epinephrine under ultrasound visualization and guidance. I then made a nicking incision with an 11blade and inserted the 10G encore biopsy device from inferolateral to superomedial for the first biopsy and superior lateral to inferior medial for the second lesion  under ultrasound guidance.   I then took 12 sequential core samples of each separate lesion using the automated sampling of the encore device, until a majority of each lesion disappeared on ultrasound. There was minimal residual lesion visible at the conclusion of the procedure.  I removed the probe, then placed a hydromark marker, using a stiff introducer, into each of the 2 biopsy sites.   We held manual compression for 10 minutes, placed steri-strips at the mammotomy site, and wrapped the patient in a 6 inch super ace wrap with an ice-pack.  Marker placed: hydromark x2  Tolerance: The patient tolerated the procedure well.  Disposition: We will see her back within a week to review her pathology.    Assessment:   Diagnosis Plan   1. Malignant neoplasm of central portion of left breast in female, estrogen receptor positive (HCC)  Ambulatory Referral to Hematology / Oncology    Ambulatory Referral to Pulmonology    Mammo Diagnostic Digital Tomosynthesis Left With CAD    US Breast Left Limited   2. Abnormal ultrasound of breast     3. Abnormal mammogram     4. Chronic pulmonary embolism without acute cor pulmonale, unspecified pulmonary embolism type (HCC)     5. Shortness of breath     6. FH: breast cancer     7. Class 1 obesity due to excess calories without serious comorbidity with body mass index (BMI) of 33.0 to 33.9 in adult     8. History of bilateral breast reduction surgery     1-3  Imaging findings:  LEFT breast 1:00 RA, 2.3 cm mass with calcifications-BR4C-  LEFT breast 3:00 RA, 2 CFN- 1.1 cm mass with calcifications- BR4C target for core biopsy today  LEFT 4:00, 2-3 CFN- 1.4 cm mass- BR4C- target  for biopsy today  LEFT 4:00, 3 CFN- 9mm mass, located 9mm from the above mass- BR4C    Core biopsies in office:  Left breast 3:00, 2 cm from the nipple-  Low-grade duct carcinoma in situ, involving an intraductal papilloma.  Solid, cribriform, micropapillary, calcifications.  1.4 cm in a core.  Estrogen , progesterone , Ki-67 is 6%.    Left breast 4:00, 2 cm from the nipple-   invasive mammary carcinoma, no special type, lobular features, intermediate grade, 3, 2, 1, 6 mm, atypical duct hyperplasia with an intraductal papilloma also seen.  Estrogen , progesterone , HER2/willian 2+.  FISH  Not amplified 2.8/1.3..  Ki-67 15%.       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, lobular features, int grade, 3,2,1, 6mm in core, ADH in a papilloma, associated calcifications and DCIS, low grade, involving a papilloma, solid, cribiform and micropapillary  ER , GA  FISH negative 2.8/1.3, Ki 67 15%  Clinical stage [id number], IA      OK per Dr Chang to hold her xarelto for 2-3 days prior to surgery or procedures  Unable to have MRI due to incontinence implant          4-  RLL subsegmental emboli- some chronic (CT 5-19-22)- sees Dr Chang with [facility] hematology, on eliquis    5-  Labored/audible breathing at rest- dyspnea on exertion    25 PYH smoking, stopped 1998- some chronic SOB, but worsening over the past one year-     sees A pulmonologist at [facility]-  Dr Bell pulmonology : 2022 note: Remote 20-pack-year history smoking quit in the 1980s.  Recently diagnosed primary embolism unprovoked 2022.  Concern for pulmonary hypertension, ongoing evaluation by Dr. Solorio.  Patient states issues since May 2022 with persistent dyspnea on exertion of unclear etiology.  Patient currently on anticoagulation with Xarelto tolerating it fairly well.  Some concern for grade 1 diastolic dysfunction and dilated left atrium.  Some concern for undiagnosed sleep apnea.     Diagnosis given  unprovoked pulmonary embolus.  Agree with anticoagulation.  Ongoing hypercoagulability evaluation.  Fatigue, most likely undiagnosed obstructive sleep apnea.  Discussed need for testing and CPAP.  Pulmonary hypertension, repeat echocardiogram to evaluate.  Defer to Dr. Solorio.  Will need a VQ scan after acute pulmonary embolus treatment to evaluate for chronic pulmonary emboli.         Cardiologist- Dr. Solorio visit September 26, 2022: Right heart cath performed: Conclusion normal pulmonary pressures, normal left-sided filling pressures, elevated right ventricular filling pressures, normal pulmonary vascular resistance.  Recommendations optimize medical therapy.    5-  Daughter Jamilah Pfeiffer- [birth year]1-57- survived breast cancer. Comitted suicide in her 60s.    6-  BMI 33  Taking movajaro injections weekly for weight loss      7-  2000- breast reduction    Plan:  The patient goes by Claudette.  She is here with her  today, who is her primary support.  We reviewed her interval history, imaging, imaging reports, exam, pathology together today.      Discussed the option of LEFT total mastectomy as the preferred surgical option due to the multifocal disease spanning at least 3.5 x 3.5 cm based on post biopsy mammogram and due to central location..    She was unable to have a MRI.    She does not wish to have her breast removed for the following reasons we discussued: She is nearly 78 years old, she has labored breathing at rest, on anticoagulation. Therefore,we discussed her taking an aromatase inhibitor as definitive treatment as an alternative to surgery.  She would like to proceed in this latter fashion.    I will arrange for her to see Dr Arzate in the upcoming weeks to discuss.  I also asked her to work on weight loss as a means of decreasing her estrogen. She was not interested in talking with nutrition.  I offered for her to talk with Parris Cooley from behavioral oncology about anxiety ofdiagnosis and  she politely declined this as well.        She does not care for Dr Bell and I dont feel that I fully understand why she is short of breath. She will need a long term relationship with a pulmonologist. She refuses to see Dr DIONISIO mahajan and requests a different physician.  I have recommended that she see Dr Clifton from pulmonology.    Note that she is taking an injection (past 2-3 months) for weight loss and diabetes called tirzepatide- this can cause delayed gastric empytying and so would have her hold this for 1- 2 weeks preop if she came to surgery.      I will arrange for her to have a LEFT Dx mammogram and ultrasound [facility] the first week of May and to see me after    [facility] breast and gynecology panel [id number] STAT panel, lois re-req the breast-gyn complete panel      Noelle Fam MD      Today I spent 60 minutes doing the following: Reviewing records, labs, outside imaging and reports in preparation for the patient visit; obtaining medical history; performing the physical exam; counseling and educating the patient and any available family or caregivers; ordering medications, tests or procedures; coordinating care with any other physicians on her care team as needed, and documenting all of the above in the medical record as well as sending communications with her other healthcare professionals.        Next Appointment:  Return for Next scheduled follow up, after imaging.      EMR Dragon/transcription disclaimer:    Please note that portions of this note were completed with a voice recognition program.

## 2022-10-11 ENCOUNTER — TELEPHONE (OUTPATIENT)
Dept: SURGERY | Facility: CLINIC | Age: 77
End: 2022-10-11

## 2022-10-11 NOTE — TELEPHONE ENCOUNTER
Patient medical oncology visit has changed from Dr. Calderon on 10/25   To Dr. Arzate Monday 10/24/22 at 1:40 pm.     I had to leave patient a detailed message and asked that she call me back to confirm.

## 2022-10-12 ENCOUNTER — TELEPHONE (OUTPATIENT)
Dept: SURGERY | Facility: CLINIC | Age: 77
End: 2022-10-12

## 2022-10-12 DIAGNOSIS — I10 BENIGN ESSENTIAL HYPERTENSION: ICD-10-CM

## 2022-10-12 NOTE — TELEPHONE ENCOUNTER
MMG & US at [facility] Mon 05/01/2023 at 11:30 am & 12 pm, arrival 11:15 am.    F/u with Dr. Fam Mon 05/08/2023 at 10:30 am, arrival 10:15 am.    Unable to speak with pt. Left detailed message.    Pt to call back and confirm.    MEB

## 2022-10-13 RX ORDER — VALSARTAN 320 MG/1
TABLET ORAL
Qty: 90 TABLET | Refills: 3 | Status: SHIPPED | OUTPATIENT
Start: 2022-10-13

## 2022-10-17 ENCOUNTER — TELEPHONE (OUTPATIENT)
Dept: SURGERY | Facility: CLINIC | Age: 77
End: 2022-10-17

## 2022-10-17 NOTE — TELEPHONE ENCOUNTER
[facility] breast and gynecology panel September 28, 2022 returned as a variant of uncertain significance in the SMARCA4 gene , C.  254C ^ T.    We will let her know that there were no mutations.

## 2022-10-24 ENCOUNTER — CONSULT (OUTPATIENT)
Dept: ONCOLOGY | Facility: CLINIC | Age: 77
End: 2022-10-24

## 2022-10-24 ENCOUNTER — LAB (OUTPATIENT)
Dept: LAB | Facility: HOSPITAL | Age: 77
End: 2022-10-24

## 2022-10-24 ENCOUNTER — NURSE NAVIGATOR (OUTPATIENT)
Dept: OTHER | Facility: HOSPITAL | Age: 77
End: 2022-10-24

## 2022-10-24 VITALS
BODY MASS INDEX: 32.21 KG/M2 | HEART RATE: 92 BPM | HEIGHT: 66 IN | SYSTOLIC BLOOD PRESSURE: 147 MMHG | TEMPERATURE: 96.9 F | RESPIRATION RATE: 18 BRPM | WEIGHT: 200.4 LBS | DIASTOLIC BLOOD PRESSURE: 77 MMHG | OXYGEN SATURATION: 96 %

## 2022-10-24 DIAGNOSIS — F41.1 ANXIETY ASSOCIATED WITH CANCER DIAGNOSIS: ICD-10-CM

## 2022-10-24 DIAGNOSIS — C50.912 DUCTAL CARCINOMA OF LEFT BREAST: ICD-10-CM

## 2022-10-24 DIAGNOSIS — C50.912 DUCTAL CARCINOMA OF LEFT BREAST: Primary | ICD-10-CM

## 2022-10-24 DIAGNOSIS — C80.1 ANXIETY ASSOCIATED WITH CANCER DIAGNOSIS: ICD-10-CM

## 2022-10-24 DIAGNOSIS — Z78.0 POST-MENOPAUSAL: ICD-10-CM

## 2022-10-24 LAB
ALBUMIN SERPL-MCNC: 4.9 G/DL (ref 3.5–5.2)
ALBUMIN/GLOB SERPL: 1.6 G/DL (ref 1.1–2.4)
ALP SERPL-CCNC: 67 U/L (ref 38–116)
ALT SERPL W P-5'-P-CCNC: 25 U/L (ref 0–33)
ANION GAP SERPL CALCULATED.3IONS-SCNC: 13.6 MMOL/L (ref 5–15)
AST SERPL-CCNC: 29 U/L (ref 0–32)
BASOPHILS # BLD AUTO: 0.03 10*3/MM3 (ref 0–0.2)
BASOPHILS NFR BLD AUTO: 0.5 % (ref 0–1.5)
BILIRUB SERPL-MCNC: 0.5 MG/DL (ref 0.2–1.2)
BUN SERPL-MCNC: 11 MG/DL (ref 6–20)
BUN/CREAT SERPL: 11.5 (ref 7.3–30)
CALCIUM SPEC-SCNC: 10.7 MG/DL (ref 8.5–10.2)
CHLORIDE SERPL-SCNC: 95 MMOL/L (ref 98–107)
CO2 SERPL-SCNC: 26.4 MMOL/L (ref 22–29)
CREAT SERPL-MCNC: 0.96 MG/DL (ref 0.6–1.1)
DEPRECATED RDW RBC AUTO: 43 FL (ref 37–54)
EGFRCR SERPLBLD CKD-EPI 2021: 60.3 ML/MIN/1.73
EOSINOPHIL # BLD AUTO: 0.12 10*3/MM3 (ref 0–0.4)
EOSINOPHIL NFR BLD AUTO: 1.9 % (ref 0.3–6.2)
ERYTHROCYTE [DISTWIDTH] IN BLOOD BY AUTOMATED COUNT: 12.4 % (ref 12.3–15.4)
GLOBULIN UR ELPH-MCNC: 3 GM/DL (ref 1.8–3.5)
GLUCOSE SERPL-MCNC: 108 MG/DL (ref 74–124)
HCT VFR BLD AUTO: 40.9 % (ref 34–46.6)
HGB BLD-MCNC: 13.8 G/DL (ref 12–15.9)
IMM GRANULOCYTES # BLD AUTO: 0.03 10*3/MM3 (ref 0–0.05)
IMM GRANULOCYTES NFR BLD AUTO: 0.5 % (ref 0–0.5)
LYMPHOCYTES # BLD AUTO: 1.79 10*3/MM3 (ref 0.7–3.1)
LYMPHOCYTES NFR BLD AUTO: 28.9 % (ref 19.6–45.3)
MCH RBC QN AUTO: 32.3 PG (ref 26.6–33)
MCHC RBC AUTO-ENTMCNC: 33.7 G/DL (ref 31.5–35.7)
MCV RBC AUTO: 95.8 FL (ref 79–97)
MONOCYTES # BLD AUTO: 0.55 10*3/MM3 (ref 0.1–0.9)
MONOCYTES NFR BLD AUTO: 8.9 % (ref 5–12)
NEUTROPHILS NFR BLD AUTO: 3.68 10*3/MM3 (ref 1.7–7)
NEUTROPHILS NFR BLD AUTO: 59.3 % (ref 42.7–76)
NRBC BLD AUTO-RTO: 0 /100 WBC (ref 0–0.2)
PLATELET # BLD AUTO: 258 10*3/MM3 (ref 140–450)
PMV BLD AUTO: 9.3 FL (ref 6–12)
POTASSIUM SERPL-SCNC: 4.4 MMOL/L (ref 3.5–4.7)
PROT SERPL-MCNC: 7.9 G/DL (ref 6.3–8)
RBC # BLD AUTO: 4.27 10*6/MM3 (ref 3.77–5.28)
SODIUM SERPL-SCNC: 135 MMOL/L (ref 134–145)
WBC NRBC COR # BLD: 6.2 10*3/MM3 (ref 3.4–10.8)

## 2022-10-24 PROCEDURE — 99215 OFFICE O/P EST HI 40 MIN: CPT | Performed by: INTERNAL MEDICINE

## 2022-10-24 PROCEDURE — G2212 PROLONG OUTPT/OFFICE VIS: HCPCS | Performed by: INTERNAL MEDICINE

## 2022-10-24 PROCEDURE — 36415 COLL VENOUS BLD VENIPUNCTURE: CPT

## 2022-10-24 PROCEDURE — 85025 COMPLETE CBC W/AUTO DIFF WBC: CPT

## 2022-10-24 PROCEDURE — 80053 COMPREHEN METABOLIC PANEL: CPT

## 2022-10-24 RX ORDER — ANASTROZOLE 1 MG/1
1 TABLET ORAL DAILY
Qty: 30 TABLET | Refills: 3 | Status: SHIPPED | OUTPATIENT
Start: 2022-10-24 | End: 2023-03-23

## 2022-10-24 NOTE — PROGRESS NOTES
Referral received from Dr. Fam's office. Called Ms. Gomez and left a message introducing myself and navigational services. Asked her to call me back at her convenience and left my contact information.

## 2022-10-24 NOTE — PROGRESS NOTES
Subjective   Claudette L McManus is a 77 y.o. female.  Referred by Dr. Fam for left breast invasive ductal carcinoma    History of Present Illness   Ms. 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   Comorbidities include hypertension, hyperlipidemia, anxiety/depression, insomnia, progressive dyspnea on exertion, diagnosed with pulmonary embolism in May 2022 and on anticoagulation with Xarelto, hypothyroidism.    7/29/2022-bilateral screening mammogram  Indeterminate left breast calcifications and focal asymmetry.  1 of which is associated with questioned architectural distortion.  Further evaluation recommended.  Neck on 8/25/2022-left breast diagnostic mammogram and ultrasound  In the lower slightly outer middle left breast there is a 1.5 cm mass corresponding to the architectural distortion.  Focal asymmetry in the outer central middle left breast partially effaces with spot compression and less conspicuous.  Right upper middle left breast there is persistent approximately 1 cm mass with corresponding architectural distortion.  The upper central anterior left breast there is a persistent focal asymmetry with calcifications.    Ultrasound  At 1:00 retroareolar left breast-2.3 x 0.9 x 2 cm hypoechoic mass with indistinct margins and internal echogenic foci from calcifications.  There is tubular elevation of the mass concerning for probable extension  At 3:00, retroareolar left breast-1.1 x 0.8 x 1 cm hypoechoic mass with indistinct margins, corresponds to suspicious group of calcifications.  At 4:00, 3 cm from the nipple there is a 1.4 and 1 x 1.1 cm irregular hypoechoic mass with peripheral vascularity corresponding to the mass with distortion on mammogram  At 430, 3 cm from the nipple-1.8 cm from the above mass there is a 0.9 x 0.5 x 0.9 cm hypoechoic mass with indistinct margins which is suspicious  At 3:00, 5 cm from the nipple  there is a 0.9 0.7 x 0.7 cm irregular hypoechoic mass with indistinct margins.    Impression  Multiple masses in the left breast, which demonstrate corresponding architectural distortion.  2 site ultrasound-guided biopsy targeting the dominant masses at 1:00 in the retroareolar breast and 4:00, 3 cm from the nipple with management of additional masses pending biopsy results.  Contrast-enhanced MRI recommended    9/21/2022-2 site biopsy  1.left breast 3:00, 2 cm from nipple-ultrasound-guided biopsy of the mass  Low-grade ductal carcinoma in situ involving a small intraductal papilloma.  DCIS measures 14 mm  ER positive, FL positive    2.left breast o'clock, 2 cm from the nipple ultrasound-guided biopsy  Invasive ductal carcinoma with lobular features  Grade 2  Millimeters on core biopsy  Atypical ductal hyperplasia with cavitations and involving an intraductal papilloma  Negative lymphovascular space invasion  ER +% strong  FL +% strong  HER2 negative, nonamplified on FISH  Ki-67 15%    She was seen by Dr. Fam and discussed mastectomy given several abnormalities in the left breast.  Since she had progressive worsening of dyspnea on exertion from 2022 without acute explanation, recent diagnosis of pulmonary embolism requiring chronic anticoagulation, her age and concerns regarding complications of mastectomy patient opted not to proceed with mastectomy  She is here to discuss neoadjuvant endocrine therapy.    She reports severe insomnia for which she is currently on trazodone.  Tried gabapentin in the past but did not help.  She is also been diagnosed with peripheral neuropathy.  She is on Cymbalta anxiety.    She had a daughter who is treated for breast cancer but eventually she passed away from suicide.  Patient reports significant anxiety since the death of her daughter.  Her daughter have been diagnosed with bipolar disorder.    Genetic testing performed and no significant pathogenic  mutation.    For the dyspnea, she evaluated by  and per patient she was recommended to have a sleep study.  She is not comfortable using the CPAP and did not wish to undergo a sleep study.  She was prescribed inhalers which she had used for a month without much help.    On the CT PE protocol performed in May 2022 there was concern for chronic pulmonary emboli and pulmonary hypertension.  For this reason she underwent a cardiac catheterization on 9/26/2022 which showed normal pulmonary pressures, normal left-sided pressures, elevated right ventricular filling pressures, normal PVR, normal RV SWI  The etiology of the dyspnea somewhat unclear.    The following portions of the patient's history were reviewed and updated as appropriate: allergies, current medications, past family history, past medical history, past social history, past surgical history and problem list.    Past Medical History:   Diagnosis Date   • Benign essential hypertension    • Chronic bilateral low back pain without sciatica 8/16/2013 March 13, 2019--Limited bone scan.  This reveals scintigraphic activity in the spine and at the right shoulder corresponds to change seen on recent x-rays and is best explained by degenerative change.  Isolated metastatic disease exactly corresponding to the sites of extensive degenerative disc change would be peculiar.  March 7, 2019--new patient to get established.  She has complaints of approxi   • Chronic bilateral thoracic back pain 2/26/2019 March 13, 2019--Limited bone scan.  This reveals scintigraphic activity in the spine and at the right shoulder corresponds to change seen on recent x-rays and is best explained by degenerative change.  Isolated metastatic disease exactly corresponding to the sites of extensive degenerative disc change would be peculiar.  March 1, 2019--x-ray of the lumbar and thoracic spine reveals mild anterior l   • Chronic insomnia 11/4/2014   • Diabetic peripheral  neuropathy (HCC) 3/7/2019    Characterized by decreased sensation and paresthesias in both lower extremities described as a burning sensation.  Extends up to the knees.  Reportedly had been evaluated with nerve conduction study in the past.   • Generalized anxiety disorder 5/19/2013   • History of Bell's palsy 5/21/2013    Remote history of Bell's palsy.  Patient does not remember which side of the face.   • Hyperlipidemia    • Impaired fasting glucose    • Major depression    • Menopausal state, 8/19/2020--normal DEXA. 3/7/2019    August 19, 2020--DEXA scan reveals lumbar spine T score 3.8.  Left femoral neck T score 2.5.  Right femoral neck T score 2.2.  Normal bone density.   • Non morbid obesity 8/11/2022   • Peripheral neuropathy, idiopathic 3/7/2019    Characterized by decreased sensation and paresthesias in both lower extremities described as a burning sensation.  Extends up to the knees.  Reportedly had been evaluated with nerve conduction study in the past.   • Primary hypothyroidism 5/19/2013   • Primary osteoarthritis involving multiple joints 4/22/2013   • Rotator cuff arthropathy of right shoulder, 4/20/2021--status post right reverse total shoulder arthroplasty 5/27/2020   • Situational mixed anxiety and depressive disorder 8/21/2019 August 21, 2019--patient seen in follow-up after I called in a prescription for Ativan after the patient's  contacted me a few weeks ago regarding the sudden death of patient's daughter.  This was an apparent suicide and the patient found her daughter dead.  I will not go into details.  Patient reports the Lorazepam has been helpful but she is still quite distraught, nervous, and undergoing   • Type 2 diabetes mellitus with diabetic neuropathy, without long-term current use of insulin (HCC)    • Vitamin D deficiency 2/26/2019        Past Surgical History:   Procedure Laterality Date   • BILATERAL BREAST REDUCTION  Early 2000    Early 2000--bilateral breast  reduction   • CARDIAC CATHETERIZATION N/A 2022    Procedure: Right Heart Cath;  Surgeon: Agus Solorio MD PhD;  Location: [facility] INVASIVE LOCATION;  Service: Cardiology;  Laterality: N/A;  Will REMAIN on Xarelto for the case   • CHOLECYSTECTOMY  1960s    --open cholecystectomy   • COLONOSCOPY  2012--reportedly normal colonoscopy   • INCONTINENCE SURGERY  2012--implantation of questionable bladder stimulator right sacral area for urinary incontinence.  Details not known.   • REPLACEMENT TOTAL KNEE BILATERAL Left ; 2011-2011--bilateral total knee replacement   • SUBTOTAL HYSTERECTOMY  Remote    Remote partial hysterectomy.  Ovaries intact.   • TOTAL SHOULDER REPLACEMENT Left 2013--left total shoulder replacement.   • TOTAL SHOULDER REPLACEMENT  2021--status post right reverse total shoulder arthroplasty   • TOTAL SHOULDER REVERSE ARTHROPLASTY Right 2021--right reverse total shoulder replacement.        Family History   Problem Relation Age of Onset   • Alcohol abuse Father    • Breast cancer Daughter         40s   • Cancer Other    • Diabetes Other         type II   • Leukemia Grandchild 19        Social History     Socioeconomic History   • Marital status:    Tobacco Use   • Smoking status: Former     Types: Cigarettes     Quit date: 1998     Years since quittin.8   • Smokeless tobacco: Never   Vaping Use   • Vaping Use: Never used   Substance and Sexual Activity   • Alcohol use: Yes     Alcohol/week: 1.0 standard drink     Types: 1 Glasses of wine per week     Comment: current some day   • Drug use: No   • Sexual activity: Not Currently     Partners: Male        OB History        1    Para   1    Term   1            AB        Living           SAB        IAB        Ectopic        Molar        Multiple        Live Births                 Age at menarche-10  Age at first live  "childbirth-12   1 para 1  0  She had a hysterectomy in the 90s, ovaries still present  Oral contraceptive pill use for 6 to 8 weeks  No use of hormone replacement therapy    Allergies   Allergen Reactions   • Morphine And Related    • Other Other (See Comments)     REJECTED DACRON SUTURES IN THE PAST AND INCISION CAME APART            Review of Systems   Constitutional: Positive for activity change, fatigue and unexpected weight loss.   HENT: Negative.    Respiratory: Negative.    Cardiovascular: Negative.    Gastrointestinal: Negative.    Genitourinary: Positive for breast pain.   Musculoskeletal: Negative.    Skin: Negative.    Allergic/Immunologic: Negative.    Neurological: Negative.    Psychiatric/Behavioral: Positive for sleep disturbance and depressed mood. The patient is nervous/anxious.          Objective   Blood pressure 147/77, pulse 92, temperature 96.9 °F (36.1 °C), temperature source Temporal, resp. rate 18, height 167.6 cm (65.98\"), weight 90.9 kg (200 lb 6.4 oz), SpO2 96 %, not currently breastfeeding.   Physical Exam  Constitutional:       Appearance: Normal appearance. She is normal weight.   HENT:      Head: Normocephalic and atraumatic.      Right Ear: External ear normal.      Left Ear: External ear normal.      Nose: Nose normal.      Mouth/Throat:      Mouth: Mucous membranes are moist.      Pharynx: Oropharynx is clear.   Eyes:      Extraocular Movements: Extraocular movements intact.      Conjunctiva/sclera: Conjunctivae normal.      Pupils: Pupils are equal, round, and reactive to light.   Cardiovascular:      Rate and Rhythm: Normal rate and regular rhythm.      Pulses: Normal pulses.      Heart sounds: Normal heart sounds.   Pulmonary:      Effort: Pulmonary effort is normal.      Breath sounds: Normal breath sounds.   Abdominal:      General: Abdomen is flat. Bowel sounds are normal.   Musculoskeletal:         General: Normal range of motion.      Cervical back: Normal " range of motion.   Skin:     General: Skin is warm.   Neurological:      General: No focal deficit present.      Mental Status: She is alert and oriented to person, place, and time.   Psychiatric:         Mood and Affect: Mood normal.         Behavior: Behavior normal.         Thought Content: Thought content normal.         Judgment: Judgment normal.       Breast Exam: Right breast appears normal on inspection.  No palpable abnormalities of the right breast.  Left breast appears normal on inspection.  On palpation tenderness present.  Between 3 to 5 o'clock position of the left breast adjacent to the areola there are some firmness which could be related to recent biopsy versus underlying malignancy.  No palpable right or left axilla lymphadenopathy.        Lab on 10/24/2022   Component Date Value Ref Range Status   • WBC 10/24/2022 6.20  3.40 - 10.80 10*3/mm3 Final   • RBC 10/24/2022 4.27  3.77 - 5.28 10*6/mm3 Final   • Hemoglobin 10/24/2022 13.8  12.0 - 15.9 g/dL Final   • Hematocrit 10/24/2022 40.9  34.0 - 46.6 % Final   • MCV 10/24/2022 95.8  79.0 - 97.0 fL Final   • MCH 10/24/2022 32.3  26.6 - 33.0 pg Final   • MCHC 10/24/2022 33.7  31.5 - 35.7 g/dL Final   • RDW 10/24/2022 12.4  12.3 - 15.4 % Final   • RDW-SD 10/24/2022 43.0  37.0 - 54.0 fl Final   • MPV 10/24/2022 9.3  6.0 - 12.0 fL Final   • Platelets 10/24/2022 258  140 - 450 10*3/mm3 Final   • Neutrophil % 10/24/2022 59.3  42.7 - 76.0 % Final   • Lymphocyte % 10/24/2022 28.9  19.6 - 45.3 % Final   • Monocyte % 10/24/2022 8.9  5.0 - 12.0 % Final   • Eosinophil % 10/24/2022 1.9  0.3 - 6.2 % Final   • Basophil % 10/24/2022 0.5  0.0 - 1.5 % Final   • Immature Grans % 10/24/2022 0.5  0.0 - 0.5 % Final   • Neutrophils, Absolute 10/24/2022 3.68  1.70 - 7.00 10*3/mm3 Final   • Lymphocytes, Absolute 10/24/2022 1.79  0.70 - 3.10 10*3/mm3 Final   • Monocytes, Absolute 10/24/2022 0.55  0.10 - 0.90 10*3/mm3 Final   • Eosinophils, Absolute 10/24/2022 0.12  0.00 - 0.40  10*3/mm3 Final   • Basophils, Absolute 10/24/2022 0.03  0.00 - 0.20 10*3/mm3 Final   • Immature Grans, Absolute 10/24/2022 0.03  0.00 - 0.05 10*3/mm3 Final   • nRBC 10/24/2022 0.0  0.0 - 0.2 /100 WBC Final   Admission on 09/26/2022, Discharged on 09/26/2022   Component Date Value Ref Range Status   • Hemoglobin 09/26/2022 11.6 (L)  12.0 - 17.0 g/dL Final   • Hematocrit 09/26/2022 34 (L)  38 - 51 % Final   • O2 Saturation, Arterial 09/26/2022 65 (L)  95 - 98 % Final    Serial Number: [id number]  [id number]        Mammo Diagnostic Digital Tomosynthesis Left With CAD    Result Date: 9/30/2022  Status post 2 site left breast biopsy with clip placement as described. Residual suspicious calcifications are seen at the posterior inferior margin of the biopsy clip at the 3-o'clock position. Surgical follow-up is recommended.  BI-RADS Category 6: Known biopsy-proven malignancy.  This report was finalized on 9/30/2022 2:34 PM by Dr. Jose Shelton M.D.           Assessment & Plan       *Left breast invasive ductal carcinoma  · T1 cN0 M0, stage Ia, multifocal  · 2 sites were biopsied, one site was consistent with ductal carcinoma in situ which is ER/MS positive and the second site was invasive ductal carcinoma with lobular features which is ER/MS positive and HER2 negative, grade 2  Discussed at length the details of imaging and pathology report.Discussed the origin of breast cancer from the ducts and the lobules and the histological type of breast cancer based on site of origin. Discussed the tumor size, lymph node status and stage of the cancer. Explained the presence of DCIS. Discussed the receptor status including ER, MS and her-2 willian and their significance in determining the biology and treatment. Also discussed the importance of grade and ki-67.   The steps of curative intent breast cancer treatment have been explained, including surgery, possible chemotherapy, possible radiation and possible endocrine therapy.   · We  discussed that given the fact that she does not want to undergo mastectomy and concerned about the complications due to ongoing dyspnea and her age it would be reasonable to proceed with neoadjuvant endocrine therapy.  · The 2 options including tamoxifen and aromatase inhibitors have been discussed.  · Given the history of DVT tamoxifen is not an option  · We discussed anastrozole 1 mg p.o. daily.  Adverse effects including but not limited to hot flashes, mood changes, fatigue, insomnia, decrease in bone mineral density, vaginal dryness, sexual dysfunction.  · Patient was particularly concerned about insomnia since she has severe trouble sleeping.  · She is currently on trazodone to help with the same.  We discussed adding another agent.  She has a visit with her primary care physician Dr. Moore tomorrow and plans to discuss with him.    *PE  · Hypercoagulability work-up negative  · Abnormal lupus anticoagulant likely secondary to Xarelto  · Prothrombin gene mutation and factor V Leiden not covered by insurance  · Continue Xarelto  · Recommend indefinite anticoagulation    *Anxiety  · Poorly controlled since the death of her daughter  · Currently on Cymbalta  · She plans to discuss with Dr. Moore regarding change in medications  · Also on trazodone  · She is concerned about the adverse effects of AI  · Will refer to supportive oncology    *Depression  · Poorly controlled  · Although stressors including health situation, death of her daughter, recent diagnosis of breast cancer  · Referred to supportive oncology    *Hypertension and hyperlipidemia-continue current medications    *Bone health-DEXA from 2020 reviewed and normal  Repeat DEXA prior to follow-up    *Follow-up-1 month to assess tolerance to AI's  Left breast diagnostic mammogram and ultrasound in 3 months    90 minutes spent on the encounter including reviewing the imaging, face-to-face time and documentation on the same day

## 2022-10-26 ENCOUNTER — OFFICE VISIT (OUTPATIENT)
Dept: INTERNAL MEDICINE | Facility: CLINIC | Age: 77
End: 2022-10-26

## 2022-10-26 VITALS
BODY MASS INDEX: 33.05 KG/M2 | HEART RATE: 88 BPM | RESPIRATION RATE: 18 BRPM | OXYGEN SATURATION: 95 % | DIASTOLIC BLOOD PRESSURE: 80 MMHG | SYSTOLIC BLOOD PRESSURE: 140 MMHG | WEIGHT: 198.4 LBS | HEIGHT: 65 IN

## 2022-10-26 DIAGNOSIS — I26.99 MULTIPLE PULMONARY EMBOLI: ICD-10-CM

## 2022-10-26 DIAGNOSIS — I51.7 RIGHT VENTRICULAR ENLARGEMENT: Chronic | ICD-10-CM

## 2022-10-26 DIAGNOSIS — F41.8 SITUATIONAL ANXIETY: Primary | ICD-10-CM

## 2022-10-26 DIAGNOSIS — Z79.01 CHRONIC ANTICOAGULATION: ICD-10-CM

## 2022-10-26 DIAGNOSIS — R06.09 DYSPNEA ON EXERTION: ICD-10-CM

## 2022-10-26 DIAGNOSIS — E11.40 TYPE 2 DIABETES MELLITUS WITH DIABETIC NEUROPATHY, WITHOUT LONG-TERM CURRENT USE OF INSULIN: Chronic | ICD-10-CM

## 2022-10-26 PROCEDURE — 99214 OFFICE O/P EST MOD 30 MIN: CPT | Performed by: INTERNAL MEDICINE

## 2022-10-26 RX ORDER — LORAZEPAM 0.5 MG/1
TABLET ORAL
Qty: 60 TABLET | Refills: 1 | Status: SHIPPED | OUTPATIENT
Start: 2022-10-26 | End: 2023-01-13

## 2022-10-31 ENCOUNTER — APPOINTMENT (OUTPATIENT)
Dept: BONE DENSITY | Facility: HOSPITAL | Age: 77
End: 2022-10-31

## 2022-10-31 ENCOUNTER — TELEPHONE (OUTPATIENT)
Dept: SURGERY | Facility: CLINIC | Age: 77
End: 2022-10-31

## 2022-10-31 NOTE — TELEPHONE ENCOUNTER
Patient seen last in office 10/7/22 LT breast ca. Seen Dr. Arzate 10/24-aromatase inhibitor as definitive treatment as an alternative to surgery.    Patient was going to be scheduled with Dr. Clifton [facility] for SOA.   She calls today unhappy with this office. Stated she waited a month to get apt only to be told Dr. Clifton is not accepting new patients.   Dr. Mcmahan, Dr. Weiss and Dr. Mercado with [facility] are accepting new patients.   She would like to know what to do, should she schedule with one of the other providers, or do we know another Pulmonologist office she may go?       Spoke w/Theresa [facility]. Faxed records. Physician will review records and obtain apt. Dr. Mercado or Dr. Weiss.

## 2022-11-01 ENCOUNTER — NURSE NAVIGATOR (OUTPATIENT)
Dept: OTHER | Facility: HOSPITAL | Age: 77
End: 2022-11-01

## 2022-11-01 NOTE — PROGRESS NOTES
Called Ms. Patricia again and left a message introducing myself and navigational services. Asked her to call me back at her convenience and left my contact information.

## 2022-11-02 ENCOUNTER — NURSE NAVIGATOR (OUTPATIENT)
Dept: OTHER | Facility: HOSPITAL | Age: 77
End: 2022-11-02

## 2022-11-02 NOTE — PROGRESS NOTES
Referral received from Dr. Fam's office. I called Ms. Gomez again and introduced myself and navigational services. She stated the consult with Dr. Arzate and Dr. Fam went well and she will take hormone blocking medication for the time being while other appointments and tests are conducted. She may choose to not have surgery at all, but would like time to consider these things.       She stated she has a good support system and a wonderful PCP who is helping with anxiety and depression medications. She stated she feels comfortable talking to them about needs or issues.      She stated she has no financial or transportation concerns at this time. But will reach out if the arise. She has no resource needs or ongoing concerns at this time.      She stated she has been anxious about her diagnosis and we discussed that can be normal. Also discussed we have support options if the need arises. She was thankful for the information.      We discussed integrative therapies and other [facility]. She will received a navigation folder with the following information in the mail:     [facility], Cancer and Restorative Exercise (CARE), [employer] Exercise program, Guide for the Newly Diagnosed, [employer], [facility], Massage Therapy, Reiki Therapy, [employer] [place], Cancer Nutrition, and Survivorship Clinic.     She verbalized appreciation for navigational services and she has my contact information and will call with any questions that arise.     Acuity level 2

## 2022-11-07 ENCOUNTER — NURSE NAVIGATOR (OUTPATIENT)
Dept: OTHER | Facility: HOSPITAL | Age: 77
End: 2022-11-07

## 2022-11-07 ENCOUNTER — TELEPHONE (OUTPATIENT)
Dept: SURGERY | Facility: CLINIC | Age: 77
End: 2022-11-07

## 2022-11-07 NOTE — TELEPHONE ENCOUNTER
Received message that Nurse Navigator called about patient's Pulmonology apt.     I called patient, spoke with  on Friday 11/4/22,   We are trying to get in with [facility].   Patient saw Dr. NICHOLE in the past. She does not wish to see Dr. NICHOLE back.   [facility] cannot schedule with Dr. Clifton, Dr. Mercado or another provider because she is established with Dr. NICHOLE.      is working on who can best assist us with seeing this patient for Pulmonology due to SOA.     I called patient to update her, had to leave a detailed VM.

## 2022-11-07 NOTE — PROGRESS NOTES
Ms. Gomez called with concerns about a pulmonary appointment. Called Dr. Fam's office to seek clarification on where the process was. Kiana COATS Stated she would call the patient and clarify next steps. She was thankful for the help.

## 2022-11-09 ENCOUNTER — TELEPHONE (OUTPATIENT)
Dept: SURGERY | Facility: CLINIC | Age: 77
End: 2022-11-09

## 2022-11-09 NOTE — TELEPHONE ENCOUNTER
Patient calls again today, she refuses to see Dr. NICHOLE, she refuses to see a Pulmonologist in [place]. We have call multiple office who all cannot see pt until Jan 2023.   Pt tells me that she is okay with waiting, she is aware she has sleep apnea, she will let us know what she wants to do.      Patient is scheduled to see Dr. DIONISIO [facility]ell[facility] Wed 11/16/22 11:20 am.

## 2022-11-14 ENCOUNTER — TELEPHONE (OUTPATIENT)
Dept: ONCOLOGY | Facility: CLINIC | Age: 77
End: 2022-11-14

## 2022-11-14 DIAGNOSIS — E11.40 TYPE 2 DIABETES MELLITUS WITH DIABETIC NEUROPATHY, WITHOUT LONG-TERM CURRENT USE OF INSULIN: Chronic | ICD-10-CM

## 2022-11-14 DIAGNOSIS — L71.9 ROSACEA: Primary | ICD-10-CM

## 2022-11-14 RX ORDER — METRONIDAZOLE 7.5 MG/G
GEL TOPICAL 2 TIMES DAILY
Qty: 45 G | Refills: 2 | Status: SHIPPED | OUTPATIENT
Start: 2022-11-14

## 2022-11-14 NOTE — TELEPHONE ENCOUNTER
"Clinical Case Management   Telephone     OSW received referral.    Patient is a 77 year old woman with ductal carcinoma of left breast who is a patient of Dr. Arzate. Patient completed the NCCN Distress Thermometer and Problems List for Patient on 10/24/2022 for which she scored 7/10.     OSW consulted with Breast Nurse Navigator, Chapis Royal, prior to call to patient. Per Chapis and patient's medication chart, patient is receiving medication management for anxiety from her primary care doctor.     OSW called patient to introduce self and services, assess for needs, and offer support. Patient shared about her challenging experiences with her recent pulmonology appointments. Patient explained that she was told she has \"small blood clots in [her] lungs\" and that it needs to get figured out prior to recommended breast surgery.     OSW assessed for patient's interest in engaging in supportive counseling. Patient is not interested at this time but asked OSW to email her an introduction letter of oncology social work services. OSW sent that email and encouraged patient to call should needs arise. Patient confirmed that she is taking medication to help alleviate anxiety symptoms. All other needs were denied at this time stating she has a \"supportive .\"     OSW to remain available.     Larissa VALE [facility] HospitalVIRA  Oncology Social Worker   [place]    "

## 2022-11-17 ENCOUNTER — TRANSCRIBE ORDERS (OUTPATIENT)
Dept: ADMINISTRATIVE | Facility: HOSPITAL | Age: 77
End: 2022-11-17

## 2022-11-17 DIAGNOSIS — R06.02 SHORTNESS OF BREATH: ICD-10-CM

## 2022-11-17 DIAGNOSIS — R07.9 CHEST PAIN, UNSPECIFIED TYPE: Primary | ICD-10-CM

## 2022-11-18 ENCOUNTER — APPOINTMENT (OUTPATIENT)
Dept: CT IMAGING | Facility: HOSPITAL | Age: 77
End: 2022-11-18

## 2022-12-02 ENCOUNTER — OFFICE VISIT (OUTPATIENT)
Dept: ONCOLOGY | Facility: CLINIC | Age: 77
End: 2022-12-02

## 2022-12-02 VITALS
SYSTOLIC BLOOD PRESSURE: 144 MMHG | BODY MASS INDEX: 31.92 KG/M2 | TEMPERATURE: 97.5 F | WEIGHT: 191.6 LBS | RESPIRATION RATE: 16 BRPM | HEART RATE: 66 BPM | OXYGEN SATURATION: 95 % | DIASTOLIC BLOOD PRESSURE: 77 MMHG | HEIGHT: 65 IN

## 2022-12-02 DIAGNOSIS — N64.89 OTHER SPECIFIED DISORDERS OF BREAST: ICD-10-CM

## 2022-12-02 DIAGNOSIS — C50.912 DUCTAL CARCINOMA OF LEFT BREAST: Primary | ICD-10-CM

## 2022-12-02 PROCEDURE — 99214 OFFICE O/P EST MOD 30 MIN: CPT | Performed by: INTERNAL MEDICINE

## 2022-12-02 NOTE — PROGRESS NOTES
Subjective   Claudette L McManus is a 77 y.o. female.  Referred by Dr. Fam for left breast invasive ductal carcinoma    History of Present Illness   Ms. 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   Comorbidities include hypertension, hyperlipidemia, anxiety/depression, insomnia, progressive dyspnea on exertion, diagnosed with pulmonary embolism in May 2022 and on anticoagulation with Xarelto, hypothyroidism.    7/29/2022-bilateral screening mammogram  Indeterminate left breast calcifications and focal asymmetry.  1 of which is associated with questioned architectural distortion.  Further evaluation recommended.  Neck on 8/25/2022-left breast diagnostic mammogram and ultrasound  In the lower slightly outer middle left breast there is a 1.5 cm mass corresponding to the architectural distortion.  Focal asymmetry in the outer central middle left breast partially effaces with spot compression and less conspicuous.  Right upper middle left breast there is persistent approximately 1 cm mass with corresponding architectural distortion.  The upper central anterior left breast there is a persistent focal asymmetry with calcifications.    Ultrasound  At 1:00 retroareolar left breast-2.3 x 0.9 x 2 cm hypoechoic mass with indistinct margins and internal echogenic foci from calcifications.  There is tubular elevation of the mass concerning for probable extension  At 3:00, retroareolar left breast-1.1 x 0.8 x 1 cm hypoechoic mass with indistinct margins, corresponds to suspicious group of calcifications.  At 4:00, 3 cm from the nipple there is a 1.4 and 1 x 1.1 cm irregular hypoechoic mass with peripheral vascularity corresponding to the mass with distortion on mammogram  At 430, 3 cm from the nipple-1.8 cm from the above mass there is a 0.9 x 0.5 x 0.9 cm hypoechoic mass with indistinct margins which is suspicious  At 3:00, 5 cm from the nipple  there is a 0.9 0.7 x 0.7 cm irregular hypoechoic mass with indistinct margins.    Impression  Multiple masses in the left breast, which demonstrate corresponding architectural distortion.  2 site ultrasound-guided biopsy targeting the dominant masses at 1:00 in the retroareolar breast and 4:00, 3 cm from the nipple with management of additional masses pending biopsy results.  Contrast-enhanced MRI recommended    9/21/2022-2 site biopsy  1.left breast 3:00, 2 cm from nipple-ultrasound-guided biopsy of the mass  Low-grade ductal carcinoma in situ involving a small intraductal papilloma.  DCIS measures 14 mm  ER positive, MO positive    2.left breast o'clock, 2 cm from the nipple ultrasound-guided biopsy  Invasive ductal carcinoma with lobular features  Grade 2  Millimeters on core biopsy  Atypical ductal hyperplasia with cavitations and involving an intraductal papilloma  Negative lymphovascular space invasion  ER +% strong  MO +% strong  HER2 negative, nonamplified on FISH  Ki-67 15%    She was seen by Dr. Fam and discussed mastectomy given several abnormalities in the left breast.  Since she had progressive worsening of dyspnea on exertion from 2022 without acute explanation, recent diagnosis of pulmonary embolism requiring chronic anticoagulation, her age and concerns regarding complications of mastectomy patient opted not to proceed with mastectomy  She is here to discuss neoadjuvant endocrine therapy.    She reports severe insomnia for which she is currently on trazodone.  Tried gabapentin in the past but did not help.  She is also been diagnosed with peripheral neuropathy.  She is on Cymbalta anxiety.    She had a daughter who is treated for breast cancer but eventually she passed away from suicide.  Patient reports significant anxiety since the death of her daughter.  Her daughter have been diagnosed with bipolar disorder.    Genetic testing performed and no significant pathogenic  mutation.    For the dyspnea, she evaluated by  and per patient she was recommended to have a sleep study.  She is not comfortable using the CPAP and did not wish to undergo a sleep study.  She was prescribed inhalers which she had used for a month without much help.    On the CT PE protocol performed in May 2022 there was concern for chronic pulmonary emboli and pulmonary hypertension.  For this reason she underwent a cardiac catheterization on 9/26/2022 which showed normal pulmonary pressures, normal left-sided pressures, elevated right ventricular filling pressures, normal PVR, normal RV SWI  The etiology of the dyspnea somewhat unclear.    Interval history  Ms. Gomez presents to the clinic today for follow-up.  She started anastrozole about a month ago and she is tolerating that very well.  Denies any new changes on the left breast.  She does feel tired with seems to be somewhat worse since starting the anastrozole.  Anxiety and depression seem to be stable.  She has been using lorazepam to help with sleep and that has helped the insomnia.    The following portions of the patient's history were reviewed and updated as appropriate: allergies, current medications, past family history, past medical history, past social history, past surgical history and problem list.    Past Medical History:   Diagnosis Date   • Benign essential hypertension    • Chronic bilateral low back pain without sciatica 8/16/2013 March 13, 2019--Limited bone scan.  This reveals scintigraphic activity in the spine and at the right shoulder corresponds to change seen on recent x-rays and is best explained by degenerative change.  Isolated metastatic disease exactly corresponding to the sites of extensive degenerative disc change would be peculiar.  March 7, 2019--new patient to get established.  She has complaints of approxi   • Chronic bilateral thoracic back pain 2/26/2019 March 13, 2019--Limited bone scan.  This  reveals scintigraphic activity in the spine and at the right shoulder corresponds to change seen on recent x-rays and is best explained by degenerative change.  Isolated metastatic disease exactly corresponding to the sites of extensive degenerative disc change would be peculiar.  March 1, 2019--x-ray of the lumbar and thoracic spine reveals mild anterior l   • Chronic insomnia 11/4/2014   • Diabetic peripheral neuropathy (HCC) 3/7/2019    Characterized by decreased sensation and paresthesias in both lower extremities described as a burning sensation.  Extends up to the knees.  Reportedly had been evaluated with nerve conduction study in the past.   • Generalized anxiety disorder 5/19/2013   • History of Bell's palsy 5/21/2013    Remote history of Bell's palsy.  Patient does not remember which side of the face.   • Hyperlipidemia    • Impaired fasting glucose    • Major depression    • Menopausal state, 8/19/2020--normal DEXA. 3/7/2019    August 19, 2020--DEXA scan reveals lumbar spine T score 3.8.  Left femoral neck T score 2.5.  Right femoral neck T score 2.2.  Normal bone density.   • Non morbid obesity 8/11/2022   • Peripheral neuropathy, idiopathic 3/7/2019    Characterized by decreased sensation and paresthesias in both lower extremities described as a burning sensation.  Extends up to the knees.  Reportedly had been evaluated with nerve conduction study in the past.   • Primary hypothyroidism 5/19/2013   • Primary osteoarthritis involving multiple joints 4/22/2013   • Rotator cuff arthropathy of right shoulder, 4/20/2021--status post right reverse total shoulder arthroplasty 5/27/2020   • Situational mixed anxiety and depressive disorder 8/21/2019 August 21, 2019--patient seen in follow-up after I called in a prescription for Ativan after the patient's  contacted me a few weeks ago regarding the sudden death of patient's daughter.  This was an apparent suicide and the patient found her daughter  dead.  I will not go into details.  Patient reports the Lorazepam has been helpful but she is still quite distraught, nervous, and undergoing   • Type 2 diabetes mellitus with diabetic neuropathy, without long-term current use of insulin (HCC)    • Vitamin D deficiency 2019        Past Surgical History:   Procedure Laterality Date   • BILATERAL BREAST REDUCTION  Early 2000    Early 2000--bilateral breast reduction   • CARDIAC CATHETERIZATION N/A 2022    Procedure: Right Heart Cath;  Surgeon: Agus Solorio MD PhD;  Location: [facility] CATH INVASIVE LOCATION;  Service: Cardiology;  Laterality: N/A;  Will REMAIN on [facility] for the case   • CHOLECYSTECTOMY  --open cholecystectomy   • COLONOSCOPY  2012--reportedly normal colonoscopy   • INCONTINENCE SURGERY  2012--implantation of questionable bladder stimulator right sacral area for urinary incontinence.  Details not known.   • REPLACEMENT TOTAL KNEE BILATERAL Left ; 2011-2011--bilateral total knee replacement   • SUBTOTAL HYSTERECTOMY  Remote    Remote partial hysterectomy.  Ovaries intact.   • TOTAL SHOULDER REPLACEMENT Left 2013--left total shoulder replacement.   • TOTAL SHOULDER REPLACEMENT  2021--status post right reverse total shoulder arthroplasty   • TOTAL SHOULDER REVERSE ARTHROPLASTY Right 2021--right reverse total shoulder replacement.        Family History   Problem Relation Age of Onset   • Alcohol abuse Father    • Breast cancer Daughter         40s   • Cancer Other    • Diabetes Other         type II   • Leukemia Grandchild 19        Social History     Socioeconomic History   • Marital status:    Tobacco Use   • Smoking status: Former     Types: Cigarettes     Quit date: 1998     Years since quittin.9   • Smokeless tobacco: Never   Vaping Use   • Vaping Use: Never used   Substance and Sexual Activity   • Alcohol use:  "Yes     Alcohol/week: 1.0 standard drink     Types: 1 Glasses of wine per week     Comment: current some day   • Drug use: No   • Sexual activity: Not Currently     Partners: Male        OB History        1    Para   1    Term   1            AB        Living           SAB        IAB        Ectopic        Molar        Multiple        Live Births                 Age at menarche-10  Age at first live childbirth-12   1 para 1  0  She had a hysterectomy in the 90s, ovaries still present  Oral contraceptive pill use for 6 to 8 weeks  No use of hormone replacement therapy    Allergies   Allergen Reactions   • Morphine And Related    • Other Other (See Comments)     REJECTED DACRON SUTURES IN THE PAST AND INCISION CAME APART            Review of Systems   Constitutional: Positive for activity change, fatigue and unexpected weight loss.   HENT: Negative.    Respiratory: Negative.    Cardiovascular: Negative.    Gastrointestinal: Negative.    Genitourinary: Positive for breast pain.   Musculoskeletal: Negative.    Skin: Negative.    Allergic/Immunologic: Negative.    Neurological: Negative.    Psychiatric/Behavioral: Positive for sleep disturbance and depressed mood. The patient is nervous/anxious.          Objective   Blood pressure 144/77, pulse 66, temperature 97.5 °F (36.4 °C), temperature source Temporal, resp. rate 16, height 165.1 cm (65\"), weight 86.9 kg (191 lb 9.6 oz), SpO2 95 %, not currently breastfeeding.   Physical Exam  Constitutional:       Appearance: Normal appearance. She is normal weight.   HENT:      Head: Normocephalic and atraumatic.      Right Ear: External ear normal.      Left Ear: External ear normal.      Nose: Nose normal.      Mouth/Throat:      Mouth: Mucous membranes are moist.      Pharynx: Oropharynx is clear.   Eyes:      Extraocular Movements: Extraocular movements intact.      Conjunctiva/sclera: Conjunctivae normal.      Pupils: Pupils are equal, round, and " reactive to light.   Cardiovascular:      Rate and Rhythm: Normal rate and regular rhythm.      Pulses: Normal pulses.      Heart sounds: Normal heart sounds.   Pulmonary:      Effort: Pulmonary effort is normal.      Breath sounds: Normal breath sounds.   Abdominal:      General: Abdomen is flat. Bowel sounds are normal.   Musculoskeletal:         General: Normal range of motion.      Cervical back: Normal range of motion.   Skin:     General: Skin is warm.   Neurological:      General: No focal deficit present.      Mental Status: She is alert and oriented to person, place, and time.   Psychiatric:         Mood and Affect: Mood normal.         Behavior: Behavior normal.         Thought Content: Thought content normal.         Judgment: Judgment normal.       Breast Exam: Right breast appears normal on inspection.  No palpable abnormalities of the right breast.  Left breast appears normal on inspection.  On palpation tenderness present.  Between 3 to 5 o'clock position of the left breast adjacent to the areola there are some firmness which could be related to recent biopsy versus underlying malignancy.  No palpable right or left axilla lymphadenopathy.      I have reexamined the patient and the results are consistent with the previously documented exam. Elsie Arzate MD       No visits with results within 30 Day(s) from this visit.   Latest known visit with results is:   Lab on 10/24/2022   Component Date Value Ref Range Status   • Glucose 10/24/2022 108  74 - 124 mg/dL Final   • BUN 10/24/2022 11  6 - 20 mg/dL Final   • Creatinine 10/24/2022 0.96  0.60 - 1.10 mg/dL Final   • Sodium 10/24/2022 135  134 - 145 mmol/L Final   • Potassium 10/24/2022 4.4  3.5 - 4.7 mmol/L Final   • Chloride 10/24/2022 95 (L)  98 - 107 mmol/L Final   • CO2 10/24/2022 26.4  22.0 - 29.0 mmol/L Final   • Calcium 10/24/2022 10.7 (C)  8.5 - 10.2 mg/dL Final   • Total Protein 10/24/2022 7.9  6.3 - 8.0 g/dL Final   • Albumin 10/24/2022 4.90   3.50 - 5.20 g/dL Final   • ALT (SGPT) 10/24/2022 25  0 - 33 U/L Final   • AST (SGOT) 10/24/2022 29  0 - 32 U/L Final   • Alkaline Phosphatase 10/24/2022 67  38 - 116 U/L Final   • Total Bilirubin 10/24/2022 0.5  0.2 - 1.2 mg/dL Final   • Globulin 10/24/2022 3.0  1.8 - 3.5 gm/dL Final   • A/G Ratio 10/24/2022 1.6  1.1 - 2.4 g/dL Final   • BUN/Creatinine Ratio 10/24/2022 11.5  7.3 - 30.0 Final   • Anion Gap 10/24/2022 13.6  5.0 - 15.0 mmol/L Final   • eGFR 10/24/2022 60.3  >60.0 mL/min/1.73 Final    National Kidney Foundation and American Society of Nephrology (ASN) Task Force recommended calculation based on the Chronic Kidney Disease Epidemiology Collaboration (CKD-EPI) equation refit without adjustment for race.   • WBC 10/24/2022 6.20  3.40 - 10.80 10*3/mm3 Final   • RBC 10/24/2022 4.27  3.77 - 5.28 10*6/mm3 Final   • Hemoglobin 10/24/2022 13.8  12.0 - 15.9 g/dL Final   • Hematocrit 10/24/2022 40.9  34.0 - 46.6 % Final   • MCV 10/24/2022 95.8  79.0 - 97.0 fL Final   • MCH 10/24/2022 32.3  26.6 - 33.0 pg Final   • MCHC 10/24/2022 33.7  31.5 - 35.7 g/dL Final   • RDW 10/24/2022 12.4  12.3 - 15.4 % Final   • RDW-SD 10/24/2022 43.0  37.0 - 54.0 fl Final   • MPV 10/24/2022 9.3  6.0 - 12.0 fL Final   • Platelets 10/24/2022 258  140 - 450 10*3/mm3 Final   • Neutrophil % 10/24/2022 59.3  42.7 - 76.0 % Final   • Lymphocyte % 10/24/2022 28.9  19.6 - 45.3 % Final   • Monocyte % 10/24/2022 8.9  5.0 - 12.0 % Final   • Eosinophil % 10/24/2022 1.9  0.3 - 6.2 % Final   • Basophil % 10/24/2022 0.5  0.0 - 1.5 % Final   • Immature Grans % 10/24/2022 0.5  0.0 - 0.5 % Final   • Neutrophils, Absolute 10/24/2022 3.68  1.70 - 7.00 10*3/mm3 Final   • Lymphocytes, Absolute 10/24/2022 1.79  0.70 - 3.10 10*3/mm3 Final   • Monocytes, Absolute 10/24/2022 0.55  0.10 - 0.90 10*3/mm3 Final   • Eosinophils, Absolute 10/24/2022 0.12  0.00 - 0.40 10*3/mm3 Final   • Basophils, Absolute 10/24/2022 0.03  0.00 - 0.20 10*3/mm3 Final   • Immature Grans,  Absolute 10/24/2022 0.03  0.00 - 0.05 10*3/mm3 Final   • nRBC 10/24/2022 0.0  0.0 - 0.2 /100 WBC Final        No radiology results for the last 30 days.       Assessment & Plan       *Left breast invasive ductal carcinoma  · T1 cN0 M0, stage Ia, multifocal  · 2 sites were biopsied, one site was consistent with ductal carcinoma in situ which is ER/AZ positive and the second site was invasive ductal carcinoma with lobular features which is ER/AZ positive and HER2 negative, grade 2  The steps of curative intent breast cancer treatment have been explained, including surgery, possible chemotherapy, possible radiation and possible endocrine therapy.   · We discussed that given the fact that she does not want to undergo mastectomy and concerned about the complications due to ongoing dyspnea and her age it would be reasonable to proceed with neoadjuvant endocrine therapy.  · The 2 options including tamoxifen and aromatase inhibitors have been discussed.  · Given the history of DVT tamoxifen is not an option  · We discussed anastrozole 1 mg p.o. daily.  Adverse effects including but not limited to hot flashes, mood changes, fatigue, insomnia, decrease in bone mineral density, vaginal dryness, sexual dysfunction.  · Patient was particularly concerned about insomnia since she has severe trouble sleeping.  · She is currently on trazodone to help with the same.    · She has been on anastrozole for 1 month since November 2022 and tolerating that well.  · Insomnia has not worsened significantly.  · Her only complaint is fatigue.    *PE  · Hypercoagulability work-up negative  · Abnormal lupus anticoagulant likely secondary to Xarelto  · Prothrombin gene mutation and factor V Leiden not covered by insurance  · Continue Xarelto  · Recommend indefinite anticoagulation.    *Anxiety  · Poorly controlled since the death of her daughter  · Currently on Cymbalta  · She plans to discuss with Dr. Moore regarding change in medications  · Also  on trazodone  · Referral to supportive oncology made.    *Depression         · Poorly controlled  · Although stressors including health situation, death of her daughter, recent diagnosis of breast cancer  · Referred to supportive oncology  · Stable    *Dyspnea  · Unclear etiology  · Repeat CT chest has been ordered  · She has a follow-up with pulmonary.    *Hypertension and hyperlipidemia-continue current medications    *Bone health-DEXA from 2020 reviewed and normal  Repeat DEXA prior to follow-up    *Follow-up-2 months with a left breast diagnostic mammogram and ultrasound

## 2022-12-07 ENCOUNTER — PATIENT OUTREACH (OUTPATIENT)
Dept: OTHER | Facility: HOSPITAL | Age: 77
End: 2022-12-07

## 2022-12-08 ENCOUNTER — HOSPITAL ENCOUNTER (OUTPATIENT)
Dept: CT IMAGING | Facility: HOSPITAL | Age: 77
Discharge: HOME | End: 2022-12-08
Admitting: INTERNAL MEDICINE

## 2022-12-08 ENCOUNTER — OFFICE VISIT (OUTPATIENT)
Dept: INTERNAL MEDICINE | Facility: CLINIC | Age: 77
End: 2022-12-08

## 2022-12-08 VITALS
DIASTOLIC BLOOD PRESSURE: 70 MMHG | BODY MASS INDEX: 31.39 KG/M2 | HEART RATE: 90 BPM | WEIGHT: 188.4 LBS | RESPIRATION RATE: 18 BRPM | SYSTOLIC BLOOD PRESSURE: 138 MMHG | HEIGHT: 65 IN | OXYGEN SATURATION: 97 %

## 2022-12-08 DIAGNOSIS — Z51.81 THERAPEUTIC DRUG MONITORING: ICD-10-CM

## 2022-12-08 DIAGNOSIS — E11.40 TYPE 2 DIABETES MELLITUS WITH DIABETIC NEUROPATHY, WITHOUT LONG-TERM CURRENT USE OF INSULIN: Primary | Chronic | ICD-10-CM

## 2022-12-08 DIAGNOSIS — Z78.0 POSTMENOPAUSAL STATE: Chronic | ICD-10-CM

## 2022-12-08 DIAGNOSIS — I51.7 RIGHT VENTRICULAR ENLARGEMENT: Chronic | ICD-10-CM

## 2022-12-08 DIAGNOSIS — R06.09 DYSPNEA ON EXERTION: ICD-10-CM

## 2022-12-08 DIAGNOSIS — F43.23 SITUATIONAL MIXED ANXIETY AND DEPRESSIVE DISORDER: Chronic | ICD-10-CM

## 2022-12-08 DIAGNOSIS — F41.8 SITUATIONAL ANXIETY: ICD-10-CM

## 2022-12-08 DIAGNOSIS — G89.29 CHRONIC BILATERAL LOW BACK PAIN WITHOUT SCIATICA: Chronic | ICD-10-CM

## 2022-12-08 DIAGNOSIS — E55.9 VITAMIN D DEFICIENCY: Chronic | ICD-10-CM

## 2022-12-08 DIAGNOSIS — I10 BENIGN ESSENTIAL HYPERTENSION: Chronic | ICD-10-CM

## 2022-12-08 DIAGNOSIS — R06.02 SHORTNESS OF BREATH: ICD-10-CM

## 2022-12-08 DIAGNOSIS — E78.2 MIXED HYPERLIPIDEMIA: Chronic | ICD-10-CM

## 2022-12-08 DIAGNOSIS — Z86.16 HISTORY OF COVID-19: ICD-10-CM

## 2022-12-08 DIAGNOSIS — M54.50 CHRONIC BILATERAL LOW BACK PAIN WITHOUT SCIATICA: Chronic | ICD-10-CM

## 2022-12-08 DIAGNOSIS — Z79.01 CHRONIC ANTICOAGULATION: Chronic | ICD-10-CM

## 2022-12-08 DIAGNOSIS — E03.9 PRIMARY HYPOTHYROIDISM: Chronic | ICD-10-CM

## 2022-12-08 DIAGNOSIS — M15.9 PRIMARY OSTEOARTHRITIS INVOLVING MULTIPLE JOINTS: Chronic | ICD-10-CM

## 2022-12-08 DIAGNOSIS — R07.9 CHEST PAIN, UNSPECIFIED TYPE: ICD-10-CM

## 2022-12-08 DIAGNOSIS — F51.04 CHRONIC INSOMNIA: Chronic | ICD-10-CM

## 2022-12-08 DIAGNOSIS — E66.9 NON MORBID OBESITY: Chronic | ICD-10-CM

## 2022-12-08 DIAGNOSIS — E87.1 HYPONATREMIA: ICD-10-CM

## 2022-12-08 DIAGNOSIS — C50.912 DUCTAL CARCINOMA OF LEFT BREAST: ICD-10-CM

## 2022-12-08 DIAGNOSIS — I26.99 MULTIPLE PULMONARY EMBOLI: ICD-10-CM

## 2022-12-08 DIAGNOSIS — E11.42 DIABETIC PERIPHERAL NEUROPATHY: Chronic | ICD-10-CM

## 2022-12-08 PROCEDURE — 71275 CT ANGIOGRAPHY CHEST: CPT

## 2022-12-08 PROCEDURE — 99214 OFFICE O/P EST MOD 30 MIN: CPT | Performed by: INTERNAL MEDICINE

## 2022-12-08 PROCEDURE — 0 IOPAMIDOL PER 1 ML: Performed by: INTERNAL MEDICINE

## 2022-12-08 RX ADMIN — IOPAMIDOL 95 ML: 755 INJECTION, SOLUTION INTRAVENOUS at 15:21

## 2022-12-08 NOTE — PROGRESS NOTES
12/08/2022    Patient Information  Claudette L McManus                                                                                          [id number]  [place] [address]      1945  [unfilled]  There is no work phone number on file.    Chief Complaint:     Follow-up blood work in order to monitor chronic medical issues listed in history of present illness.  No new acute complaints.    History of Present Illness:    Patient with multiple chronic medical problems, some of which are very serious including type 2 diabetes, hyperlipidemia, hypothyroidism, hypertension, vitamin D deficiency, diabetic peripheral neuropathy, multiple pulmonary emboli and chronic dyspnea on exertion, right ventricular enlargement, chronic anticoagulation with Xarelto, hyponatremia, nonmorbid obesity, situational anxiety and depression, chronic insomnia, postmenopausal state, chronic lower back pain, primary osteoarthritis multiple joints, recent diagnosis of left breast cancer.  She presents today for follow-up with lab prior in order to monitor chronic medical issues.  Her past medical history reviewed and updated were necessary including health maintenance parameters.  This reveals she will be up-to-date or else accounted for after today's visit with the exception of diabetic eye exam which I encouraged her to get.    Review of Systems   Constitutional: Negative.   HENT: Negative.    Eyes: Negative.    Cardiovascular: Positive for dyspnea on exertion. Negative for chest pain.   Respiratory: Negative.    Endocrine: Negative.    Hematologic/Lymphatic: Negative.    Skin: Negative.    Musculoskeletal: Positive for arthritis and joint pain.   Gastrointestinal: Negative.    Genitourinary: Negative.    Neurological: Negative.    Psychiatric/Behavioral: Positive for depression. The patient is nervous/anxious.    Allergic/Immunologic: Negative.        Active Problems:    Patient Active Problem List   Diagnosis    • Primary osteoarthritis involving multiple joints   • Benign essential hypertension   • Hyperlipidemia   • Primary hypothyroidism   • Type 2 diabetes mellitus with diabetic neuropathy, without long-term current use of insulin (HCC)   • Chronic insomnia   • Chronic bilateral low back pain without sciatica   • Chronic bilateral thoracic back pain   • Vitamin D deficiency   • Therapeutic drug monitoring   • Postmenopausal state   • Diabetic peripheral neuropathy (HCC)   • Situational mixed anxiety and depressive disorder   • ACE-inhibitor cough   • Hyponatremia   • Dyspnea on exertion   • Multiple pulmonary emboli (HCC)   • Non morbid obesity   • Ductal carcinoma of left breast (HCC)   • Right ventricular enlargement   • Situational anxiety   • Chronic anticoagulation, Xarelto for multiple pulmonary emboli   • History of COVID-19         Past Medical History:   Diagnosis Date   • Benign essential hypertension    • Chronic bilateral low back pain without sciatica 8/16/2013 March 13, 2019--Limited bone scan.  This reveals scintigraphic activity in the spine and at the right shoulder corresponds to change seen on recent x-rays and is best explained by degenerative change.  Isolated metastatic disease exactly corresponding to the sites of extensive degenerative disc change would be peculiar.  March 7, 2019--new patient to get established.  She has complaints of approxi   • Chronic bilateral thoracic back pain 2/26/2019 March 13, 2019--Limited bone scan.  This reveals scintigraphic activity in the spine and at the right shoulder corresponds to change seen on recent x-rays and is best explained by degenerative change.  Isolated metastatic disease exactly corresponding to the sites of extensive degenerative disc change would be peculiar.  March 1, 2019--x-ray of the lumbar and thoracic spine reveals mild anterior l   • Chronic insomnia 11/4/2014   • Diabetic peripheral neuropathy (HCC) 3/7/2019    Characterized by  decreased sensation and paresthesias in both lower extremities described as a burning sensation.  Extends up to the knees.  Reportedly had been evaluated with nerve conduction study in the past.   • Generalized anxiety disorder 5/19/2013   • History of Bell's palsy 5/21/2013    Remote history of Bell's palsy.  Patient does not remember which side of the face.   • Hyperlipidemia    • Impaired fasting glucose    • Major depression    • Menopausal state, 8/19/2020--normal DEXA. 3/7/2019    August 19, 2020--DEXA scan reveals lumbar spine T score 3.8.  Left femoral neck T score 2.5.  Right femoral neck T score 2.2.  Normal bone density.   • Non morbid obesity 8/11/2022   • Peripheral neuropathy, idiopathic 3/7/2019    Characterized by decreased sensation and paresthesias in both lower extremities described as a burning sensation.  Extends up to the knees.  Reportedly had been evaluated with nerve conduction study in the past.   • Primary hypothyroidism 5/19/2013   • Primary osteoarthritis involving multiple joints 4/22/2013   • Rotator cuff arthropathy of right shoulder, 4/20/2021--status post right reverse total shoulder arthroplasty 5/27/2020   • Situational mixed anxiety and depressive disorder 8/21/2019 August 21, 2019--patient seen in follow-up after I called in a prescription for Ativan after the patient's  contacted me a few weeks ago regarding the sudden death of patient's daughter.  This was an apparent suicide and the patient found her daughter dead.  I will not go into details.  Patient reports the Lorazepam has been helpful but she is still quite distraught, nervous, and undergoing   • Type 2 diabetes mellitus with diabetic neuropathy, without long-term current use of insulin ([facility])    • Vitamin D deficiency 2/26/2019         Past Surgical History:   Procedure Laterality Date   • BILATERAL BREAST REDUCTION  Early 2000    Early 2000--bilateral breast reduction   • CARDIAC CATHETERIZATION N/A 9/26/2022     Procedure: Right Heart Cath;  Surgeon: Agus Solorio MD PhD;  Location:  [facility] CATH [facility]VASIVE [facility];  Service: Cardiology;  Laterality: N/A;  Will REMAIN on Xarelto for the case   • CHOLECYSTECTOMY  1960s 1960s--open cholecystectomy   • COLONOSCOPY  2012 2012--reportedly normal colonoscopy   • INCONTINENCE SURGERY  2012 Approximately 2012--implantation of questionable bladder stimulator right sacral area for urinary incontinence.  Details not known.   • REPLACEMENT TOTAL KNEE BILATERAL Left 2010; 2011 2010-2011--bilateral total knee replacement   • SUBTOTAL HYSTERECTOMY  Remote    Remote partial hysterectomy.  Ovaries intact.   • TOTAL SHOULDER REPLACEMENT Left 2013 2013--left total shoulder replacement.   • TOTAL SHOULDER REPLACEMENT  April 28, 2021 4/20/2021--status post right reverse total shoulder arthroplasty   • TOTAL SHOULDER REVERSE ARTHROPLASTY Right 04/20/2021 4/20/2021--right reverse total shoulder replacement.         Allergies   Allergen Reactions   • Morphine And Related    • Other Other (See Comments)     REJECTED DACRON SUTURES IN THE PAST AND INCISION CAME APART           Current Outpatient Medications:   •  anastrozole (ARIMIDEX) 1 MG tablet, Take 1 tablet by mouth Daily., Disp: 30 tablet, Rfl: 3  •  Cholecalciferol (VITAMIN D3) 2000 UNITS tablet, Take 1 capsule by mouth daily., Disp: , Rfl:   •  diphenhydrAMINE-acetaminophen (TYLENOL PM)  MG tablet per tablet, Take 2 tablets by mouth Every Night., Disp: , Rfl:   •  DULoxetine (CYMBALTA) 60 MG capsule, TAKE 1 CAPSULE EVERY DAY AS DIRECTED, Disp: 90 capsule, Rfl: 3  •  ezetimibe (ZETIA) 10 MG tablet, TAKE 1 TABLET EVERY DAY, Disp: 90 tablet, Rfl: 3  •  Homeopathic Products (LEG CRAMPS PO), Take  by mouth Daily. Nightly, Disp: , Rfl:   •  levothyroxine (SYNTHROID, LEVOTHROID) 125 MCG tablet, TAKE 1 TABLET EVERY DAY, Disp: 90 tablet, Rfl: 2  •  LORazepam (Ativan) 0.5 MG tablet, Take 1 p.o. twice daily as  "needed anxiety, Disp: 60 tablet, Rfl: 1  •  metroNIDAZOLE (METROGEL) 0.75 % gel, Apply  topically to the appropriate area as directed 2 (Two) Times a Day., Disp: 45 g, Rfl: 2  •  ondansetron (ZOFRAN) 8 MG tablet, Take 1 tablet by mouth Every 6 (Six) Hours As Needed for Nausea., Disp: 60 tablet, Rfl: 1  •  rivaroxaban (XARELTO) 20 MG tablet, Take 1 p.o. daily for blood thinner as directed., Disp: 30 tablet, Rfl: 3  •  simvastatin (ZOCOR) 20 MG tablet, TAKE ONE TABLET BY MOUTH DAILY FOR HIGH CHOLESTEROL., Disp: 90 tablet, Rfl: 2  •  Tirzepatide 10 MG/0.5ML solution pen-injector, Inject 10 mg under the skin into the appropriate area as directed 1 (One) Time Per Week., Disp: 2 mL, Rfl: 11  •  traMADol (ULTRAM) 50 MG tablet, 1-2 p.o. every 6 hours as needed pain from peripheral neuropathy.  Not greater than 4 in 24 hours, Disp: 120 tablet, Rfl: 3  •  traZODone (DESYREL) 150 MG tablet, TAKE 1 TABLET EVERY NIGHT, Disp: 90 tablet, Rfl: 3  •  valsartan (DIOVAN) 320 MG tablet, TAKE 1 TABLET EVERY MORNING FOR BLOOD PRESSURE, Disp: 90 tablet, Rfl: 3      Family History   Problem Relation Age of Onset   • Alcohol abuse Father    • Breast cancer Daughter         40s   • Cancer Other    • Diabetes Other         type II   • Leukemia Grandchild 19         Social History     Socioeconomic History   • Marital status:    Tobacco Use   • Smoking status: Former     Types: Cigarettes     Quit date: 1998     Years since quittin.9   • Smokeless tobacco: Never   Vaping Use   • Vaping Use: Never used   Substance and Sexual Activity   • Alcohol use: Yes     Alcohol/week: 1.0 standard drink     Types: 1 Glasses of wine per week     Comment: current some day   • Drug use: No   • Sexual activity: Not Currently     Partners: Male         Vitals:    22 1309   BP: 138/70   Pulse: 90   Resp: 18   SpO2: 97%   Weight: 85.5 kg (188 lb 6.4 oz)   Height: 165.1 cm (65\")        Body mass index is 31.35 kg/m².      Physical " Exam:    General: Alert and oriented x 3.  No acute distress.  Obese.  Normal affect.  HEENT: Pupils equal, round, reactive to light; extraocular movements intact; sclerae nonicteric; pharynx, ear canals and TMs normal.  Neck: Without JVD, thyromegaly, bruit, or adenopathy.  Lungs: Clear to auscultation in all fields.  Heart: Regular rate and rhythm without murmur, rub, gallop, or click.  Abdomen: Soft, nontender, without hepatosplenomegaly or hernia.  Bowel sounds normal.  : Deferred.  Rectal: Deferred.  Extremities: Without clubbing, cyanosis, edema, or pulse deficit.  Neurologic: Intact without focal deficit.  Normal station and gait observed during ingress and egress from the examination room.  Skin: Without significant lesion.  Musculoskeletal: Unremarkable.    Lab/other results:    CBC is normal.  CPK normal at 66.  CMP normal.  Hemoglobin A1c 5.7.  Total cholesterol 152, triglycerides 92, LDL particle #1061, small LDL particle #664, HDL particle #42.  TSH is low at 0.005.  Free T4 is elevated 1.78.  Free T3 is normal at 2.7.  Vitamin D normal at 88.1.    Assessment/Plan:     Diagnosis Plan   1. Type 2 diabetes mellitus with diabetic neuropathy, without long-term current use of insulin (HCC)  Comprehensive Metabolic Panel    Hemoglobin A1c      2. Hyperlipidemia  CK    Comprehensive Metabolic Panel    NMR LipoProfile      3. Primary hypothyroidism  TSH    T4, Free    T3, Free    TSH    T4, Free    T3, Free      4. Benign essential hypertension        5. Vitamin D deficiency  Vitamin D,25-Hydroxy      6. Diabetic peripheral neuropathy (HCC)        7. Multiple pulmonary emboli (HCC)        8. Dyspnea on exertion        9. Right ventricular enlargement        10. Chronic anticoagulation, Xarelto for multiple pulmonary emboli        11. Hyponatremia        12. Non morbid obesity        13. Situational mixed anxiety and depressive disorder        14. Situational anxiety        15. Chronic insomnia        16.  Postmenopausal state        17. Chronic bilateral low back pain without sciatica        18. Primary osteoarthritis involving multiple joints        19. Ductal carcinoma of left breast (HCC)        20. Therapeutic drug monitoring  CBC (No Diff)      21. History of COVID-19  SARS-CoV-2 Antibodies, Nucleocapsid (Natural Immunity)        Patient has type 2 diabetes is under excellent control on terzepatide.  This is also helping her lose weight.  Hyperlipidemia is under good control with simvastatin plus Zetia and she seems to be tolerating it well.  Her TSH is markedly suppressed and after reviewing previous TSH readings they were low normal but not nearly as suppressed.  Dose reduction indicated.  Her blood pressure has been controlled on the current regimen.  Vitamin D is in the normal range.  She has multiple pulmonary emboli and chronic dyspnea on exertion and has been managed by the hematologist/oncologist as well as pulmonary.  I would have expected dyspnea on exertion to have improved but now after being on anticoagulation for quite some time.  Her situational anxiety and situational depression continues to be a problem.  I started her on lorazepam a while back which seemed to help somewhat.  Patient is postmenopausal and is up-to-date on her DEXA scan.  She continues to have various aches and pains from her chronic lower back pain as well as osteoarthritis multiple joints.  The breast cancer is being followed by the medical and surgical oncologist.    Plan is as follows: I think the best course of action is to repeat the thyroid function tests to make sure that her TSH is indeed suppressed.  If it is then we will decrease her levothyroxine down to 100 mcg/day.  No other changes in medical regimen.  Patient will follow-up in 4 months with fasting lab prior.  Otherwise she will follow-up as needed.    Addendum: As it turns out patient did test positive for COVID earlier this year.  She mentioned this when we were  discussing COVID booster vaccines.  I will check her SARS antibodies today.    Procedures

## 2022-12-09 ENCOUNTER — PATIENT OUTREACH (OUTPATIENT)
Dept: OTHER | Facility: HOSPITAL | Age: 77
End: 2022-12-09

## 2022-12-09 DIAGNOSIS — E03.9 PRIMARY HYPOTHYROIDISM: Primary | Chronic | ICD-10-CM

## 2022-12-09 LAB
SARS-COV-2 AB SERPL QL IA: POSITIVE
T3FREE SERPL-MCNC: 3.5 PG/ML (ref 2–4.4)
T4 FREE SERPL-MCNC: 2.22 NG/DL (ref 0.82–1.77)
TSH SERPL DL<=0.005 MIU/L-ACNC: 0.01 UIU/ML (ref 0.45–4.5)

## 2022-12-09 RX ORDER — LEVOTHYROXINE SODIUM 0.1 MG/1
TABLET ORAL
Qty: 90 TABLET | Refills: 1 | Status: SHIPPED | OUTPATIENT
Start: 2022-12-09

## 2022-12-12 ENCOUNTER — TELEPHONE (OUTPATIENT)
Dept: ONCOLOGY | Facility: CLINIC | Age: 77
End: 2022-12-12

## 2022-12-12 ENCOUNTER — TELEPHONE (OUTPATIENT)
Dept: SURGERY | Facility: CLINIC | Age: 77
End: 2022-12-12

## 2022-12-12 NOTE — TELEPHONE ENCOUNTER
Scheduled patient to see Dr. Arzate's office tomorrow 12/13 8:40 am.     I had to leave patient a detailed message to call me back.

## 2022-12-12 NOTE — TELEPHONE ENCOUNTER
Returned Call to patient who is reporting that she had called Dr. Fam's office.  She is reporting that her right breast is red, hot and very tender.  She has sent a picture to Dr. Fam's office as well.  Patient stataed that she cannot hardly touch the area or sleep on that side due to the pain.  Please advise.

## 2022-12-12 NOTE — TELEPHONE ENCOUNTER
Patient seen last in office 10/7/22, following in office breast biopsy 9/21/22; Multifocal invasive ductal carcinoma.     Discussion of aromatase inhibitor as definitive TX as an alternative to surgery/Left total mastectomy.   Plan was to see Dr. Arzate, Pulmonology and seeing you back in May following Left Dx mmg and U/S [facility].     She calls today concerned, left breast is very sore to touch at her nipple, she tells me nipple looks distorted, but not inverted and no nipple discharge.   Lower portion of left breast, below nipple is red and hot to touch. No swelling, no new masses, no recent illness, no fever or chills.     Patient would like to know what to do      I let patient know that we do not feel this is a result of AI or tumor simply because of the amount of time. We ask that she allow Dr. Arzate to take a look at next visit.

## 2022-12-12 NOTE — TELEPHONE ENCOUNTER
Returned call to patient with the information that Dr. Arzate will see her tomorrow at 0840.  Patient v/u.

## 2022-12-12 NOTE — TELEPHONE ENCOUNTER
Caller: McManus, Claudette L    Relationship: Self    Best call back number: [phone]    What is the best time to reach you: ANYTIME    Who are you requesting to speak with (clinical staff, provider,  specific staff member): CLINICAL    What was the call regarding: PT THINKS SHE HAS A INFECTION IN HER LEFT BREAST ITS RED, HOT AND VERY TENDER.    TRIED TO W/T NURSE WASN'T AVAILABLE.    PLEASE CALL PT    Do you require a callback: YES

## 2022-12-12 NOTE — TELEPHONE ENCOUNTER
Caller: SACHIN    Relationship: Provider    Best call back number: [phone]    What is the best time to reach you: ASAP    Who are you requesting to speak with (clinical staff, provider,  specific staff member): SCHEDULING    What was the call regarding: SACHIN W/ DR. FLOWERS'S OFFICE IN [facility] CALLED TO MAKE AN APPT W/ DR TOM OR AN NP THIS WEEK, TOMORROW IF AT ALL POSSIBLE.  PT HAS A NEW BREAST ISSUE, WORRIED ABOUT TUMOR OR MEDICATION ISSUES.  DR FLOWERS WANTS HER TO BE SEEN IN OUR OFFICE THIS WEEK.    Do you require a callback: YES, PLEASE CALL SCAHIN BACK DIRECTLY TO ADVISE & SCHEDULE.

## 2022-12-13 ENCOUNTER — OFFICE VISIT (OUTPATIENT)
Dept: ONCOLOGY | Facility: CLINIC | Age: 77
End: 2022-12-13

## 2022-12-13 VITALS
DIASTOLIC BLOOD PRESSURE: 67 MMHG | BODY MASS INDEX: 31.84 KG/M2 | OXYGEN SATURATION: 95 % | SYSTOLIC BLOOD PRESSURE: 140 MMHG | HEART RATE: 72 BPM | WEIGHT: 191.1 LBS | TEMPERATURE: 97.3 F | RESPIRATION RATE: 16 BRPM | HEIGHT: 65 IN

## 2022-12-13 DIAGNOSIS — C50.912 DUCTAL CARCINOMA OF LEFT BREAST: Primary | ICD-10-CM

## 2022-12-13 DIAGNOSIS — D68.59 HYPERCOAGULABLE STATE: ICD-10-CM

## 2022-12-13 DIAGNOSIS — Z78.0 POST-MENOPAUSAL: ICD-10-CM

## 2022-12-13 DIAGNOSIS — C80.1 ANXIETY ASSOCIATED WITH CANCER DIAGNOSIS: ICD-10-CM

## 2022-12-13 DIAGNOSIS — I26.99 MULTIPLE PULMONARY EMBOLI: ICD-10-CM

## 2022-12-13 DIAGNOSIS — F41.1 ANXIETY ASSOCIATED WITH CANCER DIAGNOSIS: ICD-10-CM

## 2022-12-13 PROCEDURE — 99214 OFFICE O/P EST MOD 30 MIN: CPT | Performed by: INTERNAL MEDICINE

## 2022-12-13 NOTE — PROGRESS NOTES
Subjective   Claudette L McManus is a 77 y.o. female.  Referred by Dr. Fam for left breast invasive ductal carcinoma    History of Present Illness   Ms. 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   Comorbidities include hypertension, hyperlipidemia, anxiety/depression, insomnia, progressive dyspnea on exertion, diagnosed with pulmonary embolism in May 2022 and on anticoagulation with Xarelto, hypothyroidism.    7/29/2022-bilateral screening mammogram  Indeterminate left breast calcifications and focal asymmetry.  1 of which is associated with questioned architectural distortion.  Further evaluation recommended.  Neck on 8/25/2022-left breast diagnostic mammogram and ultrasound  In the lower slightly outer middle left breast there is a 1.5 cm mass corresponding to the architectural distortion.  Focal asymmetry in the outer central middle left breast partially effaces with spot compression and less conspicuous.  Right upper middle left breast there is persistent approximately 1 cm mass with corresponding architectural distortion.  The upper central anterior left breast there is a persistent focal asymmetry with calcifications.    Ultrasound  At 1:00 retroareolar left breast-2.3 x 0.9 x 2 cm hypoechoic mass with indistinct margins and internal echogenic foci from calcifications.  There is tubular elevation of the mass concerning for probable extension  At 3:00, retroareolar left breast-1.1 x 0.8 x 1 cm hypoechoic mass with indistinct margins, corresponds to suspicious group of calcifications.  At 4:00, 3 cm from the nipple there is a 1.4 and 1 x 1.1 cm irregular hypoechoic mass with peripheral vascularity corresponding to the mass with distortion on mammogram  At 430, 3 cm from the nipple-1.8 cm from the above mass there is a 0.9 x 0.5 x 0.9 cm hypoechoic mass with indistinct margins which is suspicious  At 3:00, 5 cm from the nipple  there is a 0.9 0.7 x 0.7 cm irregular hypoechoic mass with indistinct margins.    Impression  Multiple masses in the left breast, which demonstrate corresponding architectural distortion.  2 site ultrasound-guided biopsy targeting the dominant masses at 1:00 in the retroareolar breast and 4:00, 3 cm from the nipple with management of additional masses pending biopsy results.  Contrast-enhanced MRI recommended    9/21/2022-2 site biopsy  1.left breast 3:00, 2 cm from nipple-ultrasound-guided biopsy of the mass  Low-grade ductal carcinoma in situ involving a small intraductal papilloma.  DCIS measures 14 mm  ER positive, NJ positive    2.left breast o'clock, 2 cm from the nipple ultrasound-guided biopsy  Invasive ductal carcinoma with lobular features  Grade 2  Millimeters on core biopsy  Atypical ductal hyperplasia with cavitations and involving an intraductal papilloma  Negative lymphovascular space invasion  ER +% strong  NJ +% strong  HER2 negative, nonamplified on FISH  Ki-67 15%    She was seen by Dr. Fam and discussed mastectomy given several abnormalities in the left breast.  Since she had progressive worsening of dyspnea on exertion from 2022 without acute explanation, recent diagnosis of pulmonary embolism requiring chronic anticoagulation, her age and concerns regarding complications of mastectomy patient opted not to proceed with mastectomy  She is here to discuss neoadjuvant endocrine therapy.    She reports severe insomnia for which she is currently on trazodone.  Tried gabapentin in the past but did not help.  She is also been diagnosed with peripheral neuropathy.  She is on Cymbalta anxiety.    She had a daughter who is treated for breast cancer but eventually she passed away from suicide.  Patient reports significant anxiety since the death of her daughter.  Her daughter have been diagnosed with bipolar disorder.    Genetic testing performed and no significant pathogenic  mutation.    For the dyspnea, she evaluated by  and per patient she was recommended to have a sleep study.  She is not comfortable using the CPAP and did not wish to undergo a sleep study.  She was prescribed inhalers which she had used for a month without much help.    On the CT PE protocol performed in May 2022 there was concern for chronic pulmonary emboli and pulmonary hypertension.  For this reason she underwent a cardiac catheterization on 9/26/2022 which showed normal pulmonary pressures, normal left-sided pressures, elevated right ventricular filling pressures, normal PVR, normal RV SWI  The etiology of the dyspnea somewhat unclear.    12/8/2022-CT of the chest-tiny right lower lobe pulmonary embolism seen previously is no longer present.  Enlarged right ventricle appears similar on prior exam.  No other abnormalities noted.    Interval history  Ms. Gomez presents to the clinic today for follow-up.  She called the clinic 12/12/2022 complaining of erythema and tenderness of the left breast at the site of the malignancy.  She also felt a sharp pain in the retroareolar region.  The started last Saturday.  She feels like the erythema has improved some today.  The pain is still present.  Denies any fevers or chills.  Denies any trauma to the left breast.  She has been compliant with anastrozole.    The following portions of the patient's history were reviewed and updated as appropriate: allergies, current medications, past family history, past medical history, past social history, past surgical history and problem list.    Past Medical History:   Diagnosis Date   • Benign essential hypertension    • Chronic bilateral low back pain without sciatica 8/16/2013 March 13, 2019--Limited bone scan.  This reveals scintigraphic activity in the spine and at the right shoulder corresponds to change seen on recent x-rays and is best explained by degenerative change.  Isolated metastatic disease exactly  corresponding to the sites of extensive degenerative disc change would be peculiar.  March 7, 2019--new patient to get established.  She has complaints of approxi   • Chronic bilateral thoracic back pain 2/26/2019 March 13, 2019--Limited bone scan.  This reveals scintigraphic activity in the spine and at the right shoulder corresponds to change seen on recent x-rays and is best explained by degenerative change.  Isolated metastatic disease exactly corresponding to the sites of extensive degenerative disc change would be peculiar.  March 1, 2019--x-ray of the lumbar and thoracic spine reveals mild anterior l   • Chronic insomnia 11/4/2014   • Diabetic peripheral neuropathy (HCC) 3/7/2019    Characterized by decreased sensation and paresthesias in both lower extremities described as a burning sensation.  Extends up to the knees.  Reportedly had been evaluated with nerve conduction study in the past.   • Generalized anxiety disorder 5/19/2013   • History of Bell's palsy 5/21/2013    Remote history of Bell's palsy.  Patient does not remember which side of the face.   • Hyperlipidemia    • Impaired fasting glucose    • Major depression    • Menopausal state, 8/19/2020--normal DEXA. 3/7/2019    August 19, 2020--DEXA scan reveals lumbar spine T score 3.8.  Left femoral neck T score 2.5.  Right femoral neck T score 2.2.  Normal bone density.   • Non morbid obesity 8/11/2022   • Peripheral neuropathy, idiopathic 3/7/2019    Characterized by decreased sensation and paresthesias in both lower extremities described as a burning sensation.  Extends up to the knees.  Reportedly had been evaluated with nerve conduction study in the past.   • Primary hypothyroidism 5/19/2013   • Primary osteoarthritis involving multiple joints 4/22/2013   • Rotator cuff arthropathy of right shoulder, 4/20/2021--status post right reverse total shoulder arthroplasty 5/27/2020   • Situational mixed anxiety and depressive disorder 8/21/2019     August 21, 2019--patient seen in follow-up after I called in a prescription for Ativan after the patient's  contacted me a few weeks ago regarding the sudden death of patient's daughter.  This was an apparent suicide and the patient found her daughter dead.  I will not go into details.  Patient reports the Lorazepam has been helpful but she is still quite distraught, nervous, and undergoing   • Type 2 diabetes mellitus with diabetic neuropathy, without long-term current use of insulin (HCC)    • Vitamin D deficiency 2/26/2019        Past Surgical History:   Procedure Laterality Date   • BILATERAL BREAST REDUCTION  Early 2000    Early 2000--bilateral breast reduction   • CARDIAC CATHETERIZATION N/A 9/26/2022    Procedure: Right Heart Cath;  Surgeon: Agus Solorio MD PhD;  Location: [facility] INVASIVE LOCATION;  Service: Cardiology;  Laterality: N/A;  Will REMAIN on [facility] for the case   • CHOLECYSTECTOMY  1960s 1960s--open cholecystectomy   • COLONOSCOPY  2012 2012--reportedly normal colonoscopy   • INCONTINENCE SURGERY  2012 Approximately 2012--implantation of questionable bladder stimulator right sacral area for urinary incontinence.  Details not known.   • REPLACEMENT TOTAL KNEE BILATERAL Left 2010; 2011 2010-2011--bilateral total knee replacement   • SUBTOTAL HYSTERECTOMY  Remote    Remote partial hysterectomy.  Ovaries intact.   • TOTAL SHOULDER REPLACEMENT Left 2013 2013--left total shoulder replacement.   • TOTAL SHOULDER REPLACEMENT  April 28, 2021 4/20/2021--status post right reverse total shoulder arthroplasty   • TOTAL SHOULDER REVERSE ARTHROPLASTY Right 04/20/2021 4/20/2021--right reverse total shoulder replacement.        Family History   Problem Relation Age of Onset   • Alcohol abuse Father    • Breast cancer Daughter         40s   • Cancer Other    • Diabetes Other         type II   • Leukemia Grandchild 19        Social History     Socioeconomic History   •  "Marital status:    Tobacco Use   • Smoking status: Former     Types: Cigarettes     Quit date: 1998     Years since quittin.9   • Smokeless tobacco: Never   Vaping Use   • Vaping Use: Never used   Substance and Sexual Activity   • Alcohol use: Yes     Alcohol/week: 1.0 standard drink     Types: 1 Glasses of wine per week     Comment: current some day   • Drug use: No   • Sexual activity: Not Currently     Partners: Male        OB History        1    Para   1    Term   1            AB        Living           SAB        IAB        Ectopic        Molar        Multiple        Live Births                 Age at menarche-10  Age at first live childbirth-12   1 para 1  0  She had a hysterectomy in the 90s, ovaries still present  Oral contraceptive pill use for 6 to 8 weeks  No use of hormone replacement therapy    Allergies   Allergen Reactions   • Morphine And Related    • Other Other (See Comments)     REJECTED DACRON SUTURES IN THE PAST AND INCISION CAME APART            Review of Systems   Constitutional: Positive for activity change, fatigue and unexpected weight loss.   HENT: Negative.    Respiratory: Negative.    Cardiovascular: Negative.    Gastrointestinal: Negative.    Genitourinary: Positive for breast pain.   Musculoskeletal: Negative.    Skin: Negative.    Allergic/Immunologic: Negative.    Neurological: Negative.    Psychiatric/Behavioral: Positive for sleep disturbance and depressed mood. The patient is nervous/anxious.      Review of systems as mentioned in the HPI    Objective   Blood pressure 144/77, pulse 66, temperature 97.5 °F (36.4 °C), temperature source Temporal, resp. rate 16, height 165.1 cm (65\"), weight 86.9 kg (191 lb 9.6 oz), SpO2 95 %, not currently breastfeeding.   Physical Exam  Constitutional:       Appearance: Normal appearance. She is normal weight.   HENT:      Head: Normocephalic and atraumatic.      Right Ear: External ear normal.      Left " Ear: External ear normal.      Nose: Nose normal.      Mouth/Throat:      Mouth: Mucous membranes are moist.      Pharynx: Oropharynx is clear.   Eyes:      Extraocular Movements: Extraocular movements intact.      Conjunctiva/sclera: Conjunctivae normal.      Pupils: Pupils are equal, round, and reactive to light.   Cardiovascular:      Rate and Rhythm: Normal rate and regular rhythm.      Pulses: Normal pulses.      Heart sounds: Normal heart sounds.   Pulmonary:      Effort: Pulmonary effort is normal.      Breath sounds: Normal breath sounds.   Abdominal:      General: Abdomen is flat. Bowel sounds are normal.   Musculoskeletal:         General: Normal range of motion.      Cervical back: Normal range of motion.   Skin:     General: Skin is warm.   Neurological:      General: No focal deficit present.      Mental Status: She is alert and oriented to person, place, and time.   Psychiatric:         Mood and Affect: Mood normal.         Behavior: Behavior normal.         Thought Content: Thought content normal.         Judgment: Judgment normal.       Breast Exam: Right breast appears normal on inspection.  No palpable abnormalities of the right breast.  Left breast on inspection there is some erythema extending from the areola to about 2 to 3 cm out at the 3:00 to 4 o'clock position.  There is tenderness to palpation of this area.  Between 3 to 5 o'clock position of the left breast adjacent to the areola there are some firmness which could be related to recent biopsy versus underlying malignancy.  No palpable right or left axilla lymphadenopathy.      I have reexamined the patient and the results are consistent with the previously documented exam. Elsie Arzate MD     No visits with results within 30 Day(s) from this visit.   Latest known visit with results is:   Lab on 10/24/2022   Component Date Value Ref Range Status   • Glucose 10/24/2022 108  74 - 124 mg/dL Final   • BUN 10/24/2022 11  6 - 20 mg/dL Final    • Creatinine 10/24/2022 0.96  0.60 - 1.10 mg/dL Final   • Sodium 10/24/2022 135  134 - 145 mmol/L Final   • Potassium 10/24/2022 4.4  3.5 - 4.7 mmol/L Final   • Chloride 10/24/2022 95 (L)  98 - 107 mmol/L Final   • CO2 10/24/2022 26.4  22.0 - 29.0 mmol/L Final   • Calcium 10/24/2022 10.7 (C)  8.5 - 10.2 mg/dL Final   • Total Protein 10/24/2022 7.9  6.3 - 8.0 g/dL Final   • Albumin 10/24/2022 4.90  3.50 - 5.20 g/dL Final   • ALT (SGPT) 10/24/2022 25  0 - 33 U/L Final   • AST (SGOT) 10/24/2022 29  0 - 32 U/L Final   • Alkaline Phosphatase 10/24/2022 67  38 - 116 U/L Final   • Total Bilirubin 10/24/2022 0.5  0.2 - 1.2 mg/dL Final   • Globulin 10/24/2022 3.0  1.8 - 3.5 gm/dL Final   • A/G Ratio 10/24/2022 1.6  1.1 - 2.4 g/dL Final   • BUN/Creatinine Ratio 10/24/2022 11.5  7.3 - 30.0 Final   • Anion Gap 10/24/2022 13.6  5.0 - 15.0 mmol/L Final   • eGFR 10/24/2022 60.3  >60.0 mL/min/1.73 Final    National Kidney Foundation and American Society of Nephrology (ASN) Task Force recommended calculation based on the Chronic Kidney Disease Epidemiology Collaboration (CKD-EPI) equation refit without adjustment for race.   • WBC 10/24/2022 6.20  3.40 - 10.80 10*3/mm3 Final   • RBC 10/24/2022 4.27  3.77 - 5.28 10*6/mm3 Final   • Hemoglobin 10/24/2022 13.8  12.0 - 15.9 g/dL Final   • Hematocrit 10/24/2022 40.9  34.0 - 46.6 % Final   • MCV 10/24/2022 95.8  79.0 - 97.0 fL Final   • MCH 10/24/2022 32.3  26.6 - 33.0 pg Final   • MCHC 10/24/2022 33.7  31.5 - 35.7 g/dL Final   • RDW 10/24/2022 12.4  12.3 - 15.4 % Final   • RDW-SD 10/24/2022 43.0  37.0 - 54.0 fl Final   • MPV 10/24/2022 9.3  6.0 - 12.0 fL Final   • Platelets 10/24/2022 258  140 - 450 10*3/mm3 Final   • Neutrophil % 10/24/2022 59.3  42.7 - 76.0 % Final   • Lymphocyte % 10/24/2022 28.9  19.6 - 45.3 % Final   • Monocyte % 10/24/2022 8.9  5.0 - 12.0 % Final   • Eosinophil % 10/24/2022 1.9  0.3 - 6.2 % Final   • Basophil % 10/24/2022 0.5  0.0 - 1.5 % Final   • Immature Grans  % 10/24/2022 0.5  0.0 - 0.5 % Final   • Neutrophils, Absolute 10/24/2022 3.68  1.70 - 7.00 10*3/mm3 Final   • Lymphocytes, Absolute 10/24/2022 1.79  0.70 - 3.10 10*3/mm3 Final   • Monocytes, Absolute 10/24/2022 0.55  0.10 - 0.90 10*3/mm3 Final   • Eosinophils, Absolute 10/24/2022 0.12  0.00 - 0.40 10*3/mm3 Final   • Basophils, Absolute 10/24/2022 0.03  0.00 - 0.20 10*3/mm3 Final   • Immature Grans, Absolute 10/24/2022 0.03  0.00 - 0.05 10*3/mm3 Final   • nRBC 10/24/2022 0.0  0.0 - 0.2 /100 WBC Final        No radiology results for the last 30 days.       Assessment & Plan       *Left breast invasive ductal carcinoma  · T1 cN0 M0, stage Ia, multifocal  · 2 sites were biopsied, one site was consistent with ductal carcinoma in situ which is ER/NM positive and the second site was invasive ductal carcinoma with lobular features which is ER/NM positive and HER2 negative, grade 2  The steps of curative intent breast cancer treatment have been explained, including surgery, possible chemotherapy, possible radiation and possible endocrine therapy.   · We discussed that given the fact that she does not want to undergo mastectomy and concerned about the complications due to ongoing dyspnea and her age it would be reasonable to proceed with neoadjuvant endocrine therapy.  · The 2 options including tamoxifen and aromatase inhibitors have been discussed.  · Given the history of DVT tamoxifen is not an option  · We discussed anastrozole 1 mg p.o. daily.  Adverse effects including but not limited to hot flashes, mood changes, fatigue, insomnia, decrease in bone mineral density, vaginal dryness, sexual dysfunction.  · Patient was particularly concerned about insomnia since she has severe trouble sleeping.  · She is currently on trazodone to help with the same.    · She has been on anastrozole for 1 month since November 2022 and tolerating that well.  · Presents today with concerns of erythema of the left breast.  · The left breast  has been examined and there is some mild erythema in the area of the tumor.  There is also extreme tenderness to palpation.  · I would like to proceed with obtaining a diagnostic mammogram and ultrasound.    *PE  · Hypercoagulability work-up negative  · Abnormal lupus anticoagulant likely secondary to Xarelto  · Prothrombin gene mutation and factor V Leiden not covered by insurance  · Continues on Xarelto  · She is to be on anticoagulation indefinitely.  · Recent CT angiogram shows resolution of the blood clots.    *Anxiety  · Poorly controlled since the death of her daughter  · Currently on Cymbalta  · She plans to discuss with Dr. Moore regarding change in medications  · Also on trazodone  · Follow-up with supportive oncology    *Depression  · Poorly controlled  · Although stressors including health situation, death of her daughter, recent diagnosis of breast cancer  · Referred to supportive oncology  · Stable    *Dyspnea  · Unclear etiology  · Repeat CT chest shows improvement in pulmonary embolism  · She reports that dyspnea has improved some    *Hypertension and hyperlipidemia-continue current medications, blood pressure 140/67    *Bone health-DEXA from 2020 reviewed and normal  Repeat DEXA prior to follow-up    *[id number] diagnostic mammogram as well as ultrasound of the left breast as soon as possible  Follow-up with me following the imaging.

## 2022-12-20 ENCOUNTER — APPOINTMENT (OUTPATIENT)
Dept: BONE DENSITY | Facility: HOSPITAL | Age: 77
End: 2022-12-20

## 2022-12-20 ENCOUNTER — TELEPHONE (OUTPATIENT)
Dept: SURGERY | Facility: CLINIC | Age: 77
End: 2022-12-20

## 2022-12-20 NOTE — TELEPHONE ENCOUNTER
Patient called to let us know that Pulmonologist had her get a CT at [facility]. She received the results from his office today. She let me know blood clots in lungs are gone. Patient wanted to let us know and see how this affects her plan with us.

## 2022-12-21 ENCOUNTER — TELEPHONE (OUTPATIENT)
Dept: SURGERY | Facility: CLINIC | Age: 77
End: 2022-12-21

## 2022-12-21 NOTE — TELEPHONE ENCOUNTER
I clarified plan with patient. She let me know that at some point she feels like she will need surgery.   Right now she is doing AI, rather than surgery-Mastectomy.   She tells me her only concern would be cancer spreading.   Patient to follow up with Dr. Fam in May 2022.     I let her know the decision about stopping anticoag meds will be given by Dr. Arzate.     Patient voiced understanding.

## 2022-12-23 ENCOUNTER — HOSPITAL ENCOUNTER (OUTPATIENT)
Dept: ULTRASOUND IMAGING | Facility: HOSPITAL | Age: 77
End: 2022-12-23

## 2022-12-23 ENCOUNTER — HOSPITAL ENCOUNTER (OUTPATIENT)
Dept: MAMMOGRAPHY | Facility: HOSPITAL | Age: 77
End: 2022-12-23

## 2022-12-27 ENCOUNTER — TELEPHONE (OUTPATIENT)
Dept: SURGERY | Facility: HOSPITAL | Age: 77
End: 2022-12-27

## 2022-12-27 NOTE — TELEPHONE ENCOUNTER
Note from [place] [facility] dated November 16, 2022 Dr. [email or web address].Fran.  Impression most likely undiagnosed obstructive sleep apnea.  Discussed with her need for testing and CPAP.  Will repeat CTA of the chest to evaluate for pulmonary embolus as she has persistent symptoms.  We will also help evaluate the parenchyma.  Cardiac work-up was negative per her report.    No pulmonary contraindication for surgery if no concerning changes on her CTA.    Ongoing hypercoagulation evaluation per hematology.    December 8, 2022 CT angiogram [facility]:  The right lower lobe pulmonary embolism seen previously is no longer present.  The enlarged right ventricle appear similar as on prior exam.      Note from Dr. Arzate December 13, 2022:  Patient has been on anastrozole for 1 month since November 2022 tolerating that well.  Patient presents with concerns of erythema of the left breast.  There is mild erythema in the area of tumor.  There is also tenderness to palpation.  We will proceed with diagnostic mammogram and ultrasound.  With regards to the pulmonary embolism, hypercoagulability work-up is negative.  Prothrombin gene mutation factor V Leiden not covered by insurance.  Continues on the Xarelto and is to be on this indefinitely.  Recent CTA shows resolution of the blood clots.  Anxiety and depression poorly controlled.  Referred to supportive oncology.  Dyspnea unclear etiology.        Left diagnostic mammogram with 3D [facility] January 19, 2023:  There are 2 coil shaped biopsy clips at 3:00 and 4:00.  The medial margin of the 3:00 clip there are calcifications that are not appreciably changed.  2 clusters measuring 9 mm and 2 mm.  No abnormality is seen adjacent to the coil clip at 4:00.    BI-RADS 6

## 2023-01-04 ENCOUNTER — TELEPHONE (OUTPATIENT)
Dept: ONCOLOGY | Facility: CLINIC | Age: 78
End: 2023-01-04
Payer: MEDICARE

## 2023-01-04 NOTE — TELEPHONE ENCOUNTER
Caller: McManus, Claudette L    Relationship: Self    Best call back number: [phone]    What is the best time to reach you: ASAP    Who are you requesting to speak with (clinical staff, provider,  specific staff member): SCHEDULING    What was the call regarding: PT WANTED TO LET US KNOW SHE NOW HAS HER US AND MAMMOGRAM SCHEDULED FOR JAN.19TH, AND SHE THOUGHT SHE STILL HAD A FOLLOW UP WITH DR TOM SCHEDULED FOR JAN.27, BUT THIS APPT IS SHOWING CANCELED.  PT WAS NOT AWARE THIS HAD BEEN CANCELED, SHE WAS WONDERING IF THIS 1/27 FOLLOW UP WOULD WORK FOLLOWING HER US AND MAMMOGRAM ON 1/19    Do you require a callback: YES, PLEASE CALL PT BACK TO CLARIFY WHAT APPT'S ARE NEEDED AND TO RESCHEDULE W/ DR TOM.

## 2023-01-12 ENCOUNTER — TELEPHONE (OUTPATIENT)
Dept: INTERNAL MEDICINE | Facility: CLINIC | Age: 78
End: 2023-01-12

## 2023-01-12 DIAGNOSIS — F41.8 SITUATIONAL ANXIETY: ICD-10-CM

## 2023-01-12 NOTE — TELEPHONE ENCOUNTER
Caller: McManus, Claudette L    Relationship: Self    Best call back number: [phone]    What is the best time to reach you: ANY     Who are you requesting to speak with (clinical staff, provider,  specific staff member): CLINICAL STAFF     Do you know the name of the person who called:     What was the call regarding: NO ABLE TO FIND BALTA ANYWHERE. WILL NEED TO KNOW WHAT TO DO. PATIENT WOULD ALSO LIKE TO KNOW IF SHE NEEDS TO STAY ON THE BLOOD THINNERS, SINCE HER CAT SCANS WERE SHOWING NO CLOTS FOUND.     Do you require a callback: YES

## 2023-01-13 RX ORDER — LORAZEPAM 0.5 MG/1
TABLET ORAL
Qty: 60 TABLET | Refills: 5 | Status: SHIPPED | OUTPATIENT
Start: 2023-01-13

## 2023-01-19 ENCOUNTER — HOSPITAL ENCOUNTER (OUTPATIENT)
Dept: ULTRASOUND IMAGING | Facility: HOSPITAL | Age: 78
End: 2023-01-19
Payer: MEDICARE

## 2023-01-19 ENCOUNTER — HOSPITAL ENCOUNTER (OUTPATIENT)
Dept: MAMMOGRAPHY | Facility: HOSPITAL | Age: 78
Discharge: HOME | End: 2023-01-19
Admitting: INTERNAL MEDICINE
Payer: MEDICARE

## 2023-01-19 DIAGNOSIS — N64.89 OTHER SPECIFIED DISORDERS OF BREAST: ICD-10-CM

## 2023-01-19 DIAGNOSIS — C50.912 DUCTAL CARCINOMA OF LEFT BREAST: ICD-10-CM

## 2023-01-19 PROCEDURE — 77065 DX MAMMO INCL CAD UNI: CPT

## 2023-01-19 PROCEDURE — G0279 TOMOSYNTHESIS, MAMMO: HCPCS

## 2023-01-20 ENCOUNTER — APPOINTMENT (OUTPATIENT)
Dept: MAMMOGRAPHY | Facility: HOSPITAL | Age: 78
End: 2023-01-20

## 2023-01-20 ENCOUNTER — APPOINTMENT (OUTPATIENT)
Dept: ULTRASOUND IMAGING | Facility: HOSPITAL | Age: 78
End: 2023-01-20

## 2023-01-23 ENCOUNTER — PATIENT OUTREACH (OUTPATIENT)
Dept: OTHER | Facility: HOSPITAL | Age: 78
End: 2023-01-23
Payer: MEDICARE

## 2023-01-23 NOTE — PROGRESS NOTES
Called Ms. Balderasanus to see how she was doing. Left a message with my contact information and asked her to call back at her convenience.

## 2023-01-24 ENCOUNTER — OFFICE VISIT (OUTPATIENT)
Dept: ONCOLOGY | Facility: CLINIC | Age: 78
End: 2023-01-24
Payer: MEDICARE

## 2023-01-24 VITALS
SYSTOLIC BLOOD PRESSURE: 137 MMHG | HEART RATE: 66 BPM | HEIGHT: 65 IN | WEIGHT: 182.4 LBS | RESPIRATION RATE: 18 BRPM | TEMPERATURE: 97.4 F | DIASTOLIC BLOOD PRESSURE: 82 MMHG | OXYGEN SATURATION: 95 % | BODY MASS INDEX: 30.39 KG/M2

## 2023-01-24 DIAGNOSIS — C50.912 DUCTAL CARCINOMA OF LEFT BREAST: Primary | ICD-10-CM

## 2023-01-24 DIAGNOSIS — R92.8 OTHER ABNORMAL AND INCONCLUSIVE FINDINGS ON DIAGNOSTIC IMAGING OF BREAST: ICD-10-CM

## 2023-01-24 PROCEDURE — 99214 OFFICE O/P EST MOD 30 MIN: CPT | Performed by: INTERNAL MEDICINE

## 2023-01-24 NOTE — PROGRESS NOTES
Subjective   Claudette L McManus is a 77 y.o. female.  Referred by Dr. Fam for left breast invasive ductal carcinoma    History of Present Illness   Ms. 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   Comorbidities include hypertension, hyperlipidemia, anxiety/depression, insomnia, progressive dyspnea on exertion, diagnosed with pulmonary embolism in May 2022 and on anticoagulation with Xarelto, hypothyroidism.    7/29/2022-bilateral screening mammogram  Indeterminate left breast calcifications and focal asymmetry.  1 of which is associated with questioned architectural distortion.  Further evaluation recommended.  Neck on 8/25/2022-left breast diagnostic mammogram and ultrasound  In the lower slightly outer middle left breast there is a 1.5 cm mass corresponding to the architectural distortion.  Focal asymmetry in the outer central middle left breast partially effaces with spot compression and less conspicuous.  Right upper middle left breast there is persistent approximately 1 cm mass with corresponding architectural distortion.  The upper central anterior left breast there is a persistent focal asymmetry with calcifications.    Ultrasound  At 1:00 retroareolar left breast-2.3 x 0.9 x 2 cm hypoechoic mass with indistinct margins and internal echogenic foci from calcifications.  There is tubular elevation of the mass concerning for probable extension  At 3:00, retroareolar left breast-1.1 x 0.8 x 1 cm hypoechoic mass with indistinct margins, corresponds to suspicious group of calcifications.  At 4:00, 3 cm from the nipple there is a 1.4 and 1 x 1.1 cm irregular hypoechoic mass with peripheral vascularity corresponding to the mass with distortion on mammogram  At 430, 3 cm from the nipple-1.8 cm from the above mass there is a 0.9 x 0.5 x 0.9 cm hypoechoic mass with indistinct margins which is suspicious  At 3:00, 5 cm from the nipple  there is a 0.9 0.7 x 0.7 cm irregular hypoechoic mass with indistinct margins.    Impression  Multiple masses in the left breast, which demonstrate corresponding architectural distortion.  2 site ultrasound-guided biopsy targeting the dominant masses at 1:00 in the retroareolar breast and 4:00, 3 cm from the nipple with management of additional masses pending biopsy results.  Contrast-enhanced MRI recommended    9/21/2022-2 site biopsy  1.left breast 3:00, 2 cm from nipple-ultrasound-guided biopsy of the mass  Low-grade ductal carcinoma in situ involving a small intraductal papilloma.  DCIS measures 14 mm  ER positive, CA positive    2.left breast o'clock, 2 cm from the nipple ultrasound-guided biopsy  Invasive ductal carcinoma with lobular features  Grade 2  Millimeters on core biopsy  Atypical ductal hyperplasia with cavitations and involving an intraductal papilloma  Negative lymphovascular space invasion  ER +% strong  CA +% strong  HER2 negative, nonamplified on FISH  Ki-67 15%    She was seen by Dr. Fam and discussed mastectomy given several abnormalities in the left breast.  Since she had progressive worsening of dyspnea on exertion from 2022 without acute explanation, recent diagnosis of pulmonary embolism requiring chronic anticoagulation, her age and concerns regarding complications of mastectomy patient opted not to proceed with mastectomy  She is here to discuss neoadjuvant endocrine therapy.    She reports severe insomnia for which she is currently on trazodone.  Tried gabapentin in the past but did not help.  She is also been diagnosed with peripheral neuropathy.  She is on Cymbalta anxiety.    She had a daughter who is treated for breast cancer but eventually she passed away from suicide.  Patient reports significant anxiety since the death of her daughter.  Her daughter have been diagnosed with bipolar disorder.    Genetic testing performed and no significant pathogenic  mutation.    For the dyspnea, she evaluated by  and per patient she was recommended to have a sleep study.  She is not comfortable using the CPAP and did not wish to undergo a sleep study.  She was prescribed inhalers which she had used for a month without much help.    On the CT PE protocol performed in May 2022 there was concern for chronic pulmonary emboli and pulmonary hypertension.  For this reason she underwent a cardiac catheterization on 9/26/2022 which showed normal pulmonary pressures, normal left-sided pressures, elevated right ventricular filling pressures, normal PVR, normal RV SWI  The etiology of the dyspnea somewhat unclear.    12/8/2022-CT of the chest-tiny right lower lobe pulmonary embolism seen previously is no longer present.  Enlarged right ventricle appears similar on prior exam.  No other abnormalities noted.    Interval history  Ms. Gomez presents to the clinic today for follow-up.  She remains on anastrozole with no new complaints.  The erythema of the left breast has resolved completely.  She had a diagnostic mammogram of the left breast performed on 1/19/2023 on which there are stable microcalcifications noted at the inferior margin of the biopsy clip at 3:00.  Ultrasound has not been performed.  She is tolerating anastrozole fairly well without any problems.  However she remains anxious about the possibility of disease progression on anastrozole and concerned about the risk of having distant metastasis in the event the drug does not work.  She is also on Xarelto and has been on it since May 2022 and wants to know if she can stop anticoagulation since the blood clots have resolved on the CT.    The following portions of the patient's history were reviewed and updated as appropriate: allergies, current medications, past family history, past medical history, past social history, past surgical history and problem list.    Past Medical History:   Diagnosis Date   • Benign  essential hypertension    • Chronic bilateral low back pain without sciatica 8/16/2013 March 13, 2019--Limited bone scan.  This reveals scintigraphic activity in the spine and at the right shoulder corresponds to change seen on recent x-rays and is best explained by degenerative change.  Isolated metastatic disease exactly corresponding to the sites of extensive degenerative disc change would be peculiar.  March 7, 2019--new patient to get established.  She has complaints of approxi   • Chronic bilateral thoracic back pain 2/26/2019 March 13, 2019--Limited bone scan.  This reveals scintigraphic activity in the spine and at the right shoulder corresponds to change seen on recent x-rays and is best explained by degenerative change.  Isolated metastatic disease exactly corresponding to the sites of extensive degenerative disc change would be peculiar.  March 1, 2019--x-ray of the lumbar and thoracic spine reveals mild anterior l   • Chronic insomnia 11/4/2014   • Diabetic peripheral neuropathy (HCC) 3/7/2019    Characterized by decreased sensation and paresthesias in both lower extremities described as a burning sensation.  Extends up to the knees.  Reportedly had been evaluated with nerve conduction study in the past.   • Generalized anxiety disorder 5/19/2013   • History of Bell's palsy 5/21/2013    Remote history of Bell's palsy.  Patient does not remember which side of the face.   • Hyperlipidemia    • Impaired fasting glucose    • Major depression    • Menopausal state, 8/19/2020--normal DEXA. 3/7/2019    August 19, 2020--DEXA scan reveals lumbar spine T score 3.8.  Left femoral neck T score 2.5.  Right femoral neck T score 2.2.  Normal bone density.   • Non morbid obesity 8/11/2022   • Peripheral neuropathy, idiopathic 3/7/2019    Characterized by decreased sensation and paresthesias in both lower extremities described as a burning sensation.  Extends up to the knees.  Reportedly had been evaluated with  nerve conduction study in the past.   • Primary hypothyroidism 5/19/2013   • Primary osteoarthritis involving multiple joints 4/22/2013   • Rotator cuff arthropathy of right shoulder, 4/20/2021--status post right reverse total shoulder arthroplasty 5/27/2020   • Situational mixed anxiety and depressive disorder 8/21/2019 August 21, 2019--patient seen in follow-up after I called in a prescription for Ativan after the patient's  contacted me a few weeks ago regarding the sudden death of patient's daughter.  This was an apparent suicide and the patient found her daughter dead.  I will not go into details.  Patient reports the Lorazepam has been helpful but she is still quite distraught, nervous, and undergoing   • Type 2 diabetes mellitus with diabetic neuropathy, without long-term current use of insulin (HCC)    • Vitamin D deficiency 2/26/2019        Past Surgical History:   Procedure Laterality Date   • BILATERAL BREAST REDUCTION  Early 2000    Early 2000--bilateral breast reduction   • CARDIAC CATHETERIZATION N/A 9/26/2022    Procedure: Right Heart Cath;  Surgeon: Agus Solorio MD PhD;  Location: [facility] INVASIVE LOCATION;  Service: Cardiology;  Laterality: N/A;  Will REMAIN on [facility] for the case   • CHOLECYSTECTOMY  1960s    1960s--open cholecystectomy   • COLONOSCOPY  2012 2012--reportedly normal colonoscopy   • INCONTINENCE SURGERY  2012 Approximately 2012--implantation of questionable bladder stimulator right sacral area for urinary incontinence.  Details not known.   • REPLACEMENT TOTAL KNEE BILATERAL Left 2010; 2011 2010-2011--bilateral total knee replacement   • SUBTOTAL HYSTERECTOMY  Remote    Remote partial hysterectomy.  Ovaries intact.   • TOTAL SHOULDER REPLACEMENT Left 2013 2013--left total shoulder replacement.   • TOTAL SHOULDER REPLACEMENT  April 28, 2021 4/20/2021--status post right reverse total shoulder arthroplasty   • TOTAL SHOULDER REVERSE ARTHROPLASTY  "Right 2021--right reverse total shoulder replacement.        Family History   Problem Relation Age of Onset   • Alcohol abuse Father    • Breast cancer Daughter         40s   • Cancer Other    • Diabetes Other         type II   • Leukemia Grandchild 19        Social History     Socioeconomic History   • Marital status:    Tobacco Use   • Smoking status: Former     Types: Cigarettes     Quit date: 1998     Years since quittin.9   • Smokeless tobacco: Never   Vaping Use   • Vaping Use: Never used   Substance and Sexual Activity   • Alcohol use: Yes     Alcohol/week: 1.0 standard drink     Types: 1 Glasses of wine per week     Comment: current some day   • Drug use: No   • Sexual activity: Not Currently     Partners: Male        OB History        1    Para   1    Term   1            AB        Living           SAB        IAB        Ectopic        Molar        Multiple        Live Births                 Age at menarche-10  Age at first live childbirth-12   1 para 1  0  She had a hysterectomy in the 90s, ovaries still present  Oral contraceptive pill use for 6 to 8 weeks  No use of hormone replacement therapy    Allergies   Allergen Reactions   • Morphine And Related    • Other Other (See Comments)     REJECTED DACRON SUTURES IN THE PAST AND INCISION CAME APART            Review of systems as mentioned in the HPI    Objective   Blood pressure 144/77, pulse 66, temperature 97.5 °F (36.4 °C), temperature source Temporal, resp. rate 16, height 165.1 cm (65\"), weight 86.9 kg (191 lb 9.6 oz), SpO2 95 %, not currently breastfeeding.   Physical Exam  Constitutional:       Appearance: Normal appearance. She is normal weight.   HENT:      Head: Normocephalic and atraumatic.      Right Ear: External ear normal.      Left Ear: External ear normal.      Nose: Nose normal.      Mouth/Throat:      Mouth: Mucous membranes are moist.      Pharynx: Oropharynx is clear. "   Eyes:      Extraocular Movements: Extraocular movements intact.      Conjunctiva/sclera: Conjunctivae normal.      Pupils: Pupils are equal, round, and reactive to light.   Cardiovascular:      Rate and Rhythm: Normal rate and regular rhythm.      Pulses: Normal pulses.      Heart sounds: Normal heart sounds.   Pulmonary:      Effort: Pulmonary effort is normal.      Breath sounds: Normal breath sounds.   Abdominal:      General: Abdomen is flat. Bowel sounds are normal.   Musculoskeletal:         General: Normal range of motion.      Cervical back: Normal range of motion.   Skin:     General: Skin is warm.   Neurological:      General: No focal deficit present.      Mental Status: She is alert and oriented to person, place, and time.   Psychiatric:         Mood and Affect: Mood normal.         Behavior: Behavior normal.         Thought Content: Thought content normal.         Judgment: Judgment normal.       Breast Exam: Right breast appears normal on inspection.  No palpable abnormalities of the right breast.  Left breast on erythema has resolved.  Tenderness resolved.  Between 3 to 5 o'clock position of the left breast adjacent to the areola there are some firmness which could be related to recent biopsy versus underlying malignancy.  No palpable right or left axilla lymphadenopathy.          No visits with results within 30 Day(s) from this visit.   Latest known visit with results is:   Lab on 10/24/2022   Component Date Value Ref Range Status   • Glucose 10/24/2022 108  74 - 124 mg/dL Final   • BUN 10/24/2022 11  6 - 20 mg/dL Final   • Creatinine 10/24/2022 0.96  0.60 - 1.10 mg/dL Final   • Sodium 10/24/2022 135  134 - 145 mmol/L Final   • Potassium 10/24/2022 4.4  3.5 - 4.7 mmol/L Final   • Chloride 10/24/2022 95 (L)  98 - 107 mmol/L Final   • CO2 10/24/2022 26.4  22.0 - 29.0 mmol/L Final   • Calcium 10/24/2022 10.7 (C)  8.5 - 10.2 mg/dL Final   • Total Protein 10/24/2022 7.9  6.3 - 8.0 g/dL Final   •  Albumin 10/24/2022 4.90  3.50 - 5.20 g/dL Final   • ALT (SGPT) 10/24/2022 25  0 - 33 U/L Final   • AST (SGOT) 10/24/2022 29  0 - 32 U/L Final   • Alkaline Phosphatase 10/24/2022 67  38 - 116 U/L Final   • Total Bilirubin 10/24/2022 0.5  0.2 - 1.2 mg/dL Final   • Globulin 10/24/2022 3.0  1.8 - 3.5 gm/dL Final   • A/G Ratio 10/24/2022 1.6  1.1 - 2.4 g/dL Final   • BUN/Creatinine Ratio 10/24/2022 11.5  7.3 - 30.0 Final   • Anion Gap 10/24/2022 13.6  5.0 - 15.0 mmol/L Final   • eGFR 10/24/2022 60.3  >60.0 mL/min/1.73 Final    National Kidney Foundation and American Society of Nephrology (ASN) Task Force recommended calculation based on the Chronic Kidney Disease Epidemiology Collaboration (CKD-EPI) equation refit without adjustment for race.   • WBC 10/24/2022 6.20  3.40 - 10.80 10*3/mm3 Final   • RBC 10/24/2022 4.27  3.77 - 5.28 10*6/mm3 Final   • Hemoglobin 10/24/2022 13.8  12.0 - 15.9 g/dL Final   • Hematocrit 10/24/2022 40.9  34.0 - 46.6 % Final   • MCV 10/24/2022 95.8  79.0 - 97.0 fL Final   • MCH 10/24/2022 32.3  26.6 - 33.0 pg Final   • MCHC 10/24/2022 33.7  31.5 - 35.7 g/dL Final   • RDW 10/24/2022 12.4  12.3 - 15.4 % Final   • RDW-SD 10/24/2022 43.0  37.0 - 54.0 fl Final   • MPV 10/24/2022 9.3  6.0 - 12.0 fL Final   • Platelets 10/24/2022 258  140 - 450 10*3/mm3 Final   • Neutrophil % 10/24/2022 59.3  42.7 - 76.0 % Final   • Lymphocyte % 10/24/2022 28.9  19.6 - 45.3 % Final   • Monocyte % 10/24/2022 8.9  5.0 - 12.0 % Final   • Eosinophil % 10/24/2022 1.9  0.3 - 6.2 % Final   • Basophil % 10/24/2022 0.5  0.0 - 1.5 % Final   • Immature Grans % 10/24/2022 0.5  0.0 - 0.5 % Final   • Neutrophils, Absolute 10/24/2022 3.68  1.70 - 7.00 10*3/mm3 Final   • Lymphocytes, Absolute 10/24/2022 1.79  0.70 - 3.10 10*3/mm3 Final   • Monocytes, Absolute 10/24/2022 0.55  0.10 - 0.90 10*3/mm3 Final   • Eosinophils, Absolute 10/24/2022 0.12  0.00 - 0.40 10*3/mm3 Final   • Basophils, Absolute 10/24/2022 0.03  0.00 - 0.20 10*3/mm3  Final   • Immature Grans, Absolute 10/24/2022 0.03  0.00 - 0.05 10*3/mm3 Final   • nRBC 10/24/2022 0.0  0.0 - 0.2 /100 WBC Final        No radiology results for the last 30 days.       Assessment & Plan       *Left breast invasive ductal carcinoma  · T1 cN0 M0, stage Ia, multifocal  · 2 sites were biopsied, one site was consistent with ductal carcinoma in situ which is ER/IL positive and the second site was invasive ductal carcinoma with lobular features which is ER/IL positive and HER2 negative, grade 2  The steps of curative intent breast cancer treatment have been explained, including surgery, possible chemotherapy, possible radiation and possible endocrine therapy.   · We discussed that given the fact that she does not want to undergo mastectomy and concerned about the complications due to ongoing dyspnea and her age it would be reasonable to proceed with neoadjuvant endocrine therapy.  · The 2 options including tamoxifen and aromatase inhibitors have been discussed.  · Given the history of DVT tamoxifen is not an option  · We discussed anastrozole 1 mg p.o. daily.  Adverse effects including but not limited to hot flashes, mood changes, fatigue, insomnia, decrease in bone mineral density, vaginal dryness, sexual dysfunction.   · She has been on anastrozole  since November 2022 and tolerating that well.  · Seen in the clinic in December 2022 with concerns of erythema of the left breast.  This has since resolved.  · Diagnostic mammogram 1/19/2022 of the left breast shows stable microcalcifications and no new concerning findings.  · We discussed about continuing anastrozole.  · Patient remains concerned about the risk of progression on anastrozole and also the risk for metastasis.  · Explained to her that given that the disease is low-grade and strongly ER/IL positive the chances of progression on anastrozole are low and also we will continue to monitor with mammograms and ultrasounds closely.  · She wants to  discuss with Dr. Fam regarding left mastectomy.    *PE  · Hypercoagulability work-up negative  · Abnormal lupus anticoagulant likely secondary to Xarelto  · Prothrombin gene mutation and factor V Leiden not covered by insurance  · Continues on Xarelto  · Recent CT angiogram shows resolution of the blood clots.  · The initial plan was to continue patient on indefinite anticoagulation as there was no clear provoking event for the PE.  · However she is concerned about remaining on anticoagulation long-term.  · She has completed 6 months of therapy.   · We discussed increased risk of recurrent DVT and PE in individuals with a previous history of PE.  · Since is the first episode of PE, could consider discontinuation of anticoagulation and continue surveillance.    *Anxiety  · Poorly controlled since the death of her daughter  · Continues on Cymbalta  · Also on trazodone  · Continue follow-up with supportive oncology    *Depression  · Stable    *Dyspnea  · Unclear etiology  · Repeat CT chest shows improvement in pulmonary embolism  · She feels like the dyspnea has improved.  · She does not have a follow-up with pulmonary at this time    *Hypertension and hyperlipidemia-continue current medications, blood pressure 137/82    *Bone health-DEXA from 2020 reviewed and normal  DEXA has been ordered but not performed.    *Follow-up-4 months with diagnostic mammogram and ultrasound of the left breast prior to follow-up.

## 2023-02-03 ENCOUNTER — TELEPHONE (OUTPATIENT)
Dept: INTERNAL MEDICINE | Facility: CLINIC | Age: 78
End: 2023-02-03

## 2023-02-03 NOTE — TELEPHONE ENCOUNTER
Caller: McManus, Claudette L     Relationship: [unfilled]     Best call back number:[phone]    What is your medical concern? IS TAKING rivaroxaban (XARELTO) 20 MG tablet BECAUSE SHE WAS HAVING BLOOD CLOTS IN HER LUNGS. AT HER 6 MONTH APPOINTMENT WITH DR TOM PATIENT SHOWED NO BLOOD CLOTS IN HER IN LUNGS. PATIENT WOULD LIKE TO STOP THIS MEDICATION AS HER  PASSED DUE TO AN INJURY WORSENED BY BEING ON BLOOD THINNERS.     How long has this issue been going on? 3 WEEKS    Is your provider already aware of this issue? NO    Have you been treated for this issue? NO    PATIENT HAS BEEN TRYING TO GET A HOLD OF CLINICAL TO DISCUSS WITH DR CROOKS OR GET AN APPOINTMENT WITH HIM.

## 2023-02-07 DIAGNOSIS — E11.40 TYPE 2 DIABETES MELLITUS WITH DIABETIC NEUROPATHY, WITHOUT LONG-TERM CURRENT USE OF INSULIN: Primary | Chronic | ICD-10-CM

## 2023-02-07 RX ORDER — TIRZEPATIDE 15 MG/.5ML
15 INJECTION, SOLUTION SUBCUTANEOUS WEEKLY
Qty: 6 ML | Refills: 3 | Status: SHIPPED | OUTPATIENT
Start: 2023-02-07 | End: 2023-03-16 | Stop reason: SDUPTHER

## 2023-02-16 ENCOUNTER — TELEPHONE (OUTPATIENT)
Dept: INTERNAL MEDICINE | Facility: CLINIC | Age: 78
End: 2023-02-16

## 2023-02-16 NOTE — TELEPHONE ENCOUNTER
Caller: McManus, Claudette L    Relationship: Self    Best call back number: [phone]     What medications are you currently taking: XARELTA 20MG     What are your concerns: PATIENT CALLING WANTING TO KNOW IF  HAS ANY SAMPLES OF THIS MEDICATION SHE STATED THAT SHE HAS ABOUT A WEEKS WORTH OF MEDICATION

## 2023-02-22 NOTE — TELEPHONE ENCOUNTER
PATIENT CALLING TO FOLLOW UP ON THIS REQUEST PLEASE REACH OUT TO PATIENT AND LEFT HER KNOW IF THERE IS ANY SAMPLES SHE IS EXPERIENCING  RESTLESS LEG SYNDROME.    PLEASE ADVISE   [phone]

## 2023-02-24 ENCOUNTER — PATIENT OUTREACH (OUTPATIENT)
Dept: OTHER | Facility: HOSPITAL | Age: 78
End: 2023-02-24
Payer: MEDICARE

## 2023-02-24 NOTE — PROGRESS NOTES
Called patient. Introduced myself and explained that I work with Chapis, breast navigator. The patient states that she is doing ok. She is taking Arimidex, which has caused hot flashes. She is also having restless legs. She meets with an APRN @ Dr. Moore' office on Monday to discuss this. Currently she can't sleep without taking pain medication. She is hoping the APRN will have another solution for her to manage her restless legs.     The patient will have a mammogram/US 5/1 and is scheduled to meet with Cristel 5/8 and Dr. Arzate again 5/16. She stated the cancer in her left breast has not grown in 6 mths.     We discussed resources available to her in the cancer center. She may consider massage. Provided her with main number to call to schedule that appt if she wants to proceed.      Patient expressed gratitude for the call and denied any additional needs at this time.  Will contact patient after her May appts; she has Chapis's info and will call as needed

## 2023-02-27 ENCOUNTER — OFFICE VISIT (OUTPATIENT)
Dept: INTERNAL MEDICINE | Facility: CLINIC | Age: 78
End: 2023-02-27
Payer: MEDICARE

## 2023-02-27 VITALS
RESPIRATION RATE: 16 BRPM | SYSTOLIC BLOOD PRESSURE: 142 MMHG | DIASTOLIC BLOOD PRESSURE: 90 MMHG | HEIGHT: 65 IN | TEMPERATURE: 97.3 F | BODY MASS INDEX: 30.35 KG/M2 | OXYGEN SATURATION: 96 % | HEART RATE: 73 BPM

## 2023-02-27 DIAGNOSIS — Z76.0 MEDICATION REFILL: ICD-10-CM

## 2023-02-27 DIAGNOSIS — I27.82 CHRONIC PULMONARY THROMBOEMBOLISM SYNDROME: ICD-10-CM

## 2023-02-27 DIAGNOSIS — I26.99 MULTIPLE PULMONARY EMBOLI: ICD-10-CM

## 2023-02-27 DIAGNOSIS — F51.04 CHRONIC INSOMNIA: Chronic | ICD-10-CM

## 2023-02-27 DIAGNOSIS — E03.9 PRIMARY HYPOTHYROIDISM: Chronic | ICD-10-CM

## 2023-02-27 DIAGNOSIS — Z79.01 CHRONIC ANTICOAGULATION: Chronic | ICD-10-CM

## 2023-02-27 DIAGNOSIS — Z79.01 CHRONIC ANTICOAGULATION: Primary | Chronic | ICD-10-CM

## 2023-02-27 PROCEDURE — 99214 OFFICE O/P EST MOD 30 MIN: CPT | Performed by: NURSE PRACTITIONER

## 2023-02-27 RX ORDER — TIRZEPATIDE 10 MG/.5ML
10 INJECTION, SOLUTION SUBCUTANEOUS WEEKLY
COMMUNITY
Start: 2022-12-16

## 2023-02-28 LAB
ALBUMIN SERPL-MCNC: 4.8 G/DL (ref 3.7–4.7)
ALBUMIN/GLOB SERPL: 1.8 {RATIO} (ref 1.2–2.2)
ALP SERPL-CCNC: 70 IU/L (ref 44–121)
ALT SERPL-CCNC: 22 IU/L (ref 0–32)
AST SERPL-CCNC: 24 IU/L (ref 0–40)
BASOPHILS # BLD AUTO: 0 X10E3/UL (ref 0–0.2)
BASOPHILS NFR BLD AUTO: 1 %
BILIRUB SERPL-MCNC: 0.4 MG/DL (ref 0–1.2)
BUN SERPL-MCNC: 13 MG/DL (ref 8–27)
BUN/CREAT SERPL: 14 (ref 12–28)
CALCIUM SERPL-MCNC: 10.2 MG/DL (ref 8.7–10.3)
CHLORIDE SERPL-SCNC: 95 MMOL/L (ref 96–106)
CO2 SERPL-SCNC: 25 MMOL/L (ref 20–29)
CREAT SERPL-MCNC: 0.93 MG/DL (ref 0.57–1)
EGFRCR SERPLBLD CKD-EPI 2021: 62 ML/MIN/1.73
EOSINOPHIL # BLD AUTO: 0.2 X10E3/UL (ref 0–0.4)
EOSINOPHIL NFR BLD AUTO: 3 %
ERYTHROCYTE [DISTWIDTH] IN BLOOD BY AUTOMATED COUNT: 13.4 % (ref 11.7–15.4)
GLOBULIN SER CALC-MCNC: 2.6 G/DL (ref 1.5–4.5)
GLUCOSE SERPL-MCNC: 108 MG/DL (ref 70–99)
HCT VFR BLD AUTO: 40.5 % (ref 34–46.6)
HGB BLD-MCNC: 13.6 G/DL (ref 11.1–15.9)
IMM GRANULOCYTES # BLD AUTO: 0 X10E3/UL (ref 0–0.1)
IMM GRANULOCYTES NFR BLD AUTO: 1 %
LYMPHOCYTES # BLD AUTO: 1.7 X10E3/UL (ref 0.7–3.1)
LYMPHOCYTES NFR BLD AUTO: 31 %
MCH RBC QN AUTO: 32 PG (ref 26.6–33)
MCHC RBC AUTO-ENTMCNC: 33.6 G/DL (ref 31.5–35.7)
MCV RBC AUTO: 95 FL (ref 79–97)
MONOCYTES # BLD AUTO: 0.4 X10E3/UL (ref 0.1–0.9)
MONOCYTES NFR BLD AUTO: 8 %
NEUTROPHILS # BLD AUTO: 3.3 X10E3/UL (ref 1.4–7)
NEUTROPHILS NFR BLD AUTO: 56 %
PLATELET # BLD AUTO: 259 X10E3/UL (ref 150–450)
POTASSIUM SERPL-SCNC: 5 MMOL/L (ref 3.5–5.2)
PROT SERPL-MCNC: 7.4 G/DL (ref 6–8.5)
RBC # BLD AUTO: 4.25 X10E6/UL (ref 3.77–5.28)
SODIUM SERPL-SCNC: 134 MMOL/L (ref 134–144)
TSH SERPL DL<=0.005 MIU/L-ACNC: 3.14 UIU/ML (ref 0.45–4.5)
WBC # BLD AUTO: 5.7 X10E3/UL (ref 3.4–10.8)

## 2023-03-01 ENCOUNTER — TELEPHONE (OUTPATIENT)
Dept: INTERNAL MEDICINE | Facility: CLINIC | Age: 78
End: 2023-03-01

## 2023-03-01 NOTE — TELEPHONE ENCOUNTER
Caller: McManus, Claudette L    Relationship: Self    Best call back number: [phone]    What is the best time to reach you: AFTER 1PM    Who are you requesting to speak with (clinical staff, provider,  specific staff member): ELDA SALEH    What was the call regarding: PATIENT STATES THAT THE COVERAGE THAT WAS DISCUSSED AT YESTERDAYS APPOINTMENT IS NOT GOING TO WORK AND SHE WOULD LIKE TO DISCUSS FURTHER.    Do you require a callback: YES

## 2023-03-03 NOTE — ASSESSMENT & PLAN NOTE
Unstable.  Patient prescribed trazodone 150 mg tablet by mouth at bedtime.  Patient is not taking medication.  Patient education given on the need to take the medication about an hour to 30 minutes to bedtime to induce sleep.  Patient also has a prescription for tramadol 50 mg tablet as needed for any pain.  Patient has been experiencing restless leg syndrome.  She can take the tramadol as needed for this problem.

## 2023-03-03 NOTE — ASSESSMENT & PLAN NOTE
Xarelto samples needed on today's office visit.  No samples in the office.  Prescription sent to her pharmacy.  Prescription card given.

## 2023-03-16 DIAGNOSIS — E11.40 TYPE 2 DIABETES MELLITUS WITH DIABETIC NEUROPATHY, WITHOUT LONG-TERM CURRENT USE OF INSULIN: Chronic | ICD-10-CM

## 2023-03-16 RX ORDER — TIRZEPATIDE 15 MG/.5ML
15 INJECTION, SOLUTION SUBCUTANEOUS WEEKLY
Qty: 6 ML | Refills: 3 | Status: SHIPPED | OUTPATIENT
Start: 2023-03-16

## 2023-03-23 RX ORDER — ANASTROZOLE 1 MG/1
TABLET ORAL
Qty: 90 TABLET | Refills: 3 | Status: SHIPPED | OUTPATIENT
Start: 2023-03-23

## 2023-04-04 DIAGNOSIS — G47.09 OTHER INSOMNIA: ICD-10-CM

## 2023-04-04 RX ORDER — TRAZODONE HYDROCHLORIDE 150 MG/1
TABLET ORAL
Qty: 30 TABLET | Refills: 0 | Status: SHIPPED | OUTPATIENT
Start: 2023-04-04

## 2023-04-04 RX ORDER — LEVOTHYROXINE SODIUM 0.12 MG/1
TABLET ORAL
Qty: 90 TABLET | Refills: 0 | Status: SHIPPED | OUTPATIENT
Start: 2023-04-04

## 2023-04-05 ENCOUNTER — TELEPHONE (OUTPATIENT)
Dept: INTERNAL MEDICINE | Facility: CLINIC | Age: 78
End: 2023-04-05

## 2023-04-05 NOTE — TELEPHONE ENCOUNTER
Hub staff attempted to follow warm transfer process and was unsuccessful     Caller: McManus, Claudette L    Relationship to patient: Self    Best call back number:     Patient is needing: PATIENT CALLED TO CHANGE HER LAB APPT FROM 4/6 TO 4/7.  HUB WAS UNABLE TO WARM TRANSFER.  PLEASE CALL THE PATIENT TO RESCHEDULE THIS LAB.

## 2023-04-06 DIAGNOSIS — E78.2 MIXED HYPERLIPIDEMIA: Chronic | ICD-10-CM

## 2023-04-06 DIAGNOSIS — E03.9 PRIMARY HYPOTHYROIDISM: Chronic | ICD-10-CM

## 2023-04-06 DIAGNOSIS — Z51.81 THERAPEUTIC DRUG MONITORING: ICD-10-CM

## 2023-04-06 DIAGNOSIS — E55.9 VITAMIN D DEFICIENCY: Chronic | ICD-10-CM

## 2023-04-06 DIAGNOSIS — E11.40 TYPE 2 DIABETES MELLITUS WITH DIABETIC NEUROPATHY, WITHOUT LONG-TERM CURRENT USE OF INSULIN: Chronic | ICD-10-CM

## 2023-04-08 LAB
25(OH)D3+25(OH)D2 SERPL-MCNC: 69.5 NG/ML (ref 30–100)
ALBUMIN SERPL-MCNC: 4.9 G/DL (ref 3.5–5.2)
ALBUMIN/GLOB SERPL: 2.3 G/DL
ALP SERPL-CCNC: 72 U/L (ref 39–117)
ALT SERPL-CCNC: 18 U/L (ref 1–33)
AST SERPL-CCNC: 23 U/L (ref 1–32)
BILIRUB SERPL-MCNC: 0.5 MG/DL (ref 0–1.2)
BUN SERPL-MCNC: 15 MG/DL (ref 8–23)
BUN/CREAT SERPL: 14.4 (ref 7–25)
CALCIUM SERPL-MCNC: 10.8 MG/DL (ref 8.6–10.5)
CHLORIDE SERPL-SCNC: 99 MMOL/L (ref 98–107)
CHOLEST SERPL-MCNC: 169 MG/DL (ref 100–199)
CK SERPL-CCNC: 121 U/L (ref 20–180)
CO2 SERPL-SCNC: 26.7 MMOL/L (ref 22–29)
CREAT SERPL-MCNC: 1.04 MG/DL (ref 0.57–1)
EGFRCR SERPLBLD CKD-EPI 2021: 54.4 ML/MIN/1.73
ERYTHROCYTE [DISTWIDTH] IN BLOOD BY AUTOMATED COUNT: 12.9 % (ref 12.3–15.4)
GLOBULIN SER CALC-MCNC: 2.1 GM/DL
GLUCOSE SERPL-MCNC: 102 MG/DL (ref 65–99)
HBA1C MFR BLD: 5.6 % (ref 4.8–5.6)
HCT VFR BLD AUTO: 38.4 % (ref 34–46.6)
HDL SERPL-SCNC: 41.7 UMOL/L
HDLC SERPL-MCNC: 78 MG/DL
HGB BLD-MCNC: 12.9 G/DL (ref 12–15.9)
LDL SERPL QN: 20.8 NM
LDL SERPL-SCNC: 738 NMOL/L
LDL SMALL SERPL-SCNC: 123 NMOL/L
LDLC SERPL CALC-MCNC: 74 MG/DL (ref 0–99)
MCH RBC QN AUTO: 32.4 PG (ref 26.6–33)
MCHC RBC AUTO-ENTMCNC: 33.6 G/DL (ref 31.5–35.7)
MCV RBC AUTO: 96.5 FL (ref 79–97)
PLATELET # BLD AUTO: 254 10*3/MM3 (ref 140–450)
POTASSIUM SERPL-SCNC: 4.7 MMOL/L (ref 3.5–5.2)
PROT SERPL-MCNC: 7 G/DL (ref 6–8.5)
RBC # BLD AUTO: 3.98 10*6/MM3 (ref 3.77–5.28)
SODIUM SERPL-SCNC: 137 MMOL/L (ref 136–145)
T3FREE SERPL-MCNC: 2.4 PG/ML (ref 2–4.4)
T4 FREE SERPL-MCNC: 1.36 NG/DL (ref 0.93–1.7)
TRIGL SERPL-MCNC: 94 MG/DL (ref 0–149)
TSH SERPL DL<=0.005 MIU/L-ACNC: 2.75 UIU/ML (ref 0.27–4.2)
WBC # BLD AUTO: 5.56 10*3/MM3 (ref 3.4–10.8)

## 2023-04-13 ENCOUNTER — OFFICE VISIT (OUTPATIENT)
Dept: INTERNAL MEDICINE | Facility: CLINIC | Age: 78
End: 2023-04-13
Payer: MEDICARE

## 2023-04-13 VITALS
BODY MASS INDEX: 29.49 KG/M2 | RESPIRATION RATE: 16 BRPM | HEART RATE: 80 BPM | TEMPERATURE: 96.3 F | OXYGEN SATURATION: 96 % | HEIGHT: 65 IN | DIASTOLIC BLOOD PRESSURE: 72 MMHG | SYSTOLIC BLOOD PRESSURE: 120 MMHG | WEIGHT: 177 LBS

## 2023-04-13 DIAGNOSIS — C50.912 DUCTAL CARCINOMA OF LEFT BREAST: ICD-10-CM

## 2023-04-13 DIAGNOSIS — E87.1 HYPONATREMIA: ICD-10-CM

## 2023-04-13 DIAGNOSIS — F41.8 SITUATIONAL ANXIETY: ICD-10-CM

## 2023-04-13 DIAGNOSIS — R06.09 DYSPNEA ON EXERTION: ICD-10-CM

## 2023-04-13 DIAGNOSIS — Z79.01 CHRONIC ANTICOAGULATION: Chronic | ICD-10-CM

## 2023-04-13 DIAGNOSIS — E11.42 DIABETIC PERIPHERAL NEUROPATHY: Chronic | ICD-10-CM

## 2023-04-13 DIAGNOSIS — M54.6 CHRONIC BILATERAL THORACIC BACK PAIN: Chronic | ICD-10-CM

## 2023-04-13 DIAGNOSIS — E11.40 TYPE 2 DIABETES MELLITUS WITH DIABETIC NEUROPATHY, WITHOUT LONG-TERM CURRENT USE OF INSULIN: Primary | Chronic | ICD-10-CM

## 2023-04-13 DIAGNOSIS — G89.29 CHRONIC BILATERAL THORACIC BACK PAIN: Chronic | ICD-10-CM

## 2023-04-13 DIAGNOSIS — E55.9 VITAMIN D DEFICIENCY: Chronic | ICD-10-CM

## 2023-04-13 DIAGNOSIS — E66.9 NON MORBID OBESITY: Chronic | ICD-10-CM

## 2023-04-13 DIAGNOSIS — E03.9 PRIMARY HYPOTHYROIDISM: Chronic | ICD-10-CM

## 2023-04-13 DIAGNOSIS — Z86.16 HISTORY OF COVID-19: Chronic | ICD-10-CM

## 2023-04-13 DIAGNOSIS — F43.23 SITUATIONAL MIXED ANXIETY AND DEPRESSIVE DISORDER: Chronic | ICD-10-CM

## 2023-04-13 DIAGNOSIS — G89.29 CHRONIC BILATERAL LOW BACK PAIN WITHOUT SCIATICA: Chronic | ICD-10-CM

## 2023-04-13 DIAGNOSIS — I10 BENIGN ESSENTIAL HYPERTENSION: Chronic | ICD-10-CM

## 2023-04-13 DIAGNOSIS — M54.50 CHRONIC BILATERAL LOW BACK PAIN WITHOUT SCIATICA: Chronic | ICD-10-CM

## 2023-04-13 DIAGNOSIS — I26.99 MULTIPLE PULMONARY EMBOLI: ICD-10-CM

## 2023-04-13 DIAGNOSIS — I51.7 RIGHT VENTRICULAR ENLARGEMENT: Chronic | ICD-10-CM

## 2023-04-13 DIAGNOSIS — E78.2 MIXED HYPERLIPIDEMIA: Chronic | ICD-10-CM

## 2023-04-13 DIAGNOSIS — Z51.81 THERAPEUTIC DRUG MONITORING: ICD-10-CM

## 2023-04-13 DIAGNOSIS — F51.04 CHRONIC INSOMNIA: Chronic | ICD-10-CM

## 2023-04-13 RX ORDER — GABAPENTIN 300 MG/1
CAPSULE ORAL
Qty: 90 CAPSULE | Refills: 1 | Status: SHIPPED | OUTPATIENT
Start: 2023-04-13

## 2023-04-13 NOTE — PROGRESS NOTES
04/13/2023    Patient Information  Claudette L McManus                                                                                          [id number]  [place] [address]      1945  [unfilled]  There is no work phone number on file.    Chief Complaint:     Follow-up medical problems as outlined below.  No new acute complaints.    History of Present Illness:    Patient with a history of several chronic medical problems, some of which are very serious, presents today for a follow-up with lab prior in order to monitor these chronic medical issues.  Her past medical history reviewed and updated were necessary including health maintenance parameters.  This reveals she will be up-to-date or else accounted for after today's visit with the exception of the diabetic eye exam which I encouraged her to get.    Review of Systems   Constitutional: Negative.   HENT: Negative.    Eyes: Negative.    Cardiovascular: Positive for dyspnea on exertion.   Respiratory: Negative.    Endocrine: Negative.    Hematologic/Lymphatic: Negative.    Skin: Negative.    Musculoskeletal: Positive for arthritis, back pain and joint pain.   Gastrointestinal: Negative.    Genitourinary: Negative.    Neurological: Negative.    Psychiatric/Behavioral: Negative.    Allergic/Immunologic: Negative.        Active Problems:    Patient Active Problem List   Diagnosis   • Primary osteoarthritis involving multiple joints   • Benign essential hypertension   • Hyperlipidemia   • Primary hypothyroidism   • Type 2 diabetes mellitus with diabetic neuropathy, without long-term current use of insulin   • Chronic insomnia   • Chronic bilateral low back pain without sciatica   • Chronic bilateral thoracic back pain   • Vitamin D deficiency   • Therapeutic drug monitoring   • Postmenopausal state   • Diabetic peripheral neuropathy   • Situational mixed anxiety and depressive disorder   • ACE-inhibitor cough   • Hyponatremia   • Dyspnea  on exertion   • Multiple pulmonary emboli   • Non morbid obesity   • Ductal carcinoma of left breast   • Right ventricular enlargement   • Situational anxiety   • Chronic anticoagulation, Xarelto for multiple pulmonary emboli   • History of COVID-19         Past Medical History:   Diagnosis Date   • Benign essential hypertension    • Chronic bilateral low back pain without sciatica 8/16/2013 March 13, 2019--Limited bone scan.  This reveals scintigraphic activity in the spine and at the right shoulder corresponds to change seen on recent x-rays and is best explained by degenerative change.  Isolated metastatic disease exactly corresponding to the sites of extensive degenerative disc change would be peculiar.  March 7, 2019--new patient to get established.  She has complaints of approxi   • Chronic bilateral thoracic back pain 2/26/2019 March 13, 2019--Limited bone scan.  This reveals scintigraphic activity in the spine and at the right shoulder corresponds to change seen on recent x-rays and is best explained by degenerative change.  Isolated metastatic disease exactly corresponding to the sites of extensive degenerative disc change would be peculiar.  March 1, 2019--x-ray of the lumbar and thoracic spine reveals mild anterior l   • Chronic insomnia 11/4/2014   • Diabetic peripheral neuropathy 3/7/2019    Characterized by decreased sensation and paresthesias in both lower extremities described as a burning sensation.  Extends up to the knees.  Reportedly had been evaluated with nerve conduction study in the past.   • Generalized anxiety disorder 5/19/2013   • History of Bell's palsy 5/21/2013    Remote history of Bell's palsy.  Patient does not remember which side of the face.   • History of COVID-19 12/8/2022   • Hyperlipidemia    • Impaired fasting glucose    • Major depression    • Menopausal state, 8/19/2020--normal DEXA. 3/7/2019    August 19, 2020--DEXA scan reveals lumbar spine T score 3.8.  Left femoral  neck T score 2.5.  Right femoral neck T score 2.2.  Normal bone density.   • Non morbid obesity 8/11/2022   • Peripheral neuropathy, idiopathic 3/7/2019    Characterized by decreased sensation and paresthesias in both lower extremities described as a burning sensation.  Extends up to the knees.  Reportedly had been evaluated with nerve conduction study in the past.   • Primary hypothyroidism 5/19/2013   • Primary osteoarthritis involving multiple joints 4/22/2013   • Rotator cuff arthropathy of right shoulder, 4/20/2021--status post right reverse total shoulder arthroplasty 5/27/2020   • Situational mixed anxiety and depressive disorder 8/21/2019 August 21, 2019--patient seen in follow-up after I called in a prescription for Ativan after the patient's  contacted me a few weeks ago regarding the sudden death of patient's daughter.  This was an apparent suicide and the patient found her daughter dead.  I will not go into details.  Patient reports the Lorazepam has been helpful but she is still quite distraught, nervous, and undergoing   • Type 2 diabetes mellitus with diabetic neuropathy, without long-term current use of insulin    • Vitamin D deficiency 2/26/2019         Past Surgical History:   Procedure Laterality Date   • BILATERAL BREAST REDUCTION  Early 2000    Early 2000--bilateral breast reduction   • CARDIAC CATHETERIZATION N/A 9/26/2022    Procedure: Right Heart Cath;  Surgeon: Agus Solorio MD PhD;  Location: [facility] INVASIVE LOCATION;  Service: Cardiology;  Laterality: N/A;  Will REMAIN on [facility] for the case   • CHOLECYSTECTOMY  1960s    1960s--open cholecystectomy   • COLONOSCOPY  2012 2012--reportedly normal colonoscopy   • INCONTINENCE SURGERY  2012 Approximately 2012--implantation of questionable bladder stimulator right sacral area for urinary incontinence.  Details not known.   • REPLACEMENT TOTAL KNEE BILATERAL Left 2010; 2011 2010-2011--bilateral total knee  replacement   • SUBTOTAL HYSTERECTOMY  Remote    Remote partial hysterectomy.  Ovaries intact.   • TOTAL SHOULDER REPLACEMENT Left 2013 2013--left total shoulder replacement.   • TOTAL SHOULDER REPLACEMENT  April 28, 2021 4/20/2021--status post right reverse total shoulder arthroplasty   • TOTAL SHOULDER REVERSE ARTHROPLASTY Right 04/20/2021 4/20/2021--right reverse total shoulder replacement.         Allergies   Allergen Reactions   • Morphine And Related    • Other Other (See Comments)     REJECTED DACRON SUTURES IN THE PAST AND INCISION CAME APART           Current Outpatient Medications:   •  anastrozole (ARIMIDEX) 1 MG tablet, TAKE ONE TABLET BY MOUTH DAILY, Disp: 90 tablet, Rfl: 3  •  Cholecalciferol (VITAMIN D3) 2000 UNITS tablet, Take 1 tablet by mouth Daily., Disp: , Rfl:   •  DULoxetine (CYMBALTA) 60 MG capsule, TAKE 1 CAPSULE EVERY DAY AS DIRECTED, Disp: 90 capsule, Rfl: 3  •  ezetimibe (ZETIA) 10 MG tablet, TAKE 1 TABLET EVERY DAY, Disp: 90 tablet, Rfl: 3  •  Homeopathic Products (LEG CRAMPS PO), Take  by mouth Daily. Nightly, Disp: , Rfl:   •  LORazepam (ATIVAN) 0.5 MG tablet, TAKE ONE TABLET BY MOUTH TWICE A DAY AS NEEDED FOR ANXIETY, Disp: 60 tablet, Rfl: 5  •  metroNIDAZOLE (METROGEL) 0.75 % gel, Apply  topically to the appropriate area as directed 2 (Two) Times a Day., Disp: 45 g, Rfl: 2  •  ondansetron (ZOFRAN) 8 MG tablet, Take 1 tablet by mouth Every 6 (Six) Hours As Needed for Nausea., Disp: 60 tablet, Rfl: 1  •  simvastatin (ZOCOR) 20 MG tablet, TAKE ONE TABLET BY MOUTH DAILY FOR HIGH CHOLESTEROL., Disp: 90 tablet, Rfl: 2  •  traZODone (DESYREL) 150 MG tablet, TAKE ONE TABLET BY MOUTH ONCE NIGHTLY, Disp: 30 tablet, Rfl: 0  •  valsartan (DIOVAN) 320 MG tablet, TAKE 1 TABLET EVERY MORNING FOR BLOOD PRESSURE, Disp: 90 tablet, Rfl: 3  •  gabapentin (NEURONTIN) 300 MG capsule, Take 1 p.o. 3 times daily for peripheral neuropathy as directed, Disp: 90 capsule, Rfl: 1  •  levothyroxine  "(Synthroid) 100 MCG tablet, Take 1 p.o. daily as directed for low thyroid, Disp: 90 tablet, Rfl: 1  •  levothyroxine (SYNTHROID, LEVOTHROID) 125 MCG tablet, TAKE ONE TABLET BY MOUTH DAILY, Disp: 90 tablet, Rfl: 0  •  Tirzepatide (Mounjaro) 15 MG/0.5ML solution pen-injector, Inject 15 mg under the skin into the appropriate area as directed 1 (One) Time Per Week. (Patient not taking: Reported on 2023), Disp: 6 mL, Rfl: 3      Family History   Problem Relation Age of Onset   • Alcohol abuse Father    • Breast cancer Daughter         40s   • Cancer Other    • Diabetes Other         type II   • Leukemia Grandchild 19         Social History     Socioeconomic History   • Marital status:    Tobacco Use   • Smoking status: Former     Types: Cigarettes     Quit date: 1998     Years since quittin.2   • Smokeless tobacco: Never   Vaping Use   • Vaping Use: Never used   Substance and Sexual Activity   • Alcohol use: Yes     Alcohol/week: 1.0 standard drink     Types: 1 Glasses of wine per week     Comment: current some day   • Drug use: No   • Sexual activity: Not Currently     Partners: Male         Vitals:    23 [id number]   BP: 120/72   BP Location: Right arm   Patient Position: Sitting   Cuff Size: Adult   Pulse: 80   Resp: 16   Temp: 96.3 °F (35.7 °C)   TempSrc: Temporal   SpO2: 96%   Weight: 80.3 kg (177 lb)   Height: 165.1 cm (65\")        Body mass index is 29.45 kg/m².      Physical Exam:    General: Alert and oriented x 3.  No acute distress.  Overweight.  Normal affect.  HEENT: Pupils equal, round, reactive to light; extraocular movements intact; sclerae nonicteric; pharynx, ear canals and TMs normal.  Neck: Without JVD, thyromegaly, bruit, or adenopathy.  Lungs: Clear to auscultation in all fields.  Heart: Regular rate and rhythm without murmur, rub, gallop, or click.  Abdomen: Soft, nontender, without hepatosplenomegaly or hernia.  Bowel sounds normal.  : Deferred.  Rectal: Deferred.  " Extremities: Without clubbing, cyanosis, edema, or pulse deficit.  Neurologic: Intact without focal deficit.  Normal station and gait observed during ingress and egress from the examination room.  Skin: Without significant lesion.  Musculoskeletal: Unremarkable.    Lab/other results:    CBC is normal.  CPK normal at 121.  CMP normal except glucose 102, creatinine 1.04, estimated GFR 54.4, calcium elevated 10.8.  Hemoglobin A1c 5.6.  Total cholesterol 169, triglycerides 94, LDL particle #738, HDL particle #41.7.  Thyroid function tests are normal.  Vitamin D normal at 69.5.    Assessment/Plan:     Diagnosis Plan   1. Type 2 diabetes mellitus with diabetic neuropathy, without long-term current use of insulin  Hemoglobin A1c    Urinalysis With Microscopic If Indicated (No Culture) - Urine, Clean Catch      2. Benign essential hypertension  Comprehensive Metabolic Panel      3. Hyperlipidemia  CK    Comprehensive Metabolic Panel    NMR LipoProfile      4. Diabetic peripheral neuropathy  gabapentin (NEURONTIN) 300 MG capsule      5. Vitamin D deficiency  Vitamin D,25-Hydroxy      6. Primary hypothyroidism  TSH    T4, Free    T3, Free      7. Chronic insomnia        8. Chronic bilateral low back pain without sciatica        9. Chronic bilateral thoracic back pain        10. Situational mixed anxiety and depressive disorder        11. Non morbid obesity        12. Right ventricular enlargement        13. History of COVID-19        14. Multiple pulmonary emboli        15. Dyspnea on exertion        16. Chronic anticoagulation, Xarelto for multiple pulmonary emboli        17. Hyponatremia        18. Ductal carcinoma of left breast        19. Situational anxiety        20. Therapeutic drug monitoring  CBC (No Diff)        Patient has type 2 diabetes that is under excellent control.  She was previously on Mounjaro but I think he has run out of it.  Her blood pressure appears to be well controlled with valsartan.   Hyperlipidemia is under excellent control with simvastatin.  Her thyroid is therapeutic with levothyroxine.  She has chronic insomnia and does arouse seems to help with this as well as her situational anxiety.  Her anxiety and depression is somewhat better but still present and is related to some personal issues we will go into here as well as her physical ailments including multiple pulmonary emboli.  She still has dyspnea on exertion but this is better than it was months ago.  She remains on Xarelto for her pulmonary emboli.  She also has a new diagnosis of ductal carcinoma of the left breast which is being managed by the breast surgeon/service.    Plan is as follows: No change in current medical regimen.  Continue with diet and weight loss efforts.  She is currently in the donut hole and cannot afford the Mounjaro.  I have asked her to contact me as soon as she gets out of the donut hole and we can refill the Mounjaro and a 15 mg weekly dose.  Patient will follow-up around the first part of October for her subsequent Medicare wellness visit.  Otherwise she will follow-up as needed.    Addendum: I have reviewed her recent CTA of the chest which shows resolution of the pulmonary emboli.  Patient wants to get off of the blood thinner and that would be my preference as well.  Apparently patient has had some issues with the pulmonary service that I want to go into but they are not making a commitment 1 way or the other and they will not allow any of the other physicians in the office to evaluate patient.  Therefore I am going to make the decision regarding anticoagulation and I think that we can safely go off of it although patient will be at increased risk for recurrence.  She is well aware of that and willing to take the risk.  I have suggested that if she goes on a long trip or flies on an airplane that we should put her temporarily on Xarelto during those times.  Patient also is aware of the symptoms to contact me  should she have any significant return of symptoms.  Xarelto discontinued today.         Procedures

## 2023-04-19 ENCOUNTER — TELEPHONE (OUTPATIENT)
Dept: SURGERY | Facility: CLINIC | Age: 78
End: 2023-04-19
Payer: MEDICARE

## 2023-04-19 ENCOUNTER — TELEPHONE (OUTPATIENT)
Dept: ONCOLOGY | Facility: CLINIC | Age: 78
End: 2023-04-19
Payer: MEDICARE

## 2023-04-19 DIAGNOSIS — C50.112 CANCER OF CENTRAL PORTION OF LEFT BREAST: Primary | ICD-10-CM

## 2023-04-19 NOTE — TELEPHONE ENCOUNTER
Claudette calls office this morning requesting breast surgery/mastectomy. She tells me that she is concerned, bothers her knowing she has breast cancer and would like removed.     Saw us last 10/7/22,  she is scheduled to see us back after LT Dx MMG 5/8/23.     Patient has seen Dr. Clifton for Pulmonology, she sees Dr. Arzate for anastrozole, she saw PMD Dr. Lito Moore last week to discuss coming off Xarelto.     She is requesting to get in ASAP for surgery.         We will see Claudette in office following Jay Dx mmg and left breast US. Patient aware.

## 2023-04-19 NOTE — TELEPHONE ENCOUNTER
Spoke with Ms Patricia by phone. She reports she has been on anastrozole since October.  She has mostly been able to tolerate the side effects well. She notes however recently she has noticed her hair has started falling out, her nails are splitting.  She states she is not dizzy but is having issues with balance.  She reprots she fell the other night after she got out of bed quickly to use the restroom. She states she has decided she wants to have a mastectomy.  This is not scheduled, she has an appt to discuss this with Dr Fam on 5/8. Reviewed the above with DR Arzate, she recommends she hold her anastrozole utnil her follow up and can review her side effects and if they have resolved at that time.  She voiced understanding.

## 2023-04-20 ENCOUNTER — TELEPHONE (OUTPATIENT)
Dept: SURGERY | Facility: CLINIC | Age: 78
End: 2023-04-20
Payer: MEDICARE

## 2023-04-20 NOTE — TELEPHONE ENCOUNTER
Left diagnostic mammogram with tomosynthesis [facility] January 19, 2023:  Scattered fibroglandular densities.  There are 2 coil shaped biopsy clips left breast, 1 in the anterior one third 3:00 and 1 in the middle third lower quadrant 4:00.  At the medial margin of the 3:00 clip are microcalcifications that are not appreciably changed.  2 separate clusters measure 9 and 2 mm and are 8 mm from the inferior margin of the clip.  No abnormality is seen adjacent to the 4:00 clip.    Due screening mammogram 7-30-23    Note from Lito Moore internal medicine physician April 13, 2023.  I have reviewed her CTA with resolution of pulmonary emboli.  Patient desires to come off the blood thinner and that would be my preference as well.  There is been but some disconnect with the pulmonary service.  I am making the decision to safely go off of the anticoagulation with the knowledge that there could be a recurrence.  Patient is aware of that and willing to take that risk.  I recommended that if she goes on long trips or flies on an airplane we should put her temporarily on Xarelto during those times.  Xarelto discontinued today.      Jessica called and would like to have her cancer removed..   We will obtain her bilateral imaging since screening is due in short order, prior to her visit.  Unable to have MRI due to incontinence implant.

## 2023-04-24 ENCOUNTER — APPOINTMENT (OUTPATIENT)
Dept: CT IMAGING | Facility: HOSPITAL | Age: 78
End: 2023-04-24
Payer: MEDICARE

## 2023-04-24 ENCOUNTER — TELEPHONE (OUTPATIENT)
Dept: ONCOLOGY | Facility: CLINIC | Age: 78
End: 2023-04-24
Payer: MEDICARE

## 2023-04-24 ENCOUNTER — HOSPITAL ENCOUNTER (OUTPATIENT)
Facility: HOSPITAL | Age: 78
LOS: 2 days | Discharge: HOME | End: 2023-04-26
Attending: EMERGENCY MEDICINE | Admitting: INTERNAL MEDICINE
Payer: MEDICARE

## 2023-04-24 DIAGNOSIS — R27.0 ATAXIA: Primary | ICD-10-CM

## 2023-04-24 DIAGNOSIS — Z79.01 CHRONIC ANTICOAGULATION: Chronic | ICD-10-CM

## 2023-04-24 DIAGNOSIS — E11.40 TYPE 2 DIABETES MELLITUS WITH DIABETIC NEUROPATHY, WITHOUT LONG-TERM CURRENT USE OF INSULIN: Chronic | ICD-10-CM

## 2023-04-24 LAB
ALBUMIN SERPL-MCNC: 4.3 G/DL (ref 3.5–5.2)
ALBUMIN/GLOB SERPL: 1.7 G/DL
ALP SERPL-CCNC: 70 U/L (ref 39–117)
ALT SERPL W P-5'-P-CCNC: 25 U/L (ref 1–33)
ANION GAP SERPL CALCULATED.3IONS-SCNC: 11 MMOL/L (ref 5–15)
AST SERPL-CCNC: 28 U/L (ref 1–32)
BASOPHILS # BLD AUTO: 0.03 10*3/MM3 (ref 0–0.2)
BASOPHILS NFR BLD AUTO: 0.6 % (ref 0–1.5)
BILIRUB SERPL-MCNC: 0.3 MG/DL (ref 0–1.2)
BUN SERPL-MCNC: 11 MG/DL (ref 8–23)
BUN/CREAT SERPL: 12 (ref 7–25)
CALCIUM SPEC-SCNC: 10.2 MG/DL (ref 8.6–10.5)
CHLORIDE SERPL-SCNC: 100 MMOL/L (ref 98–107)
CO2 SERPL-SCNC: 27 MMOL/L (ref 22–29)
CREAT SERPL-MCNC: 0.92 MG/DL (ref 0.57–1)
DEPRECATED RDW RBC AUTO: 43.6 FL (ref 37–54)
EGFRCR SERPLBLD CKD-EPI 2021: 63.1 ML/MIN/1.73
EOSINOPHIL # BLD AUTO: 0.13 10*3/MM3 (ref 0–0.4)
EOSINOPHIL NFR BLD AUTO: 2.6 % (ref 0.3–6.2)
ERYTHROCYTE [DISTWIDTH] IN BLOOD BY AUTOMATED COUNT: 12.4 % (ref 12.3–15.4)
GLOBULIN UR ELPH-MCNC: 2.6 GM/DL
GLUCOSE BLDC GLUCOMTR-MCNC: 142 MG/DL (ref 70–130)
GLUCOSE BLDC GLUCOMTR-MCNC: 76 MG/DL (ref 70–130)
GLUCOSE SERPL-MCNC: 113 MG/DL (ref 65–99)
HCT VFR BLD AUTO: 34.7 % (ref 34–46.6)
HGB BLD-MCNC: 12.1 G/DL (ref 12–15.9)
IMM GRANULOCYTES # BLD AUTO: 0.06 10*3/MM3 (ref 0–0.05)
IMM GRANULOCYTES NFR BLD AUTO: 1.2 % (ref 0–0.5)
INR PPP: 1 (ref 0.9–1.1)
LYMPHOCYTES # BLD AUTO: 1.13 10*3/MM3 (ref 0.7–3.1)
LYMPHOCYTES NFR BLD AUTO: 22.2 % (ref 19.6–45.3)
MCH RBC QN AUTO: 33 PG (ref 26.6–33)
MCHC RBC AUTO-ENTMCNC: 34.9 G/DL (ref 31.5–35.7)
MCV RBC AUTO: 94.6 FL (ref 79–97)
MONOCYTES # BLD AUTO: 0.42 10*3/MM3 (ref 0.1–0.9)
MONOCYTES NFR BLD AUTO: 8.3 % (ref 5–12)
NEUTROPHILS NFR BLD AUTO: 3.32 10*3/MM3 (ref 1.7–7)
NEUTROPHILS NFR BLD AUTO: 65.1 % (ref 42.7–76)
NRBC BLD AUTO-RTO: 0 /100 WBC (ref 0–0.2)
PLATELET # BLD AUTO: 228 10*3/MM3 (ref 140–450)
PMV BLD AUTO: 9.5 FL (ref 6–12)
POTASSIUM SERPL-SCNC: 4.2 MMOL/L (ref 3.5–5.2)
PROT SERPL-MCNC: 6.9 G/DL (ref 6–8.5)
PROTHROMBIN TIME: 13.3 SECONDS (ref 11.7–14.2)
QT INTERVAL: 384 MS
RBC # BLD AUTO: 3.67 10*6/MM3 (ref 3.77–5.28)
SODIUM SERPL-SCNC: 138 MMOL/L (ref 136–145)
WBC NRBC COR # BLD: 5.09 10*3/MM3 (ref 3.4–10.8)

## 2023-04-24 PROCEDURE — G0378 HOSPITAL OBSERVATION PER HR: HCPCS

## 2023-04-24 PROCEDURE — 80053 COMPREHEN METABOLIC PANEL: CPT | Performed by: EMERGENCY MEDICINE

## 2023-04-24 PROCEDURE — 85610 PROTHROMBIN TIME: CPT | Performed by: EMERGENCY MEDICINE

## 2023-04-24 PROCEDURE — 99284 EMERGENCY DEPT VISIT MOD MDM: CPT

## 2023-04-24 PROCEDURE — 70498 CT ANGIOGRAPHY NECK: CPT

## 2023-04-24 PROCEDURE — 70496 CT ANGIOGRAPHY HEAD: CPT

## 2023-04-24 PROCEDURE — 85025 COMPLETE CBC W/AUTO DIFF WBC: CPT | Performed by: EMERGENCY MEDICINE

## 2023-04-24 PROCEDURE — 93010 ELECTROCARDIOGRAM REPORT: CPT | Performed by: INTERNAL MEDICINE

## 2023-04-24 PROCEDURE — 82962 GLUCOSE BLOOD TEST: CPT

## 2023-04-24 PROCEDURE — 70450 CT HEAD/BRAIN W/O DYE: CPT

## 2023-04-24 PROCEDURE — 93005 ELECTROCARDIOGRAM TRACING: CPT | Performed by: EMERGENCY MEDICINE

## 2023-04-24 PROCEDURE — 25510000001 IOPAMIDOL PER 1 ML: Performed by: EMERGENCY MEDICINE

## 2023-04-24 PROCEDURE — 36415 COLL VENOUS BLD VENIPUNCTURE: CPT

## 2023-04-24 PROCEDURE — 99223 1ST HOSP IP/OBS HIGH 75: CPT | Performed by: PSYCHIATRY & NEUROLOGY

## 2023-04-24 RX ORDER — ONDANSETRON 2 MG/ML
4 INJECTION INTRAMUSCULAR; INTRAVENOUS EVERY 6 HOURS PRN
Status: DISCONTINUED | OUTPATIENT
Start: 2023-04-24 | End: 2023-04-26 | Stop reason: HOSPADM

## 2023-04-24 RX ORDER — ATORVASTATIN CALCIUM 80 MG/1
80 TABLET, FILM COATED ORAL NIGHTLY
Status: DISCONTINUED | OUTPATIENT
Start: 2023-04-24 | End: 2023-04-26 | Stop reason: HOSPADM

## 2023-04-24 RX ORDER — ASPIRIN 300 MG/1
300 SUPPOSITORY RECTAL DAILY
Status: DISCONTINUED | OUTPATIENT
Start: 2023-04-24 | End: 2023-04-26 | Stop reason: HOSPADM

## 2023-04-24 RX ORDER — ACETAMINOPHEN 325 MG/1
650 TABLET ORAL EVERY 4 HOURS PRN
Status: DISCONTINUED | OUTPATIENT
Start: 2023-04-24 | End: 2023-04-26 | Stop reason: HOSPADM

## 2023-04-24 RX ORDER — MELATONIN
2000 DAILY
Status: DISCONTINUED | OUTPATIENT
Start: 2023-04-25 | End: 2023-04-26 | Stop reason: HOSPADM

## 2023-04-24 RX ORDER — LEVOTHYROXINE SODIUM 0.12 MG/1
125 TABLET ORAL DAILY
Status: DISCONTINUED | OUTPATIENT
Start: 2023-04-25 | End: 2023-04-26 | Stop reason: HOSPADM

## 2023-04-24 RX ORDER — VALSARTAN 320 MG/1
320 TABLET ORAL EVERY MORNING
Status: DISCONTINUED | OUTPATIENT
Start: 2023-04-25 | End: 2023-04-26 | Stop reason: HOSPADM

## 2023-04-24 RX ORDER — TRAZODONE HYDROCHLORIDE 100 MG/1
150 TABLET ORAL NIGHTLY
Status: DISCONTINUED | OUTPATIENT
Start: 2023-04-25 | End: 2023-04-26 | Stop reason: HOSPADM

## 2023-04-24 RX ORDER — ACETAMINOPHEN 650 MG/1
650 SUPPOSITORY RECTAL EVERY 4 HOURS PRN
Status: DISCONTINUED | OUTPATIENT
Start: 2023-04-24 | End: 2023-04-26 | Stop reason: HOSPADM

## 2023-04-24 RX ORDER — DULOXETIN HYDROCHLORIDE 60 MG/1
60 CAPSULE, DELAYED RELEASE ORAL DAILY
Status: DISCONTINUED | OUTPATIENT
Start: 2023-04-25 | End: 2023-04-26 | Stop reason: HOSPADM

## 2023-04-24 RX ORDER — ASPIRIN 325 MG
325 TABLET ORAL DAILY
Status: DISCONTINUED | OUTPATIENT
Start: 2023-04-24 | End: 2023-04-26 | Stop reason: HOSPADM

## 2023-04-24 RX ORDER — SODIUM CHLORIDE 9 MG/ML
75 INJECTION, SOLUTION INTRAVENOUS CONTINUOUS
Status: DISCONTINUED | OUTPATIENT
Start: 2023-04-24 | End: 2023-04-26 | Stop reason: HOSPADM

## 2023-04-24 RX ORDER — BISACODYL 10 MG
10 SUPPOSITORY, RECTAL RECTAL DAILY PRN
Status: DISCONTINUED | OUTPATIENT
Start: 2023-04-24 | End: 2023-04-26 | Stop reason: HOSPADM

## 2023-04-24 RX ADMIN — SODIUM CHLORIDE 75 ML/HR: 9 INJECTION, SOLUTION INTRAVENOUS at 21:12

## 2023-04-24 RX ADMIN — ATORVASTATIN CALCIUM 80 MG: 80 TABLET, FILM COATED ORAL at 21:12

## 2023-04-24 RX ADMIN — IOPAMIDOL 95 ML: 755 INJECTION, SOLUTION INTRAVENOUS at 16:35

## 2023-04-24 NOTE — PLAN OF CARE
Goal Outcome Evaluation:      New admit from er. Patient is alert and orientated. Family at the bedside. Nih=0. Md paged for diet order.

## 2023-04-24 NOTE — ED PROVIDER NOTES
EMERGENCY DEPARTMENT ENCOUNTER    Room Number:  13/13  Date seen:  4/24/2023  PCP: Lito Moore MD  Historian: Patient      HPI:  Chief Complaint: Difficulty walking  A complete HPI/ROS/PMH/PSH/SH/FH are unobtainable due to:   Context: Claudette L McManus is a 78 y.o. female who presents to the ED c/o difficulty walking.  Patient has had unsteady gait ongoing for about 2 or 3 weeks.  Symptoms have been worsening over the last roughly 1 week.  Patient did have a fall about 5 or 6 days ago and hit the right side of her face.  She denies slurred speech or double vision.  Today she was reaching in the cabinets and felt like the cereal was spinning about her.  This was brief and the only time she had the sensation of object moving around her.  Denies new numbness or weakness.  She does have a history of some chronic lower extremity neuropathy which is unchanged from baseline.  She was started on gabapentin by primary care provider about 1 week ago but is unsure of the dose.      MEDICAL RECORD REVIEW (non ED)  I reviewed prior medical records including most recent office note with Dr. Lito Moore.    PAST MEDICAL HISTORY  Active Ambulatory Problems     Diagnosis Date Noted   • Primary osteoarthritis involving multiple joints 04/22/2013   • Benign essential hypertension    • Hyperlipidemia    • Primary hypothyroidism 05/19/2013   • Type 2 diabetes mellitus with diabetic neuropathy, without long-term current use of insulin    • Chronic insomnia 11/04/2014   • Chronic bilateral low back pain without sciatica 08/16/2013   • Chronic bilateral thoracic back pain 02/26/2019   • Vitamin D deficiency 02/26/2019   • Therapeutic drug monitoring 02/26/2019   • Postmenopausal state 03/07/2019   • Diabetic peripheral neuropathy 03/07/2019   • Situational mixed anxiety and depressive disorder 08/21/2019   • ACE-inhibitor cough 10/17/2019   • Hyponatremia 04/27/2021   • Dyspnea on exertion 05/11/2022   • Multiple pulmonary  emboli 05/19/2022   • Non morbid obesity 08/11/2022   • Ductal carcinoma of left breast 08/26/2022   • Right ventricular enlargement 08/29/2022   • Situational anxiety 10/26/2022   • Chronic anticoagulation, Xarelto for multiple pulmonary emboli 10/26/2022   • History of COVID-19 12/08/2022     Resolved Ambulatory Problems     Diagnosis Date Noted   • Acute pharyngitis 03/22/2013   • Generalized anxiety disorder 05/19/2013   • Back pain 08/16/2013   • History of Bell's palsy 05/21/2013   • Major depression    • Sinusitis, acute 03/22/2013   • Extremity pain 02/05/2016   • History of bone density study 02/05/2016   • Abnormal x-ray of thoracic spine 03/07/2019   • Abnormal x-ray of lumbar spine 03/07/2019   • Hypercalcemia 11/13/2019   • Persistent cough 11/13/2019   • Gastroesophageal reflux disease without esophagitis 11/13/2019   • Flu-like symptoms 12/02/2019   • Rotator cuff arthropathy of right shoulder, 4/20/2021--status post right reverse total shoulder arthroplasty 05/27/2020   • Need for influenza vaccination 09/25/2020   • Chronic right ear pain 11/30/2020   • Hospital discharge follow-up 04/27/2021   • S/P reverse total shoulder arthroplasty, right 05/04/2021   • Exertional chest pain 05/11/2022   • Chronic pulmonary thromboembolism syndrome 05/19/2022   • 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 3/7/2019   • Peripheral neuropathy, idiopathic 3/7/2019         PAST SURGICAL HISTORY  Past Surgical History:   Procedure Laterality Date   • BILATERAL BREAST REDUCTION  Early 2000    Early 2000--bilateral breast reduction   • CARDIAC CATHETERIZATION N/A 9/26/2022    Procedure: Right Heart Cath;  Surgeon: Agus Solorio MD PhD;  Location: [facility] INVASIVE LOCATION;  Service: Cardiology;  Laterality:  N/A;  Will REMAIN on [facility] for the case   • CHOLECYSTECTOMY  1960s    --open cholecystectomy   • COLONOSCOPY  2012--reportedly normal colonoscopy   • INCONTINENCE SURGERY  2012--implantation of questionable bladder stimulator right sacral area for urinary incontinence.  Details not known.   • REPLACEMENT TOTAL KNEE BILATERAL Left ; 2011-2011--bilateral total knee replacement   • SUBTOTAL HYSTERECTOMY  Remote    Remote partial hysterectomy.  Ovaries intact.   • TOTAL SHOULDER REPLACEMENT Left 2013--left total shoulder replacement.   • TOTAL SHOULDER REPLACEMENT  2021--status post right reverse total shoulder arthroplasty   • TOTAL SHOULDER REVERSE ARTHROPLASTY Right 2021--right reverse total shoulder replacement.         FAMILY HISTORY  Family History   Problem Relation Age of Onset   • Alcohol abuse Father    • Breast cancer Daughter         40s   • Cancer Other    • Diabetes Other         type II   • Leukemia Grandchild 19         SOCIAL HISTORY  Social History     Socioeconomic History   • Marital status:    Tobacco Use   • Smoking status: Former     Types: Cigarettes     Quit date: 1998     Years since quittin.3   • Smokeless tobacco: Never   Vaping Use   • Vaping Use: Never used   Substance and Sexual Activity   • Alcohol use: Yes     Alcohol/week: 1.0 standard drink     Types: 1 Glasses of wine per week     Comment: current some day   • Drug use: No   • Sexual activity: Not Currently     Partners: Male         ALLERGIES  Morphine and related and Other        REVIEW OF SYSTEMS  Review of Systems   Constitutional: Negative for fever.   Respiratory: Negative for shortness of breath.    Cardiovascular: Negative for chest pain.   Neurological: Positive for dizziness.   All other systems reviewed and are negative.           PHYSICAL EXAM  ED Triage Vitals   Temp Heart Rate Resp BP SpO2   23 1223  04/24/23 1223 04/24/23 1223 04/24/23 1229 04/24/23 1223   97 °F (36.1 °C) 77 16 132/70 91 %      Temp src Heart Rate Source Patient Position BP Location FiO2 (%)   -- -- 04/24/23 1229 04/24/23 1229 --     Lying Right arm        Physical Exam    GENERAL: Alert and pleasant female no obvious distress.  Triage vitals are reviewed and are benign.  HENT: nares patent, oropharynx clear  EYES: no scleral icterus, noted small ecchymosis to the lateral right eyelids  CV: regular rhythm, regular rate  RESPIRATORY: normal effort clear to auscultation bilaterally  ABDOMEN: soft  MUSCULOSKELETAL: no deformity  NEURO: Strength sensation and coordination are grossly intact.  Speech and mentation are unremarkable.  No noted dysarthria or aphasia.  Gait is fairly significantly ataxic as she tends to fall towards her right and cannot walk readily without assistance.  SKIN: warm, dry      Vital signs and nursing notes reviewed.          LAB RESULTS  Recent Results (from the past 24 hour(s))   Comprehensive Metabolic Panel    Collection Time: 04/24/23 12:53 PM    Specimen: Blood   Result Value Ref Range    Glucose 113 (H) 65 - 99 mg/dL    BUN 11 8 - 23 mg/dL    Creatinine 0.92 0.57 - 1.00 mg/dL    Sodium 138 136 - 145 mmol/L    Potassium 4.2 3.5 - 5.2 mmol/L    Chloride 100 98 - 107 mmol/L    CO2 27.0 22.0 - 29.0 mmol/L    Calcium 10.2 8.6 - 10.5 mg/dL    Total Protein 6.9 6.0 - 8.5 g/dL    Albumin 4.3 3.5 - 5.2 g/dL    ALT (SGPT) 25 1 - 33 U/L    AST (SGOT) 28 1 - 32 U/L    Alkaline Phosphatase 70 39 - 117 U/L    Total Bilirubin 0.3 0.0 - 1.2 mg/dL    Globulin 2.6 gm/dL    A/G Ratio 1.7 g/dL    BUN/Creatinine Ratio 12.0 7.0 - 25.0    Anion Gap 11.0 5.0 - 15.0 mmol/L    eGFR 63.1 >60.0 mL/min/1.73   Protime-INR    Collection Time: 04/24/23 12:53 PM    Specimen: Blood   Result Value Ref Range    Protime 13.3 11.7 - 14.2 Seconds    INR 1.00 0.90 - 1.10   CBC Auto Differential    Collection Time: 04/24/23 12:53 PM    Specimen: Blood    Result Value Ref Range    WBC 5.09 3.40 - 10.80 10*3/mm3    RBC 3.67 (L) 3.77 - 5.28 10*6/mm3    Hemoglobin 12.1 12.0 - 15.9 g/dL    Hematocrit 34.7 34.0 - 46.6 %    MCV 94.6 79.0 - 97.0 fL    MCH 33.0 26.6 - 33.0 pg    MCHC 34.9 31.5 - 35.7 g/dL    RDW 12.4 12.3 - 15.4 %    RDW-SD 43.6 37.0 - 54.0 fl    MPV 9.5 6.0 - 12.0 fL    Platelets 228 140 - 450 10*3/mm3    Neutrophil % 65.1 42.7 - 76.0 %    Lymphocyte % 22.2 19.6 - 45.3 %    Monocyte % 8.3 5.0 - 12.0 %    Eosinophil % 2.6 0.3 - 6.2 %    Basophil % 0.6 0.0 - 1.5 %    Immature Grans % 1.2 (H) 0.0 - 0.5 %    Neutrophils, Absolute 3.32 1.70 - 7.00 10*3/mm3    Lymphocytes, Absolute 1.13 0.70 - 3.10 10*3/mm3    Monocytes, Absolute 0.42 0.10 - 0.90 10*3/mm3    Eosinophils, Absolute 0.13 0.00 - 0.40 10*3/mm3    Basophils, Absolute 0.03 0.00 - 0.20 10*3/mm3    Immature Grans, Absolute 0.06 (H) 0.00 - 0.05 10*3/mm3    nRBC 0.0 0.0 - 0.2 /100 WBC   ECG 12 Lead Stroke Evaluation    Collection Time: 04/24/23  1:12 PM   Result Value Ref Range    QT Interval 384 ms       Ordered the above labs and independently interpreted results. My findings will be discussed in the medical decision making section below        RADIOLOGY  CT Head Without Contrast    Result Date: 4/24/2023  EMERGENCY NONCONTRAST HEAD CT 04/24/2023  CLINICAL HISTORY: Patient has experienced gait disturbance and ataxia for the past 2 weeks.  She denies dizziness but feels like her coordination has been off for the past 2 weeks.  TECHNIQUE: Spiral CT images were obtained from the base of the skull to the vertex without intravenous contrast. The images were reformatted and submitted in 3 mm thick axial, sagittal and coronal CT sections with brain algorithm and 2 mm thick axial CT sections with high-resolution bone algorithm.  This is correlated to a prior noncontrast head CT from [facility] on 10/26/2015.  FINDINGS: There is some patchy low-density extending from the periventricular into the  subcortical white matter of the cerebral hemispheres consistent with mild-to-moderate small vessel disease. The remainder of the brain parenchyma is normal in attenuation. The ventricles are normal in size. I see no focal mass effect. There is no midline shift. No extra-axial fluid collections are identified. There is no evidence of acute intracranial hemorrhage. The calvarium and the skull base are normal in appearance. The paranasal sinuses and the mastoid air cells and the middle ear cavities are clear.      No acute intracranial abnormality is identified. There is mild-to-moderate small vessel disease in the cerebral white matter. The remainder of the head CT is within normal limits. The etiology of the patient's acute onset of gait disturbance and ataxia over the past 2 weeks is not established on this exam. If there remains clinical suspicion of an acute stroke causing the patient's acute onset of ataxia, an MRI of the brain could be obtained for more comprehensive assessment.  The results were discussed with Dr. Robert from the emergency room by telephone on 04/24/2023 at 2 PM.  Radiation dose reduction techniques were utilized, including automated exposure control and exposure modulation based on body size.         I ordered and independently reviewed the above noted radiographic studies.      I viewed images of CT brain which showed no obvious stroke or hemorrhage per my independent interpretation.    See radiologist's dictation for official interpretation.             PROCEDURES  Critical Care  Performed by: Seven Robert MD  Authorized by: Seven Robert MD     Critical care provider statement:     Critical care time (minutes):  35    Critical care was necessary to treat or prevent imminent or life-threatening deterioration of the following conditions:  CNS failure or compromise    Critical care was time spent personally by me on the following activities:  Ordering and review of radiographic studies,  pulse oximetry, re-evaluation of patient's condition, review of old charts, ordering and review of laboratory studies, discussions with consultants, development of treatment plan with patient or surrogate, examination of patient and obtaining history from patient or surrogate              MEDICATIONS GIVEN IN ER  Medications - No data to display            MEDICAL DECISION MAKING, PROGRESS, and CONSULTS    All labs have been independently reviewed by me.  All radiology studies have been reviewed by me and I have also reviewed the radiology report.   EKG's independently viewed and interpreted by me.  Discussion below represents my analysis of pertinent findings related to patient's condition, differential diagnosis, treatment plan and final disposition.      Additional sources:  - Discussed/ obtained information from independent historians:  at bedside    - External (non-ED) record review: Please see documented above    - Chronic or social conditions impacting care: History of prior breast cancer, hypertension and hyperlipidemia.  Patient is anticoagulated on Xarelto for prior PE.    - Shared decision making: I discussed ED evaluation and treatment plan with patient who is in agreement.  Patient with ataxia ongoing for 2 or 3 weeks.  Exam shows fairly significant ataxia but no obvious other strokelike symptoms.    CT unremarkable, labs reassuring      Orders placed during this visit:  Orders Placed This Encounter   Procedures   • Critical Care   • CT Head Without Contrast   • CT Angiogram Head   • CT Angiogram Neck   • Comprehensive Metabolic Panel   • Protime-INR   • CBC Auto Differential   • Cardiac Monitoring   • Inpatient Neurology Consult Stroke   • [facility] (on-call MD unless specified) Details   • ECG 12 Lead Stroke Evaluation   • Inpatient Admission   • CBC & Differential           Differential diagnosis:    Please see as documented below in ED course      Independent interpretation of labs, radiology  studies, and discussions with consultants:  ED Course as of 04/24/23 1513   Mon Apr 24, 2023   1328 EKG independently interpreted by me    Time 1:12 PM  Sinus 63  P waves-  normal P waves and IL interval  QRS-questionable inferior Q wave, indeterminate axis  ST, T wave-nonspecific changes    Not significant change when compared to 6/2022 [DB]   1330 Labs reviewed are pretty unremarkable.  CBC chemistries and INR essentially normal. [DB]   1348 HTP-[id number]-old female with history of breast cancer, PE anticoagulated on Xarelto presents with ataxia ongoing for several weeks.  Symptoms been worsened over the last 1 week.  She does not have significant unilateral weakness.  No dysarthria or diplopia.    On exam patient is pretty significantly ataxic and cannot really walk unassisted.  I do not note any dysarthria or aphasia.  No significant weakness or numbness.    [id number]-old female with ataxia of unclear cause.  Would consider subacute stroke, hemorrhage or tumor in the differential diagnosis.  Patient is anticoagulated on Xarelto which could increase the risk of hemorrhage and decrease the risk of stroke although both are possible.    Patient is not a tPA candidate as her symptoms have been ongoing for several weeks and she is anticoagulated on Xarelto.    We will obtain routine blood work, neurology consultation and CT scan of the head for initial ED assessment. [DB]   1408 I discussed head CT with Dr. Manoj Arteaga who reports no obvious acute pathology [DB]   1408 I discussed treatment and evaluation of this patient with Dr. De Luna who is on-call for neurology.  He agrees with plan to admit to [facility] and will see in consultation    Unfortunately patient will likely need an MRI.  She does have a implant from 2014 for urinary incontinence which may preclude the use of MRI. [DB]   1507 I discussed evaluation and treatment of this patient with Dr. Alfredo Jimenez who will admit for further evaluation and treatment.  [DB]   1512 I discussed evaluation of this patient again with Dr. Toth who just saw the patient in the ED.    He states that he feels that there is less likely a vascular cause and is okay with holding off on MRI for now particularly given patient's implant which may likely preclude MRI anyway.  He would like me to add a CT angiogram of the head and neck.  He thinks that the symptoms could be related to chemotherapy medication or perhaps related to the Neurontin which she was recently started on. [DB]      ED Course User Index  [DB] Seven Robert MD             I used full protective equipment while examining this patient.  This includes face mask, gloves and protective eyewear.  I washed my hands before entering the room and immediately upon leaving the room    DIAGNOSIS  Final diagnoses:   Ataxia   Type 2 diabetes mellitus with diabetic neuropathy, without long-term current use of insulin   Chronic anticoagulation, Xarelto for multiple pulmonary emboli         DISPOSITION  Admission            Latest Documented Vital Signs:  As of 15:13 EDT  BP- 143/75 HR- 61 Temp- 97 °F (36.1 °C) O2 sat- 92%              --    Please note that portions of this were completed with a voice recognition program.       Note Disclaimer: At [facility], we believe that sharing information builds trust and better relationships. You are receiving this note because you are receiving care at [facility] or recently visited. It is possible you will see health information before a provider has talked with you about it. This kind of information can be easy to misunderstand. To help you fully understand what it means for your health, we urge you to discuss this note with your provider.           Seven Robert MD  04/24/23 2397

## 2023-04-24 NOTE — TELEPHONE ENCOUNTER
"Returned call to Ms Gomez.  She has discontinued the anastrozole since our last conversation on 4/19. She reports ongoing balance issues.  Today she notes when she opened her medicine cabinet everything was spinning. She reports difficulty walking.  She denies any other symptoms, although does mention some shortness of breath with exertion.  She says that some friends of hers who saw her when she had shortness of breath with her PE have told her \"you're short of breath again\"  Reviewed with Dr Arzate, advised her to proceed to the ER for further workup of these symptoms.  She voiced understanding.  "

## 2023-04-24 NOTE — ED NOTES
"According to pt, the past two weeks she has experienced \"gait disturbances\". Pt denies dizziness but states she feels like her coordination is off. Pt has to concentrate on placing one foot in front of the other while holding on to walls and chair to prevent from falling. Pt has had one fall due to uncoordinated gait.     Pt also states intermittent aphasia for several weeks but states increased episodes throughout the day. Pt denies slurred speech and her  denies slurred speech as well. Pt denies weakness, facial droop, blurred or double vision. NIH - 0 during RN's assessment.     Pt A&O x 4.     "

## 2023-04-24 NOTE — CONSULTS
Stroke Consult Note    Patient Name: Claudette L McManus   MRN: [id number]  Age: 78 y.o.  Sex: female  : 1945    Primary Care Physician: Lito Moore MD  Referring Physician:  No ref. provider found    Handedness: Right  Race: White    Chief Complaint/Reason for Consultation: Imbalance walking    Subjective .  HPI: 78-year-old right-handed white female with known diagnosis of hypertension, hyperlipidemia, peripheral neuropathy, diabetes, breast cancer who was on anastrozole until recently about a week ago, comes in with 2 to 3 weeks of imbalance walking and was thought it was secondary to anastrozole which was discontinued about a week ago, but her symptoms continued, and this morning she also felt dizzy for which she came to the hospital.  She denies having any dizziness in the last 2 weeks, but did have some this morning.  She denies any focal weakness/numbness/vision changes/slurred speech.  She has a history of idiopathic peripheral neuropathy for which she was recently started on gabapentin about a week ago, which has been helping her, and is able to sleep better.    She has had falls in the last week, and has right periorbital injury  Patient also has history of bilateral PE and was on Xarelto until 6 weeks ago.  She is not on any antithrombotics at home.    Last Known Normal Date/Time: Unknown EST     Review of Systems   Constitutional: Positive for fatigue.   Neurological: Positive for dizziness.        Imbalance walking   All other systems reviewed and are negative.     Past Medical History:   Diagnosis Date   • Benign essential hypertension    • Chronic bilateral low back pain without sciatica 2013--Limited bone scan.  This reveals scintigraphic activity in the spine and at the right shoulder corresponds to change seen on recent x-rays and is best explained by degenerative change.  Isolated metastatic disease exactly corresponding to the sites of extensive degenerative  disc change would be peculiar.  March 7, 2019--new patient to get established.  She has complaints of approxi   • Chronic bilateral thoracic back pain 2/26/2019 March 13, 2019--Limited bone scan.  This reveals scintigraphic activity in the spine and at the right shoulder corresponds to change seen on recent x-rays and is best explained by degenerative change.  Isolated metastatic disease exactly corresponding to the sites of extensive degenerative disc change would be peculiar.  March 1, 2019--x-ray of the lumbar and thoracic spine reveals mild anterior l   • Chronic insomnia 11/4/2014   • Diabetic peripheral neuropathy 3/7/2019    Characterized by decreased sensation and paresthesias in both lower extremities described as a burning sensation.  Extends up to the knees.  Reportedly had been evaluated with nerve conduction study in the past.   • Generalized anxiety disorder 5/19/2013   • History of Bell's palsy 5/21/2013    Remote history of Bell's palsy.  Patient does not remember which side of the face.   • History of COVID-19 12/8/2022   • Hyperlipidemia    • Impaired fasting glucose    • Major depression    • Menopausal state, 8/19/2020--normal DEXA. 3/7/2019    August 19, 2020--DEXA scan reveals lumbar spine T score 3.8.  Left femoral neck T score 2.5.  Right femoral neck T score 2.2.  Normal bone density.   • Non morbid obesity 8/11/2022   • Peripheral neuropathy, idiopathic 3/7/2019    Characterized by decreased sensation and paresthesias in both lower extremities described as a burning sensation.  Extends up to the knees.  Reportedly had been evaluated with nerve conduction study in the past.   • Primary hypothyroidism 5/19/2013   • Primary osteoarthritis involving multiple joints 4/22/2013   • Rotator cuff arthropathy of right shoulder, 4/20/2021--status post right reverse total shoulder arthroplasty 5/27/2020   • Situational mixed anxiety and depressive disorder 8/21/2019 August 21, 2019--patient seen  showed no acute infarction but multiple remote infarcts diffuse cerebral atrophy, micro hemorrhages likely associated with chronic hypertension. Patient unable to fully describe her dizziness; denies any vertiginous or lightheadedness, symptoms worse with head movement with difficulty ambulating, denied any headaches, focal weakness, visual disturbances, loss of consciousness, hearing impairment/aural fullness. She had requested to be discharged home from rehab since she could not fully participate and dizziness was not being addressed. Dizziness with gait instability:  Patient with history of CVA/TIAs. Recent left hemispheric CVA. Recently worked up at [facility] with MRI head, CT head, CT angio head and neck. Repeat head CT without acute intracranial abnormality. Neurology consult appreciated. No further work up recommended. No further imaging recommended at this time. Continued prn Meclizine and vestibular vertigo. PT OT recommended SNF but patient declined. No events on TELE. On statin and Plavix. TSH, B 12 and folate wnl. Hypertension: Discharged home on Lisinopril (dose decreased due to episode of hypotension) and Amlodipine. T2DM on long-term insulin with Neuropathy:  A1c 7.6%. Resumed Basal Insulin at 20 U qhs and Metformin on discharge. Hypokalemia and magnesium deficiency:  Replaced. Will need outpatient monitoring. UTI: Complete course of cefdinir. Consults. IP CONSULT TO HOSPITALIST  IP CONSULT TO NEUROLOGY  IP CONSULT TO HOME CARE NEEDS    Physical examination on discharge day. BP (!) 144/82   Pulse 87   Temp 98.4 °F (36.9 °C) (Oral)   Resp 17   Ht 5' 5\" (1.651 m)   Wt 173 lb 8 oz (78.7 kg)   SpO2 97%   BMI 28.87 kg/m²   General appearance. Alert. Looks comfortable. HEENT. Sclera clear. Moist mucus membranes. Cardiovascular. Regular rate and rhythm, normal S1, S2. No murmur. Respiratory. Not using accessory muscles. Clear to auscultation bilaterally, no wheeze. Gastrointestinal. Abdomen soft, non-tender, not distended, normal bowel sounds  Neurology. Facial symmetry. No speech deficits. Moving all extremities equally. Extremities. No edema in lower extremities. Skin. Warm, dry, normal turgor      Condition at time of discharge: stable    Medication instructions provided to patient at discharge.      Medication List        START taking these medications      lisinopril 10 MG tablet  Commonly known as: PRINIVIL;ZESTRIL  Take 1 tablet by mouth daily     meclizine 25 MG tablet  Commonly known as: ANTIVERT  Take 1 tablet by mouth 3 times daily as needed for Dizziness            CHANGE how you take these medications      furosemide 20 MG tablet  Commonly known as: Lasix  Take 1 tablet by mouth daily as needed (For shortness of breath or leg swelling)  What changed:   when to take this  reasons to take this     insulin glargine 100 UNIT/ML injection pen  Commonly known as: LANTUS;BASAGLAR  Inject 20 Units into the skin nightly  What changed: how much to take            CONTINUE taking these medications      amLODIPine 10 MG tablet  Commonly known as: NORVASC     atorvastatin 40 MG tablet  Commonly known as: LIPITOR  Take 1 tablet by mouth nightly     busPIRone 7.5 MG tablet  Commonly known as: BUSPAR     clopidogrel 75 MG tablet  Commonly known as: PLAVIX  Take 1 tablet by mouth daily     gabapentin 300 MG capsule  Commonly known as: NEURONTIN     metFORMIN 500 MG tablet  Commonly known as: GLUCOPHAGE     ondansetron 4 MG disintegrating tablet  Commonly known as: Zofran ODT  Take 1 tablet by mouth every 8 hours as needed for Nausea     potassium chloride 10 MEQ extended release tablet  Commonly known as: KLOR-CON M  Take 1 tablet by mouth 2 times daily               Where to Get Your Medications        These medications were sent to [facility]/pharmacy #[id number] [facility],  [address], in follow-up after I called in a prescription for Ativan after the patient's  contacted me a few weeks ago regarding the sudden death of patient's daughter.  This was an apparent suicide and the patient found her daughter dead.  I will not go into details.  Patient reports the Lorazepam has been helpful but she is still quite distraught, nervous, and undergoing   • Type 2 diabetes mellitus with diabetic neuropathy, without long-term current use of insulin    • Vitamin D deficiency 2/26/2019     Past Surgical History:   Procedure Laterality Date   • BILATERAL BREAST REDUCTION  Early 2000    Early 2000--bilateral breast reduction   • CARDIAC CATHETERIZATION N/A 9/26/2022    Procedure: Right Heart Cath;  Surgeon: Agus Solorio MD PhD;  Location: [facility] INVASIVE LOCATION;  Service: Cardiology;  Laterality: N/A;  Will REMAIN on Located within [facility] for the case   • CHOLECYSTECTOMY  1960s    1960s--open cholecystectomy   • COLONOSCOPY  2012 2012--reportedly normal colonoscopy   • INCONTINENCE SURGERY  2012 Approximately 2012--implantation of questionable bladder stimulator right sacral area for urinary incontinence.  Details not known.   • REPLACEMENT TOTAL KNEE BILATERAL Left 2010; 2011 2010-2011--bilateral total knee replacement   • SUBTOTAL HYSTERECTOMY  Remote    Remote partial hysterectomy.  Ovaries intact.   • TOTAL SHOULDER REPLACEMENT Left 2013 2013--left total shoulder replacement.   • TOTAL SHOULDER REPLACEMENT  April 28, 2021 4/20/2021--status post right reverse total shoulder arthroplasty   • TOTAL SHOULDER REVERSE ARTHROPLASTY Right 04/20/2021 4/20/2021--right reverse total shoulder replacement.     Family History   Problem Relation Age of Onset   • Alcohol abuse Father    • Breast cancer Daughter         40s   • Cancer Other    • Diabetes Other         type II   • Leukemia Grandchild 19     Social History     Socioeconomic History   • Marital status:    Tobacco Use   •  [facility] [id number]      Phone: [phone]   lisinopril 10 MG tablet  meclizine 25 MG tablet       Information about where to get these medications is not yet available    Ask your nurse or doctor about these medications  furosemide 20 MG tablet  insulin glargine 100 UNIT/ML injection pen         Discharge recommendations given to patient. Follow Up. With PCP in 1 week   Disposition. Home with [facility]  Activity. activity as tolerated  Diet: No diet orders on file      Spent 35 minutes in discharge process.     Signed:  Faustino Barr MD     8/25/2022 6:00 PM Smoking status: Former     Types: Cigarettes     Quit date: 1998     Years since quittin.3   • Smokeless tobacco: Never   Vaping Use   • Vaping Use: Never used   Substance and Sexual Activity   • Alcohol use: Yes     Alcohol/week: 1.0 standard drink     Types: 1 Glasses of wine per week     Comment: current some day   • Drug use: No   • Sexual activity: Not Currently     Partners: Male     Allergies   Allergen Reactions   • Morphine And Related    • Other Other (See Comments)     REJECTED DACRON SUTURES IN THE PAST AND INCISION CAME APART     Prior to Admission medications    Medication Sig Start Date End Date Taking? Authorizing Provider   anastrozole (ARIMIDEX) 1 MG tablet TAKE ONE TABLET BY MOUTH DAILY 3/23/23   Elsie Arzate MD   Cholecalciferol (VITAMIN D3) 2000 UNITS tablet Take 1 tablet by mouth Daily.    Provider, MD Alysa   DULoxetine (CYMBALTA) 60 MG capsule TAKE 1 CAPSULE EVERY DAY AS DIRECTED 3/17/22   Lito Moore MD   ezetimibe (ZETIA) 10 MG tablet TAKE 1 TABLET EVERY DAY 3/17/22   Lito Moore MD   gabapentin (NEURONTIN) 300 MG capsule Take 1 p.o. 3 times daily for peripheral neuropathy as directed 23   Lito Moore MD   Homeopathic Products (LEG CRAMPS PO) Take  by mouth Daily. Nightly    Provider, MD Alysa   levothyroxine (Synthroid) 100 MCG tablet Take 1 p.o. daily as directed for low thyroid 22   Lito Moore MD   levothyroxine (SYNTHROID, LEVOTHROID) 125 MCG tablet TAKE ONE TABLET BY MOUTH DAILY 23   Lito Moore MD   LORazepam (ATIVAN) 0.5 MG tablet TAKE ONE TABLET BY MOUTH TWICE A DAY AS NEEDED FOR ANXIETY 23   Lito Moore MD   metroNIDAZOLE (METROGEL) 0.75 % gel Apply  topically to the appropriate area as directed 2 (Two) Times a Day. 22   Lito Moore MD   ondansetron (ZOFRAN) 8 MG tablet Take 1 tablet by mouth Every 6 (Six) Hours As Needed for Nausea. 22   Lito Moore MD   simvastatin  (ZOCOR) 20 MG tablet TAKE ONE TABLET BY MOUTH DAILY FOR HIGH CHOLESTEROL. 9/28/22   Lito Moore MD   Tirzepatide (Mounjaro) 15 MG/0.5ML solution pen-injector Inject 15 mg under the skin into the appropriate area as directed 1 (One) Time Per Week.  Patient not taking: Reported on 4/13/2023 3/16/23   Lito Moore MD   traZODone (DESYREL) 150 MG tablet TAKE ONE TABLET BY MOUTH ONCE NIGHTLY 4/4/23   Lito Moore MD   valsartan (DIOVAN) 320 MG tablet TAKE 1 TABLET EVERY MORNING FOR BLOOD PRESSURE 10/13/22   Lito Moore MD             Objective     Temp:  [97 °F (36.1 °C)] 97 °F (36.1 °C)  Heart Rate:  [61-77] 62  Resp:  [16-18] 18  BP: (132-143)/(70-75) 143/75  Neurological Exam  Mental Status  Awake, alert and oriented to person, place and time.Alert. Speech is normal. Language is fluent with no aphasia.    Cranial Nerves  CN II: Visual fields full to confrontation.  CN III, IV, VI: Extraocular movements intact bilaterally. Normal lids and orbits bilaterally. Pupils equal round and reactive to light bilaterally.  CN V: Facial sensation is normal.  CN VII: Full and symmetric facial movement.  CN IX, X: Palate elevates symmetrically  CN XI: Shoulder shrug strength is normal.  CN XII: Tongue midline without atrophy or fasciculations.    Motor  Normal muscle bulk throughout. No fasciculations present. Strength is 5/5 throughout all four extremities.    Sensory  Decreased to light touch and temperature in stocking distribution in both lower extremities.     Reflexes  Diminished reflexes in the legs, with downgoing plantar reflexes.    Coordination    No dysmetria appreciated.    Gait    Not assessed.      Physical Exam  Vitals and nursing note reviewed.   Constitutional:       Appearance: Normal appearance.   HENT:      Head: Normocephalic and atraumatic.   Eyes:      General: Lids are normal.      Extraocular Movements: Extraocular movements intact.      Pupils: Pupils are equal, round, and  reactive to light.   Cardiovascular:      Rate and Rhythm: Normal rate and regular rhythm.   Pulmonary:      Effort: Pulmonary effort is normal. No respiratory distress.   Musculoskeletal:      Cervical back: Normal range of motion and neck supple.   Neurological:      Mental Status: She is alert.      Motor: Motor strength is normal.   Psychiatric:         Mood and Affect: Mood normal.         Speech: Speech normal.         Behavior: Behavior normal.         Acute Stroke Data    IV Thrombolytic (TPA/Tenecteplase) Inclusion / Exclusion Criteria-not a stroke    Time: 15:18 EDT  Person Administering Scale: Agus De Luna MD    Inclusion Criteria  [x]   18 years of age or greater   []   Onset of symptoms < 4.5 hours before beginning treatment (stroke onset = time patient was last seen well or without symptoms).   []   Diagnosis of acute ischemic stroke causing measurable disabling deficit (Complete Hemianopia, Any Aphasia, Visual or Sensory Extinction, Any weakness limiting sustained effort against gravity)   []   Any remaining deficit considered potentially disabling in view of patient and practitioner   Exclusion criteria (Do not proceed with Alteplase if any are checked under exclusion criteria)  [x]   Onset unknown or GREATER than 4.5 hours   []   ICH on CT/MRI   []   CT demonstrates hypodensity representing acute or subacute infarct   []   Significant head trauma or prior stroke in the previous 3 months   []   Symptoms suggestive of subarachnoid hemorrhage   []   History of un-ruptured intracranial aneurysm GREATER than 10 mm   []   Recent intracranial or intraspinal surgery within the last 3 months   []   Arterial puncture at a non-compressible site in the previous 7 days   []   Active internal bleeding   []   Acute bleeding tendency   []   Platelet count LESS than 100,000 for known hematological diseases such as leukemia, thrombocytopenia or chronic cirrhosis   []   Current use of anticoagulant with INR GREATER  than 1.7 or PT GREATER than 15 seconds, aPTT GREATER than 40 seconds   []   Heparin received within 48 hours, resulting in abnormally elevated aPTT GREATER than upper limit of normal   []   Current use of direct thrombin inhibitors or direct factor Xa inhibitors in the past 48 hours   []   Elevated blood pressure refractory to treatment (systolic GREATER than 185 mm/Hg or diastolic  GREATER than 110 mm/Hg   []   Suspected infective endocarditis and aortic arch dissection   []   Current use of therapeutic treatment dose of low-molecular-weight heparin (LMWH) within the previous 24 hours   []   Structural GI malignancy or bleed   Relative exclusion for all patients  []   Only minor nondisabling symptoms   []   Pregnancy   []   Seizure at onset with postictal residual neurological impairments   []   Major surgery or previous trauma within past 14 days   []   History of previous spontaneous ICH, intracranial neoplasm, or AV malformation   []   Postpartum (within previous 14 days)   []   Recent GI or urinary tract hemorrhage (within previous 21 days)   []   Recent acute MI (within previous 3 months)   []   History of unruptured intracranial aneurysm LESS than 10 mm   []   History of ruptured intracranial aneurysm   []   Blood glucose LESS than 50 mg/dL (2.7 mmol/L)   []   Dural puncture within the last 7 days   []   Known GREATER than 10 cerebral microbleeds   Additional exclusions for patients with symptoms onset between 3 and 4.5 hours.  []   Age > 80.   []   On any anticoagulants regardless of INR  >>> Warfarin (Coumadin), Heparin, Enoxaparin (Lovenox), fondaparinux (Arixtra), bivalirudin (Angiomax), Argatroban, dabigatran (Pradaxa), rivaroxaban (Xarelto), or apixaban (Eliquis)   []   Severe stroke (NIHSS > 25).   []   History of BOTH diabetes and previous ischemic stroke.   []   The risks and benefits have been discussed with the patient or family related to the administration of IV alteplase for stroke symptoms.    []   I have discussed and reviewed the patient's case and imaging with the attending prior to IV Thrombolytic (TPA/Tenecteplase).    Time Thrombolytic administered       Hospital Meds:  Scheduled-   Infusions- No current facility-administered medications for this encounter.     PRNs-     Functional Status Prior to Current Stroke/Katerina Score: 0    NIH Stroke Scale  Time: 15:18 EDT  Person Administering Scale: Agus De Luna MD    1a  Level of consciousness: 0=alert; keenly responsive   1b. LOC questions:  0=Performs both tasks correctly   1c. LOC commands: 0=Performs both tasks correctly   2.  Best Gaze: 0=normal   3.  Visual: 0=No visual loss   4. Facial Palsy: 0=Normal symmetric movement   5a.  Motor left arm: 0=No drift, limb holds 90 (or 45) degrees for full 10 seconds   5b.  Motor right arm: 0=No drift, limb holds 90 (or 45) degrees for full 10 seconds   6a. motor left le=No drift, limb holds 90 (or 45) degrees for full 10 seconds   6b  Motor right le=No drift, limb holds 90 (or 45) degrees for full 10 seconds   7. Limb Ataxia: 0=Absent   8.  Sensory: 0=Normal; no sensory loss   9. Best Language:  0=No aphasia, normal   10. Dysarthria: 0=Normal   11. Extinction and Inattention: 0=No abnormality    Total:   0       Results Reviewed:  I have personally reviewed current lab, radiology, and data  CT head shows no acute changes, no hemorrhage    Results for orders placed during the hospital encounter of 22    Adult Transthoracic Echo Complete W/ Cont if Necessary Per Protocol    Interpretation Summary  · Normal LV systolic function. Calculated LV EF: 59%. Grade 1a diastolic function.  · Right ventricular systolic function is low normal. RV S' 9.6 cm/sec. The RV cavity is mild to moderately dilated. There is moderate RV hypertrophy.  · Saline test results are negative.  · Insufficient TR velocity profile to estimate the right ventricular systolic pressure.            Assessment/Plan:       1. Imbalance walking.  Likely secondary to underlying peripheral neuropathy, with some worsening with anastrozole.  Given her vascular risk factors, will recommend CT angiogram of head and neck and repeat CT head in the morning (cannot get MRI secondary to bladder stimulator).  Start her on aspirin 81 mg and Lipitor 40 mg daily.  Check her A1c and lipid panel, 2D echocardiogram.  2. Peripheral neuropathy.  Could be secondary to underlying diabetes versus idiopathic.  Continue her home dose of gabapentin.  3. Essential hypertension.  Normal blood pressure goals.  No need for permissive hypertension.  4. Diabetes mellitus type 2 in obese.  Maintain normoglycemia.  5. Increase activity with PT/OT in the morning.  Bedside swallow evaluation.    Case was discussed with patient, nursing and the ER team.  Thank you for the consult.          Agus De Luna MD  April 24, 2023  15:18 EDT

## 2023-04-24 NOTE — CONSULTS
"Reason for Consultation: gait ataxia    Chief complaint imbalance and vision symptoms.    Subjective .     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  She also noted this morning when she went to the pantry, she walked and and the boxes of crackers and additional snacks were \"flying by, incredibly fast\" and that the room seemed abnormal in her vision, but that was not moving the same way the boxes of crackers were.  She called her oncologist who directed her to the ER.  Patient endorses a mild steer headache on the right.  She has had 1 fall and has a right orbital ecchymosis that is well into healing.  Despite no further fall she continues to have significant difficulty with balance, requiring holding onto furniture and walls to ambulate around the house.  She endorses peripheral neuropathy, which manifests as burning and pain in her feet keeping her from sleeping well, and having been present for decades.  She states her PCM Dr. Moore recently started her on gabapentin, and she is taking 300 mg 3 times daily, which has been very helpful for her neuropathy pain.  She states that she is only been on the medication for short time, and the symptoms that brought her to the emergency room were present prior to starting the medication.    Claudette has a bladder stimulator implant placed by Dr. Krueger, Renato mays, which has been in place since 2014.  She has a card in her purse, its alooma, with a serial number of and [id number], model #3058.     Review of Systems  Pertinent items are noted in HPI      History  Past Medical History:   Diagnosis Date   • Benign essential hypertension    • Chronic bilateral low back pain without sciatica 8/16/2013 March 13, " 2019--Limited bone scan.  This reveals scintigraphic activity in the spine and at the right shoulder corresponds to change seen on recent x-rays and is best explained by degenerative change.  Isolated metastatic disease exactly corresponding to the sites of extensive degenerative disc change would be peculiar.  March 7, 2019--new patient to get established.  She has complaints of approxi   • Chronic bilateral thoracic back pain 2/26/2019 March 13, 2019--Limited bone scan.  This reveals scintigraphic activity in the spine and at the right shoulder corresponds to change seen on recent x-rays and is best explained by degenerative change.  Isolated metastatic disease exactly corresponding to the sites of extensive degenerative disc change would be peculiar.  March 1, 2019--x-ray of the lumbar and thoracic spine reveals mild anterior l   • Chronic insomnia 11/4/2014   • Diabetic peripheral neuropathy 3/7/2019    Characterized by decreased sensation and paresthesias in both lower extremities described as a burning sensation.  Extends up to the knees.  Reportedly had been evaluated with nerve conduction study in the past.   • Generalized anxiety disorder 5/19/2013   • History of Bell's palsy 5/21/2013    Remote history of Bell's palsy.  Patient does not remember which side of the face.   • History of COVID-19 12/8/2022   • Hyperlipidemia    • Impaired fasting glucose    • Major depression    • Menopausal state, 8/19/2020--normal DEXA. 3/7/2019    August 19, 2020--DEXA scan reveals lumbar spine T score 3.8.  Left femoral neck T score 2.5.  Right femoral neck T score 2.2.  Normal bone density.   • Non morbid obesity 8/11/2022   • Peripheral neuropathy, idiopathic 3/7/2019    Characterized by decreased sensation and paresthesias in both lower extremities described as a burning sensation.  Extends up to the knees.  Reportedly had been evaluated with nerve conduction study in the past.   • Primary hypothyroidism 5/19/2013    • Primary osteoarthritis involving multiple joints 4/22/2013   • Rotator cuff arthropathy of right shoulder, 4/20/2021--status post right reverse total shoulder arthroplasty 5/27/2020   • Situational mixed anxiety and depressive disorder 8/21/2019 August 21, 2019--patient seen in follow-up after I called in a prescription for Ativan after the patient's  contacted me a few weeks ago regarding the sudden death of patient's daughter.  This was an apparent suicide and the patient found her daughter dead.  I will not go into details.  Patient reports the Lorazepam has been helpful but she is still quite distraught, nervous, and undergoing   • Type 2 diabetes mellitus with diabetic neuropathy, without long-term current use of insulin    • Vitamin D deficiency 2/26/2019   ,   Past Surgical History:   Procedure Laterality Date   • BILATERAL BREAST REDUCTION  Early 2000    Early 2000--bilateral breast reduction   • CARDIAC CATHETERIZATION N/A 9/26/2022    Procedure: Right Heart Cath;  Surgeon: Agus Solorio MD PhD;  Location: [facility] CATH INVASIVE LOCATION;  Service: Cardiology;  Laterality: N/A;  Will REMAIN on [facility] for the case   • CHOLECYSTECTOMY  1960s    1960s--open cholecystectomy   • COLONOSCOPY  2012 2012--reportedly normal colonoscopy   • INCONTINENCE SURGERY  2012 Approximately 2012--implantation of questionable bladder stimulator right sacral area for urinary incontinence.  Details not known.   • REPLACEMENT TOTAL KNEE BILATERAL Left 2010; 2011 2010-2011--bilateral total knee replacement   • SUBTOTAL HYSTERECTOMY  Remote    Remote partial hysterectomy.  Ovaries intact.   • TOTAL SHOULDER REPLACEMENT Left 2013 2013--left total shoulder replacement.   • TOTAL SHOULDER REPLACEMENT  April 28, 2021 4/20/2021--status post right reverse total shoulder arthroplasty   • TOTAL SHOULDER REVERSE ARTHROPLASTY Right 04/20/2021 4/20/2021--right reverse total shoulder replacement.   ,    Family History   Problem Relation Age of Onset   • Alcohol abuse Father    • Breast cancer Daughter         40s   • Cancer Other    • Diabetes Other         type II   • Leukemia Grandchild 19   ,   Social History     Tobacco Use   • Smoking status: Former     Types: Cigarettes     Quit date: 1998     Years since quittin.3   • Smokeless tobacco: Never   Vaping Use   • Vaping Use: Never used   Substance Use Topics   • Alcohol use: Yes     Alcohol/week: 1.0 standard drink     Types: 1 Glasses of wine per week     Comment: current some day   • Drug use: No   , (Not in a hospital admission)  , Scheduled Meds:  , Continuous Infusions:  No current facility-administered medications for this encounter.  , PRN Meds:   and Allergies:  Morphine and related and Other    Objective     Vital Signs   Temp:  [97 °F (36.1 °C)] 97 °F (36.1 °C)  Heart Rate:  [61-77] 62  Resp:  [16-18] 18  BP: (132-143)/(70-75) 143/75    Intake & Output (last day)     None           Physical Exam:  Mental status: patient alert and fully oriented  Cranial nerves: pupils equal, round, reactive to light and accomodation; EOMI; no ptosis or facial droop; facial sensation and strength intact throughout; hearing intact to voice; palate elevates symmetrically; trapezius 5/5 bilaterally; tongue midline on protrusion and AROM intact.   Motor: 5/5 distally and proximally in all limbs, except hip flexion bilaterally is 4-/5. Tone is normal. No focal atrophy appreciated.  Reflexes: 2-/4 at bicep/triceps/brachioradialis/patella/Achilles bilaterally; No Louis's. No ankle clonus.   Sensation: Intact to light touch distally in all limbs.   Gait/Coordination: FTN, finger tapping within normal limits, HTS is difficult for her bilaterally.   Other: N/a    Results Review:   I reviewed the patient's new clinical results.  CTH reviewed, agree no obvious acute abnormality.   CTA head/neck pending.     Assessment & Plan     1. Visual disturbance  2. Gait  disturbance  3. Hx of breast cancer,    This is an 78-year-old female with a history breast cancer, diabetes, and peripheral neuropathy, presenting with progressing difficulty with ambulation including recent falls, as well as visual disturbance this morning.  Exam overall is relatively reassuring, but medical history and progressing nature of symptoms, I recommend we obtain an MRI if possible as differential diagnosis includes subacute infarct or other structural etiologies beyond the resolution of the CT.  Her stimulator states that conditional MRI compatible, please confirm with radiology.  Patient has the device card in her wallet.  CT head was negative, and CTA pending.      I discussed the patients findings and my recommendations with patient and family    Appropriate PPE was worn.  Dragon dictation software was utilized for this documentation.    Sheng Rico DO  04/24/23  16:41 EDT

## 2023-04-24 NOTE — ED NOTES
C/O unsteady on feet X 4-5 weeks, states Oncology took her off med last week D/T Sx. Had a fall approx 3 days ago with RT eye injury. States SX getting worse

## 2023-04-24 NOTE — ED NOTES
.Nursing report ED to floor  Claudette L McManus  78 y.o.  female    HPI :   Chief Complaint   Patient presents with    Gait Problem       Admitting doctor:   Evelyne Jimenez MD    Admitting diagnosis:   The primary encounter diagnosis was Ataxia. Diagnoses of Type 2 diabetes mellitus with diabetic neuropathy, without long-term current use of insulin and Chronic anticoagulation, Xarelto for multiple pulmonary emboli were also pertinent to this visit.    Code status:   Current Code Status       Date Active Code Status Order ID Comments User Context       Not on file            Allergies:   Morphine and related and Other    Isolation:   No active isolations    Intake and Output  No intake or output data in the 24 hours ending 04/24/23 1527    Weight:       04/24/23  1231   Weight: 81.6 kg (180 lb)       Most recent vitals:   Vitals:    04/24/23 1306 04/24/23 1330 04/24/23 1331 04/24/23 1342   BP:   143/75    BP Location:   Right arm    Patient Position:   Lying    Pulse: 65 61  62   Resp:   18    Temp:       SpO2: 92% 92%  92%   Weight:       Height:           Active LDAs/IV Access:   Lines, Drains & Airways       Active LDAs       Name Placement date Placement time Site Days    Peripheral IV 04/24/23 1246 Right Antecubital 04/24/23  1246  Antecubital  less than 1                    Labs (abnormal labs have a star):   Labs Reviewed   COMPREHENSIVE METABOLIC PANEL - Abnormal; Notable for the following components:       Result Value    Glucose 113 (*)     All other components within normal limits    Narrative:     GFR Normal >60  Chronic Kidney Disease <60  Kidney Failure <15    The GFR formula is only valid for adults with stable renal function between ages 18 and 70.   CBC WITH AUTO DIFFERENTIAL - Abnormal; Notable for the following components:    RBC 3.67 (*)     Immature Grans % 1.2 (*)     Immature Grans, Absolute 0.06 (*)     All other components within normal limits   PROTIME-INR - Normal   CBC AND  DIFFERENTIAL    Narrative:     The following orders were created for panel order CBC & Differential.  Procedure                               Abnormality         Status                     ---------                               -----------         ------                     CBC Auto Differential[[id number]]        Abnormal            Final result                 Please view results for these tests on the individual orders.       EKG:   ECG 12 Lead Stroke Evaluation   Final Result   HEART RATE= 63  bpm   RR Interval= 952  ms   MS Interval= 173  ms   P Horizontal Axis= 10  deg   P Front Axis= 71  deg   QRSD Interval= 103  ms   QT Interval= 384  ms   QRS Axis= -66  deg   T Wave Axis= 127  deg   - ABNORMAL ECG -   Sinus rhythm   Inferior infarct, old   lateral st t changes  no change   Electronically Signed By: Matt Moreau) ([facility]) 24-Apr-2023 13:38:52   Date and Time of Study: 2023-04-24 13:12:51          Meds given in ED:   Medications - No data to display    Imaging results:  CT Head Without Contrast    Result Date: 4/24/2023  No acute intracranial abnormality is identified. There is mild-to-moderate small vessel disease in the cerebral white matter. The remainder of the head CT is within normal limits. The etiology of the patient's acute onset of gait disturbance and ataxia over the past 2 weeks is not established on this exam. If there remains clinical suspicion of an acute stroke causing the patient's acute onset of ataxia, an MRI of the brain could be obtained for more comprehensive assessment.  The results were discussed with Dr. Robert from the emergency room by telephone on 04/24/2023 at 2 PM.  Radiation dose reduction techniques were utilized, including automated exposure control and exposure modulation based on body size.        Ambulatory status:   - x2 assist w/ gait belt due to being unsteady    Social issues:   Social History     Socioeconomic History    Marital status:    Tobacco Use     Smoking status: Former     Types: Cigarettes     Quit date: 1998     Years since quittin.3    Smokeless tobacco: Never   Vaping Use    Vaping Use: Never used   Substance and Sexual Activity    Alcohol use: Yes     Alcohol/week: 1.0 standard drink     Types: 1 Glasses of wine per week     Comment: current some day    Drug use: No    Sexual activity: Not Currently     Partners: Male       NIH Stroke Scale:  Interval: baseline      Sandra Dodson RN  23 15:27 EDT

## 2023-04-25 ENCOUNTER — APPOINTMENT (OUTPATIENT)
Dept: CARDIOLOGY | Facility: HOSPITAL | Age: 78
End: 2023-04-25
Payer: MEDICARE

## 2023-04-25 ENCOUNTER — APPOINTMENT (OUTPATIENT)
Dept: MRI IMAGING | Facility: HOSPITAL | Age: 78
End: 2023-04-25
Payer: MEDICARE

## 2023-04-25 PROBLEM — Z86.711 HISTORY OF PULMONARY EMBOLISM: Status: ACTIVE | Noted: 2023-04-25

## 2023-04-25 LAB
ALBUMIN SERPL-MCNC: 3.4 G/DL (ref 3.5–5.2)
ALBUMIN/GLOB SERPL: 1.2 G/DL
ALP SERPL-CCNC: 58 U/L (ref 39–117)
ALT SERPL W P-5'-P-CCNC: 20 U/L (ref 1–33)
ANION GAP SERPL CALCULATED.3IONS-SCNC: 10.5 MMOL/L (ref 5–15)
AORTIC DIMENSIONLESS INDEX: 0.7 (DI)
ASCENDING AORTA: 3.5 CM
AST SERPL-CCNC: 20 U/L (ref 1–32)
BH CV ECHO MEAS - ACS: 1.67 CM
BH CV ECHO MEAS - AO MAX PG: 8 MMHG
BH CV ECHO MEAS - AO MEAN PG: 3.8 MMHG
BH CV ECHO MEAS - AO ROOT DIAM: 3 CM
BH CV ECHO MEAS - AO V2 MAX: 141.8 CM/SEC
BH CV ECHO MEAS - AO V2 VTI: 31.8 CM
BH CV ECHO MEAS - AVA(I,D): 2.7 CM2
BH CV ECHO MEAS - CONTRAST EF 4CH: 65 CM2
BH CV ECHO MEAS - EDV(CUBED): 135.7 ML
BH CV ECHO MEAS - EDV(MOD-SP2): 120 ML
BH CV ECHO MEAS - EDV(MOD-SP4): 114 ML
BH CV ECHO MEAS - EF(MOD-BP): 65.8 %
BH CV ECHO MEAS - EF(MOD-SP2): 65.8 %
BH CV ECHO MEAS - EF(MOD-SP4): 64.9 %
BH CV ECHO MEAS - ESV(CUBED): 38.9 ML
BH CV ECHO MEAS - ESV(MOD-SP2): 41 ML
BH CV ECHO MEAS - ESV(MOD-SP4): 40 ML
BH CV ECHO MEAS - FS: 34.1 %
BH CV ECHO MEAS - IVS/LVPW: 1.13 CM
BH CV ECHO MEAS - IVSD: 0.88 CM
BH CV ECHO MEAS - LAT PEAK E' VEL: 9 CM/SEC
BH CV ECHO MEAS - LV MASS(C)D: 148.3 GRAMS
BH CV ECHO MEAS - LV MAX PG: 4.4 MMHG
BH CV ECHO MEAS - LV MEAN PG: 2.05 MMHG
BH CV ECHO MEAS - LV V1 MAX: 104.5 CM/SEC
BH CV ECHO MEAS - LV V1 VTI: 23.7 CM
BH CV ECHO MEAS - LVIDD: 5.1 CM
BH CV ECHO MEAS - LVIDS: 3.4 CM
BH CV ECHO MEAS - LVOT AREA: 3.7 CM2
BH CV ECHO MEAS - LVOT DIAM: 2.16 CM
BH CV ECHO MEAS - LVPWD: 0.77 CM
BH CV ECHO MEAS - MED PEAK E' VEL: 7.4 CM/SEC
BH CV ECHO MEAS - MV A DUR: 0.16 SEC
BH CV ECHO MEAS - MV A MAX VEL: 92.1 CM/SEC
BH CV ECHO MEAS - MV DEC SLOPE: 440.1 CM/SEC2
BH CV ECHO MEAS - MV DEC TIME: 227 MSEC
BH CV ECHO MEAS - MV E MAX VEL: 88.7 CM/SEC
BH CV ECHO MEAS - MV E/A: 0.96
BH CV ECHO MEAS - MV MAX PG: 3.4 MMHG
BH CV ECHO MEAS - MV MEAN PG: 1.32 MMHG
BH CV ECHO MEAS - MV P1/2T: 62.8 MSEC
BH CV ECHO MEAS - MV V2 VTI: 28.2 CM
BH CV ECHO MEAS - MVA(P1/2T): 3.5 CM2
BH CV ECHO MEAS - MVA(VTI): 3.1 CM2
BH CV ECHO MEAS - PA ACC TIME: 0.11 SEC
BH CV ECHO MEAS - PA PR(ACCEL): 27.9 MMHG
BH CV ECHO MEAS - PA V2 MAX: 82.1 CM/SEC
BH CV ECHO MEAS - PULM A REVS DUR: 0.18 SEC
BH CV ECHO MEAS - PULM A REVS VEL: 26.6 CM/SEC
BH CV ECHO MEAS - PULM DIAS VEL: 52.3 CM/SEC
BH CV ECHO MEAS - PULM S/D: 0.99
BH CV ECHO MEAS - PULM SYS VEL: 51.8 CM/SEC
BH CV ECHO MEAS - RAP SYSTOLE: 8 MMHG
BH CV ECHO MEAS - RV MAX PG: 1.67 MMHG
BH CV ECHO MEAS - RV V1 MAX: 64.7 CM/SEC
BH CV ECHO MEAS - RV V1 VTI: 14.3 CM
BH CV ECHO MEAS - RVSP: 20.9 MMHG
BH CV ECHO MEAS - SV(LVOT): 86.7 ML
BH CV ECHO MEAS - SV(MOD-SP2): 79 ML
BH CV ECHO MEAS - SV(MOD-SP4): 74 ML
BH CV ECHO MEAS - TR MAX PG: 12.9 MMHG
BH CV ECHO MEAS - TR MAX VEL: 179.8 CM/SEC
BH CV ECHO MEASUREMENTS AVERAGE E/E' RATIO: 10.82
BH CV ECHO SHUNT ASSESSMENT PERFORMED (HIDDEN SCRIPTING): 1
BH CV VAS BP LEFT ARM: NORMAL MMHG
BH CV XLRA - RV BASE: 3.4 CM
BH CV XLRA - RV LENGTH: 5.9 CM
BILIRUB SERPL-MCNC: 0.3 MG/DL (ref 0–1.2)
BUN SERPL-MCNC: 10 MG/DL (ref 8–23)
BUN/CREAT SERPL: 10.5 (ref 7–25)
CALCIUM SPEC-SCNC: 9.5 MG/DL (ref 8.6–10.5)
CHLORIDE SERPL-SCNC: 103 MMOL/L (ref 98–107)
CHOLEST SERPL-MCNC: 112 MG/DL (ref 0–200)
CO2 SERPL-SCNC: 27.5 MMOL/L (ref 22–29)
CREAT SERPL-MCNC: 0.95 MG/DL (ref 0.57–1)
DEPRECATED RDW RBC AUTO: 43.2 FL (ref 37–54)
EGFRCR SERPLBLD CKD-EPI 2021: 60.7 ML/MIN/1.73
ERYTHROCYTE [DISTWIDTH] IN BLOOD BY AUTOMATED COUNT: 12.5 % (ref 12.3–15.4)
FOLATE SERPL-MCNC: 9.57 NG/ML (ref 4.78–24.2)
GLOBULIN UR ELPH-MCNC: 2.8 GM/DL
GLUCOSE BLDC GLUCOMTR-MCNC: 117 MG/DL (ref 70–130)
GLUCOSE BLDC GLUCOMTR-MCNC: 139 MG/DL (ref 70–130)
GLUCOSE BLDC GLUCOMTR-MCNC: 98 MG/DL (ref 70–130)
GLUCOSE SERPL-MCNC: 92 MG/DL (ref 65–99)
HBA1C MFR BLD: 5.5 % (ref 4.8–5.6)
HCT VFR BLD AUTO: 33.3 % (ref 34–46.6)
HDLC SERPL-MCNC: 60 MG/DL (ref 40–60)
HGB BLD-MCNC: 11.4 G/DL (ref 12–15.9)
LDLC SERPL CALC-MCNC: 36 MG/DL (ref 0–100)
LDLC/HDLC SERPL: 0.6 {RATIO}
LEFT ATRIUM VOLUME INDEX: 34.8 ML/M2
MAXIMAL PREDICTED HEART RATE: 140 BPM
MCH RBC QN AUTO: 32.7 PG (ref 26.6–33)
MCHC RBC AUTO-ENTMCNC: 34.2 G/DL (ref 31.5–35.7)
MCV RBC AUTO: 95.4 FL (ref 79–97)
PLATELET # BLD AUTO: 210 10*3/MM3 (ref 140–450)
PMV BLD AUTO: 9.6 FL (ref 6–12)
POTASSIUM SERPL-SCNC: 4 MMOL/L (ref 3.5–5.2)
PROT SERPL-MCNC: 6.2 G/DL (ref 6–8.5)
RBC # BLD AUTO: 3.49 10*6/MM3 (ref 3.77–5.28)
SINUS: 3.4 CM
SODIUM SERPL-SCNC: 141 MMOL/L (ref 136–145)
STJ: 3.1 CM
STRESS TARGET HR: 119 BPM
TRIGL SERPL-MCNC: 80 MG/DL (ref 0–150)
TSH SERPL DL<=0.05 MIU/L-ACNC: 0.08 UIU/ML (ref 0.27–4.2)
VIT B12 BLD-MCNC: 403 PG/ML (ref 211–946)
VLDLC SERPL-MCNC: 16 MG/DL (ref 5–40)
WBC NRBC COR # BLD: 5.39 10*3/MM3 (ref 3.4–10.8)

## 2023-04-25 PROCEDURE — G0378 HOSPITAL OBSERVATION PER HR: HCPCS

## 2023-04-25 PROCEDURE — 93306 TTE W/DOPPLER COMPLETE: CPT | Performed by: INTERNAL MEDICINE

## 2023-04-25 PROCEDURE — 70553 MRI BRAIN STEM W/O & W/DYE: CPT

## 2023-04-25 PROCEDURE — 99233 SBSQ HOSP IP/OBS HIGH 50: CPT | Performed by: PHYSICIAN ASSISTANT

## 2023-04-25 PROCEDURE — A9577 INJ MULTIHANCE: HCPCS | Performed by: INTERNAL MEDICINE

## 2023-04-25 PROCEDURE — 99222 1ST HOSP IP/OBS MODERATE 55: CPT | Performed by: INTERNAL MEDICINE

## 2023-04-25 PROCEDURE — 0 GADOBENATE DIMEGLUMINE 529 MG/ML SOLUTION: Performed by: INTERNAL MEDICINE

## 2023-04-25 PROCEDURE — 82607 VITAMIN B-12: CPT | Performed by: PHYSICIAN ASSISTANT

## 2023-04-25 PROCEDURE — 82962 GLUCOSE BLOOD TEST: CPT

## 2023-04-25 PROCEDURE — 85027 COMPLETE CBC AUTOMATED: CPT | Performed by: INTERNAL MEDICINE

## 2023-04-25 PROCEDURE — 82746 ASSAY OF FOLIC ACID SERUM: CPT | Performed by: PHYSICIAN ASSISTANT

## 2023-04-25 PROCEDURE — 93306 TTE W/DOPPLER COMPLETE: CPT

## 2023-04-25 PROCEDURE — 97535 SELF CARE MNGMENT TRAINING: CPT

## 2023-04-25 PROCEDURE — 83036 HEMOGLOBIN GLYCOSYLATED A1C: CPT | Performed by: INTERNAL MEDICINE

## 2023-04-25 PROCEDURE — 80061 LIPID PANEL: CPT | Performed by: INTERNAL MEDICINE

## 2023-04-25 PROCEDURE — 97165 OT EVAL LOW COMPLEX 30 MIN: CPT

## 2023-04-25 PROCEDURE — 84443 ASSAY THYROID STIM HORMONE: CPT | Performed by: INTERNAL MEDICINE

## 2023-04-25 PROCEDURE — 80053 COMPREHEN METABOLIC PANEL: CPT | Performed by: INTERNAL MEDICINE

## 2023-04-25 PROCEDURE — 25510000001 PERFLUTREN (DEFINITY) 8.476 MG IN SODIUM CHLORIDE (PF) 0.9 % 10 ML INJECTION: Performed by: INTERNAL MEDICINE

## 2023-04-25 RX ORDER — GABAPENTIN 300 MG/1
300 CAPSULE ORAL NIGHTLY
Status: DISCONTINUED | OUTPATIENT
Start: 2023-04-25 | End: 2023-04-26 | Stop reason: HOSPADM

## 2023-04-25 RX ADMIN — TRAZODONE HYDROCHLORIDE 150 MG: 100 TABLET ORAL at 00:36

## 2023-04-25 RX ADMIN — PERFLUTREN 1.5 ML: 6.52 INJECTION, SUSPENSION INTRAVENOUS at 12:51

## 2023-04-25 RX ADMIN — Medication 2000 UNITS: at 09:01

## 2023-04-25 RX ADMIN — VALSARTAN 320 MG: 320 TABLET, FILM COATED ORAL at 09:07

## 2023-04-25 RX ADMIN — GADOBENATE DIMEGLUMINE 18 ML: 529 INJECTION, SOLUTION INTRAVENOUS at 20:18

## 2023-04-25 RX ADMIN — GABAPENTIN 300 MG: 300 CAPSULE ORAL at 21:36

## 2023-04-25 RX ADMIN — LEVOTHYROXINE SODIUM 125 MCG: 0.12 TABLET ORAL at 09:01

## 2023-04-25 RX ADMIN — ATORVASTATIN CALCIUM 80 MG: 80 TABLET, FILM COATED ORAL at 21:36

## 2023-04-25 RX ADMIN — DULOXETINE HYDROCHLORIDE 60 MG: 60 CAPSULE, DELAYED RELEASE ORAL at 09:01

## 2023-04-25 RX ADMIN — TRAZODONE HYDROCHLORIDE 150 MG: 100 TABLET ORAL at 21:36

## 2023-04-25 RX ADMIN — ASPIRIN 325 MG: 325 TABLET ORAL at 09:01

## 2023-04-25 NOTE — THERAPY DISCHARGE NOTE
Acute Care - Occupational Therapy Discharge  [facility]    Patient Name: Claudette L McManus  : 1945    MRN: [id number]                              Today's Date: 2023       Admit Date: 2023    Visit Dx:     ICD-10-CM ICD-9-CM   1. Ataxia  R27.0 781.3   2. Type 2 diabetes mellitus with diabetic neuropathy, without long-term current use of insulin  E11.40 250.60     357.2   3. Chronic anticoagulation, Xarelto for multiple pulmonary emboli  Z79.01 V58.61     Patient Active Problem List   Diagnosis   • Primary osteoarthritis involving multiple joints   • Benign essential hypertension   • Hyperlipidemia   • Primary hypothyroidism   • Type 2 diabetes mellitus with diabetic neuropathy, without long-term current use of insulin   • Chronic insomnia   • Chronic bilateral low back pain without sciatica   • Chronic bilateral thoracic back pain   • Vitamin D deficiency   • Therapeutic drug monitoring   • Postmenopausal state   • Diabetic peripheral neuropathy   • Situational mixed anxiety and depressive disorder   • ACE-inhibitor cough   • Hyponatremia   • Dyspnea on exertion   • Multiple pulmonary emboli   • Non morbid obesity   • Ductal carcinoma of left breast   • Right ventricular enlargement   • Situational anxiety   • Chronic anticoagulation, Xarelto for multiple pulmonary emboli   • History of COVID-19   • Ataxia     Past Medical History:   Diagnosis Date   • Benign essential hypertension    • Chronic bilateral low back pain without sciatica 2013--Limited bone scan.  This reveals scintigraphic activity in the spine and at the right shoulder corresponds to change seen on recent x-rays and is best explained by degenerative change.  Isolated metastatic disease exactly corresponding to the sites of extensive degenerative disc change would be peculiar.  2019--new patient to get established.  She has complaints of approxi   • Chronic bilateral thoracic back pain 2019     March 13, 2019--Limited bone scan.  This reveals scintigraphic activity in the spine and at the right shoulder corresponds to change seen on recent x-rays and is best explained by degenerative change.  Isolated metastatic disease exactly corresponding to the sites of extensive degenerative disc change would be peculiar.  March 1, 2019--x-ray of the lumbar and thoracic spine reveals mild anterior l   • Chronic insomnia 11/4/2014   • Diabetic peripheral neuropathy 3/7/2019    Characterized by decreased sensation and paresthesias in both lower extremities described as a burning sensation.  Extends up to the knees.  Reportedly had been evaluated with nerve conduction study in the past.   • Generalized anxiety disorder 5/19/2013   • History of Bell's palsy 5/21/2013    Remote history of Bell's palsy.  Patient does not remember which side of the face.   • History of COVID-19 12/8/2022   • Hyperlipidemia    • Impaired fasting glucose    • Major depression    • Menopausal state, 8/19/2020--normal DEXA. 3/7/2019    August 19, 2020--DEXA scan reveals lumbar spine T score 3.8.  Left femoral neck T score 2.5.  Right femoral neck T score 2.2.  Normal bone density.   • Non morbid obesity 8/11/2022   • Peripheral neuropathy, idiopathic 3/7/2019    Characterized by decreased sensation and paresthesias in both lower extremities described as a burning sensation.  Extends up to the knees.  Reportedly had been evaluated with nerve conduction study in the past.   • Primary hypothyroidism 5/19/2013   • Primary osteoarthritis involving multiple joints 4/22/2013   • Rotator cuff arthropathy of right shoulder, 4/20/2021--status post right reverse total shoulder arthroplasty 5/27/2020   • Situational mixed anxiety and depressive disorder 8/21/2019 August 21, 2019--patient seen in follow-up after I called in a prescription for Ativan after the patient's  contacted me a few weeks ago regarding the sudden death of patient's daughter.   This was an apparent suicide and the patient found her daughter dead.  I will not go into details.  Patient reports the Lorazepam has been helpful but she is still quite distraught, nervous, and undergoing   • Type 2 diabetes mellitus with diabetic neuropathy, without long-term current use of insulin    • Vitamin D deficiency 2/26/2019     Past Surgical History:   Procedure Laterality Date   • BILATERAL BREAST REDUCTION  Early 2000    Early 2000--bilateral breast reduction   • CARDIAC CATHETERIZATION N/A 9/26/2022    Procedure: Right Heart Cath;  Surgeon: Agus Solorio MD PhD;  Location: [facility] CATH INVASIVE LOC[facility];  Service: Cardiology;  Laterality: N/A;  Will REMAIN on [facility] for the case   • CHOLECYSTECTOMY  1960s 1960s--open cholecystectomy   • COLONOSCOPY  2012 2012--reportedly normal colonoscopy   • INCONTINENCE SURGERY  2012 Approximately 2012--implantation of questionable bladder stimulator right sacral area for urinary incontinence.  Details not known.   • REPLACEMENT TOTAL KNEE BILATERAL Left 2010; 2011 2010-2011--bilateral total knee replacement   • SUBTOTAL HYSTERECTOMY  Remote    Remote partial hysterectomy.  Ovaries intact.   • TOTAL SHOULDER REPLACEMENT Left 2013 2013--left total shoulder replacement.   • TOTAL SHOULDER REPLACEMENT  April 28, 2021 4/20/2021--status post right reverse total shoulder arthroplasty   • TOTAL SHOULDER REVERSE ARTHROPLASTY Right 04/20/2021 4/20/2021--right reverse total shoulder replacement.      General Information     Row Name 04/25/23 1240          OT Time and Intention    Document Type discharge evaluation/summary  -RD     Mode of Treatment occupational therapy  -RD     Row Name 04/25/23 1240          General Information    Patient Profile Reviewed yes  -RD     Prior Level of Function independent:;ADL's  -RD     Existing Precautions/Restrictions fall  -RD     Row Name 04/25/23 1240          Living Environment    People in Home spouse   -RD     Row Name 04/25/23 1240          Cognition    Orientation Status (Cognition) oriented x 4  -RD           User Key  (r) = Recorded By, (t) = Taken By, (c) = Cosigned By    Initials Name Provider Type    Mary Lou Arriaza OT Occupational Therapist               Mobility/ADL's     Row Name 04/25/23 1240          Bed Mobility    Bed Mobility supine-sit;sit-supine  -RD     Supine-Sit [place] (Bed Mobility) modified independence  -RD     Sit-Supine [place] (Bed Mobility) modified independence  -RD     Row Name 04/25/23 1240          Transfers    Transfers sit-stand transfer  -RD     Row Name 04/25/23 1240          Sit-Stand Transfer    Sit-Stand [place] (Transfers) standby assist;supervision  -RD     Row Name 04/25/23 1240          Functional Mobility    Functional Mobility- Ind. Level standby assist  -RD     Functional Mobility- Comment pt performs fxl ambulation from EOB <> sink w/ SBA; mild unsteadiness noted to which pt states she normally holds onto walls and furniture at home- may benefit from trialing RW w/ PT  -RD     Row Name 04/25/23 1240          Activities of Daily Living    BADL Assessment/Intervention lower body dressing;grooming  -RD     Row Name 04/25/23 1240          Lower Body Dressing Assessment/Training    [place] Level (Lower Body Dressing) lower body dressing skills;doff;don;socks;supervision  -RD     Position (Lower Body Dressing) edge of bed sitting  -RD     Row Name 04/25/23 1240          Grooming Assessment/Training    [place] Level (Grooming) grooming skills;set up  -RD     Position (Grooming) edge of bed sitting  -RD           User Key  (r) = Recorded By, (t) = Taken By, (c) = Cosigned By    Initials Name Provider Type    Mary Lou Arriaza OT Occupational Therapist               Obj/Interventions     Row Name 04/25/23 1242          Sensory Assessment (Somatosensory)    Sensory Assessment (Somatosensory) UE sensation intact  -RD     Row Name 04/25/23  1242          Range of Motion Comprehensive    General Range of Motion bilateral upper extremity ROM WFL  -RD     Row Name 04/25/23 1242          Strength Comprehensive (MMT)    Comment, General Manual Muscle Testing (MMT) Assessment B UE MMT grossly WFL  -RD     Row Name 04/25/23 1242          Motor Skills    Motor Skills functional endurance  -RD     Functional Endurance fair +  -RD     Row Name 04/25/23 1242          Balance    Balance Assessment sitting static balance;standing static balance;sitting dynamic balance  -RD     Static Sitting Balance independent  -RD     Dynamic Sitting Balance independent  during LB dressing task  -RD     Position, Sitting Balance sitting edge of bed  -RD     Static Standing Balance supervision  -RD     Position/Device Used, Standing Balance unsupported  -RD           User Key  (r) = Recorded By, (t) = Taken By, (c) = Cosigned By    Initials Name Provider Type    Mary Lou Arriaza OT Occupational Therapist               Goals/Plan     Row Name 04/25/23 1244          Dressing Goal 1 (OT)    Activity/Device (Dressing Goal 1, OT) lower body dressing  Pt educ on safety techniques during dressing for increased safety at home.  -RD     [place]/Cues Needed (Dressing Goal 1, OT) supervision required  -RD     Time Frame (Dressing Goal 1, OT) short term goal (STG);1 day  -RD     Progress/Outcome (Dressing Goal 1, OT) goal met  -RD           User Key  (r) = Recorded By, (t) = Taken By, (c) = Cosigned By    Initials Name Provider Type    Mary Lou Arriaza OT Occupational Therapist               Clinical Impression     Row Name 04/25/23 1247          Pain Assessment    Pretreatment Pain Rating 0/10 - no pain  -RD     Posttreatment Pain Rating 0/10 - no pain  -RD     Row Name 04/25/23 1247          Plan of Care Review    Plan of Care Reviewed With patient  -RD     Progress improving  -RD     Outcome Evaluation Pt is an 78 year old female admitted d/t increased difficulty  walking, dizziness, and balance problems. Pt lives w/ her spouse and reports indep w/ ADLs at baseline. Pt presents to OT eval doing quite well. Pt completes bed mobility w/ modified [place], STS transfer w/ SBA/Sup, and performs fxl ambulation from EOB <> sink w/ SBA; mild unsteadiness noted to which pt states she normally holds onto walls and furniture at home- may benefit from trialing RW w/ PT. Pt able to perform LB Dressing w/ Sup and grooming w/ s/u. No further skilled acute OT services warranted at this time. Pt in agreement and anxious to return home.  -RD     Row Name 04/25/23 1247          Therapy Assessment/Plan (OT)    Therapy Frequency (OT) evaluation only  -RD     Row Name 04/25/23 1247          Positioning and Restraints    Pre-Treatment Position in bed  -RD     Post Treatment Position bed  -RD     In Bed fowlers;call light within reach;encouraged to call for assist;notified nsg  no alarm on upon entry  -RD           User Key  (r) = Recorded By, (t) = Taken By, (c) = Cosigned By    Initials Name Provider Type    Mary Lou Arriaza, OT Occupational Therapist               Outcome Measures     Row Name 04/25/23 1244          How much help from another is currently needed...    Putting on and taking off regular lower body clothing? 3  -RD     Bathing (including washing, rinsing, and drying) 3  -RD     Toileting (which includes using toilet bed pan or urinal) 3  -RD     Putting on and taking off regular upper body clothing 3  -RD     Taking care of personal grooming (such as brushing teeth) 3  -RD     Eating meals 4  -RD     AM-PAC 6 Clicks Score (OT) 19  -RD     Row Name 04/25/23 0910          How much help from another person do you currently need...    Turning from your back to your side while in flat bed without using bedrails? 4  -CT     Moving from lying on back to sitting on the side of a flat bed without bedrails? 4  -CT     Moving to and from a bed to a chair (including a wheelchair)? 4   -CT     Standing up from a chair using your arms (e.g., wheelchair, bedside chair)? 4  -CT     Climbing 3-5 steps with a railing? 3  -CT     To walk in hospital room? 3  -CT     AM-PAC 6 Clicks Score (PT) 22  -CT     Highest level of mobility 7 --> Walked 25 feet or more  -CT     Row Name 04/25/23 1244          Modified [place] Scale    Modified [place] Scale 1 - No significant disability despite symptoms.  Able to carry out all usual duties and activities.  -RD     Row Name 04/25/23 1244          Functional Assessment    Outcome Measure Options AM-PAC 6 Clicks Daily Activity (OT);Modified [place]  -RD           User Key  (r) = Recorded By, (t) = Taken By, (c) = Cosigned By    Initials Name Provider Type    CT Mila, Chante Araujo, RN Registered Nurse    Mary Lou Arriaza, OT Occupational Therapist              Occupational Therapy Education     Title: PT OT SLP Therapies (Resolved)     Topic: Occupational Therapy (Resolved)     Point: ADL training (Resolved)     Description:   Instruct learner(s) on proper safety adaptation and remediation techniques during self care or transfers.   Instruct in proper use of assistive devices.              Learning Progress Summary           Patient Acceptance, E, VU,DU by RD at 4/25/2023 1246    Comment: OT educ on OT role in therapeutic process and pt's POC. OT also educ on safety techniques during ADLs and fxl transfers for increased safety and decreased fall risk.                   Point: Home exercise program (Resolved)     Description:   Instruct learner(s) on appropriate technique for monitoring, assisting and/or progressing therapeutic exercises/activities.              Learning Progress Summary           Patient Acceptance, E, VU,DU by RD at 4/25/2023 1246    Comment: OT educ on OT role in therapeutic process and pt's POC. OT also educ on safety techniques during ADLs and fxl transfers for increased safety and decreased fall risk.                   Point: Precautions  (Resolved)     Description:   Instruct learner(s) on prescribed precautions during self-care and functional transfers.              Learning Progress Summary           Patient Acceptance, E, VU,DU by RD at 4/25/2023 1246    Comment: OT educ on OT role in therapeutic process and pt's POC. OT also educ on safety techniques during ADLs and fxl transfers for increased safety and decreased fall risk.                   Point: Body mechanics (Resolved)     Description:   Instruct learner(s) on proper positioning and spine alignment during self-care, functional mobility activities and/or exercises.              Learning Progress Summary           Patient Acceptance, E, VU,DU by LIAY at 4/25/2023 1246    Comment: OT educ on OT role in therapeutic process and pt's POC. OT also educ on safety techniques during ADLs and fxl transfers for increased safety and decreased fall risk.                               User Key     Initials Effective Dates Name Provider Type Discipline    LIYA 06/16/21 -  Mary Lou Sanchez, OT Occupational Therapist OT              OT Recommendation and Plan  Therapy Frequency (OT): evaluation only  Plan of Care Review  Plan of Care Reviewed With: patient  Progress: improving  Outcome Evaluation: Pt is an 78 year old female admitted d/t increased difficulty walking, dizziness, and balance problems. Pt lives w/ her spouse and reports indep w/ ADLs at baseline. Pt presents to OT eval doing quite well. Pt completes bed mobility w/ modified [place], STS transfer w/ SBA/Sup, and performs fxl ambulation from EOB <> sink w/ SBA; mild unsteadiness noted to which pt states she normally holds onto walls and furniture at home- may benefit from trialing RW w/ PT. Pt able to perform LB Dressing w/ Sup and grooming w/ s/u. No further skilled acute OT services warranted at this time. Pt in agreement and anxious to return home.  Plan of Care Reviewed With: patient  Outcome Evaluation: Pt is an 78 year old female  admitted d/t increased difficulty walking, dizziness, and balance problems. Pt lives w/ her spouse and reports indep w/ ADLs at baseline. Pt presents to OT eval doing quite well. Pt completes bed mobility w/ modified [place], STS transfer w/ SBA/Sup, and performs fxl ambulation from EOB <> sink w/ SBA; mild unsteadiness noted to which pt states she normally holds onto walls and furniture at home- may benefit from trialing RW w/ PT. Pt able to perform LB Dressing w/ Sup and grooming w/ s/u. No further skilled acute OT services warranted at this time. Pt in agreement and anxious to return home.     Time Calculation:    Time Calculation- OT     Row Name 04/25/23 1251             Time Calculation- OT    OT Start Time 1020  -RD      OT Stop Time 1043  -RD      OT Time Calculation (min) 23 min  -RD      Total Timed Code Minutes- OT 18 minute(s)  -RD      OT Received On 04/25/23  -RD         Timed Charges    06626 - OT Self Care/Mgmt Minutes 18  -RD         Untimed Charges    OT Eval/Re-eval Minutes 5  -RD         Total Minutes    Timed Charges Total Minutes 18  -RD      Untimed Charges Total Minutes 5  -RD       Total Minutes 23  -RD            User Key  (r) = Recorded By, (t) = Taken By, (c) = Cosigned By    Initials Name Provider Type    RD Mary Lou Sanchez OT Occupational Therapist              Therapy Charges for Today     Code Description Service Date Service Provider Modifiers Qty    [id number]  OT SELF CARE/MGMT/TRAIN EA 15 MIN 4/25/2023 Mary Lou Sanchez OT GO 1    [id number]  OT EVAL LOW COMPLEXITY 3 4/25/2023 Mary Lou Sanchez OT GO 1             OT Discharge Summary  Anticipated Discharge Disposition (OT): home with assist  Reason for Discharge: At baseline function, Maximum functional potential achieved  Outcomes Achieved: Able to achieve all goals within established timeline  Discharge Destination: Home with assist    Mary Lou Sanchez OT  4/25/2023

## 2023-04-25 NOTE — CONSULTS
.     REASON FOR CONSULTATION:   breast cancer  Provide an opinion on any further workup or treatment                             REQUESTING PHYSICIAN: Evelyne Jimenez MD  RECORDS OBTAINED:  Records of the patient's history including those from the electronic medical record were reviewed and summarized in detail.    HISTORY OF PRESENT ILLNESS:  The patient is a 78 y.o. year old female  who is here for follow-up with the above-mentioned history.    Was last seen by Dr. Arzate in our office, 1/24/2023, due to left breast cancer, stage Ia.  On neoadjuvant endocrine therapy due to history of DVT, tamoxifen was not felt to be an option.  November 2022, began anastrozole.  Initially she did not want surgery but later she became concerned about progression/metastasis and planned to discuss with Dr. Fam regarding surgery.  Patient discussed with Dr. Fam over the phone, 4/ 20/23 requesting resection of the cancer.  Dr. Fam arranged mammogram and ultrasound 5/1/2023 and appointment with her on 5/8/2023.  Patient due to see Dr. Arzate again on 5/16/2023.    Admitted through the ER due to difficulty walking and balance problems which started 2 weeks prior.  When she opened a kitchen cabinet, she felt like the cereal boxes were quickly spinning.  She started taking gabapentin 1 week prior for neuropathic pain in her feet.    Neurology has seen her.  They think her issues with walking are likely due to underlying peripheral neuropathy but due to her vascular risk factors, they planned CT angiogram head and neck and repeat CT head.  They noted she cannot have an MRI due to a bladder stimulator.  However, on further evaluation, they found out her bladder stimulator is compatible with MRI and therefore neurology ordered an MRI with and without contrast.    Past Medical History:   Diagnosis Date   • Benign essential hypertension    • Chronic bilateral low back pain without sciatica 8/16/2013 March 13,  2019--Limited bone scan.  This reveals scintigraphic activity in the spine and at the right shoulder corresponds to change seen on recent x-rays and is best explained by degenerative change.  Isolated metastatic disease exactly corresponding to the sites of extensive degenerative disc change would be peculiar.  March 7, 2019--new patient to get established.  She has complaints of approxi   • Chronic bilateral thoracic back pain 2/26/2019 March 13, 2019--Limited bone scan.  This reveals scintigraphic activity in the spine and at the right shoulder corresponds to change seen on recent x-rays and is best explained by degenerative change.  Isolated metastatic disease exactly corresponding to the sites of extensive degenerative disc change would be peculiar.  March 1, 2019--x-ray of the lumbar and thoracic spine reveals mild anterior l   • Chronic insomnia 11/4/2014   • Diabetic peripheral neuropathy 3/7/2019    Characterized by decreased sensation and paresthesias in both lower extremities described as a burning sensation.  Extends up to the knees.  Reportedly had been evaluated with nerve conduction study in the past.   • Generalized anxiety disorder 5/19/2013   • History of Bell's palsy 5/21/2013    Remote history of Bell's palsy.  Patient does not remember which side of the face.   • History of COVID-19 12/8/2022   • Hyperlipidemia    • Impaired fasting glucose    • Major depression    • Menopausal state, 8/19/2020--normal DEXA. 3/7/2019    August 19, 2020--DEXA scan reveals lumbar spine T score 3.8.  Left femoral neck T score 2.5.  Right femoral neck T score 2.2.  Normal bone density.   • Non morbid obesity 8/11/2022   • Peripheral neuropathy, idiopathic 3/7/2019    Characterized by decreased sensation and paresthesias in both lower extremities described as a burning sensation.  Extends up to the knees.  Reportedly had been evaluated with nerve conduction study in the past.   • Primary hypothyroidism 5/19/2013    • Primary osteoarthritis involving multiple joints 4/22/2013   • Rotator cuff arthropathy of right shoulder, 4/20/2021--status post right reverse total shoulder arthroplasty 5/27/2020   • Situational mixed anxiety and depressive disorder 8/21/2019 August 21, 2019--patient seen in follow-up after I called in a prescription for Ativan after the patient's  contacted me a few weeks ago regarding the sudden death of patient's daughter.  This was an apparent suicide and the patient found her daughter dead.  I will not go into details.  Patient reports the Lorazepam has been helpful but she is still quite distraught, nervous, and undergoing   • Type 2 diabetes mellitus with diabetic neuropathy, without long-term current use of insulin    • Vitamin D deficiency 2/26/2019     Past Surgical History:   Procedure Laterality Date   • BILATERAL BREAST REDUCTION  Early 2000    Early 2000--bilateral breast reduction   • CARDIAC CATHETERIZATION N/A 9/26/2022    Procedure: Right Heart Cath;  Surgeon: Agus Solorio MD PhD;  Location: [facility] CATH INVASIVE LOC[facility];  Service: Cardiology;  Laterality: N/A;  Will REMAIN on [facility] for the case   • CHOLECYSTECTOMY  1960s    1960s--open cholecystectomy   • COLONOSCOPY  2012 2012--reportedly normal colonoscopy   • INCONTINENCE SURGERY  2012 Approximately 2012--implantation of questionable bladder stimulator right sacral area for urinary incontinence.  Details not known.   • REPLACEMENT TOTAL KNEE BILATERAL Left 2010; 2011 2010-2011--bilateral total knee replacement   • SUBTOTAL HYSTERECTOMY  Remote    Remote partial hysterectomy.  Ovaries intact.   • TOTAL SHOULDER REPLACEMENT Left 2013 2013--left total shoulder replacement.   • TOTAL SHOULDER REPLACEMENT  April 28, 2021 4/20/2021--status post right reverse total shoulder arthroplasty   • TOTAL SHOULDER REVERSE ARTHROPLASTY Right 04/20/2021 4/20/2021--right reverse total shoulder replacement.        MEDICATIONS    Current Facility-Administered Medications:   •  acetaminophen (TYLENOL) tablet 650 mg, 650 mg, Oral, Q4H PRN **OR** acetaminophen (TYLENOL) suppository 650 mg, 650 mg, Rectal, Q4H PRN, Evelyne Jimenez MD  •  aspirin tablet 325 mg, 325 mg, Oral, Daily, 325 mg at 23 **OR** aspirin suppository 300 mg, 300 mg, Rectal, Daily, Evelyne Jimenez MD  •  atorvastatin (LIPITOR) tablet 80 mg, 80 mg, Oral, Nightly, Evelyne Jimenez MD, 80 mg at 23  •  bisacodyl (DULCOLAX) suppository 10 mg, 10 mg, Rectal, Daily PRN, Evelyne Jimenez MD  •  cholecalciferol (VITAMIN D3) tablet 2,000 Units, 2,000 Units, Oral, Daily, Evelyne Jimenez MD, 2,000 Units at 23  •  DULoxetine (CYMBALTA) DR capsule 60 mg, 60 mg, Oral, Daily, Evelyne Jimenez MD, 60 mg at 23  •  gabapentin (NEURONTIN) capsule 300 mg, 300 mg, Oral, Nightly, Kacey Bourgeois PA  •  levothyroxine (SYNTHROID, LEVOTHROID) tablet 125 mcg, 125 mcg, Oral, Daily, Evelyne Jimenez MD, 125 mcg at 23  •  ondansetron (ZOFRAN) injection 4 mg, 4 mg, Intravenous, Q6H PRN, Evelyne Jimenez MD  •  sodium chloride 0.9 % infusion, 75 mL/hr, Intravenous, Continuous, Evelyne Jimenez MD, Last Rate: 75 mL/hr at 23, 75 mL/hr at 23  •  traZODone (DESYREL) tablet 150 mg, 150 mg, Oral, Nightly, Evelyne Jimenez MD, 150 mg at 236  •  valsartan (DIOVAN) tablet 320 mg, 320 mg, Oral, QAM, Evelyne Jimenez MD, 320 mg at 23 0907    ALLERGIES:     Allergies   Allergen Reactions   • Morphine And Related    • Other Other (See Comments)     REJECTED DACRON SUTURES IN THE PAST AND INCISION CAME APART       SOCIAL HISTORY:       Social History     Socioeconomic History   • Marital status:    Tobacco Use   • Smoking status: Former     Types: Cigarettes     Quit date: 1998     Years since quittin.3   • Smokeless tobacco:  "Never   Vaping Use   • Vaping Use: Never used   Substance and Sexual Activity   • Alcohol use: Yes     Alcohol/week: 1.0 standard drink     Types: 1 Glasses of wine per week     Comment: current some day   • Drug use: No   • Sexual activity: Defer     Partners: Male         FAMILY HISTORY:  Family History   Problem Relation Age of Onset   • Alcohol abuse Father    • Breast cancer Daughter         40s   • Cancer Other    • Diabetes Other         type II   • Leukemia Grandchild 19       REVIEW OF SYSTEMS:  Review of Systems           Vitals:    04/24/23 1900 04/24/23 2300 04/25/23 0647 04/25/23 1250   BP: 143/65 143/76 157/78    BP Location: Left arm Left arm Left arm    Patient Position: Lying Lying Lying    Pulse: 69 60 63    Resp: 18 18 18    Temp: 98.3 °F (36.8 °C) 98.9 °F (37.2 °C) 98.5 °F (36.9 °C)    TempSrc: Oral Oral Oral    SpO2: 93% 94% 94%    Weight:    86.6 kg (191 lb)   Height:    167.6 cm (66\")         1/24/2023    12:28 PM   Current Status   ECOG score 0      PHYSICAL EXAM:    CONSTITUTIONAL:  Vital signs reviewed.  No distress, looks comfortable.  EYES:  Conjunctivae and lids unremarkable.  PERRLA  EARS, NOSE, MOUTH, THROAT:  Ears and nose appear unremarkable.  Lips, teeth, gums appear unremarkable.  RESPIRATORY:  Normal respiratory effort.  Lungs clear to auscultation bilaterally.  CARDIOVASCULAR:  Normal S1, S2.  No murmurs, rubs or gallops.  No significant lower extremity edema.  GASTROINTESTINAL: Abdomen appears unremarkable.  Nontender.  No hepatomegaly.  No splenomegaly.  LYMPHATIC:  No cervical, supraclavicular, axillary lymphadenopathy.  NEURO: Cranial nerves 2-12 grossly intact.  No focal deficits.  Appears to have equal strength all 4 extremities.  MUSCULOSKELETAL:  Unremarkable digits/nails.  No cyanosis or clubbing.  No apparent joint deformities.  SKIN:  Warm.  No rashes.  PSYCHIATRIC:  Normal judgment and insight.  Normal mood and affect.     RECENT LABS:        WBC   Date Value Ref " Range Status   04/25/2023 5.39 3.40 - 10.80 10*3/mm3 Final   04/24/2023 5.09 3.40 - 10.80 10*3/mm3 Final     Hemoglobin   Date Value Ref Range Status   04/25/2023 11.4 (L) 12.0 - 15.9 g/dL Final   04/24/2023 12.1 12.0 - 15.9 g/dL Final     Platelets   Date Value Ref Range Status   04/25/2023 210 140 - 450 10*3/mm3 Final   04/24/2023 228 140 - 450 10*3/mm3 Final       Assessment & Plan   Claudette L McManus   *Left breast cancer, stage Ia  · Neoadjuvant Arimidex since November 2022, until stopped mid April 2023 because of hair thinning, incoordination with walking, and nail changes  · 4/ 20/23: Patient discussed with Dr. Fam, stating she requests resection of the cancer.  · Plans are for mammogram and ultrasound, 5/1/2023, and see Dr. Fam on 5/8/2023, and Dr. Arzate on 5/16/2023.    *Room spinning on 1 occasion, prompting ER visit and incoordination with walking since around late March 2023, gradually worsening  · This was the reason for admission.  Neurology has seen her.  CT angiogram head and neck show mild changes of small vessel white matter ischemic disease.  No evidence of acute infarction or hemorrhage.  No mention of metastasis.  MRI of the brain has also been ordered.    *RLL pulmonary emboli, 5/19/2022 (resolved  CT chest angiogram 12/8/2022)  · 1/24/2023: Stopped anticoagulation since this was her only episode of thrombosis    Plan  Await MRI results    I first time meeting the patient.  All issues new to me.  Chart reviewed and summarized including neurology notes and breast surgery note and hospitalist note.   assisted with history.

## 2023-04-25 NOTE — PROGRESS NOTES
[facility] Acute Inpt Rehab Note     Referral received via stroke order set.  Please note this is a screening only, rehab admissions will not actively be evaluating this patient.  If felt patient is appropriate for our services once therapies begin, please call our office at [phone], to initiate a full referral.    Thank you,   Ashely Alicia RN   Rehab Admission Nurse

## 2023-04-25 NOTE — H&P
HISTORY AND PHYSICAL   [facility]        Date of Admission: 2023  Patient Identification:  Name: Claudette L McManus  Age: 78 y.o.  Sex: female  :  1945  MRN: [id number]                     Primary Care Physician: Lito Moore MD    Chief Complaint:  78 year old female who presented to the emergency room with difficulty walking and balance problems which started two weeks ago; she als has had falls; she opened a kitchen cabinet and felt like the cereal boxes were spinning rapidly; she backed off and looked around the kitchen which was also moving but not as rapidly; she started taking gabapentin about a week ago    History of Present Illness:   As above    Past Medical History:  Past Medical History:   Diagnosis Date   • Benign essential hypertension    • Chronic bilateral low back pain without sciatica 2013--Limited bone scan.  This reveals scintigraphic activity in the spine and at the right shoulder corresponds to change seen on recent x-rays and is best explained by degenerative change.  Isolated metastatic disease exactly corresponding to the sites of extensive degenerative disc change would be peculiar.  2019--new patient to get established.  She has complaints of approxi   • Chronic bilateral thoracic back pain 2019--Limited bone scan.  This reveals scintigraphic activity in the spine and at the right shoulder corresponds to change seen on recent x-rays and is best explained by degenerative change.  Isolated metastatic disease exactly corresponding to the sites of extensive degenerative disc change would be peculiar.  2019--x-ray of the lumbar and thoracic spine reveals mild anterior l   • Chronic insomnia 2014   • Diabetic peripheral neuropathy 3/7/2019    Characterized by decreased sensation and paresthesias in both lower extremities described as a burning sensation.  Extends up to the knees.  Reportedly  had been evaluated with nerve conduction study in the past.   • Generalized anxiety disorder 5/19/2013   • History of Bell's palsy 5/21/2013    Remote history of Bell's palsy.  Patient does not remember which side of the face.   • History of COVID-19 12/8/2022   • Hyperlipidemia    • Impaired fasting glucose    • Major depression    • Menopausal state, 8/19/2020--normal DEXA. 3/7/2019    August 19, 2020--DEXA scan reveals lumbar spine T score 3.8.  Left femoral neck T score 2.5.  Right femoral neck T score 2.2.  Normal bone density.   • Non morbid obesity 8/11/2022   • Peripheral neuropathy, idiopathic 3/7/2019    Characterized by decreased sensation and paresthesias in both lower extremities described as a burning sensation.  Extends up to the knees.  Reportedly had been evaluated with nerve conduction study in the past.   • Primary hypothyroidism 5/19/2013   • Primary osteoarthritis involving multiple joints 4/22/2013   • Rotator cuff arthropathy of right shoulder, 4/20/2021--status post right reverse total shoulder arthroplasty 5/27/2020   • Situational mixed anxiety and depressive disorder 8/21/2019 August 21, 2019--patient seen in follow-up after I called in a prescription for Ativan after the patient's  contacted me a few weeks ago regarding the sudden death of patient's daughter.  This was an apparent suicide and the patient found her daughter dead.  I will not go into details.  Patient reports the Lorazepam has been helpful but she is still quite distraught, nervous, and undergoing   • Type 2 diabetes mellitus with diabetic neuropathy, without long-term current use of insulin    • Vitamin D deficiency 2/26/2019     Past Surgical History:  Past Surgical History:   Procedure Laterality Date   • BILATERAL BREAST REDUCTION  Early 2000    Early 2000--bilateral breast reduction   • CARDIAC CATHETERIZATION N/A 9/26/2022    Procedure: Right Heart Cath;  Surgeon: Agus Solorio MD PhD;  Location:  [facility] INVASIVE LOCATION;  Service: Cardiology;  Laterality: N/A;  Will REMAIN on Xarelto for the case   • CHOLECYSTECTOMY  1960s    1960s--open cholecystectomy   • COLONOSCOPY  2012 2012--reportedly normal colonoscopy   • INCONTINENCE SURGERY  2012 Approximately 2012--implantation of questionable bladder stimulator right sacral area for urinary incontinence.  Details not known.   • REPLACEMENT TOTAL KNEE BILATERAL Left 2010; 2011 2010-2011--bilateral total knee replacement   • SUBTOTAL HYSTERECTOMY  Remote    Remote partial hysterectomy.  Ovaries intact.   • TOTAL SHOULDER REPLACEMENT Left 2013 2013--left total shoulder replacement.   • TOTAL SHOULDER REPLACEMENT  April 28, 2021 4/20/2021--status post right reverse total shoulder arthroplasty   • TOTAL SHOULDER REVERSE ARTHROPLASTY Right 04/20/2021 4/20/2021--right reverse total shoulder replacement.      Home Meds:  Medications Prior to Admission   Medication Sig Dispense Refill Last Dose   • anastrozole (ARIMIDEX) 1 MG tablet TAKE ONE TABLET BY MOUTH DAILY 90 tablet 3 4/23/2023   • Cholecalciferol (VITAMIN D3) 2000 UNITS tablet Take 1 tablet by mouth Daily.      • DULoxetine (CYMBALTA) 60 MG capsule TAKE 1 CAPSULE EVERY DAY AS DIRECTED 90 capsule 3    • ezetimibe (ZETIA) 10 MG tablet TAKE 1 TABLET EVERY DAY 90 tablet 3    • gabapentin (NEURONTIN) 300 MG capsule Take 1 p.o. 3 times daily for peripheral neuropathy as directed 90 capsule 1 4/24/2023   • levothyroxine (Synthroid) 100 MCG tablet Take 1 p.o. daily as directed for low thyroid 90 tablet 1    • metroNIDAZOLE (METROGEL) 0.75 % gel Apply  topically to the appropriate area as directed 2 (Two) Times a Day. 45 g 2    • ondansetron (ZOFRAN) 8 MG tablet Take 1 tablet by mouth Every 6 (Six) Hours As Needed for Nausea. 60 tablet 1    • simvastatin (ZOCOR) 20 MG tablet TAKE ONE TABLET BY MOUTH DAILY FOR HIGH CHOLESTEROL. 90 tablet 2    • Tirzepatide (Mounjaro) 15 MG/0.5ML solution  pen-injector Inject 15 mg under the skin into the appropriate area as directed 1 (One) Time Per Week. 6 mL 3    • traZODone (DESYREL) 150 MG tablet TAKE ONE TABLET BY MOUTH ONCE NIGHTLY 30 tablet 0    • valsartan (DIOVAN) 320 MG tablet TAKE 1 TABLET EVERY MORNING FOR BLOOD PRESSURE 90 tablet 3    • Homeopathic Products (LEG CRAMPS PO) Take  by mouth Daily. Nightly      • levothyroxine (SYNTHROID, LEVOTHROID) 125 MCG tablet TAKE ONE TABLET BY MOUTH DAILY 90 tablet 0    • LORazepam (ATIVAN) 0.5 MG tablet TAKE ONE TABLET BY MOUTH TWICE A DAY AS NEEDED FOR ANXIETY 60 tablet 5        Allergies:  Allergies   Allergen Reactions   • Morphine And Related    • Other Other (See Comments)     REJECTED DACRON SUTURES IN THE PAST AND INCISION CAME APART     Immunizations:  Immunization History   Administered Date(s) Administered   • COVID-19 (PFIZER) BIVALENT BOOSTER 12+YRS 10/18/2022   • COVID-19 (PFIZER) PURPLE CAP 2021, 2021, 2021   • Flu Vaccine Quad PF >36MO 10/13/2017   • FluMist 2-49yrs 10/06/2015   • Fluad Quad 65+ 2020   • Fluzone High Dose =>65 Years ([employer] ONLY) 2018, 10/13/2019   • Fluzone High-Dose 65+yrs 10/18/2022   • Hepatitis A 07/15/2018   • Influenza TIV (IM) 10/21/2016   • Pneumococcal Conjugate 13-Valent (PCV13) 2015   • Pneumococcal Polysaccharide (PPSV23) 2020   • Tdap 2016     Social History:   Social History     Social History Narrative   • Not on file     Social History     Socioeconomic History   • Marital status:    Tobacco Use   • Smoking status: Former     Types: Cigarettes     Quit date: 1998     Years since quittin.3   • Smokeless tobacco: Never   Vaping Use   • Vaping Use: Never used   Substance and Sexual Activity   • Alcohol use: Yes     Alcohol/week: 1.0 standard drink     Types: 1 Glasses of wine per week     Comment: current some day   • Drug use: No   • Sexual activity: Defer     Partners: Male       Family History:  Family  "History   Problem Relation Age of Onset   • Alcohol abuse Father    • Breast cancer Daughter         40s   • Cancer Other    • Diabetes Other         type II   • Leukemia Grandchild 19        Review of Systems  See history of present illness and past medical history.  Patient denies headache,   syncope,   trauma, change in vision, change in hearing, change in taste, changes in weight, changes in appetite, focal weakness, numbness, or paresthesia.  Patient denies chest pain, palpitations, dyspnea, orthopnea, PND, cough, sinus pressure, rhinorrhea, epistaxis, hemoptysis, nausea, vomiting,hematemesis, diarrhea, constipation or hematchezia.  Denies cold or heat intolerance, polydipsia, polyuria, polyphagia. Denies hematuria, pyuria, dysuria, hesitancy, frequency or urgency. Denies consumption of raw and under cooked meats foods or change in water source.  Denies fever, chills, sweats, night sweats.  Denies missing any routine medications.      Objective:  T Max 24 hrs: Temp (24hrs), Av.6 °F (36.4 °C), Min:97 °F (36.1 °C), Max:98.3 °F (36.8 °C)    Vitals Ranges:   Temp:  [97 °F (36.1 °C)-98.3 °F (36.8 °C)] 98.3 °F (36.8 °C)  Heart Rate:  [60-77] 69  Resp:  [16-18] 18  BP: (132-173)/(65-87) 143/65      Exam:  /65 (BP Location: Left arm, Patient Position: Lying)   Pulse 69   Temp 98.3 °F (36.8 °C) (Oral)   Resp 18   Ht 167.6 cm (66\")   Wt 86.8 kg (191 lb 6 oz)   LMP  (LMP Unknown) Comment: partial hysterectomy  SpO2 93%   BMI 30.89 kg/m²     General Appearance:    Alert, cooperative, no distress, appears stated age   Head:    Normocephalic, without obvious abnormality, atraumatic   Eyes:    PERRL, conjunctivae/corneas clear, EOM's intact, both eyes   Ears:    Normal external ear canals, both ears   Nose:   Nares normal, septum midline, mucosa normal, no drainage    or sinus tenderness   Throat:   Lips, mucosa, and tongue normal   Neck:   Supple, symmetrical, trachea midline, no adenopathy;     thyroid:  " no enlargement/tenderness/nodules; no carotid    bruit or JVD   Back:     Symmetric, no curvature, ROM normal, no CVA tenderness   Lungs:    decreased breath sounds bilaterally, respirations unlabored   Chest Wall:    No tenderness or deformity    Heart:    Regular rate and rhythm, S1 and S2 normal, no murmur, rub   or gallop   Abdomen:     Soft, nontender, bowel sounds active all four quadrants,     no masses, no hepatomegaly, no splenomegaly   Extremities:   Extremities normal, atraumatic, no cyanosis or edema   Pulses:   2+ and symmetric all extremities   Skin:   Skin color, texture, turgor normal, no rashes or lesions               .    Data Review:  Labs in chart were reviewed.  WBC   Date Value Ref Range Status   04/24/2023 5.09 3.40 - 10.80 10*3/mm3 Final     Hemoglobin   Date Value Ref Range Status   04/24/2023 12.1 12.0 - 15.9 g/dL Final     Hematocrit   Date Value Ref Range Status   04/24/2023 34.7 34.0 - 46.6 % Final     Platelets   Date Value Ref Range Status   04/24/2023 228 140 - 450 10*3/mm3 Final     Sodium   Date Value Ref Range Status   04/24/2023 138 136 - 145 mmol/L Final     Potassium   Date Value Ref Range Status   04/24/2023 4.2 3.5 - 5.2 mmol/L Final     Chloride   Date Value Ref Range Status   04/24/2023 100 98 - 107 mmol/L Final     CO2   Date Value Ref Range Status   04/24/2023 27.0 22.0 - 29.0 mmol/L Final     BUN   Date Value Ref Range Status   04/24/2023 11 8 - 23 mg/dL Final     Creatinine   Date Value Ref Range Status   04/24/2023 0.92 0.57 - 1.00 mg/dL Final     Glucose   Date Value Ref Range Status   04/24/2023 113 (H) 65 - 99 mg/dL Final     Calcium   Date Value Ref Range Status   04/24/2023 10.2 8.6 - 10.5 mg/dL Final     AST (SGOT)   Date Value Ref Range Status   04/24/2023 28 1 - 32 U/L Final     ALT (SGPT)   Date Value Ref Range Status   04/24/2023 25 1 - 33 U/L Final     Alkaline Phosphatase   Date Value Ref Range Status   04/24/2023 70 39 - 117 U/L Final                 Imaging Results (All)     Procedure Component Value Units Date/Time    CT Head Without Contrast [[id number]] Collected: 04/24/23 1418     Updated: 04/24/23 1810    Narrative:      EMERGENCY NONCONTRAST HEAD CT 04/24/2023     CLINICAL HISTORY: Patient has experienced gait disturbance and ataxia  for the past 2 weeks.  She denies dizziness but feels like her  coordination has been off for the past 2 weeks.     TECHNIQUE: Spiral CT images were obtained from the base of the skull to  the vertex without intravenous contrast. The images were reformatted and  submitted in 3 mm thick axial, sagittal and coronal CT sections with  brain algorithm and 2 mm thick axial CT sections with high-resolution  bone algorithm.     This is correlated to a prior noncontrast head CT from [facility] on 10/26/2015.     FINDINGS: There is some patchy low-density extending from the  periventricular into the subcortical white matter of the cerebral  hemispheres consistent with mild-to-moderate small vessel disease. The  remainder of the brain parenchyma is normal in attenuation. The  ventricles are normal in size. I see no focal mass effect. There is no  midline shift. No extra-axial fluid collections are identified. There is  no evidence of acute intracranial hemorrhage. The calvarium and the  skull base are normal in appearance. The paranasal sinuses and the  mastoid air cells and the middle ear cavities are clear.        Impression:      1. No acute intracranial abnormality is identified. There is  mild-to-moderate small vessel disease in the cerebral white matter. The  remainder of the head CT is within normal limits. The etiology of the  patient's acute onset of gait disturbance and ataxia over the past 2  weeks is not established on this exam. If there remains clinical  suspicion of an acute stroke causing the patient's acute onset of  ataxia, an MRI of the brain could be obtained for a more comprehensive  assessment.   The results were discussed with Dr. Robert from the  emergency room by telephone on 04/24/2023 at 2 PM.     Radiation dose reduction techniques were utilized, including automated  exposure control and exposure modulation based on body size.     This report was finalized on 4/24/2023 6:07 PM by Dr. Paras Arteaga M.D.       CT Angiogram Head [[id number]] Collected: 04/24/23 1737     Updated: 04/24/23 1737    Narrative:      CT OF THE BRAIN WITH AND WITHOUT CONTRAST AND CT ANGIOGRAPHY OF THE HEAD  AND NECK WITH CONTRAST INCLUDING RECONSTRUCTION IMAGES 04/24/2023     HISTORY: Ataxia.     TECHNIQUE: Axial images were obtained through the brain without  intravenous contrast.     FINDINGS: There is mild-to-moderate diffuse atrophy. There is mild  decreased attenuation of the periventricular white matter bilaterally  consistent with mild small vessel white matter ischemic disease. There  is no evidence of acute infarction, hemorrhage, midline shift or mass  effect. No bony abnormalities are seen.      Following the intravenous contrast injection CT angiography was  performed through the head and neck. Sagittal, coronal and 3-D  reconstruction images were reviewed.     NASCET criteria was used. There is a typical configuration of the aortic  arch. Bilateral common carotid arteries appear patent. There is mild  atherosclerotic calcification in the right carotid bifurcation with less  than 20% diameter stenosis. Atherosclerotic calcification is seen in the  left carotid bifurcation with approximately 15% to 20% diameter  stenosis. Bilateral internal carotid arteries appear patent. There is  some ectasia of the supraclinoid internal carotid arteries. Bilateral  middle and anterior cerebral arteries appear patent.     There is mild ectasia of the vertebral arteries. Both appear patent. The  basilar artery and its branches appear patent.     No aneurysm is seen.     Postcontrast CT of the brain shows no abnormal enhancement.        Impression:      1. Mild changes of small vessel white matter ischemic disease.  2. No evidence of acute infarction or hemorrhage.  3. No acute process identified on CT angiography of the head and neck  with contrast.     Radiation dose reduction techniques were utilized, including automated  exposure control and exposure modulation based on body size.          CT Angiogram Neck [[id number]] Collected: 04/24/23 1737     Updated: 04/24/23 1737    Narrative:      CT OF THE BRAIN WITH AND WITHOUT CONTRAST AND CT ANGIOGRAPHY OF THE HEAD  AND NECK WITH CONTRAST INCLUDING RECONSTRUCTION IMAGES 04/24/2023     HISTORY: Ataxia.     TECHNIQUE: Axial images were obtained through the brain without  intravenous contrast.     FINDINGS: There is mild-to-moderate diffuse atrophy. There is mild  decreased attenuation of the periventricular white matter bilaterally  consistent with mild small vessel white matter ischemic disease. There  is no evidence of acute infarction, hemorrhage, midline shift or mass  effect. No bony abnormalities are seen.      Following the intravenous contrast injection CT angiography was  performed through the head and neck. Sagittal, coronal and 3-D  reconstruction images were reviewed.     NASCET criteria was used. There is a typical configuration of the aortic  arch. Bilateral common carotid arteries appear patent. There is mild  atherosclerotic calcification in the right carotid bifurcation with less  than 20% diameter stenosis. Atherosclerotic calcification is seen in the  left carotid bifurcation with approximately 15% to 20% diameter  stenosis. Bilateral internal carotid arteries appear patent. There is  some ectasia of the supraclinoid internal carotid arteries. Bilateral  middle and anterior cerebral arteries appear patent.     There is mild ectasia of the vertebral arteries. Both appear patent. The  basilar artery and its branches appear patent.     No aneurysm is seen.     Postcontrast CT of the  brain shows no abnormal enhancement.       Impression:      1. Mild changes of small vessel white matter ischemic disease.  2. No evidence of acute infarction or hemorrhage.  3. No acute process identified on CT angiography of the head and neck  with contrast.     Radiation dose reduction techniques were utilized, including automated  exposure control and exposure modulation based on body size.                  Assessment:  Active Hospital Problems    Diagnosis  POA   • **Ataxia [R27.0]  Yes      Resolved Hospital Problems   No resolved problems to display.   falls  Hypertension  Diabetes  Hypothyroidism  Obesity  Breast cancer    Plan:  Hold gabapentin  Therapies evaluation  accu checks, insulin sliding scale  Monitor on telemetry  Appreciate input from neurology  Cannot get mri due to a bladder stimulator  Ask oncology to see her  D.w patient and ed provider as well as nurse    Evelyne Jimenez MD  4/24/2023  21:01 EDT

## 2023-04-25 NOTE — PROGRESS NOTES
"DOS: 2023  NAME: Claudette L McManus   : 1945  PCP: Lito Moore MD  Chief Complaint   Patient presents with   • Gait Problem       Chief complaint: dizziness  Subjective: Patient feels okay today.  No visual symptoms or speech problems noted.  She has a problems walking for about 3 weeks.  She says when she walks down the hallway she feels like she is bumping into the walls.  She describes an episode prior to presentation where she was looking within the cupboard and seem like objects were moving.  Discussed with MRI department and she is okay to have imaging but will need to bring her remote and from home so they can assess it in the MRI department.  She says her feet are on fire did not sleep well last night  Objective:  Vital signs: /78 (BP Location: Left arm, Patient Position: Lying)   Pulse 63   Temp 98.5 °F (36.9 °C) (Oral)   Resp 18   Ht 167.6 cm (66\")   Wt 86.6 kg (191 lb)   LMP  (LMP Unknown) Comment: partial hysterectomy  SpO2 94%   BMI 30.83 kg/m²    Gen: NAD, vitals reviewed  MS: oriented x3, recent/remote memory intact, normal attention/concentration, language intact, no neglect.  CN: visual acuity grossly normal, PERRL, EOMI, no facial droop, no dysarthria  Motor: 5/5 throughout upper and lower extremities, normal tone  Sensory: intact to light touch all 4 ext.    ROS:  No weakness, numbness  No fevers, chills      Laboratory results:  Lab Results   Component Value Date    GLUCOSE 92 2023    CALCIUM 9.5 2023     2023    K 4.0 2023    CO2 27.5 2023     2023    BUN 10 2023    CREATININE 0.95 2023    EGFRIFAFRI 65 2020    EGFRIFNONA 63 2022    BCR 10.5 2023    ANIONGAP 10.5 2023     Lab Results   Component Value Date    WBC 5.39 2023    HGB 11.4 (L) 2023    HCT 33.3 (L) 2023    MCV 95.4 2023     2023     Lab Results   Component Value Date    LDL 36 " 04/25/2023     (H) 06/11/2018     (H) 12/01/2017         Lab 04/25/23  0513   HEMOGLOBIN A1C 5.50        Review of labs:     Review and interpretation of imaging: Reviewed head CT and there is no hemorrhage or acute finding CTA head and neck negative for hemodynamic stenosis    Workup to date:    Diagnoses:  1.  Ataxia  2.  Neuropathy  3.  Breast cancer    Impression: 78-year-old right-handed female with past medical history of breast cancer diagnosed last July treated with anastrozole.  She developed ataxia that seem to be worsening over the last 3 weeks.  For an episode of what sounds like oscillopsia she presented to the ER.  For th the subacute e symptoms feeling in balance she was taken off her anastrozole.  She also started gabapentin recently but this was after symptom onset.  She says it is really helped with her neuropathic pain in her feet.  Of note she was also on Xarelto about 6 weeks ago for PE.  CT imaging has been unrevealing for etiology.  MRI brain pending for further evaluation.  She has bladder stimulator and so we will need to coordinate with radiology department      Plan:  1.  MRI with and without-discussed with MRI department and device is MRI compatible but she needs to bring her remote from home  2. B12/folate sent- additional labs pending above  3. Ok to restart gabapentin at hs from my perspective-ordered for primary team's convenience    We will be following along with    I spent at least 40 minutes interviewing, examining, and counseling patient.  I independently reviewed documentation, laboratory and diagnostic findings, external documentation where applicable, and formulated treatment plan which was discussed with the patient.      Thank you for this consultation.  Discussed above plan with neuro attending, Dr. Rico who agrees with above plan.  Neurology team is available for concerns or questions.

## 2023-04-25 NOTE — PLAN OF CARE
Goal Outcome Evaluation:  Plan of Care Reviewed With: patient        Progress: no change Continue on NIH (0) Onco Md consulted for breast CA. Awaits MRI.

## 2023-04-25 NOTE — PROGRESS NOTES
Name: Claudette L McManus ADMIT: 2023   : 1945  PCP: Lito Moore MD    MRN: [id number] LOS: 1 days   AGE/SEX: 78 y.o. female  ROOM: [facility] [place]     Subjective   Subjective   No acute events. Patient denies new complaints other than wanting to go home. She got up to the bathroom today and she says she did fine with this but her  at bedside thinks she may have had some balance issues.     Objective   Objective   Vital Signs  Temp:  [97.5 °F (36.4 °C)-98.9 °F (37.2 °C)] 98.5 °F (36.9 °C)  Heart Rate:  [60-69] 63  Resp:  [18] 18  BP: (143-173)/(65-87) 157/78  SpO2:  [93 %-94 %] 94 %  on   ;   Device (Oxygen Therapy): room air  Body mass index is 30.83 kg/m².  Physical Exam  Vitals and nursing note reviewed.   Constitutional:       General: She is not in acute distress.     Appearance: She is not toxic-appearing or diaphoretic.   HENT:      Head: Normocephalic and atraumatic.      Nose: Nose normal.      Mouth/Throat:      Mouth: Mucous membranes are moist.      Pharynx: Oropharynx is clear.   Eyes:      Conjunctiva/sclera: Conjunctivae normal.      Pupils: Pupils are equal, round, and reactive to light.   Cardiovascular:      Rate and Rhythm: Normal rate.      Pulses: Normal pulses.   Pulmonary:      Effort: Pulmonary effort is normal.      Breath sounds: Normal breath sounds.   Abdominal:      General: Bowel sounds are normal.      Palpations: Abdomen is soft.      Tenderness: There is no abdominal tenderness.   Musculoskeletal:         General: No swelling or tenderness.      Cervical back: Normal range of motion and neck supple.   Skin:     General: Skin is warm and dry.      Capillary Refill: Capillary refill takes less than 2 seconds.   Neurological:      General: No focal deficit present.      Mental Status: She is alert and oriented to person, place, and time.   Psychiatric:         Mood and Affect: Mood normal.         Behavior: Behavior normal.       Results Review     I reviewed the  patient's new clinical results.  Results from last 7 days   Lab Units 04/25/23  0513 04/24/23  1253   WBC 10*3/mm3 5.39 5.09   HEMOGLOBIN g/dL 11.4* 12.1   PLATELETS 10*3/mm3 210 228     Results from last 7 days   Lab Units 04/25/23  0513 04/24/23  1253   SODIUM mmol/L 141 138   POTASSIUM mmol/L 4.0 4.2   CHLORIDE mmol/L 103 100   CO2 mmol/L 27.5 27.0   BUN mg/dL 10 11   CREATININE mg/dL 0.95 0.92   GLUCOSE mg/dL 92 113*   EGFR mL/min/1.73 60.7 63.1     Results from last 7 days   Lab Units 04/25/23  0513 04/24/23  1253   ALBUMIN g/dL 3.4* 4.3   BILIRUBIN mg/dL 0.3 0.3   ALK PHOS U/L 58 70   AST (SGOT) U/L 20 28   ALT (SGPT) U/L 20 25     Results from last 7 days   Lab Units 04/25/23  0513 04/24/23  1253   CALCIUM mg/dL 9.5 10.2   ALBUMIN g/dL 3.4* 4.3       Hemoglobin A1C   Date/Time Value Ref Range Status   04/25/2023 0513 5.50 4.80 - 5.60 % Final     Glucose   Date/Time Value Ref Range Status   04/25/2023 1559 98 70 - 130 mg/dL Final     Comment:     Meter: [id number] : [id number] Teofilo Barnes NA   04/25/2023 1116 117 70 - 130 mg/dL Final     Comment:     Meter: [id number] : [id number] Viral Kovacs NA   04/24/2023 2046 142 (H) 70 - 130 mg/dL Final     Comment:     Meter: [id number] : [id number] Jason Reinoso NA   04/24/2023 1726 76 70 - 130 mg/dL Final     Comment:     Meter: [id number] : [id number] Yas Alamo NA       CT Head Without Contrast    Result Date: 4/24/2023  1. No acute intracranial abnormality is identified. There is mild-to-moderate small vessel disease in the cerebral white matter. The remainder of the head CT is within normal limits. The etiology of the patient's acute onset of gait disturbance and ataxia over the past 2 weeks is not established on this exam. If there remains clinical suspicion of an acute stroke causing the patient's acute onset of ataxia, an MRI of the brain could be obtained for a more comprehensive assessment.  The results were discussed with   Yesica from the emergency room by telephone on 04/24/2023 at 2 PM.  Radiation dose reduction techniques were utilized, including automated exposure control and exposure modulation based on body size.  This report was finalized on 4/24/2023 6:07 PM by Dr. Paras Arteaga M.D.      CT Angiogram Neck    Result Date: 4/24/2023  1. Mild changes of small vessel white matter ischemic disease. 2. No evidence of acute infarction or hemorrhage. 3. No acute process identified on CT angiography of the head and neck with contrast.  Radiation dose reduction techniques were utilized, including automated exposure control and exposure modulation based on body size.       CT Angiogram Head    Result Date: 4/24/2023  1. Mild changes of small vessel white matter ischemic disease. 2. No evidence of acute infarction or hemorrhage. 3. No acute process identified on CT angiography of the head and neck with contrast.  Radiation dose reduction techniques were utilized, including automated exposure control and exposure modulation based on body size.       I have personally reviewed all medications:  Scheduled Medications  aspirin, 325 mg, Oral, Daily   Or  aspirin, 300 mg, Rectal, Daily  atorvastatin, 80 mg, Oral, Nightly  cholecalciferol, 2,000 Units, Oral, Daily  DULoxetine, 60 mg, Oral, Daily  gabapentin, 300 mg, Oral, Nightly  levothyroxine, 125 mcg, Oral, Daily  traZODone, 150 mg, Oral, Nightly  valsartan, 320 mg, Oral, QAM    Infusions  sodium chloride, 75 mL/hr, Last Rate: 75 mL/hr (04/24/23 2112)    Diet  Diet: Regular/House Diet; Texture: Regular Texture (IDDSI 7); Fluid Consistency: Thin (IDDSI 0)    I have personally reviewed:  [x]  Laboratory   []  Microbiology   [x]  Radiology   [x]  EKG/Telemetry  [x]  Cardiology/Vascular   []  Pathology    [x]  Records      Assessment/Plan     Active Hospital Problems    Diagnosis  POA   • **Ataxia [R27.0]  Yes   • History of pulmonary embolism [Z86.711]  Yes   • Hyperlipidemia [E78.2]  Yes   •  Benign essential hypertension [I10]  Yes   • Type 2 diabetes mellitus with diabetic neuropathy, without long-term current use of insulin [E11.40]  Yes   • Primary hypothyroidism [E03.9]  Yes      Resolved Hospital Problems   No resolved problems to display.   Ataxia  - worsening over the past 3 weeks even after having stopped anastrozole  - CTA does not show any significant stenosis, brain MRI pending in coordination with her bladder stimulator rep  - PT consulted  - appreciate neurology recs    Type 2 DM  - BG acceptable, A1C 5.50%  - no need for coverage at this time  - has peripheral neuropathy and is on gabapentin    HTN  - BP is elevated at times but overall acceptable  - continue valsartan    Hypothyroidism  - clinically euthyroid  - continue levothyroxine    History of Pulmonary Embolism  - previously on xarelto but no longer takes this medication    Left Breast Cancer   - stage 1a  - treated with anastrozole but she stopped this due to her ataxia, defer management to oncology, they are consulted  - has appointment to see Dr. Fam regarding possible resection    SCDs for DVT prophylaxis.  Full code.  Discussed with patient, spouse, nursing staff and care team on multidisciplinary rounds.  Anticipate discharge home when cleared by consultants.      Luís Tubbs MD  [facility]  04/25/23  16:05 EDT

## 2023-04-25 NOTE — PLAN OF CARE
Goal Outcome Evaluation:  Plan of Care Reviewed With: patient        Progress: improving  Outcome Evaluation: Pt is an 78 year old female admitted d/t increased difficulty walking, dizziness, and balance problems. Pt lives w/ her spouse and reports indep w/ ADLs at baseline. Pt presents to OT eval doing quite well. Pt completes bed mobility w/ modified Hunt, STS transfer w/ SBA/Sup, and performs fxl ambulation from EOB <> sink w/ SBA; mild unsteadiness noted to which pt states she normally holds onto walls and furniture at home- may benefit from trialing RW w/ PT. Pt able to perform LB Dressing w/ Sup and grooming w/ s/u. No further skilled acute OT services warranted at this time. Pt in agreement and anxious to return home.

## 2023-04-25 NOTE — SIGNIFICANT NOTE
04/25/23 1604   OTHER   Discipline physical therapist   Rehab Time/Intention   Session Not Performed other (see comments)  (Observed pt up ad ryan in her room with her , appears steady and states she feels at BL. Symptoms that brought her into the hospital have resolved and she denies PT needs. Will sign-off.)   Therapy Assessment/Plan (PT)   Criteria for Skilled Interventions Met (PT) no;no problems identified which require skilled intervention

## 2023-04-25 NOTE — PLAN OF CARE
Goal Outcome Evaluation:               SLP orders received via stroke order protocol. Patient passed dysphagia screen and RN reports she is doing well. SLP to sign off at this time.Please re-consult if needed.

## 2023-04-26 ENCOUNTER — READMISSION MANAGEMENT (OUTPATIENT)
Dept: CALL CENTER | Facility: HOSPITAL | Age: 78
End: 2023-04-26
Payer: MEDICARE

## 2023-04-26 VITALS
HEART RATE: 76 BPM | TEMPERATURE: 97.8 F | OXYGEN SATURATION: 90 % | RESPIRATION RATE: 18 BRPM | BODY MASS INDEX: 30.7 KG/M2 | DIASTOLIC BLOOD PRESSURE: 63 MMHG | HEIGHT: 66 IN | SYSTOLIC BLOOD PRESSURE: 134 MMHG | WEIGHT: 191 LBS

## 2023-04-26 LAB — GLUCOSE BLDC GLUCOMTR-MCNC: 95 MG/DL (ref 70–130)

## 2023-04-26 PROCEDURE — 99232 SBSQ HOSP IP/OBS MODERATE 35: CPT | Performed by: INTERNAL MEDICINE

## 2023-04-26 PROCEDURE — 99233 SBSQ HOSP IP/OBS HIGH 50: CPT | Performed by: PHYSICIAN ASSISTANT

## 2023-04-26 PROCEDURE — 82962 GLUCOSE BLOOD TEST: CPT

## 2023-04-26 PROCEDURE — G0378 HOSPITAL OBSERVATION PER HR: HCPCS

## 2023-04-26 RX ADMIN — VALSARTAN 320 MG: 320 TABLET, FILM COATED ORAL at 09:41

## 2023-04-26 RX ADMIN — DULOXETINE HYDROCHLORIDE 60 MG: 60 CAPSULE, DELAYED RELEASE ORAL at 09:38

## 2023-04-26 RX ADMIN — LEVOTHYROXINE SODIUM 125 MCG: 0.12 TABLET ORAL at 09:38

## 2023-04-26 RX ADMIN — Medication 2000 UNITS: at 09:38

## 2023-04-26 RX ADMIN — ASPIRIN 325 MG: 325 TABLET ORAL at 09:38

## 2023-04-26 NOTE — CASE MANAGEMENT/SOCIAL WORK
Case Management Discharge Note      Final Note: home no additional dc orders noted for ccp. bairon galvan/ccp         Selected Continued Care - Admitted Since 4/24/2023     Destination    No services have been selected for the patient.              Durable Medical Equipment    No services have been selected for the patient.              Dialysis/Infusion    No services have been selected for the patient.              Home Medical Care    No services have been selected for the patient.              Therapy    No services have been selected for the patient.              Community Resources    No services have been selected for the patient.              Community & DME    No services have been selected for the patient.                  Transportation Services  Private: Car    Final Discharge Disposition Code: 01 - home or self-care

## 2023-04-26 NOTE — PROGRESS NOTES
[facility] Acute Inpt Rehab Note     Following per stroke order set.  Chart reviewed.     Patient denies any mobility issues at this time per physical therapy note yesterday and they signed off.    Will sign off as well.    Thanks,   Ashely Alicia RN  Rehab Admission Nurse

## 2023-04-26 NOTE — DISCHARGE SUMMARY
"Date of Admission: 4/24/2023  Date of Discharge:  4/26/2023  Primary Care Physician: Lito Moore MD     Discharge Diagnosis:  Active Hospital Problems    Diagnosis  POA   • **Ataxia [R27.0]  Yes   • History of pulmonary embolism [Z86.711]  Yes   • Hyperlipidemia [E78.2]  Yes   • Benign essential hypertension [I10]  Yes   • Type 2 diabetes mellitus with diabetic neuropathy, without long-term current use of insulin [E11.40]  Yes   • Primary hypothyroidism [E03.9]  Yes      Resolved Hospital Problems   No resolved problems to display.       Presenting Problem/History of Present Illness:  Ataxia [R27.0]  Chronic anticoagulation [Z79.01]  Type 2 diabetes mellitus with diabetic neuropathy, without long-term current use of insulin [E11.40]     Hospital Course:  The patient is a 78 y.o. female with a history of HTN, Type 2 DM, hypothyroidism, pulmonary emboli (completed treatment with xarelto), peripheral neuropathy, and left breast cancer on Arimidex who presented with ataxia. Please see admission H&P for further details. She was evaluated by neurology and underwent a brain MRI, CTA of the head and neck, and echocardiogram which did not show anything explanatory. She was evaluated by physical therapy and was observed ambulating at her baseline. She states that her symptoms have resolved and that she feels as though she is at her baseline. She is medically stable and will be discharged home. She should keep PCP and oncology follow up.    Exam Today:  Blood pressure 134/63, pulse 76, temperature 97.8 °F (36.6 °C), temperature source Oral, resp. rate 18, height 167.6 cm (66\"), weight 86.6 kg (191 lb), SpO2 90 %, not currently breastfeeding.  Vitals and nursing note reviewed.   Constitutional:       General: She is not in acute distress.     Appearance: She is not toxic-appearing or diaphoretic.   HENT:      Head: Normocephalic and atraumatic.      Nose: Nose normal.      Mouth/Throat:      Mouth: Mucous membranes are " moist.      Pharynx: Oropharynx is clear.   Eyes:      Conjunctiva/sclera: Conjunctivae normal.      Pupils: Pupils are equal, round, and reactive to light.   Cardiovascular:      Rate and Rhythm: Normal rate.      Pulses: Normal pulses.   Pulmonary:      Effort: Pulmonary effort is normal.      Breath sounds: Normal breath sounds.   Abdominal:      General: Bowel sounds are normal.      Palpations: Abdomen is soft.      Tenderness: There is no abdominal tenderness.   Musculoskeletal:         General: No swelling or tenderness.      Cervical back: Normal range of motion and neck supple.   Skin:     General: Skin is warm and dry.      Capillary Refill: Capillary refill takes less than 2 seconds.   Neurological:      General: No focal deficit present.      Mental Status: She is alert and oriented to person, place, and time.   Psychiatric:         Mood and Affect: Mood normal.         Behavior: Behavior normal.     Procedures Performed:  CT OF THE BRAIN WITH AND WITHOUT CONTRAST AND CT ANGIOGRAPHY OF THE HEAD  AND NECK WITH CONTRAST INCLUDING RECONSTRUCTION IMAGES 04/24/2023  HISTORY: Ataxia.     TECHNIQUE: Axial images were obtained through the brain without  intravenous contrast.     FINDINGS: There is mild-to-moderate diffuse atrophy. There is mild  decreased attenuation of the periventricular white matter bilaterally  consistent with mild small vessel white matter ischemic disease. There  is no evidence of acute infarction, hemorrhage, midline shift or mass  effect. No bony abnormalities are seen.      Following the intravenous contrast injection CT angiography was  performed through the head and neck. Sagittal, coronal and 3-D  reconstruction images were reviewed.     NASCET criteria was used. There is a typical configuration of the aortic  arch. Bilateral common carotid arteries appear patent. There is mild  atherosclerotic calcification in the right carotid bifurcation with less  than 20% diameter stenosis.  Atherosclerotic calcification is seen in the  left carotid bifurcation with approximately 15% to 20% diameter  stenosis. Bilateral internal carotid arteries appear patent. There is  some ectasia of the supraclinoid internal carotid arteries. Bilateral  middle and anterior cerebral arteries appear patent.     There is mild ectasia of the vertebral arteries. The distal right  vertebral artery appears somewhat small compared to the left but patent.  The basilar artery and its branches appear patent.     No aneurysm is seen.     Postcontrast CT of the brain shows no abnormal enhancement.     IMPRESSION:  1. Mild changes of small vessel white matter ischemic disease.  2. No evidence of acute infarction or hemorrhage.  3. No acute process identified on CT angiography of the head and neck  with contrast.    MR Head Without and With Intravenous Contrast-4/25/23   CLINICAL HISTORY:     Reason for exam: Neuro deficit, acute, stroke suspected. Dizziness,   h o breast cancer.     TECHNIQUE:    Magnetic resonance images of the head brain without and with   intravenous contrast in multiple planes.     COMPARISON:    CT and CTA head 4 24 23     FINDINGS:    Brain:  No acute infarct.  No acute intracranial hemorrhage or   significant mass effect.  Nonspecific areas of T2 signal hyperintensity   in the periventricular white matter likely represent the sequela of   chronic small vessel ischemic disease.  No abnormal enhancement.    Ventricles:  Ventricular and sulcal prominence commensurate with the   patient's age.    Bones joints:  No acute abnormality.    Sinuses:  Mild mucosal thickening.    Mastoid air cells:  Fluid in bilateral mastoid air cells.    Orbits:  No significant abnormality.     IMPRESSION:         No acute intracranial abnormality.  No evidence of intracranial   metastases.  Claudette L McManus  Complete Transthoracic Echocardiogram with Complete Doppler, Color Flow and Contrast  Order# [id number]  Reading physician:  "Dylan Scott MD Ordering physician: Evelyne Jimenez MD Study date: 23     Patient Information    Patient Name   Claudette L McManus MRN   [id number] Legal Sex   Female  (Age)   1945 (78 y.o.)     Admission Information    Admission Date/Time Discharge Date/Time Room/Bed   23  12:23 PM  N539/1     Patient Hx Of Height, Weight, and Vitals    Height Weight BSA (Calculated - sq m) BMI (Calculated) Retired BMI (kg/m2) Pulse BP   167.6 cm (66\") 86.6 kg (191 lb) 1.96 sq meters 30.8  63 157/78     Blood Pressure at Time of Exam     Left Arm   Blood Pressure 160/80 mmHg             [facility] PACS Images     Show images for Adult transthoracic echo complete   Clinical Indication    Symptoms or Conditions Related to Cardiac Etiology - Stroke   Comments: With bubble study.     Interpretation Summary       •  Left ventricular systolic function is normal. Calculated left ventricular EF = 65.8%  •  Left ventricular diastolic function was normal.  •  Saline test results are negative.  Echocardiogram Findings    Left Ventricle Left ventricular systolic function is normal. Calculated left ventricular EF = 65.8%   Septal wall motion is normal. Normal left ventricular cavity size and wall thickness noted. All left ventricular wall segments contract normally. Left ventricular diastolic function was normal. Normal left atrial pressure. No evidence of left ventricular thrombus or mass present.   Right Ventricle Normal right ventricular cavity size, wall thickness, systolic function and septal motion noted.   Left Atrium The left atrial cavity is mildly dilated. Left atrial volume is normal.  Saline test results are negative.   Right Atrium Normal right atrial cavity size noted.   Aortic Valve The aortic valve is abnormal in structure. The aortic valve exhibits sclerosis. The aortic valve appears trileaflet. No significant aortic valve regurgitation is present. No hemodynamically significant aortic valve " stenosis is present.   Mitral Valve Mild mitral annular calcification is present. No significant mitral valve regurgitation is present. No significant mitral valve stenosis is present.   Tricuspid Valve No significant tricuspid valve regurgitation or significant stenosis present. The tricuspid valve is grossly normal in structure.   Pulmonic Valve The pulmonic valve is grossly normal in structure. There is no significant pulmonic valve regurgitation present. There is no pulmonic valve stenosis present.   Greater Vessels No dilation of the aortic root is present. No dilation of the sinuses of Valsalva is present.   Pericardium The pericardium is normal. There is no evidence of pericardial effusion.          Consults:   Consults     Date and Time Order Name Status Description    4/24/2023  9:08 PM Inpatient Hematology & Oncology Consult Completed     4/24/2023  2:09 PM [facility] (on-call MD unless specified) Details      4/24/2023 12:49 PM Inpatient Neurology Consult Stroke Completed            Discharge Disposition:  Home or Self Care    Discharge Medications:     Discharge Medications      Continue These Medications      Instructions Start Date   anastrozole 1 MG tablet  Commonly known as: ARIMIDEX   TAKE ONE TABLET BY MOUTH DAILY      DULoxetine 60 MG capsule  Commonly known as: CYMBALTA   TAKE 1 CAPSULE EVERY DAY AS DIRECTED      ezetimibe 10 MG tablet  Commonly known as: ZETIA   TAKE 1 TABLET EVERY DAY      gabapentin 300 MG capsule  Commonly known as: NEURONTIN   Take 1 p.o. 3 times daily for peripheral neuropathy as directed      LEG CRAMPS PO   Oral, Daily, Nightly      levothyroxine 100 MCG tablet  Commonly known as: Synthroid   Take 1 p.o. daily as directed for low thyroid      levothyroxine 125 MCG tablet  Commonly known as: SYNTHROID, LEVOTHROID   TAKE ONE TABLET BY MOUTH DAILY      LORazepam 0.5 MG tablet  Commonly known as: ATIVAN   TAKE ONE TABLET BY MOUTH TWICE A DAY AS NEEDED FOR ANXIETY      metroNIDAZOLE  0.75 % gel  Commonly known as: METROGEL   Topical, 2 Times Daily      Mounjaro 15 MG/0.5ML solution pen-injector  Generic drug: Tirzepatide   15 mg, Subcutaneous, Weekly      ondansetron 8 MG tablet  Commonly known as: ZOFRAN   8 mg, Oral, Every 6 Hours PRN      simvastatin 20 MG tablet  Commonly known as: ZOCOR   TAKE ONE TABLET BY MOUTH DAILY FOR HIGH CHOLESTEROL.      traZODone 150 MG tablet  Commonly known as: DESYREL   TAKE ONE TABLET BY MOUTH ONCE NIGHTLY      valsartan 320 MG tablet  Commonly known as: DIOVAN   TAKE 1 TABLET EVERY MORNING FOR BLOOD PRESSURE      Vitamin D3 50 MCG (2000 UT) tablet   1 capsule, Oral, Daily             Discharge Diet:   Diet Instructions     Diet: Regular/House Diet; Regular Texture (IDDSI 7); Thin (IDDSI 0)      Discharge Diet: Regular/House Diet    Texture: Regular Texture (IDDSI 7)    Fluid Consistency: Thin (IDDSI 0)          Activity at Discharge:   Activity Instructions     Activity as Tolerated            Follow-up Appointments:  Future Appointments   Date Time Provider Department Center   5/1/2023 11:30 AM [facility] MAMM 1  [facility] MAMMO [facility]   5/1/2023 12:00 PM [facility] US 2  [facility] US [facility]   5/8/2023 10:30 AM Noelle Fam MD [facility]   5/16/2023 12:40 PM VITALS ONLY [facility]   5/16/2023  1:00 PM Elsie Arzate MD [facility]   6/28/2023 12:30 PM [facility] DEXA 1  [facility] DEXA [facility]   9/26/2023 10:00 AM Lito Moore MD [facility]   10/3/2023 11:00 AM Lito Moore MD [facility]     Additional Instructions for the Follow-ups that You Need to Schedule     Discharge Follow-up with PCP   As directed       Currently Documented PCP:    Lito Moore MD    PCP Phone Number:    [phone]     Follow Up Details: 1 week                Luís Tubbs MD  04/26/23  12:38 EDT    Time Spent on Discharge Activities: Greater than 30 minutes.

## 2023-04-26 NOTE — PLAN OF CARE
Goal Outcome Evaluation:  Plan of Care Reviewed With: patient        Progress: no change  Possible d/c when cleared with consults

## 2023-04-26 NOTE — PROGRESS NOTES
"DOS: 2023  NAME: Claudette L McManus   : 1945  PCP: Lito Moore MD  Chief Complaint   Patient presents with   • Gait Problem       Chief complaint: Dizziness  Subjective: Patient sitting up in bed.  She is dressed and ready to go home.  No further episodes of visual disturbance.  She is up walking and PT has made no specific recommendations    Objective:  Vital signs: /63 (BP Location: Right arm, Patient Position: Lying)   Pulse 76   Temp 97.8 °F (36.6 °C) (Oral)   Resp 18   Ht 167.6 cm (66\")   Wt 86.6 kg (191 lb)   LMP  (LMP Unknown) Comment: partial hysterectomy  SpO2 90%   BMI 30.83 kg/m²      Gen: NAD, vitals reviewed  MS: oriented x3, recent/remote memory intact, normal attention/concentration, language intact, no neglect.  CN: visual acuity grossly normal, PERRL, EOMI, no facial droop, no dysarthria  Motor: 5/5 throughout upper and lower extremities, normal tone  Sensory: intact to light touch all 4 ext.    ROS:  No weakness, numbness  No fevers, chills      Laboratory results:  Lab Results   Component Value Date    GLUCOSE 92 2023    CALCIUM 9.5 2023     2023    K 4.0 2023    CO2 27.5 2023     2023    BUN 10 2023    CREATININE 0.95 2023    EGFRIFAFRI 65 2020    EGFRIFNONA 63 2022    BCR 10.5 2023    ANIONGAP 10.5 2023     Lab Results   Component Value Date    WBC 5.39 2023    HGB 11.4 (L) 2023    HCT 33.3 (L) 2023    MCV 95.4 2023     2023     Lab Results   Component Value Date    LDL 36 2023     (H) 2018     (H) 2017         Lab 23  0513   HEMOGLOBIN A1C 5.50        Review of labs:     Review and interpretation of imaging: MRI brain with and without without negative for acute finding there are no specific old strokes and some nonspecific periventricular white matter seen    Workup to date:    Diagnoses:  1.  Gait " imbalance  2.  Neuropathy  3.  Breast cancer    Impression: 78-year-old right-handed female with past medical history of breast cancer diagnosed last July treated with anastrozole.  She developed ataxia that seem to be worsening over the last 3 weeks.  For an episode of what sounds like oscillopsia she presented to the ER. For the subacute symptoms and feeling of imbalance she was taken off her anastrozole a little less than 2 wks ago.  She also started gabapentin recently but this was after symptom onset.  She says it has really helped with neuropathic pain in her feet.  Of note she was also on Xarelto about 6 weeks ago for PE.  CT imaging has been unrevealing for etiology.  MRI brain with and without.  There was some coordinating due to her bladder stimulator but this was completed and there is no acute finding just some white matter disease no etiology for her symptoms.  She does have neuropathy and this could be contributing to her gait imbalance.  I encouraged physical activity, geronimo chi.  She has no peripheral vestibular findings on exam.  We will have her follow-up in clinic for recheck.  If she is doing well without recurrent she is may defer follow-up    I spent at least 40 minutes interviewing, examining, and counseling patient.  I independently reviewed documentation, laboratory and diagnostic findings, external documentation where applicable, and formulated treatment plan which was discussed with the patient.      Thank you for this consultation.  Discussed above plan with neuro attending, Dr. Rico who agrees with above plan.  Neurology team is available for concerns or questions.

## 2023-04-26 NOTE — OUTREACH NOTE
Prep Survey    Flowsheet Row Responses   [facility] patient discharged from? [facility]   Is LACE score < 7 ? No   Eligibility [facility]   Date of Admission 04/24/23   Date of Discharge 04/26/23   Discharge Disposition Home or Self Care   Discharge diagnosis Ataxia   Does the patient have one of the following disease processes/diagnoses(primary or secondary)? Other   Does the patient have Home health ordered? No   Is there a DME ordered? No   Prep survey completed? Yes          Yancy SUAREZ - Registered Nurse

## 2023-04-26 NOTE — PROGRESS NOTES
"        REASON FOR FOLLOWUP/CHIEF COMPLAINT:  Breast cancer    HISTORY OF PRESENT ILLNESS:   Her difficulty walking has resolved.  She is now walking without any problem.  She is dressed and planning to go home today.    Past Medical History, Past Surgical History, Social History, Family History have been reviewed and are without significant changes except as mentioned.    Review of Systems   Review of Systems   Constitutional: Negative for activity change.   HENT: Negative for nosebleeds and trouble swallowing.    Respiratory: Negative for shortness of breath and wheezing.    Cardiovascular: Negative for chest pain and palpitations.   Gastrointestinal: Negative for constipation, diarrhea and nausea.   Genitourinary: Negative for dysuria and hematuria.   Musculoskeletal: Negative for arthralgias and myalgias.   Skin: Negative for rash and wound.   Neurological: Negative for seizures and syncope.   Hematological: Negative for adenopathy. Does not bruise/bleed easily.   Psychiatric/Behavioral: Negative for confusion.       Medications:  The current medication list was reviewed in the EMR    ALLERGIES:    Allergies   Allergen Reactions   • Morphine And Related    • Other Other (See Comments)     REJECTED DACRON SUTURES IN THE PAST AND INCISION CAME APART              Vitals:    04/25/23 0647 04/25/23 1250 04/25/23 2325 04/26/23 0656   BP: 157/78  137/70 134/63   BP Location: Left arm  Right arm Right arm   Patient Position: Lying  Lying Lying   Pulse: 63  61 76   Resp: 18  18 18   Temp: 98.5 °F (36.9 °C)  97.6 °F (36.4 °C) 97.8 °F (36.6 °C)   TempSrc: Oral  Oral Oral   SpO2: 94%  93% 90%   Weight:  86.6 kg (191 lb)     Height:  167.6 cm (66\")       Physical Exam    CONSTITUTIONAL:  Vital signs reviewed.  No distress, looks comfortable.  EYES:  Conjunctivae and lids unremarkable.  PERRLA  EARS, NOSE, MOUTH, THROAT:  Ears and nose appear unremarkable.  Lips, teeth, gums appear unremarkable.  RESPIRATORY:  Normal " respiratory effort.  Lungs clear to auscultation bilaterally.  CARDIOVASCULAR:  Normal S1, S2.  No murmurs, rubs or gallops.  No significant lower extremity edema.  GASTROINTESTINAL: Abdomen appears unremarkable.  Nontender.  No hepatomegaly.  No splenomegaly.  NEURO: Cranial nerves 2-12 grossly intact.  No focal deficits.  Appears to have equal strength all 4 extremities.  MUSCULOSKELETAL:  Unremarkable digits/nails.  No cyanosis or clubbing.  SKIN:  Warm.  No rashes.  PSYCHIATRIC:  Normal judgment and insight.  Normal mood and affect.        RECENT LABS:  WBC   Date Value Ref Range Status   04/25/2023 5.39 3.40 - 10.80 10*3/mm3 Final   04/24/2023 5.09 3.40 - 10.80 10*3/mm3 Final     Hemoglobin   Date Value Ref Range Status   04/25/2023 11.4 (L) 12.0 - 15.9 g/dL Final   04/24/2023 12.1 12.0 - 15.9 g/dL Final     Platelets   Date Value Ref Range Status   04/25/2023 210 140 - 450 10*3/mm3 Final   04/24/2023 228 140 - 450 10*3/mm3 Final       ASSESSMENT/PLAN:  Claudette L McManus [id number]   *Left breast cancer, stage Ia  • Neoadjuvant Arimidex since November 2022, until stopped mid April 2023 because of hair thinning, incoordination with walking, and nail changes  • 4/ 20/23: Patient discussed with Dr. Fam, stating she requests resection of the cancer.  • Plans are for mammogram and ultrasound, 5/1/2023, and see Dr. Fam on 5/8/2023, and Dr. Arzate on 5/16/2023.     *Room spinning on 1 occasion, prompting ER visit and incoordination with walking since around late March 2023, gradually worsening  • This was the reason for admission.  Neurology has seen her.  CT angiogram head and neck show mild changes of small vessel white matter ischemic disease.  No evidence of acute infarction or hemorrhage.  No mention of metastasis.  MRI of the brain has also been ordered.  • MRI brain showed no evidence of cancer recurrence.  Neurological symptoms resolved.  She is planning to go home today.     *RLL pulmonary emboli,  5/19/2022 (resolved  CT chest angiogram 12/8/2022)  • 1/24/2023: Stopped anticoagulation since this was her only episode of thrombosis  • No clinical signs to suggest recurrent clotting     Plan  Noted plans for discharge.  No problems from an oncology standpoint with discharge.  Chart reviewed and summarized including hospitalist note

## 2023-04-27 ENCOUNTER — TRANSITIONAL CARE MANAGEMENT TELEPHONE ENCOUNTER (OUTPATIENT)
Dept: CALL CENTER | Facility: HOSPITAL | Age: 78
End: 2023-04-27
Payer: MEDICARE

## 2023-04-27 DIAGNOSIS — E03.9 PRIMARY HYPOTHYROIDISM: Chronic | ICD-10-CM

## 2023-04-27 DIAGNOSIS — E78.2 MIXED HYPERLIPIDEMIA: ICD-10-CM

## 2023-04-27 DIAGNOSIS — G47.09 OTHER INSOMNIA: ICD-10-CM

## 2023-04-27 DIAGNOSIS — F33.41 RECURRENT MAJOR DEPRESSIVE DISORDER, IN PARTIAL REMISSION: Chronic | ICD-10-CM

## 2023-04-27 NOTE — OUTREACH NOTE
Call Center TCM Note    Flowsheet Row Responses   [facility] patient discharged from? [facility]   Does the patient have one of the following disease processes/diagnoses(primary or secondary)? Other   TCM attempt successful? Yes   Call start time 1437   Call end time 1452   Meds reviewed with patient/caregiver? Yes   Is the patient having any side effects they believe may be caused by any medication additions or changes? No   Does the patient have all medications ordered at discharge? N/A   Is the patient taking all medications as directed (includes completed medication regime)? Yes   Comments No PCP appts available within TCM time frame. Patient is eligible for TCM appt.   Does the patient have an appointment with their PCP within 7 days of discharge? No appointments available   Nursing Interventions Routed TCM call to PCP office, PCP office requested to make appointment - message sent   Has home health visited the patient within 72 hours of discharge? N/A   Psychosocial issues? No   Did the patient receive a copy of their discharge instructions? Yes   Nursing interventions Reviewed instructions with patient   What is the patient's perception of their health status since discharge? Improving   Is the patient/caregiver able to teach back signs and symptoms related to disease process for when to call PCP? Yes   Is the patient/caregiver able to teach back signs and symptoms related to disease process for when to call 911? Yes   Is the patient/caregiver able to teach back the hierarchy of who to call/visit for symptoms/problems? PCP, Specialist, Home health nurse, Urgent Care, ED, 911 Yes   If the patient is a current smoker, are they able to teach back resources for cessation? Not a smoker   TCM call completed? Yes   Wrap up additional comments Patient states is improving. States no further ataxia or any problems noted. Concerns about hospital stay relayed to management. TCM complete.   Call end time 1452    Would this patient benefit from a Referral to [facility] Social Work? No   Is the patient interested in additional calls from an ambulatory ?  NOTE:  applies to high risk patients requiring additional follow-up. No          Vesna Solo RN    4/27/2023, 14:53 EDT

## 2023-05-01 ENCOUNTER — HOSPITAL ENCOUNTER (OUTPATIENT)
Dept: ULTRASOUND IMAGING | Facility: HOSPITAL | Age: 78
Discharge: HOME | End: 2023-05-01
Payer: MEDICARE

## 2023-05-01 ENCOUNTER — APPOINTMENT (OUTPATIENT)
Dept: MAMMOGRAPHY | Facility: HOSPITAL | Age: 78
End: 2023-05-01
Payer: MEDICARE

## 2023-05-01 ENCOUNTER — HOSPITAL ENCOUNTER (OUTPATIENT)
Dept: MAMMOGRAPHY | Facility: HOSPITAL | Age: 78
Discharge: HOME | End: 2023-05-01
Payer: MEDICARE

## 2023-05-01 DIAGNOSIS — C50.112 CANCER OF CENTRAL PORTION OF LEFT BREAST: ICD-10-CM

## 2023-05-01 DIAGNOSIS — C50.112 MALIGNANT NEOPLASM OF CENTRAL PORTION OF LEFT BREAST IN FEMALE, ESTROGEN RECEPTOR POSITIVE: ICD-10-CM

## 2023-05-01 DIAGNOSIS — Z17.0 MALIGNANT NEOPLASM OF CENTRAL PORTION OF LEFT BREAST IN FEMALE, ESTROGEN RECEPTOR POSITIVE: ICD-10-CM

## 2023-05-01 PROCEDURE — G0279 TOMOSYNTHESIS, MAMMO: HCPCS

## 2023-05-01 PROCEDURE — 77066 DX MAMMO INCL CAD BI: CPT

## 2023-05-01 PROCEDURE — 76642 ULTRASOUND BREAST LIMITED: CPT

## 2023-05-01 RX ORDER — LEVOTHYROXINE SODIUM 0.1 MG/1
TABLET ORAL
Qty: 90 TABLET | Refills: 1 | Status: SHIPPED | OUTPATIENT
Start: 2023-05-01

## 2023-05-01 RX ORDER — DULOXETIN HYDROCHLORIDE 60 MG/1
CAPSULE, DELAYED RELEASE ORAL
Qty: 90 CAPSULE | Refills: 3 | Status: SHIPPED | OUTPATIENT
Start: 2023-05-01

## 2023-05-01 RX ORDER — EZETIMIBE 10 MG/1
TABLET ORAL
Qty: 90 TABLET | Refills: 3 | Status: SHIPPED | OUTPATIENT
Start: 2023-05-01

## 2023-05-01 RX ORDER — TRAZODONE HYDROCHLORIDE 150 MG/1
TABLET ORAL
Qty: 90 TABLET | Refills: 0 | Status: SHIPPED | OUTPATIENT
Start: 2023-05-01 | End: 2023-05-04

## 2023-05-04 DIAGNOSIS — G47.09 OTHER INSOMNIA: ICD-10-CM

## 2023-05-04 RX ORDER — TRAZODONE HYDROCHLORIDE 150 MG/1
TABLET ORAL
Qty: 30 TABLET | Refills: 0 | Status: SHIPPED | OUTPATIENT
Start: 2023-05-04

## 2023-05-08 ENCOUNTER — OFFICE VISIT (OUTPATIENT)
Dept: SURGERY | Facility: CLINIC | Age: 78
End: 2023-05-08
Payer: MEDICARE

## 2023-05-08 VITALS
HEART RATE: 68 BPM | SYSTOLIC BLOOD PRESSURE: 126 MMHG | WEIGHT: 185 LBS | BODY MASS INDEX: 29.73 KG/M2 | HEIGHT: 66 IN | OXYGEN SATURATION: 97 % | DIASTOLIC BLOOD PRESSURE: 78 MMHG

## 2023-05-08 DIAGNOSIS — E66.3 OVERWEIGHT (BMI 25.0-29.9): ICD-10-CM

## 2023-05-08 DIAGNOSIS — R92.8 ABNORMAL MAMMOGRAM: ICD-10-CM

## 2023-05-08 DIAGNOSIS — R92.8 ABNORMAL ULTRASOUND OF BREAST: ICD-10-CM

## 2023-05-08 DIAGNOSIS — Z98.890 HISTORY OF BILATERAL BREAST REDUCTION SURGERY: ICD-10-CM

## 2023-05-08 DIAGNOSIS — Z80.3 FH: BREAST CANCER: ICD-10-CM

## 2023-05-08 DIAGNOSIS — I27.82 CHRONIC PULMONARY EMBOLISM WITHOUT ACUTE COR PULMONALE, UNSPECIFIED PULMONARY EMBOLISM TYPE: ICD-10-CM

## 2023-05-08 DIAGNOSIS — C50.112 CANCER OF CENTRAL PORTION OF LEFT BREAST: Primary | ICD-10-CM

## 2023-05-08 DIAGNOSIS — R06.02 SHORTNESS OF BREATH: ICD-10-CM

## 2023-05-08 NOTE — PROGRESS NOTES
Chief Complaint: Claudette L McManus is a 78 y.o. female who was seen in consultation at the request of Lito Moore MD  for abnormal breast imaging, newly diagnosed breast cancer and a post-biopsy visit    History of Present Illness:  Patient presents with abnormal breast imaging. She noted no new masses, skin changes, nipple discharge, nipple changes prior to her most recent imaging.  Her most recent imaging includes the followin22 [facility]  CT CHEST  MCMANUS, CLAUDETTE   HISTORY: dyspnea on exertion. FINDINGS: There is a tiny nonocclusive subsegmental right lower lobe pulmonary thromboembolus and there are a few very tiny distal pulmonary thromboemboli as well. IMPRESSION:  1. There are tiny pulmonary thromboemboli with the largest within a subsegmental right lower lobe pulmonary artery. The RV:LV ratio is 1.4. The appearance of some of the pulmonary artery branches and the lungs can be seen with chronic pulmonary thromboemboli.     22 [facility]   MAMMO SCREENING  MCMANUS CLAUDETTE   There are scattered areas of fibroglandular density. There are indeterminate calcifications in the outer central anterior left breast. There is a focal asymmetry with questioned distortion in the slightly lower slightly outer anterior left breast. There is a focal asymmetry in the outer central middle to anterior left breast. BIRADS CATEGORY 0      22 [facility] MAMMO DIAG LEFT MATTY LEFT BREAST U/S  MCMANUS CLAUDETTE  In the outer central anterior left breast, there is a 1.3 cm group of somewhat pleomorphic calcifications, which is suspicious. In the lower slightly outer middle left breast, there is a persistent approximately 1.5 cm mass with mild corresponding architectural distortion. There is also a central corresponding calcification.  The previously noted focal asymmetry in the outer central middle left  breast partially effaces with spot compression and is less conspicuous on the lateral  view.  In the slightly upper outer middle left breast, there is a persistent approximately 1 cm mass with corresponding architectural distortion.  In the upper central anterior left breast, there is a persistent focal asymmetry with calcifications.  These areas were further assessed with ultrasound.     ULTRASOUND:  Targeted sonographic evaluation of the left breast was performed from 2:00 to 6:00 in the region of the mammographic abnormalities. At 1:00 in the retroareolar left breast, there is an at least 2.3 x 0.9 x 2.0 cm hypoechoic mass with indistinct margins and internal echogenic foci from calcifications corresponding to the focal asymmetry with calcifications in the anterior left breast on mammogram. There is tubular elongation of the mass concerning for possible ductal extension.  At 3:00 in the retroareolar left breast, there is a 1.1 x 0.8 x 1.0 cm irregular hypoechoic mass with indistinct margins and internal echogenic foci from calcifications, which corresponds to the suspicious group of calcifications on mammogram.  At 4:00, 3 cm from the nipple, there is a 1.4 x 1.0 x 1.1 cm irregular hypoechoic mass with peripheral vascularity corresponding to a mass with distortion on mammogram.  At 4:30, 3 cm from the nipple, approximately 1.8 cm from the above mass  at the 4:00 position, there is a 0.9 x 0.5 x 0.9 cm irregular hypoechoic mass with indistinct margins, which is suspicious.  At 3:00, 5 cm from the nipple, there is a 0.9 x 0.7 x 0.7 cm irregular hypoechoic mass with indistinct margins and corresponding internal vascularity, which corresponds to a mass with architectural distortion  on mammogram and is suspicious.   Recommend 2 site ultrasound-guided core needle biopsy  targeting the dominant masses at 1:00 in the retroareolar breast and  4:00, 3 cm from the nipple with management of the additional masses  pending biopsy results. Contrast-enhanced breast MRI may be warranted  following the biopsy. BI  RADS CATEGORY 4C       She has not had a breast biopsy in the past.  She has her  ovaries, is postmenopausal, and takes nor hormones.  Her family history includes the following: She has 1 daughter, 1 sister, 2 maternal aunts, 1 paternal aunt, her daughter had breast cancer in her 40s.  This daughter is  due to suicide in her 60s.  The patient has had an intentional 20 pound weight loss over the past 2-month related to an injectable diabetes medication called tirzepatide.      Core biopsies in the office returned as :  Left breast 3:00, 2 cm from the nipple ultrasound-guided core needle biopsy:  Low-grade duct carcinoma in situ, involving an intraductal papilloma.  Solid, cribriform, micropapillary, calcifications.  1.4 cm in a core.  Estrogen , progesterone , Ki-67 is 6%.  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  Estrogen , progesterone , HER2/willian 2+.  FISH  Not amplified 2.8/1.3..  Ki-67 15%.     Her most recent note from Dr Bell pulmonology : Remote 20-pack-year history smoking quit in the .  Recently diagnosed primary embolism unprovoked .  Concern for pulmonary hypertension, ongoing evaluation by Dr. Solorio.  Patient states issues since May 2022 with persistent dyspnea on exertion of unclear etiology.  Patient currently on anticoagulation with Xarelto tolerating it fairly well.  Some concern for grade 1 diastolic dysfunction and dilated left atrium.  Some concern for undiagnosed sleep apnea.     Diagnosis given unprovoked pulmonary embolus.  Agree with anticoagulation.  Ongoing hypercoagulability evaluation.  Fatigue, most likely undiagnosed obstructive sleep apnea.  Discussed need for testing and CPAP.  Pulmonary hypertension, repeat echocardiogram to evaluate.  Defer to Dr. Solorio.  Will need a  VQ scan after acute pulmonary embolus treatment to evaluate for chronic pulmonary emboli.     Dr. Solorio visit September 26, 2022: Right heart cath performed: Conclusion normal pulmonary pressures, normal left-sided filling pressures, elevated right ventricular filling pressures, normal pulmonary vascular resistance.  Recommendations optimize medical therapy.      OK per Dr Chang to hold her xarelto for 2-3 days prior to procedure        Unable to have MRI due to incontinence implant    She is here to review today with her  Akira.      Interval History:    In the interim,  Claudette L McManus  has done well.  She has noted no changes in her breast exam. No new masses, skin changes, nipple changes, nipple discharge either breast.   She denies headache, bone pain, belly pain, cough, changes in vision.    She has had an intentional 21 pound weight loss.  She had an episode of ataxia and went to the emergency room for this April 26, 2023.  She tells me that she has felt much better since then.  Her MRI and CT head were both negative for any acute findings.  Dr. Lito Moore stopped her Xarelto.  She continues on her anastrozole.    Her most recent imaging includes the following:  Bilateral diagnostic mammogram and left breast ultrasound:  [facility] 5-2-23  Anterior one third left breast 3:00 there is a coil-shaped marker.  Middle third lower outer quadrant left breast at 4:00 there is a second coil shaped marker.  Interval decrease in the microcalcifications surrounding the biopsy clip at 3:00.  Only sparse microcalcifications in the region remain.  Biopsy clip at 4:00 is no longer surrounded by residual surrounding density.  Ultrasound  1:00 retroareolar stable 2.3 cm masslike region consistent with an area of benign fibrous tissue and adjacent coil-shaped clip.  4:00, 3 cm from the nipple visualized clip in the previously noted mass is no longer visualized.  3:00, 5 cm from the nipple  interval resolution of previously described irregular mass.  There is been interval resolution of the irregular lesion at 430, 3 cm from the nipple.  Impression interval resolution of abnormal mammographic densities and sonographic correlates in the left breast.  Also reduction in calcifications adjacent to the clip at 3:00.  Imaging features suggest near complete response to medical therapy at the biopsy-proven sites of malignancy.  There are only a few sparse microcalcifications adjacent to the coil shaped clip at 3:00.            Review of Systems:  Review of Systems   Constitutional: Negative for unexpected weight change (21 lb wt loss).   Respiratory: Shortness of breath: only with exertion     All other systems reviewed and are negative.       Past Medical and Surgical History:  Breast Biopsy History:  Patient has not had a breast biopsy in the past.  Breast Cancer HIstory:  Patient does not have a past medical history of breast cancer.  Breast Operations, and year:  Reduction 2000  Obstetric/Gynecologic History:  Age menstrual periods began: 10  Patient is postmenopausal due to removal of her uterus in the following year:1990   Number of pregnancies:1  Number of live births: 1  Number of abortions or miscarriages: 0  Age of delivery of first child: 12  Patient breast fed, for the following lenth of time:  Length of time taking birth control pills:6-8 wks   Patient has never taken hormone replacement  Patient has both ovaries, uterus removed 1990  Past Surgical History:   Procedure Laterality Date   • BILATERAL BREAST REDUCTION  Early 2000    Early 2000--bilateral breast reduction   • CARDIAC CATHETERIZATION N/A 9/26/2022    Procedure: Right Heart Cath;  Surgeon: Agus Solorio MD PhD;  Location: [facility] INVASIVE LOC[facility];  Service: Cardiology;  Laterality: N/A;  Will REMAIN on Xarelto for the case   • CHOLECYSTECTOMY  1960s 1960s--open cholecystectomy   • COLONOSCOPY  2012 2012--reportedly  normal colonoscopy   • INCONTINENCE SURGERY  2012 Approximately 2012--implantation of questionable bladder stimulator right sacral area for urinary incontinence.  Details not known.   • REPLACEMENT TOTAL KNEE BILATERAL Left 2010; 2011 2010-2011--bilateral total knee replacement   • SUBTOTAL HYSTERECTOMY  Remote    Remote partial hysterectomy.  Ovaries intact.   • TOTAL SHOULDER REPLACEMENT Left 2013 2013--left total shoulder replacement.   • TOTAL SHOULDER REPLACEMENT  April 28, 2021 4/20/2021--status post right reverse total shoulder arthroplasty   • TOTAL SHOULDER REVERSE ARTHROPLASTY Right 04/20/2021 4/20/2021--right reverse total shoulder replacement.     Past Medical History:   Diagnosis Date   • Benign essential hypertension    • Breast cancer     left   • Chronic bilateral low back pain without sciatica 08/16/2013 March 13, 2019--Limited bone scan.  This reveals scintigraphic activity in the spine and at the right shoulder corresponds to change seen on recent x-rays and is best explained by degenerative change.  Isolated metastatic disease exactly corresponding to the sites of extensive degenerative disc change would be peculiar.  March 7, 2019--new patient to get established.  She has complaints of approxi   • Chronic bilateral thoracic back pain 02/26/2019 March 13, 2019--Limited bone scan.  This reveals scintigraphic activity in the spine and at the right shoulder corresponds to change seen on recent x-rays and is best explained by degenerative change.  Isolated metastatic disease exactly corresponding to the sites of extensive degenerative disc change would be peculiar.  March 1, 2019--x-ray of the lumbar and thoracic spine reveals mild anterior l   • Chronic insomnia 11/04/2014   • Diabetic peripheral neuropathy 03/07/2019    Characterized by decreased sensation and paresthesias in both lower extremities described as a burning sensation.  Extends up to the knees.  Reportedly had been  evaluated with nerve conduction study in the past.   • Generalized anxiety disorder 2013   • History of Bell's palsy 2013    Remote history of Bell's palsy.  Patient does not remember which side of the face.   • History of COVID-19 2022   • Hyperlipidemia    • Impaired fasting glucose    • Major depression    • Menopausal state, 2020--normal DEXA. 2019--DEXA scan reveals lumbar spine T score 3.8.  Left femoral neck T score 2.5.  Right femoral neck T score 2.2.  Normal bone density.   • Non morbid obesity 2022   • Peripheral neuropathy, idiopathic 2019    Characterized by decreased sensation and paresthesias in both lower extremities described as a burning sensation.  Extends up to the knees.  Reportedly had been evaluated with nerve conduction study in the past.   • Primary hypothyroidism 2013   • Primary osteoarthritis involving multiple joints 2013   • Rotator cuff arthropathy of right shoulder, 2021--status post right reverse total shoulder arthroplasty 2020   • Situational mixed anxiety and depressive disorder 2019--patient seen in follow-up after I called in a prescription for Ativan after the patient's  contacted me a few weeks ago regarding the sudden death of patient's daughter.  This was an apparent suicide and the patient found her daughter dead.  I will not go into details.  Patient reports the Lorazepam has been helpful but she is still quite distraught, nervous, and undergoing   • Type 2 diabetes mellitus with diabetic neuropathy, without long-term current use of insulin    • Vitamin D deficiency 2019       Prior Hospitalizations, other than for surgery or childbirth, and year:  None     Social History     Socioeconomic History   • Marital status:    Tobacco Use   • Smoking status: Former     Types: Cigarettes     Quit date: 1998     Years since quittin.3   • Smokeless  "tobacco: Never   Vaping Use   • Vaping Use: Never used   Substance and Sexual Activity   • Alcohol use: Yes     Alcohol/week: 1.0 standard drink     Types: 1 Glasses of wine per week     Comment: current some day   • Drug use: No   • Sexual activity: Defer     Partners: Male     Patient is .  Patient is retired.  Patient drinks 2 servings of caffeine per day.    Family History:  Family History   Problem Relation Age of Onset   • Alcohol abuse Father    • Breast cancer Daughter         40s   • Cancer Other    • Diabetes Other         type II   • Leukemia Grandchild 19       Vital Signs:  /78 (BP Location: Right arm, Patient Position: Sitting, Cuff Size: Adult)   Pulse 68   Ht 167.6 cm (65.98\")   Wt 83.9 kg (185 lb)   LMP  (LMP Unknown) Comment: partial hysterectomy  SpO2 97%   BMI 29.87 kg/m²      Medications:    Current Outpatient Medications:   •  Cholecalciferol (VITAMIN D3) 2000 UNITS tablet, Take 1 tablet by mouth Daily., Disp: , Rfl:   •  DULoxetine (CYMBALTA) 60 MG capsule, TAKE 1 CAPSULE EVERY DAY AS DIRECTED, Disp: 90 capsule, Rfl: 3  •  ezetimibe (ZETIA) 10 MG tablet, TAKE 1 TABLET EVERY DAY, Disp: 90 tablet, Rfl: 3  •  gabapentin (NEURONTIN) 300 MG capsule, Take 1 p.o. 3 times daily for peripheral neuropathy as directed, Disp: 90 capsule, Rfl: 1  •  Homeopathic Products (LEG CRAMPS PO), Take  by mouth Daily. Nightly, Disp: , Rfl:   •  levothyroxine (SYNTHROID, LEVOTHROID) 100 MCG tablet, TAKE 1 TABLET EVERY DAY FOR LOW THYROID AS DIRECTED DOSE DECREASE, Disp: 90 tablet, Rfl: 1  •  levothyroxine (SYNTHROID, LEVOTHROID) 125 MCG tablet, TAKE ONE TABLET BY MOUTH DAILY, Disp: 90 tablet, Rfl: 0  •  LORazepam (ATIVAN) 0.5 MG tablet, TAKE ONE TABLET BY MOUTH TWICE A DAY AS NEEDED FOR ANXIETY, Disp: 60 tablet, Rfl: 5  •  metroNIDAZOLE (METROGEL) 0.75 % gel, Apply  topically to the appropriate area as directed 2 (Two) Times a Day., Disp: 45 g, Rfl: 2  •  ondansetron (ZOFRAN) 8 MG tablet, Take 1 " "tablet by mouth Every 6 (Six) Hours As Needed for Nausea., Disp: 60 tablet, Rfl: 1  •  simvastatin (ZOCOR) 20 MG tablet, TAKE ONE TABLET BY MOUTH DAILY FOR HIGH CHOLESTEROL., Disp: 90 tablet, Rfl: 2  •  Tirzepatide (Mounjaro) 15 MG/0.5ML solution pen-injector, Inject 15 mg under the skin into the appropriate area as directed 1 (One) Time Per Week., Disp: 6 mL, Rfl: 3  •  traZODone (DESYREL) 150 MG tablet, TAKE ONE TABLET BY MOUTH ONCE NIGHTLY, Disp: 30 tablet, Rfl: 0  •  valsartan (DIOVAN) 320 MG tablet, TAKE 1 TABLET EVERY MORNING FOR BLOOD PRESSURE, Disp: 90 tablet, Rfl: 3  •  anastrozole (ARIMIDEX) 1 MG tablet, TAKE ONE TABLET BY MOUTH DAILY (Patient not taking: Reported on 5/8/2023), Disp: 90 tablet, Rfl: 3     Allergies:  Allergies   Allergen Reactions   • Morphine And Related    • Other Other (See Comments)     REJECTED DACRON SUTURES IN THE PAST AND INCISION CAME APART       Physical Examination:  /78 (BP Location: Right arm, Patient Position: Sitting, Cuff Size: Adult)   Pulse 68   Ht 167.6 cm (65.98\")   Wt 83.9 kg (185 lb)   LMP  (LMP Unknown) Comment: partial hysterectomy  SpO2 97%   BMI 29.87 kg/m²   General Appearance:  Patient is in no distress.  She is well kept and has an obese build.   Psychiatric:  Patient with appropriate mood and affect. Alert and oriented to self, time, and place.    Breast, RIGHT:  medium sized, symmetric with the contralateral side.  Well-healed reduction pattern incisions.  Breast skin is without erythema, edema, rashes.  There are no visible abnormalities upon inspection during the arm-raising maneuver or with hands on hips in the sitting position. There is no nipple retraction, discharge or nipple/areolar skin changes.There are no masses palpable in the sitting or supine positions.     Breast, LEFT:  medium sized, symmetric with the contralateral side.  Well-healed reduction pattern incisions.  Breast skin is without erythema, edema, rashes.  There are no visible " abnormalities upon inspection during the arm-raising maneuver or with hands on hips in the sitting position. There is no nipple retraction, discharge or nipple/areolar skin changes.There are no masses palpable in the sitting or supine positions.  There is no residual nodularity in the left breast centrally or laterally.    Lymphatic:  There is no axillary, cervical, infraclavicular, or supraclavicular adenopathy bilaterally.  Eyes:  Pupils are round and reactive to light.  Cardiovascular:  Heart rate and rhythm are regular.  Respiratory:  Lungs are clear bilaterally with no crackles or wheezes in any lung field.  She has labored breathing at rest.      Gastrointestinal:  Abdomen is soft, nondistended, and nontender.     Musculoskeletal:  Good strength in all 4 extremities.   There is good range of motion in both shoulders.    Skin:  No new skin lesions or rashes on the skin excluding the breast (see breast exam above).        Imagin[facility]  CT CHEST  MCMANUS, CLAUDETTE   HISTORY: dyspnea on exertion. FINDINGS: There is a tiny nonocclusive subsegmental right lower lobe pulmonary thromboembolus and there are a few very tiny distal pulmonary thromboemboli as well. IMPRESSION:  1. There are tiny pulmonary thromboemboli with the largest within a subsegmental right lower lobe pulmonary artery. The RV:LV ratio is 1.4. The appearance of some of the pulmonary artery branches and the lungs can be seen with chronic pulmonary thromboemboli.     22 [facility]   MAMMO SCREENING  MCMANUS CLAUDETTE   There are scattered areas of fibroglandular density. There are indeterminate calcifications in the outer central anterior left breast. There is a focal asymmetry with questioned distortion in the slightly lower slightly outer anterior left breast. There is a focal asymmetry in the outer central middle to anterior left breast. BIRADS CATEGORY 0      22 [facility] MAMMO DIAG LEFT MATTY LEFT BREAST U/S  OMAR  CLAUDETTE  In the outer central anterior left breast, there is a 1.3 cm group of somewhat pleomorphic calcifications, which is suspicious. In the lower slightly outer middle left breast, there is a persistent approximately 1.5 cm mass with mild corresponding architectural distortion. There is also a central corresponding calcification.  The previously noted focal asymmetry in the outer central middle left  breast partially effaces with spot compression and is less conspicuous on the lateral view.  In the slightly upper outer middle left breast, there is a persistent approximately 1 cm mass with corresponding architectural distortion.  In the upper central anterior left breast, there is a persistent focal asymmetry with calcifications.  These areas were further assessed with ultrasound.     ULTRASOUND:  Targeted sonographic evaluation of the left breast was performed from 2:00 to 6:00 in the region of the mammographic abnormalities. At 1:00 in the retroareolar left breast, there is an at least 2.3 x 0.9 x 2.0 cm hypoechoic mass with indistinct margins and internal echogenic foci from calcifications corresponding to the focal asymmetry with calcifications in the anterior left breast on mammogram. There is tubular elongation of the mass concerning for possible ductal extension.  At 3:00 in the retroareolar left breast, there is a 1.1 x 0.8 x 1.0 cm irregular hypoechoic mass with indistinct margins and internal echogenic foci from calcifications, which corresponds to the suspicious group of calcifications on mammogram.  At 4:00, 3 cm from the nipple, there is a 1.4 x 1.0 x 1.1 cm irregular hypoechoic mass with peripheral vascularity corresponding to a mass with distortion on mammogram.  At 4:30, 3 cm from the nipple, approximately 1.8 cm from the above mass  at the 4:00 position, there is a 0.9 x 0.5 x 0.9 cm irregular hypoechoic mass with indistinct margins, which is suspicious.  At 3:00, 5 cm from the nipple, there is  a 0.9 x 0.7 x 0.7 cm irregular hypoechoic mass with indistinct margins and corresponding internal vascularity, which corresponds to a mass with architectural distortion  on mammogram and is suspicious.   Recommend 2 site ultrasound-guided core needle biopsy  targeting the dominant masses at 1:00 in the retroareolar breast and  4:00, 3 cm from the nipple with management of the additional masses  pending biopsy results. Contrast-enhanced breast MRI may be warranted  following the biopsy. BI RADS CATEGORY 4C       Left diagnostic mammogram with tomosynthesis [facility] January 19, 2023:  Scattered fibroglandular densities.  There are 2 coil shaped biopsy clips left breast, 1 in the anterior one third 3:00 and 1 in the middle third lower quadrant 4:00.  At the medial margin of the 3:00 clip are microcalcifications that are not appreciably changed.  2 separate clusters measure 9 and 2 mm and are 8 mm from the inferior margin of the clip.  No abnormality is seen adjacent to the 4:00 clip.     Due screening mammogram 7-30-23     Note from Lito Moore internal medicine physician April 13, 2023.  I have reviewed her CTA with resolution of pulmonary emboli.  Patient desires to come off the blood thinner and that would be my preference as well.  There is been but some disconnect with the pulmonary service.  I am making the decision to safely go off of the anticoagulation with the knowledge that there could be a recurrence.  Patient is aware of that and willing to take that risk.  I recommended that if she goes on long trips or flies on an airplane we should put her temporarily on Xarelto during those times.  Xarelto discontinued today.        Jessica called and would like to have her cancer removed..   We will obtain her bilateral imaging since screening is due in short order, prior to her visit.  Unable to have MRI due to incontinence implant.     Bilateral diagnostic mammogram and left breast  ultrasound:  [facility] 5-2-23  Anterior one third left breast 3:00 there is a coil-shaped marker.  Middle third lower outer quadrant left breast at 4:00 there is a second coil shaped marker.  Interval decrease in the microcalcifications surrounding the biopsy clip at 3:00.  Only sparse microcalcifications in the region remain.  Biopsy clip at 4:00 is no longer surrounded by residual surrounding density.  Ultrasound  1:00 retroareolar stable 2.3 cm masslike region consistent with an area of benign fibrous tissue and adjacent coil-shaped clip.  4:00, 3 cm from the nipple visualized clip in the previously noted mass is no longer visualized.  3:00, 5 cm from the nipple interval resolution of previously described irregular mass.  There is been interval resolution of the irregular lesion at 430, 3 cm from the nipple.  Impression interval resolution of abnormal mammographic densities and sonographic correlates in the left breast.  Also reduction in calcifications adjacent to the clip at 3:00.  Imaging features suggest near complete response to medical therapy at the biopsy-proven sites of malignancy.  There are only a few sparse microcalcifications adjacent to the coil shaped clip at 3:00.      PATHOLOGY:  Left breast 3:00, 2 cm from the nipple ultrasound-guided core needle biopsy:  Low-grade duct carcinoma in situ, involving an intraductal papilloma.  Solid, cribriform, micropapillary, calcifications.  1.4 cm in a core.  Estrogen , progesterone , Ki-67 is 6%.  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  Estrogen , progesterone , HER2/willian 2+.  FISH  Not amplified 2.8/1.3..  Ki-67 15%.      Consultations:  05/25/22 [facility]   SINGH, VIKAS MCMANUS CLAUDETTE L.   5/19/22 patient was started on anticoagulation with  Xarelto. Mshe reports no significant improvemenrt in her respiratory symptoms while being on anticoagulation for around a week. Patient states symptoms while being on anticoagulation for around a week. Patient states she always had mild difficulty breasting, but it got significantly worse in the last 6 weeks. She now has to rest even after walkiong short interval. Says she smoked halk pack/day for 20-25 years. Quit in 1988.     Her most recent note from Dr Bell pulmonology : Remote 20-pack-year history smoking quit in the 1980s.  Recently diagnosed primary embolism unprovoked 2022.  Concern for pulmonary hypertension, ongoing evaluation by Dr. Solorio.  Patient states issues since May 2022 with persistent dyspnea on exertion of unclear etiology.  Patient currently on anticoagulation with Xarelto tolerating it fairly well.  Some concern for grade 1 diastolic dysfunction and dilated left atrium.  Some concern for undiagnosed sleep apnea.     Diagnosis given unprovoked pulmonary embolus.  Agree with anticoagulation.  Ongoing hypercoagulability evaluation.  Fatigue, most likely undiagnosed obstructive sleep apnea.  Discussed need for testing and CPAP.  Pulmonary hypertension, repeat echocardiogram to evaluate.  Defer to Dr. Solorio.  Will need a VQ scan after acute pulmonary embolus treatment to evaluate for chronic pulmonary emboli.     Dr. Solorio September 26, 2022: Right heart cath performed: Conclusion normal pulmonary pressures, normal left-sided filling pressures, elevated right ventricular filling pressures, normal pulmonary vascular resistance.  Recommendations optimize medical therapy.    OK per Dr Chang to hold her xarelto for 2-3 days prior to procedure        labs:   [facility] breast and gynecology panel September 28, 2022 returned as a variant of uncertain significance in the SMARCA4 gene , C.  254C ^ T.     We will let her know that there were no mutations.         Procedures  9-21-22:  Percutaneous ultrasound-guided vacuum-assisted excisional breast biopsy x2   Indication:  ultrasound-visible breast mass x2  Location: Left breast 3:00, 2 cm from the nipple, left breast 4:00, 2 cm from the nipple  Consent:  The risks, benefits, and alternatives to the procedure were discussed with the patient, who understood and wished to proceed.  The risks described included, but were not limited to, bleeding, infection, pneumothorax, and inadequate sampling requiring either repeat percutaneous or open excisional biopsy.  Description of Procedure:   After the patient was positioned supine on the procedure table, I located each lesion using ultrasound.  The lesion at the 3:00, 2 cm the nipple location measured in the antiradial dimension 1.7 x 0.9 cm and in the radial dimension 1.4 x 1.3 cm.  Lesion at the 4:00, 2 cm from the nipple location measured in the radial dimension 1.4 x 1.0 cm and in the antiradial dimension 1.1 x 0.6 cm.    I prepped and draped the breast skin in sterile fashion.   For each separate distinct site I performed the following procedure: I anesthetized the breast skin at the site of anticipated mammotomy with 1% lidocaine with epinephrine.  I then anesthetized the underlying subcutaneous tissue and breast parenchyma surrounding each separate distinct lesion with 1% lidocaine with epinephrine under ultrasound visualization and guidance. I then made a nicking incision with an 11blade and inserted the 10G encore biopsy device from inferolateral to superomedial for the first biopsy and superior lateral to inferior medial for the second lesion  under ultrasound guidance.   I then took 12 sequential core samples of each separate lesion using the automated sampling of the encore device, until a majority of each lesion disappeared on ultrasound. There was minimal residual lesion visible at the conclusion of the procedure.  I removed the probe, then placed a hydromark marker, using a stiff  introducer, into each of the 2 biopsy sites.   We held manual compression for 10 minutes, placed steri-strips at the mammotomy site, and wrapped the patient in a 6 inch super ace wrap with an ice-pack.  Marker placed: hydromark x2  Tolerance: The patient tolerated the procedure well.  Disposition: We will see her back within a week to review her pathology.    Assessment:   Diagnosis Plan   1. Cancer of central portion of left breast  Mammo Diagnostic Digital Tomosynthesis Left With CAD      2. Abnormal ultrasound of breast        3. Abnormal mammogram  Mammo Diagnostic Digital Tomosynthesis Left With CAD      4. Chronic pulmonary embolism without acute cor pulmonale, unspecified pulmonary embolism type        5. Shortness of breath        6. FH: breast cancer        7. Overweight (BMI 25.0-29.9)        8. History of bilateral breast reduction surgery        1-3  Imaging findings:  LEFT breast 1:00 RA, 2.3 cm mass with calcifications-BR4C-  LEFT breast 3:00 RA, 2 CFN- 1.1 cm mass with calcifications- BR4C target for core biopsy today  LEFT 4:00, 2-3 CFN- 1.4 cm mass- BR4C- target for biopsy today  LEFT 4:00, 3 CFN- 9mm mass, located 9mm from the above mass- BR4C    Core biopsies in office:  Left breast 3:00, 2 cm from the nipple-  Low-grade duct carcinoma in situ, involving an intraductal papilloma.  Solid, cribriform, micropapillary, calcifications.  1.4 cm in a core.  Estrogen , progesterone , Ki-67 is 6%.    Left breast 4:00, 2 cm from the nipple-   invasive mammary carcinoma, no special type, lobular features, intermediate grade, 3, 2, 1, 6 mm, atypical duct hyperplasia with an intraductal papilloma also seen.  Estrogen , progesterone , HER2/willian 2+.  FISH  Not amplified 2.8/1.3..  Ki-67 15%.       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  254C ^ T.    - had 21 pound weight loss and off of xarelto- shortness of breath improved 4-2023    - Anastrazole Dr Arzate started -  - imaging FU 5-2023 :  Left breast 1:00 stable 2.3 cm masslike region consistent with benign fibrous tissue and adjacent coil-shaped clip that corresponds to the clip seen on mammogram with microcalcifications.  Left breast 4:00, 3 cm from the nipple clip is seen in the previously noted mass is no longer visualized  3:00 left breast 5 cm from the nipple resolution of previously described irregular lesion  Left breast 430, 3 cm from the nipple no abnormality  Impression interval resolution of all abnormal mammographic densities and suspicious sonographic lesions.  Also reduction of microcalcifications adjacent to the 3:00 clip.  Imaging features suggest near complete interval response to medical therapy.    4-  RLL subsegmental emboli- some chronic (CT 5-19-22)- sees Dr Fofana with [facility] hematology, on eliquis    5-  Labored/audible breathing at rest- dyspnea on exertion    25 PYH smoking, stopped 1998- some chronic SOB, but worsening over the past one year-     sees A pulmonologist at [facility]-  Dr Bell pulmonology : 2022 note: Remote 20-pack-year history smoking quit in the 1980s.  Recently diagnosed primary embolism unprovoked 2022.  Concern for pulmonary hypertension, ongoing evaluation by Dr. Solorio.  Patient states issues since May 2022 with persistent dyspnea on exertion of unclear etiology.  Patient currently on anticoagulation with Xarelto tolerating it fairly well.  Some concern  for grade 1 diastolic dysfunction and dilated left atrium.  Some concern for undiagnosed sleep apnea.     Diagnosis given unprovoked pulmonary embolus.  Agree with anticoagulation.  Ongoing hypercoagulability evaluation.  Fatigue, most likely undiagnosed obstructive sleep apnea.  Discussed need for testing and CPAP.  Pulmonary hypertension, repeat echocardiogram to evaluate.  Defer to Dr. Solorio.  Will need a VQ scan after acute pulmonary embolus treatment to evaluate for chronic pulmonary emboli.     Cardiologist- Dr. Solorio visit September 26, 2022: Right heart cath performed: Conclusion normal pulmonary pressures, normal left-sided filling pressures, elevated right ventricular filling pressures, normal pulmonary vascular resistance.  Recommendations optimize medical therapy.    - had 21 pound weight loss and off of xarelto- shortness of breath improved 4-2023      5-  Daughter Jamilah Pfeiffer- 6-1-57- survived breast cancer. Comitted suicide in her 60s.    6-  BMI 29 down from 33, intentional, in 2022/23    Taking movajaro injections weekly for weight loss      7-  2000- breast reduction    Plan:  The patient goes by Claudette.    Her  Akira is not with her today because he is been admitted for internal bleeding.  She is sad about this today.  We reviewed reviewed her interval history, consultations, treatments, imaging, imaging reports and examination together today.  There is no focal physical exam finding to report.  Her imaging shows resolution of the 4 areas of concern.  There are a few residual calcifications at the 3:00 location.      I told her that the junction I would not recommend a mastectomy.  We discussed that we could either continue with medical management or we could do a bracketed lumpectomy of the 2 markers since there appears to be an imaging complete response.  The markers are over a distance of 2.8 x 3.4 cm measured on her mammogram.   We would be sure to remove the residual  calcifications.  At this time based on her previous imaging and reviewing this with radiology she does not wish to proceed with surgery but wishes to continue on her medical management of her breast cancer.  I will arrange for a left mammogram in November 2023 and see her back thereafter.  Her next bilateral mammogram would be due May 2, 2024 at [facility].      Noelle Fam MD      Today I spent 30 minutes doing the following: Reviewing records, labs, outside imaging and reports in preparation for the patient visit; obtaining medical history; performing the physical exam; counseling and educating the patient and any available family or caregivers; ordering medications, tests or procedures; coordinating care with any other physicians on her care team as needed, and documenting all of the above in the medical record as well as sending communications with her other healthcare professionals.          Next Appointment:  Return for Next scheduled follow up, after imaging.      EMR Dragon/transcription disclaimer:    Please note that portions of this note were completed with a voice recognition program.

## 2023-05-11 ENCOUNTER — TELEPHONE (OUTPATIENT)
Dept: SURGERY | Facility: CLINIC | Age: 78
End: 2023-05-11
Payer: MEDICARE

## 2023-05-11 NOTE — TELEPHONE ENCOUNTER
Pt notified of the following appts:    Dx MMG and U/S is scheduled at [facility] on 11/08/2023 at 1:00    Follow up appt with Dr. Fam is scheduled on 11/17/2023 at 12:30    Pt verbalized understanding of appt times and dates appt reminders sent in the mail today.     CMA

## 2023-05-16 ENCOUNTER — OFFICE VISIT (OUTPATIENT)
Dept: ONCOLOGY | Facility: CLINIC | Age: 78
End: 2023-05-16
Payer: MEDICARE

## 2023-05-16 VITALS
BODY MASS INDEX: 30.58 KG/M2 | WEIGHT: 190.3 LBS | HEART RATE: 67 BPM | SYSTOLIC BLOOD PRESSURE: 183 MMHG | DIASTOLIC BLOOD PRESSURE: 92 MMHG | TEMPERATURE: 98 F | HEIGHT: 66 IN | RESPIRATION RATE: 18 BRPM | OXYGEN SATURATION: 95 %

## 2023-05-16 DIAGNOSIS — D68.59 HYPERCOAGULABLE STATE: ICD-10-CM

## 2023-05-16 DIAGNOSIS — Z78.0 POST-MENOPAUSAL: ICD-10-CM

## 2023-05-16 DIAGNOSIS — C50.912 DUCTAL CARCINOMA OF LEFT BREAST: Primary | ICD-10-CM

## 2023-05-16 DIAGNOSIS — C80.1 ANXIETY ASSOCIATED WITH CANCER DIAGNOSIS: ICD-10-CM

## 2023-05-16 DIAGNOSIS — I26.99 MULTIPLE PULMONARY EMBOLI: ICD-10-CM

## 2023-05-16 DIAGNOSIS — F41.1 ANXIETY ASSOCIATED WITH CANCER DIAGNOSIS: ICD-10-CM

## 2023-05-16 RX ORDER — TIRZEPATIDE 15 MG/.5ML
INJECTION, SOLUTION SUBCUTANEOUS
COMMUNITY
Start: 2023-04-07

## 2023-05-16 NOTE — PROGRESS NOTES
Subjective   Claudette L McManus is a 77 y.o. female.  Referred by Dr. Fam for left breast invasive ductal carcinoma    History of Present Illness   Ms. 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   Comorbidities include hypertension, hyperlipidemia, anxiety/depression, insomnia, progressive dyspnea on exertion, diagnosed with pulmonary embolism in May 2022 and on anticoagulation with Xarelto, hypothyroidism.    7/29/2022-bilateral screening mammogram  Indeterminate left breast calcifications and focal asymmetry.  1 of which is associated with questioned architectural distortion.  Further evaluation recommended.  Neck on 8/25/2022-left breast diagnostic mammogram and ultrasound  In the lower slightly outer middle left breast there is a 1.5 cm mass corresponding to the architectural distortion.  Focal asymmetry in the outer central middle left breast partially effaces with spot compression and less conspicuous.  Right upper middle left breast there is persistent approximately 1 cm mass with corresponding architectural distortion.  The upper central anterior left breast there is a persistent focal asymmetry with calcifications.    Ultrasound  At 1:00 retroareolar left breast-2.3 x 0.9 x 2 cm hypoechoic mass with indistinct margins and internal echogenic foci from calcifications.  There is tubular elevation of the mass concerning for probable extension  At 3:00, retroareolar left breast-1.1 x 0.8 x 1 cm hypoechoic mass with indistinct margins, corresponds to suspicious group of calcifications.  At 4:00, 3 cm from the nipple there is a 1.4 and 1 x 1.1 cm irregular hypoechoic mass with peripheral vascularity corresponding to the mass with distortion on mammogram  At 430, 3 cm from the nipple-1.8 cm from the above mass there is a 0.9 x 0.5 x 0.9 cm hypoechoic mass with indistinct margins which is suspicious  At 3:00, 5 cm from the nipple  there is a 0.9 0.7 x 0.7 cm irregular hypoechoic mass with indistinct margins.    Impression  Multiple masses in the left breast, which demonstrate corresponding architectural distortion.  2 site ultrasound-guided biopsy targeting the dominant masses at 1:00 in the retroareolar breast and 4:00, 3 cm from the nipple with management of additional masses pending biopsy results.  Contrast-enhanced MRI recommended    9/21/2022-2 site biopsy  1.left breast 3:00, 2 cm from nipple-ultrasound-guided biopsy of the mass  Low-grade ductal carcinoma in situ involving a small intraductal papilloma.  DCIS measures 14 mm  ER positive, NY positive    2.left breast o'clock, 2 cm from the nipple ultrasound-guided biopsy  Invasive ductal carcinoma with lobular features  Grade 2  Millimeters on core biopsy  Atypical ductal hyperplasia with cavitations and involving an intraductal papilloma  Negative lymphovascular space invasion  ER +% strong  [place] +% strong  HER2 negative, nonamplified on FISH  Ki-67 15%    She was seen by Dr. Fam and discussed mastectomy given several abnormalities in the left breast.  Since she had progressive worsening of dyspnea on exertion from 2022 without acute explanation, recent diagnosis of pulmonary embolism requiring chronic anticoagulation, her age and concerns regarding complications of mastectomy patient opted not to proceed with mastectomy  She is here to discuss neoadjuvant endocrine therapy.    She reports severe insomnia for which she is currently on trazodone.  Tried gabapentin in the past but did not help.  She is also been diagnosed with peripheral neuropathy.  She is on Cymbalta anxiety.    She had a daughter who is treated for breast cancer but eventually she passed away from suicide.  Patient reports significant anxiety since the death of her daughter.  Her daughter have been diagnosed with bipolar disorder.    Genetic testing performed and no significant pathogenic  mutation.    For the dyspnea, she evaluated by  and per patient she was recommended to have a sleep study.  She is not comfortable using the CPAP and did not wish to undergo a sleep study.  She was prescribed inhalers which she had used for a month without much help.    On the CT PE protocol performed in May 2022 there was concern for chronic pulmonary emboli and pulmonary hypertension.  For this reason she underwent a cardiac catheterization on 9/26/2022 which showed normal pulmonary pressures, normal left-sided pressures, elevated right ventricular filling pressures, normal PVR, normal RV SWI  The etiology of the dyspnea somewhat unclear.    12/8/2022-CT of the chest-tiny right lower lobe pulmonary embolism seen previously is no longer present.  Enlarged right ventricle appears similar on prior exam.  No other abnormalities noted.    Patient was admitted between 4/24/2023 to 4/26/2023 for complaints of ataxia.  4/25/2023 MRI of the brain did not show any acute abnormality.  She also had a CT angiogram of the neck and the head which only showed mild changes of small vessel white matter ischemic disease.  No evidence of acute infarction or hemorrhage.  No acute process identified on the CT of the head and neck.    5/1/2023-bilateral diagnostic mammogram showed interval resolution of abnormal mammographic densities in conjunction with suspicious sonographic lesions in the left breast.  There was also a reduction in microcalcifications seen at the 3:00 retroareolar region.  At 1:00 there was some residual lesions noted which is thought to be fibrous tissue.  Imaging features suggest near complete interval response to medical therapy with only a few sparse microcalcifications remaining adjacent to the clip at 3 o'clock position.  Mammographic density at 4:00 and the sonographic lesion have resolved.    Interval history  The etiology of the ataxia was unclear.  Patient stopped anastrozole since she was  admitted to the hospital and has not resumed anastrozole yet.  She tells me that her energy levels have improved significantly since discharge from the hospital.  She also feels much better.  Her  was recently hospitalized for an internal bleed and was recently discharged from the hospital and she is having to care for him.    The following portions of the patient's history were reviewed and updated as appropriate: allergies, current medications, past family history, past medical history, past social history, past surgical history and problem list.    Past Medical History:   Diagnosis Date   • Benign essential hypertension    • Chronic bilateral low back pain without sciatica 8/16/2013 March 13, 2019--Limited bone scan.  This reveals scintigraphic activity in the spine and at the right shoulder corresponds to change seen on recent x-rays and is best explained by degenerative change.  Isolated metastatic disease exactly corresponding to the sites of extensive degenerative disc change would be peculiar.  March 7, 2019--new patient to get established.  She has complaints of approxi   • Chronic bilateral thoracic back pain 2/26/2019 March 13, 2019--Limited bone scan.  This reveals scintigraphic activity in the spine and at the right shoulder corresponds to change seen on recent x-rays and is best explained by degenerative change.  Isolated metastatic disease exactly corresponding to the sites of extensive degenerative disc change would be peculiar.  March 1, 2019--x-ray of the lumbar and thoracic spine reveals mild anterior l   • Chronic insomnia 11/4/2014   • Diabetic peripheral neuropathy (HCC) 3/7/2019    Characterized by decreased sensation and paresthesias in both lower extremities described as a burning sensation.  Extends up to the knees.  Reportedly had been evaluated with nerve conduction study in the past.   • Generalized anxiety disorder 5/19/2013   • History of Bell's palsy 5/21/2013    Remote  history of Bell's palsy.  Patient does not remember which side of the face.   • Hyperlipidemia    • Impaired fasting glucose    • Major depression    • Menopausal state, 8/19/2020--normal DEXA. 3/7/2019    August 19, 2020--DEXA scan reveals lumbar spine T score 3.8.  Left femoral neck T score 2.5.  Right femoral neck T score 2.2.  Normal bone density.   • Non morbid obesity 8/11/2022   • Peripheral neuropathy, idiopathic 3/7/2019    Characterized by decreased sensation and paresthesias in both lower extremities described as a burning sensation.  Extends up to the knees.  Reportedly had been evaluated with nerve conduction study in the past.   • Primary hypothyroidism 5/19/2013   • Primary osteoarthritis involving multiple joints 4/22/2013   • Rotator cuff arthropathy of right shoulder, 4/20/2021--status post right reverse total shoulder arthroplasty 5/27/2020   • Situational mixed anxiety and depressive disorder 8/21/2019 August 21, 2019--patient seen in follow-up after I called in a prescription for Ativan after the patient's  contacted me a few weeks ago regarding the sudden death of patient's daughter.  This was an apparent suicide and the patient found her daughter dead.  I will not go into details.  Patient reports the Lorazepam has been helpful but she is still quite distraught, nervous, and undergoing   • Type 2 diabetes mellitus with diabetic neuropathy, without long-term current use of insulin (HCC)    • Vitamin D deficiency 2/26/2019        Past Surgical History:   Procedure Laterality Date   • BILATERAL BREAST REDUCTION  Early 2000    Early 2000--bilateral breast reduction   • CARDIAC CATHETERIZATION N/A 9/26/2022    Procedure: Right Heart Cath;  Surgeon: Agus Solorio MD PhD;  Location: [facility] INVASIVE LOCATION;  Service: Cardiology;  Laterality: N/A;  [facility]   • CHOLECYSTECTOMY  1960s    1960s--open cholecystectomy   • COLONOSCOPY  2012     --reportedly normal colonoscopy   • INCONTINENCE SURGERY      Approximately --implantation of questionable bladder stimulator right sacral area for urinary incontinence.  Details not known.   • REPLACEMENT TOTAL KNEE BILATERAL Left ; 2011-2011--bilateral total knee replacement   • SUBTOTAL HYSTERECTOMY  Remote    Remote partial hysterectomy.  Ovaries intact.   • TOTAL SHOULDER REPLACEMENT Left 2013--left total shoulder replacement.   • TOTAL SHOULDER REPLACEMENT  2021--status post right reverse total shoulder arthroplasty   • TOTAL SHOULDER REVERSE ARTHROPLASTY Right 2021--right reverse total shoulder replacement.        Family History   Problem Relation Age of Onset   • Alcohol abuse Father    • Breast cancer Daughter         40s   • Cancer Other    • Diabetes Other         type II   • Leukemia Grandchild 19        Social History     Socioeconomic History   • Marital status:    Tobacco Use   • Smoking status: Former     Types: Cigarettes     Quit date: 1998     Years since quittin.9   • Smokeless tobacco: Never   Vaping Use   • Vaping Use: Never used   Substance and Sexual Activity   • Alcohol use: Yes     Alcohol/week: 1.0 standard drink     Types: 1 Glasses of wine per week     Comment: current some day   • Drug use: No   • Sexual activity: Not Currently     Partners: Male        OB History        1    Para   1    Term   1            AB        Living           SAB        IAB        Ectopic        Molar        Multiple        Live Births                 Age at menarche-10  Age at first live childbirth-12   1 para 1  0  She had a hysterectomy in the 90s, ovaries still present  Oral contraceptive pill use for 6 to 8 weeks  No use of hormone replacement therapy    Allergies   Allergen Reactions   • Morphine And Related    • Other Other (See Comments)     REJECTED DACRON SUTURES IN THE PAST AND  "INCISION CAME APART            Review of systems as mentioned in the HPI    Objective   Blood pressure 144/77, pulse 66, temperature 97.5 °F (36.4 °C), temperature source Temporal, resp. rate 16, height 165.1 cm (65\"), weight 86.9 kg (191 lb 9.6 oz), SpO2 95 %, not currently breastfeeding.   Physical Exam  Constitutional:       Appearance: Normal appearance. She is normal weight.   HENT:      Head: Normocephalic and atraumatic.      Right Ear: External ear normal.      Left Ear: External ear normal.      Nose: Nose normal.      Mouth/Throat:      Mouth: Mucous membranes are moist.      Pharynx: Oropharynx is clear.   Eyes:      Extraocular Movements: Extraocular movements intact.      Conjunctiva/sclera: Conjunctivae normal.      Pupils: Pupils are equal, round, and reactive to light.   Cardiovascular:      Rate and Rhythm: Normal rate and regular rhythm.      Pulses: Normal pulses.      Heart sounds: Normal heart sounds.   Pulmonary:      Effort: Pulmonary effort is normal.      Breath sounds: Normal breath sounds.   Abdominal:      General: Abdomen is flat. Bowel sounds are normal.   Musculoskeletal:         General: Normal range of motion.      Cervical back: Normal range of motion.   Skin:     General: Skin is warm.   Neurological:      General: No focal deficit present.      Mental Status: She is alert and oriented to person, place, and time.   Psychiatric:         Mood and Affect: Mood normal.         Behavior: Behavior normal.         Thought Content: Thought content normal.         Judgment: Judgment normal.       Breast Exam: Right breast appears normal on inspection.  No palpable abnormalities of the right breast.  Left breast on erythema has resolved.  Tenderness resolved.    No palpable right or left axilla lymphadenopathy.      I have reexamined the patient and the results are consistent with the previously documented exam. Elsie Arzate MD       No visits with results within 30 Day(s) from this " visit.   Latest known visit with results is:   Lab on 10/24/2022   Component Date Value Ref Range Status   • Glucose 10/24/2022 108  74 - 124 mg/dL Final   • BUN 10/24/2022 11  6 - 20 mg/dL Final   • Creatinine 10/24/2022 0.96  0.60 - 1.10 mg/dL Final   • Sodium 10/24/2022 135  134 - 145 mmol/L Final   • Potassium 10/24/2022 4.4  3.5 - 4.7 mmol/L Final   • Chloride 10/24/2022 95 (L)  98 - 107 mmol/L Final   • CO2 10/24/2022 26.4  22.0 - 29.0 mmol/L Final   • Calcium 10/24/2022 10.7 (C)  8.5 - 10.2 mg/dL Final   • Total Protein 10/24/2022 7.9  6.3 - 8.0 g/dL Final   • Albumin 10/24/2022 4.90  3.50 - 5.20 g/dL Final   • ALT (SGPT) 10/24/2022 25  0 - 33 U/L Final   • AST (SGOT) 10/24/2022 29  0 - 32 U/L Final   • Alkaline Phosphatase 10/24/2022 67  38 - 116 U/L Final   • Total Bilirubin 10/24/2022 0.5  0.2 - 1.2 mg/dL Final   • Globulin 10/24/2022 3.0  1.8 - 3.5 gm/dL Final   • A/G Ratio 10/24/2022 1.6  1.1 - 2.4 g/dL Final   • BUN/Creatinine Ratio 10/24/2022 11.5  7.3 - 30.0 Final   • Anion Gap 10/24/2022 13.6  5.0 - 15.0 mmol/L Final   • eGFR 10/24/2022 60.3  >60.0 mL/min/1.73 Final    National Kidney Foundation and American Society of Nephrology (ASN) Task Force recommended calculation based on the Chronic Kidney Disease Epidemiology Collaboration (CKD-EPI) equation refit without adjustment for race.   • WBC 10/24/2022 6.20  3.40 - 10.80 10*3/mm3 Final   • RBC 10/24/2022 4.27  3.77 - 5.28 10*6/mm3 Final   • Hemoglobin 10/24/2022 13.8  12.0 - 15.9 g/dL Final   • Hematocrit 10/24/2022 40.9  34.0 - 46.6 % Final   • MCV 10/24/2022 95.8  79.0 - 97.0 fL Final   • MCH 10/24/2022 32.3  26.6 - 33.0 pg Final   • MCHC 10/24/2022 33.7  31.5 - 35.7 g/dL Final   • RDW 10/24/2022 12.4  12.3 - 15.4 % Final   • RDW-SD 10/24/2022 43.0  37.0 - 54.0 fl Final   • MPV 10/24/2022 9.3  6.0 - 12.0 fL Final   • Platelets 10/24/2022 258  140 - 450 10*3/mm3 Final   • Neutrophil % 10/24/2022 59.3  42.7 - 76.0 % Final   • Lymphocyte % 10/24/2022  28.9  19.6 - 45.3 % Final   • Monocyte % 10/24/2022 8.9  5.0 - 12.0 % Final   • Eosinophil % 10/24/2022 1.9  0.3 - 6.2 % Final   • Basophil % 10/24/2022 0.5  0.0 - 1.5 % Final   • Immature Grans % 10/24/2022 0.5  0.0 - 0.5 % Final   • Neutrophils, Absolute 10/24/2022 3.68  1.70 - 7.00 10*3/mm3 Final   • Lymphocytes, Absolute 10/24/2022 1.79  0.70 - 3.10 10*3/mm3 Final   • Monocytes, Absolute 10/24/2022 0.55  0.10 - 0.90 10*3/mm3 Final   • Eosinophils, Absolute 10/24/2022 0.12  0.00 - 0.40 10*3/mm3 Final   • Basophils, Absolute 10/24/2022 0.03  0.00 - 0.20 10*3/mm3 Final   • Immature Grans, Absolute 10/24/2022 0.03  0.00 - 0.05 10*3/mm3 Final   • nRBC 10/24/2022 0.0  0.0 - 0.2 /100 WBC Final        No radiology results for the last 30 days.       Assessment & Plan       *Left breast invasive ductal carcinoma  · T1 cN0 M0, stage Ia, multifocal  · 2 sites were biopsied, one site was consistent with ductal carcinoma in situ which is ER/SD positive and the second site was invasive ductal carcinoma with lobular features which is ER/SD positive and HER2 negative, grade 2  The steps of curative intent breast cancer treatment have been explained, including surgery, possible chemotherapy, possible radiation and possible endocrine therapy.   · We discussed that given the fact that she does not want to undergo mastectomy and concerned about the complications due to ongoing dyspnea and her age it would be reasonable to proceed with neoadjuvant endocrine therapy.  · The 2 options including tamoxifen and aromatase inhibitors have been discussed.  · Given the history of DVT tamoxifen is not an option  · We discussed anastrozole 1 mg p.o. daily.  Adverse effects including but not limited to hot flashes, mood changes, fatigue, insomnia, decrease in bone mineral density, vaginal dryness, sexual dysfunction.   · She has been on anastrozole  since November 2022 and tolerating that well.  · Seen in the clinic in December 2022 with  concerns of erythema of the left breast.  This has since resolved.  · Diagnostic mammogram 1/19/2022 of the left breast shows stable microcalcifications and no new concerning findings.  · We discussed about continuing anastrozole.  · She was admitted to the hospital on 4/24/2023 with complaints of balance issues.  She had an MRI of the brain and CT angiogram of the head and neck which did not show any abnormalities.  · Patient stopped taking anastrozole since admission to the hospital.  She feels much better today and reports that the energy levels have improved significantly.  · Bilateral diagnostic mammogram performed 5/1/2023 shows essential resolution of all the abnormalities noted in the left breast.  · The imaging findings are suggestive of an imaging complete response.  · Given that she has had such a dramatic response to the treatment we recommend that she resume anastrozole and see if the symptoms recur.  · If the symptoms do recur then we will plan to change treatment to either letrozole or Aromasin  · Tamoxifen would be contraindicated due to her prior history of PE.  · She is willing to restart medication in a couple of weeks.  · Patient has met with Dr. Fam and decided to continue with endocrine therapy and hold off on surgery.  · Dr. Fam's note reviewed.    *PE  · Hypercoagulability work-up negative  · Abnormal lupus anticoagulant likely secondary to Xarelto  · Prothrombin gene mutation and factor V Leiden not covered by insurance  · Continues on Xarelto  · Recent CT angiogram shows resolution of the blood clots.  · The initial plan was to continue patient on indefinite anticoagulation as there was no clear provoking event for the PE.  · However she is concerned about remaining on anticoagulation long-term.  · She has completed 6 months of therapy.   · We discussed increased risk of recurrent DVT and PE in individuals with a previous history of PE.  · Since is the first episode of PE, could  consider discontinuation of anticoagulation and continue surveillance.  · Anticoagulation has been discontinued.    *Anxiety  · Poorly controlled since the death of her daughter  · Continues on Cymbalta and trazodone  · Continue the same    *Depression  · Stable    *Dyspnea  · Improved    *Hypertension and hyperlipidemia-continue current medications, blood pressure 183/92    *Bone health-DEXA from 2020 reviewed and normal  DEXA has been ordered but not performed.     *Follow-up-3 months for physical exam and in 6 months with repeat mammogram.    40 minutes spent on the encounter including reviewing the medical records, face-to-face time and documentation on the same day

## 2023-05-19 ENCOUNTER — PATIENT OUTREACH (OUTPATIENT)
Dept: OTHER | Facility: HOSPITAL | Age: 78
End: 2023-05-19
Payer: MEDICARE

## 2023-06-11 DIAGNOSIS — E11.42 DIABETIC PERIPHERAL NEUROPATHY: Chronic | ICD-10-CM

## 2023-06-13 RX ORDER — GABAPENTIN 300 MG/1
CAPSULE ORAL
Qty: 90 CAPSULE | Refills: 1 | Status: SHIPPED | OUTPATIENT
Start: 2023-06-13

## 2023-07-17 ENCOUNTER — TELEPHONE (OUTPATIENT)
Dept: INTERNAL MEDICINE | Facility: CLINIC | Age: 78
End: 2023-07-17

## 2023-07-17 NOTE — TELEPHONE ENCOUNTER
Caller: McManus, Claudette L    Relationship: Self    Best call back number:   [phone]    What was the call regarding: PATIENT NEEDS TO KNOW HOW LONG SHE IS CONTAGIOUS WITH PNEUMONIA

## 2023-07-18 NOTE — TELEPHONE ENCOUNTER
Patient is not contagious.   She has no fever/chills/cough. Thank you, Laura [Hyperlipidemia] : hyperlipidemia [Fatigue] : feeling tired (fatigue) [Hypertension] : hypertension

## 2023-07-19 PROBLEM — J18.9 BILATERAL PNEUMONIA: Status: ACTIVE | Noted: 2023-07-19

## 2023-07-19 PROBLEM — R91.1 LEFT LOWER LOBE PULMONARY NODULE: Status: ACTIVE | Noted: 2023-07-19

## 2023-07-19 PROBLEM — Z86.711 HISTORY OF PULMONARY EMBOLISM: Chronic | Status: ACTIVE | Noted: 2023-04-25

## 2023-07-19 PROBLEM — I26.99 MULTIPLE PULMONARY EMBOLI: Chronic | Status: ACTIVE | Noted: 2022-05-19

## 2023-08-02 ENCOUNTER — OFFICE VISIT (OUTPATIENT)
Dept: INTERNAL MEDICINE | Facility: CLINIC | Age: 78
End: 2023-08-02
Payer: MEDICARE

## 2023-08-02 VITALS
RESPIRATION RATE: 16 BRPM | TEMPERATURE: 98.6 F | SYSTOLIC BLOOD PRESSURE: 122 MMHG | OXYGEN SATURATION: 93 % | HEART RATE: 73 BPM | HEIGHT: 66 IN | BODY MASS INDEX: 32.14 KG/M2 | DIASTOLIC BLOOD PRESSURE: 80 MMHG | WEIGHT: 200 LBS

## 2023-08-02 DIAGNOSIS — R06.09 DYSPNEA ON EXERTION: ICD-10-CM

## 2023-08-02 DIAGNOSIS — J18.9 PNEUMONIA OF BOTH UPPER LOBES DUE TO INFECTIOUS ORGANISM: Primary | ICD-10-CM

## 2023-08-02 DIAGNOSIS — E11.40 TYPE 2 DIABETES MELLITUS WITH DIABETIC NEUROPATHY, WITHOUT LONG-TERM CURRENT USE OF INSULIN: Chronic | ICD-10-CM

## 2023-08-02 DIAGNOSIS — R91.1 LEFT LOWER LOBE PULMONARY NODULE: ICD-10-CM

## 2023-08-02 PROBLEM — F41.9 CHRONIC ANXIETY: Chronic | Status: ACTIVE | Noted: 2022-10-26

## 2023-08-02 PROBLEM — R27.0 ATAXIA: Status: RESOLVED | Noted: 2023-04-24 | Resolved: 2023-08-02

## 2023-08-02 PROBLEM — F41.9 CHRONIC ANXIETY: Status: ACTIVE | Noted: 2022-10-26

## 2023-08-02 PROCEDURE — 3079F DIAST BP 80-89 MM HG: CPT | Performed by: INTERNAL MEDICINE

## 2023-08-02 PROCEDURE — 99214 OFFICE O/P EST MOD 30 MIN: CPT | Performed by: INTERNAL MEDICINE

## 2023-08-02 PROCEDURE — 1159F MED LIST DOCD IN RCRD: CPT | Performed by: INTERNAL MEDICINE

## 2023-08-02 PROCEDURE — 1160F RVW MEDS BY RX/DR IN RCRD: CPT | Performed by: INTERNAL MEDICINE

## 2023-08-02 PROCEDURE — 3074F SYST BP LT 130 MM HG: CPT | Performed by: INTERNAL MEDICINE

## 2023-08-02 RX ORDER — SEMAGLUTIDE 0.68 MG/ML
INJECTION, SOLUTION SUBCUTANEOUS
Qty: 3 ML | Refills: 0 | Status: SHIPPED | OUTPATIENT
Start: 2023-08-02

## 2023-08-03 ENCOUNTER — TELEPHONE (OUTPATIENT)
Dept: INTERNAL MEDICINE | Facility: CLINIC | Age: 78
End: 2023-08-03
Payer: MEDICARE

## 2023-08-08 ENCOUNTER — OFFICE VISIT (OUTPATIENT)
Dept: ONCOLOGY | Facility: CLINIC | Age: 78
End: 2023-08-08
Payer: MEDICARE

## 2023-08-08 VITALS
RESPIRATION RATE: 16 BRPM | BODY MASS INDEX: 31.21 KG/M2 | HEIGHT: 66 IN | SYSTOLIC BLOOD PRESSURE: 147 MMHG | WEIGHT: 194.2 LBS | OXYGEN SATURATION: 96 % | DIASTOLIC BLOOD PRESSURE: 81 MMHG | TEMPERATURE: 98.9 F | HEART RATE: 70 BPM

## 2023-08-08 DIAGNOSIS — E78.2 MIXED HYPERLIPIDEMIA: ICD-10-CM

## 2023-08-08 DIAGNOSIS — R91.1 LEFT LOWER LOBE PULMONARY NODULE: ICD-10-CM

## 2023-08-08 DIAGNOSIS — Z79.811 AROMATASE INHIBITOR USE: ICD-10-CM

## 2023-08-08 DIAGNOSIS — C50.912 DUCTAL CARCINOMA OF LEFT BREAST: Primary | ICD-10-CM

## 2023-08-08 RX ORDER — SIMVASTATIN 20 MG
TABLET ORAL
Qty: 90 TABLET | Refills: 2 | Status: SHIPPED | OUTPATIENT
Start: 2023-08-08

## 2023-08-08 NOTE — PROGRESS NOTES
Subjective   Claudette L McManus is a 77 y.o. female.  Referred by Dr. Fam for left breast invasive ductal carcinoma    History of Present Illness   Ms. 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   Comorbidities include hypertension, hyperlipidemia, anxiety/depression, insomnia, progressive dyspnea on exertion, diagnosed with pulmonary embolism in May 2022 and on anticoagulation with Xarelto, hypothyroidism.    7/29/2022-bilateral screening mammogram  Indeterminate left breast calcifications and focal asymmetry.  1 of which is associated with questioned architectural distortion.  Further evaluation recommended.  Neck on 8/25/2022-left breast diagnostic mammogram and ultrasound  In the lower slightly outer middle left breast there is a 1.5 cm mass corresponding to the architectural distortion.  Focal asymmetry in the outer central middle left breast partially effaces with spot compression and less conspicuous.  Right upper middle left breast there is persistent approximately 1 cm mass with corresponding architectural distortion.  The upper central anterior left breast there is a persistent focal asymmetry with calcifications.    Ultrasound  At 1:00 retroareolar left breast-2.3 x 0.9 x 2 cm hypoechoic mass with indistinct margins and internal echogenic foci from calcifications.  There is tubular elevation of the mass concerning for probable extension  At 3:00, retroareolar left breast-1.1 x 0.8 x 1 cm hypoechoic mass with indistinct margins, corresponds to suspicious group of calcifications.  At 4:00, 3 cm from the nipple there is a 1.4 and 1 x 1.1 cm irregular hypoechoic mass with peripheral vascularity corresponding to the mass with distortion on mammogram  At 430, 3 cm from the nipple-1.8 cm from the above mass there is a 0.9 x 0.5 x 0.9 cm hypoechoic mass with indistinct margins which is suspicious  At 3:00, 5 cm from the nipple  there is a 0.9 0.7 x 0.7 cm irregular hypoechoic mass with indistinct margins.    Impression  Multiple masses in the left breast, which demonstrate corresponding architectural distortion.  2 site ultrasound-guided biopsy targeting the dominant masses at 1:00 in the retroareolar breast and 4:00, 3 cm from the nipple with management of additional masses pending biopsy results.  Contrast-enhanced MRI recommended    9/21/2022-2 site biopsy  1.left breast 3:00, 2 cm from nipple-ultrasound-guided biopsy of the mass  Low-grade ductal carcinoma in situ involving a small intraductal papilloma.  DCIS measures 14 mm  ER positive, WI positive    2.left breast o'clock, 2 cm from the nipple ultrasound-guided biopsy  Invasive ductal carcinoma with lobular features  Grade 2  Millimeters on core biopsy  Atypical ductal hyperplasia with cavitations and involving an intraductal papilloma  Negative lymphovascular space invasion  ER +% strong  WI +% strong  HER2 negative, nonamplified on FISH  Ki-67 15%    She was seen by Dr. Fam and discussed mastectomy given several abnormalities in the left breast.  Since she had progressive worsening of dyspnea on exertion from 2022 without acute explanation, recent diagnosis of pulmonary embolism requiring chronic anticoagulation, her age and concerns regarding complications of mastectomy patient opted not to proceed with mastectomy  She is here to discuss neoadjuvant endocrine therapy.    She reports severe insomnia for which she is currently on trazodone.  Tried gabapentin in the past but did not help.  She is also been diagnosed with peripheral neuropathy.  She is on Cymbalta anxiety.    She had a daughter who is treated for breast cancer but eventually she passed away from suicide.  Patient reports significant anxiety since the death of her daughter.  Her daughter have been diagnosed with bipolar disorder.    Genetic testing performed and no significant pathogenic  mutation.    For the dyspnea, she evaluated by  and per patient she was recommended to have a sleep study.  She is not comfortable using the CPAP and did not wish to undergo a sleep study.  She was prescribed inhalers which she had used for a month without much help.    On the CT PE protocol performed in May 2022 there was concern for chronic pulmonary emboli and pulmonary hypertension.  For this reason she underwent a cardiac catheterization on 9/26/2022 which showed normal pulmonary pressures, normal left-sided pressures, elevated right ventricular filling pressures, normal PVR, normal RV SWI  The etiology of the dyspnea somewhat unclear.    12/8/2022-CT of the chest-tiny right lower lobe pulmonary embolism seen previously is no longer present.  Enlarged right ventricle appears similar on prior exam.  No other abnormalities noted.    Interval History  Ms. Gomez returns today in 3-month follow-up.  Shortly after her last visit she called reporting her nails breaking off and her hair thinning to the point that it was quite bothersome.  We had her discontinue anastrozole and ultimately she switched to exemestane which took place in early July 2023.    As she is seen back today she does feel that her hair and nail changes have overall improved.    Patient more recently has been struggling with respiratory symptoms of shortness of breath, undergoing CT angiogram of the chest 7/17/2023 which thankfully did not show recurrent PE but did show groundglass opacities felt to likely be infectious/inflammatory as well as a new left fissure nodule 1.8 x 1.1 cm, felt also likely to be infectious/inflammatory with 3-month interval follow-up recommended.  Patient was ultimately treated with a round of Z-Sreedhar and steroids with significant improvement in her shortness of breath.    Patient today is quite anxious about the newly found nodule.  I tried to reassure her that the radiologist felt strongly as did her PCP,  Dr. Moore, that this was likely infectious/inflammatory in nature, considering pneumonia and also previous COVID infection.  Encouraged her that we agree with recommendations for short interval follow-up CT of the chest which would take place in mid October.  It appears per review of Dr. Moore's notes that he plans to follow-up on this.    Patient has been very anxious about progression of her breast cancer and at one point had talked about proceeding with mastectomies, however she did meet with Dr. Fam who discouraged bilateral mastectomies and even right now considering her response to AI therapy no compelling reason to proceed with left mastectomy.    She denies other concerns at this time      Past Medical History:   Diagnosis Date    Benign essential hypertension     Chronic bilateral low back pain without sciatica 8/16/2013 March 13, 2019--Limited bone scan.  This reveals scintigraphic activity in the spine and at the right shoulder corresponds to change seen on recent x-rays and is best explained by degenerative change.  Isolated metastatic disease exactly corresponding to the sites of extensive degenerative disc change would be peculiar.  March 7, 2019--new patient to get established.  She has complaints of approxi    Chronic bilateral thoracic back pain 2/26/2019 March 13, 2019--Limited bone scan.  This reveals scintigraphic activity in the spine and at the right shoulder corresponds to change seen on recent x-rays and is best explained by degenerative change.  Isolated metastatic disease exactly corresponding to the sites of extensive degenerative disc change would be peculiar.  March 1, 2019--x-ray of the lumbar and thoracic spine reveals mild anterior l    Chronic insomnia 11/4/2014    Diabetic peripheral neuropathy (HCC) 3/7/2019    Characterized by decreased sensation and paresthesias in both lower extremities described as a burning sensation.  Extends up to the knees.  Reportedly had been  evaluated with nerve conduction study in the past.    Generalized anxiety disorder 5/19/2013    History of Bell's palsy 5/21/2013    Remote history of Bell's palsy.  Patient does not remember which side of the face.    Hyperlipidemia     Impaired fasting glucose     Major depression     Menopausal state, 8/19/2020--normal DEXA. 3/7/2019    August 19, 2020--DEXA scan reveals lumbar spine T score 3.8.  Left femoral neck T score 2.5.  Right femoral neck T score 2.2.  Normal bone density.    Non morbid obesity 8/11/2022    Peripheral neuropathy, idiopathic 3/7/2019    Characterized by decreased sensation and paresthesias in both lower extremities described as a burning sensation.  Extends up to the knees.  Reportedly had been evaluated with nerve conduction study in the past.    Primary hypothyroidism 5/19/2013    Primary osteoarthritis involving multiple joints 4/22/2013    Rotator cuff arthropathy of right shoulder, 4/20/2021--status post right reverse total shoulder arthroplasty 5/27/2020    Situational mixed anxiety and depressive disorder 8/21/2019 August 21, 2019--patient seen in follow-up after I called in a prescription for Ativan after the patient's  contacted me a few weeks ago regarding the sudden death of patient's daughter.  This was an apparent suicide and the patient found her daughter dead.  I will not go into details.  Patient reports the Lorazepam has been helpful but she is still quite distraught, nervous, and undergoing    Type 2 diabetes mellitus with diabetic neuropathy, without long-term current use of insulin (HCC)     Vitamin D deficiency 2/26/2019        Past Surgical History:   Procedure Laterality Date    BILATERAL BREAST REDUCTION  Early 2000    Early 2000--bilateral breast reduction    CARDIAC CATHETERIZATION N/A 9/26/2022    Procedure: Right Heart Cath;  Surgeon: Agus Solorio MD PhD;  Location: [facility] CATH INVASIVE LOCATION;  Service: Cardiology;  Laterality: N/A;   Will REMAIN on Xarelto for the case    CHOLECYSTECTOMY  1960s    --open cholecystectomy    COLONOSCOPY  2012--reportedly normal colonoscopy    INCONTINENCE SURGERY  2012--implantation of questionable bladder stimulator right sacral area for urinary incontinence.  Details not known.    REPLACEMENT TOTAL KNEE BILATERAL Left ; 2011    [id number]--bilateral total knee replacement    SUBTOTAL HYSTERECTOMY  Remote    Remote partial hysterectomy.  Ovaries intact.    TOTAL SHOULDER REPLACEMENT Left 2013--left total shoulder replacement.    TOTAL SHOULDER REPLACEMENT  2021--status post right reverse total shoulder arthroplasty    TOTAL SHOULDER REVERSE ARTHROPLASTY Right 2021--right reverse total shoulder replacement.        Family History   Problem Relation Age of Onset    Alcohol abuse Father     Breast cancer Daughter         40s    Cancer Other     Diabetes Other         type II    Leukemia Grandchild 19        Social History     Socioeconomic History    Marital status:    Tobacco Use    Smoking status: Former     Types: Cigarettes     Quit date: 1998     Years since quittin.9    Smokeless tobacco: Never   Vaping Use    Vaping Use: Never used   Substance and Sexual Activity    Alcohol use: Yes     Alcohol/week: 1.0 standard drink     Types: 1 Glasses of wine per week     Comment: current some day    Drug use: No    Sexual activity: Not Currently     Partners: Male        OB History          1    Para   1    Term   1            AB        Living             SAB        IAB        Ectopic        Molar        Multiple        Live Births                 Age at menarche-10  Age at first live childbirth-12   1 para 1  0  She had a hysterectomy in the 90s, ovaries still present  Oral contraceptive pill use for 6 to 8 weeks  No use of hormone replacement therapy    Allergies   Allergen Reactions     "Morphine And Related     Other Other (See Comments)     REJECTED DACRON SUTURES IN THE PAST AND INCISION CAME APART            Review of systems as mentioned in the HPI    Objective   Blood pressure 144/77, pulse 66, temperature 97.5 øF (36.4 øC), temperature source Temporal, resp. rate 16, height 165.1 cm (65\"), weight 86.9 kg (191 lb 9.6 oz), SpO2 95 %, not currently breastfeeding.   Physical Exam  Constitutional:       Appearance: Normal appearance. She is normal weight.   HENT:      Head: Normocephalic and atraumatic.      Right Ear: External ear normal.      Left Ear: External ear normal.      Nose: Nose normal.      Mouth/Throat:      Mouth: Mucous membranes are moist.      Pharynx: Oropharynx is clear.   Eyes:      Extraocular Movements: Extraocular movements intact.      Conjunctiva/sclera: Conjunctivae normal.      Pupils: Pupils are equal, round, and reactive to light.   Cardiovascular:      Rate and Rhythm: Normal rate and regular rhythm.      Pulses: Normal pulses.      Heart sounds: Normal heart sounds.   Pulmonary:      Effort: Pulmonary effort is normal.      Breath sounds: Normal breath sounds.   Abdominal:      General: Abdomen is flat. Bowel sounds are normal.   Musculoskeletal:         General: Normal range of motion.      Cervical back: Normal range of motion.   Skin:     General: Skin is warm.   Neurological:      General: No focal deficit present.      Mental Status: She is alert and oriented to person, place, and time.   Psychiatric:         Mood and Affect: Mood normal.         Behavior: Behavior normal.         Thought Content: Thought content normal.         Judgment: Judgment normal.     Breast Exam: Right breast appears normal on inspection.  No palpable abnormalities of the right breast.  Left breast: No erythema.  No tenderness.  No palpable firmness at this time.  No axillary adenopathy       No visits with results within 30 Day(s) from this visit.   Latest known visit with results is: "   Lab on 10/24/2022   Component Date Value Ref Range Status    Glucose 10/24/2022 108  74 - 124 mg/dL Final    BUN 10/24/2022 11  6 - 20 mg/dL Final    Creatinine 10/24/2022 0.96  0.60 - 1.10 mg/dL Final    Sodium 10/24/2022 135  134 - 145 mmol/L Final    Potassium 10/24/2022 4.4  3.5 - 4.7 mmol/L Final    Chloride 10/24/2022 95 (L)  98 - 107 mmol/L Final    CO2 10/24/2022 26.4  22.0 - 29.0 mmol/L Final    Calcium 10/24/2022 10.7 (C)  8.5 - 10.2 mg/dL Final    Total Protein 10/24/2022 7.9  6.3 - 8.0 g/dL Final    Albumin 10/24/2022 4.90  3.50 - 5.20 g/dL Final    ALT (SGPT) 10/24/2022 25  0 - 33 U/L Final    AST (SGOT) 10/24/2022 29  0 - 32 U/L Final    Alkaline Phosphatase 10/24/2022 67  38 - 116 U/L Final    Total Bilirubin 10/24/2022 0.5  0.2 - 1.2 mg/dL Final    Globulin 10/24/2022 3.0  1.8 - 3.5 gm/dL Final    A/G Ratio 10/24/2022 1.6  1.1 - 2.4 g/dL Final    BUN/Creatinine Ratio 10/24/2022 11.5  7.3 - 30.0 Final    Anion Gap 10/24/2022 13.6  5.0 - 15.0 mmol/L Final    eGFR 10/24/2022 60.3  >60.0 mL/min/1.73 Final    National Kidney Foundation and American Society of Nephrology (ASN) Task Force recommended calculation based on the Chronic Kidney Disease Epidemiology Collaboration (CKD-EPI) equation refit without adjustment for race.    WBC 10/24/2022 6.20  3.40 - 10.80 10*3/mm3 Final    RBC 10/24/2022 4.27  3.77 - 5.28 10*6/mm3 Final    Hemoglobin 10/24/2022 13.8  12.0 - 15.9 g/dL Final    Hematocrit 10/24/2022 40.9  34.0 - 46.6 % Final    MCV 10/24/2022 95.8  79.0 - 97.0 fL Final    MCH 10/24/2022 32.3  26.6 - 33.0 pg Final    MCHC 10/24/2022 33.7  31.5 - 35.7 g/dL Final    RDW 10/24/2022 12.4  12.3 - 15.4 % Final    RDW-SD 10/24/2022 43.0  37.0 - 54.0 fl Final    MPV 10/24/2022 9.3  6.0 - 12.0 fL Final    Platelets 10/24/2022 258  140 - 450 10*3/mm3 Final    Neutrophil % 10/24/2022 59.3  42.7 - 76.0 % Final    Lymphocyte % 10/24/2022 28.9  19.6 - 45.3 % Final    Monocyte % 10/24/2022 8.9  5.0 - 12.0 % Final     Eosinophil % 10/24/2022 1.9  0.3 - 6.2 % Final    Basophil % 10/24/2022 0.5  0.0 - 1.5 % Final    Immature Grans % 10/24/2022 0.5  0.0 - 0.5 % Final    Neutrophils, Absolute 10/24/2022 3.68  1.70 - 7.00 10*3/mm3 Final    Lymphocytes, Absolute 10/24/2022 1.79  0.70 - 3.10 10*3/mm3 Final    Monocytes, Absolute 10/24/2022 0.55  0.10 - 0.90 10*3/mm3 Final    Eosinophils, Absolute 10/24/2022 0.12  0.00 - 0.40 10*3/mm3 Final    Basophils, Absolute 10/24/2022 0.03  0.00 - 0.20 10*3/mm3 Final    Immature Grans, Absolute 10/24/2022 0.03  0.00 - 0.05 10*3/mm3 Final    nRBC 10/24/2022 0.0  0.0 - 0.2 /100 WBC Final        No radiology results for the last 30 days.       Assessment & Plan       *Left breast invasive ductal carcinoma  T1 cN0 M0, stage Ia, multifocal  2 sites were biopsied, one site was consistent with ductal carcinoma in situ which is ER/KS positive and the second site was invasive ductal carcinoma with lobular features which is ER/KS positive and HER2 negative, grade 2  The steps of curative intent breast cancer treatment have been explained, including surgery, possible chemotherapy, possible radiation and possible endocrine therapy.   We discussed that given the fact that she does not want to undergo mastectomy and concerned about the complications due to ongoing dyspnea and her age it would be reasonable to proceed with neoadjuvant endocrine therapy.  The 2 options including tamoxifen and aromatase inhibitors have been discussed.  Given the history of DVT tamoxifen is not an option  We discussed anastrozole 1 mg p.o. daily.  Adverse effects including but not limited to hot flashes, mood changes, fatigue, insomnia, decrease in bone mineral density, vaginal dryness, sexual dysfunction.   She has been on anastrozole  since November 2022 and tolerating that well.  Seen in the clinic in December 2022 with concerns of erythema of the left breast.  This has since resolved.  Diagnostic mammogram 1/19/2022 of the  left breast shows stable microcalcifications and no new concerning findings.  Diagnostic mammogram 5/1/2023 showing resolution of abnormal mammographic densities in the left breast as well as reduction in microcalcifications.    Patient developing hair loss and nails breaking off.  Arimidex held.  Patient ultimately switched to Aromasin 7/2023 8/8/2023 patient tolerating Aromasin well with no worsening of hair loss or nail changes.  Continue same.  Repeat diagnostic mammogram scheduled in November per Dr. Fam.    *PE  Hypercoagulability work-up negative  Abnormal lupus anticoagulant likely secondary to Xarelto  Prothrombin gene mutation and factor V Leiden not covered by insurance  Continues on Xarelto  Recent CT angiogram shows resolution of the blood clots.  The initial plan was to continue patient on indefinite anticoagulation as there was no clear provoking event for the PE.  However she is concerned about remaining on anticoagulation long-term.  She has completed 6 months of therapy.   We discussed increased risk of recurrent DVT and PE in individuals with a previous history of PE.  Since is the first episode of PE, could consider discontinuation of anticoagulation and continue surveillance.  Xarelto discontinued    *Anxiety  Poorly controlled since the death of her daughter  Continues on Cymbalta  Also on trazodone  Continue follow-up with supportive oncology    *Depression  Stable    *Dyspnea  7/17/2023 CT angiogram performed per PCP, Dr. Moore, showing no evidence of recurrent thrombosis but with inflammatory/infectious changes with groundglass opacities.  Also noted was new 1.8 x 1.1 cm left fissure lesion, also felt to be likely infectious/inflammatory.  3-month CT chest follow-up recommended.  Patient treated with a Z-Sreedhar and prednisone taper per Dr. Moore with improvement in shortness of breath.  8/8/2023 patient highly anxious about potentially having cancer in her lung.  Tried to reassure her of  the radiologist feeling this is likely infectious/inflammatory and considering her recent pneumonia and previous COVID that is likely the case.  She will see Dr. Moore in follow-up in September and anticipate him ordering repeat CT imaging at that visit.    *Hypertension and hyperlipidemia-continue current medications    *Bone health  DEXA from 2020 reviewed and normal  DEXA scan June 2023 with normal bone density.    PLAN:  Continue exemestane 25 mg daily.  Continue vitamin D supplementation.  Patient will follow-up with Dr. Moore in September.  Anticipate repeat CT chest to follow-up on left lung lesion.  Repeat diagnostic mammogram is already ordered for November.  Patient will follow-up with both Dr. Arzate and Dr. Fam in November.    This patient is on high risk drug therapy requiring intensive monitoring for toxicity.      I spent 55 minutes caring for Claudette on this date of service. This time includes time spent by me in the following activities: preparing for the visit, reviewing tests, obtaining and/or reviewing a separately obtained history, performing a medically appropriate examination and/or evaluation, counseling and educating the patient/family/caregiver, documenting information in the medical record, and care coordination

## 2023-08-24 DIAGNOSIS — E11.42 DIABETIC PERIPHERAL NEUROPATHY: Chronic | ICD-10-CM

## 2023-08-24 DIAGNOSIS — G47.09 OTHER INSOMNIA: ICD-10-CM

## 2023-08-28 RX ORDER — TRAZODONE HYDROCHLORIDE 150 MG/1
150 TABLET ORAL NIGHTLY
Qty: 90 TABLET | Refills: 1 | Status: SHIPPED | OUTPATIENT
Start: 2023-08-28

## 2023-08-28 RX ORDER — GABAPENTIN 300 MG/1
CAPSULE ORAL
Qty: 90 CAPSULE | Refills: 1 | Status: SHIPPED | OUTPATIENT
Start: 2023-08-28

## 2023-08-30 DIAGNOSIS — F41.8 SITUATIONAL ANXIETY: ICD-10-CM

## 2023-08-30 NOTE — TELEPHONE ENCOUNTER
Rx Refill Note  Requested Prescriptions     Pending Prescriptions Disp Refills    LORazepam (ATIVAN) 0.5 MG tablet [Pharmacy Med Name: LORazepam 0.5 MG TABLET] 60 tablet      Sig: TAKE ONE TABLET BY MOUTH TWICE A DAY AS NEEDED FOR ANXIETY      Last office visit with prescribing clinician: 8/2/2023   Last telemedicine visit with prescribing clinician: Visit date not found   Next office visit with prescribing clinician: 9/26/2023                         Would you like a call back once the refill request has been completed: [] Yes [] No    If the office needs to give you a call back, can they leave a voicemail: [] Yes [] No    Kiana Walker MA  08/30/23, 13:38 EDT

## 2023-08-31 RX ORDER — LORAZEPAM 0.5 MG/1
TABLET ORAL
Qty: 60 TABLET | Refills: 5 | Status: SHIPPED | OUTPATIENT
Start: 2023-08-31

## 2023-09-21 RX ORDER — EXEMESTANE 25 MG/1
TABLET ORAL
Qty: 30 TABLET | Refills: 5 | Status: SHIPPED | OUTPATIENT
Start: 2023-09-21

## 2023-10-03 RX ORDER — LEVOTHYROXINE SODIUM 0.12 MG/1
TABLET ORAL
Qty: 90 TABLET | Refills: 0 | Status: SHIPPED | OUTPATIENT
Start: 2023-10-03

## 2023-10-04 ENCOUNTER — TELEPHONE (OUTPATIENT)
Dept: INTERNAL MEDICINE | Facility: CLINIC | Age: 78
End: 2023-10-04

## 2023-10-04 NOTE — TELEPHONE ENCOUNTER
Caller: McManus, Claudette L    Relationship to patient: Self    Best call back number:[phone]     PLEASE CANCELL 10-24-23 LAB APPOINTMENT    PAYOR RECIRECTION

## 2023-10-07 DIAGNOSIS — Z86.16 HISTORY OF COVID-19: Chronic | ICD-10-CM

## 2023-10-07 DIAGNOSIS — J18.9 PNEUMONIA OF BOTH UPPER LOBES DUE TO INFECTIOUS ORGANISM: ICD-10-CM

## 2023-10-07 DIAGNOSIS — R06.09 DYSPNEA ON EXERTION: ICD-10-CM

## 2023-10-09 DIAGNOSIS — G47.09 OTHER INSOMNIA: ICD-10-CM

## 2023-10-09 RX ORDER — TRAZODONE HYDROCHLORIDE 150 MG/1
150 TABLET ORAL NIGHTLY
Qty: 90 TABLET | Refills: 10 | Status: SHIPPED | OUTPATIENT
Start: 2023-10-09

## 2023-10-09 RX ORDER — PREDNISONE 10 MG/1
TABLET ORAL
Qty: 56 TABLET | Refills: 0 | OUTPATIENT
Start: 2023-10-09

## 2023-10-13 ENCOUNTER — TELEPHONE (OUTPATIENT)
Dept: INTERNAL MEDICINE | Facility: CLINIC | Age: 78
End: 2023-10-13

## 2023-10-13 NOTE — TELEPHONE ENCOUNTER
Caller: [facility] - [place], [place] - [address] - [phone] [facility] [phone] FX    Relationship: Pharmacy    Best call back number: [phone] (Work)     Requested Prescriptions:   LORazepam (ATIVAN) 0.5 MG tablet        Pharmacy where request should be sent:  [facility] - [place], [place] - [address] - [phone]  - [phone] FX     Last office visit with prescribing clinician: 9/26/2023   Last telemedicine visit with prescribing clinician: Visit date not found   Next office visit with prescribing clinician: 2/5/2024     Additional details provided by patient: PATIENT CALLED TO REQUEST A MEDICATION REFILL ON MEDICATION.     Does the patient have less than a 3 day supply:  [x] Yes  [] No    Would you like a call back once the refill request has been completed: [] Yes [] No    If the office needs to give you a call back, can they leave a voicemail: [] Yes [] No    Bhavesh Stiles Rep   10/13/23 13:51 EDT         THANKS

## 2023-10-13 NOTE — TELEPHONE ENCOUNTER
UNABLE TO WARM TRANSFER.     Caller: McManus, Claudette L    Relationship to patient: Self    Patient is needing: PATIENT IS REQUESTING AN APPOINTMENT WITH DR. DAKSHA FERRARA. PATIENT WOULD NOT TELL HUB WHAT IT WAS REGARDING [phone]

## 2023-10-16 DIAGNOSIS — F41.8 SITUATIONAL ANXIETY: ICD-10-CM

## 2023-11-06 ENCOUNTER — OFFICE VISIT (OUTPATIENT)
Dept: INTERNAL MEDICINE | Facility: CLINIC | Age: 78
End: 2023-11-06
Payer: MEDICARE

## 2023-11-06 VITALS
HEIGHT: 66 IN | RESPIRATION RATE: 12 BRPM | HEART RATE: 88 BPM | OXYGEN SATURATION: 93 % | DIASTOLIC BLOOD PRESSURE: 68 MMHG | SYSTOLIC BLOOD PRESSURE: 122 MMHG | WEIGHT: 189.8 LBS | BODY MASS INDEX: 30.5 KG/M2

## 2023-11-06 DIAGNOSIS — E11.40 TYPE 2 DIABETES MELLITUS WITH DIABETIC NEUROPATHY, WITHOUT LONG-TERM CURRENT USE OF INSULIN: Chronic | ICD-10-CM

## 2023-11-06 DIAGNOSIS — J18.9 PNEUMONIA OF BOTH UPPER LOBES DUE TO INFECTIOUS ORGANISM: ICD-10-CM

## 2023-11-06 DIAGNOSIS — Z78.0 POSTMENOPAUSAL STATE: Chronic | ICD-10-CM

## 2023-11-06 DIAGNOSIS — Z00.00 ENCOUNTER FOR SUBSEQUENT ANNUAL WELLNESS VISIT (AWV) IN MEDICARE PATIENT: Primary | ICD-10-CM

## 2023-11-06 DIAGNOSIS — C50.912 DUCTAL CARCINOMA OF LEFT BREAST: ICD-10-CM

## 2023-11-06 DIAGNOSIS — F51.04 CHRONIC INSOMNIA: Chronic | ICD-10-CM

## 2023-11-06 DIAGNOSIS — E55.9 VITAMIN D DEFICIENCY: Chronic | ICD-10-CM

## 2023-11-06 DIAGNOSIS — E66.9 NON MORBID OBESITY: Chronic | ICD-10-CM

## 2023-11-06 DIAGNOSIS — Z86.16 HISTORY OF COVID-19: Chronic | ICD-10-CM

## 2023-11-06 DIAGNOSIS — E11.42 DIABETIC PERIPHERAL NEUROPATHY: Chronic | ICD-10-CM

## 2023-11-06 DIAGNOSIS — F41.9 CHRONIC ANXIETY: Chronic | ICD-10-CM

## 2023-11-06 DIAGNOSIS — E87.1 HYPONATREMIA: ICD-10-CM

## 2023-11-06 DIAGNOSIS — E03.9 PRIMARY HYPOTHYROIDISM: Chronic | ICD-10-CM

## 2023-11-06 DIAGNOSIS — Z12.11 COLON CANCER SCREENING: ICD-10-CM

## 2023-11-06 DIAGNOSIS — Z79.01 CHRONIC ANTICOAGULATION: Chronic | ICD-10-CM

## 2023-11-06 DIAGNOSIS — G89.29 CHRONIC BILATERAL LOW BACK PAIN WITHOUT SCIATICA: Chronic | ICD-10-CM

## 2023-11-06 DIAGNOSIS — Z51.81 THERAPEUTIC DRUG MONITORING: ICD-10-CM

## 2023-11-06 DIAGNOSIS — E78.2 MIXED HYPERLIPIDEMIA: Chronic | ICD-10-CM

## 2023-11-06 DIAGNOSIS — M15.9 PRIMARY OSTEOARTHRITIS INVOLVING MULTIPLE JOINTS: Chronic | ICD-10-CM

## 2023-11-06 DIAGNOSIS — F43.23 SITUATIONAL MIXED ANXIETY AND DEPRESSIVE DISORDER: Chronic | ICD-10-CM

## 2023-11-06 DIAGNOSIS — R91.1 LEFT LOWER LOBE PULMONARY NODULE: ICD-10-CM

## 2023-11-06 DIAGNOSIS — I10 BENIGN ESSENTIAL HYPERTENSION: Chronic | ICD-10-CM

## 2023-11-06 DIAGNOSIS — M54.50 CHRONIC BILATERAL LOW BACK PAIN WITHOUT SCIATICA: Chronic | ICD-10-CM

## 2023-11-06 DIAGNOSIS — I26.99 MULTIPLE PULMONARY EMBOLI: Chronic | ICD-10-CM

## 2023-11-06 DIAGNOSIS — Z23 NEED FOR INFLUENZA VACCINATION: ICD-10-CM

## 2023-11-06 RX ORDER — TIRZEPATIDE 15 MG/.5ML
15 INJECTION, SOLUTION SUBCUTANEOUS WEEKLY
Qty: 2 ML | Refills: 6 | Status: SHIPPED | OUTPATIENT
Start: 2023-11-06

## 2023-11-06 RX ORDER — CYCLOBENZAPRINE HCL 10 MG
TABLET ORAL
Qty: 30 TABLET | Refills: 1 | Status: SHIPPED | OUTPATIENT
Start: 2023-11-06

## 2023-11-06 NOTE — PROGRESS NOTES
The ABCs of the Annual Wellness Visit  Subsequent Medicare Wellness Visit    Subjective      Claudette L McManus is a 78 y.o. female who presents for a Subsequent Medicare Wellness Visit.    The following portions of the patient's history were reviewed and   updated as appropriate: allergies, current medications, past family history, past medical history, past social history, past surgical history, and problem list.    Compared to one year ago, the patient feels her physical   health is worse.    Compared to one year ago, the patient feels her mental   health is worse.    Recent Hospitalizations:  She was not admitted to the hospital during the last year.       Current Medical Providers:  Patient Care Team:  Lito Moore MD as PCP - General (Internal Medicine)  Lito Moore MD as Referring Physician (Internal Medicine)  Elsie Arzate MD as Consulting Physician (Hematology and Oncology)    Outpatient Medications Prior to Visit   Medication Sig Dispense Refill    Cholecalciferol (VITAMIN D3) 2000 UNITS tablet Take 1 tablet by mouth Daily.      DULoxetine (CYMBALTA) 60 MG capsule TAKE 1 CAPSULE EVERY DAY AS DIRECTED 90 capsule 3    exemestane (AROMASIN) 25 MG tablet TAKE 1 TABLET EVERY DAY 30 tablet 5    ezetimibe (ZETIA) 10 MG tablet TAKE 1 TABLET EVERY DAY 90 tablet 3    gabapentin (NEURONTIN) 300 MG capsule TAKE ONE CAPSULE BY MOUTH THREE TIMES A DAY DFOR PERIPHERAL NEUROPATHY AS DIRECTED 90 capsule 1    Homeopathic Products (LEG CRAMPS PO) Take  by mouth Daily. Nightly      levothyroxine (SYNTHROID, LEVOTHROID) 125 MCG tablet TAKE 1 TABLET BY MOUTH DAILY 90 tablet 0    LORazepam (ATIVAN) 0.5 MG tablet TAKE ONE TABLET BY MOUTH TWICE A DAY AS NEEDED FOR ANXIETY 60 tablet 5    metroNIDAZOLE (METROGEL) 0.75 % gel Apply  topically to the appropriate area as directed 2 (Two) Times a Day. 45 g 2    ondansetron (ZOFRAN) 8 MG tablet Take 1 tablet by mouth Every 6 (Six) Hours As Needed for Nausea. 60 tablet 1     Semaglutide,0.25 or 0.5MG/DOS, (Ozempic, 0.25 or 0.5 MG/DOSE,) 2 MG/3ML solution pen-injector Inject 0.25 mg subcutaneously once weekly for the first 4 weeks and then 0.5 mg subcutaneously weekly thereafter 3 mL 0    simvastatin (ZOCOR) 20 MG tablet TAKE 1 TABLET EVERY DAY FOR HIGH CHOLESTEROL 90 tablet 2    traZODone (DESYREL) 150 MG tablet TAKE 1 TABLET EVERY NIGHT 90 tablet 10    valsartan (DIOVAN) 320 MG tablet TAKE 1 TABLET EVERY MORNING FOR BLOOD PRESSURE 90 tablet 3     No facility-administered medications prior to visit.       No opioid medication identified on active medication list. I have reviewed chart for other potential  high risk medication/s and harmful drug interactions in the elderly.        Aspirin is not on active medication list.  Aspirin use is not indicated based on review of current medical condition/s. Risk of harm outweighs potential benefits.  .    Patient Active Problem List   Diagnosis    Primary osteoarthritis involving multiple joints    Benign essential hypertension    Hyperlipidemia    Primary hypothyroidism    Type 2 diabetes mellitus with diabetic neuropathy, without long-term current use of insulin    Chronic insomnia    Chronic bilateral low back pain without sciatica    Chronic bilateral thoracic back pain    Vitamin D deficiency    Therapeutic drug monitoring    Postmenopausal state    Diabetic peripheral neuropathy    Situational mixed anxiety and depressive disorder    ACE-inhibitor cough    Hyponatremia    History of multiple pulmonary emboli    Non morbid obesity    Ductal carcinoma of left breast    Right ventricular enlargement    Chronic anxiety    Chronic anticoagulation, Xarelto for multiple pulmonary emboli    History of COVID-19    Pneumonia of both upper lobes due to infectious organism    Left lower lobe pulmonary nodule     Advance Care Planning   Advance Care Planning     Advance Directive is not on file.  ACP discussion was held with the patient during this  "visit. Patient does not have an advance directive, information provided.     Objective    Vitals:    23 1358   BP: 122/68   BP Location: Right arm   Patient Position: Sitting   Cuff Size: Adult   Pulse: 88   Resp: 12   SpO2: 93%   Weight: 86.1 kg (189 lb 12.8 oz)   Height: 167 cm (65.75\")   PainSc: 0-No pain     Estimated body mass index is 30.87 kg/m² as calculated from the following:    Height as of this encounter: 167 cm (65.75\").    Weight as of this encounter: 86.1 kg (189 lb 12.8 oz).           Does the patient have evidence of cognitive impairment?   No            HEALTH RISK ASSESSMENT    Smoking Status:  Social History     Tobacco Use   Smoking Status Former    Types: Cigarettes    Quit date: 1998    Years since quittin.8   Smokeless Tobacco Never     Alcohol Consumption:  Social History     Substance and Sexual Activity   Alcohol Use Yes    Alcohol/week: 1.0 standard drink of alcohol    Types: 1 Glasses of wine per week    Comment: current some day     Fall Risk Screen:    STEADI Fall Risk Assessment was completed, and patient is at MODERATE risk for falls. Assessment completed on:2023    Depression Screenin/24/2023    12:31 PM   PHQ-2/PHQ-9 Depression Screening   Little Interest or Pleasure in Doing Things 0-->not at all   Feeling Down, Depressed or Hopeless 0-->not at all   PHQ-9: Brief Depression Severity Measure Score 0       Health Habits and Functional and Cognitive Screenin/22/2022     2:14 PM   Functional & Cognitive Status   Do you have difficulty preparing food and eating? No   Do you have difficulty bathing yourself, getting dressed or grooming yourself? No   Do you have difficulty using the toilet? No   Do you have difficulty moving around from place to place? No   Do you have trouble with steps or getting out of a bed or a chair? No   Dental Exam Up to date   Eye Exam Up to date   Current Exercises Include No Regular Exercise   Do you need help using the " phone?  No   Are you deaf or do you have serious difficulty hearing?  No   Do you need help to go to places out of walking distance? No   Do you need help shopping? No   Do you need help preparing meals?  No   Do you need help with housework?  No   Do you need help with laundry? No   Do you need help taking your medications? No   Do you need help managing money? No   Do you ever drive or ride in a car without wearing a seat belt? No   Have you felt unusual stress, anger or loneliness in the last month? No   Who do you live with? Spouse   If you need help, do you have trouble finding someone available to you? No   Have you been bothered in the last four weeks by sexual problems? No   Do you have difficulty concentrating, remembering or making decisions? No       Age-appropriate Screening Schedule:  Refer to the list below for future screening recommendations based on patient's age, sex and/or medical conditions. Orders for these recommended tests are listed in the plan section. The patient has been provided with a written plan.    Health Maintenance   Topic Date Due    COLORECTAL CANCER SCREENING  04/04/2012    INFLUENZA VACCINE  08/01/2023    URINE MICROALBUMIN  08/03/2023    ANNUAL WELLNESS VISIT  09/22/2023    HEMOGLOBIN A1C  10/25/2023    LIPID PANEL  04/25/2024    MAMMOGRAM  05/01/2024    BMI FOLLOWUP  08/02/2024    DIABETIC EYE EXAM  08/09/2024    DXA SCAN  06/28/2025    TDAP/TD VACCINES (2 - Td or Tdap) 05/25/2026    Pneumococcal Vaccine 65+  Completed    COVID-19 Vaccine  Discontinued    ZOSTER VACCINE  Discontinued                  CMS Preventative Services Quick Reference  Risk Factors Identified During Encounter:    Immunizations Discussed/Encouraged: Influenza, COVID19, and RSV (Respiratory Syncytial Virus)  Urinary Incontinence:  Mild, patient wears pads    The above risks/problems have been discussed with the patient.  Pertinent information has been shared with the patient in the After Visit  Summary.    Diagnoses and all orders for this visit:    1. Encounter for subsequent annual wellness visit (AWV) in Medicare patient (Primary)    2. Type 2 diabetes mellitus with diabetic neuropathy, without long-term current use of insulin  -     Microalbumin / Creatinine Urine Ratio - Urine, Clean Catch    3. Diabetic peripheral neuropathy    4. Hyperlipidemia    5. Primary hypothyroidism    6. Vitamin D deficiency    7. Benign essential hypertension    8. Chronic anticoagulation, Xarelto for multiple pulmonary emboli    9. Chronic anxiety    10. Chronic insomnia    11. History of COVID-19    12. Situational mixed anxiety and depressive disorder    13. Primary osteoarthritis involving multiple joints    14. Postmenopausal state    15. Non morbid obesity    16. History of multiple pulmonary emboli    17. Chronic bilateral low back pain without sciatica    18. Ductal carcinoma of left breast    19. Hyponatremia    20. Left lower lobe pulmonary nodule  -     CT Chest Without Contrast; Future    21. Pneumonia of both upper lobes due to infectious organism  -     CT Chest Without Contrast; Future    22. Therapeutic drug monitoring    23. Colon cancer screening  -     Cologuard - Stool, Per Rectum; Future    24. Need for influenza vaccination        Follow Up:   Next Medicare Wellness visit to be scheduled in 1 year.      An After Visit Summary and PPPS were made available to the patient.

## 2023-11-09 ENCOUNTER — HOSPITAL ENCOUNTER (OUTPATIENT)
Dept: MAMMOGRAPHY | Facility: HOSPITAL | Age: 78
Discharge: HOME | End: 2023-11-09
Admitting: SURGERY
Payer: MEDICARE

## 2023-11-09 ENCOUNTER — HOSPITAL ENCOUNTER (OUTPATIENT)
Dept: ULTRASOUND IMAGING | Facility: HOSPITAL | Age: 78
Discharge: HOME | End: 2023-11-09
Payer: MEDICARE

## 2023-11-09 DIAGNOSIS — R92.8 ABNORMAL MAMMOGRAM: ICD-10-CM

## 2023-11-09 DIAGNOSIS — C50.112 CANCER OF CENTRAL PORTION OF LEFT BREAST: ICD-10-CM

## 2023-11-09 PROCEDURE — G0279 TOMOSYNTHESIS, MAMMO: HCPCS

## 2023-11-09 PROCEDURE — 77065 DX MAMMO INCL CAD UNI: CPT

## 2023-11-09 PROCEDURE — 76642 ULTRASOUND BREAST LIMITED: CPT

## 2023-11-10 ENCOUNTER — TELEPHONE (OUTPATIENT)
Dept: SURGERY | Facility: CLINIC | Age: 78
End: 2023-11-10
Payer: MEDICARE

## 2023-11-10 NOTE — TELEPHONE ENCOUNTER
11-9-23 LEFT diagnostic mammogram with 3D BHL  Scattered fg densities.  In the anterior one third left breast 3:00 is a coil-shaped marker.  In the middle third lower outer left breast 4:00 is a second coil-shaped marker.  These represent the biopsy-proven site of malignancy.  There is continued decrease in microcalcifications surrounding the biopsy clip at 3:00.  Only faint microcalcifications spanning 3 mm can be seen in the vicinity of the metal clip.  No suspicious residual microcalcifications posterior to the metal clip can be seen.  No surrounding density is appreciated and biopsy clip position at the 4 o'clock position.  Impression continued decrease in microcalcifications in the left breast.  No sonographic abnormality in the areas of biopsy-proven malignancy in the left breast.  Imaging features are consistent with that of near complete imaging response to medical therapy.  BI-RADS 6

## 2023-11-13 DIAGNOSIS — I10 BENIGN ESSENTIAL HYPERTENSION: ICD-10-CM

## 2023-11-13 RX ORDER — VALSARTAN 320 MG/1
TABLET ORAL
Qty: 90 TABLET | Refills: 10 | Status: SHIPPED | OUTPATIENT
Start: 2023-11-13

## 2023-11-14 ENCOUNTER — OFFICE VISIT (OUTPATIENT)
Dept: ONCOLOGY | Facility: CLINIC | Age: 78
End: 2023-11-14
Payer: MEDICARE

## 2023-11-14 VITALS
DIASTOLIC BLOOD PRESSURE: 85 MMHG | SYSTOLIC BLOOD PRESSURE: 145 MMHG | HEIGHT: 66 IN | TEMPERATURE: 98.7 F | BODY MASS INDEX: 33.14 KG/M2 | HEART RATE: 74 BPM | WEIGHT: 206.2 LBS | OXYGEN SATURATION: 96 %

## 2023-11-14 DIAGNOSIS — N64.89 OTHER SPECIFIED DISORDERS OF BREAST: ICD-10-CM

## 2023-11-14 DIAGNOSIS — C50.912 DUCTAL CARCINOMA OF LEFT BREAST: Primary | ICD-10-CM

## 2023-11-14 PROCEDURE — 1159F MED LIST DOCD IN RCRD: CPT | Performed by: INTERNAL MEDICINE

## 2023-11-14 PROCEDURE — 3079F DIAST BP 80-89 MM HG: CPT | Performed by: INTERNAL MEDICINE

## 2023-11-14 PROCEDURE — 3077F SYST BP >= 140 MM HG: CPT | Performed by: INTERNAL MEDICINE

## 2023-11-14 PROCEDURE — 1126F AMNT PAIN NOTED NONE PRSNT: CPT | Performed by: INTERNAL MEDICINE

## 2023-11-14 PROCEDURE — 1160F RVW MEDS BY RX/DR IN RCRD: CPT | Performed by: INTERNAL MEDICINE

## 2023-11-14 PROCEDURE — 99214 OFFICE O/P EST MOD 30 MIN: CPT | Performed by: INTERNAL MEDICINE

## 2023-11-14 NOTE — PROGRESS NOTES
Subjective   Claudette L McManus is a 77 y.o. female.  Referred by Dr. Fam for left breast invasive ductal carcinoma    History of Present Illness   Ms. 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   Comorbidities include hypertension, hyperlipidemia, anxiety/depression, insomnia, progressive dyspnea on exertion, diagnosed with pulmonary embolism in May 2022 and on anticoagulation with Xarelto, hypothyroidism.    7/29/2022-bilateral screening mammogram  Indeterminate left breast calcifications and focal asymmetry.  1 of which is associated with questioned architectural distortion.  Further evaluation recommended.  Neck on 8/25/2022-left breast diagnostic mammogram and ultrasound  In the lower slightly outer middle left breast there is a 1.5 cm mass corresponding to the architectural distortion.  Focal asymmetry in the outer central middle left breast partially effaces with spot compression and less conspicuous.  Right upper middle left breast there is persistent approximately 1 cm mass with corresponding architectural distortion.  The upper central anterior left breast there is a persistent focal asymmetry with calcifications.    Ultrasound  At 1:00 retroareolar left breast-2.3 x 0.9 x 2 cm hypoechoic mass with indistinct margins and internal echogenic foci from calcifications.  There is tubular elevation of the mass concerning for probable extension  At 3:00, retroareolar left breast-1.1 x 0.8 x 1 cm hypoechoic mass with indistinct margins, corresponds to suspicious group of calcifications.  At 4:00, 3 cm from the nipple there is a 1.4 and 1 x 1.1 cm irregular hypoechoic mass with peripheral vascularity corresponding to the mass with distortion on mammogram  At 430, 3 cm from the nipple-1.8 cm from the above mass there is a 0.9 x 0.5 x 0.9 cm hypoechoic mass with indistinct margins which is suspicious  At 3:00, 5 cm from the nipple  there is a 0.9 0.7 x 0.7 cm irregular hypoechoic mass with indistinct margins.    Impression  Multiple masses in the left breast, which demonstrate corresponding architectural distortion.  2 site ultrasound-guided biopsy targeting the dominant masses at 1:00 in the retroareolar breast and 4:00, 3 cm from the nipple with management of additional masses pending biopsy results.  Contrast-enhanced MRI recommended    9/21/2022-2 site biopsy  1.left breast 3:00, 2 cm from nipple-ultrasound-guided biopsy of the mass  Low-grade ductal carcinoma in situ involving a small intraductal papilloma.  DCIS measures 14 mm  ER positive, MT positive    2.left breast o'clock, 2 cm from the nipple ultrasound-guided biopsy  Invasive ductal carcinoma with lobular features  Grade 2  Millimeters on core biopsy  Atypical ductal hyperplasia with cavitations and involving an intraductal papilloma  Negative lymphovascular space invasion  ER +% strong  MT +% strong  HER2 negative, nonamplified on FISH  Ki-67 15%    She was seen by Dr. Fam and discussed mastectomy given several abnormalities in the left breast.  Since she had progressive worsening of dyspnea on exertion from 2022 without acute explanation, recent diagnosis of pulmonary embolism requiring chronic anticoagulation, her age and concerns regarding complications of mastectomy patient opted not to proceed with mastectomy  She is here to discuss neoadjuvant endocrine therapy.    She reports severe insomnia for which she is currently on trazodone.  Tried gabapentin in the past but did not help.  She is also been diagnosed with peripheral neuropathy.  She is on Cymbalta anxiety.    She had a daughter who is treated for breast cancer but eventually she passed away from suicide.  Patient reports significant anxiety since the death of her daughter.  Her daughter have been diagnosed with bipolar disorder.    Genetic testing performed and no significant pathogenic  mutation.    For the dyspnea, she evaluated by  and per patient she was recommended to have a sleep study.  She is not comfortable using the CPAP and did not wish to undergo a sleep study.  She was prescribed inhalers which she had used for a month without much help.    On the CT PE protocol performed in May 2022 there was concern for chronic pulmonary emboli and pulmonary hypertension.  For this reason she underwent a cardiac catheterization on 9/26/2022 which showed normal pulmonary pressures, normal left-sided pressures, elevated right ventricular filling pressures, normal PVR, normal RV SWI  The etiology of the dyspnea somewhat unclear.    12/8/2022-CT of the chest-tiny right lower lobe pulmonary embolism seen previously is no longer present.  Enlarged right ventricle appears similar on prior exam.  No other abnormalities noted.    CT angiogram of the chest 7/17/2023 which thankfully did not show recurrent PE but did show groundglass opacities felt to likely be infectious/inflammatory as well as a new left fissure nodule 1.8 x 1.1 cm, felt also likely to be infectious/inflammatory with 3-month interval follow-up recommended.  Patient was ultimately treated with a round of Z-Sreedhar and steroids with significant improvement in her shortness of breath.    11/9/2023-left breast diagnostic mammogram and ultrasound  Continued interval decrease in microcalcifications in the left breast with only few faint residual microcalcifications seen on spot magnification.  No sonographic abnormality in the areas of biopsy-proven malignancy in the left breast.  Imaging features are consistent with that of near complete imaging response to medical therapy.    Interval History  Ms. Gomez returns today in 3-month follow-up.  Shortly after her last visit she called reporting her nails breaking off and her hair thinning to the point that it was quite bothersome.  We had her discontinue anastrozole and ultimately she  switched to exemestane which took place in early July 2023.    She feels much better today.  She reports that she is tolerating the Aromasin well.    She has a follow-up CT of the chest scheduled by her pulmonologist.  Denies any new breast masses or any other complaints at this time    Past Medical History:   Diagnosis Date    Benign essential hypertension     Chronic bilateral low back pain without sciatica 8/16/2013 March 13, 2019--Limited bone scan.  This reveals scintigraphic activity in the spine and at the right shoulder corresponds to change seen on recent x-rays and is best explained by degenerative change.  Isolated metastatic disease exactly corresponding to the sites of extensive degenerative disc change would be peculiar.  March 7, 2019--new patient to get established.  She has complaints of approxi    Chronic bilateral thoracic back pain 2/26/2019 March 13, 2019--Limited bone scan.  This reveals scintigraphic activity in the spine and at the right shoulder corresponds to change seen on recent x-rays and is best explained by degenerative change.  Isolated metastatic disease exactly corresponding to the sites of extensive degenerative disc change would be peculiar.  March 1, 2019--x-ray of the lumbar and thoracic spine reveals mild anterior l    Chronic insomnia 11/4/2014    Diabetic peripheral neuropathy (HCC) 3/7/2019    Characterized by decreased sensation and paresthesias in both lower extremities described as a burning sensation.  Extends up to the knees.  Reportedly had been evaluated with nerve conduction study in the past.    Generalized anxiety disorder 5/19/2013    History of Bell's palsy 5/21/2013    Remote history of Bell's palsy.  Patient does not remember which side of the face.    Hyperlipidemia     Impaired fasting glucose     Major depression     Menopausal state, 8/19/2020--normal DEXA. 3/7/2019    August 19, 2020--DEXA scan reveals lumbar spine T score 3.8.  Left femoral neck T  score 2.5.  Right femoral neck T score 2.2.  Normal bone density.    Non morbid obesity 8/11/2022    Peripheral neuropathy, idiopathic 3/7/2019    Characterized by decreased sensation and paresthesias in both lower extremities described as a burning sensation.  Extends up to the knees.  Reportedly had been evaluated with nerve conduction study in the past.    Primary hypothyroidism 5/19/2013    Primary osteoarthritis involving multiple joints 4/22/2013    Rotator cuff arthropathy of right shoulder, 4/20/2021--status post right reverse total shoulder arthroplasty 5/27/2020    Situational mixed anxiety and depressive disorder 8/21/2019 August 21, 2019--patient seen in follow-up after I called in a prescription for Ativan after the patient's  contacted me a few weeks ago regarding the sudden death of patient's daughter.  This was an apparent suicide and the patient found her daughter dead.  I will not go into details.  Patient reports the Lorazepam has been helpful but she is still quite distraught, nervous, and undergoing    Type 2 diabetes mellitus with diabetic neuropathy, without long-term current use of insulin ([facility], [facility] within [facility])     Vitamin D deficiency 2/26/2019        Past Surgical History:   Procedure Laterality Date    BILATERAL BREAST REDUCTION  Early 2000    Early 2000--bilateral breast reduction    CARDIAC CATHETERIZATION N/A 9/26/2022    Procedure: Right Heart Cath;  Surgeon: Agus Solorio MD PhD;  Location: [facility] INVASIVE LOCATION;  Service: Cardiology;  Laterality: N/A;  Will REMAIN on Xarelto for the case    CHOLECYSTECTOMY  1960s    1960s--open cholecystectomy    COLONOSCOPY  2012 2012--reportedly normal colonoscopy    INCONTINENCE SURGERY  2012 Approximately 2012--implantation of questionable bladder stimulator right sacral area for urinary incontinence.  Details not known.    REPLACEMENT TOTAL KNEE BILATERAL Left 2010; 2011 2010-2011--bilateral total knee replacement     "SUBTOTAL HYSTERECTOMY  Remote    Remote partial hysterectomy.  Ovaries intact.    TOTAL SHOULDER REPLACEMENT Left 2013--left total shoulder replacement.    TOTAL SHOULDER REPLACEMENT  2021--status post right reverse total shoulder arthroplasty    TOTAL SHOULDER REVERSE ARTHROPLASTY Right 2021--right reverse total shoulder replacement.        Family History   Problem Relation Age of Onset    Alcohol abuse Father     Breast cancer Daughter         40s    Cancer Other     Diabetes Other         type II    Leukemia Grandchild 19        Social History     Socioeconomic History    Marital status:    Tobacco Use    Smoking status: Former     Types: Cigarettes     Quit date: 1998     Years since quittin.9    Smokeless tobacco: Never   Vaping Use    Vaping Use: Never used   Substance and Sexual Activity    Alcohol use: Yes     Alcohol/week: 1.0 standard drink     Types: 1 Glasses of wine per week     Comment: current some day    Drug use: No    Sexual activity: Not Currently     Partners: Male        OB History          1    Para   1    Term   1            AB        Living             SAB        IAB        Ectopic        Molar        Multiple        Live Births                 Age at menarche-10  Age at first live childbirth-12   1 para 1  0  She had a hysterectomy in the 90s, ovaries still present  Oral contraceptive pill use for 6 to 8 weeks  No use of hormone replacement therapy    Allergies   Allergen Reactions    Morphine And Related     Other Other (See Comments)     REJECTED DACRON SUTURES IN THE PAST AND INCISION CAME APART            Review of systems as mentioned in the HPI otherwise negative    Objective   Blood pressure 144/77, pulse 66, temperature 97.5 °F (36.4 °C), temperature source Temporal, resp. rate 16, height 165.1 cm (65\"), weight 86.9 kg (191 lb 9.6 oz), SpO2 95 %, not currently breastfeeding.   Physical " Exam  Constitutional:       Appearance: Normal appearance. She is normal weight.   HENT:      Head: Normocephalic and atraumatic.      Right Ear: External ear normal.      Left Ear: External ear normal.      Nose: Nose normal.      Mouth/Throat:      Mouth: Mucous membranes are moist.      Pharynx: Oropharynx is clear.   Eyes:      Extraocular Movements: Extraocular movements intact.      Conjunctiva/sclera: Conjunctivae normal.      Pupils: Pupils are equal, round, and reactive to light.   Cardiovascular:      Rate and Rhythm: Normal rate and regular rhythm.      Pulses: Normal pulses.      Heart sounds: Normal heart sounds.   Pulmonary:      Effort: Pulmonary effort is normal.      Breath sounds: Normal breath sounds.   Abdominal:      General: Abdomen is flat. Bowel sounds are normal.   Musculoskeletal:         General: Normal range of motion.      Cervical back: Normal range of motion.   Skin:     General: Skin is warm.   Neurological:      General: No focal deficit present.      Mental Status: She is alert and oriented to person, place, and time.   Psychiatric:         Mood and Affect: Mood normal.         Behavior: Behavior normal.         Thought Content: Thought content normal.         Judgment: Judgment normal.       Breast Exam: Right breast appears normal on inspection.  No palpable abnormalities of the right breast.  Left breast: No erythema.  No tenderness.  No palpable firmness at this time.  No axillary adenopathy     I have reexamined the patient and the results are consistent with the previously documented exam. Elsie Arzate MD        No visits with results within 30 Day(s) from this visit.   Latest known visit with results is:   Lab on 10/24/2022   Component Date Value Ref Range Status    Glucose 10/24/2022 108  74 - 124 mg/dL Final    BUN 10/24/2022 11  6 - 20 mg/dL Final    Creatinine 10/24/2022 0.96  0.60 - 1.10 mg/dL Final    Sodium 10/24/2022 135  134 - 145 mmol/L Final    Potassium  10/24/2022 4.4  3.5 - 4.7 mmol/L Final    Chloride 10/24/2022 95 (L)  98 - 107 mmol/L Final    CO2 10/24/2022 26.4  22.0 - 29.0 mmol/L Final    Calcium 10/24/2022 10.7 (C)  8.5 - 10.2 mg/dL Final    Total Protein 10/24/2022 7.9  6.3 - 8.0 g/dL Final    Albumin 10/24/2022 4.90  3.50 - 5.20 g/dL Final    ALT (SGPT) 10/24/2022 25  0 - 33 U/L Final    AST (SGOT) 10/24/2022 29  0 - 32 U/L Final    Alkaline Phosphatase 10/24/2022 67  38 - 116 U/L Final    Total Bilirubin 10/24/2022 0.5  0.2 - 1.2 mg/dL Final    Globulin 10/24/2022 3.0  1.8 - 3.5 gm/dL Final    A/G Ratio 10/24/2022 1.6  1.1 - 2.4 g/dL Final    BUN/Creatinine Ratio 10/24/2022 11.5  7.3 - 30.0 Final    Anion Gap 10/24/2022 13.6  5.0 - 15.0 mmol/L Final    eGFR 10/24/2022 60.3  >60.0 mL/min/1.73 Final    National Kidney Foundation and American Society of Nephrology (ASN) Task Force recommended calculation based on the Chronic Kidney Disease Epidemiology Collaboration (CKD-EPI) equation refit without adjustment for race.    WBC 10/24/2022 6.20  3.40 - 10.80 10*3/mm3 Final    RBC 10/24/2022 4.27  3.77 - 5.28 10*6/mm3 Final    Hemoglobin 10/24/2022 13.8  12.0 - 15.9 g/dL Final    Hematocrit 10/24/2022 40.9  34.0 - 46.6 % Final    MCV 10/24/2022 95.8  79.0 - 97.0 fL Final    MCH 10/24/2022 32.3  26.6 - 33.0 pg Final    MCHC 10/24/2022 33.7  31.5 - 35.7 g/dL Final    RDW 10/24/2022 12.4  12.3 - 15.4 % Final    RDW-SD 10/24/2022 43.0  37.0 - 54.0 fl Final    MPV 10/24/2022 9.3  6.0 - 12.0 fL Final    Platelets 10/24/2022 258  140 - 450 10*3/mm3 Final    Neutrophil % 10/24/2022 59.3  42.7 - 76.0 % Final    Lymphocyte % 10/24/2022 28.9  19.6 - 45.3 % Final    Monocyte % 10/24/2022 8.9  5.0 - 12.0 % Final    Eosinophil % 10/24/2022 1.9  0.3 - 6.2 % Final    Basophil % 10/24/2022 0.5  0.0 - 1.5 % Final    Immature Grans % 10/24/2022 0.5  0.0 - 0.5 % Final    Neutrophils, Absolute 10/24/2022 3.68  1.70 - 7.00 10*3/mm3 Final    Lymphocytes, Absolute 10/24/2022 1.79  0.70  - 3.10 10*3/mm3 Final    Monocytes, Absolute 10/24/2022 0.55  0.10 - 0.90 10*3/mm3 Final    Eosinophils, Absolute 10/24/2022 0.12  0.00 - 0.40 10*3/mm3 Final    Basophils, Absolute 10/24/2022 0.03  0.00 - 0.20 10*3/mm3 Final    Immature Grans, Absolute 10/24/2022 0.03  0.00 - 0.05 10*3/mm3 Final    nRBC 10/24/2022 0.0  0.0 - 0.2 /100 WBC Final        No radiology results for the last 30 days.       Assessment & Plan       *Left breast invasive ductal carcinoma  T1 cN0 M0, stage Ia, multifocal  2 sites were biopsied, one site was consistent with ductal carcinoma in situ which is ER/IN positive and the second site was invasive ductal carcinoma with lobular features which is ER/IN positive and HER2 negative, grade 2  The steps of curative intent breast cancer treatment have been explained, including surgery, possible chemotherapy, possible radiation and possible endocrine therapy.   We discussed that given the fact that she does not want to undergo mastectomy and concerned about the complications due to ongoing dyspnea and her age it would be reasonable to proceed with neoadjuvant endocrine therapy.  The 2 options including tamoxifen and aromatase inhibitors have been discussed.  Given the history of DVT tamoxifen is not an option  We discussed anastrozole 1 mg p.o. daily.  Adverse effects including but not limited to hot flashes, mood changes, fatigue, insomnia, decrease in bone mineral density, vaginal dryness, sexual dysfunction.   She has been on anastrozole  since November 2022 and tolerating that well.  Seen in the clinic in December 2022 with concerns of erythema of the left breast.  This has since resolved.  Diagnostic mammogram 1/19/2022 of the left breast shows stable microcalcifications and no new concerning findings.  Diagnostic mammogram 5/1/2023 showing resolution of abnormal mammographic densities in the left breast as well as reduction in microcalcifications.    Patient developing hair loss and nails  breaking off.  Arimidex held.  Patient ultimately switched to Aromasin 7/2023 8/8/2023 patient tolerating Aromasin well with no worsening of hair loss or nail changes.  Continue same.  Repeat diagnostic mammogram scheduled in November per Dr. Fam.  11/9/2023-left breast diagnostic mammogram with near complete resolution of the microcalcifications.  Continue Aromasin with repeat mammogram in 6 months    *PE  Hypercoagulability work-up negative  Abnormal lupus anticoagulant likely secondary to Xarelto  Prothrombin gene mutation and factor V Leiden not covered by insurance  Continues on Xarelto  Recent CT angiogram shows resolution of the blood clots.  The initial plan was to continue patient on indefinite anticoagulation as there was no clear provoking event for the PE.  However she is concerned about remaining on anticoagulation long-term.  She has completed 6 months of therapy.   We discussed increased risk of recurrent DVT and PE in individuals with a previous history of PE.  Since is the first episode of PE, could consider discontinuation of anticoagulation and continue surveillance.  Xarelto discontinued    *Anxiety  Poorly controlled since the death of her daughter  Continues on Cymbalta  Also on trazodone  Continue follow-up with supportive oncology    *Depression  Mood stable    *Dyspnea  7/17/2023 CT angiogram performed per PCP, Dr. Moore, showing no evidence of recurrent thrombosis but with inflammatory/infectious changes with groundglass opacities.  Also noted was new 1.8 x 1.1 cm left fissure lesion, also felt to be likely infectious/inflammatory.  3-month CT chest follow-up recommended.  Patient treated with a Z-Sreedhar and prednisone taper per Dr. Moore with improvement in shortness of breath.  8/8/2023 patient highly anxious about potentially having cancer in her lung.  Tried to reassure her of the radiologist feeling this is likely infectious/inflammatory and considering her recent pneumonia and  previous COVID that is likely the case.  She will see Dr. Moore in follow-up in September and anticipate him ordering repeat CT imaging at that visit.  Repeat CT chest ordered by Dr. Moore her primary care physician.    *Hypertension and hyperlipidemia-continue current medications    *Bone health  DEXA from 2020 reviewed and normal  DEXA scan June 2023 with normal bone density.  Repeat DEXA in June 2025    PLAN:  Continue exemestane 25 mg daily.  Continue vitamin D supplementation.  Repeat CT chest ordered for left lung lesion  She will be due for screening mammogram in May 2024.  Bilateral diagnostic mammogram could be obtained at that time  Follow-up with MD in 6 months, schedule after the mammogram    This patient is on high risk drug therapy requiring intensive monitoring for toxicity.

## 2023-11-17 ENCOUNTER — OFFICE VISIT (OUTPATIENT)
Dept: SURGERY | Facility: CLINIC | Age: 78
End: 2023-11-17
Payer: MEDICARE

## 2023-11-17 VITALS
DIASTOLIC BLOOD PRESSURE: 88 MMHG | OXYGEN SATURATION: 95 % | HEIGHT: 66 IN | SYSTOLIC BLOOD PRESSURE: 148 MMHG | WEIGHT: 201 LBS | HEART RATE: 91 BPM | BODY MASS INDEX: 32.3 KG/M2

## 2023-11-17 DIAGNOSIS — E66.3 OVERWEIGHT (BMI 25.0-29.9): ICD-10-CM

## 2023-11-17 DIAGNOSIS — I27.82 CHRONIC PULMONARY EMBOLISM WITHOUT ACUTE COR PULMONALE, UNSPECIFIED PULMONARY EMBOLISM TYPE: ICD-10-CM

## 2023-11-17 DIAGNOSIS — Z98.890 HISTORY OF BILATERAL BREAST REDUCTION SURGERY: ICD-10-CM

## 2023-11-17 DIAGNOSIS — R06.02 SHORTNESS OF BREATH: ICD-10-CM

## 2023-11-17 DIAGNOSIS — Z80.3 FH: BREAST CANCER: ICD-10-CM

## 2023-11-17 DIAGNOSIS — Z12.31 ENCOUNTER FOR SCREENING MAMMOGRAM FOR MALIGNANT NEOPLASM OF BREAST: ICD-10-CM

## 2023-11-17 DIAGNOSIS — R92.8 ABNORMAL ULTRASOUND OF BREAST: ICD-10-CM

## 2023-11-17 DIAGNOSIS — R92.8 ABNORMAL MAMMOGRAM: ICD-10-CM

## 2023-11-17 DIAGNOSIS — C50.112 CANCER OF CENTRAL PORTION OF LEFT BREAST: Primary | ICD-10-CM

## 2023-11-17 NOTE — PROGRESS NOTES
Chief Complaint: Claudette L McManus is a 78 y.o. female who was seen in consultation at the request of Lito Moore MD  for newly diagnosed breast cancer and a followup visit    History of Present Illness:  Patient presents with abnormal breast imaging. She noted no new masses, skin changes, nipple discharge, nipple changes prior to her most recent imaging.  Her most recent imaging includes the followin22 [facility]  CT CHEST  MCMANUS, CLAUDETTE   HISTORY: dyspnea on exertion. FINDINGS: There is a tiny nonocclusive subsegmental right lower lobe pulmonary thromboembolus and there are a few very tiny distal pulmonary thromboemboli as well. IMPRESSION:  1. There are tiny pulmonary thromboemboli with the largest within a subsegmental right lower lobe pulmonary artery. The RV:LV ratio is 1.4. The appearance of some of the pulmonary artery branches and the lungs can be seen with chronic pulmonary thromboemboli.     22 [facility]   MAMMO SCREENING  MCMANUS CLAUDETTE   There are scattered areas of fibroglandular density. There are indeterminate calcifications in the outer central anterior left breast. There is a focal asymmetry with questioned distortion in the slightly lower slightly outer anterior left breast. There is a focal asymmetry in the outer central middle to anterior left breast. BIRADS CATEGORY 0      22 [facility] MAMMO DIAG LEFT MATTY LEFT BREAST U/S  MCMANUS CLAUDETTE  In the outer central anterior left breast, there is a 1.3 cm group of somewhat pleomorphic calcifications, which is suspicious. In the lower slightly outer middle left breast, there is a persistent approximately 1.5 cm mass with mild corresponding architectural distortion. There is also a central corresponding calcification.  The previously noted focal asymmetry in the outer central middle left  breast partially effaces with spot compression and is less conspicuous on the lateral view.  In the slightly upper outer  middle left breast, there is a persistent approximately 1 cm mass with corresponding architectural distortion.  In the upper central anterior left breast, there is a persistent focal asymmetry with calcifications.  These areas were further assessed with ultrasound.     ULTRASOUND:  Targeted sonographic evaluation of the left breast was performed from 2:00 to 6:00 in the region of the mammographic abnormalities. At 1:00 in the retroareolar left breast, there is an at least 2.3 x 0.9 x 2.0 cm hypoechoic mass with indistinct margins and internal echogenic foci from calcifications corresponding to the focal asymmetry with calcifications in the anterior left breast on mammogram. There is tubular elongation of the mass concerning for possible ductal extension.  At 3:00 in the retroareolar left breast, there is a 1.1 x 0.8 x 1.0 cm irregular hypoechoic mass with indistinct margins and internal echogenic foci from calcifications, which corresponds to the suspicious group of calcifications on mammogram.  At 4:00, 3 cm from the nipple, there is a 1.4 x 1.0 x 1.1 cm irregular hypoechoic mass with peripheral vascularity corresponding to a mass with distortion on mammogram.  At 4:30, 3 cm from the nipple, approximately 1.8 cm from the above mass  at the 4:00 position, there is a 0.9 x 0.5 x 0.9 cm irregular hypoechoic mass with indistinct margins, which is suspicious.  At 3:00, 5 cm from the nipple, there is a 0.9 x 0.7 x 0.7 cm irregular hypoechoic mass with indistinct margins and corresponding internal vascularity, which corresponds to a mass with architectural distortion  on mammogram and is suspicious.   Recommend 2 site ultrasound-guided core needle biopsy  targeting the dominant masses at 1:00 in the retroareolar breast and  4:00, 3 cm from the nipple with management of the additional masses  pending biopsy results. Contrast-enhanced breast MRI may be warranted  following the biopsy. BI RADS CATEGORY 4C       She has not  had a breast biopsy in the past.  She has her  ovaries, is postmenopausal, and takes nor hormones.  Her family history includes the following: She has 1 daughter, 1 sister, 2 maternal aunts, 1 paternal aunt, her daughter had breast cancer in her 40s.  This daughter is  due to suicide in her 60s.  The patient has had an intentional 20 pound weight loss over the past 2-month related to an injectable diabetes medication called tirzepatide.      Core biopsies in the office returned as :  Left breast 3:00, 2 cm from the nipple ultrasound-guided core needle biopsy:  Low-grade duct carcinoma in situ, involving an intraductal papilloma.  Solid, cribriform, micropapillary, calcifications.  1.4 cm in a core.  Estrogen , progesterone , Ki-67 is 6%.  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  Estrogen , progesterone , HER2/willian 2+.  FISH  Not amplified 2.8/1.3..  Ki-67 15%.     Her most recent note from Dr Bell pulmonology : Remote 20-pack-year history smoking quit in the .  Recently diagnosed primary embolism unprovoked .  Concern for pulmonary hypertension, ongoing evaluation by Dr. Solorio.  Patient states issues since May 2022 with persistent dyspnea on exertion of unclear etiology.  Patient currently on anticoagulation with Xarelto tolerating it fairly well.  Some concern for grade 1 diastolic dysfunction and dilated left atrium.  Some concern for undiagnosed sleep apnea.     Diagnosis given unprovoked pulmonary embolus.  Agree with anticoagulation.  Ongoing hypercoagulability evaluation.  Fatigue, most likely undiagnosed obstructive sleep apnea.  Discussed need for testing and CPAP.  Pulmonary hypertension, repeat echocardiogram to evaluate.  Defer to Dr. Solorio.  Will need a VQ scan after acute pulmonary  embolus treatment to evaluate for chronic pulmonary emboli.     Dr. Solorio visit September 26, 2022: Right heart cath performed: Conclusion normal pulmonary pressures, normal left-sided filling pressures, elevated right ventricular filling pressures, normal pulmonary vascular resistance.  Recommendations optimize medical therapy.        OK per Dr Chang to hold her xarelto for 2-3 days prior to procedure        Unable to have MRI due to incontinence implant    She is here to review today with her  Akira.          In the interim,  Claudette L McManus  has done well.  She has noted no changes in her breast exam. No new masses, skin changes, nipple changes, nipple discharge either breast.   She denies headache, bone pain, belly pain, cough, changes in vision.    She has had an intentional 21 pound weight loss.  She had an episode of ataxia and went to the emergency room for this April 26, 2023.  She tells me that she has felt much better since then.  Her MRI and CT head were both negative for any acute findings.  Dr. Lito Moore stopped her Xarelto.  She continues on her anastrozole.    Her most recent imaging includes the following:  Bilateral diagnostic mammogram and left breast ultrasound:  [facility] 5-2-23  Anterior one third left breast 3:00 there is a coil-shaped marker.  Middle third lower outer quadrant left breast at 4:00 there is a second coil shaped marker.  Interval decrease in the microcalcifications surrounding the biopsy clip at 3:00.  Only sparse microcalcifications in the region remain.  Biopsy clip at 4:00 is no longer surrounded by residual surrounding density.  Ultrasound  1:00 retroareolar stable 2.3 cm masslike region consistent with an area of benign fibrous tissue and adjacent coil-shaped clip.  4:00, 3 cm from the nipple visualized clip in the previously noted mass is no longer visualized.  3:00, 5 cm from the nipple interval resolution of previously described  irregular mass.  There is been interval resolution of the irregular lesion at 430, 3 cm from the nipple.  Impression interval resolution of abnormal mammographic densities and sonographic correlates in the left breast.  Also reduction in calcifications adjacent to the clip at 3:00.  Imaging features suggest near complete response to medical therapy at the biopsy-proven sites of malignancy.  There are only a few sparse microcalcifications adjacent to the coil shaped clip at 3:00.       Interval History:  In the interim,  Claudette L McManus  has done well.  She has continued on the aromatase inhibitor without difficulty and has routine FU with Dr Arzate.    She has noted no changes in her breast exam. No new masses, skin changes, nipple changes, nipple discharge either breast.   She denies headache, bone pain, belly pain, cough, changes in vision or gait.    Her most recent imaging includes the followin23 LEFT diagnostic mammogram with 3D B[facility]  Scattered fg densities.  In the anterior one third left breast 3:00 is a coil-shaped marker.  In the middle third lower outer left breast 4:00 is a second coil-shaped marker.  These represent the biopsy-proven site of malignancy.  There is continued decrease in microcalcifications surrounding the biopsy clip at 3:00.  Only faint microcalcifications spanning 3 mm can be seen in the vicinity of the metal clip.  No suspicious residual microcalcifications posterior to the metal clip can be seen.  No surrounding density is appreciated and biopsy clip position at the 4 o'clock position.  Impression continued decrease in microcalcifications in the left breast.  No sonographic abnormality in the areas of biopsy-proven malignancy in the left breast.  Imaging features are consistent with that of near complete imaging response to medical therapy.  BI-RADS 6    She has gained 15# since her last visit.    Review of Systems:  Review of Systems   Constitutional:  Positive for  unexpected weight change (16 lb wt gain since last visit).   Respiratory:  Shortness of breath: only with exertion .    All other systems reviewed and are negative.       Past Medical and Surgical History:  Breast Biopsy History:  Patient has not had a breast biopsy in the past.  Breast Cancer HIstory:  Patient does not have a past medical history of breast cancer.  Breast Operations, and year:  Reduction 2000  Obstetric/Gynecologic History:  Age menstrual periods began: 10  Patient is postmenopausal due to removal of her uterus in the following year:1990   Number of pregnancies:1  Number of live births: 1  Number of abortions or miscarriages: 0  Age of delivery of first child: 12  Patient breast fed, for the following lenth of time:  Length of time taking birth control pills:6-8 wks   Patient has never taken hormone replacement  Patient has both ovaries, uterus removed 1990  Past Surgical History:   Procedure Laterality Date    BILATERAL BREAST REDUCTION  Early 2000    Early 2000--bilateral breast reduction    CARDIAC CATHETERIZATION N/A 9/26/2022    Procedure: Right Heart Cath;  Surgeon: Agus Solorio MD PhD;  Location: [facility] CATH INVASIVE [facility];  Service: Cardiology;  Laterality: N/A;  Will REMAIN on Xarelto for the case    CHOLECYSTECTOMY  1960s    1960s--open cholecystectomy    COLONOSCOPY  2012 2012--reportedly normal colonoscopy    INCONTINENCE SURGERY  2012    Approximately 2012--implantation of questionable bladder stimulator right sacral area for urinary incontinence.  Details not known.    REPLACEMENT TOTAL KNEE BILATERAL Left 2010; 2011 2010-2011--bilateral total knee replacement    SUBTOTAL HYSTERECTOMY  Remote    Remote partial hysterectomy.  Ovaries intact.    TOTAL SHOULDER REPLACEMENT Left 2013 2013--left total shoulder replacement.    TOTAL SHOULDER REPLACEMENT  April 28, 2021 4/20/2021--status post right reverse total shoulder arthroplasty    TOTAL SHOULDER REVERSE  ARTHROPLASTY Right 04/20/2021 4/20/2021--right reverse total shoulder replacement.     Past Medical History:   Diagnosis Date    Benign essential hypertension     Chronic anxiety 10/26/2022    Chronic bilateral low back pain without sciatica 08/16/2013 March 13, 2019--Limited bone scan.  This reveals scintigraphic activity in the spine and at the right shoulder corresponds to change seen on recent x-rays and is best explained by degenerative change.  Isolated metastatic disease exactly corresponding to the sites of extensive degenerative disc change would be peculiar.  March 7, 2019--new patient to get established.  She has complaints of approxi    Chronic bilateral thoracic back pain 02/26/2019 March 13, 2019--Limited bone scan.  This reveals scintigraphic activity in the spine and at the right shoulder corresponds to change seen on recent x-rays and is best explained by degenerative change.  Isolated metastatic disease exactly corresponding to the sites of extensive degenerative disc change would be peculiar.  March 1, 2019--x-ray of the lumbar and thoracic spine reveals mild anterior l    Chronic insomnia 11/04/2014    Diabetic peripheral neuropathy 03/07/2019    Characterized by decreased sensation and paresthesias in both lower extremities described as a burning sensation.  Extends up to the knees.  Reportedly had been evaluated with nerve conduction study in the past.    Ductal carcinoma of left breast 08/26/2022    Generalized anxiety disorder 05/19/2013    History of Bell's palsy 05/21/2013    Remote history of Bell's palsy.  Patient does not remember which side of the face.    History of COVID-19 12/08/2022    History of multiple pulmonary emboli 05/19/2022    Stacie 15, 2022--patient seen in follow-up by Dr. Moore.  She is complaining of continued dyspnea on exertion which may be worse.  I reviewed the cardiology consultation from June 7, 2022 and also reviewed the echocardiogram from that same  date.  Noted below.  Her  is now present and he reports her dyspnea on exertion is definitely worse.  Her oxygen saturation at rest is 96%.  Upon ambulatio    Hyperlipidemia     Impaired fasting glucose     Menopausal state, 2020--normal DEXA. 2019--DEXA scan reveals lumbar spine T score 3.8.  Left femoral neck T score 2.5.  Right femoral neck T score 2.2.  Normal bone density.    Non morbid obesity 2022    Peripheral neuropathy, idiopathic 2019    Characterized by decreased sensation and paresthesias in both lower extremities described as a burning sensation.  Extends up to the knees.  Reportedly had been evaluated with nerve conduction study in the past.    Primary hypothyroidism 2013    Primary osteoarthritis involving multiple joints 2013    Rotator cuff arthropathy of right shoulder, 2021--status post right reverse total shoulder arthroplasty 2020    Situational mixed anxiety and depressive disorder 2019--patient seen in follow-up after I called in a prescription for Ativan after the patient's  contacted me a few weeks ago regarding the sudden death of patient's daughter.  This was an apparent suicide and the patient found her daughter dead.  I will not go into details.  Patient reports the Lorazepam has been helpful but she is still quite distraught, nervous, and undergoing    Type 2 diabetes mellitus with diabetic neuropathy, without long-term current use of insulin     Vitamin D deficiency 2019       Prior Hospitalizations, other than for surgery or childbirth, and year:  None     Social History     Socioeconomic History    Marital status:    Tobacco Use    Smoking status: Former     Types: Cigarettes     Quit date: 1998     Years since quittin.8    Smokeless tobacco: Never   Vaping Use    Vaping Use: Never used   Substance and Sexual Activity    Alcohol use: Yes     Alcohol/week: 1.0  "standard drink of alcohol     Types: 1 Glasses of wine per week     Comment: current some day    Drug use: No    Sexual activity: Defer     Partners: Male     Patient is .  Patient is retired.  Patient drinks 2 servings of caffeine per day.    Family History:  Family History   Problem Relation Age of Onset    Alcohol abuse Father     Breast cancer Daughter         40s    Cancer Other     Diabetes Other         type II    Leukemia Grandchild 19       Vital Signs:  /88 (BP Location: Right arm, Patient Position: Sitting, Cuff Size: Adult)   Pulse 91   Ht 167 cm (65.75\")   Wt 91.2 kg (201 lb)   LMP  (LMP Unknown) Comment: partial hysterectomy  SpO2 95%   BMI 32.69 kg/m²      Medications:    Current Outpatient Medications:     Cholecalciferol (VITAMIN D3) 2000 UNITS tablet, Take 1 tablet by mouth Daily., Disp: , Rfl:     cyclobenzaprine (FLEXERIL) 10 MG tablet, Take 1 p.o. 3 times daily as needed muscle relaxer/back pain, Disp: 30 tablet, Rfl: 1    DULoxetine (CYMBALTA) 60 MG capsule, TAKE 1 CAPSULE EVERY DAY AS DIRECTED, Disp: 90 capsule, Rfl: 3    exemestane (AROMASIN) 25 MG tablet, TAKE 1 TABLET EVERY DAY, Disp: 30 tablet, Rfl: 5    ezetimibe (ZETIA) 10 MG tablet, TAKE 1 TABLET EVERY DAY, Disp: 90 tablet, Rfl: 3    gabapentin (NEURONTIN) 300 MG capsule, TAKE ONE CAPSULE BY MOUTH THREE TIMES A DAY DFOR PERIPHERAL NEUROPATHY AS DIRECTED, Disp: 90 capsule, Rfl: 1    Homeopathic Products (LEG CRAMPS PO), Take  by mouth Daily. Nightly, Disp: , Rfl:     levothyroxine (SYNTHROID, LEVOTHROID) 125 MCG tablet, TAKE 1 TABLET BY MOUTH DAILY, Disp: 90 tablet, Rfl: 0    LORazepam (ATIVAN) 0.5 MG tablet, TAKE ONE TABLET BY MOUTH TWICE A DAY AS NEEDED FOR ANXIETY, Disp: 60 tablet, Rfl: 5    metroNIDAZOLE (METROGEL) 0.75 % gel, Apply  topically to the appropriate area as directed 2 (Two) Times a Day., Disp: 45 g, Rfl: 2    ondansetron (ZOFRAN) 8 MG tablet, Take 1 tablet by mouth Every 6 (Six) Hours As Needed for " "Nausea., Disp: 60 tablet, Rfl: 1    simvastatin (ZOCOR) 20 MG tablet, TAKE 1 TABLET EVERY DAY FOR HIGH CHOLESTEROL, Disp: 90 tablet, Rfl: 2    Tirzepatide (Mounjaro) 15 MG/0.5ML solution pen-injector, Inject 15 mg under the skin into the appropriate area as directed 1 (One) Time Per Week., Disp: 2 mL, Rfl: 6    traZODone (DESYREL) 150 MG tablet, TAKE 1 TABLET EVERY NIGHT, Disp: 90 tablet, Rfl: 10    valsartan (DIOVAN) 320 MG tablet, TAKE 1 TABLET EVERY MORNING FOR BLOOD PRESSURE, Disp: 90 tablet, Rfl: 10     Allergies:  Allergies   Allergen Reactions    Morphine And Related     Other Other (See Comments)     REJECTED DACRON SUTURES IN THE PAST AND INCISION CAME APART       Physical Examination:  /88 (BP Location: Right arm, Patient Position: Sitting, Cuff Size: Adult)   Pulse 91   Ht 167 cm (65.75\")   Wt 91.2 kg (201 lb)   LMP  (LMP Unknown) Comment: partial hysterectomy  SpO2 95%   BMI 32.69 kg/m²   General Appearance:  Patient is in no distress.  She is well kept and has an obese build.   Psychiatric:  Patient with appropriate mood and affect. Alert and oriented to self, time, and place.    Breast, RIGHT:  medium sized, symmetric with the contralateral side.  Well-healed reduction pattern incisions.  Breast skin is without erythema, edema, rashes.  There are no visible abnormalities upon inspection during the arm-raising maneuver or with hands on hips in the sitting position. There is no nipple retraction, discharge or nipple/areolar skin changes.There are no masses palpable in the sitting or supine positions.     Breast, LEFT:  medium sized, symmetric with the contralateral side.  Well-healed reduction pattern incisions.  Breast skin is without erythema, edema, rashes.  There are no visible abnormalities upon inspection during the arm-raising maneuver or with hands on hips in the sitting position. There is no nipple retraction, discharge or nipple/areolar skin changes.There are no masses palpable in " the sitting or supine positions.  There is no residual nodularity in the left breast centrally or laterally.    Lymphatic:  There is no axillary, cervical, infraclavicular, or supraclavicular adenopathy bilaterally.  Eyes:  Pupils are round and reactive to light.  Cardiovascular:  Heart rate and rhythm are regular.  Respiratory:  Lungs are clear bilaterally with no crackles or wheezes in any lung field.  She has labored breathing at rest.      Gastrointestinal:  Abdomen is soft, nondistended, and nontender.     Musculoskeletal:  Good strength in all 4 extremities.   There is good range of motion in both shoulders.    Skin:  No new skin lesions or rashes on the skin excluding the breast (see breast exam above).        Imagin22 [facility]  CT CHEST  MCMANUS, CLAUDETTE   HISTORY: dyspnea on exertion. FINDINGS: There is a tiny nonocclusive subsegmental right lower lobe pulmonary thromboembolus and there are a few very tiny distal pulmonary thromboemboli as well. IMPRESSION:  1. There are tiny pulmonary thromboemboli with the largest within a subsegmental right lower lobe pulmonary artery. The RV:LV ratio is 1.4. The appearance of some of the pulmonary artery branches and the lungs can be seen with chronic pulmonary thromboemboli.     22 [facility]   MAMMO SCREENING  MCMANUS CLAUDETTE   There are scattered areas of fibroglandular density. There are indeterminate calcifications in the outer central anterior left breast. There is a focal asymmetry with questioned distortion in the slightly lower slightly outer anterior left breast. There is a focal asymmetry in the outer central middle to anterior left breast. BIRADS CATEGORY 0      22 [facility] MAMMO DIAG LEFT MATTY LEFT BREAST U/S  MCMANUS CLAUDETTE  In the outer central anterior left breast, there is a 1.3 cm group of somewhat pleomorphic calcifications, which is suspicious. In the lower slightly outer middle left breast, there is a persistent  approximately 1.5 cm mass with mild corresponding architectural distortion. There is also a central corresponding calcification.  The previously noted focal asymmetry in the outer central middle left  breast partially effaces with spot compression and is less conspicuous on the lateral view.  In the slightly upper outer middle left breast, there is a persistent approximately 1 cm mass with corresponding architectural distortion.  In the upper central anterior left breast, there is a persistent focal asymmetry with calcifications.  These areas were further assessed with ultrasound.     ULTRASOUND:  Targeted sonographic evaluation of the left breast was performed from 2:00 to 6:00 in the region of the mammographic abnormalities. At 1:00 in the retroareolar left breast, there is an at least 2.3 x 0.9 x 2.0 cm hypoechoic mass with indistinct margins and internal echogenic foci from calcifications corresponding to the focal asymmetry with calcifications in the anterior left breast on mammogram. There is tubular elongation of the mass concerning for possible ductal extension.  At 3:00 in the retroareolar left breast, there is a 1.1 x 0.8 x 1.0 cm irregular hypoechoic mass with indistinct margins and internal echogenic foci from calcifications, which corresponds to the suspicious group of calcifications on mammogram.  At 4:00, 3 cm from the nipple, there is a 1.4 x 1.0 x 1.1 cm irregular hypoechoic mass with peripheral vascularity corresponding to a mass with distortion on mammogram.  At 4:30, 3 cm from the nipple, approximately 1.8 cm from the above mass  at the 4:00 position, there is a 0.9 x 0.5 x 0.9 cm irregular hypoechoic mass with indistinct margins, which is suspicious.  At 3:00, 5 cm from the nipple, there is a 0.9 x 0.7 x 0.7 cm irregular hypoechoic mass with indistinct margins and corresponding internal vascularity, which corresponds to a mass with architectural distortion  on mammogram and is suspicious.    Recommend 2 site ultrasound-guided core needle biopsy  targeting the dominant masses at 1:00 in the retroareolar breast and  4:00, 3 cm from the nipple with management of the additional masses  pending biopsy results. Contrast-enhanced breast MRI may be warranted  following the biopsy. BI RADS CATEGORY 4C       Left diagnostic mammogram with tomosynthesis [facility] January 19, 2023:  Scattered fibroglandular densities.  There are 2 coil shaped biopsy clips left breast, 1 in the anterior one third 3:00 and 1 in the middle third lower quadrant 4:00.  At the medial margin of the 3:00 clip are microcalcifications that are not appreciably changed.  2 separate clusters measure 9 and 2 mm and are 8 mm from the inferior margin of the clip.  No abnormality is seen adjacent to the 4:00 clip.     Due screening mammogram 7-30-23     Note from Lito Moore internal medicine physician April 13, 2023.  I have reviewed her CTA with resolution of pulmonary emboli.  Patient desires to come off the blood thinner and that would be my preference as well.  There is been but some disconnect with the pulmonary service.  I am making the decision to safely go off of the anticoagulation with the knowledge that there could be a recurrence.  Patient is aware of that and willing to take that risk.  I recommended that if she goes on long trips or flies on an airplane we should put her temporarily on Xarelto during those times.  Xarelto discontinued today.        Jessica called and would like to have her cancer removed..   We will obtain her bilateral imaging since screening is due in short order, prior to her visit.  Unable to have MRI due to incontinence implant.     Bilateral diagnostic mammogram and left breast ultrasound:  [facility] 5-2-23  Anterior one third left breast 3:00 there is a coil-shaped marker.  Middle third lower outer quadrant left breast at 4:00 there is a second coil shaped marker.  Interval  decrease in the microcalcifications surrounding the biopsy clip at 3:00.  Only sparse microcalcifications in the region remain.  Biopsy clip at 4:00 is no longer surrounded by residual surrounding density.  Ultrasound  1:00 retroareolar stable 2.3 cm masslike region consistent with an area of benign fibrous tissue and adjacent coil-shaped clip.  4:00, 3 cm from the nipple visualized clip in the previously noted mass is no longer visualized.  3:00, 5 cm from the nipple interval resolution of previously described irregular mass.  There is been interval resolution of the irregular lesion at 430, 3 cm from the nipple.  Impression interval resolution of abnormal mammographic densities and sonographic correlates in the left breast.  Also reduction in calcifications adjacent to the clip at 3:00.  Imaging features suggest near complete response to medical therapy at the biopsy-proven sites of malignancy.  There are only a few sparse microcalcifications adjacent to the coil shaped clip at 3:00.      11-9-23 LEFT diagnostic mammogram with 3D BHL  Scattered fg densities.  In the anterior one third left breast 3:00 is a coil-shaped marker.  In the middle third lower outer left breast 4:00 is a second coil-shaped marker.  These represent the biopsy-proven site of malignancy.  There is continued decrease in microcalcifications surrounding the biopsy clip at 3:00.  Only faint microcalcifications spanning 3 mm can be seen in the vicinity of the metal clip.  No suspicious residual microcalcifications posterior to the metal clip can be seen.  No surrounding density is appreciated and biopsy clip position at the 4 o'clock position.  Impression continued decrease in microcalcifications in the left breast.  No sonographic abnormality in the areas of biopsy-proven malignancy in the left breast.  Imaging features are consistent with that of near complete imaging response to medical therapy.  BI-RADS 6              PATHOLOGY:  Left breast  3:00, 2 cm from the nipple ultrasound-guided core needle biopsy:  Low-grade duct carcinoma in situ, involving an intraductal papilloma.  Solid, cribriform, micropapillary, calcifications.  1.4 cm in a core.  Estrogen , progesterone , Ki-67 is 6%.  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  Estrogen , progesterone , HER2/willian 2+.  FISH  Not amplified 2.8/1.3..  Ki-67 15%.      Consultations:  05/25/22 [facility]   SINGH, VIKAS MCMANUS CLAUDETTE L.   5/19/22 patient was started on anticoagulation with Xarelto. Mshe reports no significant improvemenrt in her respiratory symptoms while being on anticoagulation for around a week. Patient states symptoms while being on anticoagulation for around a week. Patient states she always had mild difficulty breasting, but it got significantly worse in the last 6 weeks. She now has to rest even after walkiong short interval. Says she smoked halk pack/day for 20-25 years. Quit in 1988.     Her most recent note from Dr Bell pulmonology : Remote 20-pack-year history smoking quit in the 1980s.  Recently diagnosed primary embolism unprovoked 2022.  Concern for pulmonary hypertension, ongoing evaluation by Dr. Solorio.  Patient states issues since May 2022 with persistent dyspnea on exertion of unclear etiology.  Patient currently on anticoagulation with Xarelto tolerating it fairly well.  Some concern for grade 1 diastolic dysfunction and dilated left atrium.  Some concern for undiagnosed sleep apnea.     Diagnosis given unprovoked pulmonary embolus.  Agree with anticoagulation.  Ongoing hypercoagulability evaluation.  Fatigue, most likely undiagnosed obstructive sleep apnea.  Discussed need for testing and CPAP.  Pulmonary hypertension, repeat echocardiogram to evaluate.  Defer to   [facility].  Will need a VQ scan after acute pulmonary embolus treatment to evaluate for chronic pulmonary emboli.     Dr. Solorio September 26, 2022: Right heart cath performed: Conclusion normal pulmonary pressures, normal left-sided filling pressures, elevated right ventricular filling pressures, normal pulmonary vascular resistance.  Recommendations optimize medical therapy.    OK per Dr Chang to hold her xarelto for 2-3 days prior to procedure        labs:   [facility] breast and gynecology panel September 28, 2022 returned as a variant of uncertain significance in the SMARCA4 gene , C.  254C ^ T.     We will let her know that there were no mutations.         Procedures 9-21-22:  Percutaneous ultrasound-guided vacuum-assisted excisional breast biopsy x2   Indication:  ultrasound-visible breast mass x2  Location: Left breast 3:00, 2 cm from the nipple, left breast 4:00, 2 cm from the nipple  Consent:  The risks, benefits, and alternatives to the procedure were discussed with the patient, who understood and wished to proceed.  The risks described included, but were not limited to, bleeding, infection, pneumothorax, and inadequate sampling requiring either repeat percutaneous or open excisional biopsy.  Description of Procedure:   After the patient was positioned supine on the procedure table, I located each lesion using ultrasound.  The lesion at the 3:00, 2 cm the nipple location measured in the antiradial dimension 1.7 x 0.9 cm and in the radial dimension 1.4 x 1.3 cm.  Lesion at the 4:00, 2 cm from the nipple location measured in the radial dimension 1.4 x 1.0 cm and in the antiradial dimension 1.1 x 0.6 cm.    I prepped and draped the breast skin in sterile fashion.   For each separate distinct site I performed the following procedure: I anesthetized the breast skin at the site of anticipated mammotomy with 1% lidocaine with epinephrine.  I then anesthetized the underlying subcutaneous tissue and breast  parenchyma surrounding each separate distinct lesion with 1% lidocaine with epinephrine under ultrasound visualization and guidance. I then made a nicking incision with an 11blade and inserted the 10G encore biopsy device from inferolateral to superomedial for the first biopsy and superior lateral to inferior medial for the second lesion  under ultrasound guidance.   I then took 12 sequential core samples of each separate lesion using the automated sampling of the encore device, until a majority of each lesion disappeared on ultrasound. There was minimal residual lesion visible at the conclusion of the procedure.  I removed the probe, then placed a hydromark marker, using a stiff introducer, into each of the 2 biopsy sites.   We held manual compression for 10 minutes, placed steri-strips at the mammotomy site, and wrapped the patient in a 6 inch super ace wrap with an ice-pack.  Marker placed: hydromark x2  Tolerance: The patient tolerated the procedure well.  Disposition: We will see her back within a week to review her pathology.    Assessment:   Diagnosis Plan   1. Cancer of central portion of left breast  Mammo Screening Digital Tomosynthesis Bilateral With CAD      2. Abnormal ultrasound of breast        3. Abnormal mammogram        4. Chronic pulmonary embolism without acute cor pulmonale, unspecified pulmonary embolism type        5. Shortness of breath        6. FH: breast cancer        7. Overweight (BMI 25.0-29.9)        8. History of bilateral breast reduction surgery        9. Encounter for screening mammogram for malignant neoplasm of breast  Mammo Screening Digital Tomosynthesis Bilateral With CAD        1-3  Imaging findings:  LEFT breast 1:00 RA, 2.3 cm mass with calcifications-BR4C-  LEFT breast 3:00 RA, 2 CFN- 1.1 cm mass with calcifications- BR4C target for core biopsy today  LEFT 4:00, 2-3 CFN- 1.4 cm mass- BR4C- target for biopsy today  LEFT 4:00, 3 CFN- 9mm mass, located 9mm from the above  mass- BR4C    Core biopsies in office:  Left breast 3:00, 2 cm from the nipple-  Low-grade duct carcinoma in situ, involving an intraductal papilloma.  Solid, cribriform, micropapillary, calcifications.  1.4 cm in a core.  Estrogen , progesterone , Ki-67 is 6%.    Left breast 4:00, 2 cm from the nipple-   invasive mammary carcinoma, no special type, lobular features, intermediate grade, 3, 2, 1, 6 mm, atypical duct hyperplasia with an intraductal papilloma also seen.  Estrogen , progesterone , HER2/willian 2+.  FISH  Not amplified 2.8/1.3..  Ki-67 15%.       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  254C ^ T.    - had 21 pound weight loss and off of xarelto- shortness of breath improved 4-2023    - Anastrazole Dr Arzate started -  - imaging FU 5-2023 :  Left breast 1:00 stable 2.3 cm masslike region consistent with benign fibrous tissue and adjacent coil-shaped clip that corresponds to the clip seen on mammogram with microcalcifications.  Left breast 4:00, 3 cm from the nipple clip is seen in the previously noted mass is no longer visualized  3:00 left breast 5 cm from the nipple resolution of previously described irregular lesion  Left breast 430, 3 cm from the nipple no abnormality  Impression interval resolution of all abnormal mammographic densities and suspicious sonographic lesions.  Also reduction of microcalcifications adjacent to the 3:00 clip.  Imaging  features suggest near complete interval response to medical therapy.    Imaging Fu - consistent with near complete imaging response to treatment; continued reduction in calcifications      4-  RLL subsegmental emboli- some chronic (CT 5-19-22)- sees Dr Fofana with [facility] hematology, on eliquis    5-  Labored/audible breathing at rest- dyspnea on exertion    25 PYH smoking, stopped 1998- some chronic SOB, but worsening over the past one year-     sees A pulmonologist at [facility]-  Dr Bell pulmonology : 2022 note: Remote 20-pack-year history smoking quit in the 1980s.  Recently diagnosed primary embolism unprovoked 2022.  Concern for pulmonary hypertension, ongoing evaluation by Dr. Solorio.  Patient states issues since May 2022 with persistent dyspnea on exertion of unclear etiology.  Patient currently on anticoagulation with Xarelto tolerating it fairly well.  Some concern for grade 1 diastolic dysfunction and dilated left atrium.  Some concern for undiagnosed sleep apnea.     Diagnosis given unprovoked pulmonary embolus.  Agree with anticoagulation.  Ongoing hypercoagulability evaluation.  Fatigue, most likely undiagnosed obstructive sleep apnea.  Discussed need for testing and CPAP.  Pulmonary hypertension, repeat echocardiogram to evaluate.  Defer to Dr. Solorio.  Will need a VQ scan after acute pulmonary embolus treatment to evaluate for chronic pulmonary emboli.     Cardiologist- Dr. Solorio visit September 26, 2022: Right heart cath performed: Conclusion normal pulmonary pressures, normal left-sided filling pressures, elevated right ventricular filling pressures, normal pulmonary vascular resistance.  Recommendations optimize medical therapy.    - had 21 pound weight loss and off of xarelto- shortness of breath improved 4-2023        5-  Daughter Jamilah Pfeiffer- 6-1-57- survived breast cancer. Comitted suicide in her 60s.    6-  BMI 29 down from 33, intentional, in 2022/23    Taking movajaro  injections weekly for weight loss      7- 2000- breast reduction    Plan:  The patient goes by Claudette.    Her  Akira is not with her today but did recover from his internal bleeding.    We reviewed reviewed her interval history, consultations, treatments, imaging, imaging reports and examination together today.  There is no focal physical exam finding to report.  Her imaging shows resolution of the 4 areas of concern.        I told her that the junction I would not recommend a mastectomy.  We discussed that we could either continue with medical management or we could do a bracketed lumpectomy of the 2 markers since there appears to be an imaging complete response.  The markers are over a distance of 2.8 x 3.4 cm measured on her mammogram.   We would be sure to remove the residual calcifications.  At this time based on her previous imaging and reviewing this with radiology she does not wish to proceed with surgery but wishes to continue on her medical management of her breast cancer.    I will arrange for her next bilateral mammogram  due May 2, 2024 at [facility]. At that juncture we may be able to change over to once annually with imaging.      Noelle Fam MD      Today I spent 20 minutes doing the following: Reviewing records, labs, outside imaging and reports in preparation for the patient visit; obtaining medical history; performing the physical exam; counseling and educating the patient and any available family or caregivers; ordering medications, tests or procedures; coordinating care with any other physicians on her care team as needed, and documenting all of the above in the medical record as well as sending communications with her other healthcare professionals.          Next Appointment:  Return for Next scheduled follow up, after imaging.      EMR Dragon/transcription disclaimer:    Please note that portions of this note were completed with a voice recognition  program.

## 2023-11-20 DIAGNOSIS — R11.0 NAUSEA: ICD-10-CM

## 2023-11-21 RX ORDER — ONDANSETRON HYDROCHLORIDE 8 MG/1
TABLET, FILM COATED ORAL
Qty: 60 TABLET | Refills: 10 | Status: SHIPPED | OUTPATIENT
Start: 2023-11-21

## 2023-12-11 ENCOUNTER — HOSPITAL ENCOUNTER (OUTPATIENT)
Dept: CT IMAGING | Facility: HOSPITAL | Age: 78
Discharge: HOME | End: 2023-12-11
Admitting: INTERNAL MEDICINE
Payer: MEDICARE

## 2023-12-11 DIAGNOSIS — E11.42 DIABETIC PERIPHERAL NEUROPATHY: Chronic | ICD-10-CM

## 2023-12-11 DIAGNOSIS — R91.1 LEFT LOWER LOBE PULMONARY NODULE: ICD-10-CM

## 2023-12-11 DIAGNOSIS — J18.9 PNEUMONIA OF BOTH UPPER LOBES DUE TO INFECTIOUS ORGANISM: ICD-10-CM

## 2023-12-11 PROCEDURE — 71250 CT THORAX DX C-: CPT

## 2023-12-11 RX ORDER — GABAPENTIN 300 MG/1
CAPSULE ORAL
Qty: 90 CAPSULE | Refills: 5 | Status: SHIPPED | OUTPATIENT
Start: 2023-12-11

## 2023-12-11 NOTE — TELEPHONE ENCOUNTER
Rx Refill Note  Requested Prescriptions     Pending Prescriptions Disp Refills    gabapentin (NEURONTIN) 300 MG capsule [Pharmacy Med Name: GABAPENTIN 300 MG CAPSULE] 90 capsule      Sig: TAKE ONE CAPSULE BY MOUTH THREE TIMES A DAY FOR PERIPHERAL NEUROPATHY AS DIRECTED      Last office visit with prescribing clinician: 11/6/2023   Last telemedicine visit with prescribing clinician: Visit date not found   Next office visit with prescribing clinician: 2/5/2024       Sky Chairez MA  12/11/23, 13:35 EST

## 2023-12-15 DIAGNOSIS — R91.1 LEFT LOWER LOBE PULMONARY NODULE: Primary | ICD-10-CM

## 2024-01-02 ENCOUNTER — TELEPHONE (OUTPATIENT)
Dept: INTERNAL MEDICINE | Facility: CLINIC | Age: 79
End: 2024-01-02
Payer: COMMERCIAL

## 2024-01-02 ENCOUNTER — HOSPITAL ENCOUNTER (EMERGENCY)
Facility: HOSPITAL | Age: 79
Discharge: HOME | End: 2024-01-02
Attending: EMERGENCY MEDICINE
Payer: COMMERCIAL

## 2024-01-02 ENCOUNTER — APPOINTMENT (OUTPATIENT)
Dept: GENERAL RADIOLOGY | Facility: HOSPITAL | Age: 79
End: 2024-01-02
Payer: COMMERCIAL

## 2024-01-02 ENCOUNTER — NURSE TRIAGE (OUTPATIENT)
Dept: CALL CENTER | Facility: HOSPITAL | Age: 79
End: 2024-01-02
Payer: COMMERCIAL

## 2024-01-02 ENCOUNTER — APPOINTMENT (OUTPATIENT)
Dept: CT IMAGING | Facility: HOSPITAL | Age: 79
End: 2024-01-02
Payer: COMMERCIAL

## 2024-01-02 VITALS
OXYGEN SATURATION: 96 % | RESPIRATION RATE: 18 BRPM | BODY MASS INDEX: 32.69 KG/M2 | HEART RATE: 78 BPM | HEIGHT: 66 IN | DIASTOLIC BLOOD PRESSURE: 91 MMHG | TEMPERATURE: 97.6 F | SYSTOLIC BLOOD PRESSURE: 152 MMHG

## 2024-01-02 DIAGNOSIS — R06.00 DYSPNEA, UNSPECIFIED TYPE: Primary | ICD-10-CM

## 2024-01-02 LAB
ALBUMIN SERPL-MCNC: 4.3 G/DL (ref 3.5–5.2)
ALBUMIN/GLOB SERPL: 1.7 G/DL
ALP SERPL-CCNC: 140 U/L (ref 39–117)
ALT SERPL W P-5'-P-CCNC: 25 U/L (ref 1–33)
ANION GAP SERPL CALCULATED.3IONS-SCNC: 8.1 MMOL/L (ref 5–15)
AST SERPL-CCNC: 23 U/L (ref 1–32)
BASOPHILS # BLD AUTO: 0.02 10*3/MM3 (ref 0–0.2)
BASOPHILS NFR BLD AUTO: 0.3 % (ref 0–1.5)
BILIRUB SERPL-MCNC: 0.3 MG/DL (ref 0–1.2)
BUN SERPL-MCNC: 11 MG/DL (ref 8–23)
BUN/CREAT SERPL: 12.1 (ref 7–25)
CALCIUM SPEC-SCNC: 9.5 MG/DL (ref 8.6–10.5)
CHLORIDE SERPL-SCNC: 99 MMOL/L (ref 98–107)
CO2 SERPL-SCNC: 27.9 MMOL/L (ref 22–29)
CREAT SERPL-MCNC: 0.91 MG/DL (ref 0.57–1)
DEPRECATED RDW RBC AUTO: 43 FL (ref 37–54)
EGFRCR SERPLBLD CKD-EPI 2021: 63.9 ML/MIN/1.73
EOSINOPHIL # BLD AUTO: 0.14 10*3/MM3 (ref 0–0.4)
EOSINOPHIL NFR BLD AUTO: 2.4 % (ref 0.3–6.2)
ERYTHROCYTE [DISTWIDTH] IN BLOOD BY AUTOMATED COUNT: 12.1 % (ref 12.3–15.4)
GLOBULIN UR ELPH-MCNC: 2.6 GM/DL
GLUCOSE SERPL-MCNC: 129 MG/DL (ref 65–99)
HCT VFR BLD AUTO: 37.7 % (ref 34–46.6)
HGB BLD-MCNC: 12.5 G/DL (ref 12–15.9)
HOLD SPECIMEN: NORMAL
HOLD SPECIMEN: NORMAL
IMM GRANULOCYTES # BLD AUTO: 0.05 10*3/MM3 (ref 0–0.05)
IMM GRANULOCYTES NFR BLD AUTO: 0.9 % (ref 0–0.5)
LYMPHOCYTES # BLD AUTO: 1.37 10*3/MM3 (ref 0.7–3.1)
LYMPHOCYTES NFR BLD AUTO: 23.5 % (ref 19.6–45.3)
MCH RBC QN AUTO: 31.7 PG (ref 26.6–33)
MCHC RBC AUTO-ENTMCNC: 33.2 G/DL (ref 31.5–35.7)
MCV RBC AUTO: 95.7 FL (ref 79–97)
MONOCYTES # BLD AUTO: 0.6 10*3/MM3 (ref 0.1–0.9)
MONOCYTES NFR BLD AUTO: 10.3 % (ref 5–12)
NEUTROPHILS NFR BLD AUTO: 3.65 10*3/MM3 (ref 1.7–7)
NEUTROPHILS NFR BLD AUTO: 62.6 % (ref 42.7–76)
NT-PROBNP SERPL-MCNC: 400 PG/ML (ref 0–1800)
PLATELET # BLD AUTO: 224 10*3/MM3 (ref 140–450)
PMV BLD AUTO: 9.2 FL (ref 6–12)
POTASSIUM SERPL-SCNC: 4.2 MMOL/L (ref 3.5–5.2)
PROT SERPL-MCNC: 6.9 G/DL (ref 6–8.5)
QT INTERVAL: 391 MS
QTC INTERVAL: 419 MS
RBC # BLD AUTO: 3.94 10*6/MM3 (ref 3.77–5.28)
SODIUM SERPL-SCNC: 135 MMOL/L (ref 136–145)
TROPONIN T SERPL HS-MCNC: 17 NG/L
WBC NRBC COR # BLD AUTO: 5.83 10*3/MM3 (ref 3.4–10.8)
WHOLE BLOOD HOLD COAG: NORMAL
WHOLE BLOOD HOLD SPECIMEN: NORMAL

## 2024-01-02 PROCEDURE — 71275 CT ANGIOGRAPHY CHEST: CPT

## 2024-01-02 PROCEDURE — 71045 X-RAY EXAM CHEST 1 VIEW: CPT

## 2024-01-02 PROCEDURE — 83880 ASSAY OF NATRIURETIC PEPTIDE: CPT | Performed by: EMERGENCY MEDICINE

## 2024-01-02 PROCEDURE — 99285 EMERGENCY DEPT VISIT HI MDM: CPT

## 2024-01-02 PROCEDURE — 25510000001 IOPAMIDOL PER 1 ML: Performed by: EMERGENCY MEDICINE

## 2024-01-02 PROCEDURE — 80053 COMPREHEN METABOLIC PANEL: CPT | Performed by: EMERGENCY MEDICINE

## 2024-01-02 PROCEDURE — 84484 ASSAY OF TROPONIN QUANT: CPT | Performed by: EMERGENCY MEDICINE

## 2024-01-02 PROCEDURE — 93005 ELECTROCARDIOGRAM TRACING: CPT | Performed by: EMERGENCY MEDICINE

## 2024-01-02 PROCEDURE — 85025 COMPLETE CBC W/AUTO DIFF WBC: CPT | Performed by: EMERGENCY MEDICINE

## 2024-01-02 RX ORDER — SODIUM CHLORIDE 0.9 % (FLUSH) 0.9 %
10 SYRINGE (ML) INJECTION AS NEEDED
Status: DISCONTINUED | OUTPATIENT
Start: 2024-01-02 | End: 2024-01-02 | Stop reason: HOSPADM

## 2024-01-02 RX ADMIN — IOPAMIDOL 67 ML: 755 INJECTION, SOLUTION INTRAVENOUS at 16:19

## 2024-01-02 NOTE — TELEPHONE ENCOUNTER
"Patient called into the hub and spoke to a triage nurse about being SOB and a \"very small\" pain around her chest area. Patient believes to have a blood clot. You had found the last one she mentioned and it is the same symptoms as before.     She does have an appointment to see you tomorrow at 12:30. I told her I would send you a message to see what you wanted her to do until then.    I advised ER but she would feel better if you gave the medical advise.     Written for documentation. Thank you.  "

## 2024-01-02 NOTE — ED TRIAGE NOTES
Pt via PV from home with c/o SOA, dry cough, intermittent CP that radiates to back x couple weeks.

## 2024-01-02 NOTE — TELEPHONE ENCOUNTER
I spoke with pt she said she was having chest pain and shortness of breath. I advised her to go to ER. She verbalized understanding and said she will go ASAP.

## 2024-01-02 NOTE — DISCHARGE INSTRUCTIONS
Today we do not see any evidence of a blood clot on the CAT scan.  We likewise do not see any evidence of pneumonia.    At this time I recommend Dacian would be to follow-up with your primary care physician within the week to recheck your status.  If you continue to have unexplained shortness of breath a stress test would be a consideration.    We are happy to see you at any time should your shortness of breath acutely worsening have any other difficulties.    Continue follow-up with your primary care for CAT scan in 3 to 6 months as previously discussed on the nodule in the left lower lobe which was discovered on your previous CAT scan.    Please read all of the instructions in this handout.  If you receive prescriptions please fill them and take them as directed.  Please call your primary care physician for follow-up appointment in the next 5 to 7 days.  If you do not have a physician you may call the [facility] referral line at [phone].    You may return to the emergency department at any time for any concerns such as worsening symptoms.  If you received a work or school note it will be printed at the back of this packet.

## 2024-01-02 NOTE — TELEPHONE ENCOUNTER
"HUB transferred call  SOB  chest pain    History blood clots and breast cancer.    SOB with exertion for last month had chest pain/tightness about 1 week ago, one episode lasted a few minutes    She is not going to the ED. It is a total waste of time.    Triage done. Care advice given.    Warm transfer to PCP office. Spoke with Srinath      Reason for Disposition   [1] MILD difficulty breathing (e.g., minimal/no SOB at rest, SOB with walking, pulse <100) AND [2] NEW-onset or WORSE than normal    Additional Information   Negative: SEVERE difficulty breathing (e.g., struggling for each breath, speaks in single words)   Negative: [1] Breathing stopped AND [2] hasn't returned   Negative: Choking on something   Negative: Bluish (or gray) lips or face now   Negative: Difficult to awaken or acting confused (e.g., disoriented, slurred speech)   Negative: Passed out (i.e., lost consciousness, collapsed and was not responding)   Negative: Wheezing started suddenly after medicine, an allergic food or bee sting   Negative: Stridor (harsh sound while breathing in)   Negative: Slow, shallow and weak breathing   Negative: Sounds like a life-threatening emergency to the triager   Negative: Chest pain   Negative: [1] Wheezing (high pitched whistling sound) AND [2] previous asthma attacks or use of asthma medicines   Negative: [1] Difficulty breathing AND [2] only present when coughing   Negative: [1] Difficulty breathing AND [2] only from stuffy or runny nose   Negative: [1] Difficulty breathing AND [2] within 14 days of COVID-19 Exposure   Negative: [1] MODERATE difficulty breathing (e.g., speaks in phrases, SOB even at rest, pulse 100-120) AND [2] NEW-onset or WORSE than normal   Negative: Oxygen level (e.g., pulse oximetry) 90 percent or lower   Negative: Wheezing can be heard across the room   Negative: Drooling or spitting out saliva (because can't swallow)   Negative: History of prior \"blood clot\" in leg or lungs (i.e., deep " "vein thrombosis, pulmonary embolism)   Negative: History of inherited increased risk of blood clots (e.g., Factor 5 Leiden, Anti-thrombin 3, Protein C or Protein S deficiency, Prothrombin mutation)   Negative: Major surgery in the past month   Negative: Hip or leg fracture (broken bone) in past month (or had cast on leg or ankle in past month)   Negative: Illness requiring prolonged bedrest in past month (e.g., immobilization, long hospital stay)   Negative: Long-distance travel in past month (e.g., car, bus, train, plane; with trip lasting 6 or more hours)   Negative: Cancer treatment in past six months (or has cancer now)   Negative: Extra heartbeats, irregular heart beating, or heart is beating very fast  (i.e., \"palpitations\")   Negative: Fever > 103 F (39.4 C)   Negative: [1] Fever > 101 F (38.3 C) AND [2] age > 60 years   Negative: [1] Fever > 100.0 F (37.8 C) AND [2] bedridden (e.g., CVA, chronic illness, recovering from surgery)   Negative: [1] Fever > 100.0 F (37.8 C) AND [2] diabetes mellitus or weak immune system (e.g., HIV positive, cancer chemo, splenectomy, organ transplant, chronic steroids)   Negative: [1] Periods where breathing stops and then resumes normally AND [2] bedridden (e.g., CVA)   Negative: Pregnant or postpartum (from 0 to 6 weeks after delivery)   Negative: Patient sounds very sick or weak to the triager    Answer Assessment - Initial Assessment Questions  1. RESPIRATORY STATUS: \"Describe your breathing?\" (e.g., wheezing, shortness of breath, unable to speak, severe coughing)   Shortness of breath with exertion with normal activities.  2. ONSET: \"When did this breathing problem begin?\"       About 1 month ago  3. PATTERN \"Does the difficult breathing come and go, or has it been constant since it started?\"      intermittent  4. SEVERITY: \"How bad is your breathing?\" (e.g., mild, moderate, severe)     - MILD: No SOB at rest, mild SOB with walking, speaks normally in sentences, can lie " "down, no retractions, pulse < 100.     - MODERATE: SOB at rest, SOB with minimal exertion and prefers to sit, cannot lie down flat, speaks in phrases, mild retractions, audible wheezing, pulse 100-120.     - SEVERE: Very SOB at rest, speaks in single words, struggling to breathe, sitting hunched forward, retractions, pulse > 120      moderate   5. RECURRENT SYMPTOM: \"Have you had difficulty breathing before?\" If Yes, ask: \"When was the last time?\" and \"What happened that time?\"       *No Answer*  6. CARDIAC HISTORY: \"Do you have any history of heart disease?\" (e.g., heart attack, angina, bypass surgery, angioplasty)       denies  7. LUNG HISTORY: \"Do you have any history of lung disease?\"  (e.g., pulmonary embolus, asthma, emphysema)      History blood clots  8. CAUSE: \"What do you think is causing the breathing problem?\"     not sure    9. OTHER SYMPTOMS: \"Do you have any other symptoms? (e.g., dizziness, runny nose, cough, chest pain, fever)  Dry cough  10. O2 SATURATION MONITOR:  \"Do you use an oxygen saturation monitor (pulse oximeter) at home?\" If Yes, ask: \"What is your reading (oxygen level) today?\" \"What is your usual oxygen saturation reading?\" (e.g., 95%)        Does not wear O2   11. PREGNANCY: \"Is there any chance you are pregnant?\" \"When was your last menstrual period?\"       na  12. TRAVEL: \"Have you traveled out of the country in the last month?\" (e.g., travel history, exposures)        *No Answer*    Protocols used: Breathing Difficulty-ADULT-AH    "

## 2024-01-02 NOTE — FSED PROVIDER NOTE
Subjective   History of Present Illness  Patient is a 78-year-old female.  She does have a history of primary osteoarthritis, essential hypertension, type 2 diabetes, chronic insomnia, chronic low back pain.  She likewise does have a history of multiple pulmonary emboli in the past and was maintained on Xarelto up until approximately 6 months ago.  No calf pain or swelling.  She reports that approxi-1 week ago she did have some upper chest pain that lasted for approximately 1 minutes.  She admits to mild exertional dyspnea.  No fever no chills.  No purulent sputum production      Review of Systems  Constitutional: No fevers, chills, sweats unless otherwise documented in HPI  Eyes: No recent visual problems, eye discharge, eye pain, redness unless otherwise documented in HPI  HEENT: No ear pain, nasal congestion, sore throat, voice changes unless otherwise documented in HPI  Respiratory: No shortness of breath, cough, pain on breathing, sputum production unless otherwise documented in HPI  Cardiovascular: No chest pain, palpitations, syncope, orthopnea unless otherwise documented in HPI  Gastrointestinal: No nausea, vomiting, diarrhea, constipation unless otherwise documented in HPI  Genitourinary: No hematuria, dysuria, incontinence unless otherwise documented in HPI  Endocrine: Negative for excessive thirst, excessive hunger, excessive urination, heat or cold intolerance unless otherwise documented in HPI  Musculoskeletal: No back pain, neck pain, joint pain, muscle pain, decreased range of motion unless otherwise documented in HPI  Integumentary: No rash, pruritus, abrasion, lesions unless otherwise documented in HPI  Neurologic: No weakness, numbness, frequent headaches, tremors unless otherwise documented in HPI  Psychiatric: No anxiety, depression, mood changes, hallucinations unless otherwise documented in HPI        Past Medical History:   Diagnosis Date    Benign essential hypertension     Chronic anxiety  10/26/2022    Chronic bilateral low back pain without sciatica 08/16/2013 March 13, 2019--Limited bone scan.  This reveals scintigraphic activity in the spine and at the right shoulder corresponds to change seen on recent x-rays and is best explained by degenerative change.  Isolated metastatic disease exactly corresponding to the sites of extensive degenerative disc change would be peculiar.  March 7, 2019--new patient to get established.  She has complaints of approxi    Chronic bilateral thoracic back pain 02/26/2019 March 13, 2019--Limited bone scan.  This reveals scintigraphic activity in the spine and at the right shoulder corresponds to change seen on recent x-rays and is best explained by degenerative change.  Isolated metastatic disease exactly corresponding to the sites of extensive degenerative disc change would be peculiar.  March 1, 2019--x-ray of the lumbar and thoracic spine reveals mild anterior l    Chronic insomnia 11/04/2014    Diabetic peripheral neuropathy 03/07/2019    Characterized by decreased sensation and paresthesias in both lower extremities described as a burning sensation.  Extends up to the knees.  Reportedly had been evaluated with nerve conduction study in the past.    Ductal carcinoma of left breast 08/26/2022    Generalized anxiety disorder 05/19/2013    History of Bell's palsy 05/21/2013    Remote history of Bell's palsy.  Patient does not remember which side of the face.    History of COVID-19 12/08/2022    History of multiple pulmonary emboli 05/19/2022    Stacie 15, 2022--patient seen in follow-up by Dr. Moore.  She is complaining of continued dyspnea on exertion which may be worse.  I reviewed the cardiology consultation from June 7, 2022 and also reviewed the echocardiogram from that same date.  Noted below.  Her  is now present and he reports her dyspnea on exertion is definitely worse.  Her oxygen saturation at rest is 96%.  Upon ambulatio    Hyperlipidemia      Impaired fasting glucose     Menopausal state, 8/19/2020--normal DEXA. 03/07/2019 August 19, 2020--DEXA scan reveals lumbar spine T score 3.8.  Left femoral neck T score 2.5.  Right femoral neck T score 2.2.  Normal bone density.    Non morbid obesity 08/11/2022    Peripheral neuropathy, idiopathic 03/07/2019    Characterized by decreased sensation and paresthesias in both lower extremities described as a burning sensation.  Extends up to the knees.  Reportedly had been evaluated with nerve conduction study in the past.    Primary hypothyroidism 05/19/2013    Primary osteoarthritis involving multiple joints 04/22/2013    Rotator cuff arthropathy of right shoulder, 4/20/2021--status post right reverse total shoulder arthroplasty 05/27/2020    Situational mixed anxiety and depressive disorder 08/21/2019 August 21, 2019--patient seen in follow-up after I called in a prescription for Ativan after the patient's  contacted me a few weeks ago regarding the sudden death of patient's daughter.  This was an apparent suicide and the patient found her daughter dead.  I will not go into details.  Patient reports the Lorazepam has been helpful but she is still quite distraught, nervous, and undergoing    Type 2 diabetes mellitus with diabetic neuropathy, without long-term current use of insulin     Vitamin D deficiency 02/26/2019       Allergies   Allergen Reactions    Morphine And Related     Other Other (See Comments)     REJECTED DACRON SUTURES IN THE PAST AND INCISION CAME APART       Past Surgical History:   Procedure Laterality Date    BILATERAL BREAST REDUCTION  Early 2000    Early 2000--bilateral breast reduction    CARDIAC CATHETERIZATION N/A 9/26/2022    Procedure: Right Heart Cath;  Surgeon: Agus Solorio MD PhD;  Location: [facility] CATH INVASIVE LOCATION;  Service: Cardiology;  Laterality: N/A;  Will REMAIN on Xarelto for the case    CHOLECYSTECTOMY  1960s    1960s--open cholecystectomy     COLONOSCOPY  2012--reportedly normal colonoscopy    INCONTINENCE SURGERY      Approximately --implantation of questionable bladder stimulator right sacral area for urinary incontinence.  Details not known.    REPLACEMENT TOTAL KNEE BILATERAL Left ; 2011    9812-9981--bilateral total knee replacement    SUBTOTAL HYSTERECTOMY  Remote    Remote partial hysterectomy.  Ovaries intact.    TOTAL SHOULDER REPLACEMENT Left 2013--left total shoulder replacement.    TOTAL SHOULDER REPLACEMENT  2021--status post right reverse total shoulder arthroplasty    TOTAL SHOULDER REVERSE ARTHROPLASTY Right 2021--right reverse total shoulder replacement.       Family History   Problem Relation Age of Onset    Alcohol abuse Father     Breast cancer Daughter         40s    Cancer Other     Diabetes Other         type II    Leukemia Grandchild 19       Social History     Socioeconomic History    Marital status:    Tobacco Use    Smoking status: Former     Types: Cigarettes     Quit date: 1998     Years since quittin.0    Smokeless tobacco: Never   Vaping Use    Vaping Use: Never used   Substance and Sexual Activity    Alcohol use: Yes     Alcohol/week: 1.0 standard drink of alcohol     Types: 1 Glasses of wine per week     Comment: current some day    Drug use: No    Sexual activity: Defer     Partners: Male           Objective   Physical Exam  Vital signs: Reviewed in nurses notes    General: Awake alert no acute distress    HEENT: Normocephalic atraumatic.  Nasopharynx clear pharynx clear  Neck:   Supple without lymphadenopathy    Respiratory:   Nonlabored respirations.  Clear to auscultation bilaterally.  Equal breath sounds bilaterally.  No wheezes or stridor noted.    Cardiovascular: Regular rate and rhythm.  No pretibial or pedal edema.  No calf pain or swelling    Abdomen: Soft, nontender to palpation.  Normal bowel sounds noted.  No masses  noted.    Skin:   Warm and dry.  No rashes noted    Neurological examination: Patient is awake alert oriented x4.  Speech is normal.  No facial palsy.  No focal motor or sensory deficits.    ECG 12 Lead      Date/Time: 1/2/2024 3:26 PM    Performed by: Pawan Bean MD  Authorized by: Pawan Bean MD  Interpreted by physician  Rhythm: sinus rhythm  Comments: Normal sinus rhythm rate of 69.  Left axis deviation noted.  No acute ST elevation or depression noted      Lab Results (last 72 hours)       Procedure Component Value Units Date/Time    CBC & Differential [[id number]]  (Abnormal) Collected: 01/02/24 1540    Specimen: Blood Updated: 01/02/24 1543    Narrative:      The following orders were created for panel order CBC & Differential.  Procedure                               Abnormality         Status                     ---------                               -----------         ------                     CBC Auto Differential[[id number]]        Abnormal            Final result                 Please view results for these tests on the individual orders.    Comprehensive Metabolic Panel [[id number]]  (Abnormal) Collected: 01/02/24 1540    Specimen: Blood Updated: 01/02/24 1601     Glucose 129 mg/dL      BUN 11 mg/dL      Creatinine 0.91 mg/dL      Sodium 135 mmol/L      Potassium 4.2 mmol/L      Chloride 99 mmol/L      CO2 27.9 mmol/L      Calcium 9.5 mg/dL      Total Protein 6.9 g/dL      Albumin 4.3 g/dL      ALT (SGPT) 25 U/L      AST (SGOT) 23 U/L      Alkaline Phosphatase 140 U/L      Total Bilirubin 0.3 mg/dL      Globulin 2.6 gm/dL      A/G Ratio 1.7 g/dL      BUN/Creatinine Ratio 12.1     Anion Gap 8.1 mmol/L      eGFR 63.9 mL/min/1.73     Narrative:      GFR Normal >60  Chronic Kidney Disease <60  Kidney Failure <15    The GFR formula is only valid for adults with stable renal function between ages 18 and 70.    BNP [[id number]]  (Normal) Collected: 01/02/24 1540    Specimen: Blood Updated:  01/02/24 1608     proBNP 400.0 pg/mL     Narrative:      This assay is used as an aid in the diagnosis of individuals suspected of having heart failure. It can be used as an aid in the diagnosis of acute decompensated heart failure (ADHF) in patients presenting with signs and symptoms of ADHF to the emergency department (ED). In addition, NT-proBNP of <300 pg/mL indicates ADHF is not likely.    Age Range Result Interpretation  NT-proBNP Concentration (pg/mL:      <50             Positive            >450                   Gray                 300-450                    Negative             <300    50-75           Positive            >900                  Gray                300-900                  Negative            <300      >75             Positive            >1800                  Gray                300-1800                  Negative            <300    Single High Sensitivity Troponin T [[id number]]  (Abnormal) Collected: 01/02/24 1540    Specimen: Blood Updated: 01/02/24 1608     HS Troponin T 17 ng/L     Narrative:      High Sensitive Troponin T Reference Range:  <14.0 ng/L- Negative Female for AMI  <22.0 ng/L- Negative Male for AMI  >=14 - Abnormal Female indicating possible myocardial injury.  >=22 - Abnormal Male indicating possible myocardial injury.   Clinicians would have to utilize clinical acumen, EKG, Troponin, and serial changes to determine if it is an Acute Myocardial Infarction or myocardial injury due to an underlying chronic condition.         CBC Auto Differential [[id number]]  (Abnormal) Collected: 01/02/24 1540    Specimen: Blood Updated: 01/02/24 1543     WBC 5.83 10*3/mm3      RBC 3.94 10*6/mm3      Hemoglobin 12.5 g/dL      Hematocrit 37.7 %      MCV 95.7 fL      MCH 31.7 pg      MCHC 33.2 g/dL      RDW 12.1 %      RDW-SD 43.0 fl      MPV 9.2 fL      Platelets 224 10*3/mm3      Neutrophil % 62.6 %      Lymphocyte % 23.5 %      Monocyte % 10.3 %      Eosinophil % 2.4 %      Basophil % 0.3 %       Immature Grans % 0.9 %      Neutrophils, Absolute 3.65 10*3/mm3      Lymphocytes, Absolute 1.37 10*3/mm3      Monocytes, Absolute 0.60 10*3/mm3      Eosinophils, Absolute 0.14 10*3/mm3      Basophils, Absolute 0.02 10*3/mm3      Immature Grans, Absolute 0.05 10*3/mm3                   ED Course      CT Angiogram Chest Pulmonary Embolism    Result Date: 1/2/2024  Narrative: CT ANGIOGRAM CHEST PULMONARY EMBOLISM  TECHNIQUE: Radiation dose reduction techniques were utilized, including automated exposure control and exposure modulation based on body size. 2 mm images were obtained through the chest after the administration of IV contrast.  HISTORY: Shortness of breath and chest pain.  COMPARISON: CT chest angiogram 5/19/2022. CT chest 12/11/2023.   FINDINGS: Nondilated main pulmonary artery. Pulmonary arteries are patent. Respiratory motion partially imaged evaluation of the subsegmental arteries.  Heart is normal in size. Trace pericardial fluid. Nondilated thoracic aorta. No mediastinal/hilar adenopathy. Nondilated esophagus.  Respiratory motion partially limits evaluation of the lungs. Trachea and main bronchi are patent. No bronchiectasis. Diffuse mild bronchial wall thickening. Mild mosaic attenuation of the lungs, likely accentuated by partial expiration as evidenced by bowing of the posterior tracheal membrane. No segmental or lobar consolidation. Unchanged left upper lobe 3-4 mm solid nodule (series 5/image 32). A previously new 1-2 mm left upper lobe nodule/nodular opacity was better seen on prior chest CT given full inspiration. No pleural effusion.  Severe multilevel thoracic spine degenerative changes. Bilateral shoulder arthroplasties.       Impression: 1. No evidence of acute pulmonary thromboemboli. 2. A previously new 1-2 mm left upper lobe nodule/nodular opacity was better seen on prior chest CT given full inspiration. Attention on follow-up as previously recommended.   This report was finalized on  1/2/2024 4:47 PM by Dr. Luís Beard M.D on Workstation: [id number]      XR Chest 1 View    Result Date: 1/2/2024  Narrative: ONE-VIEW PORTABLE CHEST  HISTORY: Shortness of breath.  FINDINGS: The lungs are moderately well expanded and clear. The heart is top normal in size and the overall appearance is no change from 5/11/2022.  This report was finalized on 1/2/2024 3:53 PM by Dr. Chang Moss M.D on Workstation: [id number]      CT Chest Without Contrast Diagnostic    Result Date: 12/14/2023  Narrative: CT CHEST WO CONTRAST DIAGNOSTIC-  HISTORY: Follow-up pulmonary nodule, groundglass opacities, recent pneumonia.  TECHNIQUE: CT of the chest was performed without intravenous contrast. Reformatted images were reviewed. Radiation dose reduction techniques were utilized, including automated exposure control and exposure modulation based on body size.  COMPARISON: CT chest 7/17/2023   FINDINGS:   No pathologically enlarged thoracic lymph nodes are identified. Heart size is normal. There is no pericardial effusion. There is calcific coronary artery atherosclerosis. There is moderate calcific aortic atherosclerosis. The ascending aorta is dilated to 4.0 cm, not significantly changed when remeasured. Pleural spaces are clear. Limited imaging through the upper abdomen demonstrates a tiny hiatal hernia. There is calcific atherosclerosis. There is an asymmetric nodular density within the right breast. Please refer recent dedicated breast imaging. There are partially imaged bilateral shoulder arthroplasties. Streak artifact metallic hardware limits evaluation of adjacent structures. There is multilevel degenerative disc disease. The trachea is clear. There is minimal residual opacity in the anterior left lower lobe in the region of the previously noted new nodular opacity, which has largely resolved. Previously noted groundglass opacities and mosaic attenuation have improved, likely exaggerated by expiratory phase imaging on  the prior examination. Peripheral reticular and groundglass opacities on the current examination likely reflects scarring/fibrosis. There is no new dense focal consolidation. A 0.4 cm nodule in the lateral left upper lobe is not significantly changed and back to 5/19/2022 (image 20). A small nodule in the left upper lobe is new (image 29). A few additional scattered small bilateral pulmonary nodules are not significantly changed dating back to 5/19/2022.       Impression:  1.  Previously noted new nodular opacity in the left lower lobe has significantly improved. Recommend follow-up CT chest in 3 to 6 months to document complete resolution. 2.  A small nodule in the left upper lobe is new. Additional scattered bilateral pulmonary nodules measuring up to 0.4 cm are not significantly changed to the back to 5/19/2022 and are nonspecific. Recommend attention on follow-up imaging. 3.  Previously noted groundglass opacities and mosaic attenuation have improved, likely exaggerated by expiratory phase imaging on the prior examination. Peripheral reticular and groundglass opacities on the current examination likely reflects scarring/fibrosis. 4.  Additional findings, as above.     This report was finalized on 12/14/2023 7:32 AM by Dr. Aurea Donato M.D on Workstation: [id number]                                6300: Patient very stable at this time.  SaO2 is 98% on room air.  I did discuss the consideration of follow-up stress test.  Her EKG does not reveal evidence of ST depression or elevation.  Her troponin was noted to be 17.  She does not wish to stay for repeat 2-hour troponin at this time.  This is in the setting of 1 week of subjective dyspnea.  Appropriate follow-up is reasonable at this time        Ddx: pe, pneumonia, chf  Medical Decision Making  Amount and/or Complexity of Data Reviewed  Labs: ordered.  Radiology: ordered.  ECG/medicine tests: ordered and independent interpretation performed.    Risk  Prescription  drug management.    The x-rays were independently reviewed as well as review of the radiologist interpretation  The CAT scan was independently reviewed in addition to the review of the radiologist interpretation    Final diagnoses:   Dyspnea, unspecified type       ED Disposition  ED Disposition       ED Disposition   Discharge    Condition   Stable    Comment   --               Lito Moore MD  [id number] [id number] [phone]    In 1 week           Medication List      No changes were made to your prescriptions during this visit.          3

## 2024-01-04 DIAGNOSIS — E11.40 TYPE 2 DIABETES MELLITUS WITH DIABETIC NEUROPATHY, WITHOUT LONG-TERM CURRENT USE OF INSULIN: Chronic | ICD-10-CM

## 2024-01-04 NOTE — TELEPHONE ENCOUNTER
Caller: McManus, Claudette L    Relationship: Self    Best call back number:     [phone] (Mobile)       Requested Prescriptions:     Tirzepatide (Mounjaro) 15 MG/0.5ML solution pen-injector          Pharmacy where request should be sent:  [id number] [address] [facility] [phone] [facility] [phone]      Last office visit with prescribing clinician: 11/6/2023   Last telemedicine visit with prescribing clinician: Visit date not found   Next office visit with prescribing clinician: 2/5/2024     Additional details provided by patient: PATIENT CALLED TO REQUEST A MEDICATION REFILL ON HER MEDICATION. PATIENT STATES THAT SHE IS COMPLETELY OUT MEDICATION.        Does the patient have less than a 3 day supply:  [x] Yes  [] No    Would you like a call back once the refill request has been completed: [] Yes [] No    If the office needs to give you a call back, can they leave a voicemail: [] Yes [] No    Bhavesh Stiles Rep   01/04/24 14:38 EST         THANKS

## 2024-01-08 DIAGNOSIS — E11.40 TYPE 2 DIABETES MELLITUS WITH DIABETIC NEUROPATHY, WITHOUT LONG-TERM CURRENT USE OF INSULIN: Chronic | ICD-10-CM

## 2024-01-08 RX ORDER — LEVOTHYROXINE SODIUM 0.12 MG/1
TABLET ORAL
Qty: 90 TABLET | Refills: 0 | Status: SHIPPED | OUTPATIENT
Start: 2024-01-08 | End: 2024-01-15

## 2024-01-12 DIAGNOSIS — J18.9 PNEUMONIA OF BOTH UPPER LOBES DUE TO INFECTIOUS ORGANISM: ICD-10-CM

## 2024-01-12 DIAGNOSIS — R06.09 DYSPNEA ON EXERTION: ICD-10-CM

## 2024-01-12 DIAGNOSIS — Z86.16 HISTORY OF COVID-19: Chronic | ICD-10-CM

## 2024-01-15 RX ORDER — LEVOTHYROXINE SODIUM 0.12 MG/1
TABLET ORAL
Qty: 90 TABLET | Refills: 0 | Status: SHIPPED | OUTPATIENT
Start: 2024-01-15

## 2024-01-15 RX ORDER — PREDNISONE 10 MG/1
TABLET ORAL
Qty: 56 TABLET | Refills: 0 | OUTPATIENT
Start: 2024-01-15

## 2024-02-11 DIAGNOSIS — M54.50 CHRONIC BILATERAL LOW BACK PAIN WITHOUT SCIATICA: Chronic | ICD-10-CM

## 2024-02-11 DIAGNOSIS — G89.29 CHRONIC BILATERAL LOW BACK PAIN WITHOUT SCIATICA: Chronic | ICD-10-CM

## 2024-02-12 RX ORDER — CYCLOBENZAPRINE HCL 10 MG
TABLET ORAL
Qty: 30 TABLET | Refills: 1 | Status: SHIPPED | OUTPATIENT
Start: 2024-02-12

## 2024-02-15 ENCOUNTER — OFFICE VISIT (OUTPATIENT)
Dept: INTERNAL MEDICINE | Facility: CLINIC | Age: 79
End: 2024-02-15
Payer: COMMERCIAL

## 2024-02-15 VITALS
DIASTOLIC BLOOD PRESSURE: 80 MMHG | WEIGHT: 178 LBS | HEIGHT: 66 IN | SYSTOLIC BLOOD PRESSURE: 130 MMHG | BODY MASS INDEX: 28.61 KG/M2 | RESPIRATION RATE: 14 BRPM | HEART RATE: 65 BPM | OXYGEN SATURATION: 95 %

## 2024-02-15 DIAGNOSIS — T46.4X5A ACE-INHIBITOR COUGH: Chronic | ICD-10-CM

## 2024-02-15 DIAGNOSIS — R91.1 LEFT UPPER LOBE PULMONARY NODULE: ICD-10-CM

## 2024-02-15 DIAGNOSIS — Z86.16 HISTORY OF COVID-19: Chronic | ICD-10-CM

## 2024-02-15 DIAGNOSIS — I51.7 RIGHT VENTRICULAR ENLARGEMENT: Chronic | ICD-10-CM

## 2024-02-15 DIAGNOSIS — E87.1 HYPONATREMIA: ICD-10-CM

## 2024-02-15 DIAGNOSIS — Z79.01 CHRONIC ANTICOAGULATION: Chronic | ICD-10-CM

## 2024-02-15 DIAGNOSIS — C50.912 DUCTAL CARCINOMA OF LEFT BREAST: ICD-10-CM

## 2024-02-15 DIAGNOSIS — G89.29 CHRONIC BILATERAL LOW BACK PAIN WITHOUT SCIATICA: Chronic | ICD-10-CM

## 2024-02-15 DIAGNOSIS — E11.42 DIABETIC PERIPHERAL NEUROPATHY: Chronic | ICD-10-CM

## 2024-02-15 DIAGNOSIS — F41.9 CHRONIC ANXIETY: Chronic | ICD-10-CM

## 2024-02-15 DIAGNOSIS — Z78.0 POSTMENOPAUSAL STATE: Chronic | ICD-10-CM

## 2024-02-15 DIAGNOSIS — Z87.01 HISTORY OF PNEUMONIA: ICD-10-CM

## 2024-02-15 DIAGNOSIS — M54.50 CHRONIC BILATERAL LOW BACK PAIN WITHOUT SCIATICA: Chronic | ICD-10-CM

## 2024-02-15 DIAGNOSIS — R05.8 ACE-INHIBITOR COUGH: Chronic | ICD-10-CM

## 2024-02-15 DIAGNOSIS — F43.23 SITUATIONAL MIXED ANXIETY AND DEPRESSIVE DISORDER: Chronic | ICD-10-CM

## 2024-02-15 DIAGNOSIS — E78.2 MIXED HYPERLIPIDEMIA: Chronic | ICD-10-CM

## 2024-02-15 DIAGNOSIS — Z51.81 THERAPEUTIC DRUG MONITORING: ICD-10-CM

## 2024-02-15 DIAGNOSIS — E11.40 TYPE 2 DIABETES MELLITUS WITH DIABETIC NEUROPATHY, WITHOUT LONG-TERM CURRENT USE OF INSULIN: Primary | Chronic | ICD-10-CM

## 2024-02-15 DIAGNOSIS — I26.99 MULTIPLE PULMONARY EMBOLI: Chronic | ICD-10-CM

## 2024-02-15 DIAGNOSIS — F51.04 CHRONIC INSOMNIA: Chronic | ICD-10-CM

## 2024-02-15 DIAGNOSIS — M25.50 ARTHRALGIA OF MULTIPLE JOINTS: ICD-10-CM

## 2024-02-15 DIAGNOSIS — E55.9 VITAMIN D DEFICIENCY: Chronic | ICD-10-CM

## 2024-02-15 DIAGNOSIS — I10 BENIGN ESSENTIAL HYPERTENSION: Chronic | ICD-10-CM

## 2024-02-15 DIAGNOSIS — E03.9 PRIMARY HYPOTHYROIDISM: Chronic | ICD-10-CM

## 2024-02-15 DIAGNOSIS — M15.9 PRIMARY OSTEOARTHRITIS INVOLVING MULTIPLE JOINTS: Chronic | ICD-10-CM

## 2024-02-15 DIAGNOSIS — E66.9 NON MORBID OBESITY: Chronic | ICD-10-CM

## 2024-02-15 RX ORDER — MELOXICAM 15 MG/1
TABLET ORAL
Qty: 90 TABLET | Refills: 1 | Status: SHIPPED | OUTPATIENT
Start: 2024-02-15

## 2024-02-16 LAB
ANA SER QL: NEGATIVE
CCP IGA+IGG SERPL IA-ACNC: 5 UNITS (ref 0–19)
CRP SERPL-MCNC: <1 MG/L (ref 0–10)
ERYTHROCYTE [SEDIMENTATION RATE] IN BLOOD BY WESTERGREN METHOD: 10 MM/HR (ref 0–40)
RHEUMATOID FACT SERPL-ACNC: <10 IU/ML
RPR SER QL: NON REACTIVE
URATE SERPL-MCNC: 6.1 MG/DL (ref 3.1–7.9)

## 2024-03-07 DIAGNOSIS — F41.8 SITUATIONAL ANXIETY: ICD-10-CM

## 2024-03-07 DIAGNOSIS — G47.09 OTHER INSOMNIA: ICD-10-CM

## 2024-03-07 RX ORDER — TRAZODONE HYDROCHLORIDE 150 MG/1
150 TABLET ORAL NIGHTLY
Qty: 90 TABLET | Refills: 10 | Status: SHIPPED | OUTPATIENT
Start: 2024-03-07

## 2024-03-07 RX ORDER — LORAZEPAM 0.5 MG/1
TABLET ORAL
Qty: 60 TABLET | Refills: 2 | Status: SHIPPED | OUTPATIENT
Start: 2024-03-07

## 2024-03-07 NOTE — TELEPHONE ENCOUNTER
Rx Refill Note  Requested Prescriptions     Pending Prescriptions Disp Refills    traZODone (DESYREL) 150 MG tablet [Pharmacy Med Name: traZODone 150 MG TABLET] 90 tablet 10     Sig: TAKE ONE TABLET BY MOUTH ONCE NIGHTLY    LORazepam (ATIVAN) 0.5 MG tablet [Pharmacy Med Name: LORazepam 0.5 MG TABLET] 60 tablet      Sig: TAKE ONE TABLET BY MOUTH TWICE A DAY AS NEEDED FOR ANXIETY      Last office visit with prescribing clinician: 2/15/2024   Last telemedicine visit with prescribing clinician: Visit date not found   Next office visit with prescribing clinician: 8/15/2024       Sky Chairez MA  03/07/24, 14:30 EST

## 2024-03-18 ENCOUNTER — OFFICE VISIT (OUTPATIENT)
Dept: INTERNAL MEDICINE | Facility: CLINIC | Age: 79
End: 2024-03-18
Payer: MEDICARE

## 2024-03-18 VITALS
WEIGHT: 186 LBS | RESPIRATION RATE: 20 BRPM | OXYGEN SATURATION: 96 % | DIASTOLIC BLOOD PRESSURE: 80 MMHG | SYSTOLIC BLOOD PRESSURE: 140 MMHG | HEART RATE: 80 BPM | TEMPERATURE: 96.3 F | HEIGHT: 66 IN | BODY MASS INDEX: 29.89 KG/M2

## 2024-03-18 DIAGNOSIS — R41.3 MEMORY LOSS: Primary | ICD-10-CM

## 2024-03-18 DIAGNOSIS — E11.40 TYPE 2 DIABETES MELLITUS WITH DIABETIC NEUROPATHY, WITHOUT LONG-TERM CURRENT USE OF INSULIN: Chronic | ICD-10-CM

## 2024-03-18 DIAGNOSIS — I10 BENIGN ESSENTIAL HYPERTENSION: Chronic | ICD-10-CM

## 2024-03-18 DIAGNOSIS — F51.04 CHRONIC INSOMNIA: Chronic | ICD-10-CM

## 2024-03-18 DIAGNOSIS — R41.89 COGNITIVE IMPAIRMENT: ICD-10-CM

## 2024-03-18 DIAGNOSIS — Z51.81 THERAPEUTIC DRUG MONITORING: ICD-10-CM

## 2024-03-18 DIAGNOSIS — E03.9 PRIMARY HYPOTHYROIDISM: Chronic | ICD-10-CM

## 2024-03-18 DIAGNOSIS — E11.42 DIABETIC PERIPHERAL NEUROPATHY: Chronic | ICD-10-CM

## 2024-03-18 PROBLEM — C50.912 DUCTAL CARCINOMA OF LEFT BREAST: Chronic | Status: ACTIVE | Noted: 2022-08-26

## 2024-03-18 PROBLEM — Z87.01 HISTORY OF PNEUMONIA: Status: RESOLVED | Noted: 2023-07-19 | Resolved: 2024-03-18

## 2024-03-18 NOTE — PROGRESS NOTES
03/18/2024    Patient Information  Claudette L McManus                                                                                          [id number]  [place] [address]      1945  [unfilled]  There is no work phone number on file.    Chief Complaint:     Complaining of problems with memory.    History of Present Illness:    Patient presents with at least a 1 year history of problems with her memory and also with problems of cognition that has been progressing rapidly in the past 3 months or so.  Sometimes she will forget names of people that she is known for many years.  She will start to tell her  something and then forget what it was that she was going to tell him.  She can go to [employer] and then immediately after cannot remember what the sermon was all about.  Today she is alert and oriented x 3 including person place and thing.  She had an MRI of her brain that was done about a year ago which was unrevealing.  She has never had cognitive testing.    Review of Systems   Constitutional: Negative.   HENT: Negative.     Eyes: Negative.    Cardiovascular: Negative.    Respiratory: Negative.     Endocrine: Negative.    Hematologic/Lymphatic: Negative.    Skin: Negative.    Musculoskeletal:  Positive for arthritis and joint pain.   Gastrointestinal: Negative.    Genitourinary: Negative.    Neurological: Negative.    Psychiatric/Behavioral:  Positive for memory loss. Negative for hallucinations. The patient has insomnia.         Impaired cognition   Allergic/Immunologic: Negative.        Active Problems:    Patient Active Problem List   Diagnosis    Primary osteoarthritis involving multiple joints    Benign essential hypertension    Hyperlipidemia    Primary hypothyroidism    Type 2 diabetes mellitus with diabetic neuropathy, without long-term current use of insulin    Chronic insomnia    Chronic bilateral low back pain without sciatica    Chronic bilateral thoracic back pain     Vitamin D deficiency    Therapeutic drug monitoring    Postmenopausal state    Diabetic peripheral neuropathy    Situational mixed anxiety and depressive disorder    ACE-inhibitor cough    History of multiple pulmonary emboli    Non morbid obesity    Ductal carcinoma of left breast    Right ventricular enlargement    Chronic anxiety    History of COVID-19    Left upper lobe pulmonary nodule    Arthralgia of multiple joints    Memory loss    Cognitive impairment         Past Medical History:   Diagnosis Date    Benign essential hypertension     Chronic anxiety 10/26/2022    Chronic bilateral low back pain without sciatica 08/16/2013 March 13, 2019--Limited bone scan.  This reveals scintigraphic activity in the spine and at the right shoulder corresponds to change seen on recent x-rays and is best explained by degenerative change.  Isolated metastatic disease exactly corresponding to the sites of extensive degenerative disc change would be peculiar.  March 7, 2019--new patient to get established.  She has complaints of approxi    Chronic bilateral thoracic back pain 02/26/2019 March 13, 2019--Limited bone scan.  This reveals scintigraphic activity in the spine and at the right shoulder corresponds to change seen on recent x-rays and is best explained by degenerative change.  Isolated metastatic disease exactly corresponding to the sites of extensive degenerative disc change would be peculiar.  March 1, 2019--x-ray of the lumbar and thoracic spine reveals mild anterior l    Chronic insomnia 11/04/2014    Diabetic peripheral neuropathy 03/07/2019    Characterized by decreased sensation and paresthesias in both lower extremities described as a burning sensation.  Extends up to the knees.  Reportedly had been evaluated with nerve conduction study in the past.    Ductal carcinoma of left breast 08/26/2022    Generalized anxiety disorder 05/19/2013    History of Bell's palsy 05/21/2013    Remote history of Bell's  palsy.  Patient does not remember which side of the face.    History of COVID-19 12/08/2022    History of multiple pulmonary emboli 05/19/2022    Stacie 15, 2022--patient seen in follow-up by Dr. Moore.  She is complaining of continued dyspnea on exertion which may be worse.  I reviewed the cardiology consultation from June 7, 2022 and also reviewed the echocardiogram from that same date.  Noted below.  Her  is now present and he reports her dyspnea on exertion is definitely worse.  Her oxygen saturation at rest is 96%.  Upon ambulatio    History of pneumonia 07/19/2023 July 19, 2023--it appears the patient may have an infectious process versus an inflammatory process in both of her lungs.  She was placed on a Z-Sreedhar by the nurse practitioner which I think is certainly reasonable.  I do think she would benefit from a round of prednisone.  There is possibly a new pulmonary nodule that we will need monitoring.  I think these are changes left over from COVID which sh    Hyperlipidemia     Impaired fasting glucose     Left upper lobe pulmonary nodule 07/19/2023 January 2, 2024--CT ANGIOGRAM CHEST PULMONARY EMBOLISM     TECHNIQUE: Radiation dose reduction techniques were utilized, including automated exposure control and exposure modulation based on body size. 2 mm images were obtained through the chest after the administration of IV contrast.     HISTORY: Shortness of breath and chest pain.     COMPARISON: CT chest angiogram 5/19/2022. CT chest 12/11/202    Menopausal state, 8/19/2020--normal DEXA. 03/07/2019 August 19, 2020--DEXA scan reveals lumbar spine T score 3.8.  Left femoral neck T score 2.5.  Right femoral neck T score 2.2.  Normal bone density.    Non morbid obesity 08/11/2022    Peripheral neuropathy, idiopathic 03/07/2019    Characterized by decreased sensation and paresthesias in both lower extremities described as a burning sensation.  Extends up to the knees.  Reportedly had been evaluated  with nerve conduction study in the past.    Primary hypothyroidism 05/19/2013    Primary osteoarthritis involving multiple joints 04/22/2013    Rotator cuff arthropathy of right shoulder, 4/20/2021--status post right reverse total shoulder arthroplasty 05/27/2020    Situational mixed anxiety and depressive disorder 08/21/2019 August 21, 2019--patient seen in follow-up after I called in a prescription for Ativan after the patient's  contacted me a few weeks ago regarding the sudden death of patient's daughter.  This was an apparent suicide and the patient found her daughter dead.  I will not go into details.  Patient reports the Lorazepam has been helpful but she is still quite distraught, nervous, and undergoing    Type 2 diabetes mellitus with diabetic neuropathy, without long-term current use of insulin     Vitamin D deficiency 02/26/2019         Past Surgical History:   Procedure Laterality Date    BILATERAL BREAST REDUCTION  Early 2000    Early 2000--bilateral breast reduction    CARDIAC CATHETERIZATION N/A 9/26/2022    Procedure: Right Heart Cath;  Surgeon: Agus Solorio MD PhD;  Location: [facility] INVASIVE LOCATION;  Service: Cardiology;  Laterality: N/A;  Will REMAIN on Xarelto for the case    CHOLECYSTECTOMY  1960s    1960s--open cholecystectomy    COLONOSCOPY  2012 2012--reportedly normal colonoscopy    INCONTINENCE SURGERY  2012 Approximately 2012--implantation of questionable bladder stimulator right sacral area for urinary incontinence.  Details not known.    REPLACEMENT TOTAL KNEE BILATERAL Left 2010; 2011 2010-2011--bilateral total knee replacement    SUBTOTAL HYSTERECTOMY  Remote    Remote partial hysterectomy.  Ovaries intact.    TOTAL SHOULDER REPLACEMENT Left 2013 2013--left total shoulder replacement.    TOTAL SHOULDER REPLACEMENT  April 28, 2021 4/20/2021--status post right reverse total shoulder arthroplasty    TOTAL SHOULDER REVERSE ARTHROPLASTY Right  04/20/2021 4/20/2021--right reverse total shoulder replacement.         Allergies   Allergen Reactions    Morphine And Related     Other Other (See Comments)     REJECTED DACRON SUTURES IN THE PAST AND INCISION CAME APART           Current Outpatient Medications:     Cholecalciferol (VITAMIN D3) 2000 UNITS tablet, Take 1 tablet by mouth Daily., Disp: , Rfl:     cyclobenzaprine (FLEXERIL) 10 MG tablet, TAKE 1 TABLET BY MOUTH 3 TIMES DAILY AS NEEDED FOR MUSCLE RELAXER/BACK PAIN, Disp: 30 tablet, Rfl: 1    DULoxetine (CYMBALTA) 60 MG capsule, TAKE 1 CAPSULE EVERY DAY AS DIRECTED, Disp: 90 capsule, Rfl: 3    exemestane (AROMASIN) 25 MG tablet, TAKE 1 TABLET EVERY DAY, Disp: 30 tablet, Rfl: 5    ezetimibe (ZETIA) 10 MG tablet, TAKE 1 TABLET EVERY DAY, Disp: 90 tablet, Rfl: 3    gabapentin (NEURONTIN) 300 MG capsule, TAKE ONE CAPSULE BY MOUTH THREE TIMES A DAY FOR PERIPHERAL NEUROPATHY AS DIRECTED, Disp: 90 capsule, Rfl: 5    Homeopathic Products (LEG CRAMPS PO), Take  by mouth Daily. Nightly, Disp: , Rfl:     levothyroxine (SYNTHROID, LEVOTHROID) 125 MCG tablet, TAKE 1 TABLET BY MOUTH DAILY, Disp: 90 tablet, Rfl: 0    LORazepam (ATIVAN) 0.5 MG tablet, TAKE ONE TABLET BY MOUTH TWICE A DAY AS NEEDED FOR ANXIETY, Disp: 60 tablet, Rfl: 2    meloxicam (MOBIC) 15 MG tablet, Take 1 p.o. daily for arthritis/joint pain, Disp: 90 tablet, Rfl: 1    metroNIDAZOLE (METROGEL) 0.75 % gel, Apply  topically to the appropriate area as directed 2 (Two) Times a Day., Disp: 45 g, Rfl: 2    ondansetron (ZOFRAN) 8 MG tablet, TAKE 1 TABLET EVERY 6 HOURS AS NEEDED FOR NAUSEA, Disp: 60 tablet, Rfl: 10    simvastatin (ZOCOR) 20 MG tablet, TAKE 1 TABLET EVERY DAY FOR HIGH CHOLESTEROL, Disp: 90 tablet, Rfl: 2    traZODone (DESYREL) 150 MG tablet, TAKE ONE TABLET BY MOUTH ONCE NIGHTLY, Disp: 90 tablet, Rfl: 10    valsartan (DIOVAN) 320 MG tablet, TAKE 1 TABLET EVERY MORNING FOR BLOOD PRESSURE, Disp: 90 tablet, Rfl: 10      Family History   Problem  "Relation Age of Onset    Alcohol abuse Father     Breast cancer Daughter         40s    Cancer Other     Diabetes Other         type II    Leukemia Grandchild 19         Social History     Socioeconomic History    Marital status:    Tobacco Use    Smoking status: Former     Current packs/day: 0.00     Types: Cigarettes     Quit date: 1998     Years since quittin.2    Smokeless tobacco: Never   Vaping Use    Vaping status: Never Used   Substance and Sexual Activity    Alcohol use: Yes     Alcohol/week: 1.0 standard drink of alcohol     Types: 1 Glasses of wine per week     Comment: current some day    Drug use: No    Sexual activity: Defer     Partners: Male         Vitals:    24 1253   BP: 140/80   Pulse: 80   Resp: 20   Temp: 96.3 °F (35.7 °C)   SpO2: 96%   Weight: 84.4 kg (186 lb)   Height: 167 cm (65.75\")        Body mass index is 30.25 kg/m².      Physical Exam:    General: Alert and oriented x 3 today.  No acute distress.  Normal affect.  HEENT: Pupils equal, round, reactive to light; extraocular movements intact; sclerae nonicteric; pharynx, ear canals and TMs normal.  Neck: Without JVD, thyromegaly, bruit, or adenopathy.  Lungs: Clear to auscultation in all fields.  Heart: Regular rate and rhythm without murmur, rub, gallop, or click.  Abdomen: Soft, nontender, without hepatosplenomegaly or hernia.  Bowel sounds normal.  : Deferred.  Rectal: Deferred.  Extremities: Without clubbing, cyanosis, edema, or pulse deficit.  Neurologic: Intact without focal deficit.  Normal station and gait observed during ingress and egress from the examination room.  Skin: Without significant lesion.  Musculoskeletal: Unremarkable.    Lab/other results:      Assessment/Plan:     Diagnosis Plan   1. Memory loss  NeuroPsych Testing      2. Cognitive impairment  NeuroPsych Testing      3. Chronic insomnia            Plan is as follows: Neuropsychiatric evaluation for cognitive impairment/memory loss.  I am not " going to make any changes to her medication at this time or try to treat the condition until we have a better idea exactly what were dealing with.  I reviewed her medications and she is on some medications that could cause some issues including the antidepressant Cymbalta as well as lorazepam and gabapentin but I am not going to change those because patient has history of depression this been significant and she also has history of significant peripheral neuropathy and gabapentin seems to help.  She has chronic insomnia that seems to come and go and this could very well be related to her cognitive issues.    Also patient has type 2 diabetes and was on Mounjaro at a 15 mg/week dose and felt much better on this but unfortunately this drug is not available due to being on backorder.  Patient reports she felt much better when on this medication and had more energy and may have had less cognitive impairment.  We had lab work done recently but this was primarily to look for inflammatory arthropathy and that workup was negative.  It looks like her last A1c was done in November.  Therefore, I am going to check some basic lab work today to make sure that were not dealing with a metabolic issue causing her memory loss/cognition problems.    Note: This is a longstanding patient of mine whom I manage her chronic medical problems which are numerous.   indicated.    Procedures

## 2024-03-29 ENCOUNTER — TELEPHONE (OUTPATIENT)
Dept: INTERNAL MEDICINE | Facility: CLINIC | Age: 79
End: 2024-03-29
Payer: MEDICARE

## 2024-03-29 NOTE — TELEPHONE ENCOUNTER
OK FOR HUB TO RELAY TO PT    3/29/24 PT REQUESTING STATUS OF REFERRAL. I WILL CALL PT TO ADVISE THIS SPECIALTY IS BOOKED SEVERAL WEEKS TO MONTHS OUT. REFERRAL WAS FAXED ON 3/18/24. I WILL GIVE THE PT THE NAME AND NUMBER OF REGINALD RAMÍREZ WHO IS DOCTOR THAT DOES THE TESTING [phone].

## 2024-04-03 ENCOUNTER — TELEPHONE (OUTPATIENT)
Dept: INTERNAL MEDICINE | Facility: CLINIC | Age: 79
End: 2024-04-03

## 2024-04-04 ENCOUNTER — TELEPHONE (OUTPATIENT)
Dept: INTERNAL MEDICINE | Facility: CLINIC | Age: 79
End: 2024-04-04
Payer: MEDICARE

## 2024-04-04 NOTE — TELEPHONE ENCOUNTER
"Relay     \"Left pt a detailed VM about her mounjaro being sent and also cologuard being negative - repeat in 3 yrs \"  "

## 2024-04-19 DIAGNOSIS — G89.29 CHRONIC BILATERAL LOW BACK PAIN WITHOUT SCIATICA: Chronic | ICD-10-CM

## 2024-04-19 DIAGNOSIS — M54.50 CHRONIC BILATERAL LOW BACK PAIN WITHOUT SCIATICA: Chronic | ICD-10-CM

## 2024-04-19 RX ORDER — CYCLOBENZAPRINE HCL 10 MG
TABLET ORAL
Qty: 30 TABLET | Refills: 1 | Status: SHIPPED | OUTPATIENT
Start: 2024-04-19

## 2024-04-19 RX ORDER — LEVOTHYROXINE SODIUM 0.12 MG/1
TABLET ORAL
Qty: 90 TABLET | Refills: 0 | Status: SHIPPED | OUTPATIENT
Start: 2024-04-19 | End: 2024-04-22 | Stop reason: SDUPTHER

## 2024-04-22 DIAGNOSIS — F33.41 RECURRENT MAJOR DEPRESSIVE DISORDER, IN PARTIAL REMISSION: Chronic | ICD-10-CM

## 2024-04-22 DIAGNOSIS — F41.8 SITUATIONAL ANXIETY: ICD-10-CM

## 2024-04-22 DIAGNOSIS — I10 BENIGN ESSENTIAL HYPERTENSION: ICD-10-CM

## 2024-04-22 DIAGNOSIS — E78.2 MIXED HYPERLIPIDEMIA: ICD-10-CM

## 2024-04-22 RX ORDER — LEVOTHYROXINE SODIUM 0.12 MG/1
125 TABLET ORAL DAILY
Qty: 90 TABLET | Refills: 0 | Status: SHIPPED | OUTPATIENT
Start: 2024-04-22

## 2024-04-22 RX ORDER — VALSARTAN 320 MG/1
320 TABLET ORAL EVERY MORNING
Qty: 90 TABLET | Refills: 10 | Status: SHIPPED | OUTPATIENT
Start: 2024-04-22

## 2024-04-22 RX ORDER — SIMVASTATIN 20 MG
TABLET ORAL
Qty: 90 TABLET | Refills: 2 | Status: SHIPPED | OUTPATIENT
Start: 2024-04-22

## 2024-04-22 RX ORDER — EZETIMIBE 10 MG/1
TABLET ORAL
Qty: 90 TABLET | Refills: 3 | Status: SHIPPED | OUTPATIENT
Start: 2024-04-22

## 2024-04-22 RX ORDER — DULOXETIN HYDROCHLORIDE 60 MG/1
CAPSULE, DELAYED RELEASE ORAL
Qty: 90 CAPSULE | Refills: 3 | Status: SHIPPED | OUTPATIENT
Start: 2024-04-22

## 2024-04-22 RX ORDER — LORAZEPAM 0.5 MG/1
TABLET ORAL
Qty: 60 TABLET | Refills: 2 | Status: SHIPPED | OUTPATIENT
Start: 2024-04-22

## 2024-05-02 ENCOUNTER — OFFICE VISIT (OUTPATIENT)
Dept: INTERNAL MEDICINE | Facility: CLINIC | Age: 79
End: 2024-05-02
Payer: MEDICARE

## 2024-05-02 VITALS
OXYGEN SATURATION: 94 % | SYSTOLIC BLOOD PRESSURE: 130 MMHG | WEIGHT: 186 LBS | RESPIRATION RATE: 14 BRPM | HEIGHT: 66 IN | BODY MASS INDEX: 29.89 KG/M2 | DIASTOLIC BLOOD PRESSURE: 84 MMHG | HEART RATE: 72 BPM

## 2024-05-02 DIAGNOSIS — R41.81 AGE-RELATED COGNITIVE DECLINE: Primary | ICD-10-CM

## 2024-05-02 DIAGNOSIS — E11.42 DIABETIC PERIPHERAL NEUROPATHY: Chronic | ICD-10-CM

## 2024-05-02 DIAGNOSIS — F41.9 CHRONIC ANXIETY: Chronic | ICD-10-CM

## 2024-05-02 DIAGNOSIS — F41.8 DEPRESSION WITH ANXIETY: ICD-10-CM

## 2024-05-02 DIAGNOSIS — F51.04 CHRONIC INSOMNIA: Chronic | ICD-10-CM

## 2024-05-02 DIAGNOSIS — G47.09 OTHER INSOMNIA: ICD-10-CM

## 2024-05-02 DIAGNOSIS — R41.3 MEMORY LOSS: ICD-10-CM

## 2024-05-02 PROBLEM — F32.A ANXIETY AND DEPRESSION: Chronic | Status: ACTIVE | Noted: 2019-08-21

## 2024-05-02 PROBLEM — F32.A ANXIETY AND DEPRESSION: Status: ACTIVE | Noted: 2019-08-21

## 2024-05-02 RX ORDER — TRAZODONE HYDROCHLORIDE 150 MG/1
TABLET ORAL
Qty: 135 TABLET | Refills: 1 | Status: SHIPPED | OUTPATIENT
Start: 2024-05-02

## 2024-05-02 NOTE — PROGRESS NOTES
05/02/2024    Patient Information  Claudette L McManus                                                                                          [id number]  [place] [address]      1945  [unfilled]  There is no work phone number on file.    Chief Complaint:     Follow-up neuropsychiatric testing.  Problems with memory.    History of Present Illness:    Patient has had complaints of memory loss and also has associated depression and anxiety presents today for follow-up after having neuropsychological evaluation which we will review today and discussed as noted below.  No new acute complaints.  Past medical history reviewed and updated were necessary.    Review of Systems   Constitutional: Negative.   HENT: Negative.     Eyes: Negative.    Cardiovascular: Negative.    Respiratory: Negative.     Endocrine: Negative.    Hematologic/Lymphatic: Negative.    Skin: Negative.    Musculoskeletal: Negative.    Gastrointestinal: Negative.    Genitourinary: Negative.    Neurological: Negative.    Psychiatric/Behavioral:  Positive for depression and memory loss. Negative for hallucinations, hypervigilance, suicidal ideas and thoughts of violence. The patient has insomnia and is nervous/anxious.    Allergic/Immunologic: Negative.        Active Problems:    Patient Active Problem List   Diagnosis    Primary osteoarthritis involving multiple joints    Benign essential hypertension    Hyperlipidemia    Primary hypothyroidism    Type 2 diabetes mellitus with diabetic neuropathy, without long-term current use of insulin    Chronic insomnia    Chronic bilateral low back pain without sciatica    Chronic bilateral thoracic back pain    Vitamin D deficiency    Therapeutic drug monitoring    Postmenopausal state    Diabetic peripheral neuropathy    Depression with anxiety    ACE-inhibitor cough    History of multiple pulmonary emboli    Non morbid obesity    Ductal carcinoma of left breast    Right  ventricular enlargement    Chronic anxiety    History of COVID-19    Left upper lobe pulmonary nodule    Arthralgia of multiple joints    Memory loss    Age-related cognitive decline         Past Medical History:   Diagnosis Date    Benign essential hypertension     Chronic anxiety 10/26/2022    Chronic bilateral low back pain without sciatica 08/16/2013 March 13, 2019--Limited bone scan.  This reveals scintigraphic activity in the spine and at the right shoulder corresponds to change seen on recent x-rays and is best explained by degenerative change.  Isolated metastatic disease exactly corresponding to the sites of extensive degenerative disc change would be peculiar.  March 7, 2019--new patient to get established.  She has complaints of approxi    Chronic bilateral thoracic back pain 02/26/2019 March 13, 2019--Limited bone scan.  This reveals scintigraphic activity in the spine and at the right shoulder corresponds to change seen on recent x-rays and is best explained by degenerative change.  Isolated metastatic disease exactly corresponding to the sites of extensive degenerative disc change would be peculiar.  March 1, 2019--x-ray of the lumbar and thoracic spine reveals mild anterior l    Chronic insomnia 11/04/2014    Diabetic peripheral neuropathy 03/07/2019    Characterized by decreased sensation and paresthesias in both lower extremities described as a burning sensation.  Extends up to the knees.  Reportedly had been evaluated with nerve conduction study in the past.    Ductal carcinoma of left breast 08/26/2022    Generalized anxiety disorder 05/19/2013    History of Bell's palsy 05/21/2013    Remote history of Bell's palsy.  Patient does not remember which side of the face.    History of COVID-19 12/08/2022    History of multiple pulmonary emboli 05/19/2022    Stacie 15, 2022--patient seen in follow-up by Dr. Moore.  She is complaining of continued dyspnea on exertion which may be worse.  I reviewed  the cardiology consultation from June 7, 2022 and also reviewed the echocardiogram from that same date.  Noted below.  Her  is now present and he reports her dyspnea on exertion is definitely worse.  Her oxygen saturation at rest is 96%.  Upon ambulatio    History of pneumonia 07/19/2023 July 19, 2023--it appears the patient may have an infectious process versus an inflammatory process in both of her lungs.  She was placed on a Z-Sreedhar by the nurse practitioner which I think is certainly reasonable.  I do think she would benefit from a round of prednisone.  There is possibly a new pulmonary nodule that we will need monitoring.  I think these are changes left over from COVID which sh    Hyperlipidemia     Impaired fasting glucose     Left upper lobe pulmonary nodule 07/19/2023 January 2, 2024--CT ANGIOGRAM CHEST PULMONARY EMBOLISM     TECHNIQUE: Radiation dose reduction techniques were utilized, including automated exposure control and exposure modulation based on body size. 2 mm images were obtained through the chest after the administration of IV contrast.     HISTORY: Shortness of breath and chest pain.     COMPARISON: CT chest angiogram 5/19/2022. CT chest 12/11/202    Menopausal state, 8/19/2020--normal DEXA. 03/07/2019 August 19, 2020--DEXA scan reveals lumbar spine T score 3.8.  Left femoral neck T score 2.5.  Right femoral neck T score 2.2.  Normal bone density.    Non morbid obesity 08/11/2022    Peripheral neuropathy, idiopathic 03/07/2019    Characterized by decreased sensation and paresthesias in both lower extremities described as a burning sensation.  Extends up to the knees.  Reportedly had been evaluated with nerve conduction study in the past.    Primary hypothyroidism 05/19/2013    Primary osteoarthritis involving multiple joints 04/22/2013    Rotator cuff arthropathy of right shoulder, 4/20/2021--status post right reverse total shoulder arthroplasty 05/27/2020    Situational mixed  anxiety and depressive disorder 08/21/2019 August 21, 2019--patient seen in follow-up after I called in a prescription for Ativan after the patient's  contacted me a few weeks ago regarding the sudden death of patient's daughter.  This was an apparent suicide and the patient found her daughter dead.  I will not go into details.  Patient reports the Lorazepam has been helpful but she is still quite distraught, nervous, and undergoing    Type 2 diabetes mellitus with diabetic neuropathy, without long-term current use of insulin     Vitamin D deficiency 02/26/2019         Past Surgical History:   Procedure Laterality Date    BILATERAL BREAST REDUCTION  Early 2000    Early 2000--bilateral breast reduction    CARDIAC CATHETERIZATION N/A 9/26/2022    Procedure: Right Heart Cath;  Surgeon: Agus Solorio MD PhD;  Location: [facility] INVASIVE LOC[facility];  Service: Cardiology;  Laterality: N/A;  Will REMAIN on Xarelto for the case    CHOLECYSTECTOMY  1960s 1960s--open cholecystectomy    COLONOSCOPY  2012 2012--reportedly normal colonoscopy    INCONTINENCE SURGERY  2012 Approximately 2012--implantation of questionable bladder stimulator right sacral area for urinary incontinence.  Details not known.    REPLACEMENT TOTAL KNEE BILATERAL Left 2010; 2011 2010-2011--bilateral total knee replacement    SUBTOTAL HYSTERECTOMY  Remote    Remote partial hysterectomy.  Ovaries intact.    TOTAL SHOULDER REPLACEMENT Left 2013 2013--left total shoulder replacement.    TOTAL SHOULDER REPLACEMENT  April 28, 2021 4/20/2021--status post right reverse total shoulder arthroplasty    TOTAL SHOULDER REVERSE ARTHROPLASTY Right 04/20/2021 4/20/2021--right reverse total shoulder replacement.         Allergies   Allergen Reactions    Morphine And Related     Other Other (See Comments)     REJECTED DACRON SUTURES IN THE PAST AND INCISION CAME APART           Current Outpatient Medications:     Cholecalciferol  (VITAMIN D3) 2000 UNITS tablet, Take 1 tablet by mouth Daily., Disp: , Rfl:     cyclobenzaprine (FLEXERIL) 10 MG tablet, TAKE ONE TABLET BY MOUTH THREE TIMES A DAY AS NEEDED FOR MUSCLE RELAXER/BACK PAIN, Disp: 30 tablet, Rfl: 1    DULoxetine (CYMBALTA) 60 MG capsule, TAKE 1 CAPSULE EVERY DAY AS DIRECTED, Disp: 90 capsule, Rfl: 3    exemestane (AROMASIN) 25 MG tablet, TAKE 1 TABLET EVERY DAY, Disp: 30 tablet, Rfl: 5    ezetimibe (ZETIA) 10 MG tablet, TAKE 1 TABLET EVERY DAY, Disp: 90 tablet, Rfl: 3    gabapentin (NEURONTIN) 300 MG capsule, TAKE ONE CAPSULE BY MOUTH THREE TIMES A DAY FOR PERIPHERAL NEUROPATHY AS DIRECTED, Disp: 90 capsule, Rfl: 5    Homeopathic Products (LEG CRAMPS PO), Take  by mouth Daily. Nightly, Disp: , Rfl:     levothyroxine (SYNTHROID, LEVOTHROID) 125 MCG tablet, Take 1 tablet by mouth Daily., Disp: 90 tablet, Rfl: 0    LORazepam (ATIVAN) 0.5 MG tablet, TAKE ONE TABLET BY MOUTH TWICE A DAY AS NEEDED FOR ANXIETY, Disp: 60 tablet, Rfl: 2    meloxicam (MOBIC) 15 MG tablet, Take 1 p.o. daily for arthritis/joint pain, Disp: 90 tablet, Rfl: 1    metroNIDAZOLE (METROGEL) 0.75 % gel, Apply  topically to the appropriate area as directed 2 (Two) Times a Day., Disp: 45 g, Rfl: 2    ondansetron (ZOFRAN) 8 MG tablet, TAKE 1 TABLET EVERY 6 HOURS AS NEEDED FOR NAUSEA, Disp: 60 tablet, Rfl: 10    simvastatin (ZOCOR) 20 MG tablet, TAKE 1 TABLET EVERY DAY FOR HIGH CHOLESTEROL, Disp: 90 tablet, Rfl: 2    Tirzepatide (Mounjaro) 15 MG/0.5ML solution pen-injector pen, Inject 0.5 mL under the skin into the appropriate area as directed 1 (One) Time Per Week., Disp: 2 mL, Rfl: 0    traZODone (DESYREL) 150 MG tablet, Take 1-1/2 tablets nightly as directed, Disp: 135 tablet, Rfl: 1    valsartan (DIOVAN) 320 MG tablet, Take 1 tablet by mouth Every Morning., Disp: 90 tablet, Rfl: 10      Family History   Problem Relation Age of Onset    Alcohol abuse Father     Breast cancer Daughter         40s    Cancer Other      "Diabetes Other         type II    Leukemia Grandchild 19         Social History     Socioeconomic History    Marital status:    Tobacco Use    Smoking status: Former     Current packs/day: 0.00     Types: Cigarettes     Quit date: 1998     Years since quittin.3    Smokeless tobacco: Never   Vaping Use    Vaping status: Never Used   Substance and Sexual Activity    Alcohol use: Yes     Alcohol/week: 1.0 standard drink of alcohol     Types: 1 Glasses of wine per week     Comment: current some day    Drug use: No    Sexual activity: Defer     Partners: Male         Vitals:    24 1241   BP: 130/84   BP Location: Left arm   Patient Position: Sitting   Cuff Size: Adult   Pulse: 72   Resp: 14   SpO2: 94%   Weight: 84.4 kg (186 lb)   Height: 167 cm (65.75\")        Body mass index is 30.25 kg/m².      Physical Exam:    General: Alert and oriented x 3.  No acute distress.  Obese.  Normal affect.  HEENT: Pupils equal, round, reactive to light; extraocular movements intact; sclerae nonicteric; pharynx, ear canals and TMs normal.  Neck: Without JVD, thyromegaly, bruit, or adenopathy.  Lungs: Clear to auscultation in all fields.  Heart: Regular rate and rhythm without murmur, rub, gallop, or click.  Abdomen: Soft, nontender, without hepatosplenomegaly or hernia.  Bowel sounds normal.  : Deferred.  Rectal: Deferred.  Extremities: Without clubbing, cyanosis, edema, or pulse deficit.  Neurologic: Intact without focal deficit.  Normal station and gait observed during ingress and egress from the examination room.  Skin: Without significant lesion.  Musculoskeletal: Unremarkable.    Lab/other results:      Assessment/Plan:     Diagnosis Plan   1. Age-related cognitive decline        2. Memory loss        3. Depression with anxiety  [employer] - Swab,      4. Chronic anxiety        5. Other insomnia        6. Chronic insomnia  traZODone (DESYREL) 150 MG tablet      7. Diabetic peripheral neuropathy  traZODone " "(DESYREL) 150 MG tablet        I reviewed the results of neuropsychiatric testing with the patient and this reveals that her memory and cognition are intact for her age.  She was diagnosed with cognitive decline due to aging but these are not bad enough to interfere with her activities of daily living.  Patient was encouraged to engage in psychotherapy and also see a psychiatrist and I have reinforced those recommendations here today.  I explained to patient I am not a psychiatrist or therapist and not sufficiently trying to treat her adequately to get her feeling better.  They also recommended vitamin B12 supplementation which she can take over-the-counter sublingually 2000 mcg/day which could be helpful.    Plan is as follows: Patient would prefer not to see a psychiatrist or therapist at this time for various reasons that I will not go into here.  I explained to patient there is a genetic test that might help us select the best antidepressant medication and that I would be willing to have that test performed and then based on that test tried to treat her depression and anxiety.  If this fails then we will really will not have much choice but for her to go to therapy/psychiatrist.    This patient has a PCP that is the continuing focal point for all health care services, and the patient sees this physician to be evaluated for memory loss, depression, anxiety, insomnia.. The inherent complexity that this code () captures is not in the clinical condition itself, but rather the cognitition of the continued responsibility of being the focal point for all needed services for this patient.\"     Procedures        "

## 2024-05-14 ENCOUNTER — OFFICE VISIT (OUTPATIENT)
Dept: INTERNAL MEDICINE | Facility: CLINIC | Age: 79
End: 2024-05-14
Payer: MEDICARE

## 2024-05-14 VITALS
BODY MASS INDEX: 28.77 KG/M2 | OXYGEN SATURATION: 92 % | WEIGHT: 179 LBS | HEART RATE: 82 BPM | SYSTOLIC BLOOD PRESSURE: 128 MMHG | TEMPERATURE: 96.5 F | RESPIRATION RATE: 18 BRPM | HEIGHT: 66 IN | DIASTOLIC BLOOD PRESSURE: 80 MMHG

## 2024-05-14 DIAGNOSIS — F41.8 DEPRESSION WITH ANXIETY: Primary | ICD-10-CM

## 2024-05-14 DIAGNOSIS — F41.9 CHRONIC ANXIETY: ICD-10-CM

## 2024-05-14 DIAGNOSIS — R41.3 MEMORY LOSS: ICD-10-CM

## 2024-05-14 DIAGNOSIS — R41.81 AGE-RELATED COGNITIVE DECLINE: ICD-10-CM

## 2024-05-14 DIAGNOSIS — F51.04 CHRONIC INSOMNIA: Chronic | ICD-10-CM

## 2024-05-14 PROCEDURE — 3074F SYST BP LT 130 MM HG: CPT | Performed by: INTERNAL MEDICINE

## 2024-05-14 PROCEDURE — 1126F AMNT PAIN NOTED NONE PRSNT: CPT | Performed by: INTERNAL MEDICINE

## 2024-05-14 PROCEDURE — 99214 OFFICE O/P EST MOD 30 MIN: CPT | Performed by: INTERNAL MEDICINE

## 2024-05-14 PROCEDURE — 3079F DIAST BP 80-89 MM HG: CPT | Performed by: INTERNAL MEDICINE

## 2024-05-14 PROCEDURE — 1160F RVW MEDS BY RX/DR IN RCRD: CPT | Performed by: INTERNAL MEDICINE

## 2024-05-14 PROCEDURE — 1159F MED LIST DOCD IN RCRD: CPT | Performed by: INTERNAL MEDICINE

## 2024-05-14 RX ORDER — LORAZEPAM 1 MG/1
TABLET ORAL
Qty: 60 TABLET | Refills: 1 | Status: SHIPPED | OUTPATIENT
Start: 2024-05-14

## 2024-05-14 RX ORDER — DIPHENOXYLATE HYDROCHLORIDE AND ATROPINE SULFATE 2.5; .025 MG/1; MG/1
TABLET ORAL DAILY
COMMUNITY

## 2024-05-14 NOTE — PROGRESS NOTES
05/14/2024    Patient Information  Claudette L McManus                                                                                          [id number]  [place] [address]      1945  [unfilled]  There is no work phone number on file.    Chief Complaint:     Follow-up depression and anxiety, age-related cognitive decline and insomnia.  Recent testing.    History of Present Illness:    Patient with a history of depression and anxiety that is not controlled with Cymbalta and lorazepam presents today to follow-up on [employer] testing and possible adjustment of medication to bring her under better control in regards to the psychiatric issues.  No new acute complaints.  Past medical history reviewed and updated were necessary.    Review of Systems   Constitutional: Negative.   HENT: Negative.     Eyes: Negative.    Cardiovascular: Negative.    Respiratory: Negative.     Endocrine: Negative.    Hematologic/Lymphatic: Negative.    Skin: Negative.    Musculoskeletal: Negative.    Gastrointestinal: Negative.    Genitourinary: Negative.    Neurological: Negative.    Psychiatric/Behavioral:  Positive for depression and memory loss. The patient has insomnia and is nervous/anxious.    Allergic/Immunologic: Negative.        Active Problems:    Patient Active Problem List   Diagnosis    Primary osteoarthritis involving multiple joints    Benign essential hypertension    Hyperlipidemia    Primary hypothyroidism    Type 2 diabetes mellitus with diabetic neuropathy, without long-term current use of insulin    Chronic insomnia    Chronic bilateral low back pain without sciatica    Chronic bilateral thoracic back pain    Vitamin D deficiency    Therapeutic drug monitoring    Postmenopausal state    Diabetic peripheral neuropathy    Depression with anxiety    ACE-inhibitor cough    History of multiple pulmonary emboli    Non morbid obesity    Ductal carcinoma of left breast    Right ventricular  enlargement    Chronic anxiety    History of COVID-19    Left upper lobe pulmonary nodule    Arthralgia of multiple joints    Memory loss    Age-related cognitive decline         Past Medical History:   Diagnosis Date    Benign essential hypertension     Chronic anxiety 10/26/2022    Chronic bilateral low back pain without sciatica 08/16/2013 March 13, 2019--Limited bone scan.  This reveals scintigraphic activity in the spine and at the right shoulder corresponds to change seen on recent x-rays and is best explained by degenerative change.  Isolated metastatic disease exactly corresponding to the sites of extensive degenerative disc change would be peculiar.  March 7, 2019--new patient to get established.  She has complaints of approxi    Chronic bilateral thoracic back pain 02/26/2019 March 13, 2019--Limited bone scan.  This reveals scintigraphic activity in the spine and at the right shoulder corresponds to change seen on recent x-rays and is best explained by degenerative change.  Isolated metastatic disease exactly corresponding to the sites of extensive degenerative disc change would be peculiar.  March 1, 2019--x-ray of the lumbar and thoracic spine reveals mild anterior l    Chronic insomnia 11/04/2014    Diabetic peripheral neuropathy 03/07/2019    Characterized by decreased sensation and paresthesias in both lower extremities described as a burning sensation.  Extends up to the knees.  Reportedly had been evaluated with nerve conduction study in the past.    Ductal carcinoma of left breast 08/26/2022    Generalized anxiety disorder 05/19/2013    History of Bell's palsy 05/21/2013    Remote history of Bell's palsy.  Patient does not remember which side of the face.    History of COVID-19 12/08/2022    History of multiple pulmonary emboli 05/19/2022    Stacie 15, 2022--patient seen in follow-up by Dr. Moore.  She is complaining of continued dyspnea on exertion which may be worse.  I reviewed the  cardiology consultation from June 7, 2022 and also reviewed the echocardiogram from that same date.  Noted below.  Her  is now present and he reports her dyspnea on exertion is definitely worse.  Her oxygen saturation at rest is 96%.  Upon ambulatio    History of pneumonia 07/19/2023 July 19, 2023--it appears the patient may have an infectious process versus an inflammatory process in both of her lungs.  She was placed on a Z-Sreedhar by the nurse practitioner which I think is certainly reasonable.  I do think she would benefit from a round of prednisone.  There is possibly a new pulmonary nodule that we will need monitoring.  I think these are changes left over from COVID which sh    Hyperlipidemia     Impaired fasting glucose     Left upper lobe pulmonary nodule 07/19/2023 January 2, 2024--CT ANGIOGRAM CHEST PULMONARY EMBOLISM     TECHNIQUE: Radiation dose reduction techniques were utilized, including automated exposure control and exposure modulation based on body size. 2 mm images were obtained through the chest after the administration of IV contrast.     HISTORY: Shortness of breath and chest pain.     COMPARISON: CT chest angiogram 5/19/2022. CT chest 12/11/202    Menopausal state, 8/19/2020--normal DEXA. 03/07/2019 August 19, 2020--DEXA scan reveals lumbar spine T score 3.8.  Left femoral neck T score 2.5.  Right femoral neck T score 2.2.  Normal bone density.    Non morbid obesity 08/11/2022    Peripheral neuropathy, idiopathic 03/07/2019    Characterized by decreased sensation and paresthesias in both lower extremities described as a burning sensation.  Extends up to the knees.  Reportedly had been evaluated with nerve conduction study in the past.    Primary hypothyroidism 05/19/2013    Primary osteoarthritis involving multiple joints 04/22/2013    Rotator cuff arthropathy of right shoulder, 4/20/2021--status post right reverse total shoulder arthroplasty 05/27/2020    Situational mixed anxiety  and depressive disorder 08/21/2019 August 21, 2019--patient seen in follow-up after I called in a prescription for Ativan after the patient's  contacted me a few weeks ago regarding the sudden death of patient's daughter.  This was an apparent suicide and the patient found her daughter dead.  I will not go into details.  Patient reports the Lorazepam has been helpful but she is still quite distraught, nervous, and undergoing    Type 2 diabetes mellitus with diabetic neuropathy, without long-term current use of insulin     Vitamin D deficiency 02/26/2019         Past Surgical History:   Procedure Laterality Date    BILATERAL BREAST REDUCTION  Early 2000    Early 2000--bilateral breast reduction    CARDIAC CATHETERIZATION N/A 9/26/2022    Procedure: Right Heart Cath;  Surgeon: Agus Solorio MD PhD;  Location: [facility] INVASIVE LOCATION;  Service: Cardiology;  Laterality: N/A;  Will REMAIN on Xarelto for the case    CHOLECYSTECTOMY  1960s 1960s--open cholecystectomy    COLONOSCOPY  2012 2012--reportedly normal colonoscopy    INCONTINENCE SURGERY  2012 Approximately 2012--implantation of questionable bladder stimulator right sacral area for urinary incontinence.  Details not known.    REPLACEMENT TOTAL KNEE BILATERAL Left 2010; 2011 2010-2011--bilateral total knee replacement    SUBTOTAL HYSTERECTOMY  Remote    Remote partial hysterectomy.  Ovaries intact.    TOTAL SHOULDER REPLACEMENT Left 2013 2013--left total shoulder replacement.    TOTAL SHOULDER REPLACEMENT  April 28, 2021 4/20/2021--status post right reverse total shoulder arthroplasty    TOTAL SHOULDER REVERSE ARTHROPLASTY Right 04/20/2021 4/20/2021--right reverse total shoulder replacement.         Allergies   Allergen Reactions    Morphine And Related     Other Other (See Comments)     REJECTED DACRON SUTURES IN THE PAST AND INCISION CAME APART           Current Outpatient Medications:     Cholecalciferol (VITAMIN  D3) 2000 UNITS tablet, Take 1 tablet by mouth Daily., Disp: , Rfl:     cyclobenzaprine (FLEXERIL) 10 MG tablet, TAKE ONE TABLET BY MOUTH THREE TIMES A DAY AS NEEDED FOR MUSCLE RELAXER/BACK PAIN, Disp: 30 tablet, Rfl: 1    DULoxetine (CYMBALTA) 60 MG capsule, TAKE 1 CAPSULE EVERY DAY AS DIRECTED, Disp: 90 capsule, Rfl: 3    exemestane (AROMASIN) 25 MG tablet, TAKE 1 TABLET EVERY DAY, Disp: 30 tablet, Rfl: 5    ezetimibe (ZETIA) 10 MG tablet, TAKE 1 TABLET EVERY DAY, Disp: 90 tablet, Rfl: 3    gabapentin (NEURONTIN) 300 MG capsule, TAKE ONE CAPSULE BY MOUTH THREE TIMES A DAY FOR PERIPHERAL NEUROPATHY AS DIRECTED, Disp: 90 capsule, Rfl: 5    Homeopathic Products (LEG CRAMPS PO), Take  by mouth Daily. Nightly, Disp: , Rfl:     levothyroxine (SYNTHROID, LEVOTHROID) 125 MCG tablet, Take 1 tablet by mouth Daily., Disp: 90 tablet, Rfl: 0    meloxicam (MOBIC) 15 MG tablet, Take 1 p.o. daily for arthritis/joint pain, Disp: 90 tablet, Rfl: 1    metroNIDAZOLE (METROGEL) 0.75 % gel, Apply  topically to the appropriate area as directed 2 (Two) Times a Day., Disp: 45 g, Rfl: 2    multivitamin (MULTI VITAMIN PO), Take  by mouth Daily., Disp: , Rfl:     ondansetron (ZOFRAN) 8 MG tablet, TAKE 1 TABLET EVERY 6 HOURS AS NEEDED FOR NAUSEA, Disp: 60 tablet, Rfl: 10    simvastatin (ZOCOR) 20 MG tablet, TAKE 1 TABLET EVERY DAY FOR HIGH CHOLESTEROL, Disp: 90 tablet, Rfl: 2    Tirzepatide (Mounjaro) 15 MG/0.5ML solution pen-injector pen, Inject 0.5 mL under the skin into the appropriate area as directed 1 (One) Time Per Week., Disp: 2 mL, Rfl: 0    traZODone (DESYREL) 150 MG tablet, Take 1-1/2 tablets nightly as directed, Disp: 135 tablet, Rfl: 1    valsartan (DIOVAN) 320 MG tablet, Take 1 tablet by mouth Every Morning., Disp: 90 tablet, Rfl: 10    Cariprazine HCl (Vraylar) 1.5 MG capsule capsule, Take 1 p.o. daily for depression/mood stabilizer and anxiety, Disp: 30 capsule, Rfl: 2    LORazepam (Ativan) 1 MG tablet, Take 1 p.o. twice daily  "for chronic anxiety/panic, Disp: 60 tablet, Rfl: 1      Family History   Problem Relation Age of Onset    Alcohol abuse Father     Breast cancer Daughter         40s    Cancer Other     Diabetes Other         type II    Leukemia Grandchild 19         Social History     Socioeconomic History    Marital status:    Tobacco Use    Smoking status: Former     Current packs/day: 0.00     Types: Cigarettes     Quit date: 1998     Years since quittin.3    Smokeless tobacco: Never   Vaping Use    Vaping status: Never Used   Substance and Sexual Activity    Alcohol use: Yes     Alcohol/week: 1.0 standard drink of alcohol     Types: 1 Glasses of wine per week     Comment: current some day    Drug use: No    Sexual activity: Defer     Partners: Male         Vitals:    24 1242   BP: 128/80   Pulse: 82   Resp: 18   Temp: 96.5 °F (35.8 °C)   SpO2: 92%   Weight: 81.2 kg (179 lb)   Height: 167 cm (65.75\")        Body mass index is 29.11 kg/m².      Physical Exam:    General: Alert and oriented x 3.  No acute distress.  Normal affect.  Overweight.  HEENT: Pupils equal, round, reactive to light; extraocular movements intact; sclerae nonicteric; pharynx, ear canals and TMs normal.  Neck: Without JVD, thyromegaly, bruit, or adenopathy.  Lungs: Clear to auscultation in all fields.  Heart: Regular rate and rhythm without murmur, rub, gallop, or click.  Abdomen: Soft, nontender, without hepatosplenomegaly or hernia.  Bowel sounds normal.  : Deferred.  Rectal: Deferred.  Extremities: Without clubbing, cyanosis, edema, or pulse deficit.  Neurologic: Intact without focal deficit.  Normal station and gait observed during ingress and egress from the examination room.  Skin: Without significant lesion.  Musculoskeletal: Unremarkable.    Lab/other results:    I reviewed the results of the [employer] testing with the patient today.  This reveals multiple antidepressant medications that do not have any significant gene " interactions including that once she is currently on which is Cymbalta.  However, it is noted that if patient is a smoker then there is significant gene drug interaction with Cymbalta.  See below.    Assessment/Plan:     Diagnosis Plan   1. Depression with anxiety  Cariprazine HCl (Vraylar) 1.5 MG capsule capsule      2. Chronic anxiety  Cariprazine HCl (Vraylar) 1.5 MG capsule capsule    LORazepam (Ativan) 1 MG tablet      3. Age-related cognitive decline        4. Memory loss        5. Chronic insomnia            Patient with depression and rather severe chronic anxiety who is not in remission or controlled.  Initially patient was not sure if she is taking Cymbalta but it appears that she is taking 60 mg/day.  She is also taking lorazepam and is complaining about the fact that they do not give her enough for a full month in order to control her symptoms.  She indicates she has to take this twice a day.  I explained to her that we are hoping to be able to get her anxiety and depression under better control to where she did not have to use a much of the lorazepam.  As noted above, the Cymbalta or duloxetine does not show any significant gene drug interactions as long as she is not a smoker which she is not.  She stopped smoking a long time ago.    Plan is as follows: We will add small dose of Vraylar 1.5 mg daily to her current regimen of Cymbalta 60 mg/day.  I will go ahead and increase her lorazepam to 1 mg and I have instructed patient to take 1/2-1 twice daily as needed for anxiety although the prescription will say take 1 twice a day for anxiety.  I will have her follow-up with me in 4 weeks to reassess.    This patient has a PCP that is the continuing focal point for all health care services, and the patient sees this physician to be evaluated for depression and anxiety. The inherent complexity that this code () captures is not in the clinical condition itself, but rather the cognitition of the continued  "responsibility of being the focal point for all needed services for this patient.\"       Procedures        "

## 2024-05-21 DIAGNOSIS — M25.50 ARTHRALGIA OF MULTIPLE JOINTS: ICD-10-CM

## 2024-05-21 DIAGNOSIS — E11.42 DIABETIC PERIPHERAL NEUROPATHY: Chronic | ICD-10-CM

## 2024-05-21 NOTE — TELEPHONE ENCOUNTER
Rx Refill Note  Requested Prescriptions     Pending Prescriptions Disp Refills    meloxicam (MOBIC) 15 MG tablet [Pharmacy Med Name: MELOXICAM 15 MG TABLET] 90 tablet 1     Sig: TAKE 1 TABLET BY MOUTH DAILY FOR ARTHRITIS/JOINT PAIN    gabapentin (NEURONTIN) 300 MG capsule [Pharmacy Med Name: GABAPENTIN 300 MG CAPSULE] 90 capsule      Sig: TAKE ONE CAPSULE BY MOUTH THREE TIMES A DAY FOR PERIPHERAL NEUROPATHY AS DIRECTED      Last office visit with prescribing clinician: 5/14/2024   Last telemedicine visit with prescribing clinician: Visit date not found   Next office visit with prescribing clinician: 6/18/2024       Sky Chairez MA  05/21/24, 15:22 EDT

## 2024-05-22 RX ORDER — GABAPENTIN 300 MG/1
CAPSULE ORAL
Qty: 90 CAPSULE | Refills: 4 | Status: SHIPPED | OUTPATIENT
Start: 2024-05-22

## 2024-05-22 RX ORDER — MELOXICAM 15 MG/1
TABLET ORAL
Qty: 90 TABLET | Refills: 1 | Status: SHIPPED | OUTPATIENT
Start: 2024-05-22

## 2024-05-30 ENCOUNTER — HOSPITAL ENCOUNTER (OUTPATIENT)
Dept: MAMMOGRAPHY | Facility: HOSPITAL | Age: 79
Discharge: HOME | End: 2024-05-30
Admitting: INTERNAL MEDICINE
Payer: MEDICARE

## 2024-05-30 ENCOUNTER — TELEPHONE (OUTPATIENT)
Dept: INTERNAL MEDICINE | Facility: CLINIC | Age: 79
End: 2024-05-30

## 2024-05-30 DIAGNOSIS — N64.89 OTHER SPECIFIED DISORDERS OF BREAST: ICD-10-CM

## 2024-05-30 DIAGNOSIS — C50.912 DUCTAL CARCINOMA OF LEFT BREAST: ICD-10-CM

## 2024-05-30 PROCEDURE — G0279 TOMOSYNTHESIS, MAMMO: HCPCS

## 2024-05-30 PROCEDURE — 77066 DX MAMMO INCL CAD BI: CPT

## 2024-05-30 NOTE — TELEPHONE ENCOUNTER
Caller: McManus, Claudette L    Relationship: Self    Best call back number: [phone]     Who are you requesting to speak with (clinical staff, provider,  specific staff member): ARIE    What was the call regarding: REFERRAL

## 2024-05-31 ENCOUNTER — TELEPHONE (OUTPATIENT)
Dept: INTERNAL MEDICINE | Facility: CLINIC | Age: 79
End: 2024-05-31
Payer: MEDICARE

## 2024-06-11 ENCOUNTER — LAB (OUTPATIENT)
Dept: LAB | Facility: HOSPITAL | Age: 79
End: 2024-06-11
Payer: MEDICARE

## 2024-06-11 ENCOUNTER — OFFICE VISIT (OUTPATIENT)
Dept: ONCOLOGY | Facility: CLINIC | Age: 79
End: 2024-06-11
Payer: MEDICARE

## 2024-06-11 VITALS
DIASTOLIC BLOOD PRESSURE: 87 MMHG | TEMPERATURE: 98 F | RESPIRATION RATE: 18 BRPM | SYSTOLIC BLOOD PRESSURE: 132 MMHG | WEIGHT: 189.4 LBS | OXYGEN SATURATION: 94 % | HEART RATE: 71 BPM | BODY MASS INDEX: 30.44 KG/M2 | HEIGHT: 66 IN

## 2024-06-11 DIAGNOSIS — C50.112 MALIGNANT NEOPLASM OF CENTRAL PORTION OF LEFT FEMALE BREAST, UNSPECIFIED ESTROGEN RECEPTOR STATUS: ICD-10-CM

## 2024-06-11 DIAGNOSIS — C50.912 DUCTAL CARCINOMA OF LEFT BREAST: Primary | ICD-10-CM

## 2024-06-11 DIAGNOSIS — C50.912 DUCTAL CARCINOMA OF LEFT BREAST: ICD-10-CM

## 2024-06-11 LAB
ALBUMIN SERPL-MCNC: 4.2 G/DL (ref 3.5–5.2)
ALBUMIN/GLOB SERPL: 1.5 G/DL
ALP SERPL-CCNC: 153 U/L (ref 39–117)
ALT SERPL W P-5'-P-CCNC: 16 U/L (ref 1–33)
ANION GAP SERPL CALCULATED.3IONS-SCNC: 11.9 MMOL/L (ref 5–15)
AST SERPL-CCNC: 26 U/L (ref 1–32)
BASOPHILS # BLD AUTO: 0.02 10*3/MM3 (ref 0–0.2)
BASOPHILS NFR BLD AUTO: 0.3 % (ref 0–1.5)
BILIRUB SERPL-MCNC: 0.5 MG/DL (ref 0–1.2)
BUN SERPL-MCNC: 10 MG/DL (ref 8–23)
BUN/CREAT SERPL: 11.1 (ref 7–25)
CALCIUM SPEC-SCNC: 9.8 MG/DL (ref 8.6–10.5)
CHLORIDE SERPL-SCNC: 99 MMOL/L (ref 98–107)
CO2 SERPL-SCNC: 25.1 MMOL/L (ref 22–29)
CREAT SERPL-MCNC: 0.9 MG/DL (ref 0.57–1)
DEPRECATED RDW RBC AUTO: 41.6 FL (ref 37–54)
EGFRCR SERPLBLD CKD-EPI 2021: 64.4 ML/MIN/1.73
EOSINOPHIL # BLD AUTO: 0.13 10*3/MM3 (ref 0–0.4)
EOSINOPHIL NFR BLD AUTO: 2 % (ref 0.3–6.2)
ERYTHROCYTE [DISTWIDTH] IN BLOOD BY AUTOMATED COUNT: 12 % (ref 12.3–15.4)
GLOBULIN UR ELPH-MCNC: 2.8 GM/DL
GLUCOSE SERPL-MCNC: 102 MG/DL (ref 65–99)
HCT VFR BLD AUTO: 39.1 % (ref 34–46.6)
HGB BLD-MCNC: 13.1 G/DL (ref 12–15.9)
IMM GRANULOCYTES # BLD AUTO: 0.09 10*3/MM3 (ref 0–0.05)
IMM GRANULOCYTES NFR BLD AUTO: 1.4 % (ref 0–0.5)
LYMPHOCYTES # BLD AUTO: 1.54 10*3/MM3 (ref 0.7–3.1)
LYMPHOCYTES NFR BLD AUTO: 24.2 % (ref 19.6–45.3)
MCH RBC QN AUTO: 31.3 PG (ref 26.6–33)
MCHC RBC AUTO-ENTMCNC: 33.5 G/DL (ref 31.5–35.7)
MCV RBC AUTO: 93.5 FL (ref 79–97)
MONOCYTES # BLD AUTO: 0.51 10*3/MM3 (ref 0.1–0.9)
MONOCYTES NFR BLD AUTO: 8 % (ref 5–12)
NEUTROPHILS NFR BLD AUTO: 4.07 10*3/MM3 (ref 1.7–7)
NEUTROPHILS NFR BLD AUTO: 64.1 % (ref 42.7–76)
NRBC BLD AUTO-RTO: 0 /100 WBC (ref 0–0.2)
PLATELET # BLD AUTO: 238 10*3/MM3 (ref 140–450)
PMV BLD AUTO: 9.4 FL (ref 6–12)
POTASSIUM SERPL-SCNC: 4.6 MMOL/L (ref 3.5–5.2)
PROT SERPL-MCNC: 7 G/DL (ref 6–8.5)
RBC # BLD AUTO: 4.18 10*6/MM3 (ref 3.77–5.28)
SODIUM SERPL-SCNC: 136 MMOL/L (ref 136–145)
WBC NRBC COR # BLD AUTO: 6.36 10*3/MM3 (ref 3.4–10.8)

## 2024-06-11 PROCEDURE — 36415 COLL VENOUS BLD VENIPUNCTURE: CPT

## 2024-06-11 PROCEDURE — 99214 OFFICE O/P EST MOD 30 MIN: CPT | Performed by: INTERNAL MEDICINE

## 2024-06-11 PROCEDURE — 1159F MED LIST DOCD IN RCRD: CPT | Performed by: INTERNAL MEDICINE

## 2024-06-11 PROCEDURE — 3079F DIAST BP 80-89 MM HG: CPT | Performed by: INTERNAL MEDICINE

## 2024-06-11 PROCEDURE — 1160F RVW MEDS BY RX/DR IN RCRD: CPT | Performed by: INTERNAL MEDICINE

## 2024-06-11 PROCEDURE — 3075F SYST BP GE 130 - 139MM HG: CPT | Performed by: INTERNAL MEDICINE

## 2024-06-11 PROCEDURE — G2211 COMPLEX E/M VISIT ADD ON: HCPCS | Performed by: INTERNAL MEDICINE

## 2024-06-11 PROCEDURE — 80053 COMPREHEN METABOLIC PANEL: CPT

## 2024-06-11 PROCEDURE — 1126F AMNT PAIN NOTED NONE PRSNT: CPT | Performed by: INTERNAL MEDICINE

## 2024-06-11 PROCEDURE — 85025 COMPLETE CBC W/AUTO DIFF WBC: CPT

## 2024-06-11 NOTE — PROGRESS NOTES
Subjective   Claudette L McManus is a 77 y.o. female.  Referred by Dr. Fam for left breast invasive ductal carcinoma    History of Present Illness   Ms. 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   Comorbidities include hypertension, hyperlipidemia, anxiety/depression, insomnia, progressive dyspnea on exertion, diagnosed with pulmonary embolism in May 2022 and on anticoagulation with Xarelto, hypothyroidism.    7/29/2022-bilateral screening mammogram  Indeterminate left breast calcifications and focal asymmetry.  1 of which is associated with questioned architectural distortion.  Further evaluation recommended.  Neck on 8/25/2022-left breast diagnostic mammogram and ultrasound  In the lower slightly outer middle left breast there is a 1.5 cm mass corresponding to the architectural distortion.  Focal asymmetry in the outer central middle left breast partially effaces with spot compression and less conspicuous.  Right upper middle left breast there is persistent approximately 1 cm mass with corresponding architectural distortion.  The upper central anterior left breast there is a persistent focal asymmetry with calcifications.    Ultrasound  At 1:00 retroareolar left breast-2.3 x 0.9 x 2 cm hypoechoic mass with indistinct margins and internal echogenic foci from calcifications.  There is tubular elevation of the mass concerning for probable extension  At 3:00, retroareolar left breast-1.1 x 0.8 x 1 cm hypoechoic mass with indistinct margins, corresponds to suspicious group of calcifications.  At 4:00, 3 cm from the nipple there is a 1.4 and 1 x 1.1 cm irregular hypoechoic mass with peripheral vascularity corresponding to the mass with distortion on mammogram  At 430, 3 cm from the nipple-1.8 cm from the above mass there is a 0.9 x 0.5 x 0.9 cm hypoechoic mass with indistinct margins which is suspicious  At 3:00, 5 cm from the nipple  there is a 0.9 0.7 x 0.7 cm irregular hypoechoic mass with indistinct margins.    Impression  Multiple masses in the left breast, which demonstrate corresponding architectural distortion.  2 site ultrasound-guided biopsy targeting the dominant masses at 1:00 in the retroareolar breast and 4:00, 3 cm from the nipple with management of additional masses pending biopsy results.  Contrast-enhanced MRI recommended    9/21/2022-2 site biopsy  1.left breast 3:00, 2 cm from nipple-ultrasound-guided biopsy of the mass  Low-grade ductal carcinoma in situ involving a small intraductal papilloma.  DCIS measures 14 mm  ER positive, KY positive    2.left breast o'clock, 2 cm from the nipple ultrasound-guided biopsy  Invasive ductal carcinoma with lobular features  Grade 2  Millimeters on core biopsy  Atypical ductal hyperplasia with cavitations and involving an intraductal papilloma  Negative lymphovascular space invasion  ER +% strong  KY +% strong  HER2 negative, nonamplified on FISH  Ki-67 15%    She was seen by Dr. Fam and discussed mastectomy given several abnormalities in the left breast.  Since she had progressive worsening of dyspnea on exertion from 2022 without acute explanation, recent diagnosis of pulmonary embolism requiring chronic anticoagulation, her age and concerns regarding complications of mastectomy patient opted not to proceed with mastectomy  She is here to discuss neoadjuvant endocrine therapy.    She reports severe insomnia for which she is currently on trazodone.  Tried gabapentin in the past but did not help.  She is also been diagnosed with peripheral neuropathy.  She is on Cymbalta anxiety.    She had a daughter who is treated for breast cancer but eventually she passed away from suicide.  Patient reports significant anxiety since the death of her daughter.  Her daughter have been diagnosed with bipolar disorder.    Genetic testing performed and no significant pathogenic  mutation.    For the dyspnea, she evaluated by  and per patient she was recommended to have a sleep study.  She is not comfortable using the CPAP and did not wish to undergo a sleep study.  She was prescribed inhalers which she had used for a month without much help.    On the CT PE protocol performed in May 2022 there was concern for chronic pulmonary emboli and pulmonary hypertension.  For this reason she underwent a cardiac catheterization on 9/26/2022 which showed normal pulmonary pressures, normal left-sided pressures, elevated right ventricular filling pressures, normal PVR, normal RV SWI  The etiology of the dyspnea somewhat unclear.    12/8/2022-CT of the chest-tiny right lower lobe pulmonary embolism seen previously is no longer present.  Enlarged right ventricle appears similar on prior exam.  No other abnormalities noted.    CT angiogram of the chest 7/17/2023 which thankfully did not show recurrent PE but did show groundglass opacities felt to likely be infectious/inflammatory as well as a new left fissure nodule 1.8 x 1.1 cm, felt also likely to be infectious/inflammatory with 3-month interval follow-up recommended.  Patient was ultimately treated with a round of Z-Sreedhar and steroids with significant improvement in her shortness of breath.    11/9/2023-left breast diagnostic mammogram and ultrasound  Continued interval decrease in microcalcifications in the left breast with only few faint residual microcalcifications seen on spot magnification.  No sonographic abnormality in the areas of biopsy-proven malignancy in the left breast.  Imaging features are consistent with that of near complete imaging response to medical therapy.    Interval History  Ms. Gomez today for follow-up.  She had a diagnostic mammogram bilateral on 5/30/2024.  She has been tolerating Aromasin extremely well and denies any arthralgias hair loss or nail changes.  Denies any new bone pains, cough, abdominal pain nausea  vomiting.  She overall feels well and tries to remain active.    Past Medical History:   Diagnosis Date    Benign essential hypertension     Chronic bilateral low back pain without sciatica 8/16/2013 March 13, 2019--Limited bone scan.  This reveals scintigraphic activity in the spine and at the right shoulder corresponds to change seen on recent x-rays and is best explained by degenerative change.  Isolated metastatic disease exactly corresponding to the sites of extensive degenerative disc change would be peculiar.  March 7, 2019--new patient to get established.  She has complaints of approxi    Chronic bilateral thoracic back pain 2/26/2019 March 13, 2019--Limited bone scan.  This reveals scintigraphic activity in the spine and at the right shoulder corresponds to change seen on recent x-rays and is best explained by degenerative change.  Isolated metastatic disease exactly corresponding to the sites of extensive degenerative disc change would be peculiar.  March 1, 2019--x-ray of the lumbar and thoracic spine reveals mild anterior l    Chronic insomnia 11/4/2014    Diabetic peripheral neuropathy (HCC) 3/7/2019    Characterized by decreased sensation and paresthesias in both lower extremities described as a burning sensation.  Extends up to the knees.  Reportedly had been evaluated with nerve conduction study in the past.    Generalized anxiety disorder 5/19/2013    History of Bell's palsy 5/21/2013    Remote history of Bell's palsy.  Patient does not remember which side of the face.    Hyperlipidemia     Impaired fasting glucose     Major depression     Menopausal state, 8/19/2020--normal DEXA. 3/7/2019    August 19, 2020--DEXA scan reveals lumbar spine T score 3.8.  Left femoral neck T score 2.5.  Right femoral neck T score 2.2.  Normal bone density.    Non morbid obesity 8/11/2022    Peripheral neuropathy, idiopathic 3/7/2019    Characterized by decreased sensation and paresthesias in both lower  extremities described as a burning sensation.  Extends up to the knees.  Reportedly had been evaluated with nerve conduction study in the past.    Primary hypothyroidism 5/19/2013    Primary osteoarthritis involving multiple joints 4/22/2013    Rotator cuff arthropathy of right shoulder, 4/20/2021--status post right reverse total shoulder arthroplasty 5/27/2020    Situational mixed anxiety and depressive disorder 8/21/2019 August 21, 2019--patient seen in follow-up after I called in a prescription for Ativan after the patient's  contacted me a few weeks ago regarding the sudden death of patient's daughter.  This was an apparent suicide and the patient found her daughter dead.  I will not go into details.  Patient reports the Lorazepam has been helpful but she is still quite distraught, nervous, and undergoing    Type 2 diabetes mellitus with diabetic neuropathy, without long-term current use of insulin ([facility])     Vitamin D deficiency 2/26/2019        Past Surgical History:   Procedure Laterality Date    BILATERAL BREAST REDUCTION  Early 2000    Early 2000--bilateral breast reduction    CARDIAC CATHETERIZATION N/A 9/26/2022    Procedure: Right Heart Cath;  Surgeon: Agus Solorio MD PhD;  Location: [facility] INVASIVE LOCATION;  Service: Cardiology;  Laterality: N/A;  Will REMAIN on Xarelto for the case    CHOLECYSTECTOMY  1960s    1960s--open cholecystectomy    COLONOSCOPY  2012 2012--reportedly normal colonoscopy    INCONTINENCE SURGERY  2012 Approximately 2012--implantation of questionable bladder stimulator right sacral area for urinary incontinence.  Details not known.    REPLACEMENT TOTAL KNEE BILATERAL Left 2010; 2011 2010-2011--bilateral total knee replacement    SUBTOTAL HYSTERECTOMY  Remote    Remote partial hysterectomy.  Ovaries intact.    TOTAL SHOULDER REPLACEMENT Left 2013 2013--left total shoulder replacement.    TOTAL SHOULDER REPLACEMENT  April 28, 2021     "2021--status post right reverse total shoulder arthroplasty    TOTAL SHOULDER REVERSE ARTHROPLASTY Right 2021--right reverse total shoulder replacement.        Family History   Problem Relation Age of Onset    Alcohol abuse Father     Breast cancer Daughter         40s    Cancer Other     Diabetes Other         type II    Leukemia Grandchild 19        Social History     Socioeconomic History    Marital status:    Tobacco Use    Smoking status: Former     Types: Cigarettes     Quit date: 1998     Years since quittin.9    Smokeless tobacco: Never   Vaping Use    Vaping Use: Never used   Substance and Sexual Activity    Alcohol use: Yes     Alcohol/week: 1.0 standard drink     Types: 1 Glasses of wine per week     Comment: current some day    Drug use: No    Sexual activity: Not Currently     Partners: Male        OB History          1    Para   1    Term   1            AB        Living             SAB        IAB        Ectopic        Molar        Multiple        Live Births                 Age at menarche-10  Age at first live childbirth-12   1 para 1  0  She had a hysterectomy in the 90s, ovaries still present  Oral contraceptive pill use for 6 to 8 weeks  No use of hormone replacement therapy    Allergies   Allergen Reactions    Morphine And Related     Other Other (See Comments)     REJECTED DACRON SUTURES IN THE PAST AND INCISION CAME APART            Review of systems as mentioned in the HPI otherwise negative    Objective   Blood pressure 144/77, pulse 66, temperature 97.5 °F (36.4 °C), temperature source Temporal, resp. rate 16, height 165.1 cm (65\"), weight 86.9 kg (191 lb 9.6 oz), SpO2 95 %, not currently breastfeeding.   Physical Exam  Constitutional:       Appearance: Normal appearance. She is normal weight.   HENT:      Head: Normocephalic and atraumatic.      Right Ear: External ear normal.      Left Ear: External ear normal.      Nose: " Nose normal.      Mouth/Throat:      Mouth: Mucous membranes are moist.      Pharynx: Oropharynx is clear.   Eyes:      Extraocular Movements: Extraocular movements intact.      Conjunctiva/sclera: Conjunctivae normal.      Pupils: Pupils are equal, round, and reactive to light.   Cardiovascular:      Rate and Rhythm: Normal rate and regular rhythm.      Pulses: Normal pulses.      Heart sounds: Normal heart sounds.   Pulmonary:      Effort: Pulmonary effort is normal.      Breath sounds: Normal breath sounds.   Abdominal:      General: Abdomen is flat. Bowel sounds are normal.   Musculoskeletal:         General: Normal range of motion.      Cervical back: Normal range of motion.   Skin:     General: Skin is warm.   Neurological:      General: No focal deficit present.      Mental Status: She is alert and oriented to person, place, and time.   Psychiatric:         Mood and Affect: Mood normal.         Behavior: Behavior normal.         Thought Content: Thought content normal.         Judgment: Judgment normal.       Breast Exam: Right breast appears normal on inspection.  No palpable abnormalities of the right breast.  Left breast: No erythema.  No tenderness.  No palpable firmness at this time.  No axillary adenopathy     I have reexamined the patient and the results are consistent with the previously documented exam. Elsie Arzate MD        No visits with results within 30 Day(s) from this visit.   Latest known visit with results is:   Lab on 10/24/2022   Component Date Value Ref Range Status    Glucose 10/24/2022 108  74 - 124 mg/dL Final    BUN 10/24/2022 11  6 - 20 mg/dL Final    Creatinine 10/24/2022 0.96  0.60 - 1.10 mg/dL Final    Sodium 10/24/2022 135  134 - 145 mmol/L Final    Potassium 10/24/2022 4.4  3.5 - 4.7 mmol/L Final    Chloride 10/24/2022 95 (L)  98 - 107 mmol/L Final    CO2 10/24/2022 26.4  22.0 - 29.0 mmol/L Final    Calcium 10/24/2022 10.7 (C)  8.5 - 10.2 mg/dL Final    Total Protein  10/24/2022 7.9  6.3 - 8.0 g/dL Final    Albumin 10/24/2022 4.90  3.50 - 5.20 g/dL Final    ALT (SGPT) 10/24/2022 25  0 - 33 U/L Final    AST (SGOT) 10/24/2022 29  0 - 32 U/L Final    Alkaline Phosphatase 10/24/2022 67  38 - 116 U/L Final    Total Bilirubin 10/24/2022 0.5  0.2 - 1.2 mg/dL Final    Globulin 10/24/2022 3.0  1.8 - 3.5 gm/dL Final    A/G Ratio 10/24/2022 1.6  1.1 - 2.4 g/dL Final    BUN/Creatinine Ratio 10/24/2022 11.5  7.3 - 30.0 Final    Anion Gap 10/24/2022 13.6  5.0 - 15.0 mmol/L Final    eGFR 10/24/2022 60.3  >60.0 mL/min/1.73 Final    National Kidney Foundation and American Society of Nephrology (ASN) Task Force recommended calculation based on the Chronic Kidney Disease Epidemiology Collaboration (CKD-EPI) equation refit without adjustment for race.    WBC 10/24/2022 6.20  3.40 - 10.80 10*3/mm3 Final    RBC 10/24/2022 4.27  3.77 - 5.28 10*6/mm3 Final    Hemoglobin 10/24/2022 13.8  12.0 - 15.9 g/dL Final    Hematocrit 10/24/2022 40.9  34.0 - 46.6 % Final    MCV 10/24/2022 95.8  79.0 - 97.0 fL Final    MCH 10/24/2022 32.3  26.6 - 33.0 pg Final    MCHC 10/24/2022 33.7  31.5 - 35.7 g/dL Final    RDW 10/24/2022 12.4  12.3 - 15.4 % Final    RDW-SD 10/24/2022 43.0  37.0 - 54.0 fl Final    MPV 10/24/2022 9.3  6.0 - 12.0 fL Final    Platelets 10/24/2022 258  140 - 450 10*3/mm3 Final    Neutrophil % 10/24/2022 59.3  42.7 - 76.0 % Final    Lymphocyte % 10/24/2022 28.9  19.6 - 45.3 % Final    Monocyte % 10/24/2022 8.9  5.0 - 12.0 % Final    Eosinophil % 10/24/2022 1.9  0.3 - 6.2 % Final    Basophil % 10/24/2022 0.5  0.0 - 1.5 % Final    Immature Grans % 10/24/2022 0.5  0.0 - 0.5 % Final    Neutrophils, Absolute 10/24/2022 3.68  1.70 - 7.00 10*3/mm3 Final    Lymphocytes, Absolute 10/24/2022 1.79  0.70 - 3.10 10*3/mm3 Final    Monocytes, Absolute 10/24/2022 0.55  0.10 - 0.90 10*3/mm3 Final    Eosinophils, Absolute 10/24/2022 0.12  0.00 - 0.40 10*3/mm3 Final    Basophils, Absolute 10/24/2022 0.03  0.00 - 0.20  10*3/mm3 Final    Immature Grans, Absolute 10/24/2022 0.03  0.00 - 0.05 10*3/mm3 Final    nRBC 10/24/2022 0.0  0.0 - 0.2 /100 WBC Final        No radiology results for the last 30 days.     6/11/2024 CBC reviewed and within normal limits  CMP reviewed and blood sugar mildly elevated at 102, alkaline phosphatase increased at 153 from a previous value of 145 months ago and in November 2023 alkaline phosphatase was normal at 108.    Assessment & Plan       *Left breast invasive ductal carcinoma  T1 cN0 M0, stage Ia, multifocal  2 sites were biopsied, one site was consistent with ductal carcinoma in situ which is ER/IL positive and the second site was invasive ductal carcinoma with lobular features which is ER/IL positive and HER2 negative, grade 2  The steps of curative intent breast cancer treatment have been explained, including surgery, possible chemotherapy, possible radiation and possible endocrine therapy.   We discussed that given the fact that she does not want to undergo mastectomy and concerned about the complications due to ongoing dyspnea and her age it would be reasonable to proceed with neoadjuvant endocrine therapy.  The 2 options including tamoxifen and aromatase inhibitors have been discussed.  Given the history of DVT tamoxifen is not an option   She has been on anastrozole  since November 2022 and tolerating that well.  Seen in the clinic in December 2022 with concerns of erythema of the left breast.  This has since resolved.  Diagnostic mammogram 1/19/2022 of the left breast shows stable microcalcifications and no new concerning findings.  Diagnostic mammogram 5/1/2023 showing resolution of abnormal mammographic densities in the left breast as well as reduction in microcalcifications.    Patient developing hair loss and nails breaking off.  Arimidex held.  Patient ultimately switched to Aromasin 7/2023 8/8/2023 patient tolerating Aromasin well with no worsening of hair loss or nail changes.   Continue same.  Repeat diagnostic mammogram scheduled in November per Dr. Fam.  11/9/2023-left breast diagnostic mammogram with near complete resolution of the microcalcifications.  5/30/2024-bilateral diagnostic mammogram images independently reviewed interpreted by me.  Overall unchanged with no new concerning findings.  Biopsy clip still present.  Patient will continue on eczema stain indefinitely as long as there is continued response on the mammograms.  Repeat left breast diagnostic mammogram in 6 months and bilateral diagnostic mammogram in 1 year    *PE  Hypercoagulability work-up negative  Abnormal lupus anticoagulant likely secondary to Xarelto  Prothrombin gene mutation and factor V Leiden not covered by insurance  Continues on Xarelto  Recent CT angiogram shows resolution of the blood clots.  The initial plan was to continue patient on indefinite anticoagulation as there was no clear provoking event for the PE.  However she is concerned about remaining on anticoagulation long-term.  She has completed 6 months of therapy.   We discussed increased risk of recurrent DVT and PE in individuals with a previous history of PE.  Since is the first episode of PE, could consider discontinuation of anticoagulation and continue surveillance.  Xarelto discontinued    *Anxiety  Poorly controlled since the death of her daughter  Continues on Cymbalta  Also on trazodone  Continue follow-up with supportive oncology    *Depression  Patient is in a great mood today after learning about the mammogram.    *Dyspnea  7/17/2023 CT angiogram performed per PCP, Dr. Moore, showing no evidence of recurrent thrombosis but with inflammatory/infectious changes with groundglass opacities.  Also noted was new 1.8 x 1.1 cm left fissure lesion, also felt to be likely infectious/inflammatory.  3-month CT chest follow-up recommended.  Patient treated with a Z-Sreedhar and prednisone taper per Dr. Moroe with improvement in shortness of  breath.  8/8/2023 patient highly anxious about potentially having cancer in her lung.  Tried to reassure her of the radiologist feeling this is likely infectious/inflammatory and considering her recent pneumonia and previous COVID that is likely the case.  She will see Dr. Moore in follow-up in September and anticipate him ordering repeat CT imaging at that visit.  1/2/2024-CT of the chest PE protocol with no evidence of pulmonary embolism.  New 1 to 2 mm left upper lobe nodule which was better seen on the prior CT chest.    *Hypertension and hyperlipidemia-continue current medications.    *Bone health  DEXA from 2020 reviewed and normal  DEXA scan June 2023 with normal bone density.  Repeat DEXA in June 2025    PLAN:  Continue exemestane 25 mg daily.  Continue vitamin D supplementation.  Stable CT chest  Stable bilateral diagnostic mammogram  Left breast diagnostic mammogram in 6 months and bilateral diagnostic mammogram in 1 year  DEXA in June 2025  MD follow-up in 6 months    This patient is on high risk drug therapy requiring intensive monitoring for toxicity.

## 2024-06-19 ENCOUNTER — OFFICE VISIT (OUTPATIENT)
Dept: INTERNAL MEDICINE | Facility: CLINIC | Age: 79
End: 2024-06-19
Payer: MEDICARE

## 2024-06-19 VITALS
RESPIRATION RATE: 16 BRPM | SYSTOLIC BLOOD PRESSURE: 128 MMHG | OXYGEN SATURATION: 92 % | TEMPERATURE: 96.1 F | DIASTOLIC BLOOD PRESSURE: 76 MMHG | WEIGHT: 179 LBS | BODY MASS INDEX: 28.77 KG/M2 | HEIGHT: 66 IN | HEART RATE: 80 BPM

## 2024-06-19 DIAGNOSIS — F41.9 CHRONIC ANXIETY: ICD-10-CM

## 2024-06-19 DIAGNOSIS — E11.40 TYPE 2 DIABETES MELLITUS WITH DIABETIC NEUROPATHY, WITHOUT LONG-TERM CURRENT USE OF INSULIN: Chronic | ICD-10-CM

## 2024-06-19 DIAGNOSIS — F41.8 DEPRESSION WITH ANXIETY: Primary | ICD-10-CM

## 2024-06-19 DIAGNOSIS — E66.3 OVERWEIGHT (BMI 25.0-29.9): Chronic | ICD-10-CM

## 2024-06-19 PROBLEM — R41.3 MEMORY LOSS: Chronic | Status: ACTIVE | Noted: 2024-03-18

## 2024-06-19 PROBLEM — M25.50 ARTHRALGIA OF MULTIPLE JOINTS: Chronic | Status: ACTIVE | Noted: 2024-02-15

## 2024-06-19 PROBLEM — R41.81 AGE-RELATED COGNITIVE DECLINE: Chronic | Status: ACTIVE | Noted: 2024-03-18

## 2024-06-19 PROCEDURE — 3078F DIAST BP <80 MM HG: CPT | Performed by: INTERNAL MEDICINE

## 2024-06-19 PROCEDURE — 1126F AMNT PAIN NOTED NONE PRSNT: CPT | Performed by: INTERNAL MEDICINE

## 2024-06-19 PROCEDURE — 3074F SYST BP LT 130 MM HG: CPT | Performed by: INTERNAL MEDICINE

## 2024-06-19 PROCEDURE — 99214 OFFICE O/P EST MOD 30 MIN: CPT | Performed by: INTERNAL MEDICINE

## 2024-06-19 NOTE — PROGRESS NOTES
06/19/2024    Patient Information  Claudette L McManus                                                                                          [id number]  [place] [address]      1945  [unfilled]  There is no work phone number on file.    Chief Complaint:     Follow-up depression and anxiety.  Recent medication adjustment.  Diabetes and history of Non morbid obesity.    History of Present Illness:    Patient has had a long history of depression associated with anxiety presents today in follow-up.  I evaluated her about 6 weeks ago and she was having worsening anxiety and depressive symptoms.  I started her on Vraylar 1.5 mg/day and increase her lorazepam to 1 mg with instructions to take 1/2-1 twice daily as needed for anxiety.  She should stay on Cymbalta 60 mg/day.  Patient reports that she is doing better with this medication and thinks that is being helpful.    Review of Systems   Constitutional: Negative.   HENT: Negative.     Eyes: Negative.    Cardiovascular: Negative.    Respiratory: Negative.     Endocrine: Negative.    Hematologic/Lymphatic: Negative.    Skin: Negative.    Musculoskeletal: Negative.    Gastrointestinal: Negative.    Genitourinary: Negative.    Neurological: Negative.    Psychiatric/Behavioral: Negative.  Positive for depression and memory loss. The patient has insomnia and is nervous/anxious.    Allergic/Immunologic: Negative.        Active Problems:    Patient Active Problem List   Diagnosis    Primary osteoarthritis involving multiple joints    Benign essential hypertension    Hyperlipidemia    Primary hypothyroidism    Type 2 diabetes mellitus with diabetic neuropathy, without long-term current use of insulin    Chronic insomnia    Chronic bilateral low back pain without sciatica    Chronic bilateral thoracic back pain    Vitamin D deficiency    Therapeutic drug monitoring    Postmenopausal state    Diabetic peripheral neuropathy    Depression with  anxiety    ACE-inhibitor cough    History of multiple pulmonary emboli    Overweight (BMI 25.0-29.9)    Ductal carcinoma of left breast    Right ventricular enlargement    Chronic anxiety    History of COVID-19    Left upper lobe pulmonary nodule    Arthralgia of multiple joints    Memory loss    Age-related cognitive decline         Past Medical History:   Diagnosis Date    Age-related cognitive decline 03/18/2024    Arthralgia of multiple joints 02/15/2024    February 15, 2024--patient reports that over the past 6 months she has been having much more pain in her hands and fingers bilaterally.  She is also noted some swelling without redness but they are tender.  We will obtain rheumatologic profile today and then follow-up on phone for the results and possible further instructions.  Need to rule out inflammatory arthritis.      Benign essential hypertension     Chronic anxiety 10/26/2022    Chronic bilateral low back pain without sciatica 08/16/2013 March 13, 2019--Limited bone scan.  This reveals scintigraphic activity in the spine and at the right shoulder corresponds to change seen on recent x-rays and is best explained by degenerative change.  Isolated metastatic disease exactly corresponding to the sites of extensive degenerative disc change would be peculiar.  March 7, 2019--new patient to get established.  She has complaints of approxi    Chronic bilateral thoracic back pain 02/26/2019 March 13, 2019--Limited bone scan.  This reveals scintigraphic activity in the spine and at the right shoulder corresponds to change seen on recent x-rays and is best explained by degenerative change.  Isolated metastatic disease exactly corresponding to the sites of extensive degenerative disc change would be peculiar.  March 1, 2019--x-ray of the lumbar and thoracic spine reveals mild anterior l    Chronic insomnia 11/04/2014    Diabetic peripheral neuropathy 03/07/2019    Characterized by decreased sensation and  paresthesias in both lower extremities described as a burning sensation.  Extends up to the knees.  Reportedly had been evaluated with nerve conduction study in the past.    Ductal carcinoma of left breast 08/26/2022    Generalized anxiety disorder 05/19/2013    History of Bell's palsy 05/21/2013    Remote history of Bell's palsy.  Patient does not remember which side of the face.    History of COVID-19 12/08/2022    History of multiple pulmonary emboli 05/19/2022    Stacie 15, 2022--patient seen in follow-up by Dr. Moore.  She is complaining of continued dyspnea on exertion which may be worse.  I reviewed the cardiology consultation from June 7, 2022 and also reviewed the echocardiogram from that same date.  Noted below.  Her  is now present and he reports her dyspnea on exertion is definitely worse.  Her oxygen saturation at rest is 96%.  Upon ambulatio    History of pneumonia 07/19/2023 July 19, 2023--it appears the patient may have an infectious process versus an inflammatory process in both of her lungs.  She was placed on a Z-Sreedhar by the nurse practitioner which I think is certainly reasonable.  I do think she would benefit from a round of prednisone.  There is possibly a new pulmonary nodule that we will need monitoring.  I think these are changes left over from COVID which sh    Hyperlipidemia     Impaired fasting glucose     Left upper lobe pulmonary nodule 07/19/2023 January 2, 2024--CT ANGIOGRAM CHEST PULMONARY EMBOLISM     TECHNIQUE: Radiation dose reduction techniques were utilized, including automated exposure control and exposure modulation based on body size. 2 mm images were obtained through the chest after the administration of IV contrast.     HISTORY: Shortness of breath and chest pain.     COMPARISON: CT chest angiogram 5/19/2022. CT chest 12/11/202    Memory loss 03/18/2024             Patient: MCMANUS, CLAUDETTE  Time Out: 23:06  Exam(s): MRI HEAD W WO Contrast      EXAM:    MR Head  Without and With Intravenous Contrast     CLINICAL HISTORY:     Reason for exam: Neuro deficit, acute, stroke suspected. Dizziness, hx breast cancer.     TECHNIQUE:    Magnetic resonance images of the head brain without and with  intravenous contrast in multiple planes.     COMPARISON:    C    Menopausal state, 8/19/2020--normal DEXA. 03/07/2019 August 19, 2020--DEXA scan reveals lumbar spine T score 3.8.  Left femoral neck T score 2.5.  Right femoral neck T score 2.2.  Normal bone density.    Non morbid obesity 08/11/2022    Overweight (BMI 25.0-29.9) 08/11/2022    Peripheral neuropathy, idiopathic 03/07/2019    Characterized by decreased sensation and paresthesias in both lower extremities described as a burning sensation.  Extends up to the knees.  Reportedly had been evaluated with nerve conduction study in the past.    Primary hypothyroidism 05/19/2013    Primary osteoarthritis involving multiple joints 04/22/2013    Rotator cuff arthropathy of right shoulder, 4/20/2021--status post right reverse total shoulder arthroplasty 05/27/2020    Situational mixed anxiety and depressive disorder 08/21/2019 August 21, 2019--patient seen in follow-up after I called in a prescription for Ativan after the patient's  contacted me a few weeks ago regarding the sudden death of patient's daughter.  This was an apparent suicide and the patient found her daughter dead.  I will not go into details.  Patient reports the Lorazepam has been helpful but she is still quite distraught, nervous, and undergoing    Type 2 diabetes mellitus with diabetic neuropathy, without long-term current use of insulin     Vitamin D deficiency 02/26/2019         Past Surgical History:   Procedure Laterality Date    BILATERAL BREAST REDUCTION  Early 2000    Early 2000--bilateral breast reduction    CARDIAC CATHETERIZATION N/A 9/26/2022    Procedure: Right Heart Cath;  Surgeon: Agus Solorio MD PhD;  Location: [facility] INVASIVE  LOCATION;  Service: Cardiology;  Laterality: N/A;  Will REMAIN on Xarelto for the case    CHOLECYSTECTOMY  1960s    1960s--open cholecystectomy    COLONOSCOPY  2012 2012--reportedly normal colonoscopy    INCONTINENCE SURGERY  2012 Approximately 2012--implantation of questionable bladder stimulator right sacral area for urinary incontinence.  Details not known.    REPLACEMENT TOTAL KNEE BILATERAL Left 2010; 2011 2010-2011--bilateral total knee replacement    SUBTOTAL HYSTERECTOMY  Remote    Remote partial hysterectomy.  Ovaries intact.    TOTAL SHOULDER REPLACEMENT Left 2013 2013--left total shoulder replacement.    TOTAL SHOULDER REPLACEMENT  April 28, 2021 4/20/2021--status post right reverse total shoulder arthroplasty    TOTAL SHOULDER REVERSE ARTHROPLASTY Right 04/20/2021 4/20/2021--right reverse total shoulder replacement.         Allergies   Allergen Reactions    Morphine And Codeine     Other Other (See Comments)     REJECTED DACRON SUTURES IN THE PAST AND INCISION CAME APART           Current Outpatient Medications:     Cariprazine HCl (Vraylar) 1.5 MG capsule capsule, Take 1 p.o. daily for depression/mood stabilizer and anxiety, Disp: 30 capsule, Rfl: 2    Cholecalciferol (VITAMIN D3) 2000 UNITS tablet, Take 1 tablet by mouth Daily., Disp: , Rfl:     cyclobenzaprine (FLEXERIL) 10 MG tablet, TAKE ONE TABLET BY MOUTH THREE TIMES A DAY AS NEEDED FOR MUSCLE RELAXER/BACK PAIN, Disp: 30 tablet, Rfl: 1    DULoxetine (CYMBALTA) 60 MG capsule, TAKE 1 CAPSULE EVERY DAY AS DIRECTED, Disp: 90 capsule, Rfl: 3    exemestane (AROMASIN) 25 MG tablet, TAKE 1 TABLET EVERY DAY, Disp: 30 tablet, Rfl: 5    ezetimibe (ZETIA) 10 MG tablet, TAKE 1 TABLET EVERY DAY, Disp: 90 tablet, Rfl: 3    gabapentin (NEURONTIN) 300 MG capsule, TAKE ONE CAPSULE BY MOUTH THREE TIMES A DAY FOR PERIPHERAL NEUROPATHY AS DIRECTED, Disp: 90 capsule, Rfl: 4    Homeopathic Products (LEG CRAMPS PO), Take  by mouth Daily. Nightly, Disp:  , Rfl:     levothyroxine (SYNTHROID, LEVOTHROID) 125 MCG tablet, Take 1 tablet by mouth Daily., Disp: 90 tablet, Rfl: 0    LORazepam (Ativan) 1 MG tablet, Take 1 p.o. twice daily for chronic anxiety/panic, Disp: 60 tablet, Rfl: 1    meloxicam (MOBIC) 15 MG tablet, TAKE 1 TABLET BY MOUTH DAILY FOR ARTHRITIS/JOINT PAIN, Disp: 90 tablet, Rfl: 1    metroNIDAZOLE (METROGEL) 0.75 % gel, Apply  topically to the appropriate area as directed 2 (Two) Times a Day., Disp: 45 g, Rfl: 2    multivitamin (MULTI VITAMIN PO), Take  by mouth Daily., Disp: , Rfl:     ondansetron (ZOFRAN) 8 MG tablet, TAKE 1 TABLET EVERY 6 HOURS AS NEEDED FOR NAUSEA, Disp: 60 tablet, Rfl: 10    simvastatin (ZOCOR) 20 MG tablet, TAKE 1 TABLET EVERY DAY FOR HIGH CHOLESTEROL, Disp: 90 tablet, Rfl: 2    Tirzepatide (Mounjaro) 15 MG/0.5ML solution pen-injector pen, Inject 0.5 mL under the skin into the appropriate area as directed 1 (One) Time Per Week., Disp: 2 mL, Rfl: 0    traZODone (DESYREL) 150 MG tablet, Take 1-1/2 tablets nightly as directed, Disp: 135 tablet, Rfl: 1    valsartan (DIOVAN) 320 MG tablet, Take 1 tablet by mouth Every Morning., Disp: 90 tablet, Rfl: 10      Family History   Problem Relation Age of Onset    Alcohol abuse Father     Breast cancer Daughter         40s    Cancer Other     Diabetes Other         type II    Leukemia Grandchild 19         Social History     Socioeconomic History    Marital status:    Tobacco Use    Smoking status: Former     Current packs/day: 0.00     Types: Cigarettes     Quit date: 1998     Years since quittin.4    Smokeless tobacco: Never   Vaping Use    Vaping status: Never Used   Substance and Sexual Activity    Alcohol use: Yes     Alcohol/week: 1.0 standard drink of alcohol     Types: 1 Glasses of wine per week     Comment: current some day    Drug use: No    Sexual activity: Defer     Partners: Male         Vitals:    24 1049   BP: 128/76   Pulse: 80   Resp: 16   Temp: 96.1 °F  "(35.6 °C)   SpO2: 92%   Weight: 81.2 kg (179 lb)   Height: 167 cm (65.75\")        Body mass index is 29.11 kg/m².      Physical Exam:    General: Alert and oriented x 3.  No acute distress.  Overweight.  Normal affect.  HEENT: Pupils equal, round, reactive to light; extraocular movements intact; sclerae nonicteric; pharynx, ear canals and TMs normal.  Neck: Without JVD, thyromegaly, bruit, or adenopathy.  Lungs: Clear to auscultation in all fields.  Heart: Regular rate and rhythm without murmur, rub, gallop, or click.  Abdomen: Soft, nontender, without hepatosplenomegaly or hernia.  Bowel sounds normal.  : Deferred.  Rectal: Deferred.  Extremities: Without clubbing, cyanosis, edema, or pulse deficit.  Neurologic: Intact without focal deficit.  Normal station and gait observed during ingress and egress from the examination room.  Skin: Without significant lesion.  Musculoskeletal: Unremarkable.    Lab/other results:      Assessment/Plan:     Diagnosis Plan   1. Depression with anxiety        2. Chronic anxiety        3. Type 2 diabetes mellitus with diabetic neuropathy, without long-term current use of insulin        4. Overweight (BMI 25.0-29.9)          Patient's depression and chronic anxiety is much better with the medication adjustment.  Specifically, we added Vraylar and low-dose and I think she should continue this.  We also increased her Ativan as well.  Patient's weight is down and she has diabetes and is on 15 mg of tirzepatide every week injection.  This is improving the situation as well.    Plan is as follows: No changes to current medical regimen.  I will have patient keep her previously scheduled lab and follow-up in August.  Otherwise she will follow-up as needed.    This patient has a PCP that is the continuing focal point for all health care services, and the patient sees this physician to be evaluated for depression, anxiety, diabetes and history of obesity which is improving. The inherent " "complexity that this code () captures is not in the clinical condition itself, but rather the cognitition of the continued responsibility of being the focal point for all needed services for this patient.\"         Procedures        "

## 2024-07-02 ENCOUNTER — TELEPHONE (OUTPATIENT)
Dept: INTERNAL MEDICINE | Facility: CLINIC | Age: 79
End: 2024-07-02

## 2024-07-02 NOTE — TELEPHONE ENCOUNTER
Caller: Patricia Claudette L    Relationship: Self    Best call back number: [phone]    What is the best time to reach you:ANY    Who are you requesting to speak with (clinical staff, provider,  specific staff member): DR CROOKS OR ARIE    Do you know the name of the person who called: PATIENT    What was the call regarding: PATIENT STATED SHE IS HAVING SOME KIND OF BLOCKAGE IN HER BOWELS AND WANTED TO SPEAK WITH ARIE OR DR CROOKS ABOUT IT.    IT HAS BEEN ONGOING FOR A WEEK, ABDOMINAL PAIN, VERY SLIGHT BOWEL MOVEMENT BUT NOT ANYTHING TO SAY SHE HAS GONE.  ITS VERY LITTLE AND VERY SMALL PIECES.     PATIENT STATED SHE HAS TAKEN MIRALAX AND SEVERAL OTHER LAXATIVES AND SUPPOSITORIES.  PATIENT STATED THAT SHE MAYBE GETS ABOUT A TABLE SPOON OF FECES OUT AT A TIME BUT SHE FEELS AS IF SHE NEEDS TO GO EVERY 20 MINUTES OR SO.    Is it okay if the provider responds through MyChart: NO

## 2024-07-03 NOTE — TELEPHONE ENCOUNTER
"Relay     \"Dr. Moore is currently out of the office. If OTC laxatives and suppositories have not worked she needs to be seen in ER for them to perform CT scans to make sure she does not have an actual bowel obstruction. \"                 "

## 2024-07-08 ENCOUNTER — HOSPITAL ENCOUNTER (EMERGENCY)
Facility: HOSPITAL | Age: 79
Discharge: HOME | End: 2024-07-08
Attending: EMERGENCY MEDICINE
Payer: MEDICARE

## 2024-07-08 ENCOUNTER — APPOINTMENT (OUTPATIENT)
Dept: CT IMAGING | Facility: HOSPITAL | Age: 79
End: 2024-07-08
Payer: MEDICARE

## 2024-07-08 VITALS
HEART RATE: 82 BPM | OXYGEN SATURATION: 95 % | WEIGHT: 180 LBS | SYSTOLIC BLOOD PRESSURE: 145 MMHG | TEMPERATURE: 98.8 F | RESPIRATION RATE: 16 BRPM | HEIGHT: 64 IN | BODY MASS INDEX: 30.73 KG/M2 | DIASTOLIC BLOOD PRESSURE: 74 MMHG

## 2024-07-08 DIAGNOSIS — K59.00 CONSTIPATION, UNSPECIFIED CONSTIPATION TYPE: Primary | ICD-10-CM

## 2024-07-08 LAB
ALBUMIN SERPL-MCNC: 3.9 G/DL (ref 3.5–5.2)
ALBUMIN/GLOB SERPL: 1.6 G/DL
ALP SERPL-CCNC: 133 U/L (ref 39–117)
ALT SERPL W P-5'-P-CCNC: 22 U/L (ref 1–33)
ANION GAP SERPL CALCULATED.3IONS-SCNC: 10 MMOL/L (ref 5–15)
AST SERPL-CCNC: 28 U/L (ref 1–32)
BASOPHILS # BLD AUTO: 0.04 10*3/MM3 (ref 0–0.2)
BASOPHILS NFR BLD AUTO: 0.6 % (ref 0–1.5)
BILIRUB SERPL-MCNC: 0.3 MG/DL (ref 0–1.2)
BILIRUB UR QL STRIP: NEGATIVE
BUN SERPL-MCNC: 11 MG/DL (ref 8–23)
BUN/CREAT SERPL: 12.8 (ref 7–25)
CALCIUM SPEC-SCNC: 9.2 MG/DL (ref 8.6–10.5)
CHLORIDE SERPL-SCNC: 97 MMOL/L (ref 98–107)
CLARITY UR: CLEAR
CO2 SERPL-SCNC: 25 MMOL/L (ref 22–29)
COLOR UR: YELLOW
CREAT SERPL-MCNC: 0.86 MG/DL (ref 0.57–1)
DEPRECATED RDW RBC AUTO: 42.7 FL (ref 37–54)
EGFRCR SERPLBLD CKD-EPI 2021: 68 ML/MIN/1.73
EOSINOPHIL # BLD AUTO: 0.17 10*3/MM3 (ref 0–0.4)
EOSINOPHIL NFR BLD AUTO: 2.6 % (ref 0.3–6.2)
ERYTHROCYTE [DISTWIDTH] IN BLOOD BY AUTOMATED COUNT: 12.3 % (ref 12.3–15.4)
GLOBULIN UR ELPH-MCNC: 2.4 GM/DL
GLUCOSE SERPL-MCNC: 146 MG/DL (ref 65–99)
GLUCOSE UR STRIP-MCNC: NEGATIVE MG/DL
HCT VFR BLD AUTO: 35.9 % (ref 34–46.6)
HGB BLD-MCNC: 12.1 G/DL (ref 12–15.9)
HGB UR QL STRIP.AUTO: NEGATIVE
IMM GRANULOCYTES # BLD AUTO: 0.05 10*3/MM3 (ref 0–0.05)
IMM GRANULOCYTES NFR BLD AUTO: 0.8 % (ref 0–0.5)
KETONES UR QL STRIP: NEGATIVE
LEUKOCYTE ESTERASE UR QL STRIP.AUTO: NEGATIVE
LIPASE SERPL-CCNC: 38 U/L (ref 13–60)
LYMPHOCYTES # BLD AUTO: 1.7 10*3/MM3 (ref 0.7–3.1)
LYMPHOCYTES NFR BLD AUTO: 26.1 % (ref 19.6–45.3)
MCH RBC QN AUTO: 31.9 PG (ref 26.6–33)
MCHC RBC AUTO-ENTMCNC: 33.7 G/DL (ref 31.5–35.7)
MCV RBC AUTO: 94.7 FL (ref 79–97)
MONOCYTES # BLD AUTO: 0.45 10*3/MM3 (ref 0.1–0.9)
MONOCYTES NFR BLD AUTO: 6.9 % (ref 5–12)
NEUTROPHILS NFR BLD AUTO: 4.11 10*3/MM3 (ref 1.7–7)
NEUTROPHILS NFR BLD AUTO: 63 % (ref 42.7–76)
NITRITE UR QL STRIP: NEGATIVE
PH UR STRIP.AUTO: 7.5 [PH] (ref 5–8)
PLATELET # BLD AUTO: 253 10*3/MM3 (ref 140–450)
PMV BLD AUTO: 9.5 FL (ref 6–12)
POTASSIUM SERPL-SCNC: 4 MMOL/L (ref 3.5–5.2)
PROT SERPL-MCNC: 6.3 G/DL (ref 6–8.5)
PROT UR QL STRIP: NEGATIVE
RBC # BLD AUTO: 3.79 10*6/MM3 (ref 3.77–5.28)
SODIUM SERPL-SCNC: 132 MMOL/L (ref 136–145)
SP GR UR STRIP: 1.01 (ref 1–1.03)
UROBILINOGEN UR QL STRIP: NORMAL
WBC NRBC COR # BLD AUTO: 6.52 10*3/MM3 (ref 3.4–10.8)

## 2024-07-08 PROCEDURE — 99285 EMERGENCY DEPT VISIT HI MDM: CPT

## 2024-07-08 PROCEDURE — 85025 COMPLETE CBC W/AUTO DIFF WBC: CPT | Performed by: EMERGENCY MEDICINE

## 2024-07-08 PROCEDURE — 80053 COMPREHEN METABOLIC PANEL: CPT | Performed by: EMERGENCY MEDICINE

## 2024-07-08 PROCEDURE — 99284 EMERGENCY DEPT VISIT MOD MDM: CPT | Performed by: EMERGENCY MEDICINE

## 2024-07-08 PROCEDURE — 83690 ASSAY OF LIPASE: CPT | Performed by: EMERGENCY MEDICINE

## 2024-07-08 PROCEDURE — 81003 URINALYSIS AUTO W/O SCOPE: CPT | Performed by: EMERGENCY MEDICINE

## 2024-07-08 PROCEDURE — 25510000001 IOPAMIDOL PER 1 ML: Performed by: EMERGENCY MEDICINE

## 2024-07-08 PROCEDURE — 74177 CT ABD & PELVIS W/CONTRAST: CPT

## 2024-07-08 RX ORDER — SODIUM CHLORIDE 9 MG/ML
125 INJECTION, SOLUTION INTRAVENOUS CONTINUOUS
Status: DISCONTINUED | OUTPATIENT
Start: 2024-07-08 | End: 2024-07-08 | Stop reason: HOSPADM

## 2024-07-08 RX ORDER — LACTULOSE 10 G/15ML
20 SOLUTION ORAL ONCE
Status: COMPLETED | OUTPATIENT
Start: 2024-07-08 | End: 2024-07-08

## 2024-07-08 RX ORDER — BISACODYL 10 MG
SUPPOSITORY, RECTAL RECTAL
Qty: 14 EACH | Refills: 0 | Status: SHIPPED | OUTPATIENT
Start: 2024-07-08

## 2024-07-08 RX ORDER — ONDANSETRON 4 MG/1
4 TABLET, ORALLY DISINTEGRATING ORAL EVERY 6 HOURS PRN
Qty: 20 TABLET | Refills: 0 | Status: SHIPPED | OUTPATIENT
Start: 2024-07-08

## 2024-07-08 RX ORDER — POLYETHYLENE GLYCOL 3350 17 G/17G
17 POWDER, FOR SOLUTION ORAL DAILY
Qty: 30 EACH | Refills: 0 | Status: SHIPPED | OUTPATIENT
Start: 2024-07-08

## 2024-07-08 RX ADMIN — IOPAMIDOL 85 ML: 755 INJECTION, SOLUTION INTRAVENOUS at 20:06

## 2024-07-08 RX ADMIN — LACTULOSE 20 G: 20 SOLUTION ORAL at 21:38

## 2024-07-08 NOTE — FSED PROVIDER NOTE
Subjective   History of Present Illness  80yo female pmh significant htn/hyperlipidemia/dm2/hypothyroid/mdd/appendectomy/cholecystectomy/hysterectomy, presents ED c/o 2wk hx constipation.  Pt reports initially had pencil thin stool 2wks previously, then unable to have bowel movement or pass flatus.  ROS neg fever/chills/chest pain/soa/abdominal pain/nausea/vomiting/dysuria.    History provided by:  Patient  Fecal Impaction  Associated symptoms: no abdominal pain, no diarrhea, no nausea and no vomiting        Review of Systems   Constitutional: Negative.    HENT: Negative.     Eyes: Negative.    Respiratory: Negative.     Cardiovascular: Negative.    Gastrointestinal:  Positive for abdominal distention and constipation. Negative for abdominal pain, blood in stool, diarrhea, nausea, rectal pain and vomiting.   Genitourinary: Negative.    Allergic/Immunologic: Negative for immunocompromised state.   All other systems reviewed and are negative.      Past Medical History:   Diagnosis Date    Age-related cognitive decline 03/18/2024    Arthralgia of multiple joints 02/15/2024    February 15, 2024--patient reports that over the past 6 months she has been having much more pain in her hands and fingers bilaterally.  She is also noted some swelling without redness but they are tender.  We will obtain rheumatologic profile today and then follow-up on phone for the results and possible further instructions.  Need to rule out inflammatory arthritis.      Benign essential hypertension     Chronic anxiety 10/26/2022    Chronic bilateral low back pain without sciatica 08/16/2013 March 13, 2019--Limited bone scan.  This reveals scintigraphic activity in the spine and at the right shoulder corresponds to change seen on recent x-rays and is best explained by degenerative change.  Isolated metastatic disease exactly corresponding to the sites of extensive degenerative disc change would be peculiar.  March 7, 2019--new patient to get  established.  She has complaints of approxi    Chronic bilateral thoracic back pain 02/26/2019 March 13, 2019--Limited bone scan.  This reveals scintigraphic activity in the spine and at the right shoulder corresponds to change seen on recent x-rays and is best explained by degenerative change.  Isolated metastatic disease exactly corresponding to the sites of extensive degenerative disc change would be peculiar.  March 1, 2019--x-ray of the lumbar and thoracic spine reveals mild anterior l    Chronic insomnia 11/04/2014    Diabetic peripheral neuropathy 03/07/2019    Characterized by decreased sensation and paresthesias in both lower extremities described as a burning sensation.  Extends up to the knees.  Reportedly had been evaluated with nerve conduction study in the past.    Ductal carcinoma of left breast 08/26/2022    Generalized anxiety disorder 05/19/2013    History of Bell's palsy 05/21/2013    Remote history of Bell's palsy.  Patient does not remember which side of the face.    History of COVID-19 12/08/2022    History of multiple pulmonary emboli 05/19/2022    Stacie 15, 2022--patient seen in follow-up by Dr. Moore.  She is complaining of continued dyspnea on exertion which may be worse.  I reviewed the cardiology consultation from June 7, 2022 and also reviewed the echocardiogram from that same date.  Noted below.  Her  is now present and he reports her dyspnea on exertion is definitely worse.  Her oxygen saturation at rest is 96%.  Upon ambulatio    History of pneumonia 07/19/2023 July 19, 2023--it appears the patient may have an infectious process versus an inflammatory process in both of her lungs.  She was placed on a Z-Sreedhar by the nurse practitioner which I think is certainly reasonable.  I do think she would benefit from a round of prednisone.  There is possibly a new pulmonary nodule that we will need monitoring.  I think these are changes left over from COVID which sh    Hyperlipidemia      Impaired fasting glucose     Left upper lobe pulmonary nodule 07/19/2023 January 2, 2024--CT ANGIOGRAM CHEST PULMONARY EMBOLISM     TECHNIQUE: Radiation dose reduction techniques were utilized, including automated exposure control and exposure modulation based on body size. 2 mm images were obtained through the chest after the administration of IV contrast.     HISTORY: Shortness of breath and chest pain.     COMPARISON: CT chest angiogram 5/19/2022. CT chest 12/11/202    Memory loss 03/18/2024             Patient: MCMANUS, CLAUDETTE  Time Out: 23:06  Exam(s): MRI HEAD W WO Contrast      EXAM:    MR Head Without and With Intravenous Contrast     CLINICAL HISTORY:     Reason for exam: Neuro deficit, acute, stroke suspected. Dizziness, hx breast cancer.     TECHNIQUE:    Magnetic resonance images of the head brain without and with  intravenous contrast in multiple planes.     COMPARISON:    C    Menopausal state, 8/19/2020--normal DEXA. 03/07/2019 August 19, 2020--DEXA scan reveals lumbar spine T score 3.8.  Left femoral neck T score 2.5.  Right femoral neck T score 2.2.  Normal bone density.    Non morbid obesity 08/11/2022    Overweight (BMI 25.0-29.9) 08/11/2022    Peripheral neuropathy, idiopathic 03/07/2019    Characterized by decreased sensation and paresthesias in both lower extremities described as a burning sensation.  Extends up to the knees.  Reportedly had been evaluated with nerve conduction study in the past.    Primary hypothyroidism 05/19/2013    Primary osteoarthritis involving multiple joints 04/22/2013    Rotator cuff arthropathy of right shoulder, 4/20/2021--status post right reverse total shoulder arthroplasty 05/27/2020    Situational mixed anxiety and depressive disorder 08/21/2019 August 21, 2019--patient seen in follow-up after I called in a prescription for Ativan after the patient's  contacted me a few weeks ago regarding the sudden death of patient's daughter.  This was  an apparent suicide and the patient found her daughter dead.  I will not go into details.  Patient reports the Lorazepam has been helpful but she is still quite distraught, nervous, and undergoing    Type 2 diabetes mellitus with diabetic neuropathy, without long-term current use of insulin     Vitamin D deficiency 02/26/2019       Allergies   Allergen Reactions    Morphine And Codeine     Other Other (See Comments)     REJECTED DACRON SUTURES IN THE PAST AND INCISION CAME APART       Past Surgical History:   Procedure Laterality Date    BILATERAL BREAST REDUCTION  Early 2000    Early 2000--bilateral breast reduction    CARDIAC CATHETERIZATION N/A 9/26/2022    Procedure: Right Heart Cath;  Surgeon: Agus Solorio MD PhD;  Location: [facility] CATH INVASIVE LOCATION;  Service: Cardiology;  Laterality: N/A;  Will REMAIN on Xarelto for the case    CHOLECYSTECTOMY  1960s 1960s--open cholecystectomy    COLONOSCOPY  2012 2012--reportedly normal colonoscopy    INCONTINENCE SURGERY  2012 Approximately 2012--implantation of questionable bladder stimulator right sacral area for urinary incontinence.  Details not known.    REPLACEMENT TOTAL KNEE BILATERAL Left 2010; 2011 2010-2011--bilateral total knee replacement    SUBTOTAL HYSTERECTOMY  Remote    Remote partial hysterectomy.  Ovaries intact.    TOTAL SHOULDER REPLACEMENT Left 2013 2013--left total shoulder replacement.    TOTAL SHOULDER REPLACEMENT  April 28, 2021 4/20/2021--status post right reverse total shoulder arthroplasty    TOTAL SHOULDER REVERSE ARTHROPLASTY Right 04/20/2021 4/20/2021--right reverse total shoulder replacement.       Family History   Problem Relation Age of Onset    Alcohol abuse Father     Breast cancer Daughter         40s    Cancer Other     Diabetes Other         type II    Leukemia Grandchild 19       Social History     Socioeconomic History    Marital status:    Tobacco Use    Smoking status: Former     Current  packs/day: 0.00     Types: Cigarettes     Quit date: 1998     Years since quittin.5    Smokeless tobacco: Never   Vaping Use    Vaping status: Never Used   Substance and Sexual Activity    Alcohol use: Yes     Alcohol/week: 1.0 standard drink of alcohol     Types: 1 Glasses of wine per week     Comment: current some day    Drug use: No    Sexual activity: Defer     Partners: Male           Objective   Physical Exam  Vitals and nursing note reviewed. Exam conducted with a chaperone present.   Constitutional:       Appearance: Normal appearance.   HENT:      Head: Normocephalic and atraumatic.      Right Ear: External ear normal.      Left Ear: External ear normal.      Nose: Nose normal.      Mouth/Throat:      Mouth: Mucous membranes are moist.      Pharynx: Oropharynx is clear.   Eyes:      Pupils: Pupils are equal, round, and reactive to light.   Cardiovascular:      Rate and Rhythm: Normal rate and regular rhythm.      Pulses: Normal pulses.      Heart sounds: Normal heart sounds. No murmur heard.     No friction rub. No gallop.   Pulmonary:      Effort: Pulmonary effort is normal.      Breath sounds: Normal breath sounds. No wheezing, rhonchi or rales.   Abdominal:      General: Bowel sounds are normal. There is distension.      Palpations: Abdomen is soft.      Tenderness: There is no abdominal tenderness. There is no guarding or rebound.      Hernia: There is no hernia in the umbilical area or ventral area.   Genitourinary:     Rectum: No mass, tenderness, anal fissure or external hemorrhoid. Normal anal tone.   Musculoskeletal:         General: No swelling or deformity. Normal range of motion.      Cervical back: Normal range of motion and neck supple. No rigidity.      Right lower leg: No edema.      Left lower leg: No edema.   Lymphadenopathy:      Cervical: No cervical adenopathy.   Skin:     General: Skin is warm and dry.   Neurological:      General: No focal deficit present.      Mental Status:  She is alert and oriented to person, place, and time.      GCS: GCS eye subscore is 4. GCS verbal subscore is 5. GCS motor subscore is 6.         Procedures       CT Abdomen Pelvis With Contrast    Result Date: 7/8/2024  CT ABDOMEN AND PELVIS WITH IV CONTRAST  HISTORY: Constipation  TECHNIQUE: Radiation dose reduction techniques were utilized, including automated exposure control and exposure modulation based on body size. 3 mm images were obtained through the abdomen and pelvis after the administration of IV contrast.  COMPARISON: CT abdomen/pelvis 10/9/2015  FINDINGS:  The distal esophagus is thickened. Bibasilar atelectasis and or pleural parenchymal scarring is present. Asymmetric soft tissue nodule within the inferior aspect of the right breast measures 1.5 cm. There are no findings of small bowel obstruction. The appendix is not seen. Gallbladder surgically absent. There is mild to moderate intrahepatic and moderate to severe extrahepatic bili ductal dilation. Findings have progressed since 10/9/2015. No focal lesion is visualized within the pancreas. The spleen, adrenal glands and kidneys have an unremarkable postcontrast CT appearance. No hydronephrosis. Bladder is decompressed and cannot be evaluated.  No abdominal pelvic adenopathy by size criteria. The rectum is moderate to severely distended with stool. There is a large amount of stool in the upstream colon. There is colonic diverticulosis. The uterus is surgically absent. No free intraperitoneal air is seen. A neurostimulator projects through the right aspect of the sacrum. No suspicious lytic or blastic osseous lesions.      1.  Rectum is moderate to severely distended with stool with a large moderate to large amount of colonic stool upstream. Underlying fecal impaction cannot be excluded. 2.  Thickening of distal esophagus suggestive of esophagitis in appropriate context. Correlation with patient history is recommended. A follow-up endoscopy if  clinically indicated. 3.  Asymmetric 1.5 cm soft tissue density within the inferior aspect of the right breast. While CT findings are nonspecific, neoplasm cannot be excluded and correlation with mammography is recommended. 4.  Mild to moderate intrahepatic and moderate to severe extrahepatic bili ductal dilation status post cholecystectomy, mildly progressed since 2015. While findings may be postsurgical, correlation with patient history is recommended and if there is clinical concern for biliary obstruction, further evaluation with MRI and MRCP of the abdomen with and without contrast is recommended. 5.  Other findings as above..   This report was finalized on 7/8/2024 9:25 PM by Dr. Loc Bean M.D on Workstation: [id number]        Medications   sodium chloride 0.9 % infusion (125 mL/hr Intravenous Not Given 7/8/24 2133)   iopamidol (ISOVUE-370) 76 % injection 100 mL (85 mL Intravenous Given 7/8/24 2006)   lactulose (CHRONULAC) 10 GM/15ML solution 20 g (20 g Oral Given 7/8/24 2138)         ED Course  ED Course as of 07/08/24 2143 Mon Jul 08, 2024   1842 Checkout Dr. Bean 1900hrs. Pending lab/CT abdomen et pelvis/disposition. [SD]   2143 Patient is very stable.  I did discuss the findings on the CAT scan including the constipation as well as the density in the breast.  I did also discuss follow-up with mammography.     [TC]      ED Course User Index  [SD] Kenneth Gomez MD  [TC] Pawan Bean MD      Labs Reviewed   COMPREHENSIVE METABOLIC PANEL - Abnormal; Notable for the following components:       Result Value    Glucose 146 (*)     Sodium 132 (*)     Chloride 97 (*)     Alkaline Phosphatase 133 (*)     All other components within normal limits    Narrative:     GFR Normal >60  Chronic Kidney Disease <60  Kidney Failure <15    The GFR formula is only valid for adults with stable renal function between ages 18 and 70.   CBC WITH AUTO DIFFERENTIAL - Abnormal; Notable for the following components:     Immature Grans % 0.8 (*)     All other components within normal limits   LIPASE - Normal   URINALYSIS WITHOUT MICROSCOPIC (NO CULTURE) - Normal   CBC AND DIFFERENTIAL    Narrative:     The following orders were created for panel order CBC & Differential.  Procedure                               Abnormality         Status                     ---------                               -----------         ------                     CBC Auto Differential[[id number]]        Abnormal            Final result                 Please view results for these tests on the individual orders.     CT Abdomen Pelvis With Contrast    Result Date: 7/8/2024  Narrative: CT ABDOMEN AND PELVIS WITH IV CONTRAST  HISTORY: Constipation  TECHNIQUE: Radiation dose reduction techniques were utilized, including automated exposure control and exposure modulation based on body size. 3 mm images were obtained through the abdomen and pelvis after the administration of IV contrast.  COMPARISON: CT abdomen/pelvis 10/9/2015  FINDINGS:  The distal esophagus is thickened. Bibasilar atelectasis and or pleural parenchymal scarring is present. Asymmetric soft tissue nodule within the inferior aspect of the right breast measures 1.5 cm. There are no findings of small bowel obstruction. The appendix is not seen. Gallbladder surgically absent. There is mild to moderate intrahepatic and moderate to severe extrahepatic bili ductal dilation. Findings have progressed since 10/9/2015. No focal lesion is visualized within the pancreas. The spleen, adrenal glands and kidneys have an unremarkable postcontrast CT appearance. No hydronephrosis. Bladder is decompressed and cannot be evaluated.  No abdominal pelvic adenopathy by size criteria. The rectum is moderate to severely distended with stool. There is a large amount of stool in the upstream colon. There is colonic diverticulosis. The uterus is surgically absent. No free intraperitoneal air is seen. A neurostimulator  projects through the right aspect of the sacrum. No suspicious lytic or blastic osseous lesions.      Impression: 1.  Rectum is moderate to severely distended with stool with a large moderate to large amount of colonic stool upstream. Underlying fecal impaction cannot be excluded. 2.  Thickening of distal esophagus suggestive of esophagitis in appropriate context. Correlation with patient history is recommended. A follow-up endoscopy if clinically indicated. 3.  Asymmetric 1.5 cm soft tissue density within the inferior aspect of the right breast. While CT findings are nonspecific, neoplasm cannot be excluded and correlation with mammography is recommended. 4.  Mild to moderate intrahepatic and moderate to severe extrahepatic bili ductal dilation status post cholecystectomy, mildly progressed since 2015. While findings may be postsurgical, correlation with patient history is recommended and if there is clinical concern for biliary obstruction, further evaluation with MRI and MRCP of the abdomen with and without contrast is recommended. 5.  Other findings as above..   This report was finalized on 7/8/2024 9:25 PM by Dr. Loc Bean M.D on Workstation: [id number]                                          Medical Decision Making  Problems Addressed:  Constipation, unspecified constipation type: complicated acute illness or injury    Amount and/or Complexity of Data Reviewed  Labs: ordered.  Radiology: ordered.    Risk  OTC drugs.  Prescription drug management.        Final diagnoses:   Constipation, unspecified constipation type       ED Disposition  ED Disposition       ED Disposition   Discharge    Condition   Stable    Comment   --               Lito Moore MD  [address] [id number] [phone]    In 1 week           Medication List        New Prescriptions      bisacodyl 10 MG suppository  Commonly known as: Dulcolax  1 supp pr q12 prn severe constipation     ondansetron ODT 4  MG disintegrating tablet  Commonly known as: ZOFRAN-ODT  Place 1 tablet on the tongue Every 6 (Six) Hours As Needed for Vomiting or Nausea.     polyethylene glycol 17 g packet  Commonly known as: MIRALAX  Take 17 g by mouth Daily.     polyethylene glycol 236 g solution  Commonly known as: GoLYTELY  8 ounces by mouth every 2-3 hours until large bowel movement               Where to Get Your Medications        These medications were sent to [facility] [id number] - [facility] [address] AT [facility] [address] [facility] [phone] [facility] [phone]   [address], [facility] [address]      Phone: [phone]   bisacodyl 10 MG suppository  ondansetron ODT 4 MG disintegrating tablet  polyethylene glycol 17 g packet  polyethylene glycol 236 g solution

## 2024-07-09 ENCOUNTER — TELEPHONE (OUTPATIENT)
Dept: INTERNAL MEDICINE | Facility: CLINIC | Age: 79
End: 2024-07-09

## 2024-07-09 NOTE — TELEPHONE ENCOUNTER
Caller: McManus, Claudette L    Relationship: Self    Best call back number: [phone]    What was the call regarding: PATIENT OF DR. CROOKS. CAN'T REACH ANYONE IN OFFICE. NEW PRESCRIPTIONS THAT NEED TO BE FILLED BECAUSE SHE IS IN THE HOSPITAL. PHARMACY SAID THEY WERE WORKING ON IT BUT STILL NO ANSWER. CALL BACK [phone]. SHE HAS TO TAKE MEDS OR SHE WILL END UP BACK IN THE HOSPITAL AND HAVE TO HAVE SURGERY.

## 2024-07-09 NOTE — DISCHARGE INSTRUCTIONS
I sent in several medications to your pharmacy.  Starting tomorrow, I would like you to use the suppository per rectum twice daily to help loosen your stool up from below.    Once you  the GoLytely, please follow directions which are as follows: 8 ounces every 2-3 hours until large bowel movement.  It is beneficial to place this in the refrigerator to help chill the GoLytely.    Once you are able to have adequate bowel movements, I sent in a once daily dosing of MiraLAX that you may use ongoing.  You may also continue to utilize the suppositories as discussed above which will also be very beneficial and preventing future episodes of severe constipation.      On the CAT scan of your abdomen, there is some density on the right breast.  This is very nonspecific but the radiologist does recommend follow-up mammography which may be ordered by your primary care physician.    Please read all of the instructions in this handout.  If you receive prescriptions please fill them and take them as directed.  Please call your primary care physician for follow-up appointment in the next 5 to 7 days.  If you do not have a physician you may call the [facility] referral line at [phone].    You may return to the emergency department at any time for any concerns such as worsening symptoms.  If you received a work or school note it will be printed at the back of this packet.

## 2024-07-12 DIAGNOSIS — F41.9 CHRONIC ANXIETY: ICD-10-CM

## 2024-07-12 RX ORDER — LORAZEPAM 1 MG/1
TABLET ORAL
Qty: 60 TABLET | Refills: 0 | Status: SHIPPED | OUTPATIENT
Start: 2024-07-12

## 2024-07-12 NOTE — TELEPHONE ENCOUNTER
Rx Refill Note  Requested Prescriptions     Pending Prescriptions Disp Refills    LORazepam (ATIVAN) 1 MG tablet [Pharmacy Med Name: LORazepam 1 MG TABLET] 60 tablet 0     Sig: TAKE 1 TABLET BY MOUTH 2 TIMES A DAY FOR CHRONIC ANXIETY OR PANIC      Last office visit with prescribing clinician: 6/19/2024   Last telemedicine visit with prescribing clinician: Visit date not found   Next office visit with prescribing clinician: 8/15/2024                         Would you like a call back once the refill request has been completed: [] Yes [] No    If the office needs to give you a call back, can they leave a voicemail: [] Yes [] No    Darcy Perry MA  07/12/24, 12:32 EDT

## 2024-07-17 ENCOUNTER — TELEPHONE (OUTPATIENT)
Dept: ONCOLOGY | Facility: CLINIC | Age: 79
End: 2024-07-17
Payer: MEDICARE

## 2024-07-17 NOTE — TELEPHONE ENCOUNTER
Caller: McManus, Claudette L    Relationship: Self    Best call back number: [phone]    What is the best time to reach you: ASAP    Who are you requesting to speak with (clinical staff, provider,  specific staff member): CLINICAL     What was the call regarding: PATIENT WAS IN URGENT CARE ON 7/8/24 - PLEASE SEE AFTER VISIT SUMMARY & CT SCAN.  THEY INFORMED HER THAT SHE HAD A BUMP ON THE RIGHT BREAST & WOULD LIKE TO TALK WITH DR. TOM AFTER SHE REVIEWS THE SCAN.    Is it okay if the provider responds through MyChart: NO

## 2024-07-18 DIAGNOSIS — R93.89 ABNORMAL CT SCAN: Primary | ICD-10-CM

## 2024-07-18 DIAGNOSIS — R92.8 OTHER ABNORMAL AND INCONCLUSIVE FINDINGS ON DIAGNOSTIC IMAGING OF BREAST: ICD-10-CM

## 2024-07-18 DIAGNOSIS — N63.10 MASS OF RIGHT BREAST, UNSPECIFIED QUADRANT: ICD-10-CM

## 2024-07-18 NOTE — TELEPHONE ENCOUNTER
Called the patient to let her know that I spoke with Yaneth dykes and we will order a right breast diagnostic mammo and us and that scheduling would be giving her a call to set this up based off the CT scan 7/8. She was very appreciative and v/u.

## 2024-08-02 DIAGNOSIS — G89.29 CHRONIC BILATERAL LOW BACK PAIN WITHOUT SCIATICA: Chronic | ICD-10-CM

## 2024-08-02 DIAGNOSIS — M54.50 CHRONIC BILATERAL LOW BACK PAIN WITHOUT SCIATICA: Chronic | ICD-10-CM

## 2024-08-02 RX ORDER — CYCLOBENZAPRINE HCL 10 MG
TABLET ORAL
Qty: 30 TABLET | Refills: 1 | Status: SHIPPED | OUTPATIENT
Start: 2024-08-02

## 2024-08-02 NOTE — TELEPHONE ENCOUNTER
Rx Refill Note  Requested Prescriptions     Pending Prescriptions Disp Refills    cyclobenzaprine (FLEXERIL) 10 MG tablet [Pharmacy Med Name: CYCLOBENZAPRINE 10 MG TABLET] 30 tablet 1     Sig: TAKE 1 TABLET BY MOUTH 3 TIMES A DAY AS NEEDED FOR MUSCLE RELAXER/BACK PAIN      Last office visit with prescribing clinician: 6/19/2024   Last telemedicine visit with prescribing clinician: Visit date not found   Next office visit with prescribing clinician: 8/29/2024                         Would you like a call back once the refill request has been completed: [] Yes [] No    If the office needs to give you a call back, can they leave a voicemail: [] Yes [] No    Darcy Perry MA  08/02/24, 12:44 EDT

## 2024-08-14 DIAGNOSIS — F41.9 CHRONIC ANXIETY: ICD-10-CM

## 2024-08-14 NOTE — TELEPHONE ENCOUNTER
Rx Refill Note  Requested Prescriptions     Pending Prescriptions Disp Refills    LORazepam (ATIVAN) 1 MG tablet 60 tablet 0     Sig: TAKE 1 TABLET BY MOUTH 2 TIMES A DAY FOR CHRONIC ANXIETY OR PANIC      Last office visit with prescribing clinician: 6/19/2024   Last telemedicine visit with prescribing clinician: Visit date not found   Next office visit with prescribing clinician: 8/29/2024       Sky Chairez MA  08/14/24, 14:37 EDT

## 2024-08-15 RX ORDER — LORAZEPAM 1 MG/1
TABLET ORAL
Qty: 60 TABLET | Refills: 4 | Status: SHIPPED | OUTPATIENT
Start: 2024-08-15

## 2024-08-20 DIAGNOSIS — E11.40 TYPE 2 DIABETES MELLITUS WITH DIABETIC NEUROPATHY, WITHOUT LONG-TERM CURRENT USE OF INSULIN: Chronic | ICD-10-CM

## 2024-08-20 DIAGNOSIS — E55.9 VITAMIN D DEFICIENCY: Chronic | ICD-10-CM

## 2024-08-20 DIAGNOSIS — I10 BENIGN ESSENTIAL HYPERTENSION: Chronic | ICD-10-CM

## 2024-08-20 DIAGNOSIS — Z86.16 HISTORY OF COVID-19: Chronic | ICD-10-CM

## 2024-08-20 DIAGNOSIS — R41.89 COGNITIVE IMPAIRMENT: ICD-10-CM

## 2024-08-20 DIAGNOSIS — Z51.81 THERAPEUTIC DRUG MONITORING: ICD-10-CM

## 2024-08-20 DIAGNOSIS — R91.1 LEFT LOWER LOBE PULMONARY NODULE: ICD-10-CM

## 2024-08-20 DIAGNOSIS — E87.1 HYPONATREMIA: ICD-10-CM

## 2024-08-20 DIAGNOSIS — E03.9 PRIMARY HYPOTHYROIDISM: Chronic | ICD-10-CM

## 2024-08-20 DIAGNOSIS — E78.2 MIXED HYPERLIPIDEMIA: Chronic | ICD-10-CM

## 2024-08-20 DIAGNOSIS — R41.3 MEMORY LOSS: ICD-10-CM

## 2024-08-21 ENCOUNTER — TELEPHONE (OUTPATIENT)
Dept: ONCOLOGY | Facility: CLINIC | Age: 79
End: 2024-08-21
Payer: MEDICARE

## 2024-08-21 NOTE — TELEPHONE ENCOUNTER
----- Message from Arabella DUY sent at 8/21/2024  8:08 AM EDT -----  Her imaging schedule is not right.... we ordered a left diagnostic mammo and ultrasound.... the ultrasound is scheduled tomorrow and the mammogram is scheduled in September!  These should be on the same day... can you please reschedule these asap and on the same day :)    Thank you!!

## 2024-08-22 ENCOUNTER — HOSPITAL ENCOUNTER (OUTPATIENT)
Dept: ULTRASOUND IMAGING | Facility: HOSPITAL | Age: 79
End: 2024-08-22
Payer: MEDICARE

## 2024-08-22 LAB
25(OH)D3+25(OH)D2 SERPL-MCNC: 45.9 NG/ML (ref 30–100)
ALBUMIN SERPL-MCNC: 4.5 G/DL (ref 3.7–4.7)
ALBUMIN/CREAT UR: 42 MG/G CREAT (ref 0–29)
ALP SERPL-CCNC: 152 IU/L (ref 44–121)
ALT SERPL-CCNC: 25 IU/L (ref 0–32)
APPEARANCE UR: CLEAR
AST SERPL-CCNC: 29 IU/L (ref 0–40)
BILIRUB SERPL-MCNC: 0.6 MG/DL (ref 0–1.2)
BILIRUB UR QL STRIP: NEGATIVE
BUN SERPL-MCNC: 9 MG/DL (ref 8–27)
BUN/CREAT SERPL: 10 (ref 12–28)
CALCIUM SERPL-MCNC: 9.9 MG/DL (ref 8.7–10.3)
CHLORIDE SERPL-SCNC: 94 MMOL/L (ref 96–106)
CHOLEST SERPL-MCNC: 177 MG/DL (ref 100–199)
CK SERPL-CCNC: 114 U/L (ref 26–161)
CO2 SERPL-SCNC: 26 MMOL/L (ref 20–29)
COLOR UR: YELLOW
CREAT SERPL-MCNC: 0.9 MG/DL (ref 0.57–1)
CREAT UR-MCNC: 27.4 MG/DL
EGFRCR SERPLBLD CKD-EPI 2021: 64 ML/MIN/1.73
ERYTHROCYTE [DISTWIDTH] IN BLOOD BY AUTOMATED COUNT: 13 % (ref 11.7–15.4)
GLOBULIN SER CALC-MCNC: 2.6 G/DL (ref 1.5–4.5)
GLUCOSE SERPL-MCNC: 104 MG/DL (ref 70–99)
GLUCOSE UR QL STRIP: NEGATIVE
HBA1C MFR BLD: 5.9 % (ref 4.8–5.6)
HCT VFR BLD AUTO: 40.3 % (ref 34–46.6)
HDL SERPL-SCNC: 45.3 UMOL/L
HDLC SERPL-MCNC: 84 MG/DL
HGB BLD-MCNC: 13.5 G/DL (ref 11.1–15.9)
HGB UR QL STRIP: NEGATIVE
KETONES UR QL STRIP: NEGATIVE
LDL SERPL QN: 21.4 NM
LDL SERPL-SCNC: 748 NMOL/L
LDL SMALL SERPL-SCNC: <90 NMOL/L
LDLC SERPL CALC-MCNC: 74 MG/DL (ref 0–99)
LEUKOCYTE ESTERASE UR QL STRIP: NEGATIVE
MCH RBC QN AUTO: 32.3 PG (ref 26.6–33)
MCHC RBC AUTO-ENTMCNC: 33.5 G/DL (ref 31.5–35.7)
MCV RBC AUTO: 96 FL (ref 79–97)
MICRO URNS: NORMAL
MICROALBUMIN UR-MCNC: 11.6 UG/ML
NITRITE UR QL STRIP: NEGATIVE
PH UR STRIP: 7 [PH] (ref 5–7.5)
PLATELET # BLD AUTO: 233 X10E3/UL (ref 150–450)
POTASSIUM SERPL-SCNC: 4.9 MMOL/L (ref 3.5–5.2)
PROT SERPL-MCNC: 7.1 G/DL (ref 6–8.5)
PROT UR QL STRIP: NEGATIVE
RBC # BLD AUTO: 4.18 X10E6/UL (ref 3.77–5.28)
SARS-COV-2 AB SERPL QL IA: POSITIVE
SODIUM SERPL-SCNC: 135 MMOL/L (ref 134–144)
SP GR UR STRIP: 1.01 (ref 1–1.03)
T3FREE SERPL-MCNC: 2.2 PG/ML (ref 2–4.4)
T4 FREE SERPL-MCNC: 1.23 NG/DL (ref 0.82–1.77)
TRIGL SERPL-MCNC: 111 MG/DL (ref 0–149)
TSH SERPL DL<=0.005 MIU/L-ACNC: 0.15 UIU/ML (ref 0.45–4.5)
UROBILINOGEN UR STRIP-MCNC: 0.2 MG/DL (ref 0.2–1)
WBC # BLD AUTO: 6.9 X10E3/UL (ref 3.4–10.8)

## 2024-08-29 ENCOUNTER — OFFICE VISIT (OUTPATIENT)
Dept: INTERNAL MEDICINE | Facility: CLINIC | Age: 79
End: 2024-08-29
Payer: MEDICARE

## 2024-08-29 VITALS
OXYGEN SATURATION: 93 % | BODY MASS INDEX: 30.73 KG/M2 | HEIGHT: 64 IN | HEART RATE: 88 BPM | WEIGHT: 180 LBS | RESPIRATION RATE: 16 BRPM | DIASTOLIC BLOOD PRESSURE: 72 MMHG | TEMPERATURE: 96 F | SYSTOLIC BLOOD PRESSURE: 142 MMHG

## 2024-08-29 DIAGNOSIS — R11.0 NAUSEA: ICD-10-CM

## 2024-08-29 DIAGNOSIS — E11.42 DIABETIC PERIPHERAL NEUROPATHY: Chronic | ICD-10-CM

## 2024-08-29 DIAGNOSIS — M54.50 CHRONIC BILATERAL LOW BACK PAIN WITHOUT SCIATICA: Chronic | ICD-10-CM

## 2024-08-29 DIAGNOSIS — E03.9 PRIMARY HYPOTHYROIDISM: Chronic | ICD-10-CM

## 2024-08-29 DIAGNOSIS — K59.09 CHRONIC CONSTIPATION: ICD-10-CM

## 2024-08-29 DIAGNOSIS — C50.912 DUCTAL CARCINOMA OF LEFT BREAST: Chronic | ICD-10-CM

## 2024-08-29 DIAGNOSIS — R41.3 MEMORY LOSS: Chronic | ICD-10-CM

## 2024-08-29 DIAGNOSIS — I10 BENIGN ESSENTIAL HYPERTENSION: Chronic | ICD-10-CM

## 2024-08-29 DIAGNOSIS — R41.81 AGE-RELATED COGNITIVE DECLINE: Chronic | ICD-10-CM

## 2024-08-29 DIAGNOSIS — E66.9 NON MORBID OBESITY: ICD-10-CM

## 2024-08-29 DIAGNOSIS — F41.9 CHRONIC ANXIETY: Chronic | ICD-10-CM

## 2024-08-29 DIAGNOSIS — G89.29 CHRONIC BILATERAL THORACIC BACK PAIN: Chronic | ICD-10-CM

## 2024-08-29 DIAGNOSIS — M25.50 ARTHRALGIA OF MULTIPLE JOINTS: Chronic | ICD-10-CM

## 2024-08-29 DIAGNOSIS — F51.04 CHRONIC INSOMNIA: Chronic | ICD-10-CM

## 2024-08-29 DIAGNOSIS — Z86.16 HISTORY OF COVID-19: Chronic | ICD-10-CM

## 2024-08-29 DIAGNOSIS — Z78.0 POSTMENOPAUSAL STATE: Chronic | ICD-10-CM

## 2024-08-29 DIAGNOSIS — R91.1 LEFT UPPER LOBE PULMONARY NODULE: Chronic | ICD-10-CM

## 2024-08-29 DIAGNOSIS — G89.29 CHRONIC BILATERAL LOW BACK PAIN WITHOUT SCIATICA: Chronic | ICD-10-CM

## 2024-08-29 DIAGNOSIS — I26.99 MULTIPLE PULMONARY EMBOLI: Chronic | ICD-10-CM

## 2024-08-29 DIAGNOSIS — E78.2 MIXED HYPERLIPIDEMIA: Chronic | ICD-10-CM

## 2024-08-29 DIAGNOSIS — E11.40 TYPE 2 DIABETES MELLITUS WITH DIABETIC NEUROPATHY, WITHOUT LONG-TERM CURRENT USE OF INSULIN: Primary | Chronic | ICD-10-CM

## 2024-08-29 DIAGNOSIS — Z51.81 THERAPEUTIC DRUG MONITORING: ICD-10-CM

## 2024-08-29 DIAGNOSIS — F41.8 DEPRESSION WITH ANXIETY: Chronic | ICD-10-CM

## 2024-08-29 DIAGNOSIS — M15.9 PRIMARY OSTEOARTHRITIS INVOLVING MULTIPLE JOINTS: Chronic | ICD-10-CM

## 2024-08-29 DIAGNOSIS — E55.9 VITAMIN D DEFICIENCY: Chronic | ICD-10-CM

## 2024-08-29 DIAGNOSIS — M54.6 CHRONIC BILATERAL THORACIC BACK PAIN: Chronic | ICD-10-CM

## 2024-08-29 RX ORDER — POLYETHYLENE GLYCOL 3350 17 G/17G
17 POWDER, FOR SOLUTION ORAL DAILY
Start: 2024-08-29

## 2024-08-29 RX ORDER — ONDANSETRON 8 MG/1
TABLET, FILM COATED ORAL
Qty: 60 TABLET | Refills: 10 | Status: SHIPPED | OUTPATIENT
Start: 2024-08-29

## 2024-08-29 NOTE — PROGRESS NOTES
08/29/2024    Patient Information  Claudette L McManus                                                                                          [id number]  [place] [address]      1945  [unfilled]  There is no work phone number on file.    Chief Complaint:     Follow-up chronic medical problems noted below.  No new acute complaints.    History of Present Illness:    Patient with multiple chronic medical problems as noted below in assessment and plan presents today for a follow-up with lab prior in order to monitor chronic medical issues.  Her past medical history reviewed and updated were necessary including health maintenance parameters.  This reveals she is currently up-to-date or else accounted for with the exception of diabetic eye exam which I encouraged her to get.    Review of Systems   Constitutional: Negative.   HENT: Negative.     Eyes: Negative.    Cardiovascular: Negative.    Respiratory: Negative.     Endocrine: Negative.    Hematologic/Lymphatic: Negative.    Skin: Negative.    Musculoskeletal:  Positive for arthritis and joint pain.   Gastrointestinal: Negative.    Genitourinary: Negative.    Neurological: Negative.    Psychiatric/Behavioral: Negative.     Allergic/Immunologic: Negative.        Active Problems:    Patient Active Problem List   Diagnosis    Primary osteoarthritis involving multiple joints    Benign essential hypertension    Hyperlipidemia    Primary hypothyroidism    Type 2 diabetes mellitus with diabetic neuropathy, without long-term current use of insulin    Chronic insomnia    Chronic bilateral low back pain without sciatica    Chronic bilateral thoracic back pain    Vitamin D deficiency    Therapeutic drug monitoring    Postmenopausal state    Diabetic peripheral neuropathy    Depression with anxiety    ACE-inhibitor cough    History of multiple pulmonary emboli    Non morbid obesity    Ductal carcinoma of left breast    Right ventricular  enlargement    Chronic anxiety    History of COVID-19    Left upper lobe pulmonary nodule    Arthralgia of multiple joints    Memory loss    Age-related cognitive decline    Chronic constipation         Past Medical History:   Diagnosis Date    Age-related cognitive decline 03/18/2024    Arthralgia of multiple joints 02/15/2024    February 15, 2024--patient reports that over the past 6 months she has been having much more pain in her hands and fingers bilaterally.  She is also noted some swelling without redness but they are tender.  We will obtain rheumatologic profile today and then follow-up on phone for the results and possible further instructions.  Need to rule out inflammatory arthritis.      Benign essential hypertension     Chronic anxiety 10/26/2022    Chronic bilateral low back pain without sciatica 08/16/2013 March 13, 2019--Limited bone scan.  This reveals scintigraphic activity in the spine and at the right shoulder corresponds to change seen on recent x-rays and is best explained by degenerative change.  Isolated metastatic disease exactly corresponding to the sites of extensive degenerative disc change would be peculiar.  March 7, 2019--new patient to get established.  She has complaints of approxi    Chronic bilateral thoracic back pain 02/26/2019 March 13, 2019--Limited bone scan.  This reveals scintigraphic activity in the spine and at the right shoulder corresponds to change seen on recent x-rays and is best explained by degenerative change.  Isolated metastatic disease exactly corresponding to the sites of extensive degenerative disc change would be peculiar.  March 1, 2019--x-ray of the lumbar and thoracic spine reveals mild anterior l    Chronic insomnia 11/04/2014    Depression with anxiety 08/21/2019 August 21, 2019--patient seen in follow-up after I called in a prescription for Ativan after the patient's  contacted me a few weeks ago regarding the sudden death of patient's  daughter.  This was an apparent suicide and the patient found her daughter dead.  I will not go into details.  Patient reports the Lorazepam has been helpful but she is still quite distraught, nervous, and undergoing    Diabetic peripheral neuropathy 03/07/2019    Characterized by decreased sensation and paresthesias in both lower extremities described as a burning sensation.  Extends up to the knees.  Reportedly had been evaluated with nerve conduction study in the past.    Ductal carcinoma of left breast 08/26/2022    Generalized anxiety disorder 05/19/2013    History of Bell's palsy 05/21/2013    Remote history of Bell's palsy.  Patient does not remember which side of the face.    History of COVID-19 12/08/2022    History of multiple pulmonary emboli 05/19/2022    Stacie 15, 2022--patient seen in follow-up by Dr. Moore.  She is complaining of continued dyspnea on exertion which may be worse.  I reviewed the cardiology consultation from June 7, 2022 and also reviewed the echocardiogram from that same date.  Noted below.  Her  is now present and he reports her dyspnea on exertion is definitely worse.  Her oxygen saturation at rest is 96%.  Upon ambulatio    History of pneumonia 07/19/2023 July 19, 2023--it appears the patient may have an infectious process versus an inflammatory process in both of her lungs.  She was placed on a Z-Sreedhar by the nurse practitioner which I think is certainly reasonable.  I do think she would benefit from a round of prednisone.  There is possibly a new pulmonary nodule that we will need monitoring.  I think these are changes left over from COVID which sh    Hyperlipidemia     Left upper lobe pulmonary nodule 07/19/2023 January 2, 2024--CT ANGIOGRAM CHEST PULMONARY EMBOLISM     TECHNIQUE: Radiation dose reduction techniques were utilized, including automated exposure control and exposure modulation based on body size. 2 mm images were obtained through the chest after the  administration of IV contrast.     HISTORY: Shortness of breath and chest pain.     COMPARISON: CT chest angiogram 5/19/2022. CT chest 12/11/202    Memory loss 03/18/2024             Patient: MCMANUS, CLAUDETTE  Time Out: 23:06  Exam(s): MRI HEAD W WO Contrast      EXAM:    MR Head Without and With Intravenous Contrast     CLINICAL HISTORY:     Reason for exam: Neuro deficit, acute, stroke suspected. Dizziness, hx breast cancer.     TECHNIQUE:    Magnetic resonance images of the head brain without and with  intravenous contrast in multiple planes.     COMPARISON:    C    Menopausal state, 8/19/2020--normal DEXA. 03/07/2019 August 19, 2020--DEXA scan reveals lumbar spine T score 3.8.  Left femoral neck T score 2.5.  Right femoral neck T score 2.2.  Normal bone density.    Non morbid obesity 08/11/2022    Primary hypothyroidism 05/19/2013    Primary osteoarthritis involving multiple joints 04/22/2013    Rotator cuff arthropathy of right shoulder, 4/20/2021--status post right reverse total shoulder arthroplasty 05/27/2020    Situational mixed anxiety and depressive disorder 08/21/2019 August 21, 2019--patient seen in follow-up after I called in a prescription for Ativan after the patient's  contacted me a few weeks ago regarding the sudden death of patient's daughter.  This was an apparent suicide and the patient found her daughter dead.  I will not go into details.  Patient reports the Lorazepam has been helpful but she is still quite distraught, nervous, and undergoing    Type 2 diabetes mellitus with diabetic neuropathy, without long-term current use of insulin     Vitamin D deficiency 02/26/2019         Past Surgical History:   Procedure Laterality Date    BILATERAL BREAST REDUCTION  Early 2000    Early 2000--bilateral breast reduction    CARDIAC CATHETERIZATION N/A 9/26/2022    Procedure: Right Heart Cath;  Surgeon: Agus Solorio MD PhD;  Location: [facility] CATH INVASIVE LOCATION;  Service:  Cardiology;  Laterality: N/A;  Will REMAIN on Xarelto for the case    CHOLECYSTECTOMY  1960s    1960s--open cholecystectomy    COLONOSCOPY  2012 2012--reportedly normal colonoscopy    INCONTINENCE SURGERY  2012    Approximately 2012--implantation of questionable bladder stimulator right sacral area for urinary incontinence.  Details not known.    REPLACEMENT TOTAL KNEE BILATERAL Left 2010; 2011 2010-2011--bilateral total knee replacement    SUBTOTAL HYSTERECTOMY  Remote    Remote partial hysterectomy.  Ovaries intact.    TOTAL SHOULDER REPLACEMENT Left 2013 2013--left total shoulder replacement.    TOTAL SHOULDER REPLACEMENT  April 28, 2021 4/20/2021--status post right reverse total shoulder arthroplasty    TOTAL SHOULDER REVERSE ARTHROPLASTY Right 04/20/2021 4/20/2021--right reverse total shoulder replacement.         Allergies   Allergen Reactions    Morphine And Codeine     Other Other (See Comments)     REJECTED DACRON SUTURES IN THE PAST AND INCISION CAME APART           Current Outpatient Medications:     Cariprazine HCl (Vraylar) 1.5 MG capsule capsule, Take 1 p.o. daily for depression/mood stabilizer and anxiety, Disp: 30 capsule, Rfl: 2    Cholecalciferol (VITAMIN D3) 2000 UNITS tablet, Take 1 tablet by mouth Daily., Disp: , Rfl:     cyclobenzaprine (FLEXERIL) 10 MG tablet, TAKE 1 TABLET BY MOUTH 3 TIMES A DAY AS NEEDED FOR MUSCLE RELAXER/BACK PAIN, Disp: 30 tablet, Rfl: 1    DULoxetine (CYMBALTA) 60 MG capsule, TAKE 1 CAPSULE EVERY DAY AS DIRECTED, Disp: 90 capsule, Rfl: 3    exemestane (AROMASIN) 25 MG tablet, TAKE 1 TABLET EVERY DAY, Disp: 30 tablet, Rfl: 5    ezetimibe (ZETIA) 10 MG tablet, TAKE 1 TABLET EVERY DAY, Disp: 90 tablet, Rfl: 3    gabapentin (NEURONTIN) 300 MG capsule, TAKE ONE CAPSULE BY MOUTH THREE TIMES A DAY FOR PERIPHERAL NEUROPATHY AS DIRECTED, Disp: 90 capsule, Rfl: 4    Homeopathic Products (LEG CRAMPS PO), Take  by mouth Daily. Nightly, Disp: , Rfl:     levothyroxine  "(SYNTHROID, LEVOTHROID) 125 MCG tablet, Take 1 tablet by mouth Daily., Disp: 90 tablet, Rfl: 0    LORazepam (ATIVAN) 1 MG tablet, TAKE 1 TABLET BY MOUTH 2 TIMES A DAY FOR CHRONIC ANXIETY OR PANIC, Disp: 60 tablet, Rfl: 4    meloxicam (MOBIC) 15 MG tablet, TAKE 1 TABLET BY MOUTH DAILY FOR ARTHRITIS/JOINT PAIN, Disp: 90 tablet, Rfl: 1    metroNIDAZOLE (METROGEL) 0.75 % gel, Apply  topically to the appropriate area as directed 2 (Two) Times a Day., Disp: 45 g, Rfl: 2    multivitamin (MULTI VITAMIN PO), Take  by mouth Daily., Disp: , Rfl:     ondansetron (ZOFRAN) 8 MG tablet, Take 1 p.o. every 6 to 8 hours as needed nausea, Disp: 60 tablet, Rfl: 10    polyethylene glycol (MIRALAX) 17 g packet, Take 17 g by mouth Daily., Disp: , Rfl:     simvastatin (ZOCOR) 20 MG tablet, TAKE 1 TABLET EVERY DAY FOR HIGH CHOLESTEROL, Disp: 90 tablet, Rfl: 2    traZODone (DESYREL) 150 MG tablet, Take 1-1/2 tablets nightly as directed, Disp: 135 tablet, Rfl: 1    valsartan (DIOVAN) 320 MG tablet, Take 1 tablet by mouth Every Morning., Disp: 90 tablet, Rfl: 10      Family History   Problem Relation Age of Onset    Alcohol abuse Father     Breast cancer Daughter         40s    Cancer Other     Diabetes Other         type II    Leukemia Grandchild 19         Social History     Socioeconomic History    Marital status:    Tobacco Use    Smoking status: Former     Current packs/day: 0.00     Types: Cigarettes     Quit date: 1998     Years since quittin.6    Smokeless tobacco: Never   Vaping Use    Vaping status: Never Used   Substance and Sexual Activity    Alcohol use: Yes     Alcohol/week: 1.0 standard drink of alcohol     Types: 1 Glasses of wine per week     Comment: current some day    Drug use: No    Sexual activity: Defer     Partners: Male         Vitals:    24 1104   BP: 142/72   Pulse: 88   Resp: 16   Temp: 96 °F (35.6 °C)   TempSrc: Temporal   SpO2: 93%   Weight: 81.6 kg (180 lb)   Height: 162.6 cm (64.02\")    "     Body mass index is 30.88 kg/m².      Physical Exam:    General: Alert and oriented x 3.  No acute distress.  Mildly obese.  Normal affect.  HEENT: Pupils equal, round, reactive to light; extraocular movements intact; sclerae nonicteric; pharynx, ear canals and TMs normal.  Neck: Without JVD, thyromegaly, bruit, or adenopathy.  Lungs: Clear to auscultation in all fields.  Heart: Regular rate and rhythm without murmur, rub, gallop, or click.  Abdomen: Soft, nontender, without hepatosplenomegaly or hernia.  Bowel sounds normal.  : Deferred.  Rectal: Deferred.  Extremities: Without clubbing, cyanosis, edema, or pulse deficit.  Neurologic: Intact without focal deficit.  Normal station and gait observed during ingress and egress from the examination room.  Skin: Without significant lesion.  Musculoskeletal: Unremarkable.    Lab/other results:    SARS antibodies are positive.  Vitamin D normal at 45.9.  Urinalysis normal.  Thyroid function tests are normal other than TSH slightly suppressed at 0.151.  Total cholesterol 177, triglycerides 111, LDL particle #748, HDL particle #45.3.  Microalbumin/creatinine ratio mildly elevated at 42.  Hemoglobin A1c 5.9.  CMP normal except glucose 104, chloride 94.  CPK normal.  CBC normal.    Assessment/Plan:     Diagnosis Plan   1. Type 2 diabetes mellitus with diabetic neuropathy, without long-term current use of insulin  Comprehensive Metabolic Panel    Hemoglobin A1c    Microalbumin / Creatinine Urine Ratio - Urine, Clean Catch      2. Hyperlipidemia  CK    Comprehensive Metabolic Panel    NMR LipoProfile      3. Diabetic peripheral neuropathy        4. Benign essential hypertension        5. Chronic bilateral low back pain without sciatica        6. Chronic bilateral thoracic back pain        7. Chronic insomnia        8. Chronic anxiety        9. Arthralgia of multiple joints        10. Age-related cognitive decline        11. Depression with anxiety        12. Ductal  carcinoma of left breast        13. History of COVID-19  SARS-CoV-2 Antibodies, Nucleocapsid (Natural Immunity)      14. History of multiple pulmonary emboli        15. Left upper lobe pulmonary nodule        16. Memory loss        17. Non morbid obesity        18. Vitamin D deficiency  Vitamin D,25-Hydroxy      19. Primary osteoarthritis involving multiple joints        20. Primary hypothyroidism  TSH    T4, Free    T3, Free      21. Postmenopausal state        22. Therapeutic drug monitoring  CBC (No Diff)      23. Chronic constipation  polyethylene glycol (MIRALAX) 17 g packet      24. Nausea  ondansetron (ZOFRAN) 8 MG tablet        Patient has multiple chronic medical problems as noted above that seem to be stable.  As matter fact, her diabetes is under excellent control.  She stopped taking the Mounjaro at least a month ago because she was concerned about potential for side effects.  She is having chronic constipation and had to go to the emergency room for this.  They recommended that mineral oil, increase water intake and prunes.  This seems to work transiently although patient has been avoiding the mineral oil.  Patient has not noticed any improvement in the chronic constipation after discontinuation of the Mounjaro at least a month ago.    Plan is as follows: I have suggested that patient go back on MiraLAX which she took for years and she needs to adjust the dose to control her constipation without causing profuse diarrhea.  I think if she does that she can avoid the mineral oil and the marked increase in fluid intake.    This patient has a PCP that is the continuing focal point for all health care services, and the patient sees this physician to be evaluated for chronic medical problems. The inherent complexity that this code () captures is not in the clinical condition itself, but rather the cognitition of the continued responsibility of being the focal point for all needed services for this  "patient.\"     Procedures        "

## 2024-09-12 ENCOUNTER — HOSPITAL ENCOUNTER (OUTPATIENT)
Dept: MAMMOGRAPHY | Facility: HOSPITAL | Age: 79
Discharge: HOME | End: 2024-09-12
Payer: MEDICARE

## 2024-09-12 ENCOUNTER — HOSPITAL ENCOUNTER (OUTPATIENT)
Dept: ULTRASOUND IMAGING | Facility: HOSPITAL | Age: 79
Discharge: HOME | End: 2024-09-12
Payer: MEDICARE

## 2024-09-12 DIAGNOSIS — R92.8 OTHER ABNORMAL AND INCONCLUSIVE FINDINGS ON DIAGNOSTIC IMAGING OF BREAST: ICD-10-CM

## 2024-09-12 DIAGNOSIS — R93.89 ABNORMAL CT SCAN: ICD-10-CM

## 2024-09-12 DIAGNOSIS — N63.10 MASS OF RIGHT BREAST, UNSPECIFIED QUADRANT: ICD-10-CM

## 2024-09-12 PROCEDURE — G0279 TOMOSYNTHESIS, MAMMO: HCPCS

## 2024-09-12 PROCEDURE — 77065 DX MAMMO INCL CAD UNI: CPT

## 2024-09-12 PROCEDURE — 76642 ULTRASOUND BREAST LIMITED: CPT

## 2024-10-14 ENCOUNTER — APPOINTMENT (OUTPATIENT)
Dept: CT IMAGING | Facility: HOSPITAL | Age: 79
End: 2024-10-14
Payer: MEDICARE

## 2024-10-14 ENCOUNTER — HOSPITAL ENCOUNTER (EMERGENCY)
Facility: HOSPITAL | Age: 79
Discharge: HOME | End: 2024-10-14
Attending: EMERGENCY MEDICINE | Admitting: EMERGENCY MEDICINE
Payer: MEDICARE

## 2024-10-14 VITALS
SYSTOLIC BLOOD PRESSURE: 118 MMHG | HEART RATE: 83 BPM | HEIGHT: 64 IN | WEIGHT: 179.9 LBS | BODY MASS INDEX: 30.71 KG/M2 | DIASTOLIC BLOOD PRESSURE: 84 MMHG | OXYGEN SATURATION: 97 % | TEMPERATURE: 97.8 F | RESPIRATION RATE: 18 BRPM

## 2024-10-14 DIAGNOSIS — S01.01XA LACERATION OF SCALP, INITIAL ENCOUNTER: ICD-10-CM
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
## 2024-10-14 DIAGNOSIS — S09.90XA CLOSED HEAD INJURY, INITIAL ENCOUNTER: ICD-10-CM

## 2024-10-14 PROCEDURE — 25010000002 TETANUS-DIPHTH-ACELL PERTUSSIS 5-2.5-18.5 LF-MCG/0.5 SUSPENSION PREFILLED SYRINGE: Performed by: PHYSICIAN ASSISTANT

## 2024-10-14 PROCEDURE — 25010000002 LIDOCAINE 1% - EPINEPHRINE 1:100000 1 %-1:100000 SOLUTION: Performed by: PHYSICIAN ASSISTANT

## 2024-10-14 PROCEDURE — 99284 EMERGENCY DEPT VISIT MOD MDM: CPT

## 2024-10-14 PROCEDURE — 90472 IMMUNIZATION ADMIN EACH ADD: CPT | Performed by: PHYSICIAN ASSISTANT

## 2024-10-14 PROCEDURE — 72125 CT NECK SPINE W/O DYE: CPT

## 2024-10-14 PROCEDURE — 90715 TDAP VACCINE 7 YRS/> IM: CPT | Performed by: PHYSICIAN ASSISTANT

## 2024-10-14 PROCEDURE — 90471 IMMUNIZATION ADMIN: CPT | Performed by: PHYSICIAN ASSISTANT

## 2024-10-14 PROCEDURE — 70450 CT HEAD/BRAIN W/O DYE: CPT

## 2024-10-14 RX ORDER — LIDOCAINE HYDROCHLORIDE AND EPINEPHRINE 10; 10 MG/ML; UG/ML
10 INJECTION, SOLUTION INFILTRATION; PERINEURAL ONCE
Status: COMPLETED | OUTPATIENT
Start: 2024-10-14 | End: 2024-10-14

## 2024-10-14 RX ADMIN — TETANUS TOXOID, REDUCED DIPHTHERIA TOXOID AND ACELLULAR PERTUSSIS VACCINE, ADSORBED 0.5 ML: 5; 2.5; 8; 8; 2.5 SUSPENSION INTRAMUSCULAR at 17:10

## 2024-10-14 RX ADMIN — LIDOCAINE HYDROCHLORIDE AND EPINEPHRINE 10 ML: 10; 10 INJECTION, SOLUTION INFILTRATION; PERINEURAL at 18:10

## 2024-10-14 NOTE — ED PROVIDER NOTES
MD ATTESTATION NOTE    The BOOGIE and I have discussed this patient's history, physical exam, and treatment plan.  I have reviewed the documentation and personally had a face to face interaction with the patient. I affirm the documentation and agree with the treatment and plan.  The attached note describes my personal findings.        SHARED APC FACE TO FACE: I performed a substantive part of the MDM during the patient's E/M visit. I personally evaluated and examined the patient. I personally made or approved the documented management plan and acknowledge its risk of complications.        Brief HPI: Patient presents for evaluation of fall.  Patient states she was doing yard work when she lost her balance.  Patient hit concrete.  Patient denies loss of conscious.  No blood thinners.  No chest pain or abdominal pain.  No hip pain.    PHYSICAL EXAM  ED Triage Vitals   Temp Heart Rate Resp BP SpO2   10/14/24 1611 10/14/24 1611 10/14/24 1611 10/14/24 1611 10/14/24 1613   97.8 °F (36.6 °C) 86 18 179/92 97 %      Temp src Heart Rate Source Patient Position BP Location FiO2 (%)   -- -- -- -- --                GENERAL: no acute distress  HENT: nares patent  EYES: no scleral icterus  CV: regular rhythm, normal rate  RESPIRATORY: normal effort  ABDOMEN: soft  MUSCULOSKELETAL: no deformity.  Mild neck tenderness  NEURO: alert, moves all extremities, follows commands  PSYCH:  calm, cooperative  SKIN: warm, dry.  Posterior scalp laceration    Vital signs and nursing notes reviewed.    ET head independent interpreted by me and shows no evidence of bleeding    ED Course as of 10/14/24 2313   Mon Oct 14, 2024   1620 Patient presents with head injury after mechanical fall prior to arrival.  No blood thinners or neurodeficits.  Unknown last tetanus shot.    No alarm signs or symptoms but given patient age, high risk of morbidity and mortality, obtaining CT head imaging.  Patient denies any neck pain but given patient age, high risk of  occult cervical spine fracture, obtaining CT cervical imaging.   [EE]   1704 CT imaging of the brain independently interpreted myself shows no evidence of acute hemorrhage or skull fracture. [EE]   1825 Patient's wound has been closed.  She is neurologically intact.  We will discharge. [EE]      ED Course User Index  [EE] Scot Miner, PA         Plan: discharge       Agus Kraft MD  10/14/24 0299

## 2024-10-14 NOTE — ED NOTES
PT to ER via EMS from home for fall and lac on back of her head. PT has a headache. No thinners and no LOC

## 2024-10-14 NOTE — ED PROVIDER NOTES
EMERGENCY DEPARTMENT ENCOUNTER    Room Number:  02/02  Date of encounter:  10/14/2024  PCP: Lito Moore MD  Historian: Patient  Chronic or social conditions impacting care (social determinants of health): Full code from home    HPI:  Chief Complaint: Fall  A complete HPI/ROS/PMH/PSH/SH/FH are unobtainable due to: Nothing    Context: Claudette L McManus is a 79 y.o. female with a history of breast cancer, hypertension, diabetes, who presents to the ED c/o acute injuries sustained in a fall prior to arrival.  Patient states that she was outside doing yard work when she lost her balance and fell.  Patient hit her left parietal scalp on pavement.  She denies any loss of consciousness.  She does complain of a mild headache.  Denies any nausea, vomiting, neck pain.  Denies blood thinners.  Denies any other significant injuries.  Unknown last tetanus shot.    Review of prior external notes (non-ED):   I reviewed internal medicine office visit from 8/29/2024.  Patient being followed for diabetes, hypertension.    Review of prior external test results outside of this encounter:  I reviewed a CMP from 8/20/2024.  Creatinine 0.9, potassium 4.9    PAST MEDICAL HISTORY  Active Ambulatory Problems     Diagnosis Date Noted    Primary osteoarthritis involving multiple joints 04/22/2013    Benign essential hypertension     Hyperlipidemia     Primary hypothyroidism 05/19/2013    Type 2 diabetes mellitus with diabetic neuropathy, without long-term current use of insulin     Chronic insomnia 11/04/2014    Chronic bilateral low back pain without sciatica 08/16/2013    Chronic bilateral thoracic back pain 02/26/2019    Vitamin D deficiency 02/26/2019    Therapeutic drug monitoring 02/26/2019    Postmenopausal state 03/07/2019    Diabetic peripheral neuropathy 03/07/2019    Depression with anxiety 08/21/2019    ACE-inhibitor cough 10/17/2019    History of multiple pulmonary emboli 05/19/2022    Non morbid obesity 08/11/2022     Ductal carcinoma of left breast 08/26/2022    Right ventricular enlargement 08/29/2022    Chronic anxiety 10/26/2022    History of COVID-19 12/08/2022    Left upper lobe pulmonary nodule 07/19/2023    Arthralgia of multiple joints 02/15/2024    Memory loss 03/18/2024    Age-related cognitive decline 03/18/2024    Chronic constipation 08/29/2024     Resolved Ambulatory Problems     Diagnosis Date Noted    Acute pharyngitis 03/22/2013    Generalized anxiety disorder 05/19/2013    Back pain 08/16/2013    History of Bell's palsy 05/21/2013    Major depression     Sinusitis, acute 03/22/2013    Extremity pain 02/05/2016    History of bone density study 02/05/2016    Abnormal x-ray of thoracic spine 03/07/2019    Abnormal x-ray of lumbar spine 03/07/2019    Hypercalcemia 11/13/2019    Persistent cough 11/13/2019    Gastroesophageal reflux disease without esophagitis 11/13/2019    Flu-like symptoms 12/02/2019    Rotator cuff arthropathy of right shoulder, 4/20/2021--status post right reverse total shoulder arthroplasty 05/27/2020    Need for influenza vaccination 09/25/2020    Chronic right ear pain 11/30/2020    Hospital discharge follow-up 04/27/2021    Hyponatremia 04/27/2021    S/P reverse total shoulder arthroplasty, right 05/04/2021    Dyspnea on exertion 05/11/2022    Exertional chest pain 05/11/2022    Chronic pulmonary thromboembolism syndrome 05/19/2022    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 03/07/2019    Situational mixed anxiety and depressive disorder 08/21/2019         PAST SURGICAL HISTORY  Past Surgical History:   Procedure Laterality Date    BILATERAL BREAST REDUCTION  Early 2000    Early  --bilateral breast reduction    CARDIAC CATHETERIZATION N/A 2022    Procedure: Right Heart Cath;  Surgeon: Agus Solorio MD PhD;  Location: [facility] INVASIVE LOCATION;  Service: Cardiology;  Laterality: N/A;  Will REMAIN on Xarelto for the case    CHOLECYSTECTOMY  1960s    --open cholecystectomy    COLONOSCOPY  2012--reportedly normal colonoscopy    INCONTINENCE SURGERY  2012--implantation of questionable bladder stimulator right sacral area for urinary incontinence.  Details not known.    REPLACEMENT TOTAL KNEE BILATERAL Left ; 2011-2011--bilateral total knee replacement    SUBTOTAL HYSTERECTOMY  Remote    Remote partial hysterectomy.  Ovaries intact.    TOTAL SHOULDER REPLACEMENT Left 2013--left total shoulder replacement.    TOTAL SHOULDER REPLACEMENT  2021--status post right reverse total shoulder arthroplasty    TOTAL SHOULDER REVERSE ARTHROPLASTY Right 2021--right reverse total shoulder replacement.         FAMILY HISTORY  Family History   Problem Relation Age of Onset    Alcohol abuse Father     Breast cancer Daughter         40s    Cancer Other     Diabetes Other         type II    Leukemia Grandchild 19         SOCIAL HISTORY  Social History     Socioeconomic History    Marital status:    Tobacco Use    Smoking status: Former     Current packs/day: 0.00     Types: Cigarettes     Quit date: 1998     Years since quittin.8    Smokeless tobacco: Never   Vaping Use    Vaping status: Never Used   Substance and Sexual Activity    Alcohol use: Yes     Alcohol/week: 1.0 standard drink of alcohol     Types: 1 Glasses of wine per week     Comment: current some day    Drug use: No    Sexual activity: Defer     Partners: Male         ALLERGIES  Morphine and codeine and Other        REVIEW OF SYSTEMS  All systems reviewed and negative except for those discussed in HPI.       PHYSICAL  EXAM    I have reviewed the triage vital signs and nursing notes.    ED Triage Vitals   Temp Heart Rate Resp BP SpO2   10/14/24 1611 10/14/24 1611 10/14/24 1611 10/14/24 1611 10/14/24 1613   97.8 °F (36.6 °C) 86 18 179/92 97 %      Temp src Heart Rate Source Patient Position BP Location FiO2 (%)   -- -- -- -- --              Physical Exam  GENERAL: Alert, oriented, not distressed  HENT: Small laceration noted to the occipital scalp.  No significant hematoma or palpable skull fracture.  No cervical tenderness.  EYES: no scleral icterus, EOMI  CV: regular rhythm, regular rate, no murmur  RESPIRATORY: normal effort, CTA  ABDOMEN: soft, nontender  MUSCULOSKELETAL: Abrasion to left elbow without significant tenderness.  Full range of motion of all extremities.  NEURO: alert, moves all extremities, normal speech, normal cerebellar function  SKIN: warm, dry      RADIOLOGY  CT Head Without Contrast, CT Cervical Spine Without Contrast    Result Date: 10/14/2024  HISTORY: Fell. Head injury. Neck pain.  CT OF THE BRAIN WITHOUT CONTRAST 10/14/2024  TECHNIQUE:  Axial images were obtained through the brain without intravenous contrast.  FINDINGS: There is mild to moderate diffuse atrophy. There is decreased attenuation of the periventricular white matter bilaterally consistent with small vessel white matter ischemic disease. Small scalp hematoma seen over the left posterior parietal region. No skull fractures are seen.      1. No acute process identified.  CT OF THE CERVICAL SPINE WITHOUT CONTRAST 10/14/2024  TECHNIQUE: Axial images were obtained from the skull base to the upper thoracic spine. Sagittal and coronal reconstruction images were reviewed.  FINDINGS: There is degenerative disease of the C1-C2 articulation. Tiny ossicles are seen adjacent to the C1-C2 articulation. These do not resemble acute fractures.  There is mild spondylosis at C4-5.  No significant subluxation is seen. No cervical spine fractures are seen. There  is mild multilevel facet joint arthropathy.  There is bilateral carotid calcification.  IMPRESSION: 1. Mild cervical degenerative disease. 2. No fractures are seen.   Radiation dose reduction techniques were utilized, including automated exposure control and exposure modulation based on body size.        I ordered the above noted radiological studies. Reviewed by me and discussed with radiologist.  See dictation for official radiology interpretation.      MEDICATIONS GIVEN IN ER    Medications   lidocaine 1% - EPINEPHrine [id number] (XYLOCAINE W/EPI) 1 %-1:825851 injection 10 mL (10 mL Injection Given by Other 10/14/24 1810)   Tetanus-Diphth-Acell Pertussis (BOOSTRIX) injection 0.5 mL (0.5 mL Intramuscular Given 10/14/24 1710)     Laceration Repair    Date/Time: 10/14/2024 11:06 PM    Performed by: Scot Miner PA  Authorized by: Agus Kraft MD    Consent:     Consent obtained:  Verbal    Consent given by:  Patient  Universal protocol:     Patient identity confirmed:  Verbally with patient, hospital-assigned identification number and arm band  Anesthesia:     Anesthesia method:  Local infiltration    Local anesthetic:  Lidocaine 1% WITH epi  Laceration details:     Location:  Scalp    Scalp location:  Occipital    Length (cm):  1.5  Pre-procedure details:     Preparation:  Patient was prepped and draped in usual sterile fashion and imaging obtained to evaluate for foreign bodies  Exploration:     Hemostasis achieved with:  Epinephrine    Wound extent: no foreign bodies/material noted    Treatment:     Area cleansed with:  Chlorhexidine    Amount of cleaning:  Standard    Irrigation solution:  Sterile saline  Skin repair:     Repair method:  Sutures    Suture size:  5-0    Wound skin closure material used: Vicryl.    Suture technique:  Simple interrupted    Number of sutures:  2  Approximation:     Approximation:  Close  Repair type:     Repair type:  Simple  Post-procedure details:     Dressing:  Antibiotic  ointment    Procedure completion:  Tolerated well, no immediate complications        ADDITIONAL ORDERS CONSIDERED BUT NOT ORDERED:  Admission was considered but after careful review of the patient's presentation, physical examination, diagnostic results, and response to treatment the patient may be safely discharged with outpatient follow-up.       PROGRESS, DATA ANALYSIS, CONSULTS, AND MEDICAL DECISION MAKING    All labs have been independently interpreted by myself.  All radiology studies have been independently interpreted by myself and discussed with radiologist dictating the report.   EKGs independently interpreted by myself.  Discussion below represents my analysis of pertinent findings related to patient's condition, differential diagnosis, treatment plan and final disposition.    I have discussed case with Dr. Kraft, emergency room physician.  He has performed his own bedside examination and agrees with treatment plan.    ED Course as of 10/14/24 2306   Mon Oct 14, 2024   1620 Patient presents with head injury after mechanical fall prior to arrival.  No blood thinners or neurodeficits.  Unknown last tetanus shot.    No alarm signs or symptoms but given patient age, high risk of morbidity and mortality, obtaining CT head imaging.  Patient denies any neck pain but given patient age, high risk of occult cervical spine fracture, obtaining CT cervical imaging.   [EE]   1704 CT imaging of the brain independently interpreted myself shows no evidence of acute hemorrhage or skull fracture. [EE]   1825 Patient's wound has been closed.  She is neurologically intact.  We will discharge. [EE]      ED Course User Index  [EE] Scot Miner PA       AS OF 23:06 EDT VITALS:    BP - 118/84  HR - 83  TEMP - 97.8 °F (36.6 °C)  O2 SATS - 97%        DIAGNOSIS  Final diagnoses:   Fall, initial encounter   Closed head injury, initial encounter   Laceration of scalp, initial encounter          DISPOSITION  Discharged    Admission was considered but after careful review of the patient's presentation, physical examination, diagnostic results, and response to treatment the patient may be safely discharged with outpatient follow-up.         Dictated utilizing Dragon dictation     Scot Miner PA  10/14/24 [id number]

## 2024-10-14 NOTE — DISCHARGE INSTRUCTIONS
2 sutures were placed in your scalp.  These will fall out on their own in the next week to 14 days.

## 2024-11-08 DIAGNOSIS — F41.8 SITUATIONAL ANXIETY: ICD-10-CM

## 2024-11-11 RX ORDER — LORAZEPAM 0.5 MG/1
TABLET ORAL
Qty: 60 TABLET | Refills: 5 | Status: SHIPPED | OUTPATIENT
Start: 2024-11-11

## 2024-11-11 RX ORDER — LEVOTHYROXINE SODIUM 125 UG/1
125 TABLET ORAL DAILY
Qty: 90 TABLET | Refills: 0 | Status: SHIPPED | OUTPATIENT
Start: 2024-11-11

## 2024-11-19 ENCOUNTER — OFFICE VISIT (OUTPATIENT)
Dept: INTERNAL MEDICINE | Facility: CLINIC | Age: 79
End: 2024-11-19
Payer: MEDICARE

## 2024-11-19 VITALS
WEIGHT: 174 LBS | SYSTOLIC BLOOD PRESSURE: 134 MMHG | DIASTOLIC BLOOD PRESSURE: 70 MMHG | RESPIRATION RATE: 16 BRPM | HEART RATE: 70 BPM | BODY MASS INDEX: 29.71 KG/M2 | HEIGHT: 64 IN | TEMPERATURE: 97.8 F | OXYGEN SATURATION: 93 %

## 2024-11-19 DIAGNOSIS — Z00.00 ENCOUNTER FOR SUBSEQUENT ANNUAL WELLNESS VISIT (AWV) IN MEDICARE PATIENT: Primary | ICD-10-CM

## 2024-11-19 DIAGNOSIS — Z23 NEED FOR INFLUENZA VACCINATION: ICD-10-CM

## 2024-11-19 PROBLEM — J18.9 COMMUNITY ACQUIRED BILATERAL LOWER LOBE PNEUMONIA: Status: ACTIVE | Noted: 2024-11-19

## 2024-11-19 PROCEDURE — 1159F MED LIST DOCD IN RCRD: CPT | Performed by: INTERNAL MEDICINE

## 2024-11-19 PROCEDURE — 3075F SYST BP GE 130 - 139MM HG: CPT | Performed by: INTERNAL MEDICINE

## 2024-11-19 PROCEDURE — 3078F DIAST BP <80 MM HG: CPT | Performed by: INTERNAL MEDICINE

## 2024-11-19 PROCEDURE — 1170F FXNL STATUS ASSESSED: CPT | Performed by: INTERNAL MEDICINE

## 2024-11-19 PROCEDURE — 1160F RVW MEDS BY RX/DR IN RCRD: CPT | Performed by: INTERNAL MEDICINE

## 2024-11-19 PROCEDURE — G0439 PPPS, SUBSEQ VISIT: HCPCS | Performed by: INTERNAL MEDICINE

## 2024-11-19 PROCEDURE — 1126F AMNT PAIN NOTED NONE PRSNT: CPT | Performed by: INTERNAL MEDICINE

## 2024-11-19 NOTE — PROGRESS NOTES
Subjective   The ABCs of the Annual Wellness Visit  Medicare Wellness Visit      Claudette L McManus is a 79 y.o. patient who presents for a Medicare Wellness Visit.    The following portions of the patient's history were reviewed and   updated as appropriate: allergies, current medications, past family history, past medical history, past social history, past surgical history, and problem list.    Compared to one year ago, the patient's physical   health is better.  Compared to one year ago, the patient's mental   health is better.    Recent Hospitalizations:  She was not admitted to the hospital during the last year.     Current Medical Providers:  Patient Care Team:  Lito Moore MD as PCP - General (Internal Medicine)  Lito Moore MD as Referring Physician (Internal Medicine)  Elsie Arzate MD as Consulting Physician (Hematology and Oncology)    Outpatient Medications Prior to Visit   Medication Sig Dispense Refill    Cariprazine HCl (Vraylar) 1.5 MG capsule capsule Take 1 p.o. daily for depression/mood stabilizer and anxiety 30 capsule 2    Cholecalciferol (VITAMIN D3) 2000 UNITS tablet Take 1 tablet by mouth Daily.      cyclobenzaprine (FLEXERIL) 10 MG tablet TAKE 1 TABLET BY MOUTH 3 TIMES A DAY AS NEEDED FOR MUSCLE RELAXER/BACK PAIN 30 tablet 1    DULoxetine (CYMBALTA) 60 MG capsule TAKE 1 CAPSULE EVERY DAY AS DIRECTED 90 capsule 3    exemestane (AROMASIN) 25 MG tablet TAKE 1 TABLET EVERY DAY 30 tablet 5    ezetimibe (ZETIA) 10 MG tablet TAKE 1 TABLET EVERY DAY 90 tablet 3    gabapentin (NEURONTIN) 300 MG capsule TAKE ONE CAPSULE BY MOUTH THREE TIMES A DAY FOR PERIPHERAL NEUROPATHY AS DIRECTED 90 capsule 4    Homeopathic Products (LEG CRAMPS PO) Take  by mouth Daily. Nightly      levothyroxine (SYNTHROID, LEVOTHROID) 125 MCG tablet TAKE 1 TABLET BY MOUTH DAILY 90 tablet 0    LORazepam (ATIVAN) 0.5 MG tablet TAKE ONE TABLET BY MOUTH TWICE A DAY AS NEEDED FOR ANXIETY 60 tablet 5    LORazepam  (ATIVAN) 1 MG tablet TAKE 1 TABLET BY MOUTH 2 TIMES A DAY FOR CHRONIC ANXIETY OR PANIC 60 tablet 4    meloxicam (MOBIC) 15 MG tablet TAKE 1 TABLET BY MOUTH DAILY FOR ARTHRITIS/JOINT PAIN 90 tablet 1    metroNIDAZOLE (METROGEL) 0.75 % gel Apply  topically to the appropriate area as directed 2 (Two) Times a Day. 45 g 2    multivitamin (MULTI VITAMIN PO) Take  by mouth Daily.      ondansetron (ZOFRAN) 8 MG tablet Take 1 p.o. every 6 to 8 hours as needed nausea 60 tablet 10    polyethylene glycol (MIRALAX) 17 g packet Take 17 g by mouth Daily.      simvastatin (ZOCOR) 20 MG tablet TAKE 1 TABLET EVERY DAY FOR HIGH CHOLESTEROL 90 tablet 2    traZODone (DESYREL) 150 MG tablet Take 1-1/2 tablets nightly as directed 135 tablet 1    valsartan (DIOVAN) 320 MG tablet Take 1 tablet by mouth Every Morning. 90 tablet 10     No facility-administered medications prior to visit.     No opioid medication identified on active medication list. I have reviewed chart for other potential  high risk medication/s and harmful drug interactions in the elderly.      Aspirin is not on active medication list.  Aspirin use is not indicated based on review of current medical condition/s. Risk of harm outweighs potential benefits.  .    Patient Active Problem List   Diagnosis    Primary osteoarthritis involving multiple joints    Benign essential hypertension    Hyperlipidemia    Primary hypothyroidism    Type 2 diabetes mellitus with diabetic neuropathy, without long-term current use of insulin    Chronic insomnia    Chronic bilateral low back pain without sciatica    Chronic bilateral thoracic back pain    Vitamin D deficiency    Therapeutic drug monitoring    Postmenopausal state    Diabetic peripheral neuropathy    Depression with anxiety    ACE-inhibitor cough    History of multiple pulmonary emboli    Non morbid obesity    Ductal carcinoma of left breast    Right ventricular enlargement    Chronic anxiety    History of COVID-19    Left upper  "lobe pulmonary nodule    Arthralgia of multiple joints    Memory loss    Age-related cognitive decline    Chronic constipation     Advance Care Planning Advance Directive is not on file.  ACP discussion was held with the patient during this visit. Patient does not have an advance directive, information provided.            Objective   Vitals:    24 1042   BP: 134/70   Pulse: 70   Resp: 16   Temp: 97.8 °F (36.6 °C)   TempSrc: Oral   SpO2: 93%   Weight: 78.9 kg (174 lb)   Height: 162.6 cm (64.02\")       Estimated body mass index is 29.85 kg/m² as calculated from the following:    Height as of this encounter: 162.6 cm (64.02\").    Weight as of this encounter: 78.9 kg (174 lb).            Does the patient have evidence of cognitive impairment? No                                                                                               Health  Risk Assessment    Smoking Status:  Social History     Tobacco Use   Smoking Status Former    Current packs/day: 0.00    Types: Cigarettes    Quit date: 1998    Years since quittin.9   Smokeless Tobacco Never     Alcohol Consumption:  Social History     Substance and Sexual Activity   Alcohol Use Yes    Alcohol/week: 1.0 standard drink of alcohol    Types: 1 Glasses of wine per week    Comment: current some day       Fall Risk Screen  STEADI Fall Risk Assessment was completed, and patient is at LOW risk for falls.Assessment completed on:2024    Depression Screening   Little interest or pleasure in doing things? Not at all   Feeling down, depressed, or hopeless? Not at all   PHQ-2 Total Score 0      Health Habits and Functional and Cognitive Screenin/19/2024    10:45 AM   Functional & Cognitive Status   Do you have difficulty preparing food and eating? No   Do you have difficulty bathing yourself, getting dressed or grooming yourself? No   Do you have difficulty using the toilet? No   Do you have difficulty moving around from place to place? No "   Do you have trouble with steps or getting out of a bed or a chair? No   Current Diet Well Balanced Diet   Dental Exam Up to date   Eye Exam Up to date   Exercise (times per week) 0 times per week   Current Exercises Include No Regular Exercise   Do you need help using the phone?  No   Are you deaf or do you have serious difficulty hearing?  No   Do you need help to go to places out of walking distance? No   Do you need help shopping? No   Do you need help preparing meals?  No   Do you need help with housework?  No   Do you need help with laundry? No   Do you need help taking your medications? No   Do you need help managing money? No   Do you ever drive or ride in a car without wearing a seat belt? No   Have you felt unusual stress, anger or loneliness in the last month? No   Who do you live with? Spouse   If you need help, do you have trouble finding someone available to you? No   Have you been bothered in the last four weeks by sexual problems? No   Do you have difficulty concentrating, remembering or making decisions? No           Age-appropriate Screening Schedule:  Refer to the list below for future screening recommendations based on patient's age, sex and/or medical conditions. Orders for these recommended tests are listed in the plan section. The patient has been provided with a written plan.    Health Maintenance List  Health Maintenance   Topic Date Due    INFLUENZA VACCINE  08/01/2024    DIABETIC EYE EXAM  08/09/2024    ANNUAL WELLNESS VISIT  11/06/2024    BMI FOLLOWUP  02/15/2025    HEMOGLOBIN A1C  02/20/2025    DXA SCAN  06/28/2025    LIPID PANEL  08/20/2025    COLORECTAL CANCER SCREENING  12/05/2026    TDAP/TD VACCINES (3 - Td or Tdap) 10/14/2034    RSV Vaccine - Adults  Completed    Pneumococcal Vaccine 65+  Completed    COVID-19 Vaccine  Discontinued    MAMMOGRAM  Discontinued    URINE MICROALBUMIN  Discontinued    ZOSTER VACCINE  Discontinued                                                                                                                                                 CMS Preventative Services Quick Reference  Risk Factors Identified During Encounter  Immunizations Discussed/Encouraged: Influenza  Urinary Incontinence:  Patient wears depends    The above risks/problems have been discussed with the patient.  Pertinent information has been shared with the patient in the After Visit Summary.  An After Visit Summary and PPPS were made available to the patient.    Follow Up:   Next Medicare Wellness visit to be scheduled in 1 year.     Assessment & Plan  Encounter for subsequent annual wellness visit (AWV) in Medicare patient         Need for influenza vaccination              Follow Up:   No follow-ups on file.

## 2024-11-23 DIAGNOSIS — E11.42 DIABETIC PERIPHERAL NEUROPATHY: Chronic | ICD-10-CM

## 2024-11-25 RX ORDER — GABAPENTIN 300 MG/1
CAPSULE ORAL
Qty: 90 CAPSULE | Refills: 4 | Status: SHIPPED | OUTPATIENT
Start: 2024-11-25

## 2024-11-26 ENCOUNTER — CLINICAL SUPPORT (OUTPATIENT)
Dept: INTERNAL MEDICINE | Facility: CLINIC | Age: 79
End: 2024-11-26
Payer: MEDICARE

## 2024-11-26 DIAGNOSIS — Z23 NEED FOR INFLUENZA VACCINATION: Primary | ICD-10-CM

## 2024-11-26 PROCEDURE — G0008 ADMIN INFLUENZA VIRUS VAC: HCPCS | Performed by: INTERNAL MEDICINE

## 2024-11-26 PROCEDURE — 90662 IIV NO PRSV INCREASED AG IM: CPT | Performed by: INTERNAL MEDICINE

## 2024-11-27 NOTE — PROGRESS NOTES
Patient seen only by the nurse and given a flu shot.  She left without being seen by the physician.

## 2024-12-10 ENCOUNTER — APPOINTMENT (OUTPATIENT)
Dept: ULTRASOUND IMAGING | Facility: HOSPITAL | Age: 79
End: 2024-12-10
Payer: MEDICARE

## 2024-12-10 ENCOUNTER — HOSPITAL ENCOUNTER (OUTPATIENT)
Dept: MAMMOGRAPHY | Facility: HOSPITAL | Age: 79
Discharge: HOME | End: 2024-12-10
Admitting: INTERNAL MEDICINE
Payer: MEDICARE

## 2024-12-10 DIAGNOSIS — C50.112 MALIGNANT NEOPLASM OF CENTRAL PORTION OF LEFT FEMALE BREAST, UNSPECIFIED ESTROGEN RECEPTOR STATUS: ICD-10-CM

## 2024-12-10 DIAGNOSIS — C50.912 DUCTAL CARCINOMA OF LEFT BREAST: ICD-10-CM

## 2024-12-10 PROCEDURE — 77065 DX MAMMO INCL CAD UNI: CPT

## 2024-12-10 PROCEDURE — G0279 TOMOSYNTHESIS, MAMMO: HCPCS

## 2025-02-03 DIAGNOSIS — E11.40 TYPE 2 DIABETES MELLITUS WITH DIABETIC NEUROPATHY, WITHOUT LONG-TERM CURRENT USE OF INSULIN: Chronic | ICD-10-CM

## 2025-02-03 RX ORDER — TIRZEPATIDE 15 MG/.5ML
INJECTION, SOLUTION SUBCUTANEOUS
Qty: 2 ML | Refills: 1 | Status: SHIPPED | OUTPATIENT
Start: 2025-02-03

## 2025-02-04 ENCOUNTER — TELEPHONE (OUTPATIENT)
Dept: INTERNAL MEDICINE | Facility: CLINIC | Age: 80
End: 2025-02-04

## 2025-02-04 NOTE — TELEPHONE ENCOUNTER
PATIENT CALLED AND STATES MEDICATION   Tirzepatide (Mounjaro) 15 MG/0.5ML solution auto-injector   IS NEEDING A PRIOR AUTHORIZATION    PHARMACY IS     [facility] - [facility] [address] [place] - [phone]  - [phone]  [phone]     CALL BACK NUMBER [phone]    SHE HAS BEEN IN [facility] WITH INSURANCE

## 2025-02-07 ENCOUNTER — TELEPHONE (OUTPATIENT)
Dept: INTERNAL MEDICINE | Facility: CLINIC | Age: 80
End: 2025-02-07
Payer: MEDICARE

## 2025-02-07 ENCOUNTER — OFFICE VISIT (OUTPATIENT)
Dept: ONCOLOGY | Facility: CLINIC | Age: 80
End: 2025-02-07
Payer: MEDICARE

## 2025-02-07 ENCOUNTER — LAB (OUTPATIENT)
Dept: LAB | Facility: HOSPITAL | Age: 80
End: 2025-02-07
Payer: MEDICARE

## 2025-02-07 VITALS
BODY MASS INDEX: 31.72 KG/M2 | RESPIRATION RATE: 16 BRPM | TEMPERATURE: 97.7 F | WEIGHT: 185.8 LBS | DIASTOLIC BLOOD PRESSURE: 78 MMHG | OXYGEN SATURATION: 96 % | SYSTOLIC BLOOD PRESSURE: 169 MMHG | HEIGHT: 64 IN | HEART RATE: 75 BPM

## 2025-02-07 DIAGNOSIS — C50.112 MALIGNANT NEOPLASM OF CENTRAL PORTION OF LEFT FEMALE BREAST, UNSPECIFIED ESTROGEN RECEPTOR STATUS: ICD-10-CM

## 2025-02-07 DIAGNOSIS — R92.8 OTHER ABNORMAL AND INCONCLUSIVE FINDINGS ON DIAGNOSTIC IMAGING OF BREAST: ICD-10-CM

## 2025-02-07 DIAGNOSIS — Z79.811 AROMATASE INHIBITOR USE: ICD-10-CM

## 2025-02-07 DIAGNOSIS — N63.10 MASS OF RIGHT BREAST, UNSPECIFIED QUADRANT: Primary | ICD-10-CM

## 2025-02-07 LAB
ALBUMIN SERPL-MCNC: 4.8 G/DL (ref 3.5–5.2)
ALBUMIN/GLOB SERPL: 1.6 G/DL
ALP SERPL-CCNC: 152 U/L (ref 39–117)
ALT SERPL W P-5'-P-CCNC: 32 U/L (ref 1–33)
ANION GAP SERPL CALCULATED.3IONS-SCNC: 12.9 MMOL/L (ref 5–15)
AST SERPL-CCNC: 37 U/L (ref 1–32)
BASOPHILS # BLD AUTO: 0.06 10*3/MM3 (ref 0–0.2)
BASOPHILS NFR BLD AUTO: 0.9 % (ref 0–1.5)
BILIRUB SERPL-MCNC: 0.5 MG/DL (ref 0–1.2)
BUN SERPL-MCNC: 12 MG/DL (ref 8–23)
BUN/CREAT SERPL: 13.8 (ref 7–25)
CALCIUM SPEC-SCNC: 10.1 MG/DL (ref 8.6–10.5)
CANCER AG15-3 SERPL-ACNC: 24.8 U/ML
CHLORIDE SERPL-SCNC: 93 MMOL/L (ref 98–107)
CO2 SERPL-SCNC: 27.1 MMOL/L (ref 22–29)
CREAT SERPL-MCNC: 0.87 MG/DL (ref 0.57–1)
DEPRECATED RDW RBC AUTO: 45.1 FL (ref 37–54)
EGFRCR SERPLBLD CKD-EPI 2021: 66.6 ML/MIN/1.73
EOSINOPHIL # BLD AUTO: 0.17 10*3/MM3 (ref 0–0.4)
EOSINOPHIL NFR BLD AUTO: 2.6 % (ref 0.3–6.2)
ERYTHROCYTE [DISTWIDTH] IN BLOOD BY AUTOMATED COUNT: 13.2 % (ref 12.3–15.4)
GLOBULIN UR ELPH-MCNC: 3 GM/DL
GLUCOSE SERPL-MCNC: 82 MG/DL (ref 65–99)
HCT VFR BLD AUTO: 41.5 % (ref 34–46.6)
HGB BLD-MCNC: 13.9 G/DL (ref 12–15.9)
IMM GRANULOCYTES # BLD AUTO: 0.08 10*3/MM3 (ref 0–0.05)
IMM GRANULOCYTES NFR BLD AUTO: 1.2 % (ref 0–0.5)
LYMPHOCYTES # BLD AUTO: 1.52 10*3/MM3 (ref 0.7–3.1)
LYMPHOCYTES NFR BLD AUTO: 23.2 % (ref 19.6–45.3)
MCH RBC QN AUTO: 31.3 PG (ref 26.6–33)
MCHC RBC AUTO-ENTMCNC: 33.5 G/DL (ref 31.5–35.7)
MCV RBC AUTO: 93.5 FL (ref 79–97)
MONOCYTES # BLD AUTO: 0.47 10*3/MM3 (ref 0.1–0.9)
MONOCYTES NFR BLD AUTO: 7.2 % (ref 5–12)
NEUTROPHILS NFR BLD AUTO: 4.26 10*3/MM3 (ref 1.7–7)
NEUTROPHILS NFR BLD AUTO: 64.9 % (ref 42.7–76)
NRBC BLD AUTO-RTO: 0 /100 WBC (ref 0–0.2)
PLATELET # BLD AUTO: 223 10*3/MM3 (ref 140–450)
PMV BLD AUTO: 8.8 FL (ref 6–12)
POTASSIUM SERPL-SCNC: 4.7 MMOL/L (ref 3.5–5.2)
PROT SERPL-MCNC: 7.8 G/DL (ref 6–8.5)
RBC # BLD AUTO: 4.44 10*6/MM3 (ref 3.77–5.28)
SODIUM SERPL-SCNC: 133 MMOL/L (ref 136–145)
WBC NRBC COR # BLD AUTO: 6.56 10*3/MM3 (ref 3.4–10.8)

## 2025-02-07 PROCEDURE — 80053 COMPREHEN METABOLIC PANEL: CPT

## 2025-02-07 PROCEDURE — 85025 COMPLETE CBC W/AUTO DIFF WBC: CPT

## 2025-02-07 PROCEDURE — 86300 IMMUNOASSAY TUMOR CA 15-3: CPT | Performed by: INTERNAL MEDICINE

## 2025-02-07 NOTE — PROGRESS NOTES
Subjective   Claudette L McManus is a 77 y.o. female.  Referred by Dr. 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   Comorbidities include hypertension, hyperlipidemia, anxiety/depression, insomnia, progressive dyspnea on exertion, diagnosed with pulmonary embolism in May 2022 and on anticoagulation with Xarelto, hypothyroidism.    7/29/2022-bilateral screening mammogram  Indeterminate left breast calcifications and focal asymmetry.  1 of which is associated with questioned architectural distortion.  Further evaluation recommended.  Neck on 8/25/2022-left breast diagnostic mammogram and ultrasound  In the lower slightly outer middle left breast there is a 1.5 cm mass corresponding to the architectural distortion.  Focal asymmetry in the outer central middle left breast partially effaces with spot compression and less conspicuous.  Right upper middle left breast there is persistent approximately 1 cm mass with corresponding architectural distortion.  The upper central anterior left breast there is a persistent focal asymmetry with calcifications.    Ultrasound  At 1:00 retroareolar left breast-2.3 x 0.9 x 2 cm hypoechoic mass with indistinct margins and internal echogenic foci from calcifications.  There is tubular elevation of the mass concerning for probable extension  At 3:00, retroareolar left breast-1.1 x 0.8 x 1 cm hypoechoic mass with indistinct margins, corresponds to suspicious group of calcifications.  At 4:00, 3 cm from the nipple there is a 1.4 and 1 x 1.1 cm irregular hypoechoic mass with peripheral vascularity corresponding to the mass with distortion on mammogram  At 430, 3 cm from the nipple-1.8 cm from the above mass there is a 0.9 x 0.5 x 0.9 cm hypoechoic mass with indistinct margins which is suspicious  At 3:00, 5 cm from the nipple  there is a 0.9 0.7 x 0.7 cm irregular hypoechoic mass with indistinct margins.    Impression  Multiple masses in the left breast, which demonstrate corresponding architectural distortion.  2 site ultrasound-guided biopsy targeting the dominant masses at 1:00 in the retroareolar breast and 4:00, 3 cm from the nipple with management of additional masses pending biopsy results.  Contrast-enhanced MRI recommended    9/21/2022-2 site biopsy  1.left breast 3:00, 2 cm from nipple-ultrasound-guided biopsy of the mass  Low-grade ductal carcinoma in situ involving a small intraductal papilloma.  DCIS measures 14 mm  ER positive, ID positive    2.left breast o'clock, 2 cm from the nipple ultrasound-guided biopsy  Invasive ductal carcinoma with lobular features  Grade 2  Millimeters on core biopsy  Atypical ductal hyperplasia with cavitations and involving an intraductal papilloma  Negative lymphovascular space invasion  ER +% strong  ID +% strong  HER2 negative, nonamplified on FISH  Ki-67 15%    She was seen by Dr. Fam and discussed mastectomy given several abnormalities in the left breast.  Since she had progressive worsening of dyspnea on exertion from 2022 without acute explanation, recent diagnosis of pulmonary embolism requiring chronic anticoagulation, her age and concerns regarding complications of mastectomy patient opted not to proceed with mastectomy  She is here to discuss neoadjuvant endocrine therapy.    She reports severe insomnia for which she is currently on trazodone.  Tried gabapentin in the past but did not help.  She is also been diagnosed with peripheral neuropathy.  She is on Cymbalta anxiety.    She had a daughter who is treated for breast cancer but eventually she passed away from suicide.  Patient reports significant anxiety since the death of her daughter.  Her daughter have been diagnosed with bipolar disorder.    Genetic testing performed and no significant pathogenic  mutation.    For the dyspnea, she evaluated by  and per patient she was recommended to have a sleep study.  She is not comfortable using the CPAP and did not wish to undergo a sleep study.  She was prescribed inhalers which she had used for a month without much help.    On the CT PE protocol performed in May 2022 there was concern for chronic pulmonary emboli and pulmonary hypertension.  For this reason she underwent a cardiac catheterization on 9/26/2022 which showed normal pulmonary pressures, normal left-sided pressures, elevated right ventricular filling pressures, normal PVR, normal RV SWI  The etiology of the dyspnea somewhat unclear.    12/8/2022-CT of the chest-tiny right lower lobe pulmonary embolism seen previously is no longer present.  Enlarged right ventricle appears similar on prior exam.  No other abnormalities noted.    CT angiogram of the chest 7/17/2023 which thankfully did not show recurrent PE but did show groundglass opacities felt to likely be infectious/inflammatory as well as a new left fissure nodule 1.8 x 1.1 cm, felt also likely to be infectious/inflammatory with 3-month interval follow-up recommended.  Patient was ultimately treated with a round of Z-Sreedhar and steroids with significant improvement in her shortness of breath.    11/9/2023-left breast diagnostic mammogram and ultrasound  Continued interval decrease in microcalcifications in the left breast with only few faint residual microcalcifications seen on spot magnification.  No sonographic abnormality in the areas of biopsy-proven malignancy in the left breast.  Imaging features are consistent with that of near complete imaging response to medical therapy.    Interval History  Ms. Gomez today for follow-up.   She continues on Aromasin and tolerating that extremely well.  She denies any arthralgias.  She denies any new breast masses.  She had a right breast diagnostic mammogram for a palpable abnormality in September  2024 which was benign.  She is due for bilateral screening mammogram in May 2025.  DEXA due in June 2025.    Past Medical History:   Diagnosis Date    Benign essential hypertension     Chronic bilateral low back pain without sciatica 8/16/2013 March 13, 2019--Limited bone scan.  This reveals scintigraphic activity in the spine and at the right shoulder corresponds to change seen on recent x-rays and is best explained by degenerative change.  Isolated metastatic disease exactly corresponding to the sites of extensive degenerative disc change would be peculiar.  March 7, 2019--new patient to get established.  She has complaints of approxi    Chronic bilateral thoracic back pain 2/26/2019 March 13, 2019--Limited bone scan.  This reveals scintigraphic activity in the spine and at the right shoulder corresponds to change seen on recent x-rays and is best explained by degenerative change.  Isolated metastatic disease exactly corresponding to the sites of extensive degenerative disc change would be peculiar.  March 1, 2019--x-ray of the lumbar and thoracic spine reveals mild anterior l    Chronic insomnia 11/4/2014    Diabetic peripheral neuropathy (HCC) 3/7/2019    Characterized by decreased sensation and paresthesias in both lower extremities described as a burning sensation.  Extends up to the knees.  Reportedly had been evaluated with nerve conduction study in the past.    Generalized anxiety disorder 5/19/2013    History of Bell's palsy 5/21/2013    Remote history of Bell's palsy.  Patient does not remember which side of the face.    Hyperlipidemia     Impaired fasting glucose     Major depression     Menopausal state, 8/19/2020--normal DEXA. 3/7/2019    August 19, 2020--DEXA scan reveals lumbar spine T score 3.8.  Left femoral neck T score 2.5.  Right femoral neck T score 2.2.  Normal bone density.    Non morbid obesity 8/11/2022    Peripheral neuropathy, idiopathic 3/7/2019    Characterized by decreased  sensation and paresthesias in both lower extremities described as a burning sensation.  Extends up to the knees.  Reportedly had been evaluated with nerve conduction study in the past.    Primary hypothyroidism 5/19/2013    Primary osteoarthritis involving multiple joints 4/22/2013    Rotator cuff arthropathy of right shoulder, 4/20/2021--status post right reverse total shoulder arthroplasty 5/27/2020    Situational mixed anxiety and depressive disorder 8/21/2019 August 21, 2019--patient seen in follow-up after I called in a prescription for Ativan after the patient's  contacted me a few weeks ago regarding the sudden death of patient's daughter.  This was an apparent suicide and the patient found her daughter dead.  I will not go into details.  Patient reports the Lorazepam has been helpful but she is still quite distraught, nervous, and undergoing    Type 2 diabetes mellitus with diabetic neuropathy, without long-term current use of insulin ([facility])     Vitamin D deficiency 2/26/2019        Past Surgical History:   Procedure Laterality Date    BILATERAL BREAST REDUCTION  Early 2000    Early 2000--bilateral breast reduction    CARDIAC CATHETERIZATION N/A 9/26/2022    Procedure: Right Heart Cath;  Surgeon: Agus Solorio MD PhD;  Location: [facility] CATH INVASIVE LOCATION;  Service: Cardiology;  Laterality: N/A;  Will REMAIN on Xarelto for the case    CHOLECYSTECTOMY  1960s    1960s--open cholecystectomy    COLONOSCOPY  2012 2012--reportedly normal colonoscopy    INCONTINENCE SURGERY  2012 Approximately 2012--implantation of questionable bladder stimulator right sacral area for urinary incontinence.  Details not known.    REPLACEMENT TOTAL KNEE BILATERAL Left 2010; 2011 2010-2011--bilateral total knee replacement    SUBTOTAL HYSTERECTOMY  Remote    Remote partial hysterectomy.  Ovaries intact.    TOTAL SHOULDER REPLACEMENT Left 2013 2013--left total shoulder replacement.    TOTAL SHOULDER  "REPLACEMENT  2021--status post right reverse total shoulder arthroplasty    TOTAL SHOULDER REVERSE ARTHROPLASTY Right 2021--right reverse total shoulder replacement.        Family History   Problem Relation Age of Onset    Alcohol abuse Father     Breast cancer Daughter         40s    Cancer Other     Diabetes Other         type II    Leukemia Grandchild 19        Social History     Socioeconomic History    Marital status:    Tobacco Use    Smoking status: Former     Types: Cigarettes     Quit date: 1998     Years since quittin.9    Smokeless tobacco: Never   Vaping Use    Vaping Use: Never used   Substance and Sexual Activity    Alcohol use: Yes     Alcohol/week: 1.0 standard drink     Types: 1 Glasses of wine per week     Comment: current some day    Drug use: No    Sexual activity: Not Currently     Partners: Male        OB History          1    Para   1    Term   1            AB        Living             SAB        IAB        Ectopic        Molar        Multiple        Live Births                 Age at menarche-10  Age at first live childbirth-12   1 para 1  0  She had a hysterectomy in the 90s, ovaries still present  Oral contraceptive pill use for 6 to 8 weeks  No use of hormone replacement therapy    Allergies   Allergen Reactions    Morphine And Related     Other Other (See Comments)     REJECTED DACRON SUTURES IN THE PAST AND INCISION CAME APART            Review of systems as mentioned in the HPI otherwise negative    Objective   Blood pressure 144/77, pulse 66, temperature 97.5 °F (36.4 °C), temperature source Temporal, resp. rate 16, height 165.1 cm (65\"), weight 86.9 kg (191 lb 9.6 oz), SpO2 95 %, not currently breastfeeding.   Physical Exam  Constitutional:       Appearance: Normal appearance. She is normal weight.   HENT:      Head: Normocephalic and atraumatic.      Right Ear: External ear normal.      Left Ear: " External ear normal.      Nose: Nose normal.      Mouth/Throat:      Mouth: Mucous membranes are moist.      Pharynx: Oropharynx is clear.   Eyes:      Extraocular Movements: Extraocular movements intact.      Conjunctiva/sclera: Conjunctivae normal.      Pupils: Pupils are equal, round, and reactive to light.   Cardiovascular:      Rate and Rhythm: Normal rate and regular rhythm.      Pulses: Normal pulses.      Heart sounds: Normal heart sounds.   Pulmonary:      Effort: Pulmonary effort is normal.      Breath sounds: Normal breath sounds.   Abdominal:      General: Abdomen is flat. Bowel sounds are normal.   Musculoskeletal:         General: Normal range of motion.      Cervical back: Normal range of motion.   Skin:     General: Skin is warm.   Neurological:      General: No focal deficit present.      Mental Status: She is alert and oriented to person, place, and time.   Psychiatric:         Mood and Affect: Mood normal.         Behavior: Behavior normal.         Thought Content: Thought content normal.         Judgment: Judgment normal.       Breast Exam: Right breast appears normal on inspection.  No palpable abnormalities of the right breast.  Left breast: No erythema.  No tenderness.  No palpable firmness at this time.  No axillary adenopathy     I have reexamined the patient and the results are consistent with the previously documented exam. Elsie Arzate MD        No visits with results within 30 Day(s) from this visit.   Latest known visit with results is:   Lab on 10/24/2022   Component Date Value Ref Range Status    Glucose 10/24/2022 108  74 - 124 mg/dL Final    BUN 10/24/2022 11  6 - 20 mg/dL Final    Creatinine 10/24/2022 0.96  0.60 - 1.10 mg/dL Final    Sodium 10/24/2022 135  134 - 145 mmol/L Final    Potassium 10/24/2022 4.4  3.5 - 4.7 mmol/L Final    Chloride 10/24/2022 95 (L)  98 - 107 mmol/L Final    CO2 10/24/2022 26.4  22.0 - 29.0 mmol/L Final    Calcium 10/24/2022 10.7 (C)  8.5 - 10.2  mg/dL Final    Total Protein 10/24/2022 7.9  6.3 - 8.0 g/dL Final    Albumin 10/24/2022 4.90  3.50 - 5.20 g/dL Final    ALT (SGPT) 10/24/2022 25  0 - 33 U/L Final    AST (SGOT) 10/24/2022 29  0 - 32 U/L Final    Alkaline Phosphatase 10/24/2022 67  38 - 116 U/L Final    Total Bilirubin 10/24/2022 0.5  0.2 - 1.2 mg/dL Final    Globulin 10/24/2022 3.0  1.8 - 3.5 gm/dL Final    A/G Ratio 10/24/2022 1.6  1.1 - 2.4 g/dL Final    BUN/Creatinine Ratio 10/24/2022 11.5  7.3 - 30.0 Final    Anion Gap 10/24/2022 13.6  5.0 - 15.0 mmol/L Final    eGFR 10/24/2022 60.3  >60.0 mL/min/1.73 Final    National Kidney Foundation and American Society of Nephrology (ASN) Task Force recommended calculation based on the Chronic Kidney Disease Epidemiology Collaboration (CKD-EPI) equation refit without adjustment for race.    WBC 10/24/2022 6.20  3.40 - 10.80 10*3/mm3 Final    RBC 10/24/2022 4.27  3.77 - 5.28 10*6/mm3 Final    Hemoglobin 10/24/2022 13.8  12.0 - 15.9 g/dL Final    Hematocrit 10/24/2022 40.9  34.0 - 46.6 % Final    MCV 10/24/2022 95.8  79.0 - 97.0 fL Final    MCH 10/24/2022 32.3  26.6 - 33.0 pg Final    MCHC 10/24/2022 33.7  31.5 - 35.7 g/dL Final    RDW 10/24/2022 12.4  12.3 - 15.4 % Final    RDW-SD 10/24/2022 43.0  37.0 - 54.0 fl Final    MPV 10/24/2022 9.3  6.0 - 12.0 fL Final    Platelets 10/24/2022 258  140 - 450 10*3/mm3 Final    Neutrophil % 10/24/2022 59.3  42.7 - 76.0 % Final    Lymphocyte % 10/24/2022 28.9  19.6 - 45.3 % Final    Monocyte % 10/24/2022 8.9  5.0 - 12.0 % Final    Eosinophil % 10/24/2022 1.9  0.3 - 6.2 % Final    Basophil % 10/24/2022 0.5  0.0 - 1.5 % Final    Immature Grans % 10/24/2022 0.5  0.0 - 0.5 % Final    Neutrophils, Absolute 10/24/2022 3.68  1.70 - 7.00 10*3/mm3 Final    Lymphocytes, Absolute 10/24/2022 1.79  0.70 - 3.10 10*3/mm3 Final    Monocytes, Absolute 10/24/2022 0.55  0.10 - 0.90 10*3/mm3 Final    Eosinophils, Absolute 10/24/2022 0.12  0.00 - 0.40 10*3/mm3 Final    Basophils, Absolute  10/24/2022 0.03  0.00 - 0.20 10*3/mm3 Final    Immature Grans, Absolute 10/24/2022 0.03  0.00 - 0.05 10*3/mm3 Final    nRBC 10/24/2022 0.0  0.0 - 0.2 /100 WBC Final        No radiology results for the last 30 days.     2/7/2025-CBC reviewed and within normal limits  CMP reviewed and within normal limits except for AST of 37, alkaline phosphatase 152  Tumor markers pending    Assessment & Plan       *Left breast invasive ductal carcinoma  T1 cN0 M0, stage Ia, multifocal  2 sites were biopsied, one site was consistent with ductal carcinoma in situ which is ER/MA positive and the second site was invasive ductal carcinoma with lobular features which is ER/MA positive and HER2 negative, grade 2  The steps of curative intent breast cancer treatment have been explained, including surgery, possible chemotherapy, possible radiation and possible endocrine therapy.   We discussed that given the fact that she does not want to undergo mastectomy and concerned about the complications due to ongoing dyspnea and her age it would be reasonable to proceed with neoadjuvant endocrine therapy.  The 2 options including tamoxifen and aromatase inhibitors have been discussed.  Given the history of DVT tamoxifen is not an option   She has been on anastrozole  since November 2022 and tolerating that well.  Seen in the clinic in December 2022 with concerns of erythema of the left breast.  This has since resolved.  Diagnostic mammogram 1/19/2022 of the left breast shows stable microcalcifications and no new concerning findings.  Diagnostic mammogram 5/1/2023 showing resolution of abnormal mammographic densities in the left breast as well as reduction in microcalcifications.    Patient developing hair loss and nails breaking off.  Arimidex held.  Patient ultimately switched to Aromasin 7/2023 8/8/2023 patient tolerating Aromasin well with no worsening of hair loss or nail changes.  Continue same.  Repeat diagnostic mammogram scheduled in  November per Dr. Fam.  11/9/2023-left breast diagnostic mammogram with near complete resolution of the microcalcifications.  5/30/2024-bilateral diagnostic mammogram images independently reviewed interpreted by me.  Overall unchanged with no new concerning findings.  Biopsy clip still present.  Continues on exam and stain with good tolerance.  Due for bilateral screening mammogram in May 2025, this will be ordered and scheduled  No new palpable abnormalities of either breast.    *PE  Hypercoagulability work-up negative  Abnormal lupus anticoagulant likely secondary to Xarelto  Prothrombin gene mutation and factor V Leiden not covered by insurance  Continues on Xarelto  Recent CT angiogram shows resolution of the blood clots.  The initial plan was to continue patient on indefinite anticoagulation as there was no clear provoking event for the PE.  However she is concerned about remaining on anticoagulation long-term.  She has completed 6 months of therapy.   We discussed increased risk of recurrent DVT and PE in individuals with a previous history of PE.  Since is the first episode of PE, could consider discontinuation of anticoagulation and continue surveillance.  Xarelto discontinued  No concerns for recurrent DVT or PE    *Anxiety  Poorly controlled since the death of her daughter  Continues on Cymbalta  Also on trazodone  Continue follow-up with supportive oncology    *Depression  Mood has remained stable and improved.    *Dyspnea  7/17/2023 CT angiogram performed per PCP, Dr. Moore, showing no evidence of recurrent thrombosis but with inflammatory/infectious changes with groundglass opacities.  Also noted was new 1.8 x 1.1 cm left fissure lesion, also felt to be likely infectious/inflammatory.  3-month CT chest follow-up recommended.  Patient treated with a Z-Sreedhar and prednisone taper per Dr. Moore with improvement in shortness of breath.  8/8/2023 patient highly anxious about potentially having cancer in her  lung.  Tried to reassure her of the radiologist feeling this is likely infectious/inflammatory and considering her recent pneumonia and previous COVID that is likely the case.  She will see Dr. Moore in follow-up in September and anticipate him ordering repeat CT imaging at that visit.  1/2/2024-CT of the chest PE protocol with no evidence of pulmonary embolism.  New 1 to 2 mm left upper lobe nodule which was better seen on the prior CT chest.    *Hypertension and hyperlipidemia-continue current medications.  Blood pressure 169/78    *Bone health  DEXA from 2020 reviewed and normal  DEXA scan June 2023 with normal bone density.  Repeat DEXA in June 2025, ordered and scheduled today    PLAN:  Continue exemestane 25 mg daily.  Continue vitamin D supplementation.  Stable CT chest  Bilateral diagnostic mammogram in 1 year due May 2025, ordered and scheduled today  DEXA in June 2025  MD follow-up in 6 months    This patient is on high risk drug therapy requiring intensive monitoring for toxicity.

## 2025-02-08 LAB — CANCER AG27-29 SERPL-ACNC: 39 U/ML (ref 0–38.6)

## 2025-02-19 ENCOUNTER — OFFICE VISIT (OUTPATIENT)
Dept: INTERNAL MEDICINE | Facility: CLINIC | Age: 80
End: 2025-02-19
Payer: MEDICARE

## 2025-02-19 VITALS
HEART RATE: 79 BPM | TEMPERATURE: 98.2 F | RESPIRATION RATE: 16 BRPM | SYSTOLIC BLOOD PRESSURE: 142 MMHG | OXYGEN SATURATION: 92 % | HEIGHT: 64 IN | WEIGHT: 194 LBS | BODY MASS INDEX: 33.12 KG/M2 | DIASTOLIC BLOOD PRESSURE: 68 MMHG

## 2025-02-19 DIAGNOSIS — M25.50 ARTHRALGIA OF MULTIPLE JOINTS: Chronic | ICD-10-CM

## 2025-02-19 DIAGNOSIS — K59.09 CHRONIC CONSTIPATION: ICD-10-CM

## 2025-02-19 DIAGNOSIS — Z86.16 HISTORY OF COVID-19: Chronic | ICD-10-CM

## 2025-02-19 DIAGNOSIS — R05.8 ACE-INHIBITOR COUGH: Chronic | ICD-10-CM

## 2025-02-19 DIAGNOSIS — M54.50 CHRONIC BILATERAL LOW BACK PAIN WITHOUT SCIATICA: Chronic | ICD-10-CM

## 2025-02-19 DIAGNOSIS — E11.40 TYPE 2 DIABETES MELLITUS WITH DIABETIC NEUROPATHY, WITHOUT LONG-TERM CURRENT USE OF INSULIN: Primary | Chronic | ICD-10-CM

## 2025-02-19 DIAGNOSIS — I10 BENIGN ESSENTIAL HYPERTENSION: Chronic | ICD-10-CM

## 2025-02-19 DIAGNOSIS — R91.1 LEFT UPPER LOBE PULMONARY NODULE: Chronic | ICD-10-CM

## 2025-02-19 DIAGNOSIS — I51.7 RIGHT VENTRICULAR ENLARGEMENT: Chronic | ICD-10-CM

## 2025-02-19 DIAGNOSIS — F41.9 CHRONIC ANXIETY: Chronic | ICD-10-CM

## 2025-02-19 DIAGNOSIS — M15.0 PRIMARY OSTEOARTHRITIS INVOLVING MULTIPLE JOINTS: Chronic | ICD-10-CM

## 2025-02-19 DIAGNOSIS — Z51.81 THERAPEUTIC DRUG MONITORING: ICD-10-CM

## 2025-02-19 DIAGNOSIS — F41.8 DEPRESSION WITH ANXIETY: Chronic | ICD-10-CM

## 2025-02-19 DIAGNOSIS — C50.912 DUCTAL CARCINOMA OF LEFT BREAST: Chronic | ICD-10-CM

## 2025-02-19 DIAGNOSIS — E78.2 MIXED HYPERLIPIDEMIA: Chronic | ICD-10-CM

## 2025-02-19 DIAGNOSIS — G89.29 CHRONIC BILATERAL LOW BACK PAIN WITHOUT SCIATICA: Chronic | ICD-10-CM

## 2025-02-19 DIAGNOSIS — R53.82 CHRONIC FATIGUE: ICD-10-CM

## 2025-02-19 DIAGNOSIS — R41.0 INTERMITTENT CONFUSION: ICD-10-CM

## 2025-02-19 DIAGNOSIS — F51.04 CHRONIC INSOMNIA: Chronic | ICD-10-CM

## 2025-02-19 DIAGNOSIS — R41.3 MEMORY LOSS: Chronic | ICD-10-CM

## 2025-02-19 DIAGNOSIS — E55.9 VITAMIN D DEFICIENCY: Chronic | ICD-10-CM

## 2025-02-19 DIAGNOSIS — E66.9 NON MORBID OBESITY: Chronic | ICD-10-CM

## 2025-02-19 DIAGNOSIS — T46.4X5A ACE-INHIBITOR COUGH: Chronic | ICD-10-CM

## 2025-02-19 DIAGNOSIS — E03.9 PRIMARY HYPOTHYROIDISM: Chronic | ICD-10-CM

## 2025-02-19 DIAGNOSIS — E11.42 DIABETIC PERIPHERAL NEUROPATHY: Chronic | ICD-10-CM

## 2025-02-19 DIAGNOSIS — G47.10 HYPERSOMNOLENCE DISORDER: ICD-10-CM

## 2025-02-19 DIAGNOSIS — R41.81 AGE-RELATED COGNITIVE DECLINE: Chronic | ICD-10-CM

## 2025-02-19 DIAGNOSIS — I26.99 MULTIPLE PULMONARY EMBOLI: Chronic | ICD-10-CM

## 2025-02-19 PROBLEM — J18.9 COMMUNITY ACQUIRED BILATERAL LOWER LOBE PNEUMONIA: Status: RESOLVED | Noted: 2024-11-19 | Resolved: 2025-02-19

## 2025-02-19 PROCEDURE — 1159F MED LIST DOCD IN RCRD: CPT | Performed by: INTERNAL MEDICINE

## 2025-02-19 PROCEDURE — 3078F DIAST BP <80 MM HG: CPT | Performed by: INTERNAL MEDICINE

## 2025-02-19 PROCEDURE — 1160F RVW MEDS BY RX/DR IN RCRD: CPT | Performed by: INTERNAL MEDICINE

## 2025-02-19 PROCEDURE — 1126F AMNT PAIN NOTED NONE PRSNT: CPT | Performed by: INTERNAL MEDICINE

## 2025-02-19 PROCEDURE — G2211 COMPLEX E/M VISIT ADD ON: HCPCS | Performed by: INTERNAL MEDICINE

## 2025-02-19 PROCEDURE — 3077F SYST BP >= 140 MM HG: CPT | Performed by: INTERNAL MEDICINE

## 2025-02-19 PROCEDURE — 99214 OFFICE O/P EST MOD 30 MIN: CPT | Performed by: INTERNAL MEDICINE

## 2025-02-19 NOTE — PROGRESS NOTES
02/19/2025    Patient Information  Claudette L McManus                                                                                          [id number]  [place] [address]      1945  [unfilled]  There is no work phone number on file.    Chief Complaint:     Follow-up chronic medical problems.    History of Present Illness:    Patient with several chronic medical problems including type 2 diabetes presents today for follow-up.  Patient had lab a while back which we will review.  No new acute complaints.  Her past medical history reviewed and updated were necessary including health maintenance parameters.  This reveals she will be up-to-date or else accounted for after today's visit.    Review of Systems   Constitutional: Negative.   HENT: Negative.     Eyes: Negative.    Cardiovascular: Negative.    Respiratory: Negative.     Endocrine: Negative.    Hematologic/Lymphatic: Negative.    Skin: Negative.    Musculoskeletal:  Positive for arthritis and joint pain.   Gastrointestinal: Negative.    Genitourinary: Negative.    Neurological: Negative.    Psychiatric/Behavioral:  Positive for depression and memory loss. The patient has insomnia.    Allergic/Immunologic: Negative.        Active Problems:    Patient Active Problem List   Diagnosis    Primary osteoarthritis involving multiple joints    Benign essential hypertension    Hyperlipidemia    Primary hypothyroidism    Type 2 diabetes mellitus with diabetic neuropathy, without long-term current use of insulin    Chronic insomnia    Chronic bilateral low back pain without sciatica    Chronic bilateral thoracic back pain    Vitamin D deficiency    Therapeutic drug monitoring    Postmenopausal state    Diabetic peripheral neuropathy    Depression with anxiety    ACE-inhibitor cough    History of multiple pulmonary emboli    Non morbid obesity    Ductal carcinoma of left breast    Right ventricular enlargement    Chronic anxiety    History  of COVID-19    Left upper lobe pulmonary nodule    Arthralgia of multiple joints    Memory loss    Age-related cognitive decline    Chronic constipation    Hypersomnolence disorder    Chronic fatigue         Past Medical History:   Diagnosis Date    Age-related cognitive decline 03/18/2024    Arthralgia of multiple joints 02/15/2024    February 15, 2024--patient reports that over the past 6 months she has been having much more pain in her hands and fingers bilaterally.  She is also noted some swelling without redness but they are tender.  We will obtain rheumatologic profile today and then follow-up on phone for the results and possible further instructions.  Need to rule out inflammatory arthritis.      Benign essential hypertension     Chronic anxiety 10/26/2022    Chronic bilateral low back pain without sciatica 08/16/2013 March 13, 2019--Limited bone scan.  This reveals scintigraphic activity in the spine and at the right shoulder corresponds to change seen on recent x-rays and is best explained by degenerative change.  Isolated metastatic disease exactly corresponding to the sites of extensive degenerative disc change would be peculiar.  March 7, 2019--new patient to get established.  She has complaints of approxi    Chronic bilateral thoracic back pain 02/26/2019 March 13, 2019--Limited bone scan.  This reveals scintigraphic activity in the spine and at the right shoulder corresponds to change seen on recent x-rays and is best explained by degenerative change.  Isolated metastatic disease exactly corresponding to the sites of extensive degenerative disc change would be peculiar.  March 1, 2019--x-ray of the lumbar and thoracic spine reveals mild anterior l    Chronic insomnia 11/04/2014    Depression with anxiety 08/21/2019 August 21, 2019--patient seen in follow-up after I called in a prescription for Ativan after the patient's  contacted me a few weeks ago regarding the sudden death of  patient's daughter.  This was an apparent suicide and the patient found her daughter dead.  I will not go into details.  Patient reports the Lorazepam has been helpful but she is still quite distraught, nervous, and undergoing    Diabetic peripheral neuropathy 03/07/2019    Characterized by decreased sensation and paresthesias in both lower extremities described as a burning sensation.  Extends up to the knees.  Reportedly had been evaluated with nerve conduction study in the past.    Ductal carcinoma of left breast 08/26/2022    Generalized anxiety disorder 05/19/2013    History of Bell's palsy 05/21/2013    Remote history of Bell's palsy.  Patient does not remember which side of the face.    History of COVID-19 12/08/2022    History of multiple pulmonary emboli 05/19/2022    Stacie 15, 2022--patient seen in follow-up by Dr. Moore.  She is complaining of continued dyspnea on exertion which may be worse.  I reviewed the cardiology consultation from June 7, 2022 and also reviewed the echocardiogram from that same date.  Noted below.  Her  is now present and he reports her dyspnea on exertion is definitely worse.  Her oxygen saturation at rest is 96%.  Upon ambulatio    History of pneumonia 07/19/2023 July 19, 2023--it appears the patient may have an infectious process versus an inflammatory process in both of her lungs.  She was placed on a Z-Sreedhar by the nurse practitioner which I think is certainly reasonable.  I do think she would benefit from a round of prednisone.  There is possibly a new pulmonary nodule that we will need monitoring.  I think these are changes left over from COVID which sh    Hyperlipidemia     Left upper lobe pulmonary nodule 07/19/2023 January 2, 2024--CT ANGIOGRAM CHEST PULMONARY EMBOLISM     TECHNIQUE: Radiation dose reduction techniques were utilized, including automated exposure control and exposure modulation based on body size. 2 mm images were obtained through the chest after  the administration of IV contrast.     HISTORY: Shortness of breath and chest pain.     COMPARISON: CT chest angiogram 5/19/2022. CT chest 12/11/202    Memory loss 03/18/2024             Patient: MCMANUS, CLAUDETTE  Time Out: 23:06  Exam(s): MRI HEAD W WO Contrast      EXAM:    MR Head Without and With Intravenous Contrast     CLINICAL HISTORY:     Reason for exam: Neuro deficit, acute, stroke suspected. Dizziness, hx breast cancer.     TECHNIQUE:    Magnetic resonance images of the head brain without and with  intravenous contrast in multiple planes.     COMPARISON:    C    Menopausal state, 8/19/2020--normal DEXA. 03/07/2019 August 19, 2020--DEXA scan reveals lumbar spine T score 3.8.  Left femoral neck T score 2.5.  Right femoral neck T score 2.2.  Normal bone density.    Non morbid obesity 08/11/2022    Primary hypothyroidism 05/19/2013    Primary osteoarthritis involving multiple joints 04/22/2013    Rotator cuff arthropathy of right shoulder, 4/20/2021--status post right reverse total shoulder arthroplasty 05/27/2020    Situational mixed anxiety and depressive disorder 08/21/2019 August 21, 2019--patient seen in follow-up after I called in a prescription for Ativan after the patient's  contacted me a few weeks ago regarding the sudden death of patient's daughter.  This was an apparent suicide and the patient found her daughter dead.  I will not go into details.  Patient reports the Lorazepam has been helpful but she is still quite distraught, nervous, and undergoing    Type 2 diabetes mellitus with diabetic neuropathy, without long-term current use of insulin     Vitamin D deficiency 02/26/2019         Past Surgical History:   Procedure Laterality Date    BILATERAL BREAST REDUCTION  Early 2000    Early 2000--bilateral breast reduction    CARDIAC CATHETERIZATION N/A 9/26/2022    Procedure: Right Heart Cath;  Surgeon: Agus Solorio MD PhD;  Location: [facility] INVASIVE LOCATION;   Service: Cardiology;  Laterality: N/A;  Will REMAIN on Xarelto for the case    CHOLECYSTECTOMY  1960s    1960s--open cholecystectomy    COLONOSCOPY  2012 2012--reportedly normal colonoscopy    INCONTINENCE SURGERY  2012 Approximately 2012--implantation of questionable bladder stimulator right sacral area for urinary incontinence.  Details not known.    REPLACEMENT TOTAL KNEE BILATERAL Left 2010; 2011    [id number]--bilateral total knee replacement    SUBTOTAL HYSTERECTOMY  Remote    Remote partial hysterectomy.  Ovaries intact.    TOTAL SHOULDER REPLACEMENT Left 2013 2013--left total shoulder replacement.    TOTAL SHOULDER REPLACEMENT  April 28, 2021 4/20/2021--status post right reverse total shoulder arthroplasty    TOTAL SHOULDER REVERSE ARTHROPLASTY Right 04/20/2021 4/20/2021--right reverse total shoulder replacement.         Allergies   Allergen Reactions    Morphine And Codeine     Other Other (See Comments)     REJECTED DACRON SUTURES IN THE PAST AND INCISION CAME APART           Current Outpatient Medications:     Cariprazine HCl (Vraylar) 1.5 MG capsule capsule, Take 1 p.o. daily for depression/mood stabilizer and anxiety, Disp: 30 capsule, Rfl: 2    Cholecalciferol (VITAMIN D3) 2000 UNITS tablet, Take 1 tablet by mouth Daily., Disp: , Rfl:     cyclobenzaprine (FLEXERIL) 10 MG tablet, TAKE 1 TABLET BY MOUTH 3 TIMES A DAY AS NEEDED FOR MUSCLE RELAXER/BACK PAIN, Disp: 30 tablet, Rfl: 1    DULoxetine (CYMBALTA) 60 MG capsule, TAKE 1 CAPSULE EVERY DAY AS DIRECTED, Disp: 90 capsule, Rfl: 3    exemestane (AROMASIN) 25 MG tablet, TAKE 1 TABLET EVERY DAY, Disp: 30 tablet, Rfl: 5    ezetimibe (ZETIA) 10 MG tablet, TAKE 1 TABLET EVERY DAY, Disp: 90 tablet, Rfl: 3    gabapentin (NEURONTIN) 300 MG capsule, TAKE 1 CAPSULE BY MOUTH 3 TIMES A DAY FOR PERIPHERAL NEUROPATHY AS DIRECTED, Disp: 90 capsule, Rfl: 4    Homeopathic Products (LEG CRAMPS PO), Take  by mouth Daily. Nightly, Disp: , Rfl:      levothyroxine (SYNTHROID, LEVOTHROID) 125 MCG tablet, TAKE 1 TABLET BY MOUTH DAILY, Disp: 90 tablet, Rfl: 0    LORazepam (ATIVAN) 1 MG tablet, TAKE 1 TABLET BY MOUTH 2 TIMES A DAY FOR CHRONIC ANXIETY OR PANIC, Disp: 60 tablet, Rfl: 4    meloxicam (MOBIC) 15 MG tablet, TAKE 1 TABLET BY MOUTH DAILY FOR ARTHRITIS/JOINT PAIN, Disp: 90 tablet, Rfl: 1    metroNIDAZOLE (METROGEL) 0.75 % gel, Apply  topically to the appropriate area as directed 2 (Two) Times a Day., Disp: 45 g, Rfl: 2    multivitamin (MULTI VITAMIN PO), Take  by mouth Daily., Disp: , Rfl:     ondansetron (ZOFRAN) 8 MG tablet, Take 1 p.o. every 6 to 8 hours as needed nausea, Disp: 60 tablet, Rfl: 10    polyethylene glycol (MIRALAX) 17 g packet, Take 17 g by mouth Daily., Disp: , Rfl:     simvastatin (ZOCOR) 20 MG tablet, TAKE 1 TABLET EVERY DAY FOR HIGH CHOLESTEROL, Disp: 90 tablet, Rfl: 2    Tirzepatide (Mounjaro) 15 MG/0.5ML solution auto-injector, INJECT 15 MG UNDER THE SKIN ONCE WEEKLY, Disp: 2 mL, Rfl: 1    traZODone (DESYREL) 150 MG tablet, Take 1-1/2 tablets nightly as directed, Disp: 135 tablet, Rfl: 1    valsartan (DIOVAN) 320 MG tablet, Take 1 tablet by mouth Every Morning., Disp: 90 tablet, Rfl: 10      Family History   Problem Relation Age of Onset    Alcohol abuse Father     Breast cancer Daughter         40s    Cancer Other     Diabetes Other         type II    Leukemia Grandchild 19         Social History     Socioeconomic History    Marital status:    Tobacco Use    Smoking status: Former     Current packs/day: 0.00     Types: Cigarettes     Quit date: 1998     Years since quittin.1    Smokeless tobacco: Never   Vaping Use    Vaping status: Never Used   Substance and Sexual Activity    Alcohol use: Yes     Alcohol/week: 1.0 standard drink of alcohol     Types: 1 Glasses of wine per week     Comment: current some day    Drug use: No    Sexual activity: Defer     Partners: Male         Vitals:    25 1240   BP: 142/68  "  Pulse: 79   Resp: 16   Temp: 98.2 °F (36.8 °C)   TempSrc: Oral   SpO2: 92%   Weight: 88 kg (194 lb)   Height: 162.6 cm (64.02\")        Body mass index is 33.28 kg/m².      Physical Exam:    General: Alert and oriented x 3.  No acute distress.  Normal affect.  Obese.  HEENT: Pupils equal, round, reactive to light; extraocular movements intact; sclerae nonicteric; pharynx, ear canals and TMs normal.  Neck: Without JVD, thyromegaly, bruit, or adenopathy.  Lungs: Clear to auscultation in all fields.  Heart: Regular rate and rhythm without murmur, rub, gallop, or click.  Abdomen: Soft, nontender, without hepatosplenomegaly or hernia.  Bowel sounds normal.  : Deferred.  Rectal: Deferred.  Extremities: Without clubbing, cyanosis, edema, or pulse deficit.  Neurologic: Intact without focal deficit.  Normal station and gait observed during ingress and egress from the examination room.  Skin: Without significant lesion.  Musculoskeletal: Unremarkable.    Lab/other results:    CK normal at 74.  CMP normal except glucose 105.  Hemoglobin A1c 6.0.  Microalbumin/creatinine ratio less than 13.  Total cholesterol is 208, triglycerides 130, LDL particle number slightly elevated 1082, HDL particle #46.  TSH is less than 0.005.  Free T4 elevated 1.87.  Free T33.3.  Vitamin D normal at 46.  SARS antibodies are positive.    Assessment/Plan:     Diagnosis Plan   1. Type 2 diabetes mellitus with diabetic neuropathy, without long-term current use of insulin  Comprehensive Metabolic Panel    Hemoglobin A1c    Urinalysis With Microscopic If Indicated (No Culture) - Urine, Clean Catch    Microalbumin / Creatinine Urine Ratio - Urine, Clean Catch      2. Hyperlipidemia  CK    Comprehensive Metabolic Panel    NMR LipoProfile    Homocysteine      3. Primary hypothyroidism  T4, Free    T3, Free    TSH    TSH    T4, Free    T3, Free      4. Benign essential hypertension  Comprehensive Metabolic Panel      5. Vitamin D deficiency  Vitamin " D,25-Hydroxy      6. Diabetic peripheral neuropathy        7. Depression with anxiety        8. ACE-inhibitor cough        9. History of multiple pulmonary emboli        10. Non morbid obesity        11. Ductal carcinoma of left breast        12. Chronic bilateral low back pain without sciatica        13. Chronic insomnia        14. Primary osteoarthritis involving multiple joints        15. Right ventricular enlargement        16. Chronic anxiety        17. History of COVID-19  SARS-CoV-2 Antibodies, Nucleocapsid (Natural Immunity)      18. Left upper lobe pulmonary nodule        19. Arthralgia of multiple joints        20. Memory loss  Ambulatory Referral to Neurology      21. Age-related cognitive decline  Ambulatory Referral to Neurology      22. Chronic constipation        23. Therapeutic drug monitoring        24. Hypersomnolence disorder        25. Chronic fatigue  CBC (No Diff)    Methylmalonic Acid, Serum      26. Intermittent confusion  Ambulatory Referral to Neurology        Patient has type 2 diabetes that is under good control.  Hyperlipidemia is under fair control not quite as good as it was previously.  Compliance with simvastatin and Zetia encouraged and also encouraged patient to follow a low carbohydrate diet and weight loss.  Her depression and anxiety seem to be reasonably controlled.  I am not totally sure if it is in complete remission.  Patient still having problems do the death of her daughter quite sometime ago.  She has a history of multiple pulmonary emboli without recurrence.  She has breast cancer and is followed by medical oncology.  She has a history of COVID-19 and has SARS antibodies.  Left upper lobe pulmonary nodules being monitored.  Memory loss may be somewhat more progressive.  Patient's  seems to be a little concerned.  She has been tested and this was felt to be age-related cognitive decline.  Constipation controlled with MiraLAX.    Plan is as follows: Once again I  strongly recommend low carbohydrate diet, exercise, and weight loss.  Patient referred to neurology regarding memory loss and confusion.  We discussed the possibility of sleep apnea causing her hypersomnolence and patient does not want to get tested because she would not wear CPAP.  She has poor sleep and frequent awakenings.  Her thyroid is supratherapeutic and we need to confirm this and reduce the dose if necessary.  Too much thyroid can cause fatigue as well as low thyroid dose.  Will repeat her thyroid function test today and then I will contact her as soon as the results are available for possible adjustment of her medication.  Will set patient up for fasting lab and follow-up in about 2 months and hopefully by then she would have seen the neurologist has also been on the adjusted dose of thyroid medication to see what effect that is had regarding her chronic fatigue and other symptoms.        Procedures

## 2025-02-20 DIAGNOSIS — E03.9 PRIMARY HYPOTHYROIDISM: Primary | Chronic | ICD-10-CM

## 2025-02-20 LAB
T3FREE SERPL-MCNC: 3.2 PG/ML (ref 2–4.4)
T4 FREE SERPL-MCNC: 1.76 NG/DL (ref 0.82–1.77)
TSH SERPL DL<=0.005 MIU/L-ACNC: 0.07 UIU/ML (ref 0.45–4.5)

## 2025-02-20 RX ORDER — LEVOTHYROXINE SODIUM 88 UG/1
TABLET ORAL
Qty: 90 TABLET | Refills: 1 | Status: SHIPPED | OUTPATIENT
Start: 2025-02-20

## 2025-02-25 DIAGNOSIS — E78.2 MIXED HYPERLIPIDEMIA: ICD-10-CM

## 2025-02-25 RX ORDER — SIMVASTATIN 20 MG
TABLET ORAL
Qty: 90 TABLET | Refills: 2 | Status: SHIPPED | OUTPATIENT
Start: 2025-02-25

## 2025-03-03 DIAGNOSIS — F41.9 CHRONIC ANXIETY: ICD-10-CM

## 2025-03-03 RX ORDER — LORAZEPAM 1 MG/1
TABLET ORAL
Qty: 60 TABLET | Refills: 4 | Status: SHIPPED | OUTPATIENT
Start: 2025-03-03

## 2025-03-03 NOTE — TELEPHONE ENCOUNTER
"Caller: McManus, Claudette L    Relationship: Self    Best call back number: [phone] - PLEASE CALL PATIENT AND LET HER KNOW IF THIS CAN BE SENT.    Requested Prescriptions:   Requested Prescriptions     Pending Prescriptions Disp Refills    LORazepam (ATIVAN) 1 MG tablet 60 tablet 4     Sig: TAKE 1 TABLET BY MOUTH 2 TIMES A DAY FOR CHRONIC ANXIETY OR PANIC      Pharmacy where request should be sent: [facility] [phone] [facility] [id number] AT [facility] [address] [facility] [phone] [facility] [phone] FX     Last office visit with prescribing clinician: 2/19/2025   Last telemedicine visit with prescribing clinician: Visit date not found   Next office visit with prescribing clinician: 4/24/2025     Additional details provided by patient: PATIENT STATES THAT SHE CANNOT SLEEP WITHOUT THIS. PATIENT STATES THAT PHARMACY SAID SHE IS NOT DUE UNTIL APRIL. PATIENT STATES THAT SHE HAS NO MORE. PATIENT STATES THAT SHE DOESN'T TAKE MORE THAN SHE NEEDS. INSURANCE ONLY PAID FOR ONE BOTTLE AND NOT TWO. PATIENT PICKED THIS UP. PATIENT STATES THAT SHE THINK SHE PUT SOME IN HER MEDICATION BOX BUT WHEN SHE WENT TO REFILL THIS, SHE DOESN'T HAVE THIS. PATIENT HAS LOOKED EVERYWHERE AND CANNOT FIND THIS.     PATIENT STATES THAT SHE DOESN'T REMEMBER HOW TO GET INTO HER MYCHART. SHE STATES THAT SHE IS HAVING \"MIND PROBLEMS\".     Does the patient have less than a 3 day supply:  [x] Yes  [] No    Bhavesh Momin Rep   03/03/25 13:40 EST  "

## 2025-03-24 NOTE — PROGRESS NOTES
CC: new pt for memory     HPI:  Claudette L McManus is a  80 y.o.  R -handed female.  She has past medical history of breast cancer previously on anastrozole, remote history of DVT PE previously on Xarelto, polyneuropathy, insomnia, hypertension, arthritis with shoulder and knee replacement, hyperlipidemia, anxiety/depression, bladder stimulator.  I met her during a hospitalization in the spring 2023 she was hospitalized for ataxia and had MRI brain that was negative for acute finding showed no specific old strokes but some nonspecific periventricular white matter.  She has been referred from her PCP for memory problems.      She thinks memory problems started 2-3 yrs ago.  She tells she forgets words or how to spell words particularly in manuscript.  She has difficulty recalling appointment times and has gotten lost while driving.  No MVAs.  Sometimes she has difficulty operating her phone.  Once she wrote 2 utility checks on the same day for same bill, and different amts.  After this her  does all the bills.  she cares for her  Akira who she tells is disabled uses a cane all the time.  Her only daughter passed in 2018.  She endorses falls most recently a week ago when trying to hang a bird house.  She describes another fall when she was out doing yard work been down to operate the hose and fell when standing back up.  She went to the ER after this fall for head injury and bleeding of the scalp.    She takes trazodone, gabapentin, 2 mg of Ativan every night before bed.  She tells she has racing thoughts if she misses these medications and cannot sleep.  She is also on Cymbalta 60 mg daily.  She endorses painful paresthesias in leg cramps that keep her awake at night.  She has never had a dedicated sleep study.      Social history: retired in 2017, , dtr passed to suicide in 2018, drinks white wine nightly,     Family history: sister with strokes in 60s, no family history of dementia  or problems walking      Pain Scale:        ROS:  Review of Systems      Reviewed ROS conducted by MA and haily        Physical Exam:  There were no vitals filed for this visit.   Orthostatic BP:    There is no height or weight on file to calculate BMI.        General: pt well appearing, no distress  HEENT: Normocephalic, conjunctiva normal, external canals normal, no nasal discharge, moist mucous membranes  Neck: No lymphadenopathy, thyroid not enlarged, no JVD  CV: Regular rate and rhythm, no murmurs negative no bruits auscultated at neck, equal pulses  Pulmonary: Normal respiratory effort, clear to auscultation bilaterally  Extremities: no edema, bruising, or skin lesions  Pysch: good eye contact, cooperative, full affect, euthymic mood, good attention, good insight  Mental: alert, conversant, AOx3, provides history, MOCA 27, 3/5 delayed recall, poor cube, good clock draw.  Language fluent, names, repeats.  CN: CN II-XII intact, PERRL, EOMi, no gaze palsy or nystagmus, intact facial sensation, no facial assymetry, intact hearing, symmetric palate elevation, tongue midline, good SCM strength  Motor: no abnormal movements, no pronator drift, normal  bulk and tone, 5/5 strength b/l UE & LE  Sensory: glove and stocking distribution sensory loss b/l LE all modalities  Reflexes: 2+ throughout, neg babinski  Coordination: no ataxia on finger-nose, heel-shin  Gait: normal gait, no ataxia      Results:      Lab Results   Component Value Date    GLUCOSE 82 02/07/2025    BUN 12 02/07/2025    CREATININE 0.87 02/07/2025    EGFRIFNONA 63 02/01/2022    EGFRIFAFRI 101 04/22/2021    BCR 13.8 02/07/2025    CO2 27.1 02/07/2025    CALCIUM 10.1 02/07/2025    ALBUMIN 4.8 02/07/2025    AST 37 (H) 02/07/2025    ALT 32 02/07/2025       Lab Results   Component Value Date    WBC 6.56 02/07/2025    HGB 13.9 02/07/2025    HCT 41.5 02/07/2025    MCV 93.5 02/07/2025     02/07/2025         .  Lab Results   Component Value Date    RPR Non  Reactive 02/15/2024         Lab Results   Component Value Date    TSH 0.067 (L) 02/19/2025         Lab Results   Component Value Date    GREUPBSE85 403 04/25/2023         Lab Results   Component Value Date    FOLATE 9.57 04/25/2023         Lab Results   Component Value Date    HGBA1C 6.0 (H) 11/19/2024         Lab Results   Component Value Date    GLUCOSE 82 02/07/2025    BUN 12 02/07/2025    CREATININE 0.87 02/07/2025    EGFRIFNONA 63 02/01/2022    EGFRIFAFRI 101 04/22/2021    BCR 13.8 02/07/2025    K 4.7 02/07/2025    CO2 27.1 02/07/2025    CALCIUM 10.1 02/07/2025    ALBUMIN 4.8 02/07/2025    AST 37 (H) 02/07/2025    ALT 32 02/07/2025     TSH low, free T4 in range, B12 in range, RPR nonreactive    Diagnosis:  Memory impairment  History of breast cancer  Idiopathic polyneuropathy  Insomnia  Depression    Impression: 80-year-old right-handed female with past medical history of breast cancer previously on anastrozole, remote history of DVT PE no longer on Xarelto, hypertension hyperlipidemia, insomnia, anxiety/depression who is here today for memory evaluation.  MRI 2023 personally reviewed and there is  moderate small vessel ischemic disease hyperintensity seen, no ventriculomegaly or significant atrophy, microhemorrhages, or prior stroke seen.  She has she has evidence of length dependent neuropathy with some mild imbalance.  MoCA 27 out of 30.  Mostly suspect pseudodementia but vascular or AD type a possibility for which for which she may be candidate for targeted treatment     Plan  Sleep medicine referral  Increase nightly gabapentin, consider 600mg ER at bedtime with downward titration of lorazepam.  Will forward to her PCP who is her prescriber  Encouraged behavioral health for her depression.  If not effective or connected will refer for neuro psych  Discussed options for regular activities out of the house to include aerobic exercise  Needs to monitor BP and systolic less than 130    F/u in 2-3 mos    I  spent at least 60 minutes interviewing, examining, and counseling patient.  I independently reviewed documentation, laboratory and diagnostic findings, external documentation where applicable, and formulated treatment plan which was discussed with the patient.

## 2025-03-27 ENCOUNTER — OFFICE VISIT (OUTPATIENT)
Dept: NEUROLOGY | Facility: CLINIC | Age: 80
End: 2025-03-27
Payer: MEDICARE

## 2025-03-27 VITALS
HEIGHT: 64 IN | HEART RATE: 82 BPM | SYSTOLIC BLOOD PRESSURE: 122 MMHG | WEIGHT: 187 LBS | BODY MASS INDEX: 31.92 KG/M2 | DIASTOLIC BLOOD PRESSURE: 82 MMHG | OXYGEN SATURATION: 96 %

## 2025-03-27 DIAGNOSIS — R41.3 MEMORY LOSS: Primary | ICD-10-CM

## 2025-03-27 RX ORDER — TOBRAMYCIN AND DEXAMETHASONE 3; 1 MG/ML; MG/ML
SUSPENSION/ DROPS OPHTHALMIC
COMMUNITY
Start: 2025-03-04

## 2025-03-27 RX ORDER — ERYTHROMYCIN 5 MG/G
OINTMENT OPHTHALMIC
COMMUNITY
Start: 2025-03-04

## 2025-03-27 RX ORDER — VIBEGRON 75 MG/1
75 TABLET, FILM COATED ORAL DAILY
COMMUNITY
Start: 2025-03-12 | End: 2026-03-12

## 2025-03-27 NOTE — LETTER
March 27, 2025     Lito Moore MD  [address] [facility]  [address]  [place] [id number]    Patient: Claudette L McManus   YOB: 1945   Date of Visit: 3/27/2025     Dear Lito Moore MD:       Thank you for referring Claudette McManus to me for evaluation. Below are the relevant portions of my assessment and plan of care.    If you have questions, please do not hesitate to call me. I look forward to following Claudette along with you.         Sincerely,        NIEVES Martins        CC: No Recipients    Kacey Bourgeois PA  03/27/25 1057  Sign when Signing Visit  CC: new pt for memory     HPI:  Claudette L McManus is a  80 y.o.  R -handed female.  She has past medical history of breast cancer previously on anastrozole, remote history of DVT PE previously on Xarelto, polyneuropathy, insomnia, hypertension, arthritis with shoulder and knee replacement, hyperlipidemia, anxiety/depression, bladder stimulator.  I met her during a hospitalization in the spring 2023 she was hospitalized for ataxia and had MRI brain that was negative for acute finding showed no specific old strokes but some nonspecific periventricular white matter.  She has been referred from her PCP for memory problems.      She thinks memory problems started 2-3 yrs ago.  She tells she forgets words or how to spell words particularly in manuscript.  She has difficulty recalling appointment times and has gotten lost while driving.  No MVAs.  Sometimes she has difficulty operating her phone.  Once she wrote 2 utility checks on the same day for same bill, and different amts.  After this her  does all the bills.  she cares for her  Akira who she tells is disabled uses a cane all the time.  Her only daughter passed in 2018.  She endorses falls most recently a week ago when trying to hang a bird house.  She describes another fall when she was out doing yard work been down to operate the hose and fell when standing back up.   She went to the ER after this fall for head injury and bleeding of the scalp.    She takes trazodone, gabapentin, 2 mg of Ativan every night before bed.  She tells she has racing thoughts if she misses these medications and cannot sleep.  She is also on Cymbalta 60 mg daily.  She endorses painful paresthesias in leg cramps that keep her awake at night.  She has never had a dedicated sleep study.      Social history: retired in 2017, , dtr passed to suicide in 2018, drinks white wine nightly,     Family history: sister with strokes in 60s, no family history of dementia or problems walking      Pain Scale:        ROS:  Review of Systems      Reviewed ROS conducted by MA and haily        Physical Exam:  There were no vitals filed for this visit.   Orthostatic BP:    There is no height or weight on file to calculate BMI.        General: pt well appearing, no distress  HEENT: Normocephalic, conjunctiva normal, external canals normal, no nasal discharge, moist mucous membranes  Neck: No lymphadenopathy, thyroid not enlarged, no JVD  CV: Regular rate and rhythm, no murmurs negative no bruits auscultated at neck, equal pulses  Pulmonary: Normal respiratory effort, clear to auscultation bilaterally  Extremities: no edema, bruising, or skin lesions  Pysch: good eye contact, cooperative, full affect, euthymic mood, good attention, good insight  Mental: alert, conversant, AOx3, provides history, MOCA 27, 3/5 delayed recall, poor cube, good clock draw.  Language fluent, names, repeats.  CN: CN II-XII intact, PERRL, EOMi, no gaze palsy or nystagmus, intact facial sensation, no facial assymetry, intact hearing, symmetric palate elevation, tongue midline, good SCM strength  Motor: no abnormal movements, no pronator drift, normal  bulk and tone, 5/5 strength b/l UE & LE  Sensory: glove and stocking distribution sensory loss b/l LE all modalities  Reflexes: 2+ throughout, neg babinski  Coordination: no ataxia on  finger-nose, heel-shin  Gait: normal gait, no ataxia      Results:      Lab Results   Component Value Date    GLUCOSE 82 02/07/2025    BUN 12 02/07/2025    CREATININE 0.87 02/07/2025    EGFRIFNONA 63 02/01/2022    EGFRIFAFRI 101 04/22/2021    BCR 13.8 02/07/2025    CO2 27.1 02/07/2025    CALCIUM 10.1 02/07/2025    ALBUMIN 4.8 02/07/2025    AST 37 (H) 02/07/2025    ALT 32 02/07/2025       Lab Results   Component Value Date    WBC 6.56 02/07/2025    HGB 13.9 02/07/2025    HCT 41.5 02/07/2025    MCV 93.5 02/07/2025     02/07/2025         .  Lab Results   Component Value Date    RPR Non Reactive 02/15/2024         Lab Results   Component Value Date    TSH 0.067 (L) 02/19/2025         Lab Results   Component Value Date    [id number] 403 04/25/2023         Lab Results   Component Value Date    FOLATE 9.57 04/25/2023         Lab Results   Component Value Date    HGBA1C 6.0 (H) 11/19/2024         Lab Results   Component Value Date    GLUCOSE 82 02/07/2025    BUN 12 02/07/2025    CREATININE 0.87 02/07/2025    EGFRIFNONA 63 02/01/2022    EGFRIFAFRI 101 04/22/2021    BCR 13.8 02/07/2025    K 4.7 02/07/2025    CO2 27.1 02/07/2025    CALCIUM 10.1 02/07/2025    ALBUMIN 4.8 02/07/2025    AST 37 (H) 02/07/2025    ALT 32 02/07/2025     TSH low, free T4 in range, B12 in range, RPR nonreactive    Diagnosis:  Memory impairment  History of breast cancer  Idiopathic polyneuropathy  Insomnia  Depression    Impression: 80-year-old right-handed female with past medical history of breast cancer previously on anastrozole, remote history of DVT PE no longer on Xarelto, hypertension hyperlipidemia, insomnia, anxiety/depression who is here today for memory evaluation.  MRI 2023 personally reviewed and there is  moderate small vessel ischemic disease hyperintensity seen, no ventriculomegaly or significant atrophy, microhemorrhages, or prior stroke seen.  Mostly suspect pseudodementia but vascular or AD type a possibility for which for  which she may be candidate for targeted treatment     Plan  Sleep medicine referral  Increase nightly gabapentin, consider 600mg ER at bedtime with downward titration of lorazepam.  Will forward to her PCP who is her prescriber  Encouraged behavioral health for her depression.  If not effective or connected will refer for neuro psych  Discussed options for regular activities out of the house to include aerobic exercise  Needs to monitor BP and systolic less than 130    F/u in 2-3 mos    I spent at least 60 minutes interviewing, examining, and counseling patient.  I independently reviewed documentation, laboratory and diagnostic findings, external documentation where applicable, and formulated treatment plan which was discussed with the patient.

## 2025-03-27 NOTE — PATIENT INSTRUCTIONS
Cut your lorazepam to 1.5 tablets at night.  You will hear about your sleep referral.  Re connect with your grief counselor and try a [employer] Class     [address] , [place] [address]  [email or web address]    [facility] neuro [facility] [facility] classes are held in-person in [place] twice a week on Tuesdays and Thursdays from 10:30 a.m. to noon at the [facility] on [facility].    [facility]  Director: Esvin Hinkle. [facility] [id number]. Ph: [phone]

## 2025-03-27 NOTE — LETTER
March 27, 2025     Lito Moore MD  [address] [facility] [address]  [place] [id number]    Patient: Claudette L McManus   YOB: 1945   Date of Visit: 3/27/2025       Dear Lito Moore MD:    Thank you for referring Claudette McManus to me for evaluation. Below are the relevant portions of my assessment and plan of care.    Encounter Diagnosis and Orders:  Diagnoses and all orders for this visit:    1. Memory loss (Primary)  -     Ambulatory Referral to Sleep Medicine        If you have questions, please do not hesitate to call me. I look forward to following Claudette along with you.         Sincerely,        NIEVES Martins        CC: No Recipients

## 2025-04-05 DIAGNOSIS — F51.04 CHRONIC INSOMNIA: Chronic | ICD-10-CM

## 2025-04-05 DIAGNOSIS — E11.42 DIABETIC PERIPHERAL NEUROPATHY: Chronic | ICD-10-CM

## 2025-04-07 DIAGNOSIS — E11.40 TYPE 2 DIABETES MELLITUS WITH DIABETIC NEUROPATHY, WITHOUT LONG-TERM CURRENT USE OF INSULIN: Chronic | ICD-10-CM

## 2025-04-07 RX ORDER — TRAZODONE HYDROCHLORIDE 150 MG/1
150 TABLET ORAL NIGHTLY
Qty: 90 TABLET | Refills: 1 | Status: SHIPPED | OUTPATIENT
Start: 2025-04-07

## 2025-04-07 RX ORDER — TIRZEPATIDE 15 MG/.5ML
INJECTION, SOLUTION SUBCUTANEOUS
Qty: 2 ML | Refills: 1 | Status: SHIPPED | OUTPATIENT
Start: 2025-04-07

## 2025-04-29 DIAGNOSIS — E11.42 DIABETIC PERIPHERAL NEUROPATHY: Chronic | ICD-10-CM

## 2025-04-29 RX ORDER — GABAPENTIN 300 MG/1
CAPSULE ORAL
Qty: 90 CAPSULE | Refills: 1 | Status: SHIPPED | OUTPATIENT
Start: 2025-04-29

## 2025-05-08 ENCOUNTER — OFFICE VISIT (OUTPATIENT)
Dept: INTERNAL MEDICINE | Facility: CLINIC | Age: 80
End: 2025-05-08
Payer: MEDICARE

## 2025-05-08 VITALS
BODY MASS INDEX: 30.05 KG/M2 | SYSTOLIC BLOOD PRESSURE: 138 MMHG | HEIGHT: 64 IN | HEART RATE: 84 BPM | RESPIRATION RATE: 16 BRPM | OXYGEN SATURATION: 98 % | WEIGHT: 176 LBS | TEMPERATURE: 99.3 F | DIASTOLIC BLOOD PRESSURE: 84 MMHG

## 2025-05-08 DIAGNOSIS — E78.2 MIXED HYPERLIPIDEMIA: Chronic | ICD-10-CM

## 2025-05-08 DIAGNOSIS — R53.82 CHRONIC FATIGUE: ICD-10-CM

## 2025-05-08 DIAGNOSIS — E66.9 NON MORBID OBESITY: Chronic | ICD-10-CM

## 2025-05-08 DIAGNOSIS — I10 BENIGN ESSENTIAL HYPERTENSION: Chronic | ICD-10-CM

## 2025-05-08 DIAGNOSIS — Z86.16 HISTORY OF COVID-19: Chronic | ICD-10-CM

## 2025-05-08 DIAGNOSIS — C50.912 DUCTAL CARCINOMA OF LEFT BREAST: Chronic | ICD-10-CM

## 2025-05-08 DIAGNOSIS — I26.99 MULTIPLE PULMONARY EMBOLI: Chronic | ICD-10-CM

## 2025-05-08 DIAGNOSIS — K59.09 CHRONIC CONSTIPATION: Chronic | ICD-10-CM

## 2025-05-08 DIAGNOSIS — E87.1 HYPONATREMIA: ICD-10-CM

## 2025-05-08 DIAGNOSIS — E11.40 TYPE 2 DIABETES MELLITUS WITH DIABETIC NEUROPATHY, WITHOUT LONG-TERM CURRENT USE OF INSULIN: Primary | Chronic | ICD-10-CM

## 2025-05-08 DIAGNOSIS — R41.81 AGE-RELATED COGNITIVE DECLINE: Chronic | ICD-10-CM

## 2025-05-08 DIAGNOSIS — G89.29 CHRONIC BILATERAL THORACIC BACK PAIN: Chronic | ICD-10-CM

## 2025-05-08 DIAGNOSIS — M54.6 CHRONIC BILATERAL THORACIC BACK PAIN: Chronic | ICD-10-CM

## 2025-05-08 DIAGNOSIS — F41.9 CHRONIC ANXIETY: Chronic | ICD-10-CM

## 2025-05-08 DIAGNOSIS — R41.3 MEMORY LOSS: Chronic | ICD-10-CM

## 2025-05-08 DIAGNOSIS — D64.9 CHRONIC ANEMIA: ICD-10-CM

## 2025-05-08 DIAGNOSIS — F51.04 CHRONIC INSOMNIA: Chronic | ICD-10-CM

## 2025-05-08 DIAGNOSIS — M25.50 ARTHRALGIA OF MULTIPLE JOINTS: Chronic | ICD-10-CM

## 2025-05-08 DIAGNOSIS — E03.9 PRIMARY HYPOTHYROIDISM: Chronic | ICD-10-CM

## 2025-05-08 DIAGNOSIS — I51.7 RIGHT VENTRICULAR ENLARGEMENT: Chronic | ICD-10-CM

## 2025-05-08 DIAGNOSIS — R91.1 LEFT UPPER LOBE PULMONARY NODULE: Chronic | ICD-10-CM

## 2025-05-08 DIAGNOSIS — Z78.0 POSTMENOPAUSAL STATE: Chronic | ICD-10-CM

## 2025-05-08 DIAGNOSIS — F41.8 DEPRESSION WITH ANXIETY: Chronic | ICD-10-CM

## 2025-05-08 DIAGNOSIS — M54.50 CHRONIC BILATERAL LOW BACK PAIN WITHOUT SCIATICA: Chronic | ICD-10-CM

## 2025-05-08 DIAGNOSIS — E55.9 VITAMIN D DEFICIENCY: Chronic | ICD-10-CM

## 2025-05-08 DIAGNOSIS — Z51.81 THERAPEUTIC DRUG MONITORING: ICD-10-CM

## 2025-05-08 DIAGNOSIS — E11.42 DIABETIC PERIPHERAL NEUROPATHY: Chronic | ICD-10-CM

## 2025-05-08 DIAGNOSIS — G89.29 CHRONIC BILATERAL LOW BACK PAIN WITHOUT SCIATICA: Chronic | ICD-10-CM

## 2025-05-08 RX ORDER — EXEMESTANE 25 MG/1
25 TABLET ORAL DAILY
Start: 2025-05-08

## 2025-05-08 RX ORDER — CELECOXIB 200 MG/1
CAPSULE ORAL
Qty: 30 CAPSULE | Refills: 6 | Status: SHIPPED | OUTPATIENT
Start: 2025-05-08

## 2025-05-08 NOTE — PROGRESS NOTES
05/08/2025    Patient Information  Claudette L McManus                                                                                          [id number]  [place] [address]      1945  [unfilled]  There is no work phone number on file.    Chief Complaint:     Follow-up chronic medical problems.  Recent blood work.    History of Present Illness:    Patient with multiple chronic medical problems as noted below in assessment and plan presents today for follow-up with lab prior in order to monitor his chronic medical issues.  She has no new acute complaints but continues to have her usual chronic complaints.  Her past medical history reviewed and updated were necessary including health maintenance parameters.  This reveals she is up-to-date or else accounted for after today's visit.    Review of Systems   Constitutional: Negative.   HENT: Negative.     Eyes: Negative.    Cardiovascular: Negative.    Respiratory: Negative.     Endocrine: Negative.    Hematologic/Lymphatic: Negative.    Skin: Negative.    Musculoskeletal:  Positive for arthritis, back pain, joint pain, neck pain and stiffness.   Gastrointestinal: Negative.    Genitourinary: Negative.    Neurological: Negative.    Psychiatric/Behavioral:  Positive for depression and memory loss. The patient has insomnia.    Allergic/Immunologic: Negative.        Active Problems:    Patient Active Problem List   Diagnosis    Primary osteoarthritis involving multiple joints    Benign essential hypertension    Hyperlipidemia    Primary hypothyroidism    Type 2 diabetes mellitus with diabetic neuropathy, without long-term current use of insulin    Chronic insomnia    Chronic bilateral low back pain without sciatica    Chronic bilateral thoracic back pain    Vitamin D deficiency    Therapeutic drug monitoring    Postmenopausal state    Diabetic peripheral neuropathy    Depression with anxiety    ACE-inhibitor cough    Hyponatremia    History of  multiple pulmonary emboli    Non morbid obesity    Ductal carcinoma of left breast    Right ventricular enlargement    Chronic anxiety    History of COVID-19    Left upper lobe pulmonary nodule    Arthralgia of multiple joints    Memory loss    Age-related cognitive decline    Chronic constipation    Hypersomnolence disorder    Chronic fatigue         Past Medical History:   Diagnosis Date    Age-related cognitive decline 03/18/2024    Arthralgia of multiple joints 02/15/2024    February 15, 2024--patient reports that over the past 6 months she has been having much more pain in her hands and fingers bilaterally.  She is also noted some swelling without redness but they are tender.  We will obtain rheumatologic profile today and then follow-up on phone for the results and possible further instructions.  Need to rule out inflammatory arthritis.      Benign essential hypertension     Chronic anxiety 10/26/2022    Chronic bilateral low back pain without sciatica 08/16/2013 March 13, 2019--Limited bone scan.  This reveals scintigraphic activity in the spine and at the right shoulder corresponds to change seen on recent x-rays and is best explained by degenerative change.  Isolated metastatic disease exactly corresponding to the sites of extensive degenerative disc change would be peculiar.  March 7, 2019--new patient to get established.  She has complaints of approxi    Chronic bilateral thoracic back pain 02/26/2019 March 13, 2019--Limited bone scan.  This reveals scintigraphic activity in the spine and at the right shoulder corresponds to change seen on recent x-rays and is best explained by degenerative change.  Isolated metastatic disease exactly corresponding to the sites of extensive degenerative disc change would be peculiar.  March 1, 2019--x-ray of the lumbar and thoracic spine reveals mild anterior l    Chronic insomnia 11/04/2014    Depression with anxiety 08/21/2019 August 21, 2019--patient seen in  follow-up after I called in a prescription for Ativan after the patient's  contacted me a few weeks ago regarding the sudden death of patient's daughter.  This was an apparent suicide and the patient found her daughter dead.  I will not go into details.  Patient reports the Lorazepam has been helpful but she is still quite distraught, nervous, and undergoing    Diabetic peripheral neuropathy 03/07/2019    Characterized by decreased sensation and paresthesias in both lower extremities described as a burning sensation.  Extends up to the knees.  Reportedly had been evaluated with nerve conduction study in the past.    Ductal carcinoma of left breast 08/26/2022    Generalized anxiety disorder 05/19/2013    History of Bell's palsy 05/21/2013    Remote history of Bell's palsy.  Patient does not remember which side of the face.    History of COVID-19 12/08/2022    History of multiple pulmonary emboli 05/19/2022    Stacie 15, 2022--patient seen in follow-up by Dr. Moore.  She is complaining of continued dyspnea on exertion which may be worse.  I reviewed the cardiology consultation from June 7, 2022 and also reviewed the echocardiogram from that same date.  Noted below.  Her  is now present and he reports her dyspnea on exertion is definitely worse.  Her oxygen saturation at rest is 96%.  Upon ambulatio    History of pneumonia 07/19/2023 July 19, 2023--it appears the patient may have an infectious process versus an inflammatory process in both of her lungs.  She was placed on a Z-Sreedhar by the nurse practitioner which I think is certainly reasonable.  I do think she would benefit from a round of prednisone.  There is possibly a new pulmonary nodule that we will need monitoring.  I think these are changes left over from COVID which sh    Hyperlipidemia     Left upper lobe pulmonary nodule 07/19/2023 January 2, 2024--CT ANGIOGRAM CHEST PULMONARY EMBOLISM     TECHNIQUE: Radiation dose reduction techniques were  utilized, including automated exposure control and exposure modulation based on body size. 2 mm images were obtained through the chest after the administration of IV contrast.     HISTORY: Shortness of breath and chest pain.     COMPARISON: CT chest angiogram 5/19/2022. CT chest 12/11/202    Memory loss 03/18/2024             Patient: MCMANUS, CLAUDETTE  Time Out: 23:06  Exam(s): MRI HEAD W WO Contrast      EXAM:    MR Head Without and With Intravenous Contrast     CLINICAL HISTORY:     Reason for exam: Neuro deficit, acute, stroke suspected. Dizziness, hx breast cancer.     TECHNIQUE:    Magnetic resonance images of the head brain without and with  intravenous contrast in multiple planes.     COMPARISON:    C    Menopausal state, 8/19/2020--normal DEXA. 03/07/2019 August 19, 2020--DEXA scan reveals lumbar spine T score 3.8.  Left femoral neck T score 2.5.  Right femoral neck T score 2.2.  Normal bone density.    Non morbid obesity 08/11/2022    Primary hypothyroidism 05/19/2013    Primary osteoarthritis involving multiple joints 04/22/2013    Rotator cuff arthropathy of right shoulder, 4/20/2021--status post right reverse total shoulder arthroplasty 05/27/2020    Situational mixed anxiety and depressive disorder 08/21/2019 August 21, 2019--patient seen in follow-up after I called in a prescription for Ativan after the patient's  contacted me a few weeks ago regarding the sudden death of patient's daughter.  This was an apparent suicide and the patient found her daughter dead.  I will not go into details.  Patient reports the Lorazepam has been helpful but she is still quite distraught, nervous, and undergoing    Type 2 diabetes mellitus with diabetic neuropathy, without long-term current use of insulin     Vitamin D deficiency 02/26/2019         Past Surgical History:   Procedure Laterality Date    BILATERAL BREAST REDUCTION  Early 2000    Early 2000--bilateral breast reduction    CARDIAC  CATHETERIZATION N/A 9/26/2022    Procedure: Right Heart Cath;  Surgeon: Agus Solorio MD PhD;  Location:  [facility];  Service: Cardiology;  Laterality: N/A;  Will REMAIN on Xarelto for the case    CHOLECYSTECTOMY  1960s    1960s--open cholecystectomy    COLONOSCOPY  2012 2012--reportedly normal colonoscopy    INCONTINENCE SURGERY  2012 Approximately 2012--implantation of questionable bladder stimulator right sacral area for urinary incontinence.  Details not known.    REPLACEMENT TOTAL KNEE BILATERAL Left 2010; 2011 2010-2011--bilateral total knee replacement    SUBTOTAL HYSTERECTOMY  Remote    Remote partial hysterectomy.  Ovaries intact.    TOTAL SHOULDER REPLACEMENT Left 2013 2013--left total shoulder replacement.    TOTAL SHOULDER REPLACEMENT  April 28, 2021 4/20/2021--status post right reverse total shoulder arthroplasty    TOTAL SHOULDER REVERSE ARTHROPLASTY Right 04/20/2021 4/20/2021--right reverse total shoulder replacement.         Allergies   Allergen Reactions    Morphine And Codeine     Other Other (See Comments)     REJECTED DACRON SUTURES IN THE PAST AND INCISION CAME APART           Current Outpatient Medications:     Cholecalciferol (VITAMIN D3) 2000 UNITS tablet, Take 1 tablet by mouth Daily., Disp: , Rfl:     DULoxetine (CYMBALTA) 60 MG capsule, TAKE 1 CAPSULE EVERY DAY AS DIRECTED, Disp: 90 capsule, Rfl: 3    erythromycin (ROMYCIN) 5 MG/GM ophthalmic ointment, apply (1CM)  by ophthalmic route  every evening ribbon along lash line after warm compress mask every night., Disp: , Rfl:     exemestane (AROMASIN) 25 MG tablet, Take 1 tablet by mouth Daily., Disp: , Rfl:     ezetimibe (ZETIA) 10 MG tablet, TAKE 1 TABLET EVERY DAY, Disp: 90 tablet, Rfl: 3    gabapentin (NEURONTIN) 300 MG capsule, TAKE 1 CAPSULE BY MOUTH 3 TIMES A DAY FOR PERIPHERAL NEUROPATHY AS DIRECTED, Disp: 90 capsule, Rfl: 1    levothyroxine (Synthroid) 88 MCG tablet, Take 1 tablet (88  "mcg) every day for low thyroid, Disp: 90 tablet, Rfl: 1    LORazepam (ATIVAN) 1 MG tablet, TAKE 1 TABLET BY MOUTH 2 TIMES A DAY FOR CHRONIC ANXIETY OR PANIC, Disp: 60 tablet, Rfl: 4    Mounjaro 15 MG/0.5ML solution auto-injector, INJECT 15 MG UNDER THE SKIN ONCE WEEKLY, Disp: 2 mL, Rfl: 1    multivitamin (MULTI VITAMIN PO), Take  by mouth Daily., Disp: , Rfl:     polyethylene glycol (MIRALAX) 17 g packet, Take 17 g by mouth Daily., Disp: , Rfl:     simvastatin (ZOCOR) 20 MG tablet, TAKE 1 TABLET BY MOUTH DAILY FOR HIGH CHOLESTEROL, Disp: 90 tablet, Rfl: 2    traZODone (DESYREL) 150 MG tablet, TAKE ONE TABLET BY MOUTH ONCE NIGHTLY, Disp: 90 tablet, Rfl: 1    valsartan (DIOVAN) 320 MG tablet, Take 1 tablet by mouth Every Morning., Disp: 90 tablet, Rfl: 10    celecoxib (CeleBREX) 200 MG capsule, Take 1 capsule (200 mg) once daily for arthritis, Disp: 30 capsule, Rfl: 6      Family History   Problem Relation Age of Onset    Alcohol abuse Father     Breast cancer Daughter         40s    Cancer Other     Diabetes Other         type II    Leukemia Grandchild 19         Social History     Socioeconomic History    Marital status:    Tobacco Use    Smoking status: Former     Current packs/day: 0.00     Types: Cigarettes     Quit date: 1998     Years since quittin.3    Smokeless tobacco: Never   Vaping Use    Vaping status: Never Used   Substance and Sexual Activity    Alcohol use: Yes     Alcohol/week: 1.0 standard drink of alcohol     Types: 1 Glasses of wine per week     Comment: current some day    Drug use: No    Sexual activity: Defer     Partners: Male         Vitals:    25 1239   BP: 138/84   Pulse: 84   Resp: 16   Temp: 99.3 °F (37.4 °C)   TempSrc: Oral   SpO2: 98%   Weight: 79.8 kg (176 lb)   Height: 162.6 cm (64.02\")        Body mass index is 30.2 kg/m².      Physical Exam:    General: Alert and oriented x 3.  No acute distress.  Just slightly obese.  Normal affect.  HEENT: Pupils equal, round, " reactive to light; extraocular movements intact; sclerae nonicteric; pharynx, ear canals and TMs normal.  Neck: Without JVD, thyromegaly, bruit, or adenopathy.  Lungs: Clear to auscultation in all fields.  Heart: Regular rate and rhythm without murmur, rub, gallop, or click.  Abdomen: Soft, nontender, without hepatosplenomegaly or hernia.  Bowel sounds normal.  : Deferred.  Rectal: Deferred.  Extremities: Without clubbing, cyanosis, edema, or pulse deficit.  Neurologic: Intact without focal deficit.  Normal station and gait observed during ingress and egress from the examination room.  Skin: Without significant lesion.  Musculoskeletal: Unremarkable.    Lab/other results:    CBC normal.  SARS antibodies are positive.  CK normal at 84.  CMP normal except sodium low 130, estimated GFR 56.  Hemoglobin A1c 5.7.  Total cholesterol 154, triglycerides 104, LDL particle #558, HDL particle #40.8.  TSH is slightly suppressed at 0.262.  Free T3 and free T4 are normal.  Vitamin D normal at 62.2.  Urinalysis normal.  Microalbumin/creatinine ratio normal at 13.  Methylmalonic acid normal at 238, homocystine normal at 15.1.    Assessment/Plan:     Diagnosis Plan   1. Type 2 diabetes mellitus with diabetic neuropathy, without long-term current use of insulin  Comprehensive Metabolic Panel    Hemoglobin A1c    Microalbumin / Creatinine Urine Ratio - Urine, Clean Catch    Urinalysis With Microscopic If Indicated (No Culture) - Urine, Clean Catch      2. Diabetic peripheral neuropathy        3. Hyperlipidemia  CK    Comprehensive Metabolic Panel    NMR LipoProfile    Homocysteine      4. Primary hypothyroidism  TSH    T4, Free    T3, Free      5. Benign essential hypertension  Comprehensive Metabolic Panel      6. Vitamin D deficiency  Vitamin D,25-Hydroxy      7. Depression with anxiety        8. Chronic insomnia        9. History of multiple pulmonary emboli        10. Right ventricular enlargement        11. History of COVID-19   SARS-CoV-2 Antibodies, Nucleocapsid (Natural Immunity)      12. Left upper lobe pulmonary nodule        13. Arthralgia of multiple joints  celecoxib (CeleBREX) 200 MG capsule      14. Memory loss        15. Age-related cognitive decline        16. Chronic constipation        17. Ductal carcinoma of left breast  exemestane (AROMASIN) 25 MG tablet      18. Non morbid obesity        19. Postmenopausal state        20. Chronic fatigue        21. Therapeutic drug monitoring  CBC (No Diff)      22. Hyponatremia        23. Chronic bilateral low back pain without sciatica        24. Chronic bilateral thoracic back pain        25. Chronic anxiety        26. Chronic anemia  Methylmalonic Acid, Serum    Homocysteine        Patient has type 2 diabetes is under excellent control.  She has diabetic peripheral neuropathy and gabapentin seems to help somewhat.  Hyperlipidemia is under excellent control.  Her thyroid is therapeutic on the current dose of levothyroxine.  I am not concerned about the slightly suppressed TSH.  Blood pressure control.  Vitamin D in normal range which is important given her postmenopausal state.  She has depression and anxiety and generalized anxiety and takes lorazepam.  Patient is not taking Aromasin anymore and that may have helped her depressive symptoms.  She is not on Vraylar either.  Chronic insomnia is intermittent.  Lorazepam helps.  She has a history of multiple pulmonary emboli and is not on chronic anticoagulation.  She has a history of COVID-19 and has COVID antibodies.  Left upper lobe pulmonary nodule needs to be reassessed with CT scan.  She has age-related cognitive decline that is been evaluated by neurology.  Currently not being treated.  She has Non morbid obesity have strongly encouraged low carbohydrate diet and attainment of ideal body weight.  Patient has left breast cancer and is followed by oncology and the breast surgeon.  She has chronic fatigue that is multifactorial and I  doubt there is a whole lot we can do about this in the long run.    Plan is as follows: Discontinue meloxicam and start Celebrex 200 mg/day for arthritis.  No other changes in medical regimen.  Patient will follow-up after November 19, 2025 for her Medicare wellness visit.        Procedures

## 2025-05-12 RX ORDER — LEVOTHYROXINE SODIUM 125 UG/1
125 TABLET ORAL DAILY
Qty: 90 TABLET | Refills: 0 | OUTPATIENT
Start: 2025-05-12

## 2025-05-14 ENCOUNTER — HOSPITAL ENCOUNTER (OUTPATIENT)
Dept: MAMMOGRAPHY | Facility: HOSPITAL | Age: 80
Discharge: HOME | End: 2025-05-14
Payer: MEDICARE

## 2025-05-14 ENCOUNTER — HOSPITAL ENCOUNTER (OUTPATIENT)
Dept: ULTRASOUND IMAGING | Facility: HOSPITAL | Age: 80
Discharge: HOME | End: 2025-05-14
Payer: MEDICARE

## 2025-05-14 DIAGNOSIS — N63.10 MASS OF RIGHT BREAST, UNSPECIFIED QUADRANT: ICD-10-CM

## 2025-05-14 DIAGNOSIS — C50.912 DUCTAL CARCINOMA OF LEFT BREAST: ICD-10-CM

## 2025-05-14 DIAGNOSIS — Z79.811 AROMATASE INHIBITOR USE: ICD-10-CM

## 2025-05-14 DIAGNOSIS — C50.112 MALIGNANT NEOPLASM OF CENTRAL PORTION OF LEFT FEMALE BREAST, UNSPECIFIED ESTROGEN RECEPTOR STATUS: ICD-10-CM

## 2025-05-14 DIAGNOSIS — R92.8 OTHER ABNORMAL AND INCONCLUSIVE FINDINGS ON DIAGNOSTIC IMAGING OF BREAST: ICD-10-CM

## 2025-05-14 PROCEDURE — G0279 TOMOSYNTHESIS, MAMMO: HCPCS

## 2025-05-14 PROCEDURE — 77066 DX MAMMO INCL CAD BI: CPT

## 2025-05-14 PROCEDURE — 76642 ULTRASOUND BREAST LIMITED: CPT

## 2025-05-15 ENCOUNTER — TELEPHONE (OUTPATIENT)
Dept: ONCOLOGY | Facility: CLINIC | Age: 80
End: 2025-05-15
Payer: MEDICARE

## 2025-05-15 DIAGNOSIS — R92.1 CALCIFICATION OF LEFT BREAST: Primary | ICD-10-CM

## 2025-05-15 NOTE — TELEPHONE ENCOUNTER
Contacted Ms Gomez by phone to review with her mammogram recommendation of a stereotactic biopsy.  She states Dr Donato did let her know this at the time of imaging.  Advised her we will order the biopsy and I will notify scheduling.  She is currently scheduled for follow up with Dr Arzate in June and will move that appointment to a few days after the biopsy.  She voiced understanding.

## 2025-05-20 NOTE — PROGRESS NOTES
05/30/25 0001   Pre-Procedure Phone Call   Procedure Time Verified Yes   Arrival Time 1130   Procedure Location Verified Yes   Medical History Reviewed No   NPO Status Reinforced No     Left voicemail message for patient. Informed her to arrive one hour prior to biopsy, can eat and drink and drive self to and from, advised re type of clothing and timeframe for results to come back.  Mamm nurse provided our contact number should she need to reach us.

## 2025-05-22 DIAGNOSIS — F33.41 RECURRENT MAJOR DEPRESSIVE DISORDER, IN PARTIAL REMISSION: Chronic | ICD-10-CM

## 2025-05-22 RX ORDER — DULOXETIN HYDROCHLORIDE 60 MG/1
60 CAPSULE, DELAYED RELEASE ORAL DAILY
Qty: 90 CAPSULE | Refills: 3 | Status: SHIPPED | OUTPATIENT
Start: 2025-05-22

## 2025-05-28 DIAGNOSIS — E11.40 TYPE 2 DIABETES MELLITUS WITH DIABETIC NEUROPATHY, WITHOUT LONG-TERM CURRENT USE OF INSULIN: Chronic | ICD-10-CM

## 2025-05-29 ENCOUNTER — TELEPHONE (OUTPATIENT)
Dept: INTERNAL MEDICINE | Facility: CLINIC | Age: 80
End: 2025-05-29

## 2025-05-29 RX ORDER — TIRZEPATIDE 15 MG/.5ML
INJECTION, SOLUTION SUBCUTANEOUS
Qty: 2 ML | Refills: 1 | Status: SHIPPED | OUTPATIENT
Start: 2025-05-29

## 2025-05-29 NOTE — TELEPHONE ENCOUNTER
Caller: McManus, Claudette L    Relationship to Patient: Self    Phone Number: [phone]     Reason for Call: PATIENT CALLING WANTING TO KNOW WHO  WOULD RECOMMEND FOR ORTHOPEDIC SHE STATES THAT SHE HAS ARTHRITIS IN HER HIPS, AND THE  PERIFERAL NEUROPATHY IN HER FEET HAS GOTTEN WORSE.

## 2025-05-30 ENCOUNTER — HOSPITAL ENCOUNTER (OUTPATIENT)
Dept: MAMMOGRAPHY | Facility: HOSPITAL | Age: 80
Discharge: HOME | End: 2025-05-30
Payer: MEDICARE

## 2025-06-03 NOTE — PROGRESS NOTES
06/13/25 0001   Pre-Procedure Phone Call   Procedure Time Verified Yes   Arrival Time 1130   Procedure Location Verified Yes   Medical History Reviewed No   NPO Status Reinforced No   Ride and Caregiver Arranged N/A     Left voicemail message for patient. Informed her to arrive one hour prior to biopsy, can eat and drink and drive self to and from, advised re type of clothing and timeframe for results to come back.  Mamm nurse provided our contact number should she need to reach us.

## 2025-06-05 ENCOUNTER — TELEPHONE (OUTPATIENT)
Dept: ONCOLOGY | Facility: CLINIC | Age: 80
End: 2025-06-05
Payer: MEDICARE

## 2025-06-06 DIAGNOSIS — E78.2 MIXED HYPERLIPIDEMIA: ICD-10-CM

## 2025-06-06 RX ORDER — EZETIMIBE 10 MG/1
10 TABLET ORAL DAILY
Qty: 90 TABLET | Refills: 3 | Status: SHIPPED | OUTPATIENT
Start: 2025-06-06

## 2025-06-09 ENCOUNTER — TELEPHONE (OUTPATIENT)
Dept: ONCOLOGY | Facility: CLINIC | Age: 80
End: 2025-06-09

## 2025-06-09 NOTE — TELEPHONE ENCOUNTER
Returned patients call and lM that we cancelled her appt for 6/9 on 6/5. Patients is scheduled to see MD on 6/19.

## 2025-06-09 NOTE — TELEPHONE ENCOUNTER
Received: 06/07/2025 08:40 AM  This message is for Dr. Salgado. Over the weekend I received a text message that my appointment had been changed to I think 8:15 this morning or Monday morning, I'm sorry, and that's impossible for me to be at your office at that time. It said if I had to cancel that appointment, which a lot of people will I'm sure, you'd have to go to [place] and make another appointment. I'm 81 years old. I drive just around home. I do not get on expressways and [place] is many miles away, all expressway. So please call me back when Dr. Salgado is going to be in her office on [address] across from [facility]. Thank you very much. Appreciate your having to make this change. This is the best that I can do. Thank you. Claudette McManus [phone].

## 2025-06-10 ENCOUNTER — OFFICE VISIT (OUTPATIENT)
Dept: ORTHOPEDIC SURGERY | Facility: CLINIC | Age: 80
End: 2025-06-10
Payer: MEDICARE

## 2025-06-10 VITALS — BODY MASS INDEX: 29.2 KG/M2 | WEIGHT: 181.7 LBS | TEMPERATURE: 98 F | HEIGHT: 66 IN

## 2025-06-10 DIAGNOSIS — M70.62 TROCHANTERIC BURSITIS OF BOTH HIPS: ICD-10-CM

## 2025-06-10 DIAGNOSIS — M70.61 TROCHANTERIC BURSITIS OF BOTH HIPS: ICD-10-CM

## 2025-06-10 DIAGNOSIS — R52 PAIN: Primary | ICD-10-CM

## 2025-06-10 RX ORDER — METHYLPREDNISOLONE 4 MG/1
TABLET ORAL
Qty: 21 TABLET | Refills: 0 | Status: SHIPPED | OUTPATIENT
Start: 2025-06-10

## 2025-06-10 NOTE — PROGRESS NOTES
Patient: Claudette L McManus  YOB: 1945 80 y.o. female  Medical Record Number: [id number]    Chief Complaints:   Chief Complaint   Patient presents with    Left Hip - Initial Evaluation    Right Hip - Initial Evaluation       History of Present Illness:Claudette L McManus is a 80 y.o. female who presents with complaints of bilateral lateral hip pain.  The patient reports it started about 6 months ago, unfortunately has progressively gotten worse.  Reports that the left hip is worse than the right.  Denies any injury.      Allergies:   Allergies   Allergen Reactions    Morphine And Codeine     Other Other (See Comments)     REJECTED DACRON SUTURES IN THE PAST AND INCISION CAME APART       Medications:   Current Outpatient Medications   Medication Sig Dispense Refill    celecoxib (CeleBREX) 200 MG capsule Take 1 capsule (200 mg) once daily for arthritis 30 capsule 6    Cholecalciferol (VITAMIN D3) 2000 UNITS tablet Take 1 tablet by mouth Daily.      DULoxetine (CYMBALTA) 60 MG capsule TAKE 1 CAPSULE BY MOUTH DAILY AS DIRECTED 90 capsule 3    erythromycin (ROMYCIN) 5 MG/GM ophthalmic ointment apply (1CM)  by ophthalmic route  every evening ribbon along lash line after warm compress mask every night.      exemestane (AROMASIN) 25 MG tablet Take 1 tablet by mouth Daily.      ezetimibe (ZETIA) 10 MG tablet TAKE 1 TABLET BY MOUTH DAILY 90 tablet 3    gabapentin (NEURONTIN) 300 MG capsule TAKE 1 CAPSULE BY MOUTH 3 TIMES A DAY FOR PERIPHERAL NEUROPATHY AS DIRECTED 90 capsule 1    levothyroxine (Synthroid) 88 MCG tablet Take 1 tablet (88 mcg) every day for low thyroid 90 tablet 1    LORazepam (ATIVAN) 1 MG tablet TAKE 1 TABLET BY MOUTH 2 TIMES A DAY FOR CHRONIC ANXIETY OR PANIC 60 tablet 4    Mounjaro 15 MG/0.5ML solution auto-injector INJECT 15 MG UNDER THE SKIN ONCE WEEKLY 2 mL 1    multivitamin (MULTI VITAMIN PO) Take  by mouth Daily.      polyethylene glycol (MIRALAX) 17 g packet Take 17 g by mouth Daily.    "   simvastatin (ZOCOR) 20 MG tablet TAKE 1 TABLET BY MOUTH DAILY FOR HIGH CHOLESTEROL 90 tablet 2    traZODone (DESYREL) 150 MG tablet TAKE ONE TABLET BY MOUTH ONCE NIGHTLY 90 tablet 1    valsartan (DIOVAN) 320 MG tablet Take 1 tablet by mouth Every Morning. 90 tablet 10    methylPREDNISolone (MEDROL) 4 MG dose pack Use as directed by package instructions 21 tablet 0     No current facility-administered medications for this visit.         The following portions of the patient's history were reviewed and updated as appropriate: allergies, current medications, past family history, past medical history, past social history, past surgical history and problem list.    Review of Systems:   14 point review of systems was performed. All systems negative except pertinent positives/negatives listed in HPI above    Physical Exam:   Vitals:    06/10/25 1105   Temp: 98 °F (36.7 °C)   Weight: 82.4 kg (181 lb 11.2 oz)   Height: 167.6 cm (66\")       General: A and O x 3, ASA, NAD   Skin clear no unusual lesions noted  Bilateral knees patient is very tender palpation of her bilateral hip greater trochanteric bursa she otherwise has normal internal extra rotation with a negative Stincfield negative logroll calf soft and nontender       Radiology:  Xrays 2 views of bilateral hips were ordered and reviewed today secondary to pain and show mild arthritic changes noted right hip moderate arthritic changes noted left hip.  Significant arthritic changes noted lower lumbar spine.  No comparative views available    Assessment/Plan: Bilateral hip greater trochanteric bursitis with increased pain    Patient and I discussed options, would definitely recommend physical therapy, we will try Medrol Dosepak.  If her symptoms do not improve we will consider bilateral hip bursa injections.  We will otherwise see her back as needed      Racheal Mcgee, APRN  6/10/2025  "

## 2025-06-12 DIAGNOSIS — E11.42 DIABETIC PERIPHERAL NEUROPATHY: Chronic | ICD-10-CM

## 2025-06-12 DIAGNOSIS — F51.04 CHRONIC INSOMNIA: Chronic | ICD-10-CM

## 2025-06-12 RX ORDER — TRAZODONE HYDROCHLORIDE 150 MG/1
150 TABLET ORAL NIGHTLY
Qty: 90 TABLET | Refills: 3 | Status: SHIPPED | OUTPATIENT
Start: 2025-06-12

## 2025-06-12 NOTE — TELEPHONE ENCOUNTER
Caller: McManus, Claudette L    Relationship: Self    Requested Prescriptions:   Requested Prescriptions     Pending Prescriptions Disp Refills    traZODone (DESYREL) 150 MG tablet 90 tablet 1     Sig: Take 1 tablet by mouth Every Night.      Pharmacy where request should be sent: [id number] [address] [facility] [phone] [facility] [phone] FX     Last office visit with prescribing clinician: 5/8/2025   Last telemedicine visit with prescribing clinician: Visit date not found   Next office visit with prescribing clinician: 11/20/2025     Additional details provided by patient: PATIENT STATES THAT DR. CROOKS INCREASED THIS MEDICATION TO 1 AND A HALF TABS PER NIGHT SO SHE HAS RUN OUT.   PATIENT STATES [facility] IS TELLING THAT SHE IS NOT DUE UNTIL JULY SO THEY WILL NOT REFILL IT UNTIL DR. CROOKS SENDS A NEW PRESCRIPTION TO THEM.         Does the patient have less than a 3 day supply:  [x] Yes  [] No    Would you like a call back once the refill request has been completed: [] Yes [x] No    If the office needs to give you a call back, can they leave a voicemail: [] Yes [x] No    Bhavesh Brantley   06/12/25 12:08 EDT

## 2025-06-13 ENCOUNTER — HOSPITAL ENCOUNTER (OUTPATIENT)
Dept: MAMMOGRAPHY | Facility: HOSPITAL | Age: 80
Discharge: HOME | End: 2025-06-13
Payer: MEDICARE

## 2025-06-16 ENCOUNTER — TELEPHONE (OUTPATIENT)
Dept: INTERNAL MEDICINE | Facility: CLINIC | Age: 80
End: 2025-06-16

## 2025-06-16 NOTE — TELEPHONE ENCOUNTER
Caller: McManus, Claudette L    Relationship: Self    Best call back number: [phone]    What medication are you requesting:   traZODone HCl 150 MG Take 1-1/2 tablets nightly as directed   What are your current symptoms: NOT ABLE TO SLEEP    Have you had these symptoms before:    [x] Yes  [] No    Have you been treated for these symptoms before:   [x] Yes  [] No    If a prescription is needed, what is your preferred pharmacy and phone number: [facility] [phone] - Psychiatric [address] AT [facility] [address] [facility] [phone] [facility] [phone] FX     Additional notes: ALANA STATED PATIENT IS NOT ABLE TO GET ANY MORE MEDICATION UNTIL JULY.    PATIENT WAS TOLD BY PHARMACY THAT MEDICATION WILL NOT BE FILLED UNTIL NEXT MONTH.    PATIENT STATED [facility] ONLY FILLED THE MEDICATION TOP BE TAKEN ONCE NIGHTLY AND NOT FOR TH 1 1/2 TABLETS NIGHTLY THAT WAS CHANGED MAY 2 , 2024.    PATIENT WILL NEED TO HAVE THE QTY UPDATED AS WELL.   FOR A 90 DAYS SUPPLY WITH 3 REFILLS.    PATIENT IS COMPLETELY OUT, PATIENT HAS NOT HAD ANY FOR A WEEK.    PATIENT IS NOT SLEEPING    PATIENT REQUESTING A CALL WHEN SENT IN TO THE PHARMACY SO SHE KNOWS SHE CAN GO PICK IT UP.

## 2025-06-17 DIAGNOSIS — F51.04 CHRONIC INSOMNIA: Chronic | ICD-10-CM

## 2025-06-17 DIAGNOSIS — E11.42 DIABETIC PERIPHERAL NEUROPATHY: Chronic | ICD-10-CM

## 2025-06-17 RX ORDER — TRAZODONE HYDROCHLORIDE 150 MG/1
150 TABLET ORAL NIGHTLY
Qty: 90 TABLET | Refills: 3 | Status: CANCELLED | OUTPATIENT
Start: 2025-06-17

## 2025-06-17 NOTE — TELEPHONE ENCOUNTER
I spoke to Pt and informed her that her medication has been sent to [facility] on 6/12

## 2025-06-19 ENCOUNTER — TELEPHONE (OUTPATIENT)
Dept: MAMMOGRAPHY | Facility: HOSPITAL | Age: 80
End: 2025-06-19
Payer: MEDICARE

## 2025-06-19 NOTE — TELEPHONE ENCOUNTER
Left voicemail informing patient to arrive at 10:30AM for 11:30AM appointment. Informed patient that there are no food or drink restrictions. Advised patient regarding type of clothing to wear, including a comfortable bra, and timeframe to expect results. This RN provided contact number should she need to reach us prior to appointment.

## 2025-06-20 ENCOUNTER — TELEPHONE (OUTPATIENT)
Dept: ONCOLOGY | Facility: CLINIC | Age: 80
End: 2025-06-20
Payer: MEDICARE

## 2025-06-20 NOTE — TELEPHONE ENCOUNTER
Caller: McManus, Claudette L    Relationship to patient: Self    Best call back number: [phone]    Chief complaint: CANC. - R/S     Type of visit: LAB & F/U 1    Requested date: AT THE [facility] - PT WILL NOT GO TO THE [facility] LOCATION    If rescheduling, when is the original appointment: 7/3/25     Additional notes:APPT. WAS R/S FROM [facility] TO [facility].        CLINICAL:  PT HAS MULTIPLE QUESTIONS REGARDING HER BX ON MONDAY (6/23/25)

## 2025-06-20 NOTE — TELEPHONE ENCOUNTER
Returned call to pt. Pt just had questions regarding her schedule. She did not have questions about her biopsy. She cannot go to [facility] for her f/u visit with Dr. Arzate. She needs it to be switched to [facility]. Message sent to scheduling.

## 2025-06-20 NOTE — TELEPHONE ENCOUNTER
Returned patients call and LM with new date and time for appt at [facility].

## 2025-06-23 ENCOUNTER — HOSPITAL ENCOUNTER (OUTPATIENT)
Dept: MAMMOGRAPHY | Facility: HOSPITAL | Age: 80
Discharge: HOME | End: 2025-06-23
Admitting: INTERNAL MEDICINE
Payer: MEDICARE

## 2025-06-23 VITALS
OXYGEN SATURATION: 96 % | TEMPERATURE: 98.2 F | DIASTOLIC BLOOD PRESSURE: 86 MMHG | RESPIRATION RATE: 16 BRPM | HEART RATE: 73 BPM | SYSTOLIC BLOOD PRESSURE: 175 MMHG

## 2025-06-23 DIAGNOSIS — R92.1 CALCIFICATION OF LEFT BREAST: ICD-10-CM

## 2025-06-23 PROCEDURE — 88342 IMHCHEM/IMCYTCHM 1ST ANTB: CPT | Performed by: INTERNAL MEDICINE

## 2025-06-23 PROCEDURE — 88341 IMHCHEM/IMCYTCHM EA ADD ANTB: CPT | Performed by: INTERNAL MEDICINE

## 2025-06-23 PROCEDURE — 25010000002 LIDOCAINE 1 % SOLUTION: Performed by: INTERNAL MEDICINE

## 2025-06-23 PROCEDURE — 88360 TUMOR IMMUNOHISTOCHEM/MANUAL: CPT | Performed by: INTERNAL MEDICINE

## 2025-06-23 PROCEDURE — 25010000002 LIDOCAINE-EPINEPHRINE (PF) 1 %-1:200000 SOLUTION: Performed by: INTERNAL MEDICINE

## 2025-06-23 PROCEDURE — 88305 TISSUE EXAM BY PATHOLOGIST: CPT | Performed by: INTERNAL MEDICINE

## 2025-06-23 PROCEDURE — A4648 IMPLANTABLE TISSUE MARKER: HCPCS

## 2025-06-23 RX ORDER — LIDOCAINE HYDROCHLORIDE 10 MG/ML
1 INJECTION, SOLUTION INFILTRATION; PERINEURAL ONCE
Status: COMPLETED | OUTPATIENT
Start: 2025-06-23 | End: 2025-06-23

## 2025-06-23 RX ADMIN — Medication 1 ML: at 11:45

## 2025-06-23 RX ADMIN — LIDOCAINE HYDROCHLORIDE 8 ML: 10; .005 INJECTION, SOLUTION EPIDURAL; INFILTRATION; INTRACAUDAL; PERINEURAL at 11:45

## 2025-06-23 NOTE — NURSING NOTE
Biopsy site to Left breast clear. Dermabond dry and intact. No firmness or swelling noted at or around biopsy site.  Ice pack with protective covering applied to biopsy site. Discharge instructions discussed with patient. Patient verbalized understanding. No distress noted. To home via private vehicle.

## 2025-06-24 ENCOUNTER — TELEPHONE (OUTPATIENT)
Dept: MAMMOGRAPHY | Facility: HOSPITAL | Age: 80
End: 2025-06-24
Payer: MEDICARE

## 2025-06-24 LAB
CYTO UR: NORMAL
LAB AP CASE REPORT: NORMAL
LAB AP DIAGNOSIS COMMENT: NORMAL
LAB AP SPECIAL STAINS: NORMAL
LAB AP SYNOPTIC CHECKLIST: NORMAL
PATH REPORT.FINAL DX SPEC: NORMAL
PATH REPORT.GROSS SPEC: NORMAL

## 2025-06-24 NOTE — TELEPHONE ENCOUNTER
Post biopsy call. No answer voicemail  left for patient to call back with any questions or concerns

## 2025-06-25 ENCOUNTER — LAB (OUTPATIENT)
Dept: LAB | Facility: HOSPITAL | Age: 80
End: 2025-06-25
Payer: MEDICARE

## 2025-06-25 ENCOUNTER — OFFICE VISIT (OUTPATIENT)
Dept: ONCOLOGY | Facility: CLINIC | Age: 80
End: 2025-06-25
Payer: MEDICARE

## 2025-06-25 VITALS
DIASTOLIC BLOOD PRESSURE: 91 MMHG | TEMPERATURE: 97.6 F | SYSTOLIC BLOOD PRESSURE: 147 MMHG | HEART RATE: 75 BPM | HEIGHT: 66 IN | WEIGHT: 178.4 LBS | BODY MASS INDEX: 28.67 KG/M2 | OXYGEN SATURATION: 96 %

## 2025-06-25 DIAGNOSIS — N63.10 MASS OF RIGHT BREAST, UNSPECIFIED QUADRANT: ICD-10-CM

## 2025-06-25 DIAGNOSIS — C50.912 DUCTAL CARCINOMA OF LEFT BREAST: ICD-10-CM

## 2025-06-25 DIAGNOSIS — C50.112 MALIGNANT NEOPLASM OF CENTRAL PORTION OF LEFT FEMALE BREAST, UNSPECIFIED ESTROGEN RECEPTOR STATUS: ICD-10-CM

## 2025-06-25 DIAGNOSIS — C50.112 MALIGNANT NEOPLASM OF CENTRAL PORTION OF LEFT FEMALE BREAST, UNSPECIFIED ESTROGEN RECEPTOR STATUS: Primary | ICD-10-CM

## 2025-06-25 DIAGNOSIS — Z79.811 AROMATASE INHIBITOR USE: ICD-10-CM

## 2025-06-25 LAB
ALBUMIN SERPL-MCNC: 4.5 G/DL (ref 3.5–5.2)
ALBUMIN/GLOB SERPL: 1.7 G/DL
ALP SERPL-CCNC: 98 U/L (ref 39–117)
ALT SERPL W P-5'-P-CCNC: 37 U/L (ref 1–33)
ANION GAP SERPL CALCULATED.3IONS-SCNC: 11.1 MMOL/L (ref 5–15)
AST SERPL-CCNC: 36 U/L (ref 1–32)
BASOPHILS # BLD AUTO: 0.05 10*3/MM3 (ref 0–0.2)
BASOPHILS NFR BLD AUTO: 0.7 % (ref 0–1.5)
BILIRUB SERPL-MCNC: 0.6 MG/DL (ref 0–1.2)
BUN SERPL-MCNC: 8.8 MG/DL (ref 8–23)
BUN/CREAT SERPL: 9 (ref 7–25)
CALCIUM SPEC-SCNC: 9.9 MG/DL (ref 8.6–10.5)
CHLORIDE SERPL-SCNC: 96 MMOL/L (ref 98–107)
CO2 SERPL-SCNC: 23.9 MMOL/L (ref 22–29)
CREAT SERPL-MCNC: 0.98 MG/DL (ref 0.57–1)
DEPRECATED RDW RBC AUTO: 44.2 FL (ref 37–54)
EGFRCR SERPLBLD CKD-EPI 2021: 57.7 ML/MIN/1.73
EOSINOPHIL # BLD AUTO: 0.15 10*3/MM3 (ref 0–0.4)
EOSINOPHIL NFR BLD AUTO: 2 % (ref 0.3–6.2)
ERYTHROCYTE [DISTWIDTH] IN BLOOD BY AUTOMATED COUNT: 12.7 % (ref 12.3–15.4)
GLOBULIN UR ELPH-MCNC: 2.7 GM/DL
GLUCOSE SERPL-MCNC: 93 MG/DL (ref 65–99)
HCT VFR BLD AUTO: 40.2 % (ref 34–46.6)
HGB BLD-MCNC: 13.5 G/DL (ref 12–15.9)
IMM GRANULOCYTES # BLD AUTO: 0.12 10*3/MM3 (ref 0–0.05)
IMM GRANULOCYTES NFR BLD AUTO: 1.6 % (ref 0–0.5)
LYMPHOCYTES # BLD AUTO: 1.92 10*3/MM3 (ref 0.7–3.1)
LYMPHOCYTES NFR BLD AUTO: 25.5 % (ref 19.6–45.3)
MCH RBC QN AUTO: 31.8 PG (ref 26.6–33)
MCHC RBC AUTO-ENTMCNC: 33.6 G/DL (ref 31.5–35.7)
MCV RBC AUTO: 94.8 FL (ref 79–97)
MONOCYTES # BLD AUTO: 0.63 10*3/MM3 (ref 0.1–0.9)
MONOCYTES NFR BLD AUTO: 8.4 % (ref 5–12)
NEUTROPHILS NFR BLD AUTO: 4.65 10*3/MM3 (ref 1.7–7)
NEUTROPHILS NFR BLD AUTO: 61.8 % (ref 42.7–76)
NRBC BLD AUTO-RTO: 0 /100 WBC (ref 0–0.2)
PLATELET # BLD AUTO: 232 10*3/MM3 (ref 140–450)
PMV BLD AUTO: 9 FL (ref 6–12)
POTASSIUM SERPL-SCNC: 4.8 MMOL/L (ref 3.5–5.2)
PROT SERPL-MCNC: 7.2 G/DL (ref 6–8.5)
RBC # BLD AUTO: 4.24 10*6/MM3 (ref 3.77–5.28)
SODIUM SERPL-SCNC: 131 MMOL/L (ref 136–145)
WBC NRBC COR # BLD AUTO: 7.52 10*3/MM3 (ref 3.4–10.8)

## 2025-06-25 PROCEDURE — 36415 COLL VENOUS BLD VENIPUNCTURE: CPT

## 2025-06-25 PROCEDURE — 80053 COMPREHEN METABOLIC PANEL: CPT

## 2025-06-25 PROCEDURE — 85025 COMPLETE CBC W/AUTO DIFF WBC: CPT

## 2025-06-25 NOTE — PROGRESS NOTES
Subjective   Claudette L McManus is a 77 y.o. female.  Referred by Dr. 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   Comorbidities include hypertension, hyperlipidemia, anxiety/depression, insomnia, progressive dyspnea on exertion, diagnosed with pulmonary embolism in May 2022 and on anticoagulation with Xarelto, hypothyroidism.    7/29/2022-bilateral screening mammogram  Indeterminate left breast calcifications and focal asymmetry.  1 of which is associated with questioned architectural distortion.  Further evaluation recommended.  Neck on 8/25/2022-left breast diagnostic mammogram and ultrasound  In the lower slightly outer middle left breast there is a 1.5 cm mass corresponding to the architectural distortion.  Focal asymmetry in the outer central middle left breast partially effaces with spot compression and less conspicuous.  Right upper middle left breast there is persistent approximately 1 cm mass with corresponding architectural distortion.  The upper central anterior left breast there is a persistent focal asymmetry with calcifications.    Ultrasound  At 1:00 retroareolar left breast-2.3 x 0.9 x 2 cm hypoechoic mass with indistinct margins and internal echogenic foci from calcifications.  There is tubular elevation of the mass concerning for probable extension  At 3:00, retroareolar left breast-1.1 x 0.8 x 1 cm hypoechoic mass with indistinct margins, corresponds to suspicious group of calcifications.  At 4:00, 3 cm from the nipple there is a 1.4 and 1 x 1.1 cm irregular hypoechoic mass with peripheral vascularity corresponding to the mass with distortion on mammogram  At 430, 3 cm from the nipple-1.8 cm from the above mass there is a 0.9 x 0.5 x 0.9 cm hypoechoic mass with indistinct margins which is suspicious  At 3:00, 5 cm from the nipple  there is a 0.9 0.7 x 0.7 cm irregular hypoechoic mass with indistinct margins.    Impression  Multiple masses in the left breast, which demonstrate corresponding architectural distortion.  2 site ultrasound-guided biopsy targeting the dominant masses at 1:00 in the retroareolar breast and 4:00, 3 cm from the nipple with management of additional masses pending biopsy results.  Contrast-enhanced MRI recommended    9/21/2022-2 site biopsy  1.left breast 3:00, 2 cm from nipple-ultrasound-guided biopsy of the mass  Low-grade ductal carcinoma in situ involving a small intraductal papilloma.  DCIS measures 14 mm  ER positive, WI positive    2.left breast o'clock, 2 cm from the nipple ultrasound-guided biopsy  Invasive ductal carcinoma with lobular features  Grade 2  Millimeters on core biopsy  Atypical ductal hyperplasia with cavitations and involving an intraductal papilloma  Negative lymphovascular space invasion  ER +% strong  WI +% strong  HER2 negative, nonamplified on FISH  Ki-67 15%    She was seen by Dr. Fam and discussed mastectomy given several abnormalities in the left breast.  Since she had progressive worsening of dyspnea on exertion from 2022 without acute explanation, recent diagnosis of pulmonary embolism requiring chronic anticoagulation, her age and concerns regarding complications of mastectomy patient opted not to proceed with mastectomy  She is here to discuss neoadjuvant endocrine therapy.    She reports severe insomnia for which she is currently on trazodone.  Tried gabapentin in the past but did not help.  She is also been diagnosed with peripheral neuropathy.  She is on Cymbalta anxiety.    She had a daughter who is treated for breast cancer but eventually she passed away from suicide.  Patient reports significant anxiety since the death of her daughter.  Her daughter have been diagnosed with bipolar disorder.    Genetic testing performed and no significant pathogenic  mutation.    For the dyspnea, she evaluated by  and per patient she was recommended to have a sleep study.  She is not comfortable using the CPAP and did not wish to undergo a sleep study.  She was prescribed inhalers which she had used for a month without much help.    On the CT PE protocol performed in May 2022 there was concern for chronic pulmonary emboli and pulmonary hypertension.  For this reason she underwent a cardiac catheterization on 9/26/2022 which showed normal pulmonary pressures, normal left-sided pressures, elevated right ventricular filling pressures, normal PVR, normal RV SWI  The etiology of the dyspnea somewhat unclear.    12/8/2022-CT of the chest-tiny right lower lobe pulmonary embolism seen previously is no longer present.  Enlarged right ventricle appears similar on prior exam.  No other abnormalities noted.    CT angiogram of the chest 7/17/2023 which thankfully did not show recurrent PE but did show groundglass opacities felt to likely be infectious/inflammatory as well as a new left fissure nodule 1.8 x 1.1 cm, felt also likely to be infectious/inflammatory with 3-month interval follow-up recommended.  Patient was ultimately treated with a round of Z-Sreedhar and steroids with significant improvement in her shortness of breath.    11/9/2023-left breast diagnostic mammogram and ultrasound  Continued interval decrease in microcalcifications in the left breast with only few faint residual microcalcifications seen on spot magnification.  No sonographic abnormality in the areas of biopsy-proven malignancy in the left breast.  Imaging features are consistent with that of near complete imaging response to medical therapy.    Interval History  Returns to the office today for review of mammogram and biopsy.  She continues on exemestane which she tolerates reasonably well.  She does have generalized arthralgias though notably has had multiple joints replaced.  She is unsure if her  arthralgias have worsened since beginning exemestane.  She does not struggle with hot flashes.  She does report some intermittent left breast pain.  She is also struggling with more tenderness following a left breast biopsy.    Past Medical History:   Diagnosis Date    Age-related cognitive decline 03/18/2024    Allergic morphine & dacron sutures    more than 30 yrs ago    Arthralgia of multiple joints 02/15/2024    February 15, 2024--patient reports that over the past 6 months she has been having much more pain in her hands and fingers bilaterally.  She is also noted some swelling without redness but they are tender.  We will obtain rheumatologic profile today and then follow-up on phone for the results and possible further instructions.  Need to rule out inflammatory arthritis.      Benign essential hypertension     Cataract     Cholelithiasis 1960??    Gallbladder removed    Chronic anxiety 10/26/2022    Chronic bilateral low back pain without sciatica 08/16/2013 March 13, 2019--Limited bone scan.  This reveals scintigraphic activity in the spine and at the right shoulder corresponds to change seen on recent x-rays and is best explained by degenerative change.  Isolated metastatic disease exactly corresponding to the sites of extensive degenerative disc change would be peculiar.  March 7, 2019--new patient to get established.  She has complaints of approxi    Chronic bilateral thoracic back pain 02/26/2019 March 13, 2019--Limited bone scan.  This reveals scintigraphic activity in the spine and at the right shoulder corresponds to change seen on recent x-rays and is best explained by degenerative change.  Isolated metastatic disease exactly corresponding to the sites of extensive degenerative disc change would be peculiar.  March 1, 2019--x-ray of the lumbar and thoracic spine reveals mild anterior l    Chronic constipation take Miralax daily    Chronic insomnia 11/04/2014    Depression with anxiety  08/21/2019 August 21, 2019--patient seen in follow-up after I called in a prescription for Ativan after the patient's  contacted me a few weeks ago regarding the sudden death of patient's daughter.  This was an apparent suicide and the patient found her daughter dead.  I will not go into details.  Patient reports the Lorazepam has been helpful but she is still quite distraught, nervous, and undergoing    Diabetic peripheral neuropathy 03/07/2019    Characterized by decreased sensation and paresthesias in both lower extremities described as a burning sensation.  Extends up to the knees.  Reportedly had been evaluated with nerve conduction study in the past.    Ductal carcinoma of left breast 08/26/2022    Generalized anxiety disorder 05/19/2013    History of Bell's palsy 05/21/2013    Remote history of Bell's palsy.  Patient does not remember which side of the face.    History of COVID-19 12/08/2022    History of multiple pulmonary emboli 05/19/2022    Stacie 15, 2022--patient seen in follow-up by Dr. Moore.  She is complaining of continued dyspnea on exertion which may be worse.  I reviewed the cardiology consultation from June 7, 2022 and also reviewed the echocardiogram from that same date.  Noted below.  Her  is now present and he reports her dyspnea on exertion is definitely worse.  Her oxygen saturation at rest is 96%.  Upon ambulatio    History of pneumonia 07/19/2023 July 19, 2023--it appears the patient may have an infectious process versus an inflammatory process in both of her lungs.  She was placed on a Z-Sreedhar by the nurse practitioner which I think is certainly reasonable.  I do think she would benefit from a round of prednisone.  There is possibly a new pulmonary nodule that we will need monitoring.  I think these are changes left over from COVID which sh    Hyperlipidemia     Left upper lobe pulmonary nodule 07/19/2023 January 2, 2024--CT ANGIOGRAM CHEST PULMONARY EMBOLISM      TECHNIQUE: Radiation dose reduction techniques were utilized, including automated exposure control and exposure modulation based on body size. 2 mm images were obtained through the chest after the administration of IV contrast.     HISTORY: Shortness of breath and chest pain.     COMPARISON: CT chest angiogram 5/19/2022. CT chest 12/11/202    Memory loss 03/18/2024             Patient: MCMANUS, CLAUDETTE  Time Out: 23:06  Exam(s): MRI HEAD W WO Contrast      EXAM:    MR Head Without and With Intravenous Contrast     CLINICAL HISTORY:     Reason for exam: Neuro deficit, acute, stroke suspected. Dizziness, hx breast cancer.     TECHNIQUE:    Magnetic resonance images of the head brain without and with  intravenous contrast in multiple planes.     COMPARISON:    C    Menopausal state, 8/19/2020--normal DEXA. 03/07/2019 August 19, 2020--DEXA scan reveals lumbar spine T score 3.8.  Left femoral neck T score 2.5.  Right femoral neck T score 2.2.  Normal bone density.    Non morbid obesity 08/11/2022    Primary hypothyroidism 05/19/2013    Primary osteoarthritis involving multiple joints 04/22/2013    Rotator cuff arthropathy of right shoulder, 4/20/2021--status post right reverse total shoulder arthroplasty 05/27/2020    Scoliosis     Situational mixed anxiety and depressive disorder 08/21/2019 August 21, 2019--patient seen in follow-up after I called in a prescription for Ativan after the patient's  contacted me a few weeks ago regarding the sudden death of patient's daughter.  This was an apparent suicide and the patient found her daughter dead.  I will not go into details.  Patient reports the Lorazepam has been helpful but she is still quite distraught, nervous, and undergoing    Type 2 diabetes mellitus with diabetic neuropathy, without long-term current use of insulin     Urinary tract infection once in 2013    Vitamin D deficiency 02/26/2019     Past Surgical History:   Procedure Laterality Date     APPENDECTOMY  1963    BILATERAL BREAST REDUCTION  Early 2000    Early 2000--bilateral breast reduction    BREAST BIOPSY  left    CARDIAC CATHETERIZATION N/A 2022    Procedure: Right Heart Cath;  Surgeon: Agus Solorio MD PhD;  Location: [facility] CATH INVASIVE LOC[facility];  Service: Cardiology;  Laterality: N/A;  Will REMAIN on Xarelto for the case    CHOLECYSTECTOMY  --open cholecystectomy    COLONOSCOPY  2012--reportedly normal colonoscopy    COSMETIC SURGERY  ???    EYE SURGERY      INCONTINENCE SURGERY  2012--implantation of questionable bladder stimulator right sacral area for urinary incontinence.  Details not known.    JOINT REPLACEMENT    -  -    both knees & both shoulders    LYMPH NODE BIOPSY  ???    REDUCTION MAMMAPLASTY      REPLACEMENT TOTAL KNEE BILATERAL Left ; 2011-2011--bilateral total knee replacement    SUBTOTAL HYSTERECTOMY  Remote    Remote partial hysterectomy.  Ovaries intact.    TOTAL SHOULDER REPLACEMENT Left 2013--left total shoulder replacement.    TOTAL SHOULDER REPLACEMENT  2021--status post right reverse total shoulder arthroplasty    TOTAL SHOULDER REVERSE ARTHROPLASTY Right 2021--right reverse total shoulder replacement.    UMBILICAL HERNIA REPAIR       Family History   Problem Relation Age of Onset    Alcohol abuse Father         father    Breast cancer Daughter         40s    Cancer Other     Diabetes Other         type II    Leukemia Grandchild 19    Depression Mother     Arthritis Mother         mother    Diabetes Maternal Grandfather     COPD Daughter     Cancer Daughter     Diabetes Daughter     Early death Daughter         suicide 2019     Social History     Tobacco Use    Smoking status: Former     Current packs/day: 0.00     Types: Cigarettes     Quit date: 1998     Years since quittin.4    Smokeless tobacco: Never   Vaping Use     "Vaping status: Never Used   Substance Use Topics    Alcohol use: Yes     Alcohol/week: 1.0 standard drink of alcohol     Types: 1 Glasses of wine per week     Comment: current some day    Drug use: No         Age at menarche-10  Age at first live childbirth-12   1 para 1  0  She had a hysterectomy in the 90s, ovaries still present  Oral contraceptive pill use for 6 to 8 weeks  No use of hormone replacement therapy    Allergies   Allergen Reactions    Morphine And Codeine     Other Other (See Comments)     REJECTED DACRON SUTURES IN THE PAST AND INCISION CAME APART       Review of systems as mentioned in the HPI otherwise negative    Objective   Vitals:    25 1209   BP: 147/91   Pulse: 75   Temp: 97.6 °F (36.4 °C)   TempSrc: Oral   SpO2: 96%   Weight: 80.9 kg (178 lb 6.4 oz)   Height: 167.6 cm (65.98\")   PainSc: 0-No pain       Physical Exam  Constitutional:       Appearance: Normal appearance. She is normal weight.   HENT:      Head: Normocephalic and atraumatic.      Right Ear: External ear normal.      Left Ear: External ear normal.      Nose: Nose normal.      Mouth/Throat:      Mouth: Mucous membranes are moist.      Pharynx: Oropharynx is clear.   Eyes:      Extraocular Movements: Extraocular movements intact.      Conjunctiva/sclera: Conjunctivae normal.      Pupils: Pupils are equal, round, and reactive to light.   Cardiovascular:      Rate and Rhythm: Normal rate and regular rhythm.      Pulses: Normal pulses.      Heart sounds: Normal heart sounds.   Pulmonary:      Effort: Pulmonary effort is normal.      Breath sounds: Normal breath sounds.   Abdominal:      General: Abdomen is flat. Bowel sounds are normal.   Musculoskeletal:         General: Normal range of motion.      Cervical back: Normal range of motion.   Skin:     General: Skin is warm.   Neurological:      General: No focal deficit present.      Mental Status: She is alert and oriented to person, place, and time. "   Psychiatric:         Mood and Affect: Mood normal.         Behavior: Behavior normal.         Thought Content: Thought content normal.         Judgment: Judgment normal.       Breast Exam: Right breast appears normal on inspection.  No palpable abnormalities of the right breast.  Left breast: Biopsy site at the 2 o'clock position just outside the nipple.  There is bruising at the site and some edema under biopsy site    I have reexamined the patient and the results are consistent with the previously documented exam. NONA Lara    Results from last 7 days   Lab Units 06/25/25  1157   WBC 10*3/mm3 7.52   NEUTROS ABS 10*3/mm3 4.65   HEMOGLOBIN g/dL 13.5   HEMATOCRIT % 40.2   PLATELETS 10*3/mm3 232     Results from last 7 days   Lab Units 06/25/25  1157   SODIUM mmol/L 131*   POTASSIUM mmol/L 4.8   CHLORIDE mmol/L 96*   CO2 mmol/L 23.9   BUN mg/dL 8.8   CREATININE mg/dL 0.98   CALCIUM mg/dL 9.9   ALBUMIN g/dL 4.5   BILIRUBIN mg/dL 0.6   ALK PHOS U/L 98   ALT (SGPT) U/L 37*   AST (SGOT) U/L 36*   GLUCOSE mg/dL 93           No radiology results for the last 30 days.     Assessment & Plan       *Left breast invasive ductal carcinoma  T1 cN0 M0, stage Ia, multifocal  2 sites were biopsied, one site was consistent with ductal carcinoma in situ which is ER/LA positive and the second site was invasive ductal carcinoma with lobular features which is ER/LA positive and HER2 negative, grade 2  The steps of curative intent breast cancer treatment have been explained, including surgery, possible chemotherapy, possible radiation and possible endocrine therapy.   We discussed that given the fact that she does not want to undergo mastectomy and concerned about the complications due to ongoing dyspnea and her age it would be reasonable to proceed with neoadjuvant endocrine therapy.  The 2 options including tamoxifen and aromatase inhibitors have been discussed.  Given the history of DVT tamoxifen is not an option    She has been on anastrozole  since November 2022 and tolerating that well.  Seen in the clinic in December 2022 with concerns of erythema of the left breast.  This has since resolved.  Diagnostic mammogram 1/19/2022 of the left breast shows stable microcalcifications and no new concerning findings.  Diagnostic mammogram 5/1/2023 showing resolution of abnormal mammographic densities in the left breast as well as reduction in microcalcifications.    Patient developing hair loss and nails breaking off.  Arimidex held.  Patient ultimately switched to Aromasin 7/2023 8/8/2023 patient tolerating Aromasin well with no worsening of hair loss or nail changes.  Continue same.  Repeat diagnostic mammogram scheduled in November per Dr. Fam.  11/9/2023-left breast diagnostic mammogram with near complete resolution of the microcalcifications.  5/30/2024-bilateral diagnostic mammogram images independently reviewed interpreted by me.  Overall unchanged with no new concerning findings.  Biopsy clip still present.  5/14/2025 diagnostic mammogram with new calcifications in the upper central slightly outer anterior left breast which are suspicious  6/23/2025 mammogram stereotactic breast biopsy with pathology consistent with DCIS  Patient reviewed 6/25/2025 including recent biopsy results with findings of DCIS.  We have reviewed this is a new calcification noted on most recent mammogram and is likely developed while on endocrine therapy.  She is previously evaluated by Dr. Fam with recommendations for surgery though the patient declined surgery.  We have discussed with new DCIS she needs to be reevaluated by breast surgery and strongly consider proceeding with surgery.  Dr. Arzate would like for her to continue exemestane at this time.    *PE  Hypercoagulability work-up negative  Abnormal lupus anticoagulant likely secondary to Xarelto  Prothrombin gene mutation and factor V Leiden not covered by insurance  Continues on  Xarelto  Recent CT angiogram shows resolution of the blood clots.  The initial plan was to continue patient on indefinite anticoagulation as there was no clear provoking event for the PE.  However she is concerned about remaining on anticoagulation long-term.  She has completed 6 months of therapy.   We discussed increased risk of recurrent DVT and PE in individuals with a previous history of PE.  Since is the first episode of PE, could consider discontinuation of anticoagulation and continue surveillance.  Xarelto discontinued  No concerns for recurrent DVT or PE    *Anxiety  Poorly controlled since the death of her daughter  Continues on Cymbalta  Also on trazodone  Continue follow-up with supportive oncology    *Depression  Mood has remained stable and improved.    *Dyspnea  7/17/2023 CT angiogram performed per PCP, Dr. Moore, showing no evidence of recurrent thrombosis but with inflammatory/infectious changes with groundglass opacities.  Also noted was new 1.8 x 1.1 cm left fissure lesion, also felt to be likely infectious/inflammatory.  3-month CT chest follow-up recommended.  Patient treated with a Z-Sreedhar and prednisone taper per Dr. Moore with improvement in shortness of breath.  8/8/2023 patient highly anxious about potentially having cancer in her lung.  Tried to reassure her of the radiologist feeling this is likely infectious/inflammatory and considering her recent pneumonia and previous COVID that is likely the case.  She will see Dr. Moore in follow-up in September and anticipate him ordering repeat CT imaging at that visit.  1/2/2024-CT of the chest PE protocol with no evidence of pulmonary embolism.  New 1 to 2 mm left upper lobe nodule which was better seen on the prior CT chest.    *Hypertension and hyperlipidemia-continue current medications.  Blood pressure 169/78    *Bone health  DEXA from 2020 reviewed and normal  DEXA scan June 2023 with normal bone density.  Repeat DEXA for  7/1/2025    PLAN:  Recent mammogram and biopsy reviewed with the patient today with new findings of DCIS  MRI of the breast   Referral to breast surgery for reconsideration of surgery, previously evaluated by Dr. Fam  Continue exemestane 25 mg daily.  Continue vitamin D supplementation.  DEXA follow-up for 7/1/2025  Follow-up with Dr. Arzate in 3 months, ideally this would be after the patient has surgical intervention for her left breast     This patient is on high risk drug therapy requiring intensive monitoring for toxicity.  Plan of care was discussed and reviewed with Dr. Arzate who was in agreement    I spent 42 minutes caring for Claudette on this date of service. This time includes time spent by me in the following activities: preparing for the visit, reviewing tests, obtaining and/or reviewing a separately obtained history, performing a medically appropriate examination and/or evaluation, counseling and educating the patient/family/caregiver, ordering medications, tests, or procedures, referring and communicating with other health care professionals, documenting information in the medical record, and care coordination.     Arabella Moran, APRN  06/25/2025

## 2025-06-26 ENCOUNTER — TELEPHONE (OUTPATIENT)
Dept: SURGERY | Facility: CLINIC | Age: 80
End: 2025-06-26
Payer: MEDICARE

## 2025-06-26 DIAGNOSIS — D05.12 DUCTAL CARCINOMA IN SITU (DCIS) OF LEFT BREAST: Primary | ICD-10-CM

## 2025-06-26 DIAGNOSIS — C50.112 MALIGNANT NEOPLASM OF CENTRAL PORTION OF LEFT FEMALE BREAST, UNSPECIFIED ESTROGEN RECEPTOR STATUS: Primary | ICD-10-CM

## 2025-06-26 NOTE — TELEPHONE ENCOUNTER
Left VM to callback and confirm the following appointment.    7/3/2025 11:00 AM Arabella Randle MD

## 2025-06-30 ENCOUNTER — TELEPHONE (OUTPATIENT)
Dept: ONCOLOGY | Facility: CLINIC | Age: 80
End: 2025-06-30
Payer: MEDICARE

## 2025-06-30 NOTE — TELEPHONE ENCOUNTER
Left detailed voicemail. Tried to call patient twice. Once Friday and once this morning to find out if she is aware of a metallic clip that has kept her from having an MRI in the past. Staff message sent to JANY Sarmiento. Updated JANY Sarmiento and JANY Moran that I have not been successful in reaching the patient. Meka Dowd RN

## 2025-07-01 ENCOUNTER — HOSPITAL ENCOUNTER (OUTPATIENT)
Dept: BONE DENSITY | Facility: HOSPITAL | Age: 80
Discharge: HOME | End: 2025-07-01
Admitting: INTERNAL MEDICINE
Payer: MEDICARE

## 2025-07-01 DIAGNOSIS — N63.10 MASS OF RIGHT BREAST, UNSPECIFIED QUADRANT: ICD-10-CM

## 2025-07-01 DIAGNOSIS — R92.8 OTHER ABNORMAL AND INCONCLUSIVE FINDINGS ON DIAGNOSTIC IMAGING OF BREAST: ICD-10-CM

## 2025-07-01 DIAGNOSIS — Z79.811 AROMATASE INHIBITOR USE: ICD-10-CM

## 2025-07-01 PROCEDURE — 77080 DXA BONE DENSITY AXIAL: CPT

## 2025-07-03 ENCOUNTER — PREP FOR SURGERY (OUTPATIENT)
Dept: OTHER | Facility: HOSPITAL | Age: 80
End: 2025-07-03
Payer: MEDICARE

## 2025-07-03 ENCOUNTER — TELEPHONE (OUTPATIENT)
Dept: SURGERY | Facility: CLINIC | Age: 80
End: 2025-07-03
Payer: MEDICARE

## 2025-07-03 ENCOUNTER — OFFICE VISIT (OUTPATIENT)
Dept: SURGERY | Facility: CLINIC | Age: 80
End: 2025-07-03
Payer: MEDICARE

## 2025-07-03 ENCOUNTER — HOSPITAL ENCOUNTER (OUTPATIENT)
Dept: SURGERY | Facility: HOSPITAL | Age: 80
Discharge: HOME | End: 2025-07-03
Payer: MEDICARE

## 2025-07-03 VITALS
DIASTOLIC BLOOD PRESSURE: 88 MMHG | HEART RATE: 97 BPM | SYSTOLIC BLOOD PRESSURE: 130 MMHG | WEIGHT: 178 LBS | RESPIRATION RATE: 18 BRPM | HEIGHT: 66 IN | BODY MASS INDEX: 28.61 KG/M2 | OXYGEN SATURATION: 99 %

## 2025-07-03 DIAGNOSIS — C50.812 MALIGNANT NEOPLASM OF OVERLAPPING SITES OF LEFT BREAST IN FEMALE, ESTROGEN RECEPTOR POSITIVE: Primary | ICD-10-CM

## 2025-07-03 DIAGNOSIS — Z17.0 MALIGNANT NEOPLASM OF OVERLAPPING SITES OF LEFT BREAST IN FEMALE, ESTROGEN RECEPTOR POSITIVE: Primary | ICD-10-CM

## 2025-07-03 RX ORDER — DIAZEPAM 5 MG/1
5 TABLET ORAL ONCE
Status: CANCELLED | OUTPATIENT
Start: 2025-07-15 | End: 2025-07-03

## 2025-07-03 NOTE — H&P (VIEW-ONLY)
[facility]     Referring Provider: NONA Lara     Chief complaint: Left breast cancer on neoadjuvant endocrine therapy     Subjective   HPI:   The patient was initially called back after screening mammogram in 2022 for new left breast findings.  Diagnostic imaging demonstrated  At least 5 irregular masses located in the retroareolar/central left breast with associated calcifications.  Dr. Fam biopsied 2 of these masses. One of them showed intermediate grade invasive ductal carcinoma, ER/MI positive, HER2 negative and the other showed DCIS involving a papilloma.    At the time of diagnosis she had been recently diagnosed with a pulmonary embolus and was on anticoagulation.  She also was having issues with shortness of breath and was being seen by pulmonology.  She was not believed to be a good surgical candidate and was placed on endocrine therapy. She started anastrozole in 11/2022 and switched to Aromasin in 7/2023 which she has been on since.    Follow-up imaging in 5/2023 actually showed a decrease in the amount of calcifications on mammogram, as well as resolution of the irregular masses on ultrasound.  Follow-up imaging in 11/23 again showed a decrease in calcifications and no residual masses on ultrasound.  Imaging in 5/2024 was stable.  In 9/2024 diagnostic imaging was done on the right side for a nodule seen on CT and this showed an intramammary lymph node.  Imaging on the left was stable again in 12/2024.  In 5/2025 she underwent bilateral diagnostic imaging and at that time was also complaining of pain in the left upper inner breast.  This showed 2.9 cm of calcifications in the upper left breast and subsequent biopsy showed DCIS.  There was nothing abnormal in the area of pain.    She had a follow-up CT in 12/2022 that showed resolution of the PE and Xarelto was stopped in 2023 after 6 months of therapy.  She is no longer followed by pulmonology ans is not on any oxygen or  inhalers.  Genetic testing was negative aside from a VUS.  She has a past history of a breast reduction in 2000.       Oncology/Hematology History   Malignant neoplasm of overlapping sites of left breast in female, estrogen receptor positive   7/29/2022 Initial Diagnosis    Malignant neoplasm of overlapping sites of left breast in female, estrogen receptor positive     7/29/2022 Imaging    Screening MMG with Koby ( [facility]):  There are scattered areas of fibroglandular density.   There are indeterminate calcifications in the outer central anterior left breast. There is a focal asymmetry with questioned distortion in the slightly lower slightly outer anterior left breast. There is a focal asymmetry in the outer central middle to anterior left breast. There are no suspicious masses, calcifications, or areas of architectural distortion in the right breast.   BI-RADS 0: Incomplete      8/25/2022 Imaging    Left Diagnostic MMG with Koby & Left Breast Limited US ( [facility]):  MMG:  There are scattered areas of fibroglandular density.    In the outer central anterior left breast, there is a 1.3 cm group of somewhat pleomorphic calcifications, which is suspicious.  In the lower slightly outer middle left breast, there is a persistent approximately 1.5 cm mass with mild corresponding architectural distortion. There is also a central corresponding calcification.  The previously noted focal asymmetry in the outer central middle left breast partially effaces with spot compression and is less conspicuous  on the lateral view. In the slightly upper outer middle left breast, there is a persistent approximately 1 cm mass with corresponding architectural distortion. In the upper central anterior left breast, there is a persistent focal asymmetry with calcifications.  US:  Targeted sonographic evaluation of the left breast was performed from 2:00 to 6:00 in the region of the mammographic abnormalities.   At 1:00 in the retroareolar left  breast, there is an at least 2.3 x 0.9 x 2.0 cm hypoechoic mass with indistinct margins and internal echogenic foci from calcifications corresponding to the focal asymmetry with calcifications in the anterior left breast on mammogram. There is tubular elongation of the mass concerning for possible ductal extension.   At 3:00 in the retroareolar left breast, there is a 1.1 x 0.8 x 1.0 cm irregular hypoechoic mass with indistinct margins and internal echogenic foci from calcifications, which corresponds to the suspicious group of  calcifications on mammogram.   At 4:00, 3 cm from the nipple, there is a 1.4 x 1.0 x 1.1 cm irregular hypoechoic mass with peripheral vascularity corresponding to a mass with distortion on mammogram.   At 4:30, 3 cm from the nipple, approximately 1.8 cm from the above mass at the 4:00 position, there is a 0.9 x 0.5 x 0.9 cm irregular hypoechoic  mass with indistinct margins, which is suspicious.   At 3:00, 5 cm from the nipple, there is a 0.9 x 0.7 x 0.7 cm irregular hypoechoic mass with indistinct margins and corresponding internal vascularity, which corresponds to a mass with architectural distortion on mammogram and is suspicious   BI-RADS 4C: High suspicion for malignancy      9/21/2022 Biopsy    Left Breast, US-Guided Biopsy x 2:    1. Left Breast, 3:00, 2 cm FN, U/S-Guided Core Needle Biopsy for a Mass:               A. LOW GRADE DUCTAL CARCINOMA IN SITU (DCIS), INVOLVING A SMALL INTRADUCTAL        PAPILLOMA AND ADJACENT DUCTS, Solid, Cribriform and Micropapillary types with calcifications.                B. DCIS measures 14 mm in greatest contiguous extent.                C. See Biomarker Template #1.     Estrogen Receptor (ER): Positive (%, Strong)  Progesterone Receptor (PA): Positive (%, Strong)  Ki-67 6%     2. Left Breast, 4:00, 2 cm FN, U/S-Guided Core Needle Biopsy for a Mass:               A. INVASIVE DUCTAL CARCINOMA WITH LOBULAR FEATURES, Moderately  differentiated;       Yarely Histologic Grade II/III (tubule score = 3, nuclear score = 2, mitoses score = 1),       measuring at least 6 mm and involving multiple core fragments.                B. Atypical ductal hyperplasia with calcifications and involving an intraductal papilloma.                C. Negative for lymphovascular space invasion.                D. See Biomarker Template #2 and Comment.     Estrogen Receptor (ER): Positive (%, Strong)  Progesterone Receptor (NC): Positive (%, Strong)  HER2 Negative (IHC 2+; FISH copy # 2.8, ratio 1.3)  Ki-67 15%     9/28/2022 Genetic Testing    [facility] Cancers Panel (36 genes):    VUS in SMARCA4     9/28/2022 Imaging    Left Diagnostic MMG with Koby ( [facility]):  In the anterior one-third of the left breast at the 3 o'clock position there is a coil-shaped metallic clip representing the biopsy-proven site of malignancy. Residual calcifications are noted within 1 cm of the posterior inferior margin of the biopsy clip.  There is a second coil-shaped metallic clip in the middle third lower outer quadrant of the left breast at the 4-o'clock position. Surrounding increased density consistent with post biopsy change is noted. When compared to the prior mammogram 07/29/2022, no evidence for clip migration is appreciated.   BI-RADS 6: Known biopsy-proven malignancy.      1/19/2023 Imaging    Left Diagnostic MMG with Koby ( [facility]):  There are 2 coil-shaped biopsy clips in the left breast, one in the anterior one-third at the 3 o'clock position and one in the middle third lower quadrant at the 4-o'clock position.   Reidentified at the medial margin of the coil-shaped metallic clip at the 3-o'clock position are microcalcifications that are not appreciably changed. Two separate clusters measure on the order of 0.9 cm and 0.2 cm in greatest dimension are noted on the order of 0.8 cm from the inferior margin of the biopsy clip.  No abnormality is seen  adjacent to the coil-shaped metallic clip at the 4 o'clock position in the middle third.  I see no new or dominant masses or additional suspicious microcalcifications. There is no evidence for skin thickening, nipple retraction or axillary adenopathy.  BI-RADS 6: Known biopsy-proved malignancy.      5/1/2023 Imaging    Bilateral Diagnostic MMG with Koby & [facility]  MMG:  Scattered fibroglandular densities are seen throughout both breasts. In the anterior one-third of the left breast located lateral to the plane of the nipple at the 3 o'clock position there is a coil-shaped metallic clip. In the middle third of the lower outer quadrant of the left breast at the 4 o'clock position there is a second coil-shaped metallic clip.  These represent biopsy-proven sites of malignancy. There has been interval decrease in the microcalcifications surrounding the biopsy clip at the 3-o'clock position. Only sparse microcalcifications in the region remain spanning a distance measuring on the order of 3 mm compared to 3 mm previously. The biopsy clip in the region is unchanged in position and is located on the order of 6 mm superior and slightly lateral to the residual microcalcifications of interest. Other calcifications in the region appear unchanged.  The biopsy clip at the 4-o'clock position is no longer surrounded by residual surrounding density which can be appreciated on the mammograms dated 08/25/2022 and 09/28/2022. Also, there was noted to be an area of asymmetry/density seen in the middle third lateral aspect of the left breast in the 3 o'clock position on the mammogram dated 08/25/2022 that is no longer visualized.   I see no new or dominant masses in either breast. No evidence for skin thickening, nipple retraction or axillary adenopathy is appreciated.  US:  At the 1 o'clock position in a retroareolar location there is a stable 2.3 x 0.8 x 2.0 cm hypoechoic mass like region. This is most  consistent with an area of benign fibrous tissue and adjacent coil-shaped metallic clip which corresponds to the clip seen mammographically associated with microcalcifications is noted.  At the 4 o'clock position on the order of 3 cm from the nipple there is a visualized metallic clip. The previously noted irregular mass at the 4-o'clock position is no longer visualized. No suspicious findings at this location are visualized. This represents a biopsy-proven site of malignancy.  At the 3 o'clock position on the order of 5 cm from the nipple there has been interval resolution of the previously described irregular mass seen on the sonographic examination dated 08/25/2022. No abnormality is seen at this location on the current examination  There has been interval resolution of the irregular lesion seen at the 4:30 position on the order of 3 cm from the nipple on the examination from 08/25/2022. No abnormality is seen at this location on the current examination.   BI-RADS 6: Known biopsy-proven malignancy.      11/9/2023 Imaging    Left Diagnostic MMG with Koby & Left Breast Limited US ([facility]):  MMG:  Scattered fibroglandular densities are seen throughout the left breast. Reidentified in the anterior one third of the left breast at the 3 o'clock position is a coil shaped metallic clip. In the middle third lower outer quadrant of the left breast at the 4 o'clock position is a second coil shaped metallic clip. These represent biopsy-proven sites of malignancy.  There is continued interval decrease in microcalcifications surrounding the biopsy clip at the 3 o'clock position. Only faint microcalcifications spanning a region of 3 mm can be seen in the vicinity of the metallic clip on the 90-degree lateral spot magnification image. No residual suspicious microcalcifications posterior to the metallic clip can be seen on the spot magnification CC image.  No residual surrounding density is appreciated. Biopsy clip position at the 4  o'clock position.  I see no new or dominant masses. There is no evidence for architectural distortion or deformity.  US:  Targeted sonographic evaluation of the left breast was performed through the retroareolar region at the 1 o'clock position, at the 3 o'clock position on the order of 5 cm from the nipple in the 4 o'clock and 4:30 positions on the order of 3 cm from the nipple. No suspicious sonographic findings are appreciated.  BI-RADS 6: Known biopsy-proven malignancy.      5/30/2024 Imaging    Bilateral Diagnostic MMG with Koby ([facility]):  There are scattered areas of fibroglandular density.  There are HydroMARK coil clips marking 2 sites of known biopsy-proven malignancy in the left breast, which are located in the slightly upper outer anterior and lower slightly outer middle to anterior left breast. These are similar to prior. There are benign-appearing calcifications. There are no new suspicious masses, calcifications, or areas of architectural distortion in either breast.   BI-RADS 6: Known biopsy-proven malignancy      9/12/2024 Imaging    Right Diagnostic MMG with Koby & Right Breast Limited US ([facility]):  Standard images and R2 computerized assisted detection are obtained followed by digital tomosynthesis and limited directed right breast ultrasound. There are scattered areas of fibroglandular density in the right breast and there is a more focal nodular density in the middle third of the lower inner right breast measuring up to 1.7 cm that is similar to the recent right-sided mammogram dated 05/30/2024. It is also quite similar to previous mammograms dating back to 05/15/2015 and today's correlating ultrasound exam confirms the presence of a lymph node at this location measuring up to 1.4 cm. There are no findings to suggest malignancy.  BI-RADS 2. Benign.      12/10/2024 Imaging    Left Diagnostic MMG with Koby ([facility]):  There are scattered areas of fibroglandular density.     There are HydroMARK coil clips in  the upper outer anterior and lower outer middle to anterior left breast marking 2 sites of biopsy-proven malignancy. There are benign-appearing calcifications. There are no new suspicious masses, calcifications, or areas of architectural distortion.   BI-RADS 6: Known biopsy-proven malignancy     5/14/2025 Imaging    Bilateral Diagnostic MMG with Koby & Left Breast Limited US ([facility]):  MMG:  There are scattered areas of fibroglandular density.    There is a marker overlying the slightly upper inner middle left breast marking the area of concern indicated by the patient. No suspicious focal mammographic abnormality is identified deep to the marker. This area was further assessed with ultrasound.   There are HydroMARK coil clips in the upper outer anterior and lower central middle to anterior left breast marking 2 sites of biopsy-proven malignancy. Surrounding parenchyma is similar to prior mammogram. However, in the upper central anterior left breast, there are new grouped calcifications measuring approximately 2.9 cm, which are suspicious.  There are no suspicious masses, calcifications, or areas of architectural distortion in the right breast.  US:  Targeted sonographic evaluation of the left breast was performed in the area of concern indicated by the patient from 10:00 to 11:00. Predominantly fatty tissue is identified. No suspicious cystic or solid mass is identified.   BI-RADS 4: Suspicious     6/23/2025 Biopsy    Left Breast, Stereotactic Biopsy ([facility]):    1.  Left breast, 1:00, stereotactic biopsies of calcifications ([facility]): INTERMEDIATE GRADE DUCTAL CARCINOMA IN SITU (DCIS).               - Architectural patterns: Solid and cribriform with central necrosis and associated microcalcifications.               - DCIS is present in all tissue cores, measuring up to 10 mm maximally.               - Negative for invasive carcinoma.     Estrogen Receptor (ER): Positive (%, Strong)  Progesterone Receptor  (PgR): Positive (71-80%, Strong)  Ki-67 15%         Review of Systems:  See interval history.       Medications:    Current Outpatient Medications:     celecoxib (CeleBREX) 200 MG capsule, Take 1 capsule (200 mg) once daily for arthritis, Disp: 30 capsule, Rfl: 6    Cholecalciferol (VITAMIN D3) 2000 UNITS tablet, Take 1 tablet by mouth Daily., Disp: , Rfl:     DULoxetine (CYMBALTA) 60 MG capsule, TAKE 1 CAPSULE BY MOUTH DAILY AS DIRECTED, Disp: 90 capsule, Rfl: 3    erythromycin (ROMYCIN) 5 MG/GM ophthalmic ointment, apply (1CM)  by ophthalmic route  every evening ribbon along lash line after warm compress mask every night., Disp: , Rfl:     exemestane (AROMASIN) 25 MG tablet, Take 1 tablet by mouth Daily., Disp: , Rfl:     ezetimibe (ZETIA) 10 MG tablet, TAKE 1 TABLET BY MOUTH DAILY, Disp: 90 tablet, Rfl: 3    gabapentin (NEURONTIN) 300 MG capsule, TAKE 1 CAPSULE BY MOUTH 3 TIMES A DAY FOR PERIPHERAL NEUROPATHY AS DIRECTED, Disp: 90 capsule, Rfl: 1    levothyroxine (Synthroid) 88 MCG tablet, Take 1 tablet (88 mcg) every day for low thyroid, Disp: 90 tablet, Rfl: 1    LORazepam (ATIVAN) 1 MG tablet, TAKE 1 TABLET BY MOUTH 2 TIMES A DAY FOR CHRONIC ANXIETY OR PANIC, Disp: 60 tablet, Rfl: 4    Mounjaro 15 MG/0.5ML solution auto-injector, INJECT 15 MG UNDER THE SKIN ONCE WEEKLY, Disp: 2 mL, Rfl: 1    multivitamin (MULTI VITAMIN PO), Take  by mouth Daily., Disp: , Rfl:     polyethylene glycol (MIRALAX) 17 g packet, Take 17 g by mouth Daily., Disp: , Rfl:     simvastatin (ZOCOR) 20 MG tablet, TAKE 1 TABLET BY MOUTH DAILY FOR HIGH CHOLESTEROL, Disp: 90 tablet, Rfl: 2    traZODone (DESYREL) 150 MG tablet, Take 1 tablet by mouth Every Night., Disp: 90 tablet, Rfl: 3    valsartan (DIOVAN) 320 MG tablet, Take 1 tablet by mouth Every Morning., Disp: 90 tablet, Rfl: 10      Allergies   Allergen Reactions    Morphine And Codeine     Other Other (See Comments)     REJECTED DACRON SUTURES IN THE PAST AND INCISION CAME APART        Family History   Problem Relation Age of Onset    Alcohol abuse Father         father    Breast cancer Daughter         40s    Cancer Other     Diabetes Other         type II    Leukemia Grandchild 19    Depression Mother     Arthritis Mother         mother    Diabetes Maternal Grandfather     COPD Daughter     Cancer Daughter     Diabetes Daughter     Early death Daughter         suicide 07/29/2019       Objective   PHYSICAL EXAMINATION:   Vitals:    07/03/25 1132   BP: 130/88   Pulse: 97   Resp: 18   SpO2: 99%     ECOG 0 - Asymptomatic  General: NAD, well appearing  Psych: a&o x3, normal mood and affect  Eyes: EOMI, no scleral icterus  ENMT: neck supple without masses or thyromegaly, mucous membranes moist  MSK: normal gait, normal ROM in bilateral shoulders  Lymph nodes: no cervical, supraclavicular or axillary lymphadenopathy  Breast: symmetric, moderate size, grade 3 ptosis  Right: Wise pattern scars, otherwise no visible abnormalities on inspection while seated, with arms raised or hands on hips. No masses or nipple abnormalities.  Left: On inspection, there are Wise pattern scars and periareolar ecchymoses.  At 2:00, under the areola, there is a 2 cm postbiopsy hematoma.    Left breast, in-office ultrasound: At 2:00, under the areolar edge, there is a small postbiopsy hematoma with biopsy clip visualized.       I have independently reviewed her imaging and here are my findings:   In 2022 in the left retroareolar breast there were at least 5 separate irregular masses with associated calcifications.  2 of these masses were biopsied and are marked with hydromark clips.  Clips are at least 3 cm apart on mammogram.  Over the past 2 years imaging has shown a decrease in calcifications and resolution of the irregular masses.  Diagnostic imaging in May showed new calcifications measuring 2.9 cm.      Assessment & Plan   Assessment:  80 y.o. F with left breast cancer initially diagnosed in 2022: Intermediate  grade, invasive ductal carcinoma, ER/LA positive, Her2 negative.  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  She had at least 5 retroareolar irregular masses seen on ultrasound, some of which may have been additional cancer sites or could have been uninvolved papillomas.  She was placed on neoadjuvant endocrine therapy because she was not a good surgical candidate and initially had a very good response on imaging.  Recent diagnostic imaging in 5/2025 showed new calcifications and biopsy shows DCIS.    Discussion:  She is agreeable to surgery and we went over options.  She had multifocal retroareolar disease upfront and multiple masses only 2 of which were biopsied.  She now has been on neoadjuvant endocrine therapy and has a separate new site of disease.  For all these reasons I have recommended a mastectomy.  I did offer a plastic surgery referral to discuss reconstruction, however the patient prefers a flat closure.  She had negative genetic testing and there is no indication for a contralateral prophylactic mastectomy since her risk of a second primary breast cancer is low.    We discussed axillary staging. I described the procedure for sentinel lymph node biopsy in detail, including the preoperative injection of a radiotracer and intraoperative injection of lymphazurin blue dye. I explained that this is a mapping test and not a cancer test, that all of the lymph nodes containing these dyes will be removed for complete testing by pathology, and that the results could impact the decision for adjuvant treatment or additional surgery.    In her case, if sentinel nodes are positive for cancer intraoperatively, I will proceed with a completion dissection. Even if the sentinel nodes are negative intraoperatively, there is a ~10-15% risk that they are positive on final pathology (false negative rate). In this case, she may require  a second operation for completion dissection versus axillary radiation. I explained that axillary node dissection is associated with an increased risk of lymphedema versus sentinel lymph node biopsy (15-30% vs. <10%), that there is an increased risk of permanent upper inner arm numbness, and a risk of injury to motor nerves that control the shoulder muscles.     I described additional risks and potential complications associated with surgery, including, but not limited to, bleeding, infection, complications related to blue dye, lymphedema, deformity/poor cosmetic result, chronic pain, neurovascular injury, numbness, seroma, hematoma, deep venous thrombosis, skin flap necrosis, disease recurrence and the possibility of requiring additional surgery. We also discussed other treatment options including the option of not undergoing any surgical treatment and the risks associated with this including disease progression. She expressed an understanding of these factors and wished to proceed.    [facility] statement:  The patient's clinical stage is documented as above. This was discussed with the patient prior to initiation of treatment. All available pathology reports were discussed with the patient today. All treatment decisions were made via shared decision making with the patient. This patient was evaluated for appropriate ancillary referrals including, pre-treatment functional assessment, exercise program, nutrition program, genetics, and lymphedema clinic. This patient received preoperative and postoperative education. The patient was offered a breast reconstruction referral; risks and benefits were discussed today. The patient was educated on perioperative multimodal pain management strategies. Barriers to effective and efficient care are/will be evaluated by the nurse navigator.    Plan:  - Left mastectomy and sentinel lymph node biopsy, possible axillary dissection.    Arabella Randle MD    I spent 100 minutes  caring for Claudette on this date of service. This time includes time spent by me in the following activities: preparing for the visit, performing a medically appropriate examination and/or evaluation , counseling and educating the patient/family/caregiver, documenting information in the medical record, and independently interpreting results and communicating that information with the patient/family/caregiver.      CC:  NONA Lara MD

## 2025-07-03 NOTE — PROGRESS NOTES
[facility]     Referring Provider: NONA Lara     Chief complaint: Left breast cancer on neoadjuvant endocrine therapy     Subjective   HPI:   The patient was initially called back after screening mammogram in 2022 for new left breast findings.  Diagnostic imaging demonstrated  At least 5 irregular masses located in the retroareolar/central left breast with associated calcifications.  Dr. Fam biopsied 2 of these masses. One of them showed intermediate grade invasive ductal carcinoma, ER/SD positive, HER2 negative and the other showed DCIS involving a papilloma.    At the time of diagnosis she had been recently diagnosed with a pulmonary embolus and was on anticoagulation.  She also was having issues with shortness of breath and was being seen by pulmonology.  She was not believed to be a good surgical candidate and was placed on endocrine therapy. She started anastrozole in 11/2022 and switched to Aromasin in 7/2023 which she has been on since.    Follow-up imaging in 5/2023 actually showed a decrease in the amount of calcifications on mammogram, as well as resolution of the irregular masses on ultrasound.  Follow-up imaging in 11/23 again showed a decrease in calcifications and no residual masses on ultrasound.  Imaging in 5/2024 was stable.  In 9/2024 diagnostic imaging was done on the right side for a nodule seen on CT and this showed an intramammary lymph node.  Imaging on the left was stable again in 12/2024.  In 5/2025 she underwent bilateral diagnostic imaging and at that time was also complaining of pain in the left upper inner breast.  This showed 2.9 cm of calcifications in the upper left breast and subsequent biopsy showed DCIS.  There was nothing abnormal in the area of pain.    She had a follow-up CT in 12/2022 that showed resolution of the PE and Xarelto was stopped in 2023 after 6 months of therapy.  She is no longer followed by pulmonology ans is not on any oxygen or  inhalers.  Genetic testing was negative aside from a VUS.  She has a past history of a breast reduction in 2000.       Oncology/Hematology History   Malignant neoplasm of overlapping sites of left breast in female, estrogen receptor positive   7/29/2022 Initial Diagnosis    Malignant neoplasm of overlapping sites of left breast in female, estrogen receptor positive     7/29/2022 Imaging    Screening MMG with Koby ( [facility]):  There are scattered areas of fibroglandular density.   There are indeterminate calcifications in the outer central anterior left breast. There is a focal asymmetry with questioned distortion in the slightly lower slightly outer anterior left breast. There is a focal asymmetry in the outer central middle to anterior left breast. There are no suspicious masses, calcifications, or areas of architectural distortion in the right breast.   BI-RADS 0: Incomplete      8/25/2022 Imaging    Left Diagnostic MMG with Koby & Left Breast Limited US ( [facility]):  MMG:  There are scattered areas of fibroglandular density.    In the outer central anterior left breast, there is a 1.3 cm group of somewhat pleomorphic calcifications, which is suspicious.  In the lower slightly outer middle left breast, there is a persistent approximately 1.5 cm mass with mild corresponding architectural distortion. There is also a central corresponding calcification.  The previously noted focal asymmetry in the outer central middle left breast partially effaces with spot compression and is less conspicuous  on the lateral view. In the slightly upper outer middle left breast, there is a persistent approximately 1 cm mass with corresponding architectural distortion. In the upper central anterior left breast, there is a persistent focal asymmetry with calcifications.  US:  Targeted sonographic evaluation of the left breast was performed from 2:00 to 6:00 in the region of the mammographic abnormalities.   At 1:00 in the retroareolar left  breast, there is an at least 2.3 x 0.9 x 2.0 cm hypoechoic mass with indistinct margins and internal echogenic foci from calcifications corresponding to the focal asymmetry with calcifications in the anterior left breast on mammogram. There is tubular elongation of the mass concerning for possible ductal extension.   At 3:00 in the retroareolar left breast, there is a 1.1 x 0.8 x 1.0 cm irregular hypoechoic mass with indistinct margins and internal echogenic foci from calcifications, which corresponds to the suspicious group of  calcifications on mammogram.   At 4:00, 3 cm from the nipple, there is a 1.4 x 1.0 x 1.1 cm irregular hypoechoic mass with peripheral vascularity corresponding to a mass with distortion on mammogram.   At 4:30, 3 cm from the nipple, approximately 1.8 cm from the above mass at the 4:00 position, there is a 0.9 x 0.5 x 0.9 cm irregular hypoechoic  mass with indistinct margins, which is suspicious.   At 3:00, 5 cm from the nipple, there is a 0.9 x 0.7 x 0.7 cm irregular hypoechoic mass with indistinct margins and corresponding internal vascularity, which corresponds to a mass with architectural distortion on mammogram and is suspicious   BI-RADS 4C: High suspicion for malignancy      9/21/2022 Biopsy    Left Breast, US-Guided Biopsy x 2:    1. Left Breast, 3:00, 2 cm FN, U/S-Guided Core Needle Biopsy for a Mass:               A. LOW GRADE DUCTAL CARCINOMA IN SITU (DCIS), INVOLVING A SMALL INTRADUCTAL        PAPILLOMA AND ADJACENT DUCTS, Solid, Cribriform and Micropapillary types with calcifications.                B. DCIS measures 14 mm in greatest contiguous extent.                C. See Biomarker Template #1.     Estrogen Receptor (ER): Positive (%, Strong)  Progesterone Receptor (SD): Positive (%, Strong)  Ki-67 6%     2. Left Breast, 4:00, 2 cm FN, U/S-Guided Core Needle Biopsy for a Mass:               A. INVASIVE DUCTAL CARCINOMA WITH LOBULAR FEATURES, Moderately  differentiated;       Yarely Histologic Grade II/III (tubule score = 3, nuclear score = 2, mitoses score = 1),       measuring at least 6 mm and involving multiple core fragments.                B. Atypical ductal hyperplasia with calcifications and involving an intraductal papilloma.                C. Negative for lymphovascular space invasion.                D. See Biomarker Template #2 and Comment.     Estrogen Receptor (ER): Positive (%, Strong)  Progesterone Receptor (ND): Positive (%, Strong)  HER2 Negative (IHC 2+; FISH copy # 2.8, ratio 1.3)  Ki-67 15%     9/28/2022 Genetic Testing    [facility] Cancers Panel (36 genes):    VUS in SMARCA4     9/28/2022 Imaging    Left Diagnostic MMG with Koby ( [facility]):  In the anterior one-third of the left breast at the 3 o'clock position there is a coil-shaped metallic clip representing the biopsy-proven site of malignancy. Residual calcifications are noted within 1 cm of the posterior inferior margin of the biopsy clip.  There is a second coil-shaped metallic clip in the middle third lower outer quadrant of the left breast at the 4-o'clock position. Surrounding increased density consistent with post biopsy change is noted. When compared to the prior mammogram 07/29/2022, no evidence for clip migration is appreciated.   BI-RADS 6: Known biopsy-proven malignancy.      1/19/2023 Imaging    Left Diagnostic MMG with Koby ( [facility]):  There are 2 coil-shaped biopsy clips in the left breast, one in the anterior one-third at the 3 o'clock position and one in the middle third lower quadrant at the 4-o'clock position.   Reidentified at the medial margin of the coil-shaped metallic clip at the 3-o'clock position are microcalcifications that are not appreciably changed. Two separate clusters measure on the order of 0.9 cm and 0.2 cm in greatest dimension are noted on the order of 0.8 cm from the inferior margin of the biopsy clip.  No abnormality is seen  adjacent to the coil-shaped metallic clip at the 4 o'clock position in the middle third.  I see no new or dominant masses or additional suspicious microcalcifications. There is no evidence for skin thickening, nipple retraction or axillary adenopathy.  BI-RADS 6: Known biopsy-proved malignancy.      5/1/2023 Imaging    Bilateral Diagnostic MMG with Koby & [facility]  MMG:  Scattered fibroglandular densities are seen throughout both breasts. In the anterior one-third of the left breast located lateral to the plane of the nipple at the 3 o'clock position there is a coil-shaped metallic clip. In the middle third of the lower outer quadrant of the left breast at the 4 o'clock position there is a second coil-shaped metallic clip.  These represent biopsy-proven sites of malignancy. There has been interval decrease in the microcalcifications surrounding the biopsy clip at the 3-o'clock position. Only sparse microcalcifications in the region remain spanning a distance measuring on the order of 3 mm compared to 3 mm previously. The biopsy clip in the region is unchanged in position and is located on the order of 6 mm superior and slightly lateral to the residual microcalcifications of interest. Other calcifications in the region appear unchanged.  The biopsy clip at the 4-o'clock position is no longer surrounded by residual surrounding density which can be appreciated on the mammograms dated 08/25/2022 and 09/28/2022. Also, there was noted to be an area of asymmetry/density seen in the middle third lateral aspect of the left breast in the 3 o'clock position on the mammogram dated 08/25/2022 that is no longer visualized.   I see no new or dominant masses in either breast. No evidence for skin thickening, nipple retraction or axillary adenopathy is appreciated.  US:  At the 1 o'clock position in a retroareolar location there is a stable 2.3 x 0.8 x 2.0 cm hypoechoic mass like region. This is most  consistent with an area of benign fibrous tissue and adjacent coil-shaped metallic clip which corresponds to the clip seen mammographically associated with microcalcifications is noted.  At the 4 o'clock position on the order of 3 cm from the nipple there is a visualized metallic clip. The previously noted irregular mass at the 4-o'clock position is no longer visualized. No suspicious findings at this location are visualized. This represents a biopsy-proven site of malignancy.  At the 3 o'clock position on the order of 5 cm from the nipple there has been interval resolution of the previously described irregular mass seen on the sonographic examination dated 08/25/2022. No abnormality is seen at this location on the current examination  There has been interval resolution of the irregular lesion seen at the 4:30 position on the order of 3 cm from the nipple on the examination from 08/25/2022. No abnormality is seen at this location on the current examination.   BI-RADS 6: Known biopsy-proven malignancy.      11/9/2023 Imaging    Left Diagnostic MMG with Koby & Left Breast Limited US ([facility]):  MMG:  Scattered fibroglandular densities are seen throughout the left breast. Reidentified in the anterior one third of the left breast at the 3 o'clock position is a coil shaped metallic clip. In the middle third lower outer quadrant of the left breast at the 4 o'clock position is a second coil shaped metallic clip. These represent biopsy-proven sites of malignancy.  There is continued interval decrease in microcalcifications surrounding the biopsy clip at the 3 o'clock position. Only faint microcalcifications spanning a region of 3 mm can be seen in the vicinity of the metallic clip on the 90-degree lateral spot magnification image. No residual suspicious microcalcifications posterior to the metallic clip can be seen on the spot magnification CC image.  No residual surrounding density is appreciated. Biopsy clip position at the 4  o'clock position.  I see no new or dominant masses. There is no evidence for architectural distortion or deformity.  US:  Targeted sonographic evaluation of the left breast was performed through the retroareolar region at the 1 o'clock position, at the 3 o'clock position on the order of 5 cm from the nipple in the 4 o'clock and 4:30 positions on the order of 3 cm from the nipple. No suspicious sonographic findings are appreciated.  BI-RADS 6: Known biopsy-proven malignancy.      5/30/2024 Imaging    Bilateral Diagnostic MMG with Koby ([facility]):  There are scattered areas of fibroglandular density.  There are HydroMARK coil clips marking 2 sites of known biopsy-proven malignancy in the left breast, which are located in the slightly upper outer anterior and lower slightly outer middle to anterior left breast. These are similar to prior. There are benign-appearing calcifications. There are no new suspicious masses, calcifications, or areas of architectural distortion in either breast.   BI-RADS 6: Known biopsy-proven malignancy      9/12/2024 Imaging    Right Diagnostic MMG with Koby & Right Breast Limited US ([facility]):  Standard images and R2 computerized assisted detection are obtained followed by digital tomosynthesis and limited directed right breast ultrasound. There are scattered areas of fibroglandular density in the right breast and there is a more focal nodular density in the middle third of the lower inner right breast measuring up to 1.7 cm that is similar to the recent right-sided mammogram dated 05/30/2024. It is also quite similar to previous mammograms dating back to 05/15/2015 and today's correlating ultrasound exam confirms the presence of a lymph node at this location measuring up to 1.4 cm. There are no findings to suggest malignancy.  BI-RADS 2. Benign.      12/10/2024 Imaging    Left Diagnostic MMG with Koby ([facility]):  There are scattered areas of fibroglandular density.     There are HydroMARK coil clips in  the upper outer anterior and lower outer middle to anterior left breast marking 2 sites of biopsy-proven malignancy. There are benign-appearing calcifications. There are no new suspicious masses, calcifications, or areas of architectural distortion.   BI-RADS 6: Known biopsy-proven malignancy     5/14/2025 Imaging    Bilateral Diagnostic MMG with Koby & Left Breast Limited US ([facility]):  MMG:  There are scattered areas of fibroglandular density.    There is a marker overlying the slightly upper inner middle left breast marking the area of concern indicated by the patient. No suspicious focal mammographic abnormality is identified deep to the marker. This area was further assessed with ultrasound.   There are HydroMARK coil clips in the upper outer anterior and lower central middle to anterior left breast marking 2 sites of biopsy-proven malignancy. Surrounding parenchyma is similar to prior mammogram. However, in the upper central anterior left breast, there are new grouped calcifications measuring approximately 2.9 cm, which are suspicious.  There are no suspicious masses, calcifications, or areas of architectural distortion in the right breast.  US:  Targeted sonographic evaluation of the left breast was performed in the area of concern indicated by the patient from 10:00 to 11:00. Predominantly fatty tissue is identified. No suspicious cystic or solid mass is identified.   BI-RADS 4: Suspicious     6/23/2025 Biopsy    Left Breast, Stereotactic Biopsy ([facility]):    1.  Left breast, 1:00, stereotactic biopsies of calcifications ([facility]): INTERMEDIATE GRADE DUCTAL CARCINOMA IN SITU (DCIS).               - Architectural patterns: Solid and cribriform with central necrosis and associated microcalcifications.               - DCIS is present in all tissue cores, measuring up to 10 mm maximally.               - Negative for invasive carcinoma.     Estrogen Receptor (ER): Positive (%, Strong)  Progesterone Receptor  (PgR): Positive (71-80%, Strong)  Ki-67 15%         Review of Systems:  See interval history.       Medications:    Current Outpatient Medications:     celecoxib (CeleBREX) 200 MG capsule, Take 1 capsule (200 mg) once daily for arthritis, Disp: 30 capsule, Rfl: 6    Cholecalciferol (VITAMIN D3) 2000 UNITS tablet, Take 1 tablet by mouth Daily., Disp: , Rfl:     DULoxetine (CYMBALTA) 60 MG capsule, TAKE 1 CAPSULE BY MOUTH DAILY AS DIRECTED, Disp: 90 capsule, Rfl: 3    erythromycin (ROMYCIN) 5 MG/GM ophthalmic ointment, apply (1CM)  by ophthalmic route  every evening ribbon along lash line after warm compress mask every night., Disp: , Rfl:     exemestane (AROMASIN) 25 MG tablet, Take 1 tablet by mouth Daily., Disp: , Rfl:     ezetimibe (ZETIA) 10 MG tablet, TAKE 1 TABLET BY MOUTH DAILY, Disp: 90 tablet, Rfl: 3    gabapentin (NEURONTIN) 300 MG capsule, TAKE 1 CAPSULE BY MOUTH 3 TIMES A DAY FOR PERIPHERAL NEUROPATHY AS DIRECTED, Disp: 90 capsule, Rfl: 1    levothyroxine (Synthroid) 88 MCG tablet, Take 1 tablet (88 mcg) every day for low thyroid, Disp: 90 tablet, Rfl: 1    LORazepam (ATIVAN) 1 MG tablet, TAKE 1 TABLET BY MOUTH 2 TIMES A DAY FOR CHRONIC ANXIETY OR PANIC, Disp: 60 tablet, Rfl: 4    Mounjaro 15 MG/0.5ML solution auto-injector, INJECT 15 MG UNDER THE SKIN ONCE WEEKLY, Disp: 2 mL, Rfl: 1    multivitamin (MULTI VITAMIN PO), Take  by mouth Daily., Disp: , Rfl:     polyethylene glycol (MIRALAX) 17 g packet, Take 17 g by mouth Daily., Disp: , Rfl:     simvastatin (ZOCOR) 20 MG tablet, TAKE 1 TABLET BY MOUTH DAILY FOR HIGH CHOLESTEROL, Disp: 90 tablet, Rfl: 2    traZODone (DESYREL) 150 MG tablet, Take 1 tablet by mouth Every Night., Disp: 90 tablet, Rfl: 3    valsartan (DIOVAN) 320 MG tablet, Take 1 tablet by mouth Every Morning., Disp: 90 tablet, Rfl: 10      Allergies   Allergen Reactions    Morphine And Codeine     Other Other (See Comments)     REJECTED DACRON SUTURES IN THE PAST AND INCISION CAME APART        Family History   Problem Relation Age of Onset    Alcohol abuse Father         father    Breast cancer Daughter         40s    Cancer Other     Diabetes Other         type II    Leukemia Grandchild 19    Depression Mother     Arthritis Mother         mother    Diabetes Maternal Grandfather     COPD Daughter     Cancer Daughter     Diabetes Daughter     Early death Daughter         suicide 07/29/2019       Objective   PHYSICAL EXAMINATION:   Vitals:    07/03/25 1132   BP: 130/88   Pulse: 97   Resp: 18   SpO2: 99%     ECOG 0 - Asymptomatic  General: NAD, well appearing  Psych: a&o x3, normal mood and affect  Eyes: EOMI, no scleral icterus  ENMT: neck supple without masses or thyromegaly, mucous membranes moist  MSK: normal gait, normal ROM in bilateral shoulders  Lymph nodes: no cervical, supraclavicular or axillary lymphadenopathy  Breast: symmetric, moderate size, grade 3 ptosis  Right: Wise pattern scars, otherwise no visible abnormalities on inspection while seated, with arms raised or hands on hips. No masses or nipple abnormalities.  Left: On inspection, there are Wise pattern scars and periareolar ecchymoses.  At 2:00, under the areola, there is a 2 cm postbiopsy hematoma.    Left breast, in-office ultrasound: At 2:00, under the areolar edge, there is a small postbiopsy hematoma with biopsy clip visualized.       I have independently reviewed her imaging and here are my findings:   In 2022 in the left retroareolar breast there were at least 5 separate irregular masses with associated calcifications.  2 of these masses were biopsied and are marked with hydromark clips.  Clips are at least 3 cm apart on mammogram.  Over the past 2 years imaging has shown a decrease in calcifications and resolution of the irregular masses.  Diagnostic imaging in May showed new calcifications measuring 2.9 cm.      Assessment & Plan   Assessment:  80 y.o. F with left breast cancer initially diagnosed in 2022: Intermediate  grade, invasive ductal carcinoma, ER/AZ positive, Her2 negative.  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  She had at least 5 retroareolar irregular masses seen on ultrasound, some of which may have been additional cancer sites or could have been uninvolved papillomas.  She was placed on neoadjuvant endocrine therapy because she was not a good surgical candidate and initially had a very good response on imaging.  Recent diagnostic imaging in 5/2025 showed new calcifications and biopsy shows DCIS.    Discussion:  She is agreeable to surgery and we went over options.  She had multifocal retroareolar disease upfront and multiple masses only 2 of which were biopsied.  She now has been on neoadjuvant endocrine therapy and has a separate new site of disease.  For all these reasons I have recommended a mastectomy.  I did offer a plastic surgery referral to discuss reconstruction, however the patient prefers a flat closure.  She had negative genetic testing and there is no indication for a contralateral prophylactic mastectomy since her risk of a second primary breast cancer is low.    We discussed axillary staging. I described the procedure for sentinel lymph node biopsy in detail, including the preoperative injection of a radiotracer and intraoperative injection of lymphazurin blue dye. I explained that this is a mapping test and not a cancer test, that all of the lymph nodes containing these dyes will be removed for complete testing by pathology, and that the results could impact the decision for adjuvant treatment or additional surgery.    In her case, if sentinel nodes are positive for cancer intraoperatively, I will proceed with a completion dissection. Even if the sentinel nodes are negative intraoperatively, there is a ~10-15% risk that they are positive on final pathology (false negative rate). In this case, she may require  a second operation for completion dissection versus axillary radiation. I explained that axillary node dissection is associated with an increased risk of lymphedema versus sentinel lymph node biopsy (15-30% vs. <10%), that there is an increased risk of permanent upper inner arm numbness, and a risk of injury to motor nerves that control the shoulder muscles.     I described additional risks and potential complications associated with surgery, including, but not limited to, bleeding, infection, complications related to blue dye, lymphedema, deformity/poor cosmetic result, chronic pain, neurovascular injury, numbness, seroma, hematoma, deep venous thrombosis, skin flap necrosis, disease recurrence and the possibility of requiring additional surgery. We also discussed other treatment options including the option of not undergoing any surgical treatment and the risks associated with this including disease progression. She expressed an understanding of these factors and wished to proceed.    [facility] statement:  The patient's clinical stage is documented as above. This was discussed with the patient prior to initiation of treatment. All available pathology reports were discussed with the patient today. All treatment decisions were made via shared decision making with the patient. This patient was evaluated for appropriate ancillary referrals including, pre-treatment functional assessment, exercise program, nutrition program, genetics, and lymphedema clinic. This patient received preoperative and postoperative education. The patient was offered a breast reconstruction referral; risks and benefits were discussed today. The patient was educated on perioperative multimodal pain management strategies. Barriers to effective and efficient care are/will be evaluated by the nurse navigator.    Plan:  - Left mastectomy and sentinel lymph node biopsy, possible axillary dissection.    Arabella Randle MD    I spent 100 minutes  caring for Claudette on this date of service. This time includes time spent by me in the following activities: preparing for the visit, performing a medically appropriate examination and/or evaluation , counseling and educating the patient/family/caregiver, documenting information in the medical record, and independently interpreting results and communicating that information with the patient/family/caregiver.      CC:  NONA Lara MD

## 2025-07-03 NOTE — LETTER
July 3, 2025     NONA Lara  [address]  [facility] [address]  [place] [id number]    Patient: Claudette L McManus   YOB: 1945   Date of Visit: 7/3/2025     Dear NONA Lara:       Thank you for referring Claudette McManus to me for evaluation. Below are the relevant portions of my assessment and plan of care.    If you have questions, please do not hesitate to call me. I look forward to following Claudette along with you.         Sincerely,        Arabella Randle MD        CC: MD Jer Augustin, Arabella SUHKLA MD  07/03/25 1307  Sign when Signing Visit  [facility]     Referring Provider: NONA Lara     Chief complaint: Left breast cancer on neoadjuvant endocrine therapy     Subjective   HPI:   The patient was initially called back after screening mammogram in 2022 for new left breast findings.  Diagnostic imaging demonstrated  At least 5 irregular masses located in the retroareolar/central left breast with associated calcifications.  Dr. Fam biopsied 2 of these masses. One of them showed intermediate grade invasive ductal carcinoma, ER/VT positive, HER2 negative and the other showed DCIS involving a papilloma.    At the time of diagnosis she had been recently diagnosed with a pulmonary embolus and was on anticoagulation.  She also was having issues with shortness of breath and was being seen by pulmonology.  She was not believed to be a good surgical candidate and was placed on endocrine therapy. She started anastrozole in 11/2022 and switched to Aromasin in 7/2023 which she has been on since.    Follow-up imaging in 5/2023 actually showed a decrease in the amount of calcifications on mammogram, as well as resolution of the irregular masses on ultrasound.  Follow-up imaging in 11/23 again showed a decrease in calcifications and no residual masses on ultrasound.  Imaging in 5/2024 was stable.  In 9/2024 diagnostic imaging was done  on the right side for a nodule seen on CT and this showed an intramammary lymph node.  Imaging on the left was stable again in 12/2024.  In 5/2025 she underwent bilateral diagnostic imaging and at that time was also complaining of pain in the left upper inner breast.  This showed 2.9 cm of calcifications in the upper left breast and subsequent biopsy showed DCIS.  There was nothing abnormal in the area of pain.    She had a follow-up CT in 12/2022 that showed resolution of the PE and Xarelto was stopped in 2023 after 6 months of therapy.  She is no longer followed by pulmonology ans is not on any oxygen or inhalers.  Genetic testing was negative aside from a VUS.  She has a past history of a breast reduction in 2000.       Oncology/Hematology History   Malignant neoplasm of overlapping sites of left breast in female, estrogen receptor positive   7/29/2022 Initial Diagnosis    Malignant neoplasm of overlapping sites of left breast in female, estrogen receptor positive     7/29/2022 Imaging    Screening MMG with Koby ([facility]):  There are scattered areas of fibroglandular density.   There are indeterminate calcifications in the outer central anterior left breast. There is a focal asymmetry with questioned distortion in the slightly lower slightly outer anterior left breast. There is a focal asymmetry in the outer central middle to anterior left breast. There are no suspicious masses, calcifications, or areas of architectural distortion in the right breast.   BI-RADS 0: Incomplete      8/25/2022 Imaging    Left Diagnostic MMG with Koby & Left Breast Limited US ([facility]):  MMG:  There are scattered areas of fibroglandular density.    In the outer central anterior left breast, there is a 1.3 cm group of somewhat pleomorphic calcifications, which is suspicious.  In the lower slightly outer middle left breast, there is a persistent approximately 1.5 cm mass with mild corresponding architectural distortion. There is also a  central corresponding calcification.  The previously noted focal asymmetry in the outer central middle left breast partially effaces with spot compression and is less conspicuous  on the lateral view. In the slightly upper outer middle left breast, there is a persistent approximately 1 cm mass with corresponding architectural distortion. In the upper central anterior left breast, there is a persistent focal asymmetry with calcifications.  US:  Targeted sonographic evaluation of the left breast was performed from 2:00 to 6:00 in the region of the mammographic abnormalities.   At 1:00 in the retroareolar left breast, there is an at least 2.3 x 0.9 x 2.0 cm hypoechoic mass with indistinct margins and internal echogenic foci from calcifications corresponding to the focal asymmetry with calcifications in the anterior left breast on mammogram. There is tubular elongation of the mass concerning for possible ductal extension.   At 3:00 in the retroareolar left breast, there is a 1.1 x 0.8 x 1.0 cm irregular hypoechoic mass with indistinct margins and internal echogenic foci from calcifications, which corresponds to the suspicious group of  calcifications on mammogram.   At 4:00, 3 cm from the nipple, there is a 1.4 x 1.0 x 1.1 cm irregular hypoechoic mass with peripheral vascularity corresponding to a mass with distortion on mammogram.   At 4:30, 3 cm from the nipple, approximately 1.8 cm from the above mass at the 4:00 position, there is a 0.9 x 0.5 x 0.9 cm irregular hypoechoic  mass with indistinct margins, which is suspicious.   At 3:00, 5 cm from the nipple, there is a 0.9 x 0.7 x 0.7 cm irregular hypoechoic mass with indistinct margins and corresponding internal vascularity, which corresponds to a mass with architectural distortion on mammogram and is suspicious   BI-RADS 4C: High suspicion for malignancy      9/21/2022 Biopsy    Left Breast, US-Guided Biopsy x 2:    1. Left Breast, 3:00, 2 cm FN, U/S-Guided Core  Needle Biopsy for a Mass:               A. LOW GRADE DUCTAL CARCINOMA IN SITU (DCIS), INVOLVING A SMALL INTRADUCTAL        PAPILLOMA AND ADJACENT DUCTS, Solid, Cribriform and Micropapillary types with calcifications.                B. DCIS measures 14 mm in greatest contiguous extent.                C. See Biomarker Template #1.     Estrogen Receptor (ER): Positive (%, Strong)  Progesterone Receptor (SD): Positive (%, Strong)  Ki-67 6%     2. Left Breast, 4:00, 2 cm FN, U/S-Guided Core Needle Biopsy for a Mass:               A. INVASIVE DUCTAL CARCINOMA WITH LOBULAR FEATURES, Moderately differentiated;       Yarely Histologic Grade II/III (tubule score = 3, nuclear score = 2, mitoses score = 1),       measuring at least 6 mm and involving multiple core fragments.                B. Atypical ductal hyperplasia with calcifications and involving an intraductal papilloma.                C. Negative for lymphovascular space invasion.                D. See Biomarker Template #2 and Comment.     Estrogen Receptor (ER): Positive (%, Strong)  Progesterone Receptor (SD): Positive (%, Strong)  HER2 Negative (IHC 2+; FISH copy # 2.8, ratio 1.3)  Ki-67 15%     9/28/2022 Genetic Testing    [facility] Cancers Panel (36 genes):    VUS in SMARCA4     9/28/2022 Imaging    Left Diagnostic MMG with Koby ([facility]):  In the anterior one-third of the left breast at the 3 o'clock position there is a coil-shaped metallic clip representing the biopsy-proven site of malignancy. Residual calcifications are noted within 1 cm of the posterior inferior margin of the biopsy clip.  There is a second coil-shaped metallic clip in the middle third lower outer quadrant of the left breast at the 4-o'clock position. Surrounding increased density consistent with post biopsy change is noted. When compared to the prior mammogram 07/29/2022, no evidence for clip migration is appreciated.   BI-RADS 6: Known biopsy-proven  malignancy.      1/19/2023 Imaging    Left Diagnostic MMG with Koby ([facility]):  There are 2 coil-shaped biopsy clips in the left breast, one in the anterior one-third at the 3 o'clock position and one in the middle third lower quadrant at the 4-o'clock position.   Reidentified at the medial margin of the coil-shaped metallic clip at the 3-o'clock position are microcalcifications that are not appreciably changed. Two separate clusters measure on the order of 0.9 cm and 0.2 cm in greatest dimension are noted on the order of 0.8 cm from the inferior margin of the biopsy clip.  No abnormality is seen adjacent to the coil-shaped metallic clip at the 4 o'clock position in the middle third.  I see no new or dominant masses or additional suspicious microcalcifications. There is no evidence for skin thickening, nipple retraction or axillary adenopathy.  BI-RADS 6: Known biopsy-proved malignancy.      5/1/2023 Imaging    Bilateral Diagnostic MMG with Koby & Left Breast Limited US ([facility]):  MMG:  Scattered fibroglandular densities are seen throughout both breasts. In the anterior one-third of the left breast located lateral to the plane of the nipple at the 3 o'clock position there is a coil-shaped metallic clip. In the middle third of the lower outer quadrant of the left breast at the 4 o'clock position there is a second coil-shaped metallic clip.  These represent biopsy-proven sites of malignancy. There has been interval decrease in the microcalcifications surrounding the biopsy clip at the 3-o'clock position. Only sparse microcalcifications in the region remain spanning a distance measuring on the order of 3 mm compared to 3 mm previously. The biopsy clip in the region is unchanged in position and is located on the order of 6 mm superior and slightly lateral to the residual microcalcifications of interest. Other calcifications in the region appear unchanged.  The biopsy clip at the 4-o'clock position is no longer surrounded  by residual surrounding density which can be appreciated on the mammograms dated 08/25/2022 and 09/28/2022. Also, there was noted to be an area of asymmetry/density seen in the middle third lateral aspect of the left breast in the 3 o'clock position on the mammogram dated 08/25/2022 that is no longer visualized.   I see no new or dominant masses in either breast. No evidence for skin thickening, nipple retraction or axillary adenopathy is appreciated.  US:  At the 1 o'clock position in a retroareolar location there is a stable 2.3 x 0.8 x 2.0 cm hypoechoic mass like region. This is most consistent with an area of benign fibrous tissue and adjacent coil-shaped metallic clip which corresponds to the clip seen mammographically associated with microcalcifications is noted.  At the 4 o'clock position on the order of 3 cm from the nipple there is a visualized metallic clip. The previously noted irregular mass at the 4-o'clock position is no longer visualized. No suspicious findings at this location are visualized. This represents a biopsy-proven site of malignancy.  At the 3 o'clock position on the order of 5 cm from the nipple there has been interval resolution of the previously described irregular mass seen on the sonographic examination dated 08/25/2022. No abnormality is seen at this location on the current examination  There has been interval resolution of the irregular lesion seen at the 4:30 position on the order of 3 cm from the nipple on the examination from 08/25/2022. No abnormality is seen at this location on the current examination.   BI-RADS 6: Known biopsy-proven malignancy.      11/9/2023 Imaging    Left Diagnostic MMG with Koby & Left Breast Limited US ([facility]):  MMG:  Scattered fibroglandular densities are seen throughout the left breast. Reidentified in the anterior one third of the left breast at the 3 o'clock position is a coil shaped metallic clip. In the middle third lower outer quadrant of the left  breast at the 4 o'clock position is a second coil shaped metallic clip. These represent biopsy-proven sites of malignancy.  There is continued interval decrease in microcalcifications surrounding the biopsy clip at the 3 o'clock position. Only faint microcalcifications spanning a region of 3 mm can be seen in the vicinity of the metallic clip on the 90-degree lateral spot magnification image. No residual suspicious microcalcifications posterior to the metallic clip can be seen on the spot magnification CC image.  No residual surrounding density is appreciated. Biopsy clip position at the 4 o'clock position.  I see no new or dominant masses. There is no evidence for architectural distortion or deformity.  US:  Targeted sonographic evaluation of the left breast was performed through the retroareolar region at the 1 o'clock position, at the 3 o'clock position on the order of 5 cm from the nipple in the 4 o'clock and 4:30 positions on the order of 3 cm from the nipple. No suspicious sonographic findings are appreciated.  BI-RADS 6: Known biopsy-proven malignancy.      5/30/2024 Imaging    Bilateral Diagnostic MMG with Koby ([facility]):  There are scattered areas of fibroglandular density.  There are HydroMARK coil clips marking 2 sites of known biopsy-proven malignancy in the left breast, which are located in the slightly upper outer anterior and lower slightly outer middle to anterior left breast. These are similar to prior. There are benign-appearing calcifications. There are no new suspicious masses, calcifications, or areas of architectural distortion in either breast.   BI-RADS 6: Known biopsy-proven malignancy      9/12/2024 Imaging    Right Diagnostic MMG with Koby & Right Breast Limited US ([facility]):  Standard images and R2 computerized assisted detection are obtained followed by digital tomosynthesis and limited directed right breast ultrasound. There are scattered areas of fibroglandular density in the right breast and  there is a more focal nodular density in the middle third of the lower inner right breast measuring up to 1.7 cm that is similar to the recent right-sided mammogram dated 05/30/2024. It is also quite similar to previous mammograms dating back to 05/15/2015 and today's correlating ultrasound exam confirms the presence of a lymph node at this location measuring up to 1.4 cm. There are no findings to suggest malignancy.  BI-RADS 2. Benign.      12/10/2024 Imaging    Left Diagnostic MMG with Koby ([facility]):  There are scattered areas of fibroglandular density.     There are HydroMARK coil clips in the upper outer anterior and lower outer middle to anterior left breast marking 2 sites of biopsy-proven malignancy. There are benign-appearing calcifications. There are no new suspicious masses, calcifications, or areas of architectural distortion.   BI-RADS 6: Known biopsy-proven malignancy     5/14/2025 Imaging    Bilateral Diagnostic MMG with Koby & Left Breast Limited US ([facility]):  MMG:  There are scattered areas of fibroglandular density.    There is a marker overlying the slightly upper inner middle left breast marking the area of concern indicated by the patient. No suspicious focal mammographic abnormality is identified deep to the marker. This area was further assessed with ultrasound.   There are HydroMARK coil clips in the upper outer anterior and lower central middle to anterior left breast marking 2 sites of biopsy-proven malignancy. Surrounding parenchyma is similar to prior mammogram. However, in the upper central anterior left breast, there are new grouped calcifications measuring approximately 2.9 cm, which are suspicious.  There are no suspicious masses, calcifications, or areas of architectural distortion in the right breast.  US:  Targeted sonographic evaluation of the left breast was performed in the area of concern indicated by the patient from 10:00 to 11:00. Predominantly fatty tissue is identified. No  suspicious cystic or solid mass is identified.   BI-RADS 4: Suspicious     6/23/2025 Biopsy    Left Breast, Stereotactic Biopsy ([facility]):    1.  Left breast, 1:00, stereotactic biopsies of calcifications (cory): INTERMEDIATE GRADE DUCTAL CARCINOMA IN SITU (DCIS).               - Architectural patterns: Solid and cribriform with central necrosis and associated microcalcifications.               - DCIS is present in all tissue cores, measuring up to 10 mm maximally.               - Negative for invasive carcinoma.     Estrogen Receptor (ER): Positive (%, Strong)  Progesterone Receptor (PgR): Positive (71-80%, Strong)  Ki-67 15%         Review of Systems:  See interval history.       Medications:    Current Outpatient Medications:   •  celecoxib (CeleBREX) 200 MG capsule, Take 1 capsule (200 mg) once daily for arthritis, Disp: 30 capsule, Rfl: 6  •  Cholecalciferol (VITAMIN D3) 2000 UNITS tablet, Take 1 tablet by mouth Daily., Disp: , Rfl:   •  DULoxetine (CYMBALTA) 60 MG capsule, TAKE 1 CAPSULE BY MOUTH DAILY AS DIRECTED, Disp: 90 capsule, Rfl: 3  •  erythromycin (ROMYCIN) 5 MG/GM ophthalmic ointment, apply (1CM)  by ophthalmic route  every evening ribbon along lash line after warm compress mask every night., Disp: , Rfl:   •  exemestane (AROMASIN) 25 MG tablet, Take 1 tablet by mouth Daily., Disp: , Rfl:   •  ezetimibe (ZETIA) 10 MG tablet, TAKE 1 TABLET BY MOUTH DAILY, Disp: 90 tablet, Rfl: 3  •  gabapentin (NEURONTIN) 300 MG capsule, TAKE 1 CAPSULE BY MOUTH 3 TIMES A DAY FOR PERIPHERAL NEUROPATHY AS DIRECTED, Disp: 90 capsule, Rfl: 1  •  levothyroxine (Synthroid) 88 MCG tablet, Take 1 tablet (88 mcg) every day for low thyroid, Disp: 90 tablet, Rfl: 1  •  LORazepam (ATIVAN) 1 MG tablet, TAKE 1 TABLET BY MOUTH 2 TIMES A DAY FOR CHRONIC ANXIETY OR PANIC, Disp: 60 tablet, Rfl: 4  •  Mounjaro 15 MG/0.5ML solution auto-injector, INJECT 15 MG UNDER THE SKIN ONCE WEEKLY, Disp: 2 mL, Rfl: 1  •  multivitamin (MULTI  VITAMIN PO), Take  by mouth Daily., Disp: , Rfl:   •  polyethylene glycol (MIRALAX) 17 g packet, Take 17 g by mouth Daily., Disp: , Rfl:   •  simvastatin (ZOCOR) 20 MG tablet, TAKE 1 TABLET BY MOUTH DAILY FOR HIGH CHOLESTEROL, Disp: 90 tablet, Rfl: 2  •  traZODone (DESYREL) 150 MG tablet, Take 1 tablet by mouth Every Night., Disp: 90 tablet, Rfl: 3  •  valsartan (DIOVAN) 320 MG tablet, Take 1 tablet by mouth Every Morning., Disp: 90 tablet, Rfl: 10      Allergies   Allergen Reactions   • Morphine And Codeine    • Other Other (See Comments)     REJECTED DACRON SUTURES IN THE PAST AND INCISION CAME APART       Family History   Problem Relation Age of Onset   • Alcohol abuse Father         father   • Breast cancer Daughter         40s   • Cancer Other    • Diabetes Other         type II   • Leukemia Grandchild 19   • Depression Mother    • Arthritis Mother         mother   • Diabetes Maternal Grandfather    • COPD Daughter    • Cancer Daughter    • Diabetes Daughter    • Early death Daughter         suicide 07/29/2019       Objective   PHYSICAL EXAMINATION:   Vitals:    07/03/25 1132   BP: 130/88   Pulse: 97   Resp: 18   SpO2: 99%     ECOG 0 - Asymptomatic  General: NAD, well appearing  Psych: a&o x3, normal mood and affect  Eyes: EOMI, no scleral icterus  ENMT: neck supple without masses or thyromegaly, mucous membranes moist  MSK: normal gait, normal ROM in bilateral shoulders  Lymph nodes: no cervical, supraclavicular or axillary lymphadenopathy  Breast: symmetric, moderate size, grade 3 ptosis  Right: Wise pattern scars, otherwise no visible abnormalities on inspection while seated, with arms raised or hands on hips. No masses or nipple abnormalities.  Left: On inspection, there are Wise pattern scars and periareolar ecchymoses.  At 2:00, under the areola, there is a 2 cm postbiopsy hematoma.    Left breast, in-office ultrasound: At 2:00, under the areolar edge, there is a small postbiopsy hematoma with biopsy clip  visualized.       I have independently reviewed her imaging and here are my findings:   In 2022 in the left retroareolar breast there were at least 5 separate irregular masses with associated calcifications.  2 of these masses were biopsied and are marked with hydromark clips.  Clips are at least 3 cm apart on mammogram.  Over the past 2 years imaging has shown a decrease in calcifications and resolution of the irregular masses.  Diagnostic imaging in May showed new calcifications measuring 2.9 cm.      Assessment & Plan   Assessment:  80 y.o. F with left breast cancer initially diagnosed in 2022: Intermediate grade, invasive ductal carcinoma, ER/TN positive, Her2 negative.  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  She had at least 5 retroareolar irregular masses seen on ultrasound, some of which may have been additional cancer sites or could have been uninvolved papillomas.  She was placed on neoadjuvant endocrine therapy because she was not a good surgical candidate and initially had a very good response on imaging.  Recent diagnostic imaging in 5/2025 showed new calcifications and biopsy shows DCIS.    Discussion:  She is agreeable to surgery and we went over options.  She had multifocal retroareolar disease upfront and multiple masses only 2 of which were biopsied.  She now has been on neoadjuvant endocrine therapy and has a separate new site of disease.  For all these reasons I have recommended a mastectomy.  I did offer a plastic surgery referral to discuss reconstruction, however the patient prefers a flat closure.  She had negative genetic testing and there is no indication for a contralateral prophylactic mastectomy since her risk of a second primary breast cancer is low.    We discussed axillary staging. I described the procedure for sentinel lymph node biopsy in detail, including the preoperative injection of a  radiotracer and intraoperative injection of lymphazurin blue dye. I explained that this is a mapping test and not a cancer test, that all of the lymph nodes containing these dyes will be removed for complete testing by pathology, and that the results could impact the decision for adjuvant treatment or additional surgery.    In her case, if sentinel nodes are positive for cancer intraoperatively, I will proceed with a completion dissection. Even if the sentinel nodes are negative intraoperatively, there is a ~10-15% risk that they are positive on final pathology (false negative rate). In this case, she may require a second operation for completion dissection versus axillary radiation. I explained that axillary node dissection is associated with an increased risk of lymphedema versus sentinel lymph node biopsy (15-30% vs. <10%), that there is an increased risk of permanent upper inner arm numbness, and a risk of injury to motor nerves that control the shoulder muscles.     I described additional risks and potential complications associated with surgery, including, but not limited to, bleeding, infection, complications related to blue dye, lymphedema, deformity/poor cosmetic result, chronic pain, neurovascular injury, numbness, seroma, hematoma, deep venous thrombosis, skin flap necrosis, disease recurrence and the possibility of requiring additional surgery. We also discussed other treatment options including the option of not undergoing any surgical treatment and the risks associated with this including disease progression. She expressed an understanding of these factors and wished to proceed.    NAPBC statement:  The patient's clinical stage is documented as above. This was discussed with the patient prior to initiation of treatment. All available pathology reports were discussed with the patient today. All treatment decisions were made via shared decision making with the patient. This patient was evaluated for  appropriate ancillary referrals including, pre-treatment functional assessment, exercise program, nutrition program, genetics, and lymphedema clinic. This patient received preoperative and postoperative education. The patient was offered a breast reconstruction referral; risks and benefits were discussed today. The patient was educated on perioperative multimodal pain management strategies. Barriers to effective and efficient care are/will be evaluated by the nurse navigator.    Plan:  - Left mastectomy and sentinel lymph node biopsy, possible axillary dissection.    Arabella Randle MD    I spent 100 minutes caring for Claudette on this date of service. This time includes time spent by me in the following activities: preparing for the visit, performing a medically appropriate examination and/or evaluation , counseling and educating the patient/family/caregiver, documenting information in the medical record, and independently interpreting results and communicating that information with the patient/family/caregiver.      CC:  NONA Lara MD

## 2025-07-04 DIAGNOSIS — I10 BENIGN ESSENTIAL HYPERTENSION: ICD-10-CM

## 2025-07-05 DIAGNOSIS — E11.42 DIABETIC PERIPHERAL NEUROPATHY: Chronic | ICD-10-CM

## 2025-07-07 ENCOUNTER — PATIENT OUTREACH (OUTPATIENT)
Dept: OTHER | Facility: HOSPITAL | Age: 80
End: 2025-07-07
Payer: MEDICARE

## 2025-07-07 RX ORDER — VALSARTAN 320 MG/1
320 TABLET ORAL EVERY MORNING
Qty: 90 TABLET | Refills: 10 | Status: SHIPPED | OUTPATIENT
Start: 2025-07-07

## 2025-07-08 ENCOUNTER — EXTERNAL PBMM DATA (OUTPATIENT)
Dept: PHARMACY | Facility: OTHER | Age: 80
End: 2025-07-08
Payer: MEDICARE

## 2025-07-09 DIAGNOSIS — F41.9 CHRONIC ANXIETY: ICD-10-CM

## 2025-07-09 DIAGNOSIS — F51.04 CHRONIC INSOMNIA: Chronic | ICD-10-CM

## 2025-07-09 DIAGNOSIS — E11.42 DIABETIC PERIPHERAL NEUROPATHY: Chronic | ICD-10-CM

## 2025-07-09 DIAGNOSIS — C50.812 MALIGNANT NEOPLASM OF OVERLAPPING SITES OF LEFT BREAST IN FEMALE, ESTROGEN RECEPTOR POSITIVE: Primary | ICD-10-CM

## 2025-07-09 DIAGNOSIS — Z17.0 MALIGNANT NEOPLASM OF OVERLAPPING SITES OF LEFT BREAST IN FEMALE, ESTROGEN RECEPTOR POSITIVE: Primary | ICD-10-CM

## 2025-07-09 RX ORDER — GABAPENTIN 300 MG/1
CAPSULE ORAL
Qty: 90 CAPSULE | Refills: 5 | Status: SHIPPED | OUTPATIENT
Start: 2025-07-09 | End: 2025-07-09 | Stop reason: SDUPTHER

## 2025-07-09 RX ORDER — GABAPENTIN 300 MG/1
CAPSULE ORAL
Qty: 90 CAPSULE | Refills: 5 | Status: SHIPPED | OUTPATIENT
Start: 2025-07-09

## 2025-07-09 RX ORDER — LIDOCAINE AND PRILOCAINE 25; 25 MG/G; MG/G
1 CREAM TOPICAL ONCE
Qty: 1 G | Refills: 0 | Status: SHIPPED | OUTPATIENT
Start: 2025-07-09 | End: 2025-07-09

## 2025-07-09 RX ORDER — GABAPENTIN 300 MG/1
CAPSULE ORAL
Qty: 90 CAPSULE | Refills: 1 | Status: CANCELLED | OUTPATIENT
Start: 2025-07-09

## 2025-07-09 RX ORDER — GABAPENTIN 300 MG/1
CAPSULE ORAL
Qty: 90 CAPSULE | OUTPATIENT
Start: 2025-07-09

## 2025-07-09 NOTE — TELEPHONE ENCOUNTER
Dr Moore , Could you PLEASE give her a Written Prescription   for GABAPENTIN. Pt states Jasonfrandyigor wants a written prescription only.   PT will stop by the office to pick it up. Thank you

## 2025-07-11 ENCOUNTER — TELEPHONE (OUTPATIENT)
Dept: SURGERY | Facility: CLINIC | Age: 80
End: 2025-07-11
Payer: MEDICARE

## 2025-07-11 ENCOUNTER — PRE-ADMISSION TESTING (OUTPATIENT)
Dept: PREADMISSION TESTING | Facility: HOSPITAL | Age: 80
End: 2025-07-11
Payer: MEDICARE

## 2025-07-11 VITALS
BODY MASS INDEX: 29.71 KG/M2 | HEART RATE: 70 BPM | DIASTOLIC BLOOD PRESSURE: 76 MMHG | TEMPERATURE: 97.1 F | OXYGEN SATURATION: 99 % | SYSTOLIC BLOOD PRESSURE: 155 MMHG | WEIGHT: 178.3 LBS | HEIGHT: 65 IN | RESPIRATION RATE: 16 BRPM

## 2025-07-11 DIAGNOSIS — C50.812 MALIGNANT NEOPLASM OF OVERLAPPING SITES OF LEFT BREAST IN FEMALE, ESTROGEN RECEPTOR POSITIVE: ICD-10-CM

## 2025-07-11 DIAGNOSIS — Z17.0 MALIGNANT NEOPLASM OF OVERLAPPING SITES OF LEFT BREAST IN FEMALE, ESTROGEN RECEPTOR POSITIVE: ICD-10-CM

## 2025-07-11 LAB
QT INTERVAL: 385 MS
QTC INTERVAL: 422 MS

## 2025-07-11 PROCEDURE — 93005 ELECTROCARDIOGRAM TRACING: CPT

## 2025-07-11 PROCEDURE — 93010 ELECTROCARDIOGRAM REPORT: CPT | Performed by: INTERNAL MEDICINE

## 2025-07-11 RX ORDER — LIDOCAINE AND PRILOCAINE 25; 25 MG/G; MG/G
CREAM TOPICAL
COMMUNITY
Start: 2025-07-09 | End: 2025-07-15 | Stop reason: HOSPADM

## 2025-07-11 NOTE — TELEPHONE ENCOUNTER
Informed patient of Surgery 7/15 Arrive at 8 surgery at 11. Pre Admission testing Today 7/11 at 1:30 am. Post Op 7/31 at 2.     Patient worried about making appointment today. Informed PAT that patient was on her way. Encouraged patient to get to appointment as close to 1:30 as possible.     Patient confirmed understanding.   EAS

## 2025-07-11 NOTE — DISCHARGE INSTRUCTIONS
"Holding GLP-1 Receptor Agonists Prior to Anesthesia    In order for safe anesthesia, GLP-1 receptor agonist mediations need to be held before the procedure.     What are GLP-1 Receptor Agonists?  Glucagon-like peptide-1 (GLP-1) receptor agonists are approved by the Food and Drug Administration for treatment of type 2 diabetes mellitus and cardiovascular risk reduction. In addition, GLP-1 receptor agonists are also used for weight loss.     Which of my medications are GLP-1 Receptor Agonists?   Exanetide (Byetta®, Bydureon®)  Lixisenatide (Adlyxin®, Soliqua®)  Semaglutide (Wegovy®, Ozempic®, Rybelsus®)  Liraglutide (Saxenda®, Victoza®, Xutolphy®)  Dulaglutide (Trulicity®)   Tirzepatide (Mounjaro®, Zepbound®)    How can GLP-1 Receptor Agonists cause problems during surgery?  GLP-1 agonists can potentially cause adverse gastrointestinal effects, including the consequences of delayed gastric emptying. This can cause the stomach to be full even after refraining from eating and drinking before surgery and can cause regurgitation or \"throwing up\" of the stomach contents during anesthesia. If the contents enter the airway and the lungs, it could cause anesthesia complications and a severe pneumonia.     What should I do prior to my procedure?  According to the recommendations of the American Society of Anesthesiologists:    If you take GLP-1 agonists every day: Hold the medication on the day of the procedure/surgery.   If you take GLP-1 agonists once a week: Hold the medication a week prior to the procedure/surgery    What if I have questions?  If you have concerns or questions about holding your medications prior to your procedure, contact your doctors before stopping your medications.    Modified from the American Society of Anesthesiologists Consensus-Based Guidance on Preoperative Management of Patients (Adults and Children) on Glucagon-Like Peptide-1 (GLP-1) Receptor Agonists June 29,2023        Take the following " medications the morning of surgery: LEVOTHYROXINE, GABAPENTIN    ARRIVE TO ENTRANCE C.       If you are on an Aspirin or a Blood Thinner please clarify with the surgeon and prescribing physician if and when you are to hold the medication or if you are to continue the medication.  If you are on prescription narcotic pain medication to control your pain you may also take that medication the morning of surgery.      General Instructions:     Do not eat solid food after midnight the night before surgery.  Clear liquids day of surgery are allowed but must be stopped at least two hours before your hospital arrival time.       Allowed clear liquids      Water, sodas, and tea or coffee with no cream or milk added.       12 to 20 ounces of a clear liquid that contains carbohydrates is recommended.  If non-diabetic, have Gatorade or Powerade.  If diabetic, have G2 or Powerade Zero.     Do not have liquids red in color.  Do not consume chicken, beef, pork or vegetable broth or bouillon cubes of any variety as they are not considered clear liquids and are not allowed.      Infants may have breast milk up to four hours before surgery.  Infants drinking formula may drink formula up to six hours before surgery.   Patients who avoid smoking, chewing tobacco and alcohol for 4 weeks prior to surgery have a reduced risk of post-operative complications.  Quit smoking as many days before surgery as you can.  Do not smoke, use chewing tobacco or drink alcohol the day of surgery.   If applicable bring your C-PAP/ BI-PAP machine in with you to preop day of surgery.  Bring any papers given to you in the doctor’s office.  Wear clean comfortable clothes.  Do not wear contact lenses, false eyelashes or make-up.  Bring a case for your glasses.   Bring crutches or walker if applicable.  Remove all piercings.  Leave jewelry and any other valuables at home.  Hair extensions with metal clips must be removed prior to surgery.  The Pre-Admission  Testing nurse will instruct you to bring medications if unable to obtain an accurate list in Pre-Admission Testing.    Day of surgery you will need to let the preoperative nurse know the last time you took each of your medications.  To ensure a safe environment for patients and staff, we kindly ask that children under the age of 16 not accompany patients.  If you must bring a dependent child or dependent adult please ensure a responsible adult, other than yourself, is present to supervise them.      If you were given a blood bank ID arm band remember to bring it with you the day of surgery.    Preventing a Surgical Site Infection:  For 2 to 3 days before surgery, avoid shaving with a razor because the razor can irritate skin and make it easier to develop an infection.    Any areas of open skin can increase the risk of a post-operative wound infection by allowing bacteria to enter and travel throughout the body.  Notify your surgeon if you have any skin wounds / rashes even if it is not near the expected surgical site.  The area will need assessed to determine if surgery should be delayed until it is healed.  The night prior to surgery shower using a fresh bar of anti-bacterial soap (such as Dial) and clean washcloth.  Sleep in a clean bed with clean clothing.  Do not allow pets to sleep with you.  Shower on the morning of surgery using a fresh bar of anti-bacterial soap (such as Dial) and clean washcloth.  Dry with a clean towel and dress in clean clothing.  Ask your surgeon if you will be receiving antibiotics prior to surgery.  Make sure you, your family, and all healthcare providers clean their hands with soap and water or an alcohol based hand  before caring for you or your wound.    Day of surgery:  Your arrival time is approximately two hours before your scheduled surgery time.  Please note if you have an early arrival time the surgery doors do not open before 5:00 AM.  Upon arrival, a Pre-op nurse  and Anesthesiologist will review your health history, obtain vital signs, and answer questions you may have.  The only belongings needed at this time will be a list of your home medications and if applicable your C-PAP/BI-PAP machine.  A Pre-op nurse will start an IV and you may receive medication in preparation for surgery, including something to help you relax.     Please be aware that surgery does come with discomfort.  We want to make every effort to control your discomfort so please discuss any uncontrolled symptoms with your nurse.   Your doctor will most likely have prescribed pain medications.      If you are going home after surgery you will receive individualized written care instructions before being discharged.  A responsible adult must drive you to and from the hospital on the day of your surgery and ideally stay with you through the night.   .  Discharge prescriptions can be filled by the hospital pharmacy during regular pharmacy hours.  If you are having surgery late in the day/evening your prescription may be e-prescribed to your pharmacy.  Please verify your pharmacy hours or chose a 24 hour pharmacy to avoid not having access to your prescription because your pharmacy has closed for the day.    If you are staying overnight following surgery, you will be transported to your hospital room following the recovery period.  [facility] has all private rooms.    If you have any questions please call Pre-Admission Testing at [phone].  Deductibles and co-payments are collected on the day of service. Please be prepared to pay the required co-pay, deductible or deposit on the day of service as defined by your plan.    Call your surgeon immediately if you experience any of the following symptoms:  Sore Throat  Shortness of Breath or difficulty breathing  Cough  Chills  Body soreness or muscle pain  Headache  Fever  New loss of taste or smell  Do not arrive for your surgery ill.  Your  procedure will need to be rescheduled to another time.  You will need to call your physician before the day of surgery to avoid any unnecessary exposure to hospital staff as well as other patients.

## 2025-07-15 ENCOUNTER — HOSPITAL ENCOUNTER (OUTPATIENT)
Dept: NUCLEAR MEDICINE | Facility: HOSPITAL | Age: 80
Discharge: HOME | End: 2025-07-15
Payer: MEDICARE

## 2025-07-15 ENCOUNTER — ANCILLARY PROCEDURE (OUTPATIENT)
Dept: LAB | Facility: HOSPITAL | Age: 80
End: 2025-07-15
Payer: MEDICARE

## 2025-07-15 ENCOUNTER — ANESTHESIA EVENT (OUTPATIENT)
Dept: PERIOP | Facility: HOSPITAL | Age: 80
End: 2025-07-15
Payer: MEDICARE

## 2025-07-15 ENCOUNTER — HOSPITAL ENCOUNTER (OUTPATIENT)
Facility: HOSPITAL | Age: 80
Setting detail: HOSPITAL OUTPATIENT SURGERY
Discharge: HOME | End: 2025-07-15
Attending: SURGERY | Admitting: SURGERY
Payer: MEDICARE

## 2025-07-15 ENCOUNTER — ANESTHESIA (OUTPATIENT)
Dept: PERIOP | Facility: HOSPITAL | Age: 80
End: 2025-07-15
Payer: MEDICARE

## 2025-07-15 ENCOUNTER — TRANSCRIBE ORDERS (OUTPATIENT)
Dept: LAB | Facility: HOSPITAL | Age: 80
End: 2025-07-15
Payer: MEDICARE

## 2025-07-15 VITALS
DIASTOLIC BLOOD PRESSURE: 77 MMHG | RESPIRATION RATE: 16 BRPM | SYSTOLIC BLOOD PRESSURE: 135 MMHG | OXYGEN SATURATION: 97 % | HEART RATE: 69 BPM | TEMPERATURE: 98.3 F

## 2025-07-15 DIAGNOSIS — C50.912 MALIGNANT NEOPLASM OF LEFT FEMALE BREAST, UNSPECIFIED ESTROGEN RECEPTOR STATUS, UNSPECIFIED SITE OF BREAST: Primary | ICD-10-CM

## 2025-07-15 DIAGNOSIS — C50.912 MALIGNANT NEOPLASM OF LEFT FEMALE BREAST, UNSPECIFIED ESTROGEN RECEPTOR STATUS, UNSPECIFIED SITE OF BREAST: ICD-10-CM

## 2025-07-15 DIAGNOSIS — Z17.0 MALIGNANT NEOPLASM OF OVERLAPPING SITES OF LEFT BREAST IN FEMALE, ESTROGEN RECEPTOR POSITIVE: ICD-10-CM

## 2025-07-15 DIAGNOSIS — C50.812 MALIGNANT NEOPLASM OF OVERLAPPING SITES OF LEFT BREAST IN FEMALE, ESTROGEN RECEPTOR POSITIVE: ICD-10-CM

## 2025-07-15 LAB
GLUCOSE BLDC GLUCOMTR-MCNC: 101 MG/DL (ref 70–130)
GLUCOSE BLDC GLUCOMTR-MCNC: 124 MG/DL (ref 70–130)

## 2025-07-15 PROCEDURE — 25010000002 SUGAMMADEX 200 MG/2ML SOLUTION

## 2025-07-15 PROCEDURE — 88360 TUMOR IMMUNOHISTOCHEM/MANUAL: CPT | Performed by: SURGERY

## 2025-07-15 PROCEDURE — 76098 X-RAY EXAM SURGICAL SPECIMEN: CPT

## 2025-07-15 PROCEDURE — 25010000002 FAMOTIDINE 10 MG/ML SOLUTION: Performed by: ANESTHESIOLOGY

## 2025-07-15 PROCEDURE — 25010000002 ISOSULFAN BLUE 1 % SOLUTION: Performed by: SURGERY

## 2025-07-15 PROCEDURE — 19303 MAST SIMPLE COMPLETE: CPT | Performed by: SURGERY

## 2025-07-15 PROCEDURE — 25810000003 LACTATED RINGERS PER 1000 ML: Performed by: ANESTHESIOLOGY

## 2025-07-15 PROCEDURE — 25010000002 MAGNESIUM SULFATE PER 500 MG OF MAGNESIUM

## 2025-07-15 PROCEDURE — 25010000002 FENTANYL CITRATE (PF) 50 MCG/ML SOLUTION

## 2025-07-15 PROCEDURE — 34310000005 TECHETIUM TC99M TILMANOCEPT: Performed by: SURGERY

## 2025-07-15 PROCEDURE — 25010000002 CEFAZOLIN PER 500 MG: Performed by: SURGERY

## 2025-07-15 PROCEDURE — 25010000002 PROPOFOL 10 MG/ML EMULSION

## 2025-07-15 PROCEDURE — 38792 RA TRACER ID OF SENTINL NODE: CPT

## 2025-07-15 PROCEDURE — 25810000003 LACTATED RINGERS PER 1000 ML

## 2025-07-15 PROCEDURE — 38900 IO MAP OF SENT LYMPH NODE: CPT | Performed by: SURGERY

## 2025-07-15 PROCEDURE — 82948 REAGENT STRIP/BLOOD GLUCOSE: CPT

## 2025-07-15 PROCEDURE — 25010000002 ONDANSETRON PER 1 MG

## 2025-07-15 PROCEDURE — 38525 BIOPSY/REMOVAL LYMPH NODES: CPT | Performed by: SURGERY

## 2025-07-15 PROCEDURE — 88332 PATH CONSLTJ SURG EA ADD BLK: CPT | Performed by: SURGERY

## 2025-07-15 PROCEDURE — 88342 IMHCHEM/IMCYTCHM 1ST ANTB: CPT | Performed by: SURGERY

## 2025-07-15 PROCEDURE — 88341 IMHCHEM/IMCYTCHM EA ADD ANTB: CPT | Performed by: SURGERY

## 2025-07-15 PROCEDURE — 25010000002 DEXAMETHASONE PER 1 MG

## 2025-07-15 PROCEDURE — 88307 TISSUE EXAM BY PATHOLOGIST: CPT | Performed by: SURGERY

## 2025-07-15 PROCEDURE — 19303 MAST SIMPLE COMPLETE: CPT | Performed by: SPECIALIST/TECHNOLOGIST, OTHER

## 2025-07-15 PROCEDURE — 88331 PATH CONSLTJ SURG 1 BLK 1SPC: CPT | Performed by: SURGERY

## 2025-07-15 PROCEDURE — A9520 TC99 TILMANOCEPT DIAG 0.5MCI: HCPCS | Performed by: SURGERY

## 2025-07-15 PROCEDURE — 78195 LYMPH SYSTEM IMAGING: CPT | Performed by: SURGERY

## 2025-07-15 DEVICE — LIGACLIP MCA MULTIPLE CLIP APPLIERS, 20 SMALL CLIPS
Type: IMPLANTABLE DEVICE | Site: BREAST | Status: FUNCTIONAL
Brand: LIGACLIP

## 2025-07-15 RX ORDER — SODIUM CHLORIDE, SODIUM LACTATE, POTASSIUM CHLORIDE, CALCIUM CHLORIDE 600; 310; 30; 20 MG/100ML; MG/100ML; MG/100ML; MG/100ML
INJECTION, SOLUTION INTRAVENOUS CONTINUOUS PRN
Status: DISCONTINUED | OUTPATIENT
Start: 2025-07-15 | End: 2025-07-15 | Stop reason: SURG

## 2025-07-15 RX ORDER — DEXAMETHASONE SODIUM PHOSPHATE 4 MG/ML
INJECTION, SOLUTION INTRA-ARTICULAR; INTRALESIONAL; INTRAMUSCULAR; INTRAVENOUS; SOFT TISSUE AS NEEDED
Status: DISCONTINUED | OUTPATIENT
Start: 2025-07-15 | End: 2025-07-15 | Stop reason: SURG

## 2025-07-15 RX ORDER — HYDROMORPHONE HYDROCHLORIDE 1 MG/ML
0.25 INJECTION, SOLUTION INTRAMUSCULAR; INTRAVENOUS; SUBCUTANEOUS
Status: DISCONTINUED | OUTPATIENT
Start: 2025-07-15 | End: 2025-07-15 | Stop reason: HOSPADM

## 2025-07-15 RX ORDER — IPRATROPIUM BROMIDE AND ALBUTEROL SULFATE 2.5; .5 MG/3ML; MG/3ML
3 SOLUTION RESPIRATORY (INHALATION) ONCE AS NEEDED
Status: DISCONTINUED | OUTPATIENT
Start: 2025-07-15 | End: 2025-07-15 | Stop reason: HOSPADM

## 2025-07-15 RX ORDER — DROPERIDOL 2.5 MG/ML
0.62 INJECTION, SOLUTION INTRAMUSCULAR; INTRAVENOUS
Status: DISCONTINUED | OUTPATIENT
Start: 2025-07-15 | End: 2025-07-15 | Stop reason: HOSPADM

## 2025-07-15 RX ORDER — PROPOFOL 10 MG/ML
VIAL (ML) INTRAVENOUS AS NEEDED
Status: DISCONTINUED | OUTPATIENT
Start: 2025-07-15 | End: 2025-07-15 | Stop reason: SURG

## 2025-07-15 RX ORDER — PROMETHAZINE HYDROCHLORIDE 25 MG/1
25 TABLET ORAL ONCE AS NEEDED
Status: DISCONTINUED | OUTPATIENT
Start: 2025-07-15 | End: 2025-07-15 | Stop reason: HOSPADM

## 2025-07-15 RX ORDER — DIAZEPAM 5 MG/1
5 TABLET ORAL ONCE
Status: COMPLETED | OUTPATIENT
Start: 2025-07-15 | End: 2025-07-15

## 2025-07-15 RX ORDER — HYDROCODONE BITARTRATE AND ACETAMINOPHEN 5; 325 MG/1; MG/1
1 TABLET ORAL ONCE AS NEEDED
Status: DISCONTINUED | OUTPATIENT
Start: 2025-07-15 | End: 2025-07-15 | Stop reason: HOSPADM

## 2025-07-15 RX ORDER — LIDOCAINE HYDROCHLORIDE 10 MG/ML
0.5 INJECTION, SOLUTION INFILTRATION; PERINEURAL ONCE AS NEEDED
Status: DISCONTINUED | OUTPATIENT
Start: 2025-07-15 | End: 2025-07-15 | Stop reason: HOSPADM

## 2025-07-15 RX ORDER — ROCURONIUM BROMIDE 10 MG/ML
INJECTION, SOLUTION INTRAVENOUS AS NEEDED
Status: DISCONTINUED | OUTPATIENT
Start: 2025-07-15 | End: 2025-07-15 | Stop reason: SURG

## 2025-07-15 RX ORDER — DIPHENHYDRAMINE HYDROCHLORIDE 50 MG/ML
12.5 INJECTION, SOLUTION INTRAMUSCULAR; INTRAVENOUS
Status: DISCONTINUED | OUTPATIENT
Start: 2025-07-15 | End: 2025-07-15 | Stop reason: HOSPADM

## 2025-07-15 RX ORDER — ONDANSETRON 2 MG/ML
4 INJECTION INTRAMUSCULAR; INTRAVENOUS ONCE AS NEEDED
Status: DISCONTINUED | OUTPATIENT
Start: 2025-07-15 | End: 2025-07-15 | Stop reason: HOSPADM

## 2025-07-15 RX ORDER — HYDROCODONE BITARTRATE AND ACETAMINOPHEN 5; 325 MG/1; MG/1
1 TABLET ORAL EVERY 6 HOURS PRN
Qty: 15 TABLET | Refills: 0 | Status: SHIPPED | OUTPATIENT
Start: 2025-07-15

## 2025-07-15 RX ORDER — BUPIVACAINE HCL/EPINEPHRINE 0.25-.0005
VIAL (ML) INJECTION AS NEEDED
Status: DISCONTINUED | OUTPATIENT
Start: 2025-07-15 | End: 2025-07-15 | Stop reason: HOSPADM

## 2025-07-15 RX ORDER — ATROPINE SULFATE 0.4 MG/ML
0.4 INJECTION, SOLUTION INTRAMUSCULAR; INTRAVENOUS; SUBCUTANEOUS ONCE AS NEEDED
Status: DISCONTINUED | OUTPATIENT
Start: 2025-07-15 | End: 2025-07-15 | Stop reason: HOSPADM

## 2025-07-15 RX ORDER — HYDRALAZINE HYDROCHLORIDE 20 MG/ML
5 INJECTION INTRAMUSCULAR; INTRAVENOUS
Status: DISCONTINUED | OUTPATIENT
Start: 2025-07-15 | End: 2025-07-15 | Stop reason: HOSPADM

## 2025-07-15 RX ORDER — ONDANSETRON 2 MG/ML
INJECTION INTRAMUSCULAR; INTRAVENOUS AS NEEDED
Status: DISCONTINUED | OUTPATIENT
Start: 2025-07-15 | End: 2025-07-15 | Stop reason: SURG

## 2025-07-15 RX ORDER — FLUMAZENIL 0.1 MG/ML
0.2 INJECTION INTRAVENOUS AS NEEDED
Status: DISCONTINUED | OUTPATIENT
Start: 2025-07-15 | End: 2025-07-15 | Stop reason: HOSPADM

## 2025-07-15 RX ORDER — NALOXONE HCL 0.4 MG/ML
0.2 VIAL (ML) INJECTION AS NEEDED
Status: DISCONTINUED | OUTPATIENT
Start: 2025-07-15 | End: 2025-07-15 | Stop reason: HOSPADM

## 2025-07-15 RX ORDER — MIDAZOLAM HYDROCHLORIDE 1 MG/ML
0.5 INJECTION, SOLUTION INTRAMUSCULAR; INTRAVENOUS
Status: DISCONTINUED | OUTPATIENT
Start: 2025-07-15 | End: 2025-07-15 | Stop reason: HOSPADM

## 2025-07-15 RX ORDER — FENTANYL CITRATE 50 UG/ML
25 INJECTION, SOLUTION INTRAMUSCULAR; INTRAVENOUS
Status: DISCONTINUED | OUTPATIENT
Start: 2025-07-15 | End: 2025-07-15 | Stop reason: HOSPADM

## 2025-07-15 RX ORDER — MAGNESIUM SULFATE HEPTAHYDRATE 500 MG/ML
INJECTION, SOLUTION INTRAMUSCULAR; INTRAVENOUS AS NEEDED
Status: DISCONTINUED | OUTPATIENT
Start: 2025-07-15 | End: 2025-07-15 | Stop reason: SURG

## 2025-07-15 RX ORDER — LABETALOL HYDROCHLORIDE 5 MG/ML
5 INJECTION, SOLUTION INTRAVENOUS
Status: DISCONTINUED | OUTPATIENT
Start: 2025-07-15 | End: 2025-07-15 | Stop reason: HOSPADM

## 2025-07-15 RX ORDER — KETAMINE HCL IN NACL, ISO-OSM 100MG/10ML
SYRINGE (ML) INJECTION AS NEEDED
Status: DISCONTINUED | OUTPATIENT
Start: 2025-07-15 | End: 2025-07-15 | Stop reason: SURG

## 2025-07-15 RX ORDER — ISOSULFAN BLUE 50 MG/5ML
INJECTION, SOLUTION SUBCUTANEOUS AS NEEDED
Status: DISCONTINUED | OUTPATIENT
Start: 2025-07-15 | End: 2025-07-15 | Stop reason: HOSPADM

## 2025-07-15 RX ORDER — FENTANYL CITRATE 50 UG/ML
INJECTION, SOLUTION INTRAMUSCULAR; INTRAVENOUS AS NEEDED
Status: DISCONTINUED | OUTPATIENT
Start: 2025-07-15 | End: 2025-07-15 | Stop reason: SURG

## 2025-07-15 RX ORDER — EPHEDRINE SULFATE 50 MG/ML
5 INJECTION, SOLUTION INTRAVENOUS ONCE AS NEEDED
Status: DISCONTINUED | OUTPATIENT
Start: 2025-07-15 | End: 2025-07-15 | Stop reason: HOSPADM

## 2025-07-15 RX ORDER — PROMETHAZINE HYDROCHLORIDE 25 MG/1
25 SUPPOSITORY RECTAL ONCE AS NEEDED
Status: DISCONTINUED | OUTPATIENT
Start: 2025-07-15 | End: 2025-07-15 | Stop reason: HOSPADM

## 2025-07-15 RX ORDER — SODIUM CHLORIDE 0.9 % (FLUSH) 0.9 %
3 SYRINGE (ML) INJECTION EVERY 12 HOURS SCHEDULED
Status: DISCONTINUED | OUTPATIENT
Start: 2025-07-15 | End: 2025-07-15 | Stop reason: HOSPADM

## 2025-07-15 RX ORDER — HYDROCODONE BITARTRATE AND ACETAMINOPHEN 7.5; 325 MG/1; MG/1
1 TABLET ORAL EVERY 4 HOURS PRN
Status: DISCONTINUED | OUTPATIENT
Start: 2025-07-15 | End: 2025-07-15 | Stop reason: HOSPADM

## 2025-07-15 RX ORDER — FAMOTIDINE 10 MG/ML
20 INJECTION, SOLUTION INTRAVENOUS ONCE
Status: COMPLETED | OUTPATIENT
Start: 2025-07-15 | End: 2025-07-15

## 2025-07-15 RX ORDER — FENTANYL CITRATE 50 UG/ML
50 INJECTION, SOLUTION INTRAMUSCULAR; INTRAVENOUS ONCE AS NEEDED
Status: DISCONTINUED | OUTPATIENT
Start: 2025-07-15 | End: 2025-07-15 | Stop reason: HOSPADM

## 2025-07-15 RX ORDER — SODIUM CHLORIDE, SODIUM LACTATE, POTASSIUM CHLORIDE, CALCIUM CHLORIDE 600; 310; 30; 20 MG/100ML; MG/100ML; MG/100ML; MG/100ML
9 INJECTION, SOLUTION INTRAVENOUS CONTINUOUS
Status: DISCONTINUED | OUTPATIENT
Start: 2025-07-15 | End: 2025-07-15 | Stop reason: HOSPADM

## 2025-07-15 RX ORDER — SODIUM CHLORIDE 0.9 % (FLUSH) 0.9 %
3-10 SYRINGE (ML) INJECTION AS NEEDED
Status: DISCONTINUED | OUTPATIENT
Start: 2025-07-15 | End: 2025-07-15 | Stop reason: HOSPADM

## 2025-07-15 RX ADMIN — FAMOTIDINE 20 MG: 10 INJECTION INTRAVENOUS at 10:47

## 2025-07-15 RX ADMIN — PROPOFOL 50 MG: 10 INJECTION, EMULSION INTRAVENOUS at 11:09

## 2025-07-15 RX ADMIN — FENTANYL CITRATE 25 MCG: 50 INJECTION, SOLUTION INTRAMUSCULAR; INTRAVENOUS at 15:00

## 2025-07-15 RX ADMIN — SODIUM CHLORIDE, POTASSIUM CHLORIDE, SODIUM LACTATE AND CALCIUM CHLORIDE 9 ML/HR: 600; 310; 30; 20 INJECTION, SOLUTION INTRAVENOUS at 10:47

## 2025-07-15 RX ADMIN — DIAZEPAM 5 MG: 5 TABLET ORAL at 08:38

## 2025-07-15 RX ADMIN — Medication 10 MG: at 12:43

## 2025-07-15 RX ADMIN — CEFAZOLIN 2000 MG: 2 INJECTION, POWDER, FOR SOLUTION INTRAMUSCULAR; INTRAVENOUS at 10:49

## 2025-07-15 RX ADMIN — FENTANYL CITRATE 25 MCG: 50 INJECTION, SOLUTION INTRAMUSCULAR; INTRAVENOUS at 14:56

## 2025-07-15 RX ADMIN — TILMANOCEPT 1 DOSE: KIT at 10:21

## 2025-07-15 RX ADMIN — Medication 20 MG: at 12:19

## 2025-07-15 RX ADMIN — ROCURONIUM BROMIDE 50 MG: 10 INJECTION INTRAVENOUS at 11:02

## 2025-07-15 RX ADMIN — MAGNESIUM SULFATE HEPTAHYDRATE 2 G: 500 INJECTION, SOLUTION INTRAMUSCULAR; INTRAVENOUS at 11:17

## 2025-07-15 RX ADMIN — PROPOFOL 100 MG: 10 INJECTION, EMULSION INTRAVENOUS at 11:00

## 2025-07-15 RX ADMIN — PROPOFOL 50 MG: 10 INJECTION, EMULSION INTRAVENOUS at 11:04

## 2025-07-15 RX ADMIN — HYDROCODONE BITARTRATE AND ACETAMINOPHEN 1 TABLET: 7.5; 325 TABLET ORAL at 14:39

## 2025-07-15 RX ADMIN — PROPOFOL 150 MG/KG/HR: 10 INJECTION, EMULSION INTRAVENOUS at 11:09

## 2025-07-15 RX ADMIN — DEXAMETHASONE SODIUM PHOSPHATE 10 MG: 4 INJECTION, SOLUTION INTRA-ARTICULAR; INTRALESIONAL; INTRAMUSCULAR; INTRAVENOUS; SOFT TISSUE at 11:10

## 2025-07-15 RX ADMIN — FENTANYL CITRATE 50 MCG: 50 INJECTION, SOLUTION INTRAMUSCULAR; INTRAVENOUS at 12:28

## 2025-07-15 RX ADMIN — FENTANYL CITRATE 50 MCG: 50 INJECTION, SOLUTION INTRAMUSCULAR; INTRAVENOUS at 13:38

## 2025-07-15 RX ADMIN — SUGAMMADEX 200 MG: 100 INJECTION, SOLUTION INTRAVENOUS at 13:24

## 2025-07-15 RX ADMIN — ONDANSETRON 4 MG: 2 INJECTION, SOLUTION INTRAMUSCULAR; INTRAVENOUS at 11:10

## 2025-07-15 RX ADMIN — SODIUM CHLORIDE, POTASSIUM CHLORIDE, SODIUM LACTATE AND CALCIUM CHLORIDE: 600; 310; 30; 20 INJECTION, SOLUTION INTRAVENOUS at 10:54

## 2025-07-15 RX ADMIN — FENTANYL CITRATE 50 MCG: 50 INJECTION, SOLUTION INTRAMUSCULAR; INTRAVENOUS at 11:00

## 2025-07-15 NOTE — ANESTHESIA PROCEDURE NOTES
Airway  Reason: elective    Date/Time: 7/15/2025 11:07 AM  Airway not difficult    General Information and Staff    Patient location during procedure: OR  Anesthesiologist: Matt Mock MD  CRNA/CAA: Carla Brown CRNA    Indications and Patient Condition  Indications for airway management: airway protection    Preoxygenated: yes    Mask difficulty assessment: 1 - vent by mask    Final Airway Details    Final airway type: endotracheal airway      Successful airway: ETT   Successful intubation technique: direct laryngoscopy  Adjuncts used in placement: Bougie  Endotracheal tube insertion site: oral  Blade: Alicia  Blade size: 3  ETT size (mm): 7.0  Cormack-Lehane Classification: grade IIb - view of arytenoids or posterior of glottis only  Placement verified by: chest auscultation and capnometry   Number of attempts at approach: 1  Assessment: lips, teeth, and gum same as pre-op and atraumatic intubation

## 2025-07-15 NOTE — DISCHARGE INSTRUCTIONS
Drain Care    WHAT is a Drain?  A special tube that is placed in the surgical wound to prevent bodily fluid from building up and collecting at the site of surgery.  Helps to reduce infection and seroma build up  Works by suction to pull fluid from the site of surgery into a small bulb  The fluid in the drain may change colors over time.  You may shower with the drain in place. Do not take a tub bath or submerge it in water  You can attach the drain to a string and wear it around your neck, or tuck it into a soft waistband. You may also purchase a special bra/shirt/jacket with pockets for the drain.  HOW do I EMPTY the drain:              -Empty the drain at least 2 times a day, around the same times, every day.   The drain has a small stopper next to the tubbing.  Open the Stopper to release the suction.   The bulb has measurement lines on it to show how much fluid is in the bulb. Measure the amount of fluid (in mL or cc's) after the stopper has been released.   Record the amount of fluid, date, and time on the drain log provided. If you have multiple drains, measure and record the amount from each drain separately.   If it's easier you can pour the fluid into a measuring cup. Measure the fluid in mL's.   Discard the fluid in the sink or toilet.   To Close: Squeeze the bulb to remove as much air as possible, replace the stopper. This creates the suction to help remove the fluid from your surgical wound.   Stripping the Drain:  Helps to keep the tubing open and flowing. If it is not done, can cause the tubing to be clogged and not work.  Strip the drain three (3) times a day to make sure it's working properly.   Hold the tube securely with one had, near where the tube exits your skin, pinched between your thumb and finger. With your other hand, using hand  on your fingers - pinch the tube a little further away from your skin between your thumb and finger. Slide your hand, further away from your skin all the  way to the bulb while keeping the hand closest to your skin/body in the same place.   Repeat this 2 times.  If the drain seems to have stopped working OR if fluid is leaking from around the drain, try stripping the drain before calling the office/your doctor.  WHEN will the drain be removed:  Bring the long to your clinic appointment, Removal depends on how much fluid has been coming out and how well the drain has been working.   Reasons to call the office:  You have signs of infection: Redness, pain, swelling, warmth, temperature above 101F.  Drain stops working, fluid is coming out from around the drain and you have already tried stripping the drain  You see a sudden change in the odor or color of the fluid  The tubing comes out.     Neva Soler MD  Breast [place] [address] [phone]   Drain Output Log      Date Time Drain #1 Drain #2 Drain #3 Drain #4

## 2025-07-15 NOTE — ANESTHESIA POSTPROCEDURE EVALUATION
Patient: Claudette L McManus    Procedure Summary       Date: 07/15/25 Room / Location: [facility] OR  / [facility] MAIN OR    Anesthesia Start: 1054 Anesthesia Stop: 1359    Procedure: LEFT MASTECTOMY WITH SENTINEL LYMPH NODE BIOPSY (Left: Breast) Diagnosis:       Malignant neoplasm of overlapping sites of left breast in female, estrogen receptor positive      (Malignant neoplasm of overlapping sites of left breast in female, estrogen receptor positive [C50.812, Z17.0])    Surgeons: Arabella Randle MD Provider: Matt Mock MD    Anesthesia Type: general ASA Status: 3            Anesthesia Type: general    Vitals  Vitals Value Taken Time   /70 07/15/25 14:45   Temp 36.8 °C (98.3 °F) 07/15/25 13:58   Pulse 76 07/15/25 14:46   Resp 16 07/15/25 14:30   SpO2 95 % 07/15/25 14:46   Vitals shown include unfiled device data.        Post Anesthesia Care and Evaluation    Patient location during evaluation: bedside  Patient participation: complete - patient participated  Level of consciousness: awake  Pain management: adequate    Airway patency: patent  Anesthetic complications: No anesthetic complications  PONV Status: controlled  Cardiovascular status: acceptable  Respiratory status: acceptable  Hydration status: acceptable    Comments: /68   Pulse 79   Temp 36.8 °C (98.3 °F) (Oral)   Resp 16   LMP  (LMP Unknown) Comment: partial hysterectomy  SpO2 99%

## 2025-07-15 NOTE — ANESTHESIA PREPROCEDURE EVALUATION
Anesthesia Evaluation                  Airway   Mallampati: II  TM distance: >3 FB  Neck ROM: full  Dental - normal exam     Pulmonary    (+) pulmonary embolism,  (-) decreased breath sounds, wheezes  Cardiovascular - normal exam    (+) hypertension, hyperlipidemia      Neuro/Psych  (+) numbness, psychiatric history Anxiety and Depression  GI/Hepatic/Renal/Endo    (+) diabetes mellitus, thyroid problem hypothyroidism    Musculoskeletal     Abdominal    Substance History      OB/GYN          Other   arthritis,   history of cancer                      Anesthesia Plan    ASA 3     general     intravenous induction     Anesthetic plan, risks, benefits, and alternatives have been provided, discussed and informed consent has been obtained with: patient.        CODE STATUS:

## 2025-07-15 NOTE — OP NOTE
Operative Report    Patient Name:  Claudette L McManus  YOB: 1945    Date of Surgery:  7/15/2025    Pre-op Diagnosis:   Malignant neoplasm of overlapping sites of left breast in female, estrogen receptor positive [C50.812, Z17.0]    Post-Op Diagnosis:  Malignant neoplasm of overlapping sites of left breast in female, estrogen receptor positive [C50.812, Z17.0]     Procedures:  LEFT MASTECTOMY WITH SENTINEL LYMPH NODE BIOPSY       Staff:  Surgeon(s):  Arabella Randle MD    Assistant: Nova Hernandez CSA  was responsible for performing the following activities: Retraction, Suction, Irrigation, Suturing, and Closing and their skilled assistance was necessary for the success of this case.    Anesthesia: General    Estimated Blood Loss: 25 mL    Implants:    Implant Name Type Inv. Item Serial No.  Lot No. LRB No. Used Action   CLIPAPPLR M/ ENDO LIGACLIP 9 3/8IN SM - [id number] Implant CLIPAPPLR M/ ENDO LIGACLIP 9 3/8IN   [employer]  [employer] [id number] Left 3 Implanted       Specimen:          Specimens       ID Source Type Tests Collected By Collected At Frozen?    A [place] Lymph Node Tissue TISSUE PATHOLOGY EXAM   Arabella Randle MD 7/15/25 1159 Yes    Description: Left axilla sentinel node #1, hot and blue, count 1700    B [place] Lymph Node Tissue TISSUE PATHOLOGY EXAM   Arabella Randle MD 7/15/25 1209 Yes    Description: Left axilla sentinel node #2, hot and blue, count 500    C Breast, Left Tissue TISSUE PATHOLOGY EXAM   Arabella Randle MD 7/15/25 1239 No    Description: Left breast, stitch at 12:00            Findings:   Intraoperative frozen section pathology evaluation of sentinel nodes showed micrometastatic disease in 1 of 2 lymph nodes.   Scar tissue from prior reduction.    Complications: None     Indications: The patient is a 80 y.o. F with left breast cancer initially diagnosed in 2022: Intermediate grade, invasive ductal carcinoma, ER/VT  positive, Her2 negative.  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  She had at least 5 retroareolar irregular masses seen on ultrasound, some of which may have been additional cancer sites or could have been uninvolved papillomas.  She was placed on neoadjuvant endocrine therapy because she was not a good surgical candidate and initially had a very good response on imaging.  Recent diagnostic imaging in 5/2025 showed new calcifications and biopsy shows DCIS. She presents today for surgery. The risks and benefits were discussed preoperatively and she understood and agreed to proceed.     Description of Procedure:  Preoperatively, in the nuclear medicine suite, the periareolar skin was injected with Lymphoseek. The patient was identified in the preoperative area and brought to the operating room and placed supine on the table. Patient verification was performed and general anesthesia was induced. The left breast was cleansed with an alcohol prep and 5 ml of lymphazurin blue was injected in the retroareolar area. She tolerated this well. The patient was prepped and draped in sterile fashion with the left arm prepped into the field. A time out was performed and all were in agreement. I confirmed that the patient had received preoperative antibiotics.     I made an incision through the skin and subcutaneous tissues. The superior flap was raised first in order to access the axilla. Once in the area of the axilla, the clavipectoral fascia was incised. The navigator gamma probe was used to identify the area of increased uptake. I dissected in the area of increased uptake, taking care to note any blue channels traveling in this direction. The area was pulled forward into the wound and the node was carefully dissected out and removed, taking care to clip all lymphatic channels. The node was hot and blue. Ex vivo counts were 1700.  A  second area of increased uptake was identified. This node was carefully dissected out and removed, taking care to clip all lymphatic channels. The node was both hot and blue. Ex vivo counts on SLN #2 measured 500. Background count was 30, there were no additional hot or blue nodes, and the node biopsy was terminated. The lymph nodes were sent for frozen section and intraoperative pathology evaluation showed micrometastatic disease in 1 of 2 lymph nodes.     I then continued creation of tissue flaps using electrocautery superiorly to the level of the clavicle, medially to the sternal border, laterally to the anterior axillary line and inferiorly to the inframammary fold. Once the flaps were raised the breast was carefully removed from the pectoralis major and serratus using electrocautery. The specimen was marked with a short superior stitch (12:00) and handed off for pathology. The wound bed was then copiously irrigated and hemostasis achieved.    Next a pectoralis muscle block was performed. The interfascial plane between pectoralis major and minor was identified and 10 mL of 0.25% bupivacaine with epinephrine was injected into this space. Next the fascia overlying the serratus anterior, under pectoralis minor, was identified and this space was injected with 15 mL of local anesthetic. The drain sites were also injected with local anesthetic. The pectoralis muscle and mastectomy flaps were coated with 5 mL of liquid thrombin. Two 15 Fr ZAHRAA drains were placed in the mastectomy cavity, brought out through the lateral chest wall skin inferior to the incision, and secured with a 3-0 nylon. The deep dermis was closed with interrupted 3-0 vicryl suture. The skin was closed with 4-0 subcuticular running monocryl and covered with dermabond. A SorbaView dressing was placed over the drain site. The patient was placed in an Ace wrap which was stuffed with Kerlix. Counts were correct at the end of the case. The patient was  awakened from anesthesia and taken to the PACU in stable condition..     [place] Node Biopsy for Breast Cancer - Left  Operation performed with curative intent. Yes   Tracer(s) used to identify sentinel nodes in the upfront surgery (non-neoadjuvant) setting (select all that apply). N/A   Tracer(s) used to identify sentinel nodes in the neoadjuvant setting (select all that apply). Dye and Radioactive tracer   All nodes (colored or non-colored) present at the end of a dye-filled lymphatic channel were removed. Yes   All significantly radioactive nodes were removed. Yes   All palpably suspicious nodes were removed. Yes   Biopsy-proven positive nodes marked with clips prior to chemotherapy were identified and removed. N/A      Arabella Randle MD     Date: 7/15/2025  Time: 13:34 EDT

## 2025-07-16 ENCOUNTER — PATIENT OUTREACH (OUTPATIENT)
Dept: OTHER | Facility: HOSPITAL | Age: 80
End: 2025-07-16
Payer: MEDICARE

## 2025-07-16 NOTE — PROGRESS NOTES
"Received call from Dr. Randle to phone pt and check on her s/p surgery. Phoned pt and spoke with her and her . Pt states she is doing well, she isn't in any pain, she is taking her meds as needed. She reports having a good night's sleep. She used two pillows to prop herself in bed. She had a light breakfast of coffee and toast. She stated her  and friend are not happy with her because they think she is \"not doing the right things\" Pt was able to explain to me that she knows not to lift her arm above her head. She knows not to lift or pull with her left arm. She stated she  is using her rollator and advised her not to bear weight on her left arm.  She is wearing button down blouses but did wear a pullover shirt with a large neck and this upset her  and friend. Encouraged her to wear button down blouses until post- op visit with Dr. Randle. She states she is \"trying to the right thing and trying hard to get better.\" She didn't know if her friend was coming back to the house to help, but she did state she felt comfortable with her drain care without her friend. She reports having a  once every 2 weeks and she did not think she needed any medical in home care. I encouraged her and stated she is doing well from what she is sharing with me and her  did not offer to state anything to the contrary.  Asked to speak with her  and she stated he was listening along side of her, offered  opportunity to talk, he declined but did take down my direct contact number and was encouraged to call if they had any questions, concerns or needed further assistance. Will check in with a follow up call on Friday.  "

## 2025-07-17 LAB
CYTO UR: NORMAL
LAB AP CASE REPORT: NORMAL
LAB AP DIAGNOSIS COMMENT: NORMAL
LAB AP SPECIAL STAINS: NORMAL
LAB AP SYNOPTIC CHECKLIST: NORMAL
Lab: NORMAL
PATH REPORT.FINAL DX SPEC: NORMAL
PATH REPORT.GROSS SPEC: NORMAL

## 2025-07-18 ENCOUNTER — RESULTS FOLLOW-UP (OUTPATIENT)
Dept: PERIOP | Facility: HOSPITAL | Age: 80
End: 2025-07-18
Payer: MEDICARE

## 2025-07-18 ENCOUNTER — TELEPHONE (OUTPATIENT)
Dept: OTHER | Facility: HOSPITAL | Age: 80
End: 2025-07-18
Payer: MEDICARE

## 2025-07-18 DIAGNOSIS — Z17.0 MALIGNANT NEOPLASM OF OVERLAPPING SITES OF LEFT BREAST IN FEMALE, ESTROGEN RECEPTOR POSITIVE: Primary | ICD-10-CM

## 2025-07-18 DIAGNOSIS — C50.812 MALIGNANT NEOPLASM OF OVERLAPPING SITES OF LEFT BREAST IN FEMALE, ESTROGEN RECEPTOR POSITIVE: Primary | ICD-10-CM

## 2025-07-18 NOTE — TELEPHONE ENCOUNTER
I called Ms. Gomez to discuss her pathology report. They had some issues initially with emptying the drains, but this is going fine now. I will place a referral for radiation oncology.     Arabella Randle MD

## 2025-07-18 NOTE — TELEPHONE ENCOUNTER
Pt's  called and stated, the friend of his wife which was helping with her aftercare took all the discharge instructions with her and he needed to know about her aftercare. Phoned Dr. aRndle's office and spoke with Lida, will notify Dr. Randle and pt will receive a returned call.

## 2025-07-22 ENCOUNTER — CLINICAL SUPPORT (OUTPATIENT)
Dept: SURGERY | Facility: CLINIC | Age: 80
End: 2025-07-22
Payer: MEDICARE

## 2025-07-22 ENCOUNTER — TELEPHONE (OUTPATIENT)
Dept: SURGERY | Facility: CLINIC | Age: 80
End: 2025-07-22
Payer: MEDICARE

## 2025-07-24 NOTE — PROGRESS NOTES
Patient came into office for ace wrap change and check js drains.   I educated patients  on drain care, the bulbs were not getting collapsed properly.    Dr. Randle also came into room to check patients surgical incision and to make sure there were no sign of infection and drains and surgical site intact.     Drain #1 250 cc  Drain # 2 170 cc     I will call patient &  on Thursday to check output.

## 2025-07-29 DIAGNOSIS — F41.9 CHRONIC ANXIETY: ICD-10-CM

## 2025-07-29 RX ORDER — LORAZEPAM 1 MG/1
TABLET ORAL
Qty: 60 TABLET | Refills: 4 | Status: SHIPPED | OUTPATIENT
Start: 2025-07-29

## 2025-07-29 RX ORDER — LORAZEPAM 1 MG/1
TABLET ORAL
Qty: 60 TABLET | Refills: 1 | OUTPATIENT
Start: 2025-07-29

## 2025-07-29 NOTE — TELEPHONE ENCOUNTER
Rx Refill Note  Requested Prescriptions     Pending Prescriptions Disp Refills    LORazepam (ATIVAN) 1 MG tablet [Pharmacy Med Name: LORazepam 1 MG TABLET] 60 tablet      Sig: TAKE 1 TABLET BY MOUTH 2 TIMES A DAY FOR CHRONIC ANXIETY OR PANIC      Last office visit with prescribing clinician: 5/8/2025   Last telemedicine visit with prescribing clinician: Visit date not found   Next office visit with prescribing clinician: 11/20/2025       Trice Bauer  07/29/25, 11:59 EDT

## 2025-07-30 ENCOUNTER — TELEPHONE (OUTPATIENT)
Dept: ORTHOPEDICS | Facility: OTHER | Age: 80
End: 2025-07-30
Payer: MEDICARE

## 2025-07-30 NOTE — TELEPHONE ENCOUNTER
PATIENT CALLED SHE HAD SURGERY WITH DR ESME HARDIN, THAT IS WHY SHE MISSED HER APPOINTMENTS.  SHE HAS DRAINS IN HER CHEST AND CANNOT DO PHYSICAL THERAPY UNTIL THEY ARE REMOVED.  SHE WILL CALL BACK TO RESCHEDULE AT A LATER TIME.

## 2025-07-31 ENCOUNTER — TELEPHONE (OUTPATIENT)
Dept: INTERNAL MEDICINE | Facility: CLINIC | Age: 80
End: 2025-07-31
Payer: MEDICARE

## 2025-07-31 ENCOUNTER — OFFICE VISIT (OUTPATIENT)
Dept: SURGERY | Facility: CLINIC | Age: 80
End: 2025-07-31
Payer: MEDICARE

## 2025-07-31 VITALS
HEIGHT: 64 IN | BODY MASS INDEX: 30.39 KG/M2 | OXYGEN SATURATION: 96 % | DIASTOLIC BLOOD PRESSURE: 86 MMHG | RESPIRATION RATE: 18 BRPM | SYSTOLIC BLOOD PRESSURE: 150 MMHG | WEIGHT: 178 LBS | HEART RATE: 81 BPM

## 2025-07-31 DIAGNOSIS — Z48.89 POSTOPERATIVE VISIT: Primary | ICD-10-CM

## 2025-07-31 PROCEDURE — 3077F SYST BP >= 140 MM HG: CPT | Performed by: SURGERY

## 2025-07-31 PROCEDURE — 3079F DIAST BP 80-89 MM HG: CPT | Performed by: SURGERY

## 2025-07-31 PROCEDURE — 99024 POSTOP FOLLOW-UP VISIT: CPT | Performed by: SURGERY

## 2025-07-31 PROCEDURE — 1160F RVW MEDS BY RX/DR IN RCRD: CPT | Performed by: SURGERY

## 2025-07-31 PROCEDURE — 1159F MED LIST DOCD IN RCRD: CPT | Performed by: SURGERY

## 2025-07-31 NOTE — TELEPHONE ENCOUNTER
Called and spoke to patient's pharmacy and they stated that it is not time for her refill at this moment she has an 30 day supply and she can not get an refill until it is 28 days out of the 30.    I called and relayed to patient and patient has an verbal understanding.

## 2025-07-31 NOTE — LETTER
July 31, 2025     NONA Lara  [address]  [facility] [address]  [place] [id number]    Patient: Claudette L McManus   YOB: 1945   Date of Visit: 7/31/2025     Dear NONA Lara:       Thank you for referring Claudette McManus to me for evaluation. Below are the relevant portions of my assessment and plan of care.    If you have questions, please do not hesitate to call me. I look forward to following Claudette along with you.         Sincerely,        Arabella Randle MD        CC: MD Jer Augustin, Arabella SHUKLA MD  07/31/25 2015  Sign when Signing Visit  [facility]     Referring Provider: NONA Lara     Chief complaint: Postoperative visit     Subjective  HPI:   7/3/25:  The patient was initially called back after screening mammogram in 2022 for new left breast findings.  Diagnostic imaging demonstrated  At least 5 irregular masses located in the retroareolar/central left breast with associated calcifications.  Dr. Fam biopsied 2 of these masses. One of them showed intermediate grade invasive ductal carcinoma, ER/SC positive, HER2 negative and the other showed DCIS involving a papilloma.     At the time of diagnosis she had been recently diagnosed with a pulmonary embolus and was on anticoagulation.  She also was having issues with shortness of breath and was being seen by pulmonology.  She was not believed to be a good surgical candidate and was placed on endocrine therapy. She started anastrozole in 11/2022 and switched to Aromasin in 7/2023 which she has been on since.     Follow-up imaging in 5/2023 actually showed a decrease in the amount of calcifications on mammogram, as well as resolution of the irregular masses on ultrasound.  Follow-up imaging in 11/23 again showed a decrease in calcifications and no residual masses on ultrasound.  Imaging in 5/2024 was stable.  In 9/2024 diagnostic imaging was done on the right side  for a nodule seen on CT and this showed an intramammary lymph node.  Imaging on the left was stable again in 12/2024.  In 5/2025 she underwent bilateral diagnostic imaging and at that time was also complaining of pain in the left upper inner breast.  This showed 2.9 cm of calcifications in the upper left breast and subsequent biopsy showed DCIS.  There was nothing abnormal in the area of pain.     She had a follow-up CT in 12/2022 that showed resolution of the PE and Xarelto was stopped in 2023 after 6 months of therapy.  She is no longer followed by pulmonology ans is not on any oxygen or inhalers.  Genetic testing was negative aside from a VUS.  She has a past history of a breast reduction in 2000.    7/31/25, Interval History:  She underwent left mastectomy and SLNB on 7/15/25. There were issues immediately postoperatively with the drains. She got in a fight with her friend who initially planned to come to her house and take care of the drains. Her friend called the office and demanded I send someone to her house to take care of the drains because she refused to go back. Her  initially had no intention of taking care of the drains. After multiple phone calls, he ultimately agreed to take care of them. He had been taught how to do so at the hospital and seen the friend do it several times.   They then came to the office on 7/22/25 for an MA visit and we discovered that the drains were not being collapsed or kept to suction. We collapsed the drains in the office and evacuated over 400 mL of fluid. Since then I have been having them empty the drains 3 times a day.  They paged me over the weekend about some fibrinous debris in the tubing and I reassured them that this was normal. Drain output is still quite high, around 70 per drain.  She admits that she has been essentially using her arm as she normally would, without any restrictions. She also is very frustrated today that the healing process is taking so  long. She admits she had been going out shopping. She wants to be able to drive herself. She dislikes the Ace wrap.       Oncology/Hematology History   Malignant neoplasm of overlapping sites of left breast in female, estrogen receptor positive   7/29/2022 Initial Diagnosis    Malignant neoplasm of overlapping sites of left breast in female, estrogen receptor positive     7/29/2022 Imaging    Screening MMG with Koby ([facility]):  There are scattered areas of fibroglandular density.   There are indeterminate calcifications in the outer central anterior left breast. There is a focal asymmetry with questioned distortion in the slightly lower slightly outer anterior left breast. There is a focal asymmetry in the outer central middle to anterior left breast. There are no suspicious masses, calcifications, or areas of architectural distortion in the right breast.   BI-RADS 0: Incomplete      8/25/2022 Imaging    Left Diagnostic MMG with Koby & Left Breast Limited US ([facility]):  MMG:  There are scattered areas of fibroglandular density.    In the outer central anterior left breast, there is a 1.3 cm group of somewhat pleomorphic calcifications, which is suspicious.  In the lower slightly outer middle left breast, there is a persistent approximately 1.5 cm mass with mild corresponding architectural distortion. There is also a central corresponding calcification.  The previously noted focal asymmetry in the outer central middle left breast partially effaces with spot compression and is less conspicuous  on the lateral view. In the slightly upper outer middle left breast, there is a persistent approximately 1 cm mass with corresponding architectural distortion. In the upper central anterior left breast, there is a persistent focal asymmetry with calcifications.  US:  Targeted sonographic evaluation of the left breast was performed from 2:00 to 6:00 in the region of the mammographic abnormalities.   At 1:00 in the retroareolar  left breast, there is an at least 2.3 x 0.9 x 2.0 cm hypoechoic mass with indistinct margins and internal echogenic foci from calcifications corresponding to the focal asymmetry with calcifications in the anterior left breast on mammogram. There is tubular elongation of the mass concerning for possible ductal extension.   At 3:00 in the retroareolar left breast, there is a 1.1 x 0.8 x 1.0 cm irregular hypoechoic mass with indistinct margins and internal echogenic foci from calcifications, which corresponds to the suspicious group of  calcifications on mammogram.   At 4:00, 3 cm from the nipple, there is a 1.4 x 1.0 x 1.1 cm irregular hypoechoic mass with peripheral vascularity corresponding to a mass with distortion on mammogram.   At 4:30, 3 cm from the nipple, approximately 1.8 cm from the above mass at the 4:00 position, there is a 0.9 x 0.5 x 0.9 cm irregular hypoechoic  mass with indistinct margins, which is suspicious.   At 3:00, 5 cm from the nipple, there is a 0.9 x 0.7 x 0.7 cm irregular hypoechoic mass with indistinct margins and corresponding internal vascularity, which corresponds to a mass with architectural distortion on mammogram and is suspicious   BI-RADS 4C: High suspicion for malignancy      9/21/2022 Biopsy    Left Breast, US-Guided Biopsy x 2:    1. Left Breast, 3:00, 2 cm FN, U/S-Guided Core Needle Biopsy for a Mass:               A. LOW GRADE DUCTAL CARCINOMA IN SITU (DCIS), INVOLVING A SMALL INTRADUCTAL        PAPILLOMA AND ADJACENT DUCTS, Solid, Cribriform and Micropapillary types with calcifications.                B. DCIS measures 14 mm in greatest contiguous extent.                C. See Biomarker Template #1.     Estrogen Receptor (ER): Positive (%, Strong)  Progesterone Receptor (NJ): Positive (%, Strong)  Ki-67 6%     2. Left Breast, 4:00, 2 cm FN, U/S-Guided Core Needle Biopsy for a Mass:               A. INVASIVE DUCTAL CARCINOMA WITH LOBULAR FEATURES, Moderately  differentiated;       Yarely Histologic Grade II/III (tubule score = 3, nuclear score = 2, mitoses score = 1),       measuring at least 6 mm and involving multiple core fragments.                B. Atypical ductal hyperplasia with calcifications and involving an intraductal papilloma.                C. Negative for lymphovascular space invasion.                D. See Biomarker Template #2 and Comment.     Estrogen Receptor (ER): Positive (%, Strong)  Progesterone Receptor (MA): Positive (%, Strong)  HER2 Negative (IHC 2+; FISH copy # 2.8, ratio 1.3)  Ki-67 15%     9/28/2022 Genetic Testing    [facility] Cancers Panel (36 genes):    VUS in SMARCA4     9/28/2022 Imaging    Left Diagnostic MMG with Koby ( [facility]):  In the anterior one-third of the left breast at the 3 o'clock position there is a coil-shaped metallic clip representing the biopsy-proven site of malignancy. Residual calcifications are noted within 1 cm of the posterior inferior margin of the biopsy clip.  There is a second coil-shaped metallic clip in the middle third lower outer quadrant of the left breast at the 4-o'clock position. Surrounding increased density consistent with post biopsy change is noted. When compared to the prior mammogram 07/29/2022, no evidence for clip migration is appreciated.   BI-RADS 6: Known biopsy-proven malignancy.      1/19/2023 Imaging    Left Diagnostic MMG with Koby ( [facility]):  There are 2 coil-shaped biopsy clips in the left breast, one in the anterior one-third at the 3 o'clock position and one in the middle third lower quadrant at the 4-o'clock position.   Reidentified at the medial margin of the coil-shaped metallic clip at the 3-o'clock position are microcalcifications that are not appreciably changed. Two separate clusters measure on the order of 0.9 cm and 0.2 cm in greatest dimension are noted on the order of 0.8 cm from the inferior margin of the biopsy clip.  No abnormality is seen  adjacent to the coil-shaped metallic clip at the 4 o'clock position in the middle third.  I see no new or dominant masses or additional suspicious microcalcifications. There is no evidence for skin thickening, nipple retraction or axillary adenopathy.  BI-RADS 6: Known biopsy-proved malignancy.      5/1/2023 Imaging    Bilateral Diagnostic MMG with Koby & [facility]  MMG:  Scattered fibroglandular densities are seen throughout both breasts. In the anterior one-third of the left breast located lateral to the plane of the nipple at the 3 o'clock position there is a coil-shaped metallic clip. In the middle third of the lower outer quadrant of the left breast at the 4 o'clock position there is a second coil-shaped metallic clip.  These represent biopsy-proven sites of malignancy. There has been interval decrease in the microcalcifications surrounding the biopsy clip at the 3-o'clock position. Only sparse microcalcifications in the region remain spanning a distance measuring on the order of 3 mm compared to 3 mm previously. The biopsy clip in the region is unchanged in position and is located on the order of 6 mm superior and slightly lateral to the residual microcalcifications of interest. Other calcifications in the region appear unchanged.  The biopsy clip at the 4-o'clock position is no longer surrounded by residual surrounding density which can be appreciated on the mammograms dated 08/25/2022 and 09/28/2022. Also, there was noted to be an area of asymmetry/density seen in the middle third lateral aspect of the left breast in the 3 o'clock position on the mammogram dated 08/25/2022 that is no longer visualized.   I see no new or dominant masses in either breast. No evidence for skin thickening, nipple retraction or axillary adenopathy is appreciated.  US:  At the 1 o'clock position in a retroareolar location there is a stable 2.3 x 0.8 x 2.0 cm hypoechoic mass like region. This is most  consistent with an area of benign fibrous tissue and adjacent coil-shaped metallic clip which corresponds to the clip seen mammographically associated with microcalcifications is noted.  At the 4 o'clock position on the order of 3 cm from the nipple there is a visualized metallic clip. The previously noted irregular mass at the 4-o'clock position is no longer visualized. No suspicious findings at this location are visualized. This represents a biopsy-proven site of malignancy.  At the 3 o'clock position on the order of 5 cm from the nipple there has been interval resolution of the previously described irregular mass seen on the sonographic examination dated 08/25/2022. No abnormality is seen at this location on the current examination  There has been interval resolution of the irregular lesion seen at the 4:30 position on the order of 3 cm from the nipple on the examination from 08/25/2022. No abnormality is seen at this location on the current examination.   BI-RADS 6: Known biopsy-proven malignancy.      11/9/2023 Imaging    Left Diagnostic MMG with Koby & Left Breast Limited US ([facility]):  MMG:  Scattered fibroglandular densities are seen throughout the left breast. Reidentified in the anterior one third of the left breast at the 3 o'clock position is a coil shaped metallic clip. In the middle third lower outer quadrant of the left breast at the 4 o'clock position is a second coil shaped metallic clip. These represent biopsy-proven sites of malignancy.  There is continued interval decrease in microcalcifications surrounding the biopsy clip at the 3 o'clock position. Only faint microcalcifications spanning a region of 3 mm can be seen in the vicinity of the metallic clip on the 90-degree lateral spot magnification image. No residual suspicious microcalcifications posterior to the metallic clip can be seen on the spot magnification CC image.  No residual surrounding density is appreciated. Biopsy clip position at the 4  o'clock position.  I see no new or dominant masses. There is no evidence for architectural distortion or deformity.  US:  Targeted sonographic evaluation of the left breast was performed through the retroareolar region at the 1 o'clock position, at the 3 o'clock position on the order of 5 cm from the nipple in the 4 o'clock and 4:30 positions on the order of 3 cm from the nipple. No suspicious sonographic findings are appreciated.  BI-RADS 6: Known biopsy-proven malignancy.      5/30/2024 Imaging    Bilateral Diagnostic MMG with Koby ([facility]):  There are scattered areas of fibroglandular density.  There are HydroMARK coil clips marking 2 sites of known biopsy-proven malignancy in the left breast, which are located in the slightly upper outer anterior and lower slightly outer middle to anterior left breast. These are similar to prior. There are benign-appearing calcifications. There are no new suspicious masses, calcifications, or areas of architectural distortion in either breast.   BI-RADS 6: Known biopsy-proven malignancy      9/12/2024 Imaging    Right Diagnostic MMG with Koby & Right Breast Limited US ([facility]):  Standard images and R2 computerized assisted detection are obtained followed by digital tomosynthesis and limited directed right breast ultrasound. There are scattered areas of fibroglandular density in the right breast and there is a more focal nodular density in the middle third of the lower inner right breast measuring up to 1.7 cm that is similar to the recent right-sided mammogram dated 05/30/2024. It is also quite similar to previous mammograms dating back to 05/15/2015 and today's correlating ultrasound exam confirms the presence of a lymph node at this location measuring up to 1.4 cm. There are no findings to suggest malignancy.  BI-RADS 2. Benign.      12/10/2024 Imaging    Left Diagnostic MMG with Koby ([facility]):  There are scattered areas of fibroglandular density.     There are HydroMARK coil clips in  the upper outer anterior and lower outer middle to anterior left breast marking 2 sites of biopsy-proven malignancy. There are benign-appearing calcifications. There are no new suspicious masses, calcifications, or areas of architectural distortion.   BI-RADS 6: Known biopsy-proven malignancy     5/14/2025 Imaging    Bilateral Diagnostic MMG with Koby & Left Breast Limited US ([facility]):  MMG:  There are scattered areas of fibroglandular density.    There is a marker overlying the slightly upper inner middle left breast marking the area of concern indicated by the patient. No suspicious focal mammographic abnormality is identified deep to the marker. This area was further assessed with ultrasound.   There are HydroMARK coil clips in the upper outer anterior and lower central middle to anterior left breast marking 2 sites of biopsy-proven malignancy. Surrounding parenchyma is similar to prior mammogram. However, in the upper central anterior left breast, there are new grouped calcifications measuring approximately 2.9 cm, which are suspicious.  There are no suspicious masses, calcifications, or areas of architectural distortion in the right breast.  US:  Targeted sonographic evaluation of the left breast was performed in the area of concern indicated by the patient from 10:00 to 11:00. Predominantly fatty tissue is identified. No suspicious cystic or solid mass is identified.   BI-RADS 4: Suspicious     6/23/2025 Biopsy    Left Breast, Stereotactic Biopsy ([facility]):    1.  Left breast, 1:00, stereotactic biopsies of calcifications ([facility]): INTERMEDIATE GRADE DUCTAL CARCINOMA IN SITU (DCIS).               - Architectural patterns: Solid and cribriform with central necrosis and associated microcalcifications.               - DCIS is present in all tissue cores, measuring up to 10 mm maximally.               - Negative for invasive carcinoma.     Estrogen Receptor (ER): Positive (%, Strong)  Progesterone Receptor  (PgR): Positive (71-80%, Strong)  Ki-67 15%     7/15/2025 Surgery    Left mastectomy and sentinel lymph node biopsy    1.  [place] lymph node #1, left axilla, excision:               - 1 lymph node, positive for macrometastatic carcinoma.(1/1).               - Metastatic focus measures 3.0 mm by cytokeratin immunostain without extranodal extension.     2.  [place] lymph node #2, left axilla, excision:  -1 lymph node, negative for metastatic carcinoma (0/1).        3.  Left breast, oriented simple mastectomy (577 g): MULTIFOCAL INVASIVE MAMMARY CARCINOMA, NO SPECIAL TYPE (INVASIVE DUCTAL CARCINOMA).               - Tumor sites and sizes: 1:00 upper outer quadrant (UOQ) #1- 2.0mm, #2 1.0 mm, #3 0.8 mm.                                                       4-5:00 Lower outer quadrant (LOQ)- #4 1.0 mm, #5 1.5 mm, #6 1.8 mm.  - Associated intermediate grade ductal carcinoma in situ (DCIS), solid and cribriform types and involving an intraductal papilloma with central necrosis and associated calcifications, spanning up to 50 mm from medial to lateral.  -All margins are free of invasive carcinoma and DCIS:               Invasive foci: Anterior - 25 mm (LOQ)                                     Posterior - 40 mm (LOQ)                                     Superior - 45 mm (UOQ)                                     Inferior - 60 mm (LOQ)                                     Lateral - 90 mm (LOQ)                                     Medial - 140 mm (UOQ).               DCIS: Anterior - 25 mm.                          Posterior - 2 mm (2.5 mm focus at 5:00).                          Superior - 45 mm.                          Inferior - 60 mm.                          Lateral - 90 mm.                          Medial - 120 mm.  - Focal lymphovascular space invasion identified.  -Hormone receptors: See attached biomarker template.  -Pathologic stage: ympT1a, N1a (sn).    Tumor #1 & #2 Receptors:  ER+ (%, strong)  AK+ (%,  strong)  Her2 negative (IHC 1+)    Tumor #3 Receptors:  ER+ (%, strong)  MI+ (%, strong)  Her2 negative (IHC 1+)    Tumor #4-6 Receptors:  ER+ (%, strong)  MI+ (%, strong)  Her2 negative (IHC 1+)  Ki-67 10%         Review of Systems:  See interval history.      Medications:    Current Outpatient Medications:   •  celecoxib (CeleBREX) 200 MG capsule, Take 1 capsule (200 mg) once daily for arthritis (Patient taking differently: Take 1 capsule (200 mg) once daily for arthritis HOLD PRIOR TO SURGERY PER MD INSTRUCTIONS), Disp: 30 capsule, Rfl: 6  •  Cholecalciferol (VITAMIN D3) 2000 UNITS tablet, Take 1 tablet by mouth Daily. HOLD PRIOR TO SURGERY, Disp: , Rfl:   •  DULoxetine (CYMBALTA) 60 MG capsule, TAKE 1 CAPSULE BY MOUTH DAILY AS DIRECTED, Disp: 90 capsule, Rfl: 3  •  erythromycin (ROMYCIN) 5 MG/GM ophthalmic ointment, apply (1CM)  by ophthalmic route  every evening ribbon along lash line after warm compress mask every night., Disp: , Rfl:   •  exemestane (AROMASIN) 25 MG tablet, Take 1 tablet by mouth Daily., Disp: , Rfl:   •  ezetimibe (ZETIA) 10 MG tablet, TAKE 1 TABLET BY MOUTH DAILY, Disp: 90 tablet, Rfl: 3  •  gabapentin (NEURONTIN) 300 MG capsule, TAKE 1 CAPSULE BY MOUTH 3 TIMES A DAY FOR PERIPHERAL NEUROPATHY AS DIRECTED, Disp: 90 capsule, Rfl: 5  •  levothyroxine (Synthroid) 88 MCG tablet, Take 1 tablet (88 mcg) every day for low thyroid, Disp: 90 tablet, Rfl: 1  •  LORazepam (ATIVAN) 1 MG tablet, TAKE 1 TABLET BY MOUTH 2 TIMES A DAY FOR CHRONIC ANXIETY OR PANIC, Disp: 60 tablet, Rfl: 4  •  Mounjaro 15 MG/0.5ML solution auto-injector, INJECT 15 MG UNDER THE SKIN ONCE WEEKLY (Patient taking differently: Inject  under the skin into the appropriate area as directed 1 (One) Time Per Week. HOLD PRIOR TO SURGERY 7 DAYS), Disp: 2 mL, Rfl: 1  •  multivitamin (MULTI VITAMIN PO), Take  by mouth Daily. HOLD PRIOR TO SURGERY, Disp: , Rfl:   •  polyethylene glycol (MIRALAX) 17 g packet, Take 17  g by mouth Daily., Disp: , Rfl:   •  simvastatin (ZOCOR) 20 MG tablet, TAKE 1 TABLET BY MOUTH DAILY FOR HIGH CHOLESTEROL, Disp: 90 tablet, Rfl: 2  •  traZODone (DESYREL) 150 MG tablet, Take 1 tablet by mouth Every Night., Disp: 90 tablet, Rfl: 3  •  valsartan (DIOVAN) 320 MG tablet, TAKE 1 TABLET BY MOUTH EVERY MORNING, Disp: 90 tablet, Rfl: 10      Allergies   Allergen Reactions   • Morphine And Codeine Hives and Mental Status Change   • Other Dermatitis     REJECTED DACRON SUTURES IN THE PAST AND INCISION CAME APART       Family History   Problem Relation Age of Onset   • Depression Mother    • Arthritis Mother         mother   • Alcohol abuse Father         father   • Breast cancer Daughter         40s   • COPD Daughter    • Cancer Daughter    • Diabetes Daughter    • Early death Daughter         suicide 07/29/2019   • Diabetes Maternal Grandfather    • Leukemia Grandchild 19   • Cancer Other    • Diabetes Other         type II   • Malig Hyperthermia Neg Hx        Objective  PHYSICAL EXAMINATION:   Vitals:    07/31/25 1242   BP: 150/86   Pulse: 81   Resp: 18   SpO2: 96%     ECOG 0 - Asymptomatic  General: NAD, well appearing  Psych: a&o x3, normal mood and affect  Eyes: EOMI, no scleral icterus  ENMT: neck supple without masses or thyromegaly, mucous membranes moist  MSK: normal gait, normal ROM in bilateral shoulders  Lymph nodes: no axillary swelling  Breast:   Right: deferred  Left: Sp mastectomy with well-healing incision and healthy flaps. Drains serous.      Assessment & Plan  Assessment:  80 y.o. F with left breast cancer initially diagnosed in 2022: Intermediate grade, invasive ductal carcinoma, ER/CO positive, Her2 negative.  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  She had at least 5 retroareolar irregular masses seen on ultrasound, some of which may have been additional cancer sites or could have been uninvolved  papillomas.  She was placed on neoadjuvant endocrine therapy because she was not a good surgical candidate and initially had a very good response on imaging.  Recent diagnostic imaging in 5/2025 showed new calcifications and biopsy shows DCIS.   -She underwent left mastectomy and SLNB on 7/15/25, [id number] (6 foci of invasive cancer ranging from 0.8 to 2 mm and 1/2 LNs positive).    Plan:  -Ace wrap was replaced with a postsurgical bra which we attempted to stuff for some compression since the Ace wrap was bothering her.   -Radiation oncology referral. Appointment schedule with Dr. Maier on 8/7/25.  -Continue drains. Weekly MA visits for the next 2 weeks. F/u with me in 3 weeks.    Arabella Randle MD      CC:  NONA Lara MD

## 2025-07-31 NOTE — TELEPHONE ENCOUNTER
Called patient's pharmacy multiple times to see what the hold up was with her LORazepam (ATIVAN) 1 MG tablet    Pharmacy still has not got back with me.   I've called and told patient I have been reaching out to them and once I get a response I will call her back.    Thank you.

## 2025-07-31 NOTE — PROGRESS NOTES
[facility]     Referring Provider: NONA Lara     Chief complaint: Postoperative visit     Subjective   HPI:   7/3/25:  The patient was initially called back after screening mammogram in 2022 for new left breast findings.  Diagnostic imaging demonstrated  At least 5 irregular masses located in the retroareolar/central left breast with associated calcifications.  Dr. Fam biopsied 2 of these masses. One of them showed intermediate grade invasive ductal carcinoma, ER/MD positive, HER2 negative and the other showed DCIS involving a papilloma.     At the time of diagnosis she had been recently diagnosed with a pulmonary embolus and was on anticoagulation.  She also was having issues with shortness of breath and was being seen by pulmonology.  She was not believed to be a good surgical candidate and was placed on endocrine therapy. She started anastrozole in 11/2022 and switched to Aromasin in 7/2023 which she has been on since.     Follow-up imaging in 5/2023 actually showed a decrease in the amount of calcifications on mammogram, as well as resolution of the irregular masses on ultrasound.  Follow-up imaging in 11/23 again showed a decrease in calcifications and no residual masses on ultrasound.  Imaging in 5/2024 was stable.  In 9/2024 diagnostic imaging was done on the right side for a nodule seen on CT and this showed an intramammary lymph node.  Imaging on the left was stable again in 12/2024.  In 5/2025 she underwent bilateral diagnostic imaging and at that time was also complaining of pain in the left upper inner breast.  This showed 2.9 cm of calcifications in the upper left breast and subsequent biopsy showed DCIS.  There was nothing abnormal in the area of pain.     She had a follow-up CT in 12/2022 that showed resolution of the PE and Xarelto was stopped in 2023 after 6 months of therapy.  She is no longer followed by pulmonology ans is not on any oxygen or inhalers.  Genetic  testing was negative aside from a VUS.  She has a past history of a breast reduction in 2000.    7/31/25, Interval History:  She underwent left mastectomy and SLNB on 7/15/25. There were issues immediately postoperatively with the drains. She got in a fight with her friend who initially planned to come to her house and take care of the drains. Her friend called the office and demanded I send someone to her house to take care of the drains because she refused to go back. Her  initially had no intention of taking care of the drains. After multiple phone calls, he ultimately agreed to take care of them. He had been taught how to do so at the hospital and seen the friend do it several times.   They then came to the office on 7/22/25 for an MA visit and we discovered that the drains were not being collapsed or kept to suction. We collapsed the drains in the office and evacuated over 400 mL of fluid. Since then I have been having them empty the drains 3 times a day.  They paged me over the weekend about some fibrinous debris in the tubing and I reassured them that this was normal. Drain output is still quite high, around 70 per drain.  She admits that she has been essentially using her arm as she normally would, without any restrictions. She also is very frustrated today that the healing process is taking so long. She admits she had been going out shopping. She wants to be able to drive herself. She dislikes the Ace wrap.       Oncology/Hematology History   Malignant neoplasm of overlapping sites of left breast in female, estrogen receptor positive   7/29/2022 Initial Diagnosis    Malignant neoplasm of overlapping sites of left breast in female, estrogen receptor positive     7/29/2022 Imaging    Screening MMG with Koby ([facility]):  There are scattered areas of fibroglandular density.   There are indeterminate calcifications in the outer central anterior left breast. There is a focal asymmetry with questioned  distortion in the slightly lower slightly outer anterior left breast. There is a focal asymmetry in the outer central middle to anterior left breast. There are no suspicious masses, calcifications, or areas of architectural distortion in the right breast.   BI-RADS 0: Incomplete      8/25/2022 Imaging    Left Diagnostic MMG with Koby & Left Breast Limited US ([facility]):  MMG:  There are scattered areas of fibroglandular density.    In the outer central anterior left breast, there is a 1.3 cm group of somewhat pleomorphic calcifications, which is suspicious.  In the lower slightly outer middle left breast, there is a persistent approximately 1.5 cm mass with mild corresponding architectural distortion. There is also a central corresponding calcification.  The previously noted focal asymmetry in the outer central middle left breast partially effaces with spot compression and is less conspicuous  on the lateral view. In the slightly upper outer middle left breast, there is a persistent approximately 1 cm mass with corresponding architectural distortion. In the upper central anterior left breast, there is a persistent focal asymmetry with calcifications.  US:  Targeted sonographic evaluation of the left breast was performed from 2:00 to 6:00 in the region of the mammographic abnormalities.   At 1:00 in the retroareolar left breast, there is an at least 2.3 x 0.9 x 2.0 cm hypoechoic mass with indistinct margins and internal echogenic foci from calcifications corresponding to the focal asymmetry with calcifications in the anterior left breast on mammogram. There is tubular elongation of the mass concerning for possible ductal extension.   At 3:00 in the retroareolar left breast, there is a 1.1 x 0.8 x 1.0 cm irregular hypoechoic mass with indistinct margins and internal echogenic foci from calcifications, which corresponds to the suspicious group of  calcifications on mammogram.   At 4:00, 3 cm from the nipple, there is a  1.4 x 1.0 x 1.1 cm irregular hypoechoic mass with peripheral vascularity corresponding to a mass with distortion on mammogram.   At 4:30, 3 cm from the nipple, approximately 1.8 cm from the above mass at the 4:00 position, there is a 0.9 x 0.5 x 0.9 cm irregular hypoechoic  mass with indistinct margins, which is suspicious.   At 3:00, 5 cm from the nipple, there is a 0.9 x 0.7 x 0.7 cm irregular hypoechoic mass with indistinct margins and corresponding internal vascularity, which corresponds to a mass with architectural distortion on mammogram and is suspicious   BI-RADS 4C: High suspicion for malignancy      9/21/2022 Biopsy    Left Breast, US-Guided Biopsy x 2:    1. Left Breast, 3:00, 2 cm FN, U/S-Guided Core Needle Biopsy for a Mass:               A. LOW GRADE DUCTAL CARCINOMA IN SITU (DCIS), INVOLVING A SMALL INTRADUCTAL        PAPILLOMA AND ADJACENT DUCTS, Solid, Cribriform and Micropapillary types with calcifications.                B. DCIS measures 14 mm in greatest contiguous extent.                C. See Biomarker Template #1.     Estrogen Receptor (ER): Positive (%, Strong)  Progesterone Receptor (NM): Positive (%, Strong)  Ki-67 6%     2. Left Breast, 4:00, 2 cm FN, U/S-Guided Core Needle Biopsy for a Mass:               A. INVASIVE DUCTAL CARCINOMA WITH LOBULAR FEATURES, Moderately differentiated;       Yarely Histologic Grade II/III (tubule score = 3, nuclear score = 2, mitoses score = 1),       measuring at least 6 mm and involving multiple core fragments.                B. Atypical ductal hyperplasia with calcifications and involving an intraductal papilloma.                C. Negative for lymphovascular space invasion.                D. See Biomarker Template #2 and Comment.     Estrogen Receptor (ER): Positive (%, Strong)  Progesterone Receptor (NM): Positive (%, Strong)  HER2 Negative (IHC 2+; FISH copy # 2.8, ratio 1.3)  Ki-67 15%     9/28/2022 Genetic Testing     [employer] [facility] Cancers Panel (36 genes):    VUS in SMARCA4     9/28/2022 Imaging    Left Diagnostic MMG with Koby ( [facility]):  In the anterior one-third of the left breast at the 3 o'clock position there is a coil-shaped metallic clip representing the biopsy-proven site of malignancy. Residual calcifications are noted within 1 cm of the posterior inferior margin of the biopsy clip.  There is a second coil-shaped metallic clip in the middle third lower outer quadrant of the left breast at the 4-o'clock position. Surrounding increased density consistent with post biopsy change is noted. When compared to the prior mammogram 07/29/2022, no evidence for clip migration is appreciated.   BI-RADS 6: Known biopsy-proven malignancy.      1/19/2023 Imaging    Left Diagnostic MMG with Koby ( [facility]):  There are 2 coil-shaped biopsy clips in the left breast, one in the anterior one-third at the 3 o'clock position and one in the middle third lower quadrant at the 4-o'clock position.   Reidentified at the medial margin of the coil-shaped metallic clip at the 3-o'clock position are microcalcifications that are not appreciably changed. Two separate clusters measure on the order of 0.9 cm and 0.2 cm in greatest dimension are noted on the order of 0.8 cm from the inferior margin of the biopsy clip.  No abnormality is seen adjacent to the coil-shaped metallic clip at the 4 o'clock position in the middle third.  I see no new or dominant masses or additional suspicious microcalcifications. There is no evidence for skin thickening, nipple retraction or axillary adenopathy.  BI-RADS 6: Known biopsy-proved malignancy.      5/1/2023 Imaging    Bilateral Diagnostic MMG with Koby & Left Breast Limited US ( [facility]):  MMG:  Scattered fibroglandular densities are seen throughout both breasts. In the anterior one-third of the left breast located lateral to the plane of the nipple at the 3 o'clock position there is a coil-shaped metallic clip. In  the middle third of the lower outer quadrant of the left breast at the 4 o'clock position there is a second coil-shaped metallic clip.  These represent biopsy-proven sites of malignancy. There has been interval decrease in the microcalcifications surrounding the biopsy clip at the 3-o'clock position. Only sparse microcalcifications in the region remain spanning a distance measuring on the order of 3 mm compared to 3 mm previously. The biopsy clip in the region is unchanged in position and is located on the order of 6 mm superior and slightly lateral to the residual microcalcifications of interest. Other calcifications in the region appear unchanged.  The biopsy clip at the 4-o'clock position is no longer surrounded by residual surrounding density which can be appreciated on the mammograms dated 08/25/2022 and 09/28/2022. Also, there was noted to be an area of asymmetry/density seen in the middle third lateral aspect of the left breast in the 3 o'clock position on the mammogram dated 08/25/2022 that is no longer visualized.   I see no new or dominant masses in either breast. No evidence for skin thickening, nipple retraction or axillary adenopathy is appreciated.  US:  At the 1 o'clock position in a retroareolar location there is a stable 2.3 x 0.8 x 2.0 cm hypoechoic mass like region. This is most consistent with an area of benign fibrous tissue and adjacent coil-shaped metallic clip which corresponds to the clip seen mammographically associated with microcalcifications is noted.  At the 4 o'clock position on the order of 3 cm from the nipple there is a visualized metallic clip. The previously noted irregular mass at the 4-o'clock position is no longer visualized. No suspicious findings at this location are visualized. This represents a biopsy-proven site of malignancy.  At the 3 o'clock position on the order of 5 cm from the nipple there has been interval resolution of the previously described irregular mass seen  on the sonographic examination dated 08/25/2022. No abnormality is seen at this location on the current examination  There has been interval resolution of the irregular lesion seen at the 4:30 position on the order of 3 cm from the nipple on the examination from 08/25/2022. No abnormality is seen at this location on the current examination.   BI-RADS 6: Known biopsy-proven malignancy.      11/9/2023 Imaging    Left Diagnostic MMG with Koby & Left Breast Limited US ([facility]):  MMG:  Scattered fibroglandular densities are seen throughout the left breast. Reidentified in the anterior one third of the left breast at the 3 o'clock position is a coil shaped metallic clip. In the middle third lower outer quadrant of the left breast at the 4 o'clock position is a second coil shaped metallic clip. These represent biopsy-proven sites of malignancy.  There is continued interval decrease in microcalcifications surrounding the biopsy clip at the 3 o'clock position. Only faint microcalcifications spanning a region of 3 mm can be seen in the vicinity of the metallic clip on the 90-degree lateral spot magnification image. No residual suspicious microcalcifications posterior to the metallic clip can be seen on the spot magnification CC image.  No residual surrounding density is appreciated. Biopsy clip position at the 4 o'clock position.  I see no new or dominant masses. There is no evidence for architectural distortion or deformity.  US:  Targeted sonographic evaluation of the left breast was performed through the retroareolar region at the 1 o'clock position, at the 3 o'clock position on the order of 5 cm from the nipple in the 4 o'clock and 4:30 positions on the order of 3 cm from the nipple. No suspicious sonographic findings are appreciated.  BI-RADS 6: Known biopsy-proven malignancy.      5/30/2024 Imaging    Bilateral Diagnostic MMG with Koby ([facility]):  There are scattered areas of fibroglandular density.  There are HydroMARK coil  clips marking 2 sites of known biopsy-proven malignancy in the left breast, which are located in the slightly upper outer anterior and lower slightly outer middle to anterior left breast. These are similar to prior. There are benign-appearing calcifications. There are no new suspicious masses, calcifications, or areas of architectural distortion in either breast.   BI-RADS 6: Known biopsy-proven malignancy      9/12/2024 Imaging    Right Diagnostic MMG with Koby & Right Breast Limited US ([facility]):  Standard images and R2 computerized assisted detection are obtained followed by digital tomosynthesis and limited directed right breast ultrasound. There are scattered areas of fibroglandular density in the right breast and there is a more focal nodular density in the middle third of the lower inner right breast measuring up to 1.7 cm that is similar to the recent right-sided mammogram dated 05/30/2024. It is also quite similar to previous mammograms dating back to 05/15/2015 and today's correlating ultrasound exam confirms the presence of a lymph node at this location measuring up to 1.4 cm. There are no findings to suggest malignancy.  BI-RADS 2. Benign.      12/10/2024 Imaging    Left Diagnostic MMG with Koby ([facility]):  There are scattered areas of fibroglandular density.     There are HydroMARK coil clips in the upper outer anterior and lower outer middle to anterior left breast marking 2 sites of biopsy-proven malignancy. There are benign-appearing calcifications. There are no new suspicious masses, calcifications, or areas of architectural distortion.   BI-RADS 6: Known biopsy-proven malignancy     5/14/2025 Imaging    Bilateral Diagnostic MMG with Koby & Left Breast Limited US ([facility]):  MMG:  There are scattered areas of fibroglandular density.    There is a marker overlying the slightly upper inner middle left breast marking the area of concern indicated by the patient. No suspicious focal mammographic abnormality is  identified deep to the marker. This area was further assessed with ultrasound.   There are HydroMARK coil clips in the upper outer anterior and lower central middle to anterior left breast marking 2 sites of biopsy-proven malignancy. Surrounding parenchyma is similar to prior mammogram. However, in the upper central anterior left breast, there are new grouped calcifications measuring approximately 2.9 cm, which are suspicious.  There are no suspicious masses, calcifications, or areas of architectural distortion in the right breast.  US:  Targeted sonographic evaluation of the left breast was performed in the area of concern indicated by the patient from 10:00 to 11:00. Predominantly fatty tissue is identified. No suspicious cystic or solid mass is identified.   BI-RADS 4: Suspicious     6/23/2025 Biopsy    Left Breast, Stereotactic Biopsy ([facility]):    1.  Left breast, 1:00, stereotactic biopsies of calcifications (cory): INTERMEDIATE GRADE DUCTAL CARCINOMA IN SITU (DCIS).               - Architectural patterns: Solid and cribriform with central necrosis and associated microcalcifications.               - DCIS is present in all tissue cores, measuring up to 10 mm maximally.               - Negative for invasive carcinoma.     Estrogen Receptor (ER): Positive (%, Strong)  Progesterone Receptor (PgR): Positive (71-80%, Strong)  Ki-67 15%     7/15/2025 Surgery    Left mastectomy and sentinel lymph node biopsy    1.  [place] lymph node #1, left axilla, excision:               - 1 lymph node, positive for macrometastatic carcinoma.(1/1).               - Metastatic focus measures 3.0 mm by cytokeratin immunostain without extranodal extension.     2.  [place] lymph node #2, left axilla, excision:  -1 lymph node, negative for metastatic carcinoma (0/1).        3.  Left breast, oriented simple mastectomy (577 g): MULTIFOCAL INVASIVE MAMMARY CARCINOMA, NO SPECIAL TYPE (INVASIVE DUCTAL CARCINOMA).               -  Tumor sites and sizes: 1:00 upper outer quadrant (UOQ) #1- 2.0mm, #2 1.0 mm, #3 0.8 mm.                                                       4-5:00 Lower outer quadrant (LOQ)- #4 1.0 mm, #5 1.5 mm, #6 1.8 mm.  - Associated intermediate grade ductal carcinoma in situ (DCIS), solid and cribriform types and involving an intraductal papilloma with central necrosis and associated calcifications, spanning up to 50 mm from medial to lateral.  -All margins are free of invasive carcinoma and DCIS:               Invasive foci: Anterior - 25 mm (LOQ)                                     Posterior - 40 mm (LOQ)                                     Superior - 45 mm (UOQ)                                     Inferior - 60 mm (LOQ)                                     Lateral - 90 mm (LOQ)                                     Medial - 140 mm (UOQ).               DCIS: Anterior - 25 mm.                          Posterior - 2 mm (2.5 mm focus at 5:00).                          Superior - 45 mm.                          Inferior - 60 mm.                          Lateral - 90 mm.                          Medial - 120 mm.  - Focal lymphovascular space invasion identified.  -Hormone receptors: See attached biomarker template.  -Pathologic stage: ympT1a, N1a (sn).    Tumor #1 & #2 Receptors:  ER+ (%, strong)  PA+ (%, strong)  Her2 negative (IHC 1+)    Tumor #3 Receptors:  ER+ (%, strong)  PA+ (%, strong)  Her2 negative (IHC 1+)    Tumor #4-6 Receptors:  ER+ (%, strong)  PA+ (%, strong)  Her2 negative (IHC 1+)  Ki-67 10%         Review of Systems:  See interval history.      Medications:    Current Outpatient Medications:     celecoxib (CeleBREX) 200 MG capsule, Take 1 capsule (200 mg) once daily for arthritis (Patient taking differently: Take 1 capsule (200 mg) once daily for arthritis HOLD PRIOR TO SURGERY PER MD INSTRUCTIONS), Disp: 30 capsule, Rfl: 6    Cholecalciferol (VITAMIN D3) 2000 UNITS tablet,  Take 1 tablet by mouth Daily. HOLD PRIOR TO SURGERY, Disp: , Rfl:     DULoxetine (CYMBALTA) 60 MG capsule, TAKE 1 CAPSULE BY MOUTH DAILY AS DIRECTED, Disp: 90 capsule, Rfl: 3    erythromycin (ROMYCIN) 5 MG/GM ophthalmic ointment, apply (1CM)  by ophthalmic route  every evening ribbon along lash line after warm compress mask every night., Disp: , Rfl:     exemestane (AROMASIN) 25 MG tablet, Take 1 tablet by mouth Daily., Disp: , Rfl:     ezetimibe (ZETIA) 10 MG tablet, TAKE 1 TABLET BY MOUTH DAILY, Disp: 90 tablet, Rfl: 3    gabapentin (NEURONTIN) 300 MG capsule, TAKE 1 CAPSULE BY MOUTH 3 TIMES A DAY FOR PERIPHERAL NEUROPATHY AS DIRECTED, Disp: 90 capsule, Rfl: 5    levothyroxine (Synthroid) 88 MCG tablet, Take 1 tablet (88 mcg) every day for low thyroid, Disp: 90 tablet, Rfl: 1    LORazepam (ATIVAN) 1 MG tablet, TAKE 1 TABLET BY MOUTH 2 TIMES A DAY FOR CHRONIC ANXIETY OR PANIC, Disp: 60 tablet, Rfl: 4    Mounjaro 15 MG/0.5ML solution auto-injector, INJECT 15 MG UNDER THE SKIN ONCE WEEKLY (Patient taking differently: Inject  under the skin into the appropriate area as directed 1 (One) Time Per Week. HOLD PRIOR TO SURGERY 7 DAYS), Disp: 2 mL, Rfl: 1    multivitamin (MULTI VITAMIN PO), Take  by mouth Daily. HOLD PRIOR TO SURGERY, Disp: , Rfl:     polyethylene glycol (MIRALAX) 17 g packet, Take 17 g by mouth Daily., Disp: , Rfl:     simvastatin (ZOCOR) 20 MG tablet, TAKE 1 TABLET BY MOUTH DAILY FOR HIGH CHOLESTEROL, Disp: 90 tablet, Rfl: 2    traZODone (DESYREL) 150 MG tablet, Take 1 tablet by mouth Every Night., Disp: 90 tablet, Rfl: 3    valsartan (DIOVAN) 320 MG tablet, TAKE 1 TABLET BY MOUTH EVERY MORNING, Disp: 90 tablet, Rfl: 10      Allergies   Allergen Reactions    Morphine And Codeine Hives and Mental Status Change    Other Dermatitis     REJECTED DACRON SUTURES IN THE PAST AND INCISION CAME APART       Family History   Problem Relation Age of Onset    Depression Mother     Arthritis Mother         mother     Alcohol abuse Father         father    Breast cancer Daughter         40s    COPD Daughter     Cancer Daughter     Diabetes Daughter     Early death Daughter         suicide 07/29/2019    Diabetes Maternal Grandfather     Leukemia Grandchild 19    Cancer Other     Diabetes Other         type II    Malig Hyperthermia Neg Hx        Objective   PHYSICAL EXAMINATION:   Vitals:    07/31/25 1242   BP: 150/86   Pulse: 81   Resp: 18   SpO2: 96%     ECOG 0 - Asymptomatic  General: NAD, well appearing  Psych: a&o x3, normal mood and affect  Eyes: EOMI, no scleral icterus  ENMT: neck supple without masses or thyromegaly, mucous membranes moist  MSK: normal gait, normal ROM in bilateral shoulders  Lymph nodes: no axillary swelling  Breast:   Right: deferred  Left: Sp mastectomy with well-healing incision and healthy flaps. Drains serous.      Assessment & Plan   Assessment:  80 y.o. F with left breast cancer initially diagnosed in 2022: Intermediate grade, invasive ductal carcinoma, ER/PA positive, Her2 negative.  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  She had at least 5 retroareolar irregular masses seen on ultrasound, some of which may have been additional cancer sites or could have been uninvolved papillomas.  She was placed on neoadjuvant endocrine therapy because she was not a good surgical candidate and initially had a very good response on imaging.  Recent diagnostic imaging in 5/2025 showed new calcifications and biopsy shows DCIS.   -She underwent left mastectomy and SLNB on 7/15/25, [id number] (6 foci of invasive cancer ranging from 0.8 to 2 mm and 1/2 LNs positive).    Plan:  -Ace wrap was replaced with a postsurgical bra which we attempted to stuff for some compression since the Ace wrap was bothering her.   -Radiation oncology referral. Appointment schedule with Dr. Maier on 8/7/25.  -Continue drains. Weekly MA visits for  the next 2 weeks. F/u with me in 3 weeks.    Arabella Randle MD      CC:  NONA Lara MD

## 2025-08-05 ENCOUNTER — PATIENT OUTREACH (OUTPATIENT)
Dept: RADIATION ONCOLOGY | Facility: HOSPITAL | Age: 80
End: 2025-08-05
Payer: MEDICARE

## 2025-08-06 ENCOUNTER — TELEPHONE (OUTPATIENT)
Dept: RADIATION ONCOLOGY | Facility: HOSPITAL | Age: 80
End: 2025-08-06
Payer: MEDICARE

## 2025-08-07 ENCOUNTER — TELEPHONE (OUTPATIENT)
Dept: SURGERY | Facility: CLINIC | Age: 80
End: 2025-08-07
Payer: MEDICARE

## 2025-08-07 ENCOUNTER — CONSULT (OUTPATIENT)
Dept: RADIATION ONCOLOGY | Facility: HOSPITAL | Age: 80
End: 2025-08-07
Payer: MEDICARE

## 2025-08-07 VITALS
OXYGEN SATURATION: 95 % | WEIGHT: 183 LBS | BODY MASS INDEX: 30.94 KG/M2 | DIASTOLIC BLOOD PRESSURE: 84 MMHG | RESPIRATION RATE: 17 BRPM | SYSTOLIC BLOOD PRESSURE: 165 MMHG | HEART RATE: 69 BPM

## 2025-08-07 DIAGNOSIS — C50.812 MALIGNANT NEOPLASM OF OVERLAPPING SITES OF LEFT BREAST IN FEMALE, ESTROGEN RECEPTOR POSITIVE: Primary | ICD-10-CM

## 2025-08-07 DIAGNOSIS — Z17.0 MALIGNANT NEOPLASM OF OVERLAPPING SITES OF LEFT BREAST IN FEMALE, ESTROGEN RECEPTOR POSITIVE: Primary | ICD-10-CM

## 2025-08-07 PROCEDURE — G0463 HOSPITAL OUTPT CLINIC VISIT: HCPCS

## 2025-08-13 ENCOUNTER — TELEPHONE (OUTPATIENT)
Dept: OTHER | Facility: HOSPITAL | Age: 80
End: 2025-08-13
Payer: MEDICARE

## 2025-08-14 ENCOUNTER — TELEPHONE (OUTPATIENT)
Dept: OTHER | Facility: HOSPITAL | Age: 80
End: 2025-08-14
Payer: MEDICARE

## 2025-08-14 ENCOUNTER — CLINICAL SUPPORT (OUTPATIENT)
Dept: SURGERY | Facility: CLINIC | Age: 80
End: 2025-08-14
Payer: MEDICARE

## 2025-08-15 DIAGNOSIS — E11.40 TYPE 2 DIABETES MELLITUS WITH DIABETIC NEUROPATHY, WITHOUT LONG-TERM CURRENT USE OF INSULIN: Chronic | ICD-10-CM

## 2025-08-18 ENCOUNTER — TELEPHONE (OUTPATIENT)
Dept: OTHER | Facility: HOSPITAL | Age: 80
End: 2025-08-18
Payer: MEDICARE

## 2025-08-18 ENCOUNTER — PATIENT OUTREACH (OUTPATIENT)
Dept: RADIATION ONCOLOGY | Facility: HOSPITAL | Age: 80
End: 2025-08-18
Payer: MEDICARE

## 2025-08-18 RX ORDER — TIRZEPATIDE 15 MG/.5ML
INJECTION, SOLUTION SUBCUTANEOUS
Qty: 2 ML | Refills: 1 | Status: SHIPPED | OUTPATIENT
Start: 2025-08-18

## 2025-08-19 ENCOUNTER — DOCUMENTATION (OUTPATIENT)
Dept: OTHER | Facility: HOSPITAL | Age: 80
End: 2025-08-19
Payer: MEDICARE

## 2025-08-22 ENCOUNTER — OFFICE VISIT (OUTPATIENT)
Dept: SURGERY | Facility: CLINIC | Age: 80
End: 2025-08-22
Payer: MEDICARE

## 2025-08-22 VITALS
HEIGHT: 64 IN | SYSTOLIC BLOOD PRESSURE: 152 MMHG | HEART RATE: 75 BPM | DIASTOLIC BLOOD PRESSURE: 82 MMHG | WEIGHT: 183 LBS | RESPIRATION RATE: 17 BRPM | OXYGEN SATURATION: 97 % | BODY MASS INDEX: 31.24 KG/M2

## 2025-08-22 DIAGNOSIS — C50.812 MALIGNANT NEOPLASM OF OVERLAPPING SITES OF LEFT BREAST IN FEMALE, ESTROGEN RECEPTOR POSITIVE: ICD-10-CM

## 2025-08-22 DIAGNOSIS — Z17.0 MALIGNANT NEOPLASM OF OVERLAPPING SITES OF LEFT BREAST IN FEMALE, ESTROGEN RECEPTOR POSITIVE: ICD-10-CM

## 2025-08-22 DIAGNOSIS — Z48.89 POSTOPERATIVE VISIT: Primary | ICD-10-CM

## 2025-08-22 PROCEDURE — 99024 POSTOP FOLLOW-UP VISIT: CPT | Performed by: SURGERY

## 2025-08-22 PROCEDURE — 1159F MED LIST DOCD IN RCRD: CPT | Performed by: SURGERY

## 2025-08-22 PROCEDURE — 3077F SYST BP >= 140 MM HG: CPT | Performed by: SURGERY

## 2025-08-22 PROCEDURE — 1160F RVW MEDS BY RX/DR IN RCRD: CPT | Performed by: SURGERY

## 2025-08-22 PROCEDURE — 3079F DIAST BP 80-89 MM HG: CPT | Performed by: SURGERY

## 2025-08-26 ENCOUNTER — TELEPHONE (OUTPATIENT)
Dept: RADIATION ONCOLOGY | Facility: HOSPITAL | Age: 80
End: 2025-08-26
Payer: MEDICARE

## (undated) DEVICE — BNDG,ELSTC,MATRIX,STRL,6"X5YD,LF,HOOK&LP: Brand: MEDLINE

## (undated) DEVICE — RESERVOIR,SUCTION,100CC,SILICONE: Brand: MEDLINE

## (undated) DEVICE — LEGGINGS, PAIR, 31X48, STERILE: Brand: MEDLINE

## (undated) DEVICE — PK CATH CARD 40

## (undated) DEVICE — SYNTHETIC CONTROLCATH TD CATHETER: Brand: SWAN-GANZ CONTROLCATH

## (undated) DEVICE — TOWEL,OR,DSP,ST,BLUE,STD,4/PK,20PK/CS: Brand: MEDLINE

## (undated) DEVICE — INTRO SHEATH ART/FEM ENGAGE .035 7F12CM

## (undated) DEVICE — NDL HYPO PRECISIONGLIDE REG 25G 1 1/2

## (undated) DEVICE — APPL CHLORAPREP HI/LITE 26ML ORNG

## (undated) DEVICE — DRAIN,WOUND,15FR,3/16,FULL-FLUTED: Brand: MEDLINE

## (undated) DEVICE — SYR LL TP 10ML STRL

## (undated) DEVICE — SYR LUERLOK 5CC

## (undated) DEVICE — STCKNT IMPERV 9X36IN STRL

## (undated) DEVICE — ANTIBACTERIAL UNDYED BRAIDED (POLYGLACTIN 910), SYNTHETIC ABSORBABLE SUTURE: Brand: COATED VICRYL

## (undated) DEVICE — KT MANIFLD CARDIAC

## (undated) DEVICE — BANDAGE,GAUZE,BULKEE II,4.5"X4.1YD,STRL: Brand: MEDLINE

## (undated) DEVICE — DRP SLUSH WARMR MACH CIR 44X44IN

## (undated) DEVICE — EXOFIN PRECISION PEN HIGH VISCOSITY TOPICAL SKIN ADHESIVE: Brand: EXOFIN PRECISION PEN, 1G

## (undated) DEVICE — GLV SURG SENSICARE POLYISPRN W/ALOE PF LF 6.5 GRN STRL

## (undated) DEVICE — GLV SURG SENSICARE PI MIC PF SZ6.5 LF STRL

## (undated) DEVICE — MEDICINE CUP, GRADUATED, STER: Brand: MEDLINE

## (undated) DEVICE — PATIENT RETURN ELECTRODE, SINGLE-USE, CONTACT QUALITY MONITORING, ADULT, WITH 9FT CORD, FOR PATIENTS WEIGING OVER 33LBS. (15KG): Brand: MEGADYNE

## (undated) DEVICE — SUT SILK 2/0 SH 30IN K833H

## (undated) DEVICE — INTENDED FOR TISSUE SEPARATION, AND OTHER PROCEDURES THAT REQUIRE A SHARP SURGICAL BLADE TO PUNCTURE OR CUT.: Brand: BARD-PARKER ® STAINLESS STEEL BLADES

## (undated) DEVICE — GOWN,SIRUS,NON REINFRCD,LARGE,SET IN SL: Brand: MEDLINE

## (undated) DEVICE — PK UNIV COMPL 40

## (undated) DEVICE — SUT MNCRYL PLS ANTIB UD 4/0 PS2 18IN

## (undated) DEVICE — STPLR SKIN VISISTAT WD 35CT

## (undated) DEVICE — SUT ANTIBAC VICRYL/PLS ABS TIE COAT SZ3/0 12X18IN UD

## (undated) DEVICE — ST. SORBAVIEW ULTIMATE IJ SYSTEM A,C: Brand: CENTURION

## (undated) DEVICE — CVR TRANSD CIV FLX TPR 11.9 TO 3.8X61CM

## (undated) DEVICE — BIOPATCH™ ANTIMICROBIAL DRESSING WITH CHLORHEXIDINE GLUCONATE IS A HYDROPHILLIC POLYURETHANE ABSORPTIVE FOAM WITH CHLORHEXIDINE GLUCONATE (CHG) WHICH INHIBITS BACTERIAL GROWTH UNDER THE DRESSING. THE DRESSING IS INTENDED TO BE USED TO ABSORB EXUDATE, COVER A WOUND CAUSED BY VASCULAR AND NONVASCULAR PERCUTANEOUS MEDICAL DEVICES DURING SURGERY, AS WELL AS REDUCE LOCAL INFECTION AND COLONIZATION OF MICROORGANISMS.: Brand: BIOPATCH